# Patient Record
Sex: FEMALE | Race: BLACK OR AFRICAN AMERICAN | Employment: OTHER | URBAN - METROPOLITAN AREA
[De-identification: names, ages, dates, MRNs, and addresses within clinical notes are randomized per-mention and may not be internally consistent; named-entity substitution may affect disease eponyms.]

---

## 2017-01-05 ENCOUNTER — OFFICE VISIT (OUTPATIENT)
Dept: CARDIOLOGY CLINIC | Age: 77
End: 2017-01-05

## 2017-01-05 ENCOUNTER — DOCUMENTATION ONLY (OUTPATIENT)
Dept: CARDIOLOGY CLINIC | Age: 77
End: 2017-01-05

## 2017-01-05 VITALS
HEIGHT: 62 IN | RESPIRATION RATE: 20 BRPM | WEIGHT: 203 LBS | DIASTOLIC BLOOD PRESSURE: 72 MMHG | OXYGEN SATURATION: 97 % | HEART RATE: 101 BPM | SYSTOLIC BLOOD PRESSURE: 132 MMHG | BODY MASS INDEX: 37.36 KG/M2

## 2017-01-05 DIAGNOSIS — E78.5 DYSLIPIDEMIA: ICD-10-CM

## 2017-01-05 DIAGNOSIS — R06.09 DYSPNEA ON EXERTION: Primary | ICD-10-CM

## 2017-01-05 DIAGNOSIS — I20.8 ANGINAL EQUIVALENT (HCC): ICD-10-CM

## 2017-01-05 DIAGNOSIS — I20.9 ISCHEMIC CHEST PAIN (HCC): Primary | ICD-10-CM

## 2017-01-05 DIAGNOSIS — E11.9 TYPE 2 DIABETES MELLITUS WITHOUT COMPLICATION, UNSPECIFIED LONG TERM INSULIN USE STATUS: ICD-10-CM

## 2017-01-05 RX ORDER — SODIUM CHLORIDE 0.9 % (FLUSH) 0.9 %
5-10 SYRINGE (ML) INJECTION EVERY 8 HOURS
Status: CANCELLED | OUTPATIENT
Start: 2017-01-06

## 2017-01-05 RX ORDER — SODIUM CHLORIDE 0.9 % (FLUSH) 0.9 %
5-10 SYRINGE (ML) INJECTION AS NEEDED
Status: CANCELLED | OUTPATIENT
Start: 2017-01-06

## 2017-01-05 NOTE — PROGRESS NOTES
Right/Left heart cath with  tomorrrow 1-6 at 3:00 pm.Arrival time of 1:00pm.Npo after midnight,labs current. Hold Lasix in am prior to procedure. Pt advised of instructions

## 2017-01-05 NOTE — MR AVS SNAPSHOT
Visit Information Date & Time Provider Department Dept. Phone Encounter #  
 1/5/2017  3:00 PM Lizzy Agee MD [facility] [phone] [id number] Upcoming Health Maintenance Date Due  
 EYE EXAM RETINAL OR DILATED Q1 1/3/1950 GLAUCOMA SCREENING Q2Y 1/3/2005 INFLUENZA AGE 9 TO ADULT 8/1/2016 HEMOGLOBIN A1C Q6M 1/12/2017 MICROALBUMIN Q1 7/12/2017 LIPID PANEL Q1 7/12/2017 FOOT EXAM Q1 7/21/2017 MEDICARE YEARLY EXAM 7/22/2017 Pneumococcal 65+ Low/Medium Risk (2 of 2 - PPSV23) 11/15/2017 DTaP/Tdap/Td series (2 - Td) 7/21/2026 Allergies as of 1/5/2017  Review Complete On: 1/5/2017 By: Rebecca Merino Severity Noted Reaction Type Reactions Sulfa (Sulfonamide Antibiotics) High 03/11/2010    Hives, Itching Current Immunizations  Reviewed on 11/15/2016 No immunizations on file. Not reviewed this visit You Were Diagnosed With   
  
 Codes Comments Dyspnea on exertion    -  Primary ICD-10-CM: R06.09 
ICD-9-CM: 786.09 Dyslipidemia     ICD-10-CM: E78.5 ICD-9-CM: 272.4 Type 2 diabetes mellitus without complication, unspecified long term insulin use status (HCC)     ICD-10-CM: E11.9 ICD-9-CM: 250.00 Vitals BP Pulse Resp Height(growth percentile) Weight(growth percentile) SpO2  
 132/72 (BP 1 Location: Right arm) (!) 101 20 5' 2\" (1.575 m) 203 lb (92.1 kg) 97% BMI OB Status Smoking Status 37.13 kg/m2 Hysterectomy Former Smoker Vitals History BMI and BSA Data Body Mass Index Body Surface Area  
 37.13 kg/m 2 2.01 m 2 Preferred Pharmacy Pharmacy Name Phone [facility] [address] [phone] Your Updated Medication List  
  
   
This list is accurate as of: 1/5/17  4:18 PM.  Always use your most recent med list.  
  
  
  
  
 acetaminophen 650 mg CR tablet Commonly known as:  TYLENOL  
 Take 650 mg by mouth every six (6) hours as needed for Pain. alcohol swabs Padm Commonly known as:  BD Single Use Swabs Regular  
100 Each by Apply Externally route daily. alendronate 70 mg tablet Commonly known as:  FOSAMAX TAKE 1 TABLET EVERY 7 DAYS  
  
 atorvastatin 40 mg tablet Commonly known as:  LIPITOR Take 40 mg by mouth once over twenty-four (24) hours. Blood-Glucose Meter monitoring kit  
accu-check alexei plus meter Test blood sugar once daily E11.9 Calcium Carbonate-Vit D3-Min 600 mg calcium- 400 unit Tab Take  by mouth two (2) times a day. furosemide 20 mg tablet Commonly known as:  LASIX Take  by mouth every Monday, Wednesday, Friday. gabapentin 600 mg tablet Commonly known as:  NEURONTIN Take  by mouth three (3) times daily. glipiZIDE 5 mg tablet Commonly known as:  Ivon Waylon Take 5 mg by mouth once over twenty-four (24) hours. glucose blood VI test strips strip Commonly known as:  blood glucose test  
Accu-chek alexei plus test strips Test blood sugar once daily E11.9 GLUCOSE CONTROL Soln Generic drug:  Blood Glucose Control, Normal  
1 Bottle by Does Not Apply route daily. Lancets Misc  
accu-chek softclix lancets Check blood sugar daily E11.9  
  
 latanoprost 0.005 % ophthalmic solution Commonly known as:  Farhad Morales Administer 1 Drop to both eyes nightly. nystatin powder Commonly known as:  NYSTOP Apply  to affected area four (4) times daily for 90 days. oxybutynin 5 mg tablet Commonly known as:  OKVJWZUZ Take 1 Tab by mouth three (3) times daily for 270 days. raNITIdine 150 mg tablet Commonly known as:  ZANTAC Take 150 mg by mouth once over twenty-four (24) hours. We Performed the Following AMB POC EKG ROUTINE W/ 12 LEADS, INTER & REP [12675 CPT(R)] To-Do List   
 01/06/2017 1:45 PM  
  Appointment with CATH LAB [facility] VIRTUAL; CATH LAB [facility] at [address] [place] [address] CATH LAB ([phone]) NPO AFTER MIDNIGHT! ROUTINE CASES:  Please arrive 2 hours prior to your scheduled appointment time. If your scheduled appointment is for 7:30am, 8:00am or 8:15am, please arrive by 6:45am.  NON ROUTINE CASES:  1. If you require labs, x-ray, EKG or meds-please arrive 3 hours prior to your appointment time. 2.  If you require hydration prior to your procedure-please arrive 4 hours prior to your appointment time. ** It is the [facility] responsibility to notify the Cath Lab  of any prep required outside of the normal routine case**   Patient needs to arrive 2 hours before their appointment time, unless otherwise instructed. Introducing [facility] SERVICES! Dear Alok Mccall: Thank you for requesting a [employer]â„¢ account. Our records indicate that you already have an active [employer]â„¢ account. You can access your account anytime at [email or web address] Did you know that you can access your hospital and ER discharge instructions at any time in [employer]â„¢? You can also review all of your test results from your hospital stay or ER visit. Additional Information If you have questions, please visit the Frequently Asked Questions section of the [employer]â„¢ website at [email or web address] Remember, [employer]â„¢ is NOT to be used for urgent needs. For medical emergencies, dial 911. Now available from your iPhone and Android! Please provide this summary of care documentation to your next provider. Your primary care clinician is listed as [address]. If you have any questions after today's visit, please call [phone].

## 2017-01-05 NOTE — PROGRESS NOTES
4:36 PM Pt's. Chart reviewed possibly including viewing of labs, test results, imaging results and history and physical for possible cath/angio/EP procedure.

## 2017-01-05 NOTE — PROGRESS NOTES
Micheal Louis MD    [address] [facility], [address] [facility]    Office [phone],U [phone]  Pager [phone]    Jamal Cline is a 68 y.o. female is here for f/u visit . Primary care physician:  Mary French MD    Patient Active Problem List   Diagnosis Code    GERD (gastroesophageal reflux disease) K21.9    Arthritis of left knee M19.90    Pure hypercholesterolemia E78.00    Primary open angle glaucoma H40.1190    Type 2 diabetes mellitus without complication ([facility] [address] N10.7    Osteoporosis M81.0       Chief complaint:  Chief Complaint   Patient presents with    Irregular Heart Beat     afib    Shortness of Breath       Assessment:  Dyspnea -continues to have progressive dyspnea with exertion which has worsened. HLD  DM  Former smoker. However has been exposed to significant secondhand smoking        Plan:   Stress nuc study - Nml/ECHO- EF 68%/CTA-no acute pulmonary process/PFTs done by pulmonologist-patient states that she has been told that her lung function tests were okay. Jillian Hayes - nml  Continue aspirin 81 mg daily/Statin  Since the patient's symptoms of dyspnea are progressively worsening , I am not sure whether this is anginal equivalent in spite of having a normal stress test.  We will proceed with RHC/LHC. ( possible radial/brachial approach)  Cont meds    LHC +/- PCI/RHC consent    The risks (including but not limited to death, myocardial infarction, cerebrovascular accident, dysrhythmia, renal failure +/-dialysis, vascular complication, allergy, and/or need for emergency surgery), indications, benefits, and alternatives of cardiac catheterization +/- PCI have been explained in detail to the patient and family. Patient and family informed that if patient needs surgery  - it will be at [address] as [facility] does not have onsite surgery. All questions answered to patient's satisfaction.  Patient agrees to proceed with the procedure and  informed consent was obtained. Patient evaluated and can be MALDONADO candidate. Galen's R - normal    ASA 3    AW 3    Follow-up in 2-3 weeks postprocedure or earlier prn          Lab Results   Component Value Date/Time    Cholesterol, total 146 07/12/2016 09:37 AM    HDL Cholesterol 56 07/12/2016 09:37 AM    LDL, calculated 69 07/12/2016 09:37 AM    VLDL, calculated 21 07/12/2016 09:37 AM    Triglyceride 106 07/12/2016 09:37 AM    CHOL/HDL Ratio 4.4 09/02/2010 03:17 PM         Patient understands the plan. All questions were answered to the patient's satisfaction. Medication Side Effects and Warnings were discussed with patient: yes  Patient Labs were reviewed and or requested:  yes  Patient Past Records were reviewed and or requested: yes    I appreciate the opportunity to be involved in Ms. Pichardo. See note below for details. Please do not hesitate to contact us with questions or concerns. Manohar Monsalve MD    Cardiac Testing/ Procedures: A. Cardiac Cath/PCI:    B.ECHO/CHIP: 11/30/16 Left ventricle: Systolic function was normal. Ejection fraction was  estimated to be 68 % by Lira's biplane technique. There were no  regional wall motion abnormalities. Doppler parameters were consistent  with abnormal left ventricular relaxation (grade 1 diastolic dysfunction). Mitral valve: There was mild regurgitation. Tricuspid valve: There was near moderate regurgitation. Pulmonary artery  systolic pressure: 33 mmHg. C.StressNuclear/Stress ECHO/Stress test: 11/2016 - Nml Elba nuc stress test    D. Vascular: 12/6/16 - Nml resting NICKOLAS    E. EP:    F. Miscellaneous:    Subjective:  Nehemiah Hernandez is a 68 y.o. female who returns for follow up visit. Patient continues to have dyspnea with exertion. She is accompanied by her daughter. Both the patient and the daughter indicate that the symptoms are getting worse. She can hardly walk a few steps without starting to get short winded. No chest pain.   History of chronic mild swelling lower extremities. Patient was seen by pulmonary and has had LFTs done. She also has had a CTA chest done on 12/16/16-no acute cardiopulmonary process. CBC on 12/15/16-WBC 6, hemoglobin 12.8, platelets 072. Serum creatinine 0.82. K-3.8. Both the patient and the daughter is very worried about the patient's symptoms since it is becoming difficult to move around. ROS:  (bold if positive, if negative)             Medications before admission:    Current Outpatient Prescriptions   Medication Sig Dispense    nystatin (NYSTOP) powder Apply  to affected area four (4) times daily for 90 days. 180 g    glipiZIDE (GLUCOTROL) 5 mg tablet Take 5 mg by mouth once over twenty-four (24) hours.  atorvastatin (LIPITOR) 40 mg tablet Take 40 mg by mouth once over twenty-four (24) hours.  raNITIdine (ZANTAC) 150 mg tablet Take 150 mg by mouth once over twenty-four (24) hours.  alendronate (FOSAMAX) 70 mg tablet TAKE 1 TABLET EVERY 7 DAYS 12 Tab    Blood-Glucose Meter monitoring kit accu-check alexei plus meter Test blood sugar once daily E11.9 1 Kit    glucose blood VI test strips (BLOOD GLUCOSE TEST) strip Accu-chek alexei plus test strips Test blood sugar once daily E11.9 100 Strip    Lancets misc accu-chek softclix lancets Check blood sugar daily E11.9 100 Each    alcohol swabs (BD SINGLE USE SWABS REGULAR) padm 100 Each by Apply Externally route daily. 100 Pad    GLUCOSE CONTROL soln 1 Bottle by Does Not Apply route daily. 1 Bottle    oxybutynin (DITROPAN) 5 mg tablet Take 1 Tab by mouth three (3) times daily for 270 days. 270 Tab    acetaminophen (TYLENOL) 650 mg CR tablet Take 650 mg by mouth every six (6) hours as needed for Pain.  gabapentin (NEURONTIN) 600 mg tablet Take  by mouth three (3) times daily.  latanoprost (XALATAN) 0.005 % ophthalmic solution Administer 1 Drop to both eyes nightly.      Calcium Carbonate-Vit D3-Min 600 mg calcium- 400 unit tab Take  by mouth two (2) times a day.  furosemide (LASIX) 20 mg tablet Take  by mouth every Monday, Wednesday, Friday. No current facility-administered medications for this visit. Physical Exam:  Visit Vitals    /72 (BP 1 Location: Right arm)    Pulse (!) 101    Resp 20    Ht 5' 2\" (1.575 m)    Wt 203 lb (92.1 kg)    SpO2 97%    BMI 37.13 kg/m2          Gen: Well-developed, well-nourished, in no acute distress  Neck: Supple,No JVD, No Carotid Bruit,   Resp: No accessory muscle use, Clear breath sounds, No rales or rhonchi  Card: Regular Rate,Rythm,Normal S1, S2, No murmurs, rubs or gallop. No thrills.    Abd:  Soft, non-tender, non-distended,BS+,   MSK: No cyanosis  Skin: No rashes    Neuro: moving all four extremities , follows commands appropriately  Psych:  Good insight, oriented to person, place , alert, Nml Affect  LE: No edema    EKG:      LABS:        Lab Results   Component Value Date/Time    WBC 8.2 07/12/2016 09:37 AM    HGB 13.0 07/12/2016 09:37 AM    HCT 39.5 07/12/2016 09:37 AM    PLATELET 736 17/08/8584 09:37 AM    MCV 86 07/12/2016 09:37 AM     Lab Results   Component Value Date/Time    Sodium 142 07/12/2016 09:37 AM    Potassium 4.6 07/12/2016 09:37 AM    Chloride 100 07/12/2016 09:37 AM    CO2 25 07/12/2016 09:37 AM    Anion gap 8 07/15/2014 03:34 AM    Glucose 90 07/12/2016 09:37 AM    BUN 11 07/12/2016 09:37 AM    Creatinine 1.06 07/12/2016 09:37 AM    BUN/Creatinine ratio 10 07/12/2016 09:37 AM    GFR est AA 59 07/12/2016 09:37 AM    GFR est non-AA 51 07/12/2016 09:37 AM    Calcium 9.5 07/12/2016 09:37 AM       No results found for: APTT  Lab Results   Component Value Date/Time    INR 1.1 06/16/2014 02:25 PM    Prothrombin time 11.2 06/16/2014 02:25 PM     No components found for: Mitchell Hilliard, MD

## 2017-01-06 ENCOUNTER — HOSPITAL ENCOUNTER (OUTPATIENT)
Dept: CARDIAC CATH/INVASIVE PROCEDURES | Age: 77
Discharge: HOME | End: 2017-01-06
Attending: INTERNAL MEDICINE | Admitting: INTERNAL MEDICINE
Payer: MEDICARE

## 2017-01-06 VITALS
RESPIRATION RATE: 20 BRPM | WEIGHT: 203 LBS | DIASTOLIC BLOOD PRESSURE: 79 MMHG | SYSTOLIC BLOOD PRESSURE: 112 MMHG | HEART RATE: 62 BPM | OXYGEN SATURATION: 95 % | BODY MASS INDEX: 37.36 KG/M2 | HEIGHT: 62 IN | TEMPERATURE: 98.7 F

## 2017-01-06 DIAGNOSIS — I20.9 ISCHEMIC CHEST PAIN (HCC): ICD-10-CM

## 2017-01-06 LAB
ANION GAP BLD CALC-SCNC: 10 MMOL/L (ref 5–15)
BASOPHILS # BLD AUTO: 0 K/UL (ref 0–0.1)
BASOPHILS # BLD: 0 % (ref 0–1)
BUN SERPL-MCNC: 14 MG/DL (ref 6–20)
BUN/CREAT SERPL: 14 (ref 12–20)
CALCIUM SERPL-MCNC: 9.1 MG/DL (ref 8.5–10.1)
CHLORIDE SERPL-SCNC: 104 MMOL/L (ref 97–108)
CO2 SERPL-SCNC: 26 MMOL/L (ref 21–32)
COHGB MFR BLD: 0.6 % (ref 1–2)
COHGB MFR BLD: 0.7 % (ref 1–2)
CREAT SERPL-MCNC: 0.98 MG/DL (ref 0.55–1.02)
EOSINOPHIL # BLD: 0.1 K/UL (ref 0–0.4)
EOSINOPHIL NFR BLD: 1 % (ref 0–7)
ERYTHROCYTE [DISTWIDTH] IN BLOOD BY AUTOMATED COUNT: 14.4 % (ref 11.5–14.5)
GLUCOSE SERPL-MCNC: 77 MG/DL (ref 65–100)
HCT VFR BLD AUTO: 39.1 % (ref 35–47)
HGB BLD OXIMETRY-MCNC: 11.1 G/DL (ref 14–17)
HGB BLD OXIMETRY-MCNC: 12.7 G/DL (ref 14–17)
HGB BLD-MCNC: 12.2 G/DL (ref 11.5–16)
INR PPP: 1.1 (ref 0.9–1.1)
LYMPHOCYTES # BLD AUTO: 23 % (ref 12–49)
LYMPHOCYTES # BLD: 1.6 K/UL (ref 0.8–3.5)
MCH RBC QN AUTO: 27.9 PG (ref 26–34)
MCHC RBC AUTO-ENTMCNC: 31.2 G/DL (ref 30–36.5)
MCV RBC AUTO: 89.3 FL (ref 80–99)
METHGB MFR BLD: 0.2 % (ref 0–1.4)
METHGB MFR BLD: 0.5 % (ref 0–1.4)
MONOCYTES # BLD: 0.4 K/UL (ref 0–1)
MONOCYTES NFR BLD AUTO: 6 % (ref 5–13)
NEUTS SEG # BLD: 4.8 K/UL (ref 1.8–8)
NEUTS SEG NFR BLD AUTO: 70 % (ref 32–75)
OXYHGB MFR BLD: 76.7 % (ref 94–97)
OXYHGB MFR BLD: 96 % (ref 94–97)
PLATELET # BLD AUTO: 247 K/UL (ref 150–400)
POTASSIUM SERPL-SCNC: 3.8 MMOL/L (ref 3.5–5.1)
PROTHROMBIN TIME: 10.7 SEC (ref 9–11.1)
RBC # BLD AUTO: 4.38 M/UL (ref 3.8–5.2)
SAO2 % BLD: 78 % (ref 95–99)
SAO2 % BLD: 97 % (ref 95–99)
SODIUM SERPL-SCNC: 140 MMOL/L (ref 136–145)
WBC # BLD AUTO: 6.9 K/UL (ref 3.6–11)

## 2017-01-06 PROCEDURE — C1751 CATH, INF, PER/CENT/MIDLINE: HCPCS

## 2017-01-06 PROCEDURE — 74011000250 HC RX REV CODE- 250: Performed by: INTERNAL MEDICINE

## 2017-01-06 PROCEDURE — 77030029065 HC DRSG HEMO QCLOT ZMED -B

## 2017-01-06 PROCEDURE — 85610 PROTHROMBIN TIME: CPT | Performed by: INTERNAL MEDICINE

## 2017-01-06 PROCEDURE — 77030004532 HC CATH ANGI DX IMP BSC -A

## 2017-01-06 PROCEDURE — 74011250636 HC RX REV CODE- 250/636: Performed by: INTERNAL MEDICINE

## 2017-01-06 PROCEDURE — 93460 R&L HRT ART/VENTRICLE ANGIO: CPT

## 2017-01-06 PROCEDURE — 77030019569 HC BND COMPR RAD TERU -B

## 2017-01-06 PROCEDURE — 85025 COMPLETE CBC W/AUTO DIFF WBC: CPT | Performed by: INTERNAL MEDICINE

## 2017-01-06 PROCEDURE — 77030028837 HC SYR ANGI PWR INJ COEU -A

## 2017-01-06 PROCEDURE — C1894 INTRO/SHEATH, NON-LASER: HCPCS

## 2017-01-06 PROCEDURE — 36415 COLL VENOUS BLD VENIPUNCTURE: CPT | Performed by: INTERNAL MEDICINE

## 2017-01-06 PROCEDURE — C1769 GUIDE WIRE: HCPCS

## 2017-01-06 PROCEDURE — 82375 ASSAY CARBOXYHB QUANT: CPT | Performed by: INTERNAL MEDICINE

## 2017-01-06 PROCEDURE — 74011636320 HC RX REV CODE- 636/320: Performed by: INTERNAL MEDICINE

## 2017-01-06 PROCEDURE — 80048 BASIC METABOLIC PNL TOTAL CA: CPT | Performed by: INTERNAL MEDICINE

## 2017-01-06 RX ORDER — HEPARIN SODIUM 200 [USP'U]/100ML
500 INJECTION, SOLUTION INTRAVENOUS
Status: DISCONTINUED | OUTPATIENT
Start: 2017-01-06 | End: 2017-01-06 | Stop reason: HOSPADM

## 2017-01-06 RX ORDER — MIDAZOLAM HYDROCHLORIDE 1 MG/ML
.5-1 INJECTION, SOLUTION INTRAMUSCULAR; INTRAVENOUS
Status: DISCONTINUED | OUTPATIENT
Start: 2017-01-06 | End: 2017-01-06 | Stop reason: HOSPADM

## 2017-01-06 RX ORDER — DIPHENHYDRAMINE HYDROCHLORIDE 50 MG/ML
25 INJECTION, SOLUTION INTRAMUSCULAR; INTRAVENOUS
Status: DISCONTINUED | OUTPATIENT
Start: 2017-01-06 | End: 2017-01-06 | Stop reason: HOSPADM

## 2017-01-06 RX ORDER — SODIUM CHLORIDE 9 MG/ML
75 INJECTION, SOLUTION INTRAVENOUS CONTINUOUS
Status: DISCONTINUED | OUTPATIENT
Start: 2017-01-06 | End: 2017-01-06 | Stop reason: HOSPADM

## 2017-01-06 RX ORDER — ACETAMINOPHEN 325 MG/1
650 TABLET ORAL
Status: DISCONTINUED | OUTPATIENT
Start: 2017-01-06 | End: 2017-01-06 | Stop reason: HOSPADM

## 2017-01-06 RX ORDER — HYDROCORTISONE SODIUM SUCCINATE 100 MG/2ML
100 INJECTION, POWDER, FOR SOLUTION INTRAMUSCULAR; INTRAVENOUS
Status: DISCONTINUED | OUTPATIENT
Start: 2017-01-06 | End: 2017-01-06 | Stop reason: HOSPADM

## 2017-01-06 RX ORDER — VERAPAMIL HYDROCHLORIDE 2.5 MG/ML
2.5-5 INJECTION, SOLUTION INTRAVENOUS
Status: DISCONTINUED | OUTPATIENT
Start: 2017-01-06 | End: 2017-01-06 | Stop reason: HOSPADM

## 2017-01-06 RX ORDER — HEPARIN SODIUM 1000 [USP'U]/ML
1000-5000 INJECTION, SOLUTION INTRAVENOUS; SUBCUTANEOUS
Status: DISCONTINUED | OUTPATIENT
Start: 2017-01-06 | End: 2017-01-06 | Stop reason: HOSPADM

## 2017-01-06 RX ORDER — SODIUM CHLORIDE 0.9 % (FLUSH) 0.9 %
5-10 SYRINGE (ML) INJECTION EVERY 8 HOURS
Status: DISCONTINUED | OUTPATIENT
Start: 2017-01-06 | End: 2017-01-06 | Stop reason: HOSPADM

## 2017-01-06 RX ORDER — SODIUM CHLORIDE 9 MG/ML
100 INJECTION, SOLUTION INTRAVENOUS CONTINUOUS
Status: DISCONTINUED | OUTPATIENT
Start: 2017-01-06 | End: 2017-01-06 | Stop reason: HOSPADM

## 2017-01-06 RX ORDER — LIDOCAINE HYDROCHLORIDE 10 MG/ML
1-20 INJECTION INFILTRATION; PERINEURAL
Status: DISCONTINUED | OUTPATIENT
Start: 2017-01-06 | End: 2017-01-06 | Stop reason: HOSPADM

## 2017-01-06 RX ORDER — SODIUM CHLORIDE 0.9 % (FLUSH) 0.9 %
5-10 SYRINGE (ML) INJECTION AS NEEDED
Status: DISCONTINUED | OUTPATIENT
Start: 2017-01-06 | End: 2017-01-06 | Stop reason: HOSPADM

## 2017-01-06 RX ORDER — FENTANYL CITRATE 50 UG/ML
25-200 INJECTION, SOLUTION INTRAMUSCULAR; INTRAVENOUS
Status: DISCONTINUED | OUTPATIENT
Start: 2017-01-06 | End: 2017-01-06 | Stop reason: HOSPADM

## 2017-01-06 RX ADMIN — VERAPAMIL HYDROCHLORIDE 2.5 MG: 2.5 INJECTION, SOLUTION INTRAVENOUS at 14:40

## 2017-01-06 RX ADMIN — MIDAZOLAM HYDROCHLORIDE 1 MG: 1 INJECTION, SOLUTION INTRAMUSCULAR; INTRAVENOUS at 13:48

## 2017-01-06 RX ADMIN — HEPARIN SODIUM 1000 UNITS: 200 INJECTION, SOLUTION INTRAVENOUS at 13:40

## 2017-01-06 RX ADMIN — LIDOCAINE HYDROCHLORIDE 15 ML: 10 INJECTION, SOLUTION INFILTRATION; PERINEURAL at 13:50

## 2017-01-06 RX ADMIN — FENTANYL CITRATE 50 MCG: 50 INJECTION, SOLUTION INTRAMUSCULAR; INTRAVENOUS at 13:40

## 2017-01-06 RX ADMIN — MIDAZOLAM HYDROCHLORIDE 1 MG: 1 INJECTION, SOLUTION INTRAMUSCULAR; INTRAVENOUS at 14:19

## 2017-01-06 RX ADMIN — MIDAZOLAM HYDROCHLORIDE 1 MG: 1 INJECTION, SOLUTION INTRAMUSCULAR; INTRAVENOUS at 14:03

## 2017-01-06 RX ADMIN — IOPAMIDOL 80 ML: 755 INJECTION, SOLUTION INTRAVENOUS at 14:43

## 2017-01-06 RX ADMIN — MIDAZOLAM HYDROCHLORIDE 2 MG: 1 INJECTION, SOLUTION INTRAMUSCULAR; INTRAVENOUS at 14:32

## 2017-01-06 RX ADMIN — MIDAZOLAM HYDROCHLORIDE 1 MG: 1 INJECTION, SOLUTION INTRAMUSCULAR; INTRAVENOUS at 14:29

## 2017-01-06 RX ADMIN — SODIUM CHLORIDE 100 ML/HR: 900 INJECTION, SOLUTION INTRAVENOUS at 15:46

## 2017-01-06 RX ADMIN — SODIUM CHLORIDE 75 ML/HR: 900 INJECTION, SOLUTION INTRAVENOUS at 12:33

## 2017-01-06 RX ADMIN — FENTANYL CITRATE 25 MCG: 50 INJECTION, SOLUTION INTRAMUSCULAR; INTRAVENOUS at 13:51

## 2017-01-06 RX ADMIN — NITROGLYCERIN 100 MCG: 5 INJECTION, SOLUTION INTRAVENOUS at 14:22

## 2017-01-06 RX ADMIN — FENTANYL CITRATE 25 MCG: 50 INJECTION, SOLUTION INTRAMUSCULAR; INTRAVENOUS at 14:20

## 2017-01-06 RX ADMIN — NITROGLYCERIN 100 MCG: 5 INJECTION, SOLUTION INTRAVENOUS at 14:30

## 2017-01-06 RX ADMIN — VERAPAMIL HYDROCHLORIDE 2.5 MG: 2.5 INJECTION, SOLUTION INTRAVENOUS at 13:48

## 2017-01-06 RX ADMIN — LIDOCAINE HYDROCHLORIDE 7 ML: 10 INJECTION, SOLUTION INFILTRATION; PERINEURAL at 13:44

## 2017-01-06 RX ADMIN — NITROGLYCERIN 100 MCG: 5 INJECTION, SOLUTION INTRAVENOUS at 14:40

## 2017-01-06 RX ADMIN — MIDAZOLAM HYDROCHLORIDE 1 MG: 1 INJECTION, SOLUTION INTRAMUSCULAR; INTRAVENOUS at 14:08

## 2017-01-06 RX ADMIN — HEPARIN SODIUM 3000 UNITS: 1000 INJECTION, SOLUTION INTRAVENOUS; SUBCUTANEOUS at 14:29

## 2017-01-06 RX ADMIN — MIDAZOLAM HYDROCHLORIDE 3 MG: 1 INJECTION, SOLUTION INTRAMUSCULAR; INTRAVENOUS at 13:40

## 2017-01-06 RX ADMIN — NITROGLYCERIN 100 MCG: 5 INJECTION, SOLUTION INTRAVENOUS at 13:47

## 2017-01-06 NOTE — PROCEDURES
BRIEF PROCEDURE NOTE    Date of Procedure: 1/6/2017   Preoperative Diagnosis: Dyspnea - ? Anginal equivalent - CCS 3  Postoperative Diagnosis: No sig CAD  Procedure: Left heart cath, LV angiography, coronary angiography  Interventional Cardiologist: Mai Kingsley MD  Anesthesia: local + IV sedation  Estimated Blood Loss: Minimal  Findings:     R radial approach  Attempted R brachial vein - unable to get access; Switched to R Femoral vein    R SCV angiogram  R Brachial vein venogram    L Main: Nml    LAD: Mid 30%; Med; D1 - MLI    LCflex: Med; MLI; OM1 - small to med; MLI    RCA:  Med to large; Dominant; Nml      LVEF: 60%    No significant gradient across aortic valve. PCI: none        RHC findings:    RAPm8=       mmHg  RVSP= 42    mmHg  PAPm=  16      mmHg  PCWPm= 9   mmHg    Specimens Removed : None    Closure Device: TR band R  Radial                              Manual - R Femoral vein        See full cath note.     Complications: none    Mai Kingsley MD

## 2017-01-06 NOTE — DISCHARGE INSTRUCTIONS
Kori Gastelum MD    [address] [facility], [address] [facility]    Office [phone],ABD [phone]    Patient ID:  Court Sims  [id number]  86 y.o.  1940    Admit Date: 1/6/2017    Discharge Date: 1/6/2017     Admitting Physician: Kori Gastelum MD     Discharge Physician: Kori Gastelum MD    Admission Diagnoses:   [facility]    Discharge Diagnoses:   No sig CAD    Discharge Condition: Good    Cardiology Procedures this Admission:  Diagnostic left heart catheterization    Disposition: home    Patient Instructions:         Reference discharge instructions provided by nursing for diet and activity. Follow-up with Dr Alexa Shepard as scheduled    Signed:  Kori Gastelum MD  1/6/2017  3:14 PM      Radial Cardiac Catheterization/Angiography Discharge Instructions    It is normal to feel tired the first couple days. Take it easy and follow the physicians instructions. CHECK THE CATHETER INSERTION SITE DAILY:    Remove the wrist dressing 24 hours after the procedure. You may shower 24 hours after the procedure. Wash with soap and water and pat dry. Gentle cleaning of the site with soap and water is sufficient, cover with a dry clean dressing or bandage. Do not apply creams or powders to the area. No soaking the wrist for 3 days. Leave the puncture site open to air after 24 hours post-procedure. CALL THE PHYSICIANS:     If the site becomes red, swollen or feels warm to the touch  If there is bleeding or drainage or if there is unusual pain at the radial site. If there is any minor oozing, you may apply a band-aid and remove after 12 hours. If the bleeding continues, hold pressure with the middle finger against the puncture site and the thumb against the back of the wrist,call 911 to be transported to the hospital.  DO NOT DRIVE YOURSELF, [address]     ACTIVITY:   For the first 24 hours do not manipulate the wrist.  No lifting, pushing or pulling over 3-5 pounds with the affected wrist for 7 daysand no straining the insertion site. Do not life grocery bags or the garbage can, do not run the vacuum  or  for 7 days. Start with short walks as in the hospital and gradually increase as tolerated each day. It is recommended to walk 30 minutes 5-7 days per week. Follow your physicians instructions on activity. Avoid walking outside in extremes of heat or cold. Walk inside when it is cold and windy or hot and humid. Things to keep in mind:  No driving for at least 24 hours, or as designated by your physician. Limit the number of times you go up and down the stairs  Take rests and pace yourself with activity. Be careful and do not strain with bowel movements. MEDICATIONS:    Take all medications as prescribed  Call your physician if you have any questions  Keep an updated list of your medications with you at all times and give a list to your physician and pharmacist    SIGNS AND SYMPTOMS:   Be cautious of symptoms of angina or recurrent symptoms such as chest discomfort, unusual shortness of breath or fatigue. These could be symptoms of restenosis, a new blockage or a heart attack. If your symptoms are relieved with rest it is still recommended that you notify your physician of recurrent chest pain or discomfort. For CHEST PAIN or symptoms of angina not relieved with rest:  If the discomfort is not relieved with rest, and you have been prescribed Nitroglycerin, take as directed (taken under the tongue, one at a time 5 minutes apart for a total of 3 doses). If the discomfort is not relieved after the 3rd nitroglycerin, call 911. If you have not been prescribed Nitroglycerin  and your chest discomfort is not relieved with rest, call 911. AFTER CARE:   Follow up with your physician as instructed.   Follow a heart healthy diet with proper portion control, daily stress management, daily exercise, blood pressure and cholesterol control , and smoking cessation.

## 2017-01-06 NOTE — IP AVS SNAPSHOT
[address] 
[phone] Patient: Rigoberto Cruz MRN: [id number] EHU:[id number]/[id number] You are allergic to the following Allergen Reactions Sulfa (Sulfonamide Antibiotics) Hives Itching Recent Documentation Height Weight BMI OB Status Smoking Status 1.575 m 92.1 kg 37.13 kg/m2 Hysterectomy Former Smoker Emergency Contacts Name Discharge Info Relation Home Work Mobile Nafisa Ferreira CAREGIVER [3] Daughter [21] [phone] Siomara Borrego  Child [2] [phone] About your hospitalization You were admitted on:  January 6, 2017 You last received care in the:  [facility] PACU You were discharged on:  January 6, 2017 Unit phone number:  [phone] Why you were hospitalized Your primary diagnosis was:  Not on File Providers Seen During Your Hospitalizations Provider Role Specialty Primary office phone Julius Bright MD Attending Provider Cardiology [phone] Your Primary Care Physician (PCP) Primary Care Physician Office Phone Office Fax George Trivedi [phone] Follow-up Information Follow up With Details Comments Contact Info MD Marilynn Haro [address] 
[phone] Your Appointments Tuesday January 10, 2017 10:00 AM EST  
ROUTINE CARE with Umesh Leggett MD  
[address] [place] [address] [facility] [address]ilynn [address]  
[phone] Friday January 27, 2017 11:20 AM EST  
ESTABLISHED PATIENT with Julius Bright MD  
[facility] ([facility]) [address] [place] [address] [phone] [facility]  
[phone] Current Discharge Medication List  
  
 CONTINUE these medications which have NOT CHANGED Dose & Instructions Dispensing Information Comments Morning Noon Evening Bedtime  
 acetaminophen 650 mg CR tablet Commonly known as:  TYLENOL Your next dose is: Today, Tomorrow Other:  _________ Dose:  650 mg Take 650 mg by mouth every six (6) hours as needed for Pain. Refills:  0  
     
   
   
   
  
 alcohol swabs Padm Commonly known as:  BD Single Use Swabs Regular Your next dose is: Today, Tomorrow Other:  _________ Dose:  100 Each  
100 Each by Apply Externally route daily. Quantity:  100 Pad Refills:  1  
     
   
   
   
  
 alendronate 70 mg tablet Commonly known as:  FOSAMAX Your next dose is: Today, Tomorrow Other:  _________ TAKE 1 TABLET EVERY 7 DAYS Quantity:  12 Tab Refills:  0  
     
   
   
   
  
 atorvastatin 40 mg tablet Commonly known as:  LIPITOR Your next dose is: Today, Tomorrow Other:  _________ Dose:  40 mg Take 40 mg by mouth once over twenty-four (24) hours. Refills:  0 Blood-Glucose Meter monitoring kit Your next dose is: Today, Tomorrow Other:  _________  
   
   
 accu-check alexei plus meter Test blood sugar once daily E11.9 Quantity:  1 Kit Refills:  0 Calcium Carbonate-Vit D3-Min 600 mg calcium- 400 unit Tab Your next dose is: Today, Tomorrow Other:  _________ Take  by mouth two (2) times a day. Refills:  0  
     
   
   
   
  
 furosemide 20 mg tablet Commonly known as:  LASIX Your next dose is: Today, Tomorrow Other:  _________ Take  by mouth every Monday, Wednesday, Friday. Refills:  0  
     
   
   
   
  
 gabapentin 600 mg tablet Commonly known as:  NEURONTIN Your next dose is: Today, Tomorrow Other:  _________ Take  by mouth three (3) times daily. Refills:  0  
     
   
   
   
  
 glipiZIDE 5 mg tablet Commonly known as:  Noam Cavazos Your next dose is: Today, Tomorrow Other:  _________ Dose:  5 mg Take 5 mg by mouth once over twenty-four (24) hours. Refills:  0  
     
   
   
   
  
 glucose blood VI test strips strip Commonly known as:  blood glucose test  
   
Your next dose is: Today, Tomorrow Other:  _________ Accu-chek alexei plus test strips Test blood sugar once daily E11.9 Quantity:  100 Strip Refills:  1 GLUCOSE CONTROL Soln Generic drug:  Blood Glucose Control, Normal  
   
Your next dose is: Today, Tomorrow Other:  _________ Dose:  1 Bottle 1 Bottle by Does Not Apply route daily. Quantity:  1 Bottle Refills:  1 Lancets Misc Your next dose is: Today, Tomorrow Other:  _________  
   
   
 accu-chek softclix lancets Check blood sugar daily E11.9 Quantity:  100 Each Refills:  1  
     
   
   
   
  
 latanoprost 0.005 % ophthalmic solution Commonly known as:  Ede Sarah Your next dose is: Today, Tomorrow Other:  _________ Dose:  1 Drop Administer 1 Drop to both eyes nightly. Refills:  0  
     
   
   
   
  
 nystatin powder Commonly known as:  NYSTOP Your next dose is: Today, Tomorrow Other:  _________ Apply  to affected area four (4) times daily for 90 days. Quantity:  180 g Refills:  3  
     
   
   
   
  
 oxybutynin 5 mg tablet Commonly known as:  BOUUHKVN Your next dose is: Today, Tomorrow Other:  _________ Dose:  5 mg Take 1 Tab by mouth three (3) times daily for 270 days. Quantity:  270 Tab Refills:  1  
     
   
   
   
  
 raNITIdine 150 mg tablet Commonly known as:  ZANTAC Your next dose is: Today, Tomorrow Other:  _________ Dose:  150 mg Take 150 mg by mouth once over twenty-four (24) hours. Refills:  0 Discharge Instructions Brooks Holt MD 
 
[address] [facility] [address] [facility] Office [phone] Patient ID: 
Srikanth Pope [id number] 
39 y.o. 
1940 Admit Date: 1/6/2017 Discharge Date: 1/6/2017 Admitting Physician: Brooks Holt MD  
 
Discharge Physician: Brooks Holt MD 
 
Admission Diagnoses:  
R&LHC Discharge Diagnoses: No sig CAD Discharge Condition: Good Cardiology Procedures this Admission:  Diagnostic left heart catheterization Disposition: home Patient Instructions:  
 
 
 
Reference discharge instructions provided by nursing for diet and activity. Follow-up with Dr Bhupendra Resendiz as scheduled Signed: 
Brooks Holt MD 
1/6/2017 
3:14 PM 
 
 
Radial Cardiac Catheterization/Angiography Discharge Instructions It is normal to feel tired the first couple days. Take it easy and follow the physicians instructions. CHECK THE CATHETER INSERTION SITE DAILY: 
 
Remove the wrist dressing 24 hours after the procedure. You may shower 24 hours after the procedure. Wash with soap and water and pat dry. Gentle cleaning of the site with soap and water is sufficient, cover with a dry clean dressing or bandage. Do not apply creams or powders to the area. No soaking the wrist for 3 days. Leave the puncture site open to air after 24 hours post-procedure. CALL THE PHYSICIANS:  
 
If the site becomes red, swollen or feels warm to the touch If there is bleeding or drainage or if there is unusual pain at the radial site. If there is any minor oozing, you may apply a band-aid and remove after 12 hours.   
If the bleeding continues, hold pressure with the middle finger against the puncture site and the thumb against the back of the wrist,call 911 to be transported to the hospital. 
DO NOT DRIVE YOURSELF, OR HAVE ANYONE ELSE DRIVE YOU  CALL [phone]. ACTIVITY:  
For the first 24 hours do not manipulate the wrist. 
No lifting, pushing or pulling over 3-5 pounds with the affected wrist for 7 daysand no straining the insertion site. Do not life grocery bags or the garbage can, do not run the vacuum  or  for 7 days. Start with short walks as in the hospital and gradually increase as tolerated each day. It is recommended to walk 30 minutes 5-7 days per week. Follow your physicians instructions on activity. Avoid walking outside in extremes of heat or cold. Walk inside when it is cold and windy or hot and humid. Things to keep in mind: 
No driving for at least 24 hours, or as designated by your physician. Limit the number of times you go up and down the stairs Take rests and pace yourself with activity. Be careful and do not strain with bowel movements. MEDICATIONS: 
 
Take all medications as prescribed Call your physician if you have any questions Keep an updated list of your medications with you at all times and give a list to your physician and pharmacist 
 
SIGNS AND SYMPTOMS:  
Be cautious of symptoms of angina or recurrent symptoms such as chest discomfort, unusual shortness of breath or fatigue. These could be symptoms of restenosis, a new blockage or a heart attack. If your symptoms are relieved with rest it is still recommended that you notify your physician of recurrent chest pain or discomfort. For CHEST PAIN or symptoms of angina not relieved with rest:  If the discomfort is not relieved with rest, and you have been prescribed Nitroglycerin, take as directed (taken under the tongue, one at a time 5 minutes apart for a total of 3 doses). If the discomfort is not relieved after the 3rd nitroglycerin, call 911. If you have not been prescribed Nitroglycerin  and your chest discomfort is not relieved with rest, call 911. AFTER CARE:  
Follow up with your physician as instructed. Follow a heart healthy diet with proper portion control, daily stress management, daily exercise, blood pressure and cholesterol control , and smoking cessation. Discharge Orders None [facility]! Dear Anel Conde: Thank you for requesting a [employer] account. Our records indicate that you already have an active [employer] account. You can access your account anytime at [email or web address] Did you know that you can access your hospital and ER discharge instructions at any time in [employer]? You can also review all of your test results from your hospital stay or ER visit. Additional Information If you have questions, please visit the Frequently Asked Questions section of the [employer] website at [email or web address] Remember, [employer] is NOT to be used for urgent needs. For medical emergencies, dial 911. Now available from your iPhone and Android! General Information Please provide this summary of care documentation to your next provider. Patient Signature:  ____________________________________________________________ Date:  ____________________________________________________________  
  
Earnstine Rosales Provider Signature:  ____________________________________________________________ Date:  ____________________________________________________________

## 2017-01-06 NOTE — PROGRESS NOTES
Chart reviewed for Pre-Procedure evaluation and documentation. -- Areas reviewed, BUT NOT LIMITED TO, are Patient's current and past H&P, Labs, all Notes, all Media records, radiology records, and medications.

## 2017-01-06 NOTE — PROGRESS NOTES
11:53 AM Received patient from waiting area. Armband and allergies verbally confirmed with patient. Procedure explained and consents signed. 1130 labs sent right radial pleth good for radial and ulnar  1025 TRANSFER - OUT REPORT:    Verbal report given to Michelle rn(name) on Lazaro Rangel  being transferred to cath lab(unit) for routine progression of care       Report consisted of patients Situation, Background, Assessment and   Recommendations(SBAR). Information from the following report(s) Procedure Summary was reviewed with the receiving nurse. Lines:   Peripheral IV 01/06/17 Right Antecubital (Active)   Site Assessment Clean, dry, & intact 1/6/2017 12:24 PM       Peripheral IV 01/06/17 Left Antecubital (Active)   Site Assessment Clean, dry, & intact 1/6/2017 12:24 PM        Opportunity for questions and clarification was provided. Patient transported with:   Registered Nurse        2:49 PM TRANSFER - IN REPORT:    Verbal report received from Michelle mera(name) on Lazaro Rangel  being received from cath lab(unit) for routine progression of care      Report consisted of patients Situation, Background, Assessment and   Recommendations(SBAR). Information from the following report(s) Procedure Summary was reviewed with the receiving nurse. Opportunity for questions and clarification was provided. Assessment completed upon patients arrival to unit and care assumed. 1530 update given to daughter, pt able to void on bedpan    3:49 PM 3 cc removed from tr band  355 3 cc removed from tr band site soft d ry in tact  405 4 cc removed from tr band site soft dry in tact, tr band removed and quick clot dressing applied, arm board reapplied sitting pt up 30 degrees  4:10 taking po without problem, daughter brought back to review discharge summary   5 pm Copy of printed discharge instructions given to patient and   daughter            . daughter Discharge instructions given to patient and daughter. All questions answered. Patient and daughter verbalized understanding. Patient escorted via wheelchair to entrance. daughter driving. Patient discharged into care of daughter.

## 2017-01-06 NOTE — IP AVS SNAPSHOT
Current Discharge Medication List  
  
Take these medications at their scheduled times Dose & Instructions Dispensing Information Comments Morning Noon Evening Bedtime  
 alcohol swabs Padm Commonly known as:  BD Single Use Swabs Regular Your next dose is: Today, Tomorrow Other:  ____________ Dose:  100 Each  
100 Each by Apply Externally route daily. Quantity:  100 Pad Refills:  1 Calcium Carbonate-Vit D3-Min 600 mg calcium- 400 unit Tab Your next dose is: Today, Tomorrow Other:  ____________ Take  by mouth two (2) times a day. Refills:  0  
     
   
   
   
  
 furosemide 20 mg tablet Commonly known as:  LASIX Your next dose is: Today, Tomorrow Other:  ____________ Take  by mouth every Monday, Wednesday, Friday. Refills:  0  
     
   
   
   
  
 gabapentin 600 mg tablet Commonly known as:  NEURONTIN Your next dose is: Today, Tomorrow Other:  ____________ Take  by mouth three (3) times daily. Refills:  0 GLUCOSE CONTROL Soln Generic drug:  Blood Glucose Control, Normal  
   
Your next dose is: Today, Tomorrow Other:  ____________ Dose:  1 Bottle 1 Bottle by Does Not Apply route daily. Quantity:  1 Bottle Refills:  1  
     
   
   
   
  
 latanoprost 0.005 % ophthalmic solution Commonly known as:  Gina Furbish Your next dose is: Today, Tomorrow Other:  ____________ Dose:  1 Drop Administer 1 Drop to both eyes nightly. Refills:  0  
     
   
   
   
  
 nystatin powder Commonly known as:  NYSTOP Your next dose is: Today, Tomorrow Other:  ____________ Apply  to affected area four (4) times daily for 90 days. Quantity:  180 g Refills:  3  
     
   
   
   
  
 oxybutynin 5 mg tablet Commonly known as:  CDKPANXZ  
   
 Your next dose is: Today, Tomorrow Other:  ____________ Dose:  5 mg Take 1 Tab by mouth three (3) times daily for 270 days. Quantity:  270 Tab Refills:  1 Take these medications as needed Dose & Instructions Dispensing Information Comments Morning Noon Evening Bedtime  
 acetaminophen 650 mg CR tablet Commonly known as:  TYLENOL Your next dose is: Today, Tomorrow Other:  ____________ Dose:  650 mg Take 650 mg by mouth every six (6) hours as needed for Pain. Refills:  0 Take these medications as directed Dose & Instructions Dispensing Information Comments Morning Noon Evening Bedtime  
 alendronate 70 mg tablet Commonly known as:  FOSAMAX Your next dose is: Today, Tomorrow Other:  ____________ TAKE 1 TABLET EVERY 7 DAYS Quantity:  12 Tab Refills:  0  
     
   
   
   
  
 atorvastatin 40 mg tablet Commonly known as:  LIPITOR Your next dose is: Today, Tomorrow Other:  ____________ Dose:  40 mg Take 40 mg by mouth once over twenty-four (24) hours. Refills:  0 Blood-Glucose Meter monitoring kit Your next dose is: Today, Tomorrow Other:  ____________  
   
   
 accu-check alexei plus meter Test blood sugar once daily E11.9 Quantity:  1 Kit Refills:  0  
     
   
   
   
  
 glipiZIDE 5 mg tablet Commonly known as:  Rashida Ryan Your next dose is: Today, Tomorrow Other:  ____________ Dose:  5 mg Take 5 mg by mouth once over twenty-four (24) hours. Refills:  0  
     
   
   
   
  
 glucose blood VI test strips strip Commonly known as:  blood glucose test  
   
Your next dose is: Today, Tomorrow Other:  ____________ Accu-chek alexei plus test strips Test blood sugar once daily E11.9 Quantity:  100 Strip Refills:  1 Lancets Misc Your next dose is: Today, Tomorrow Other:  ____________  
   
   
 accu-chek softclix lancets Check blood sugar daily E11.9 Quantity:  100 Each Refills:  1  
     
   
   
   
  
 raNITIdine 150 mg tablet Commonly known as:  ZANTAC Your next dose is: Today, Tomorrow Other:  ____________ Dose:  150 mg Take 150 mg by mouth once over twenty-four (24) hours. Refills:  0

## 2017-01-06 NOTE — PROGRESS NOTES
Chart reviewed for pre-procedure, evaluation and documentation. Areas reviewed, but not limited to, are patients current and past H&P, labs, all notes, all media records, radiology records and medications.

## 2017-01-06 NOTE — H&P (VIEW-ONLY)
Brooks Holt MD    [address] [facility] [place], [address] [facility]    Office [phone],LJJ [phone]  Pager [phone]    Srikanth Pope is a 68 y.o. female is here for f/u visit . Primary care physician:  Dasia Quintero MD    Patient Active Problem List   Diagnosis Code    GERD (gastroesophageal reflux disease) K21.9    Arthritis of left knee M19.90    Pure hypercholesterolemia E78.00    Primary open angle glaucoma H40.1190    Type 2 diabetes mellitus without complication ([facility] [address] Y77.4    Osteoporosis M81.0       Chief complaint:  Chief Complaint   Patient presents with    Irregular Heart Beat     afib    Shortness of Breath       Assessment:  Dyspnea -continues to have progressive dyspnea with exertion which has worsened. HLD  DM  Former smoker. However has been exposed to significant secondhand smoking        Plan:   Stress nuc study - Nml/ECHO- EF 68%/CTA-no acute pulmonary process/PFTs done by pulmonologist-patient states that she has been told that her lung function tests were okay. Mayra Gonzalez - nml  Continue aspirin 81 mg daily/Statin  Since the patient's symptoms of dyspnea are progressively worsening , I am not sure whether this is anginal equivalent in spite of having a normal stress test.  We will proceed with RHC/LHC. ( possible radial/brachial approach)  Cont meds    LHC +/- PCI/RHC consent    The risks (including but not limited to death, myocardial infarction, cerebrovascular accident, dysrhythmia, renal failure +/-dialysis, vascular complication, allergy, and/or need for emergency surgery), indications, benefits, and alternatives of cardiac catheterization +/- PCI have been explained in detail to the patient and family. Patient and family informed that if patient needs surgery  - it will be at [facility] as [facility] does not have onsite surgery. All questions answered to patient's satisfaction.  Patient agrees to proceed with the procedure and  informed consent was obtained. Patient evaluated and can be MALDONADO candidate. Galen's R - normal    ASA 3    AW 3    Follow-up in 2-3 weeks postprocedure or earlier prn          Lab Results   Component Value Date/Time    Cholesterol, total 146 07/12/2016 09:37 AM    HDL Cholesterol 56 07/12/2016 09:37 AM    LDL, calculated 69 07/12/2016 09:37 AM    VLDL, calculated 21 07/12/2016 09:37 AM    Triglyceride 106 07/12/2016 09:37 AM    CHOL/HDL Ratio 4.4 09/02/2010 03:17 PM         Patient understands the plan. All questions were answered to the patient's satisfaction. Medication Side Effects and Warnings were discussed with patient: yes  Patient Labs were reviewed and or requested:  yes  Patient Past Records were reviewed and or requested: yes    I appreciate the opportunity to be involved in Ms. Pichardo. See note below for details. Please do not hesitate to contact us with questions or concerns. Matthew Pearson MD    Cardiac Testing/ Procedures: A. Cardiac Cath/PCI:    B.ECHO/CHIP: 11/30/16 Left ventricle: Systolic function was normal. Ejection fraction was  estimated to be 68 % by Lira's biplane technique. There were no  regional wall motion abnormalities. Doppler parameters were consistent  with abnormal left ventricular relaxation (grade 1 diastolic dysfunction). Mitral valve: There was mild regurgitation. Tricuspid valve: There was near moderate regurgitation. Pulmonary artery  systolic pressure: 33 mmHg. C.StressNuclear/Stress ECHO/Stress test: 11/2016 - Nml Elba nuc stress test    D. Vascular: 12/6/16 - Nml resting NICKOLAS    E. EP:    F. Miscellaneous:    Subjective:  Melva Shah is a 68 y.o. female who returns for follow up visit. Patient continues to have dyspnea with exertion. She is accompanied by her daughter. Both the patient and the daughter indicate that the symptoms are getting worse. She can hardly walk a few steps without starting to get short winded. No chest pain.   History of chronic mild swelling lower extremities. Patient was seen by pulmonary and has had LFTs done. She also has had a CTA chest done on 12/16/16-no acute cardiopulmonary process. CBC on 12/15/16-WBC 6, hemoglobin 12.8, platelets 315. Serum creatinine 0.82. K-3.8. Both the patient and the daughter is very worried about the patient's symptoms since it is becoming difficult to move around. ROS:  (bold if positive, if negative)             Medications before admission:    Current Outpatient Prescriptions   Medication Sig Dispense    nystatin (NYSTOP) powder Apply  to affected area four (4) times daily for 90 days. 180 g    glipiZIDE (GLUCOTROL) 5 mg tablet Take 5 mg by mouth once over twenty-four (24) hours.  atorvastatin (LIPITOR) 40 mg tablet Take 40 mg by mouth once over twenty-four (24) hours.  raNITIdine (ZANTAC) 150 mg tablet Take 150 mg by mouth once over twenty-four (24) hours.  alendronate (FOSAMAX) 70 mg tablet TAKE 1 TABLET EVERY 7 DAYS 12 Tab    Blood-Glucose Meter monitoring kit accu-check alexei plus meter Test blood sugar once daily E11.9 1 Kit    glucose blood VI test strips (BLOOD GLUCOSE TEST) strip Accu-chek alexei plus test strips Test blood sugar once daily E11.9 100 Strip    Lancets misc accu-chek softclix lancets Check blood sugar daily E11.9 100 Each    alcohol swabs (BD SINGLE USE SWABS REGULAR) padm 100 Each by Apply Externally route daily. 100 Pad    GLUCOSE CONTROL soln 1 Bottle by Does Not Apply route daily. 1 Bottle    oxybutynin (DITROPAN) 5 mg tablet Take 1 Tab by mouth three (3) times daily for 270 days. 270 Tab    acetaminophen (TYLENOL) 650 mg CR tablet Take 650 mg by mouth every six (6) hours as needed for Pain.  gabapentin (NEURONTIN) 600 mg tablet Take  by mouth three (3) times daily.  latanoprost (XALATAN) 0.005 % ophthalmic solution Administer 1 Drop to both eyes nightly.      Calcium Carbonate-Vit D3-Min 600 mg calcium- 400 unit tab Take  by mouth two (2) times a day.  furosemide (LASIX) 20 mg tablet Take  by mouth every Monday, Wednesday, Friday. No current facility-administered medications for this visit. Physical Exam:  Visit Vitals    /72 (BP 1 Location: Right arm)    Pulse (!) 101    Resp 20    Ht 5' 2\" (1.575 m)    Wt 203 lb (92.1 kg)    SpO2 97%    BMI 37.13 kg/m2          Gen: Well-developed, well-nourished, in no acute distress  Neck: Supple,No JVD, No Carotid Bruit,   Resp: No accessory muscle use, Clear breath sounds, No rales or rhonchi  Card: Regular Rate,Rythm,Normal S1, S2, No murmurs, rubs or gallop. No thrills.    Abd:  Soft, non-tender, non-distended,BS+,   MSK: No cyanosis  Skin: No rashes    Neuro: moving all four extremities , follows commands appropriately  Psych:  Good insight, oriented to person, place , alert, Nml Affect  LE: No edema    EKG:      LABS:        Lab Results   Component Value Date/Time    WBC 8.2 07/12/2016 09:37 AM    HGB 13.0 07/12/2016 09:37 AM    HCT 39.5 07/12/2016 09:37 AM    PLATELET 282 67/63/2543 09:37 AM    MCV 86 07/12/2016 09:37 AM     Lab Results   Component Value Date/Time    Sodium 142 07/12/2016 09:37 AM    Potassium 4.6 07/12/2016 09:37 AM    Chloride 100 07/12/2016 09:37 AM    CO2 25 07/12/2016 09:37 AM    Anion gap 8 07/15/2014 03:34 AM    Glucose 90 07/12/2016 09:37 AM    BUN 11 07/12/2016 09:37 AM    Creatinine 1.06 07/12/2016 09:37 AM    BUN/Creatinine ratio 10 07/12/2016 09:37 AM    GFR est AA 59 07/12/2016 09:37 AM    GFR est non-AA 51 07/12/2016 09:37 AM    Calcium 9.5 07/12/2016 09:37 AM       No results found for: APTT  Lab Results   Component Value Date/Time    INR 1.1 06/16/2014 02:25 PM    Prothrombin time 11.2 06/16/2014 02:25 PM     No components found for: Kaya Fitting, MD

## 2017-01-06 NOTE — INTERVAL H&P NOTE
H&P Update:  Denton Benton was seen and examined. History and physical has been reviewed. The patient has been examined.  There have been no significant clinical changes since the completion of the originally dated History and Physical.    Signed By: Karla Lynch MD     January 6, 2017 12:58 PM

## 2017-01-13 ENCOUNTER — OFFICE VISIT (OUTPATIENT)
Dept: FAMILY MEDICINE CLINIC | Age: 77
End: 2017-01-13

## 2017-01-13 VITALS
BODY MASS INDEX: 37.17 KG/M2 | WEIGHT: 202 LBS | HEIGHT: 62 IN | RESPIRATION RATE: 16 BRPM | TEMPERATURE: 97.7 F | DIASTOLIC BLOOD PRESSURE: 60 MMHG | HEART RATE: 70 BPM | SYSTOLIC BLOOD PRESSURE: 106 MMHG | OXYGEN SATURATION: 96 %

## 2017-01-13 DIAGNOSIS — Z77.22 SECOND HAND SMOKE EXPOSURE: ICD-10-CM

## 2017-01-13 DIAGNOSIS — R06.02 SOB (SHORTNESS OF BREATH): Primary | ICD-10-CM

## 2017-01-13 RX ORDER — BUDESONIDE AND FORMOTEROL FUMARATE DIHYDRATE 160; 4.5 UG/1; UG/1
2 AEROSOL RESPIRATORY (INHALATION) 2 TIMES DAILY
Qty: 1 INHALER | Refills: 3 | Status: SHIPPED | OUTPATIENT
Start: 2017-01-13 | End: 2017-02-07

## 2017-01-13 NOTE — MR AVS SNAPSHOT
Visit Information Date & Time Provider Department Dept. Phone Encounter #  
 1/13/2017 11:15 AM Hermes Haro [id number] Follow-up Instructions Return if symptoms worsen or fail to improve. Your Appointments 1/27/2017 11:20 AM  
ESTABLISHED PATIENT with Julius Bright MD  
[facility] ([facility]) Appt Note: 3 week fup  
 [address] [facility] [address] [facility]  
[address] [facility] [address] [address] Upcoming Health Maintenance Date Due  
 EYE EXAM RETINAL OR DILATED Q1 1/3/1950 GLAUCOMA SCREENING Q2Y 1/3/2005 INFLUENZA AGE 9 TO ADULT 8/1/2016 HEMOGLOBIN A1C Q6M 1/12/2017 MICROALBUMIN Q1 7/12/2017 LIPID PANEL Q1 7/12/2017 FOOT EXAM Q1 7/21/2017 MEDICARE YEARLY EXAM 7/22/2017 Pneumococcal 65+ Low/Medium Risk (2 of 2 - PPSV23) 11/15/2017 DTaP/Tdap/Td series (2 - Td) 7/21/2026 Allergies as of 1/13/2017  Review Complete On: 1/13/2017 By: Pepe Orozco LPN Severity Noted Reaction Type Reactions Sulfa (Sulfonamide Antibiotics) High 03/11/2010    Hives, Itching Current Immunizations  Reviewed on 11/15/2016 No immunizations on file. Not reviewed this visit You Were Diagnosed With   
  
 Codes Comments SOB (shortness of breath)    -  Primary ICD-10-CM: R06.02 
ICD-9-CM: 786.05 Second hand smoke exposure     ICD-10-CM: Z77.22 
ICD-9-CM: V15.89 Vitals BP Pulse Temp Resp Height(growth percentile) Weight(growth percentile) 106/60 (BP 1 Location: Right arm, BP Patient Position: Sitting) 70 97.7 °F (36.5 °C) (Oral) 16 5' 2\" (1.575 m) 202 lb (91.6 kg) SpO2 BMI OB Status Smoking Status 96% 36.95 kg/m2 Hysterectomy Former Smoker Vitals History BMI and BSA Data  Body Mass Index Body Surface Area  
 36.95 kg/m 2 2 m 2  
  
  
 Preferred Pharmacy Pharmacy Name Phone [facility] [address] [place] [address] [address] [phone] Your Updated Medication List  
  
   
This list is accurate as of: 1/13/17 11:49 AM.  Always use your most recent med list.  
  
  
  
  
 acetaminophen 650 mg CR tablet Commonly known as:  TYLENOL Take 650 mg by mouth every six (6) hours as needed for Pain. alcohol swabs Padm Commonly known as:  BD Single Use Swabs Regular  
100 Each by Apply Externally route daily. alendronate 70 mg tablet Commonly known as:  FOSAMAX TAKE 1 TABLET EVERY 7 DAYS  
  
 ASPIRIN CHILDRENS PO Take  by mouth. atorvastatin 40 mg tablet Commonly known as:  LIPITOR Take 40 mg by mouth once over twenty-four (24) hours. Blood-Glucose Meter monitoring kit  
accu-check alexei plus meter Test blood sugar once daily E11.9  
  
 budesonide-formoterol 160-4.5 mcg/actuation HFA inhaler Commonly known as:  SYMBICORT Take 2 Puffs by inhalation two (2) times a day for 30 days. Calcium Carbonate-Vit D3-Min 600 mg calcium- 400 unit Tab Take  by mouth two (2) times a day. furosemide 20 mg tablet Commonly known as:  LASIX Take  by mouth every Monday, Wednesday, Friday. gabapentin 600 mg tablet Commonly known as:  NEURONTIN Take  by mouth three (3) times daily. glipiZIDE 5 mg tablet Commonly known as:  Janith Ket Take 5 mg by mouth once over twenty-four (24) hours. glucose blood VI test strips strip Commonly known as:  blood glucose test  
Accu-chek alexei plus test strips Test blood sugar once daily E11.9 GLUCOSE CONTROL Soln Generic drug:  Blood Glucose Control, Normal  
1 Bottle by Does Not Apply route daily. Lancets Misc  
accu-chek softclix lancets Check blood sugar daily E11.9  
  
 latanoprost 0.005 % ophthalmic solution Commonly known as:  Ami Curls Administer 1 Drop to both eyes nightly. nystatin powder Commonly known as:  NYSTOP Apply  to affected area four (4) times daily for 90 days. oxybutynin 5 mg tablet Commonly known as:  MDXNBASS Take 1 Tab by mouth three (3) times daily for 270 days. raNITIdine 150 mg tablet Commonly known as:  ZANTAC Take 150 mg by mouth once over twenty-four (24) hours. Prescriptions Printed Refills  
 budesonide-formoterol (SYMBICORT) 160-4.5 mcg/actuation HFA inhaler 3 Sig: Take 2 Puffs by inhalation two (2) times a day for 30 days. Class: Print Route: Inhalation We Performed the Following REFERRAL TO SLEEP STUDIES [REF99 Custom] Comments:  
 Suspicious for DANIELA. Day time somnolence. Has SOB which has been thoroughly investigated with Pulmonary and Cardiology. Follow-up Instructions Return if symptoms worsen or fail to improve. Referral Information Referral ID Referred By Referred To  
  
 [id number] Lexi Choudhury Phone: [phone] Visits Status Start Date End Date 1 New Request 1/13/17 1/13/18 If your referral has a status of pending review or denied, additional information will be sent to support the outcome of this decision. Patient Instructions Sleep Apnea: Care Instructions Your Care Instructions Sleep apnea means that you frequently stop breathing for 10 seconds or longer during sleep. It can be mild to severe, based on the number of times an hour that you stop breathing or have slowed breathing. Blocked or narrowed airways in your nose, mouth, or throat can cause sleep apnea. Your airway can become blocked when your throat muscles and tongue relax during sleep. You can treat sleep apnea at home by making lifestyle changes. You also can use a CPAP breathing machine that keeps tissues in the throat from blocking your airway.  Or your doctor may suggest that you use a breathing device while you sleep. It helps keep your airway open. This could be a device that you put in your mouth. Other examples include strips or disks that you use on your nose. In some cases, surgery may be needed to remove enlarged tissues in the throat. Follow-up care is a key part of your treatment and safety. Be sure to make and go to all appointments, and call your doctor if you are having problems. It's also a good idea to know your test results and keep a list of the medicines you take. How can you care for yourself at home? · Lose weight, if needed. It may reduce the number of times you stop breathing or have slowed breathing. · Sleep on your side. It may stop mild apnea. If you tend to roll onto your back, sew a pocket in the back of your pajama top. Put a tennis ball into the pocket, and stitch the pocket shut. This will help keep you from sleeping on your back. · Avoid alcohol and medicines such as sleeping pills and sedatives before bed. · Do not smoke. Smoking can make sleep apnea worse. If you need help quitting, talk to your doctor about stop-smoking programs and medicines. These can increase your chances of quitting for good. · Prop up the head of your bed 4 to 6 inches by putting bricks under the legs of the bed. · Treat breathing problems, such as a stuffy nose, caused by a cold or allergies. · Try a continuous positive airway pressure (CPAP) breathing machine if your doctor recommends it. The machine keeps your airway open when you sleep. · If CPAP does not work for you, ask your doctor if you can try other breathing machines. A bilevel positive airway pressure machine uses one type of air pressure for breathing in and another type for breathing out. Another device raises or lowers air pressure as needed while you breathe. · Talk to your doctor if: 
¨ Your nose feels dry or bleeds when you use one of these machines. You may need to increase moisture in the air. A humidifier may help. ¨ Your nose is runny or stuffy from using a breathing machine. Decongestants or a corticosteroid nasal spray may help. ¨ You are sleepy during the day and it gets in the way of the normal things you do. Do not drive when you are drowsy. When should you call for help? Watch closely for changes in your health, and be sure to contact your doctor if: 
· You still have sleep apnea even though you have made lifestyle changes. · You are thinking of trying a device such as CPAP. · You are having problems using a CPAP or similar machine. Where can you learn more? Go to http://nikki-alicia.info/. Enter E300 in the search box to learn more about \"Sleep Apnea: Care Instructions. \" Current as of: May 23, 2016 Content Version: 11.1 © 8106-6746 [employer]. Care instructions adapted under license by [employer] (which disclaims liability or warranty for this information). If you have questions about a medical condition or this instruction, always ask your healthcare professional. [place] [address] any warranty or liability for your use of this information. Introducing \[facility] [place] [facility]\"" & HEALTH SERVICES! Dear Duke La: Thank you for requesting a [employer] account. Our records indicate that you already have an active [employer] account. You can access your account anytime at [email or web address] Did you know that you can access your hospital and ER discharge instructions at any time in [employer]? You can also review all of your test results from your hospital stay or ER visit. Additional Information If you have questions, please visit the Frequently Asked Questions section of the [employer] website at [email or web address] Remember, [employer] is NOT to be used for urgent needs. For medical emergencies, dial 911. Now available from your iPhone and Android! Please provide this summary of care documentation to your next provider. Your primary care clinician is listed as [address]. If you have any questions after today's visit, please call [phone].

## 2017-01-13 NOTE — PROGRESS NOTES
Identified pt with two pt identifiers(name and ). Chief Complaint   Patient presents with    Follow-up     after having heart cath - reports,  \"The heart and lung doctor couln't find any reason for my shortness of breath\"        Health Maintenance Due   Topic    EYE EXAM RETINAL OR DILATED Q1     GLAUCOMA SCREENING Q2Y     INFLUENZA AGE 9 TO ADULT     HEMOGLOBIN A1C Q6M        Wt Readings from Last 3 Encounters:   17 202 lb (91.6 kg)   17 203 lb (92.1 kg)   17 203 lb (92.1 kg)     Temp Readings from Last 3 Encounters:   17 98.7 °F (37.1 °C)   11/15/16 98.2 °F (36.8 °C) (Oral)   16 98.6 °F (37 °C) (Oral)     BP Readings from Last 3 Encounters:   17 112/79   17 132/72   16 120/78     Pulse Readings from Last 3 Encounters:   17 62   17 (!) 101   16 (!) 56         Learning Assessment:  :     Learning Assessment 2016   PRIMARY LEARNER Patient   PRIMARY LANGUAGE ENGLISH   LEARNER PREFERENCE PRIMARY LISTENING   ANSWERED BY patient   RELATIONSHIP SELF       Depression Screening:  :     PHQ 2 / 9, over the last two weeks 2016   Little interest or pleasure in doing things Not at all   Feeling down, depressed or hopeless Not at all   Total Score PHQ 2 0       Fall Risk Assessment:  :     Fall Risk Assessment, last 12 mths 2016   Able to walk? Yes   Fall in past 12 months? No       Abuse Screening:  :     Abuse Screening Questionnaire 11/15/2016   Do you ever feel afraid of your partner? N   Are you in a relationship with someone who physically or mentally threatens you? N   Is it safe for you to go home? Y       Coordination of Care Questionnaire:  :     1) Have you been to an emergency room, urgent care clinic since your last visit? no   Hospitalized since your last visit?  Yes, 2017 for heart catheretization             2) Have you seen or consulted any other health care providers outside of [facility] since your last visit? Yes, Heart and lung doctor  3) Do you have an Advance Directive on file? no  Are you interested in receiving information about Advance Directives? no    Patient is accompanied by self. I have received verbal consent from Nehemiah Hernandez to discuss any/all medical information while they are present in the room. Reviewed record in preparation for visit and have obtained necessary documentation. Medication reconciliation up to date and corrected with patient at this time.

## 2017-01-13 NOTE — PROGRESS NOTES
HISTORY OF PRESENT ILLNESS  Vincenzo Sheppard is a 68 y.o. female. HPI Comments: Who presents for follow up of her SOB and malaise. She described SOB as worsening over the past 6-9 months and has a difficult time walking more than 50 feet or less before she becomes winded. She had a full cardiac work up that even included a catheterization (1/7/2017)  and fortunately, all results returned normal. She is followed by Dr. Bita Wang. Unfortunately, she still has had problems with SOB and fatigue/malaise. Sleeps poorly at night. She has symptoms concerning for possible DANIELA. Please see below: How do you know you have sleep apnea? You probably have sleep apnea if you answer 'yes' to 3 or more of the following questions:  S - Have you been told that you Snore? Y  T - Are you often Tired during the day? Y  O - Has anyone Observed you stop breathing while sleeping? N  P- Do you have (or are being treated for) high blood Pressure? Y    B - Are you obese (Body Mass Index > 35)? Y  A - Is your Age 48years old or older? Y  N - Is your Neck size greater than 16 inches? N  G - Are you male Gender? N      ROS:  Review of Systems   All other systems reviewed and are negative. OBJECTIVE:  Visit Vitals    /60 (BP 1 Location: Right arm, BP Patient Position: Sitting)    Pulse 70    Temp 97.7 °F (36.5 °C) (Oral)    Resp 16    Ht 5' 2\" (1.575 m)    Wt 202 lb (91.6 kg)    SpO2 96%    BMI 36.95 kg/m2   Physical Exam   Cardiovascular: Normal rate and regular rhythm. Pulmonary/Chest: Effort normal and breath sounds normal.   Musculoskeletal: Normal range of motion. Neurological: She is alert. Nursing note and vitals reviewed. ASSESSMENT and PLAN    ICD-10-CM ICD-9-CM    1. SOB (shortness of breath) R06.02 786.05 REFERRAL TO SLEEP STUDIES      budesonide-formoterol (SYMBICORT) 160-4.5 mcg/actuation HFA inhaler   2.  Second hand smoke exposure Z77.22 V15.89 budesonide-formoterol (SYMBICORT) 160-4.5 mcg/actuation HFA inhaler     Unclear as to the etiology of her SOB and malaise. She is deconditioned from obesity and possible sleep disorder. She has a h/o second hand smoking and will add Symbicort to her regimen. Of note, she is in the process of being worked up by pulmonary. She is to return in 6-weeks. Nothing specific was found on routine spirometry just \"mild restriction\". I have given her a referral to sleep study and will advise her specialist when the results return. Worrisome symptoms discussed in detail and when to pursue emergent care. Pt verbalizes understanding of plan of care and denies further questions or concerns at this time. Follow-up Disposition:  Return if symptoms worsen or fail to improve.

## 2017-01-13 NOTE — PATIENT INSTRUCTIONS
Sleep Apnea: Care Instructions  Your Care Instructions  Sleep apnea means that you frequently stop breathing for 10 seconds or longer during sleep. It can be mild to severe, based on the number of times an hour that you stop breathing or have slowed breathing. Blocked or narrowed airways in your nose, mouth, or throat can cause sleep apnea. Your airway can become blocked when your throat muscles and tongue relax during sleep. You can treat sleep apnea at home by making lifestyle changes. You also can use a CPAP breathing machine that keeps tissues in the throat from blocking your airway. Or your doctor may suggest that you use a breathing device while you sleep. It helps keep your airway open. This could be a device that you put in your mouth. Other examples include strips or disks that you use on your nose. In some cases, surgery may be needed to remove enlarged tissues in the throat. Follow-up care is a key part of your treatment and safety. Be sure to make and go to all appointments, and call your doctor if you are having problems. It's also a good idea to know your test results and keep a list of the medicines you take. How can you care for yourself at home? · Lose weight, if needed. It may reduce the number of times you stop breathing or have slowed breathing. · Sleep on your side. It may stop mild apnea. If you tend to roll onto your back, sew a pocket in the back of your pajama top. Put a tennis ball into the pocket, and stitch the pocket shut. This will help keep you from sleeping on your back. · Avoid alcohol and medicines such as sleeping pills and sedatives before bed. · Do not smoke. Smoking can make sleep apnea worse. If you need help quitting, talk to your doctor about stop-smoking programs and medicines. These can increase your chances of quitting for good. · Prop up the head of your bed 4 to 6 inches by putting bricks under the legs of the bed.   · Treat breathing problems, such as a stuffy nose, caused by a cold or allergies. · Try a continuous positive airway pressure (CPAP) breathing machine if your doctor recommends it. The machine keeps your airway open when you sleep. · If CPAP does not work for you, ask your doctor if you can try other breathing machines. A bilevel positive airway pressure machine uses one type of air pressure for breathing in and another type for breathing out. Another device raises or lowers air pressure as needed while you breathe. · Talk to your doctor if:  ¨ Your nose feels dry or bleeds when you use one of these machines. You may need to increase moisture in the air. A humidifier may help. ¨ Your nose is runny or stuffy from using a breathing machine. Decongestants or a corticosteroid nasal spray may help. ¨ You are sleepy during the day and it gets in the way of the normal things you do. Do not drive when you are drowsy. When should you call for help? Watch closely for changes in your health, and be sure to contact your doctor if:  · You still have sleep apnea even though you have made lifestyle changes. · You are thinking of trying a device such as CPAP. · You are having problems using a CPAP or similar machine. Where can you learn more? Go to http://nikki-alicia.info/. Enter B701 in the search box to learn more about \"Sleep Apnea: Care Instructions. \"  Current as of: May 23, 2016  Content Version: 11.1  © 3539-7682 [employer]. Care instructions adapted under license by [employer] (which disclaims liability or warranty for this information). If you have questions about a medical condition or this instruction, always ask your healthcare professional. [address] any warranty or liability for your use of this information.

## 2017-02-07 ENCOUNTER — OFFICE VISIT (OUTPATIENT)
Dept: CARDIOLOGY CLINIC | Age: 77
End: 2017-02-07

## 2017-02-07 VITALS
RESPIRATION RATE: 18 BRPM | OXYGEN SATURATION: 93 % | HEIGHT: 62 IN | DIASTOLIC BLOOD PRESSURE: 68 MMHG | WEIGHT: 204.2 LBS | BODY MASS INDEX: 37.58 KG/M2 | HEART RATE: 87 BPM | SYSTOLIC BLOOD PRESSURE: 106 MMHG

## 2017-02-07 DIAGNOSIS — R06.00 DYSPNEA, UNSPECIFIED TYPE: Primary | ICD-10-CM

## 2017-02-07 RX ORDER — FLUTICASONE FUROATE AND VILANTEROL 100; 25 UG/1; UG/1
1 POWDER RESPIRATORY (INHALATION) DAILY
COMMUNITY
End: 2018-01-09

## 2017-02-07 RX ORDER — ASPIRIN 81 MG/1
TABLET ORAL DAILY
COMMUNITY
End: 2020-08-05

## 2017-02-07 NOTE — PROGRESS NOTES
Melecio Rooney MD    [address] [place], [address] [facility]    Office [phone],YU [phone]  Pager [phone]    Helena Monaco is a 68 y.o. female is here for f/u visit . Primary care physician:  Yue Blake MD    Patient Active Problem List   Diagnosis Code    GERD (gastroesophageal reflux disease) K21.9    Arthritis of left knee M19.90    Pure hypercholesterolemia E78.00    Primary open angle glaucoma H40.1190    Type 2 diabetes mellitus without complication ([facility] [address] L65.9    Osteoporosis M81.0       Chief complaint:  Chief Complaint   Patient presents with    Follow-up     S/P cath       Assessment:  Dyspnea -cardiac cath-no significant CAD/normal PA pressures on right heart cath. HLD  DM  Former smoker. However has been exposed to significant secondhand smoking        Plan:     Cont meds. Patient states that her symptoms have improved after using inhaler. Scheduled to get a sleep study  Lipids per PCP  Aggressive cardiovascular risk factor modification. Follow-up in 1 year or earlier prn            Lab Results   Component Value Date/Time    Cholesterol, total 146 07/12/2016 09:37 AM    HDL Cholesterol 56 07/12/2016 09:37 AM    LDL, calculated 69 07/12/2016 09:37 AM    VLDL, calculated 21 07/12/2016 09:37 AM    Triglyceride 106 07/12/2016 09:37 AM    CHOL/HDL Ratio 4.4 09/02/2010 03:17 PM         Patient understands the plan. All questions were answered to the patient's satisfaction. Medication Side Effects and Warnings were discussed with patient: yes  Patient Labs were reviewed and or requested:  yes  Patient Past Records were reviewed and or requested: yes    I appreciate the opportunity to be involved in Ms. Pichardo. See note below for details. Please do not hesitate to contact us with questions or concerns. Melecio Rooney MD    Cardiac Testing/ Procedures: A. Cardiac Cath/PCI: 2/7/17 R radial approach  Attempted R brachial vein - unable to get access; Switched to R Femoral  vein    R SCV angiogram  R Brachial vein venogram    L Main: Nml    LAD: Mid 30%; Med; D1 - MLI    LCflex: Med; MLI; OM1 - small to med; MLI    RCA: Med to large; Dominant; Nml    LVEF: 60%    No significant gradient across aortic valve. PCI: none    RHC findings:    RAPm8= mmHg  RVSP= 42 mmHg  PAPm= 16 mmHg  PCWPm= 9 mmHg    Specimens Removed : None    B.ECHO/CHIP: 11/30/16 Left ventricle: Systolic function was normal. Ejection fraction was  estimated to be 68 % by Lira's biplane technique. There were no  regional wall motion abnormalities. Doppler parameters were consistent  with abnormal left ventricular relaxation (grade 1 diastolic dysfunction). Mitral valve: There was mild regurgitation. Tricuspid valve: There was near moderate regurgitation. Pulmonary artery  systolic pressure: 33 mmHg. C.StressNuclear/Stress ECHO/Stress test: 11/2016 - Nml Elba nuc stress test    D. Vascular: 12/6/16 - Nml resting NICKOLAS    E. EP:    F. Miscellaneous:    Subjective:  Nehemiah Hernandez is a 68 y.o. female who returns for follow up visit. Patient states that her symptoms have improved after using inhaler. Scheduled to get a sleep study. History of chronic mild swelling lower extremities. ROS:  (bold if positive, if negative)             Medications before admission:    Current Outpatient Prescriptions   Medication Sig Dispense    fluticasone-vilanterol (BREO ELLIPTA) 100-25 mcg/dose inhaler Take 1 Puff by inhalation daily.  aspirin delayed-release 81 mg tablet Take  by mouth daily.  nystatin (NYSTOP) powder Apply  to affected area four (4) times daily for 90 days. 180 g    glipiZIDE (GLUCOTROL) 5 mg tablet Take 5 mg by mouth once over twenty-four (24) hours.  atorvastatin (LIPITOR) 40 mg tablet Take 40 mg by mouth once over twenty-four (24) hours.  raNITIdine (ZANTAC) 150 mg tablet Take 150 mg by mouth once over twenty-four (24) hours.      alendronate (FOSAMAX) 70 mg tablet TAKE 1 TABLET EVERY 7 DAYS 12 Tab    Blood-Glucose Meter monitoring kit accu-check alexei plus meter Test blood sugar once daily E11.9 1 Kit    glucose blood VI test strips (BLOOD GLUCOSE TEST) strip Accu-chek alexei plus test strips Test blood sugar once daily E11.9 100 Strip    Lancets misc accu-chek softclix lancets Check blood sugar daily E11.9 100 Each    alcohol swabs (BD SINGLE USE SWABS REGULAR) padm 100 Each by Apply Externally route daily. 100 Pad    GLUCOSE CONTROL soln 1 Bottle by Does Not Apply route daily. 1 Bottle    oxybutynin (DITROPAN) 5 mg tablet Take 1 Tab by mouth three (3) times daily for 270 days. 270 Tab    acetaminophen (TYLENOL) 650 mg CR tablet Take 650 mg by mouth every six (6) hours as needed for Pain.  gabapentin (NEURONTIN) 600 mg tablet Take  by mouth three (3) times daily.  latanoprost (XALATAN) 0.005 % ophthalmic solution Administer 1 Drop to both eyes nightly.  Calcium Carbonate-Vit D3-Min 600 mg calcium- 400 unit tab Take  by mouth two (2) times a day.  furosemide (LASIX) 20 mg tablet Take  by mouth every Monday, Wednesday, Friday. No current facility-administered medications for this visit. Physical Exam:  Visit Vitals    /68 (BP 1 Location: Left arm)    Pulse 87    Resp 18    Ht 5' 2\" (1.575 m)    Wt 204 lb 3.2 oz (92.6 kg)    SpO2 93%    BMI 37.35 kg/m2          Gen: Well-developed, well-nourished, in no acute distress  Neck: Supple,No JVD, No Carotid Bruit,   Resp: No accessory muscle use, Clear breath sounds, No rales or rhonchi  Card: Regular Rate,Rythm,Normal S1, S2, No murmurs, rubs or gallop. No thrills.    Abd:  Soft, non-tender, non-distended,BS+,   MSK: No cyanosis  Skin: No rashes    Neuro: moving all four extremities , follows commands appropriately  Psych:  Good insight, oriented to person, place , alert, Nml Affect  LE: No edema    EKG: Sinus rhythm, PVC, leftward axis, nonspecific STT changes      LABS:        Lab Results   Component Value Date/Time    WBC 6.9 01/06/2017 12:09 PM    HGB 12.2 01/06/2017 12:09 PM    HCT 39.1 01/06/2017 12:09 PM    PLATELET 914 14/57/3246 12:09 PM    MCV 89.3 01/06/2017 12:09 PM     Lab Results   Component Value Date/Time    Sodium 140 01/06/2017 12:09 PM    Potassium 3.8 01/06/2017 12:09 PM    Chloride 104 01/06/2017 12:09 PM    CO2 26 01/06/2017 12:09 PM    Anion gap 10 01/06/2017 12:09 PM    Glucose 77 01/06/2017 12:09 PM    BUN 14 01/06/2017 12:09 PM    Creatinine 0.98 01/06/2017 12:09 PM    BUN/Creatinine ratio 14 01/06/2017 12:09 PM    GFR est AA >60 01/06/2017 12:09 PM    GFR est non-AA 55 01/06/2017 12:09 PM    Calcium 9.1 01/06/2017 12:09 PM       No results found for: APTT  Lab Results   Component Value Date/Time    INR 1.1 01/06/2017 12:14 PM    INR 1.1 06/16/2014 02:25 PM    Prothrombin time 10.7 01/06/2017 12:14 PM    Prothrombin time 11.2 06/16/2014 02:25 PM     No components found for: Anisha Funes MD

## 2017-02-07 NOTE — MR AVS SNAPSHOT
Visit Information Date & Time Provider Department Dept. Phone Encounter #  
 2/7/2017 10:20 AM Del Whitmore MD [facility] [phone] [id number] Your Appointments 2/9/2018 10:40 AM  
ESTABLISHED PATIENT with Del Whitmore MD  
[facility] ([address]) Appt Note: ANNUAL  
 [address] [facility] [place] [address] [facility]  
[address] [facility] [place] [phone] [address] Upcoming Health Maintenance Date Due  
 EYE EXAM RETINAL OR DILATED Q1 1/3/1950 GLAUCOMA SCREENING Q2Y 1/3/2005 INFLUENZA AGE 9 TO ADULT 8/1/2016 HEMOGLOBIN A1C Q6M 1/12/2017 MICROALBUMIN Q1 7/12/2017 LIPID PANEL Q1 7/12/2017 FOOT EXAM Q1 7/21/2017 MEDICARE YEARLY EXAM 7/22/2017 Pneumococcal 65+ Low/Medium Risk (2 of 2 - PPSV23) 11/15/2017 DTaP/Tdap/Td series (2 - Td) 7/21/2026 Allergies as of 2/7/2017  Review Complete On: 2/7/2017 By: Natalee Elizabeth Severity Noted Reaction Type Reactions Sulfa (Sulfonamide Antibiotics) High 03/11/2010    Hives, Itching Current Immunizations  Reviewed on 11/15/2016 No immunizations on file. Not reviewed this visit You Were Diagnosed With   
  
 Codes Comments Dyspnea, unspecified type    -  Primary ICD-10-CM: R06.00 
ICD-9-CM: 786.09 Vitals BP Pulse Resp Height(growth percentile) Weight(growth percentile) SpO2  
 106/68 (BP 1 Location: Left arm) 87 18 5' 2\" (1.575 m) 204 lb 3.2 oz (92.6 kg) 93% BMI OB Status Smoking Status 37.35 kg/m2 Hysterectomy Former Smoker Vitals History BMI and BSA Data Body Mass Index Body Surface Area  
 37.35 kg/m 2 2.01 m 2 Preferred Pharmacy Pharmacy Name Phone [address] [facility] [address] [facility] [address] [phone] Your Updated Medication List  
  
   
 This list is accurate as of: 2/7/17  1:13 PM.  Always use your most recent med list.  
  
  
  
  
 acetaminophen 650 mg CR tablet Commonly known as:  TYLENOL Take 650 mg by mouth every six (6) hours as needed for Pain. alcohol swabs Padm Commonly known as:  BD Single Use Swabs Regular  
100 Each by Apply Externally route daily. alendronate 70 mg tablet Commonly known as:  FOSAMAX TAKE 1 TABLET EVERY 7 DAYS  
  
 aspirin delayed-release 81 mg tablet Take  by mouth daily. atorvastatin 40 mg tablet Commonly known as:  LIPITOR Take 40 mg by mouth once over twenty-four (24) hours. Blood-Glucose Meter monitoring kit  
accu-check alexei plus meter Test blood sugar once daily E11.9 BREO ELLIPTA 100-25 mcg/dose inhaler Generic drug:  fluticasone-vilanterol Take 1 Puff by inhalation daily. Calcium Carbonate-Vit D3-Min 600 mg calcium- 400 unit Tab Take  by mouth two (2) times a day. furosemide 20 mg tablet Commonly known as:  LASIX Take  by mouth every Monday, Wednesday, Friday. gabapentin 600 mg tablet Commonly known as:  NEURONTIN Take  by mouth three (3) times daily. glipiZIDE 5 mg tablet Commonly known as:  Loura Valentina Take 5 mg by mouth once over twenty-four (24) hours. glucose blood VI test strips strip Commonly known as:  blood glucose test  
Accu-chek alexei plus test strips Test blood sugar once daily E11.9 GLUCOSE CONTROL Soln Generic drug:  Blood Glucose Control, Normal  
1 Bottle by Does Not Apply route daily. Lancets Misc  
accu-chek softclix lancets Check blood sugar daily E11.9  
  
 latanoprost 0.005 % ophthalmic solution Commonly known as:  Verta Piety Administer 1 Drop to both eyes nightly. nystatin powder Commonly known as:  NYSTOP Apply  to affected area four (4) times daily for 90 days. oxybutynin 5 mg tablet Commonly known as:  PKEDWERC  
 Take 1 Tab by mouth three (3) times daily for 270 days. raNITIdine 150 mg tablet Commonly known as:  ZANTAC Take 150 mg by mouth once over twenty-four (24) hours. We Performed the Following AMB POC EKG ROUTINE W/ 12 LEADS, INTER & REP [79428 CPT(R)] Introducing [facility]! Dear Luis Romero: Thank you for requesting a DIY account. Our records indicate that you already have an active DIY account. You can access your account anytime at [email or web address] Did you know that you can access your hospital and ER discharge instructions at any time in DIY? You can also review all of your test results from your hospital stay or ER visit. Additional Information If you have questions, please visit the Frequently Asked Questions section of the DIY website at [email or web address] Remember, DIY is NOT to be used for urgent needs. For medical emergencies, dial 911. Now available from your iPhone and Android! Please provide this summary of care documentation to your next provider. Your primary care clinician is listed as [address]. If you have any questions after today's visit, please call [phone].

## 2017-04-05 RX ORDER — GLIPIZIDE 5 MG/1
5 TABLET ORAL
Qty: 90 TAB | Refills: 0 | Status: SHIPPED | OUTPATIENT
Start: 2017-04-05 | End: 2017-11-10 | Stop reason: SDUPTHER

## 2017-04-05 NOTE — TELEPHONE ENCOUNTER
Royal Hussein called to request medication for patient. Patient has one pill left. Patient uses [address] [place] [address] on [address].

## 2017-07-31 ENCOUNTER — OFFICE VISIT (OUTPATIENT)
Dept: FAMILY MEDICINE CLINIC | Age: 77
End: 2017-07-31

## 2017-07-31 VITALS
OXYGEN SATURATION: 95 % | DIASTOLIC BLOOD PRESSURE: 71 MMHG | HEART RATE: 94 BPM | RESPIRATION RATE: 18 BRPM | HEIGHT: 62 IN | TEMPERATURE: 98.4 F | BODY MASS INDEX: 39.01 KG/M2 | WEIGHT: 212 LBS | SYSTOLIC BLOOD PRESSURE: 127 MMHG

## 2017-07-31 DIAGNOSIS — K21.9 GASTROESOPHAGEAL REFLUX DISEASE WITHOUT ESOPHAGITIS: ICD-10-CM

## 2017-07-31 DIAGNOSIS — E55.9 VITAMIN D DEFICIENCY: ICD-10-CM

## 2017-07-31 DIAGNOSIS — E78.00 PURE HYPERCHOLESTEROLEMIA: ICD-10-CM

## 2017-07-31 DIAGNOSIS — E11.9 TYPE 2 DIABETES MELLITUS WITHOUT COMPLICATION, UNSPECIFIED LONG TERM INSULIN USE STATUS: ICD-10-CM

## 2017-07-31 DIAGNOSIS — G47.33 OSA ON CPAP: ICD-10-CM

## 2017-07-31 DIAGNOSIS — Z99.89 OSA ON CPAP: ICD-10-CM

## 2017-07-31 DIAGNOSIS — Z00.00 MEDICARE ANNUAL WELLNESS VISIT, SUBSEQUENT: Primary | ICD-10-CM

## 2017-07-31 RX ORDER — RANITIDINE 150 MG/1
150 TABLET, FILM COATED ORAL 2 TIMES DAILY
Qty: 60 TAB | Refills: 5 | Status: SHIPPED | OUTPATIENT
Start: 2017-07-31 | End: 2018-01-08

## 2017-07-31 NOTE — PROGRESS NOTES
Nel Eaton is a 68 y.o. female and presents for annual Medicare Wellness Visit. She has a h/o GERD, HTN, HLD, Type II DM and also DANIELA on CPAP. She has been doing well recently. Reports near normal blood sugars. Needs to return for fasting labs as she is not fasting today. In addition, she reports that her GERD medication (Zantac) had not been working as well recently. I note that she is taking it only 1 x per day. Problem List: Reviewed with patient and discussed risk factors. Patient Active Problem List   Diagnosis Code    GERD (gastroesophageal reflux disease) K21.9    Arthritis of left knee M17.12    Pure hypercholesterolemia E78.00    Primary open angle glaucoma H40.1190    Type 2 diabetes mellitus without complication ([facility] [address] C81.3    Osteoporosis M81.0    DANIELA on CPAP G47.33, Z99.89       Current medical providers:  Patient Care Team:  Anh Cui MD as PCP - General (Pediatrics)  Elpidio Puga MD as Physician (Cardiology)    PSH: Reviewed with patient  Past Surgical History:   Procedure Laterality Date    BREAST SURGERY PROCEDURE UNLISTED      LT BREAST CYST BENIGN    DRAINAGE OF DEEP RECTAL ABSCESS      ENDOSCOPY, COLON, DIAGNOSTIC      2008    HX GI      RECTAL ABSCESS    HX HEART CATHETERIZATION  01/06/2017    HX HEENT      NATHAN CATARACTS    HX HYSTERECTOMY          SH: Reviewed with patient  Social History   Substance Use Topics    Smoking status: Former Smoker     Quit date: 3/11/1990    Smokeless tobacco: Not on file    Alcohol use 0.0 oz/week     0 Standard drinks or equivalent per week      Comment: occasionally       FH: Reviewed with patient  Family History   Problem Relation Age of Onset    Heart Disease Mother        Medications/Allergies: Reviewed with patient  Current Outpatient Prescriptions on File Prior to Visit   Medication Sig Dispense Refill    glipiZIDE (GLUCOTROL) 5 mg tablet Take 1 Tab by mouth once over twenty-four (24) hours.  90 Tab 0    fluticasone-vilanterol (BREO ELLIPTA) 100-25 mcg/dose inhaler Take 1 Puff by inhalation daily.  aspirin delayed-release 81 mg tablet Take  by mouth daily.  atorvastatin (LIPITOR) 40 mg tablet Take 40 mg by mouth once over twenty-four (24) hours.  alendronate (FOSAMAX) 70 mg tablet TAKE 1 TABLET EVERY 7 DAYS 12 Tab 0    oxybutynin (DITROPAN) 5 mg tablet Take 1 Tab by mouth three (3) times daily for 270 days. 270 Tab 1    acetaminophen (TYLENOL) 650 mg CR tablet Take 650 mg by mouth every six (6) hours as needed for Pain.  gabapentin (NEURONTIN) 600 mg tablet Take  by mouth three (3) times daily.  latanoprost (XALATAN) 0.005 % ophthalmic solution Administer 1 Drop to both eyes nightly.  Calcium Carbonate-Vit D3-Min 600 mg calcium- 400 unit tab Take  by mouth two (2) times a day.  furosemide (LASIX) 20 mg tablet Take  by mouth every Monday, Wednesday, Friday.  Blood-Glucose Meter monitoring kit accu-check alexei plus meter Test blood sugar once daily E11.9 1 Kit 0    glucose blood VI test strips (BLOOD GLUCOSE TEST) strip Accu-chek alexei plus test strips Test blood sugar once daily E11.9 100 Strip 1    Lancets misc accu-chek softclix lancets Check blood sugar daily E11.9 100 Each 1    alcohol swabs (BD SINGLE USE SWABS REGULAR) padm 100 Each by Apply Externally route daily. 100 Pad 1    GLUCOSE CONTROL soln 1 Bottle by Does Not Apply route daily. 1 Bottle 1     No current facility-administered medications on file prior to visit. Allergies   Allergen Reactions    Sulfa (Sulfonamide Antibiotics) Hives and Itching       Objective:  Visit Vitals    /71    Pulse 94    Temp 98.4 °F (36.9 °C) (Oral)    Resp 18    Ht 5' 2\" (1.575 m)    Wt 212 lb (96.2 kg)    SpO2 95%    BMI 38.78 kg/m2    Body mass index is 38.78 kg/(m^2).     Assessment of cognitive impairment: Alert and oriented x 3    General Appearance:  Well developed, well nourished,alert and oriented x 3, and individual in no acute distress. Ears/Nose/Mouth/Throat:   Hearing grossly normal.         Neck: Supple. Chest:   Lungs clear to auscultation bilaterally. Cardiovascular:  Regular rate and rhythm, S1, S2 normal, no murmur. Abdomen:   Soft, non-tender, bowel sounds are active. Extremities: No edema bilaterally. Skin: Warm and dry. Depression Screen:   PHQ over the last two weeks 7/31/2017   Little interest or pleasure in doing things Not at all   Feeling down, depressed or hopeless Nearly every day   Total Score PHQ 2 3       Fall Risk Assessment:    Fall Risk Assessment, last 12 mths 7/31/2017   Able to walk? Yes   Fall in past 12 months? No       Functional Ability:   Does the patient exhibit a steady gait? yes   How long did it take the patient to get up and walk from a sitting position? Few seconds   Is the patient self reliant?  (ie can do own laundry, meals, household chores)  yes     Does the patient handle his/her own medications? yes     Does the patient handle his/her own money? yes     Is the patients home safe (ie good lighting, handrails on stairs and bath, etc.)? yes     Did you notice or did patient express any hearing difficulties? no     Did you notice or did patient express any vision difficulties? Mild difficulties. Received new glasses, but they don't seem to be working for her. Were distance and reading eye charts used? no       Advance Care Planning:   Patient was offered the opportunity to discuss advance care planning:  yes     Does patient have an Advance Directive:  no   If no, did you provide information on Caring Connections?   yes       Plan:      Orders Placed This Encounter    LIPID PANEL    CRP, HIGH SENSITIVITY    VITAMIN D, 25 HYDROXY    HEMOGLOBIN A1C WITH EAG    METABOLIC PANEL, COMPREHENSIVE    CBC WITH AUTOMATED DIFF    raNITIdine (ZANTAC) 150 mg tablet       Health Maintenance   Topic Date Due    EYE EXAM RETINAL OR DILATED Q1 Addressed    HEMOGLOBIN A1C Q6M  Addressed    LIPID PANEL Q1  Addressed    INFLUENZA AGE 9 TO ADULT  08/01/2017    Pneumococcal 65+ Low/Medium Risk (2 of 2 - PPSV23) 11/15/2017    GLAUCOMA SCREENING Q2Y  01/15/2018    FOOT EXAM Q1  07/31/2018    MICROALBUMIN Q1  07/31/2018    MEDICARE YEARLY EXAM  08/01/2018    DTaP/Tdap/Td series (2 - Td) 07/21/2026    OSTEOPOROSIS SCREENING (DEXA)  Completed    ZOSTER VACCINE AGE 60>  Addressed       Assessment and Plan:    ICD-10-CM ICD-9-CM    1. Medicare annual wellness visit, subsequent Z00.00 V70.0    2. Pure hypercholesterolemia E78.00 272.0 LIPID PANEL      CRP, HIGH SENSITIVITY      CBC WITH AUTOMATED DIFF   3. Type 2 diabetes mellitus without complication, unspecified long term insulin use status (HCC) E11.9 250.00 HEMOGLOBIN A1C WITH EAG      METABOLIC PANEL, COMPREHENSIVE       DIABETES FOOT EXAM      MICROALBUMIN, UR, RAND W/ MICROALBUMIN/CREA RATIO   4. Gastroesophageal reflux disease without esophagitis K21.9 530.81 raNITIdine (ZANTAC) 150 mg tablet   5. Vitamin D deficiency E55.9 268.9 VITAMIN D, 25 HYDROXY   6. DANIELA on CPAP G47.33 327.23     Z99.89 V46.8    . Penelope Everett 77F with h/o Type II DM, HTN, GERD and DANIELA. She has been doing well. Here for annual medicare wellness visit. I have reviewed her  and not that her immunizations are UTD. She does need to schedule for a mammogram at this time and needs to return for fasting labs since she is not fasting at this time. To help more effectively manage her GERD symptoms. We increased Zantac to 150 mgs 2 x daily. If this does not help, then will move to a PPI. Would prefer to avoid PPI due to her h/o osteopenia seen on bone density next year. Repeat bone density is in 2018. I have discussed the diagnosis with the patient and the intended treatment plan as seen in the above orders. The patient has received an after-visit summary and questions were answered concerning future plans.  Asked to return should symptoms worsen or not improve with treatment. Any pending labs and studies will be relayed to patient when they become available. Pt verbalizes understanding of plan of care and denies further questions or concerns at this time. Follow-up Disposition:  Return in about 1 year (around 7/31/2018), or if symptoms worsen or fail to improve, for every 4-6 months. Pavithra Pierce

## 2017-07-31 NOTE — PATIENT INSTRUCTIONS
Osteopenia:   Recommend vitamin D 8090-4409 international units. Calcium 800 mgs daily and 2-servings of calcium products per day in the diet. (Chewing 1-1/2 tums may suffice). Well Visit, Over 72: Care Instructions  Your Care Instructions  Physical exams can help you stay healthy. Your doctor has checked your overall health and may have suggested ways to take good care of yourself. He or she also may have recommended tests. At home, you can help prevent illness with healthy eating, regular exercise, and other steps. Follow-up care is a key part of your treatment and safety. Be sure to make and go to all appointments, and call your doctor if you are having problems. It's also a good idea to know your test results and keep a list of the medicines you take. How can you care for yourself at home? · Reach and stay at a healthy weight. This will lower your risk for many problems, such as obesity, diabetes, heart disease, and high blood pressure. · Get at least 30 minutes of exercise on most days of the week. Walking is a good choice. You also may want to do other activities, such as running, swimming, cycling, or playing tennis or team sports. · Do not smoke. Smoking can make health problems worse. If you need help quitting, talk to your doctor about stop-smoking programs and medicines. These can increase your chances of quitting for good. · Protect your skin from too much sun. When you're outdoors from 10 a.m. to 4 p.m., stay in the shade or cover up with clothing and a hat with a wide brim. Wear sunglasses that block UV rays. Even when it's cloudy, put broad-spectrum sunscreen (SPF 30 or higher) on any exposed skin. · See a dentist one or two times a year for checkups and to have your teeth cleaned. · Wear a seat belt in the car. · Limit alcohol to 2 drinks a day for men and 1 drink a day for women. Too much alcohol can cause health problems.   Follow your doctor's advice about when to have certain tests. These tests can spot problems early. For men and women  · Cholesterol. Your doctor will tell you how often to have this done based on your overall health and other things that can increase your risk for heart attack and stroke. · Blood pressure. Have your blood pressure checked during a routine doctor visit. Your doctor will tell you how often to check your blood pressure based on your age, your blood pressure results, and other factors. · Diabetes. Ask your doctor whether you should have tests for diabetes. · Vision. Experts recommend that you have yearly exams for glaucoma and other age-related eye problems. · Hearing. Tell your doctor if you notice any change in your hearing. You can have tests to find out how well you hear. · Colon cancer tests. Keep having colon cancer tests as your doctor recommends. You can have one of several types of tests. · Heart attack and stroke risk. At least every 4 to 6 years, you should have your risk for heart attack and stroke assessed. Your doctor uses factors such as your age, blood pressure, cholesterol, and whether you smoke or have diabetes to show what your risk for a heart attack or stroke is over the next 10 years. · Osteoporosis. Talk to your doctor about whether you should have a bone density test to find out whether you have thinning bones. Also ask your doctor about whether you should take calcium and vitamin D supplements. For women  · Pap test and pelvic exam. You may no longer need a Pap test. Talk with your doctor about whether to stop or continue to have Pap tests. · Breast exam and mammogram. Ask how often you should have a mammogram, which is an X-ray of your breasts. A mammogram can spot breast cancer before it can be felt and when it is easiest to treat. · Thyroid disease. Talk to your doctor about whether to have your thyroid checked as part of a regular physical exam. Women have an increased chance of a thyroid problem.   For men  · Prostate exam. Talk to your doctor about whether you should have a blood test (called a PSA test) for prostate cancer. Experts disagree on whether men should have this test. Some experts recommend that you discuss the benefits and risks of the test with your doctor. · Abdominal aortic aneurysm. Ask your doctor whether you should have a test to check for an aneurysm. You may need a test if you ever smoked or if your parent, brother, sister, or child has had an aneurysm. When should you call for help? Watch closely for changes in your health, and be sure to contact your doctor if you have any problems or symptoms that concern you. Where can you learn more? Go to http://nikki-alicia.info/. Enter T897 in the search box to learn more about \"Well Visit, Over 65: Care Instructions. \"  Current as of: July 19, 2016  Content Version: 11.3  © 3830-2354 [employer]. Care instructions adapted under license by [employer] (which disclaims liability or warranty for this information). If you have questions about a medical condition or this instruction, always ask your healthcare professional. [address] any warranty or liability for your use of this information.

## 2017-07-31 NOTE — MR AVS SNAPSHOT
Visit Information Date & Time Provider Department Dept. Phone Encounter #  
 7/31/2017  9:00 AM Victor Manuel Hull [facility] [phone] Follow-up Instructions Return in about 1 year (around 7/31/2018), or if symptoms worsen or fail to improve, for every 4-6 months. Your Appointments 2/9/2018 10:40 AM  
ESTABLISHED PATIENT with Tonia Howard MD  
[facility] ([address]) Appt Note: ANNUAL  
 [address] [facility] [place] [phone] [facility]  
[address] [facility] [place] [phone] [address] Upcoming Health Maintenance Date Due  
 EYE EXAM RETINAL OR DILATED Q1 1/3/1950 HEMOGLOBIN A1C Q6M 1/12/2017 LIPID PANEL Q1 7/12/2017 INFLUENZA AGE 9 TO ADULT 8/1/2017 Pneumococcal 65+ Low/Medium Risk (2 of 2 - PPSV23) 11/15/2017 GLAUCOMA SCREENING Q2Y 1/15/2018 FOOT EXAM Q1 7/31/2018 MICROALBUMIN Q1 7/31/2018 MEDICARE YEARLY EXAM 8/1/2018 DTaP/Tdap/Td series (2 - Td) 7/21/2026 Allergies as of 7/31/2017  Review Complete On: 7/31/2017 By: Ang Price LPN Severity Noted Reaction Type Reactions Sulfa (Sulfonamide Antibiotics) High 03/11/2010    Hives, Itching Current Immunizations  Reviewed on 7/31/2017 No immunizations on file. Reviewed by Victor Manuel Hull MD on 7/31/2017 at  9:38 AM  
You Were Diagnosed With   
  
 Codes Comments Medicare annual wellness visit, subsequent    -  Primary ICD-10-CM: Z00.00 ICD-9-CM: V70.0 Pure hypercholesterolemia     ICD-10-CM: E78.00 ICD-9-CM: 272.0 Type 2 diabetes mellitus without complication, unspecified long term insulin use status (HCC)     ICD-10-CM: E11.9 ICD-9-CM: 250.00 Gastroesophageal reflux disease without esophagitis     ICD-10-CM: K21.9 ICD-9-CM: 530.81 Vitamin D deficiency     ICD-10-CM: E55.9 ICD-9-CM: 268.9 DANIELA on CPAP     ICD-10-CM: G47.33, Z99.89 ICD-9-CM: 327.23, V46.8 Vitals BP Pulse Temp Resp Height(growth percentile) Weight(growth percentile) 127/71 94 98.4 °F (36.9 °C) (Oral) 18 5' 2\" (1.575 m) 212 lb (96.2 kg) SpO2 BMI OB Status Smoking Status 95% 38.78 kg/m2 Hysterectomy Former Smoker Vitals History BMI and BSA Data Body Mass Index Body Surface Area 38.78 kg/m 2 2.05 m 2 Preferred Pharmacy Pharmacy Name Phone [facility], [address] [place] [address] Your Updated Medication List  
  
   
This list is accurate as of: 7/31/17  9:53 AM.  Always use your most recent med list.  
  
  
  
  
 acetaminophen 650 mg CR tablet Commonly known as:  TYLENOL Take 650 mg by mouth every six (6) hours as needed for Pain. alcohol swabs Padm Commonly known as:  BD Single Use Swabs Regular  
100 Each by Apply Externally route daily. alendronate 70 mg tablet Commonly known as:  FOSAMAX TAKE 1 TABLET EVERY 7 DAYS  
  
 aspirin delayed-release 81 mg tablet Take  by mouth daily. atorvastatin 40 mg tablet Commonly known as:  LIPITOR Take 40 mg by mouth once over twenty-four (24) hours. Blood-Glucose Meter monitoring kit  
accu-check alexei plus meter Test blood sugar once daily E11.9 BREO ELLIPTA 100-25 mcg/dose inhaler Generic drug:  fluticasone-vilanterol Take 1 Puff by inhalation daily. Calcium Carbonate-Vit D3-Min 600 mg calcium- 400 unit Tab Take  by mouth two (2) times a day. furosemide 20 mg tablet Commonly known as:  LASIX Take  by mouth every Monday, Wednesday, Friday. gabapentin 600 mg tablet Commonly known as:  NEURONTIN Take  by mouth three (3) times daily. glipiZIDE 5 mg tablet Commonly known as:  Salem Campi Take 1 Tab by mouth once over twenty-four (24) hours. glucose blood VI test strips strip Commonly known as:  blood glucose test  
Accu-chek alexei plus test strips Test blood sugar once daily E11.9 GLUCOSE CONTROL Soln Generic drug:  Blood Glucose Control, Normal  
1 Bottle by Does Not Apply route daily. Lancets Misc  
accu-chek softclix lancets Check blood sugar daily E11.9  
  
 latanoprost 0.005 % ophthalmic solution Commonly known as:  Garnell Anis Administer 1 Drop to both eyes nightly. oxybutynin 5 mg tablet Commonly known as:  XQWONXSF Take 1 Tab by mouth three (3) times daily for 270 days. raNITIdine 150 mg tablet Commonly known as:  ZANTAC Take 1 Tab by mouth two (2) times a day for 30 days. Prescriptions Sent to Pharmacy Refills  
 raNITIdine (ZANTAC) 150 mg tablet 5 Sig: Take 1 Tab by mouth two (2) times a day for 30 days. Class: Normal  
 Pharmacy: [facility] [id number] [place] [address], [address] [place] [address] #: [phone] Route: Oral  
  
We Performed the Following CBC WITH AUTOMATED DIFF [63729 CPT(R)] CRP, HIGH SENSITIVITY [75066 CPT(R)] HEMOGLOBIN A1C WITH EAG [30520 CPT(R)]  DIABETES FOOT EXAM [7 Custom] LIPID PANEL [34737 CPT(R)] METABOLIC PANEL, COMPREHENSIVE [45761 CPT(R)] VITAMIN D, 25 HYDROXY [id number] CPT(R)] Follow-up Instructions Return in about 1 year (around 7/31/2018), or if symptoms worsen or fail to improve, for every 4-6 months. Patient Instructions Osteopenia:  
Recommend vitamin D 4190-4532 international units. Calcium 800 mgs daily and 2-servings of calcium products per day in the diet. (Chewing 1-1/2 tums may suffice). Well Visit, Over 72: Care Instructions Your Care Instructions Physical exams can help you stay healthy. Your doctor has checked your overall health and may have suggested ways to take good care of yourself. He or she also may have recommended tests.  At home, you can help prevent illness with healthy eating, regular exercise, and other steps. Follow-up care is a key part of your treatment and safety. Be sure to make and go to all appointments, and call your doctor if you are having problems. It's also a good idea to know your test results and keep a list of the medicines you take. How can you care for yourself at home? · Reach and stay at a healthy weight. This will lower your risk for many problems, such as obesity, diabetes, heart disease, and high blood pressure. · Get at least 30 minutes of exercise on most days of the week. Walking is a good choice. You also may want to do other activities, such as running, swimming, cycling, or playing tennis or team sports. · Do not smoke. Smoking can make health problems worse. If you need help quitting, talk to your doctor about stop-smoking programs and medicines. These can increase your chances of quitting for good. · Protect your skin from too much sun. When you're outdoors from 10 a.m. to 4 p.m., stay in the shade or cover up with clothing and a hat with a wide brim. Wear sunglasses that block UV rays. Even when it's cloudy, put broad-spectrum sunscreen (SPF 30 or higher) on any exposed skin. · See a dentist one or two times a year for checkups and to have your teeth cleaned. · Wear a seat belt in the car. · Limit alcohol to 2 drinks a day for men and 1 drink a day for women. Too much alcohol can cause health problems. Follow your doctor's advice about when to have certain tests. These tests can spot problems early. For men and women · Cholesterol. Your doctor will tell you how often to have this done based on your overall health and other things that can increase your risk for heart attack and stroke. · Blood pressure. Have your blood pressure checked during a routine doctor visit. Your doctor will tell you how often to check your blood pressure based on your age, your blood pressure results, and other factors. · Diabetes. Ask your doctor whether you should have tests for diabetes. · Vision. Experts recommend that you have yearly exams for glaucoma and other age-related eye problems. · Hearing. Tell your doctor if you notice any change in your hearing. You can have tests to find out how well you hear. · Colon cancer tests. Keep having colon cancer tests as your doctor recommends. You can have one of several types of tests. · Heart attack and stroke risk. At least every 4 to 6 years, you should have your risk for heart attack and stroke assessed. Your doctor uses factors such as your age, blood pressure, cholesterol, and whether you smoke or have diabetes to show what your risk for a heart attack or stroke is over the next 10 years. · Osteoporosis. Talk to your doctor about whether you should have a bone density test to find out whether you have thinning bones. Also ask your doctor about whether you should take calcium and vitamin D supplements. For women · Pap test and pelvic exam. You may no longer need a Pap test. Talk with your doctor about whether to stop or continue to have Pap tests. · Breast exam and mammogram. Ask how often you should have a mammogram, which is an X-ray of your breasts. A mammogram can spot breast cancer before it can be felt and when it is easiest to treat. · Thyroid disease. Talk to your doctor about whether to have your thyroid checked as part of a regular physical exam. Women have an increased chance of a thyroid problem. For men · Prostate exam. Talk to your doctor about whether you should have a blood test (called a PSA test) for prostate cancer. Experts disagree on whether men should have this test. Some experts recommend that you discuss the benefits and risks of the test with your doctor. · Abdominal aortic aneurysm. Ask your doctor whether you should have a test to check for an aneurysm.  You may need a test if you ever smoked or if your parent, brother, sister, or child has had an aneurysm. When should you call for help? Watch closely for changes in your health, and be sure to contact your doctor if you have any problems or symptoms that concern you. Where can you learn more? Go to http://nikki-alicia.info/. Enter Z624 in the search box to learn more about \"Well Visit, Over 65: Care Instructions. \" Current as of: July 19, 2016 Content Version: 11.3 © 7474-3776 [employer] Care instructions adapted under license by [employer] (which disclaims liability or warranty for this information). If you have questions about a medical condition or this instruction, always ask your healthcare professional. [address] any warranty or liability for your use of this information. Introducing [facility]! Dear Trevor Contreras: Thank you for requesting a Logistic[employer] account. Our records indicate that you already have an active Logisticare account. You can access your account anytime at [email or web address] Did you know that you can access your hospital and ER discharge instructions at any time in Logisticare? You can also review all of your test results from your hospital stay or ER visit. Additional Information If you have questions, please visit the Frequently Asked Questions section of the [employer] website at [email or web address] Remember, Logisticare is NOT to be used for urgent needs. For medical emergencies, dial 911. Now available from your iPhone and Android! Please provide this summary of care documentation to your next provider. Your primary care clinician is listed as [address]. If you have any questions after today's visit, please call [phone].

## 2017-08-04 ENCOUNTER — LAB ONLY (OUTPATIENT)
Dept: FAMILY MEDICINE CLINIC | Age: 77
End: 2017-08-04

## 2017-08-04 ENCOUNTER — HOSPITAL ENCOUNTER (OUTPATIENT)
Dept: MAMMOGRAPHY | Age: 77
Discharge: HOME | End: 2017-08-04
Attending: INTERNAL MEDICINE
Payer: MEDICARE

## 2017-08-04 DIAGNOSIS — Z12.31 VISIT FOR SCREENING MAMMOGRAM: ICD-10-CM

## 2017-08-04 PROCEDURE — 77067 SCR MAMMO BI INCL CAD: CPT

## 2017-08-05 LAB
25(OH)D3+25(OH)D2 SERPL-MCNC: 32.3 NG/ML (ref 30–100)
ALBUMIN SERPL-MCNC: 3.7 G/DL (ref 3.5–4.8)
ALBUMIN/CREAT UR: <9.8 MG/G CREAT (ref 0–30)
ALBUMIN/GLOB SERPL: 1 {RATIO} (ref 1.2–2.2)
ALP SERPL-CCNC: 91 IU/L (ref 39–117)
ALT SERPL-CCNC: 14 IU/L (ref 0–32)
AST SERPL-CCNC: 14 IU/L (ref 0–40)
BASOPHILS # BLD AUTO: 0 X10E3/UL (ref 0–0.2)
BASOPHILS NFR BLD AUTO: 1 %
BILIRUB SERPL-MCNC: 0.3 MG/DL (ref 0–1.2)
BUN SERPL-MCNC: 15 MG/DL (ref 8–27)
BUN/CREAT SERPL: 16 (ref 12–28)
CALCIUM SERPL-MCNC: 9.3 MG/DL (ref 8.7–10.3)
CHLORIDE SERPL-SCNC: 102 MMOL/L (ref 96–106)
CHOLEST SERPL-MCNC: 143 MG/DL (ref 100–199)
CO2 SERPL-SCNC: 25 MMOL/L (ref 18–29)
CREAT SERPL-MCNC: 0.94 MG/DL (ref 0.57–1)
CREAT UR-MCNC: 30.5 MG/DL
CRP SERPL HS-MCNC: 6.59 MG/L (ref 0–3)
EOSINOPHIL # BLD AUTO: 0.1 X10E3/UL (ref 0–0.4)
EOSINOPHIL NFR BLD AUTO: 3 %
ERYTHROCYTE [DISTWIDTH] IN BLOOD BY AUTOMATED COUNT: 14.8 % (ref 12.3–15.4)
EST. AVERAGE GLUCOSE BLD GHB EST-MCNC: 143 MG/DL
GLOBULIN SER CALC-MCNC: 3.7 G/DL (ref 1.5–4.5)
GLUCOSE SERPL-MCNC: 70 MG/DL (ref 65–99)
HBA1C MFR BLD: 6.6 % (ref 4.8–5.6)
HCT VFR BLD AUTO: 36.1 % (ref 34–46.6)
HDLC SERPL-MCNC: 45 MG/DL
HGB BLD-MCNC: 11.9 G/DL (ref 11.1–15.9)
IMM GRANULOCYTES # BLD: 0 X10E3/UL (ref 0–0.1)
IMM GRANULOCYTES NFR BLD: 0 %
INTERPRETATION, 910389: NORMAL
INTERPRETATION: NORMAL
LDLC SERPL CALC-MCNC: 82 MG/DL (ref 0–99)
LYMPHOCYTES # BLD AUTO: 1.5 X10E3/UL (ref 0.7–3.1)
LYMPHOCYTES NFR BLD AUTO: 28 %
Lab: NORMAL
Lab: NORMAL
MCH RBC QN AUTO: 28.3 PG (ref 26.6–33)
MCHC RBC AUTO-ENTMCNC: 33 G/DL (ref 31.5–35.7)
MCV RBC AUTO: 86 FL (ref 79–97)
MICROALBUMIN UR-MCNC: <3 UG/ML
MONOCYTES # BLD AUTO: 0.4 X10E3/UL (ref 0.1–0.9)
MONOCYTES NFR BLD AUTO: 7 %
NEUTROPHILS # BLD AUTO: 3.4 X10E3/UL (ref 1.4–7)
NEUTROPHILS NFR BLD AUTO: 61 %
PDF IMAGE, 910387: NORMAL
PLATELET # BLD AUTO: 246 X10E3/UL (ref 150–379)
POTASSIUM SERPL-SCNC: 4.4 MMOL/L (ref 3.5–5.2)
PROT SERPL-MCNC: 7.4 G/DL (ref 6–8.5)
RBC # BLD AUTO: 4.2 X10E6/UL (ref 3.77–5.28)
SODIUM SERPL-SCNC: 140 MMOL/L (ref 134–144)
TRIGL SERPL-MCNC: 78 MG/DL (ref 0–149)
VLDLC SERPL CALC-MCNC: 16 MG/DL (ref 5–40)
WBC # BLD AUTO: 5.4 X10E3/UL (ref 3.4–10.8)

## 2017-10-31 ENCOUNTER — OFFICE VISIT (OUTPATIENT)
Dept: FAMILY MEDICINE CLINIC | Age: 77
End: 2017-10-31

## 2017-10-31 VITALS
HEIGHT: 62 IN | DIASTOLIC BLOOD PRESSURE: 64 MMHG | BODY MASS INDEX: 36.25 KG/M2 | HEART RATE: 94 BPM | TEMPERATURE: 99 F | SYSTOLIC BLOOD PRESSURE: 131 MMHG | OXYGEN SATURATION: 98 % | WEIGHT: 197 LBS | RESPIRATION RATE: 18 BRPM

## 2017-10-31 DIAGNOSIS — L30.4 PRURITIC INTERTRIGO: ICD-10-CM

## 2017-10-31 DIAGNOSIS — N76.0 ACUTE VAGINITIS: Primary | ICD-10-CM

## 2017-10-31 RX ORDER — ATORVASTATIN CALCIUM 40 MG/1
40 TABLET, FILM COATED ORAL
Qty: 30 TAB | Refills: 5 | Status: SHIPPED | OUTPATIENT
Start: 2017-10-31 | End: 2018-06-14 | Stop reason: SDUPTHER

## 2017-10-31 RX ORDER — GABAPENTIN 600 MG/1
TABLET ORAL 3 TIMES DAILY
Status: CANCELLED | OUTPATIENT
Start: 2017-10-31

## 2017-10-31 RX ORDER — LYSINE HCL 500 MG
TABLET ORAL
Qty: 60 TAB | Refills: 5 | Status: SHIPPED | OUTPATIENT
Start: 2017-10-31

## 2017-10-31 RX ORDER — FLUCONAZOLE 150 MG/1
150 TABLET ORAL DAILY
Qty: 2 TAB | Refills: 0 | Status: SHIPPED | OUTPATIENT
Start: 2017-10-31 | End: 2017-11-01

## 2017-10-31 RX ORDER — KETOCONAZOLE 20 MG/G
CREAM TOPICAL DAILY
Qty: 15 G | Refills: 0 | Status: SHIPPED | OUTPATIENT
Start: 2017-10-31 | End: 2017-11-16 | Stop reason: SDUPTHER

## 2017-10-31 RX ORDER — NYSTATIN 100000 [USP'U]/G
POWDER TOPICAL 4 TIMES DAILY
Qty: 60 G | Refills: 3 | Status: SHIPPED | OUTPATIENT
Start: 2017-10-31 | End: 2018-01-09 | Stop reason: ALTCHOICE

## 2017-10-31 NOTE — MR AVS SNAPSHOT
Visit Information Date & Time Provider Department Dept. Phone Encounter #  
 10/31/2017  2:15 PM Hermes Puckett [place] [phone] Your Appointments 2/9/2018 10:40 AM  
ESTABLISHED PATIENT with Dar Stearns MD  
[facility] ([address]) Appt Note: ANNUAL  
 [address] [facility] [place] [phone] [facility]  
[address] [facility] [place] [phone] [address] Upcoming Health Maintenance Date Due  
 EYE EXAM RETINAL OR DILATED Q1 1/3/1950 INFLUENZA AGE 9 TO ADULT 8/1/2017 Pneumococcal 65+ Low/Medium Risk (2 of 2 - PPSV23) 11/15/2017 GLAUCOMA SCREENING Q2Y 1/15/2018 HEMOGLOBIN A1C Q6M 2/4/2018 FOOT EXAM Q1 7/31/2018 MICROALBUMIN Q1 7/31/2018 MEDICARE YEARLY EXAM 8/1/2018 LIPID PANEL Q1 8/4/2018 DTaP/Tdap/Td series (2 - Td) 7/21/2026 Allergies as of 10/31/2017  Review Complete On: 10/31/2017 By: Fuentes Becker MD  
  
 Severity Noted Reaction Type Reactions Sulfa (Sulfonamide Antibiotics) High 03/11/2010    Hives, Itching Current Immunizations  Reviewed on 7/31/2017 No immunizations on file. Not reviewed this visit You Were Diagnosed With   
  
 Codes Comments Acute vaginitis    -  Primary ICD-10-CM: N76.0 ICD-9-CM: 616.10 Vitals BP Pulse Temp Resp Height(growth percentile) Weight(growth percentile) 131/64 94 99 °F (37.2 °C) (Oral) 18 5' 2\" (1.575 m) 197 lb (89.4 kg) SpO2 BMI OB Status Smoking Status 98% 36.03 kg/m2 Hysterectomy Former Smoker Vitals History BMI and BSA Data Body Mass Index Body Surface Area 36.03 kg/m 2 1.98 m 2 Preferred Pharmacy Pharmacy Name Phone [address] Your Updated Medication List  
  
   
 This list is accurate as of: 10/31/17  3:16 PM.  Always use your most recent med list.  
  
  
  
  
 acetaminophen 650 mg CR tablet Commonly known as:  TYLENOL Take 650 mg by mouth every six (6) hours as needed for Pain. alcohol swabs Padm Commonly known as:  BD Single Use Swabs Regular  
100 Each by Apply Externally route daily. alendronate 70 mg tablet Commonly known as:  FOSAMAX TAKE 1 TABLET EVERY 7 DAYS  
  
 aspirin delayed-release 81 mg tablet Take  by mouth daily. atorvastatin 40 mg tablet Commonly known as:  LIPITOR Take 1 Tab by mouth once over twenty-four (24) hours for 30 days. Blood-Glucose Meter monitoring kit  
accu-check alexei plus meter Test blood sugar once daily E11.9 BREO ELLIPTA 100-25 mcg/dose inhaler Generic drug:  fluticasone-vilanterol Take 1 Puff by inhalation daily. Calcium Carbonate-Vit D3-Min 600 mg calcium- 400 unit Tab Take 1 pill 2 x daily  
  
 fluconazole 150 mg tablet Commonly known as:  DIFLUCAN Take 1 Tab by mouth daily for 1 day. Repeat in 3-days if still having symptoms. furosemide 20 mg tablet Commonly known as:  LASIX Take  by mouth every Monday, Wednesday, Friday. gabapentin 600 mg tablet Commonly known as:  NEURONTIN Take  by mouth three (3) times daily. glipiZIDE 5 mg tablet Commonly known as:  Dav [place] Take 1 Tab by mouth once over twenty-four (24) hours. glucose blood VI test strips strip Commonly known as:  blood glucose test  
Accu-chek alexei plus test strips Test blood sugar once daily E11.9 GLUCOSE CONTROL Soln Generic drug:  Blood Glucose Control, Normal  
1 Bottle by Does Not Apply route daily. ketoconazole 2 % topical cream  
Commonly known as:  NIZORAL Apply  to affected area daily. Lancets Misc  
accu-chek softclix lancets Check blood sugar daily E11.9  
  
 latanoprost 0.005 % ophthalmic solution Commonly known as:  Martin Shoemaker  
 Administer 1 Drop to both eyes nightly. oxybutynin 5 mg tablet Commonly known as:  NZGSELHF Take 1 Tab by mouth three (3) times daily for 270 days. raNITIdine 150 mg tablet Commonly known as:  ZANTAC Take 1 Tab by mouth two (2) times a day for 30 days. Prescriptions Sent to Pharmacy Refills  
 atorvastatin (LIPITOR) 40 mg tablet 5 Sig: Take 1 Tab by mouth once over twenty-four (24) hours for 30 days. Class: Normal  
 Pharmacy: [address] Ph #: [phone] Route: Oral  
 Calcium Carbonate-Vit D3-Min 600 mg calcium- 400 unit tab 5 Sig: Take 1 pill 2 x daily Class: Normal  
 Pharmacy: [address] Ph #: [phone]  
 fluconazole (DIFLUCAN) 150 mg tablet 0 Sig: Take 1 Tab by mouth daily for 1 day. Repeat in 3-days if still having symptoms. Class: Normal  
 Pharmacy: [address] Ph #: [phone] Route: Oral  
 ketoconazole (NIZORAL) 2 % topical cream 0 Sig: Apply  to affected area daily. Class: Normal  
 Pharmacy: [address] Ph #: [phone] Route: Topical  
  
We Performed the Following [address] [id number] Custom] Patient Instructions Vaginitis: Care Instructions Your Care Instructions Vaginitis is soreness or infection of the vagina. This common problem can cause itching and burning. And it can cause a change in vaginal discharge. Sometimes it can cause pain during sex. Vaginitis may be caused by bacteria, yeast, or other germs. Some infections that cause it are caught from a sexual partner. Bath products, spermicides, and douches can irritate the vagina too. Some women have this problem during and after menopause.  A drop in estrogen levels during this time can cause dryness, soreness, and pain during sex. Your doctor can give you medicine to treat an infection. And home care may help you feel better. For certain types of infections, your sex partner must be treated too. Follow-up care is a key part of your treatment and safety. Be sure to make and go to all appointments, and call your doctor if you are having problems. It's also a good idea to know your test results and keep a list of the medicines you take. How can you care for yourself at home? · If your doctor prescribed antibiotics, take them as directed. Do not stop taking them just because you feel better. You need to take the full course of antibiotics. · Take your medicines exactly as prescribed. Call your doctor if you think you are having a problem with your medicine. · Do not eat or drink anything that has alcohol if you are taking metronidazole (Flagyl). · If you have a yeast infection, use over-the-counter products as your doctor tells you to. Or take medicine your doctor prescribes exactly as directed. · Wash your vaginal area daily with water. You also can use a mild, unscented soap if you want. · Do not use scented bath products. And do not use vaginal sprays or douches. · Put a washcloth soaked in cool water on the area to relieve itching. Or you can take cool baths. · If you have dryness because of menopause, use estrogen cream or pills that your doctor prescribes. · Ask your doctor about when it is okay to have sex. · Use a personal lubricant before sex if you have dryness. Examples are Astroglide, K-Y Jelly, and Wet Lubricant Gel. · Ask your doctor if your sex partner also needs treatment. When should you call for help? Call your doctor now or seek immediate medical care if: 
? · You have a fever and pelvic pain. ? Watch closely for changes in your health, and be sure to contact your doctor if: 
? · You have bleeding other than your period. ? · You do not get better as expected. Where can you learn more? Go to http://nikki-alicia.info/. Enter I071 in the search box to learn more about \"Vaginitis: Care Instructions. \" Current as of: October 13, 2016 Content Version: 11.4 © 0056-8010 [employer]. Care instructions adapted under license by [employer] (which disclaims liability or warranty for this information). If you have questions about a medical condition or this instruction, always ask your healthcare professional. [address] any warranty or liability for your use of this information. Introducing [facility]! Dear Sara Whittaker: Thank you for requesting a ideaForge account. Our records indicate that you already have an active ideaForge account. You can access your account anytime at [email or web address] Did you know that you can access your hospital and ER discharge instructions at any time in ideaForge? You can also review all of your test results from your hospital stay or ER visit. Additional Information If you have questions, please visit the Frequently Asked Questions section of the ideaForge website at [email or web address] Remember, ideaForge is NOT to be used for urgent needs. For medical emergencies, dial 911. Now available from your iPhone and Android! Please provide this summary of care documentation to your next provider. Your primary care clinician is listed as [address]. If you have any questions after today's visit, please call [phone].

## 2017-10-31 NOTE — PATIENT INSTRUCTIONS
Vaginitis: Care Instructions  Your Care Instructions    Vaginitis is soreness or infection of the vagina. This common problem can cause itching and burning. And it can cause a change in vaginal discharge. Sometimes it can cause pain during sex. Vaginitis may be caused by bacteria, yeast, or other germs. Some infections that cause it are caught from a sexual partner. Bath products, spermicides, and douches can irritate the vagina too. Some women have this problem during and after menopause. A drop in estrogen levels during this time can cause dryness, soreness, and pain during sex. Your doctor can give you medicine to treat an infection. And home care may help you feel better. For certain types of infections, your sex partner must be treated too. Follow-up care is a key part of your treatment and safety. Be sure to make and go to all appointments, and call your doctor if you are having problems. It's also a good idea to know your test results and keep a list of the medicines you take. How can you care for yourself at home? · If your doctor prescribed antibiotics, take them as directed. Do not stop taking them just because you feel better. You need to take the full course of antibiotics. · Take your medicines exactly as prescribed. Call your doctor if you think you are having a problem with your medicine. · Do not eat or drink anything that has alcohol if you are taking metronidazole (Flagyl). · If you have a yeast infection, use over-the-counter products as your doctor tells you to. Or take medicine your doctor prescribes exactly as directed. · Wash your vaginal area daily with water. You also can use a mild, unscented soap if you want. · Do not use scented bath products. And do not use vaginal sprays or douches. · Put a washcloth soaked in cool water on the area to relieve itching. Or you can take cool baths.   · If you have dryness because of menopause, use estrogen cream or pills that your doctor prescribes. · Ask your doctor about when it is okay to have sex. · Use a personal lubricant before sex if you have dryness. Examples are Astroglide, K-Y Jelly, and Wet Lubricant Gel. · Ask your doctor if your sex partner also needs treatment. When should you call for help? Call your doctor now or seek immediate medical care if:  ? · You have a fever and pelvic pain. ? Watch closely for changes in your health, and be sure to contact your doctor if:  ? · You have bleeding other than your period. ? · You do not get better as expected. Where can you learn more? Go to http://nikki-alicia.info/. Enter W680 in the search box to learn more about \"Vaginitis: Care Instructions. \"  Current as of: October 13, 2016  Content Version: 11.4  © 3499-8220 Healthwise, Incorporated. Care instructions adapted under license by [employer] (which disclaims liability or warranty for this information). If you have questions about a medical condition or this instruction, always ask your healthcare professional. [address] any warranty or liability for your use of this information.

## 2017-10-31 NOTE — PROGRESS NOTES
CC:  Chief Complaint   Patient presents with    Vaginal Itching     HISTORY OF PRESENT ILLNESS  Hakeem Stallworth is a 68 y.o. female. HPI Comments: Who presents with a new c/o vaginal itching that started about 1-month ago. No discharge. No burning. It is mostly on the vaginal walls and along the inner surface of her groin. She has also noticed that she is missing hair in the area. Vaginal Itching          ROS:  Review of Systems   All other systems reviewed and are negative. OBJECTIVE:  /64  Pulse 94  Temp 99 °F (37.2 °C) (Oral)   Resp 18  Ht 5' 2\" (1.575 m)  Wt 197 lb (89.4 kg)  SpO2 98%  BMI 36.03 kg/m2  Physical Exam   Cardiovascular: Normal rate and regular rhythm. Pulmonary/Chest: Effort normal and breath sounds normal.   Genitourinary:   Genitourinary Comments: Excoriation of vagina, erythematous and whitish discharge is noted adherent to the skin. Nursing note and vitals reviewed. ASSESSMENT and PLAN    ICD-10-CM ICD-9-CM    1. Acute vaginitis N76.0 616.10 NUSWAB VAGINITIS PLUS   2. Pruritic intertrigo L30.4 695.89 nystatin (MYCOSTATIN) powder     I have discussed the diagnosis with the patient and the intended treatment plan as seen in the above orders. The patient has received an after-visit summary and questions were answered concerning future plans. Asked to return should symptoms worsen or not improve with treatment. Any pending labs and studies will be relayed to patient when they become available. Pt verbalizes understanding of plan of care and denies further questions or concerns at this time. Follow-up Disposition:  Return if symptoms worsen or fail to improve.

## 2017-10-31 NOTE — PROGRESS NOTES
Chief Complaint   Patient presents with    Vaginal Itching     Pt states she has had vaginal itching x1.5 months. Denies discharge and dysuria, but feels that it is very different than a yeast infection. PHQ over the last two weeks 7/31/2017   Little interest or pleasure in doing things Not at all   Feeling down, depressed or hopeless Nearly every day   Total Score PHQ 2 3     1. Have you been to the ER, urgent care clinic since your last visit? Hospitalized since your last visit? No    2. Have you seen or consulted any other health care providers outside of the [address] [place] [address] since your last visit? Include any pap smears or colon screening. No       PHQ over the last two weeks 10/31/2017   Little interest or pleasure in doing things Several days   Feeling down, depressed or hopeless Several days   Total Score PHQ 2 2     Pt reports \"a lot going on in my house\" -r/t moods.

## 2017-11-02 NOTE — TELEPHONE ENCOUNTER
Marguerite Kerr with [facility] is calling to see if  is still taking the glipizide 5 mg or if this has been discontinued.  The call back number is [phone]

## 2017-11-02 NOTE — TELEPHONE ENCOUNTER
Phone number goes directly to busy signal. Pt was advised I am unable to reach the [facility] Representative to advise glipizide is a current medication for her. Please advise representative if they should callback. Thanks.

## 2017-11-03 LAB
A VAGINAE DNA VAG QL NAA+PROBE: ABNORMAL SCORE
BVAB2 DNA VAG QL NAA+PROBE: ABNORMAL SCORE
C ALBICANS DNA VAG QL NAA+PROBE: POSITIVE
C GLABRATA DNA VAG QL NAA+PROBE: POSITIVE
C TRACH RRNA SPEC QL NAA+PROBE: NEGATIVE
MEGA1 DNA VAG QL NAA+PROBE: ABNORMAL SCORE
N GONORRHOEA RRNA SPEC QL NAA+PROBE: NEGATIVE
T VAGINALIS RRNA SPEC QL NAA+PROBE: NEGATIVE

## 2017-11-07 NOTE — PROGRESS NOTES
Pt was advised via TM on cell phone, \"Recent culture done in the office only showed yeast and medication she was prescribed when she was seen should have resolved her sx. Please give us a call at the office if sx have not resolved to discuss further\".

## 2017-11-10 NOTE — TELEPHONE ENCOUNTER
Thomas called regarding previous message. I advised Guy Smith that patient is still taking glipizide. Guy Smith requested a new order because she stated patient hasn't received a refill since May 2017.

## 2017-11-14 RX ORDER — GLIPIZIDE 5 MG/1
5 TABLET ORAL
Qty: 90 TAB | Refills: 0 | Status: SHIPPED | OUTPATIENT
Start: 2017-11-14 | End: 2018-03-31 | Stop reason: SDUPTHER

## 2017-11-16 ENCOUNTER — OFFICE VISIT (OUTPATIENT)
Dept: FAMILY MEDICINE CLINIC | Age: 77
End: 2017-11-16

## 2017-11-16 VITALS
SYSTOLIC BLOOD PRESSURE: 116 MMHG | HEART RATE: 47 BPM | TEMPERATURE: 96.7 F | OXYGEN SATURATION: 96 % | WEIGHT: 200 LBS | DIASTOLIC BLOOD PRESSURE: 51 MMHG | RESPIRATION RATE: 18 BRPM | HEIGHT: 62 IN | BODY MASS INDEX: 36.8 KG/M2

## 2017-11-16 DIAGNOSIS — J02.9 SORE THROAT: Primary | ICD-10-CM

## 2017-11-16 DIAGNOSIS — R52 BODY ACHES: ICD-10-CM

## 2017-11-16 DIAGNOSIS — R05.9 COUGH: ICD-10-CM

## 2017-11-16 DIAGNOSIS — R68.83 CHILLS: ICD-10-CM

## 2017-11-16 DIAGNOSIS — J06.9 URI WITH COUGH AND CONGESTION: ICD-10-CM

## 2017-11-16 DIAGNOSIS — J34.89 NASAL DRAINAGE: ICD-10-CM

## 2017-11-16 DIAGNOSIS — R23.8 SKIN IRRITATION: ICD-10-CM

## 2017-11-16 DIAGNOSIS — J11.1 INFLUENZA: ICD-10-CM

## 2017-11-16 LAB
QUICKVUE INFLUENZA TEST: NEGATIVE
S PYO AG THROAT QL: NEGATIVE
VALID INTERNAL CONTROL?: YES
VALID INTERNAL CONTROL?: YES

## 2017-11-16 RX ORDER — AZITHROMYCIN 250 MG/1
TABLET, FILM COATED ORAL
Qty: 6 TAB | Refills: 0 | Status: SHIPPED | OUTPATIENT
Start: 2017-11-16 | End: 2017-11-21

## 2017-11-16 RX ORDER — KETOCONAZOLE 20 MG/G
CREAM TOPICAL DAILY
Qty: 30 G | Refills: 3 | Status: SHIPPED | OUTPATIENT
Start: 2017-11-16 | End: 2020-09-01 | Stop reason: SDUPTHER

## 2017-11-16 RX ORDER — BENZONATATE 100 MG/1
100 CAPSULE ORAL
Qty: 21 CAP | Refills: 0 | Status: SHIPPED | OUTPATIENT
Start: 2017-11-16 | End: 2017-11-27 | Stop reason: SDUPTHER

## 2017-11-16 RX ORDER — KETOCONAZOLE 20 MG/G
CREAM TOPICAL DAILY
Qty: 15 G | Refills: 0 | Status: CANCELLED | OUTPATIENT
Start: 2017-11-16

## 2017-11-16 NOTE — PROGRESS NOTES
Identified pt with two pt identifiers(name and ). Chief Complaint   Patient presents with    Sore Throat     sx have been occurring over the last 2 wks    Fever    Chills    Cough    Generalized Body Aches    Medication Refill     of cream for irritation under breasts that is almost cleared        Health Maintenance Due   Topic    EYE EXAM RETINAL OR DILATED Q1     Influenza Age 5 to Adult     Pneumococcal 65+ Low/Medium Risk (2 of 2 - PPSV23)    GLAUCOMA SCREENING Q2Y        Wt Readings from Last 3 Encounters:   17 200 lb (90.7 kg)   10/31/17 197 lb (89.4 kg)   17 212 lb (96.2 kg)     Temp Readings from Last 3 Encounters:   17 96.7 °F (35.9 °C) (Oral)   10/31/17 99 °F (37.2 °C) (Oral)   17 98.4 °F (36.9 °C) (Oral)     BP Readings from Last 3 Encounters:   17 116/51   10/31/17 131/64   17 127/71     Pulse Readings from Last 3 Encounters:   17 (!) 47   10/31/17 94   17 94         Learning Assessment:  :     Learning Assessment 2017   PRIMARY LEARNER Patient Patient   HIGHEST LEVEL OF EDUCATION - PRIMARY LEARNER  SOME COLLEGE -   BARRIERS PRIMARY LEARNER NONE -   PRIMARY LANGUAGE ENGLISH ENGLISH   LEARNER PREFERENCE PRIMARY READING LISTENING   ANSWERED BY patient patient   RELATIONSHIP SELF SELF       Depression Screening:  :     PHQ over the last two weeks 10/31/2017   Little interest or pleasure in doing things Several days   Feeling down, depressed or hopeless Several days   Total Score PHQ 2 2       Fall Risk Assessment:  :     Fall Risk Assessment, last 12 mths 10/31/2017   Able to walk? Yes   Fall in past 12 months? No       Abuse Screening:  :     Abuse Screening Questionnaire 2017 11/15/2016   Do you ever feel afraid of your partner? N N   Are you in a relationship with someone who physically or mentally threatens you? N N   Is it safe for you to go home?  Y Y       Coordination of Care Questionnaire:  :     1) Have you been to an emergency room, urgent care clinic since your last visit? no   Hospitalized since your last visit? no             2) Have you seen or consulted any other health care providers outside of [address] [place] [address] since your last visit? no  (Include any pap smears or colon screenings in this section.)    3) Do you have an Advance Directive on file? no  Are you interested in receiving information about Advance Directives? no    Patient is accompanied by daughter. I have received verbal consent from Court Sims to discuss any/all medical information while they are present in the room. Reviewed record in preparation for visit and have obtained necessary documentation. Medication reconciliation up to date and corrected with patient at this time.      Orders for POC influenza & Rapid Strep testing placed per Dr. Gus Gilbert verbal order

## 2017-11-16 NOTE — PATIENT INSTRUCTIONS
Upper Respiratory Infection (Cold): Care Instructions  Your Care Instructions    An upper respiratory infection, or URI, is an infection of the nose, sinuses, or throat. URIs are spread by coughs, sneezes, and direct contact. The common cold is the most frequent kind of URI. The flu and sinus infections are other kinds of URIs. Almost all URIs are caused by viruses. Antibiotics won't cure them. But you can treat most infections with home care. This may include drinking lots of fluids and taking over-the-counter pain medicine. You will probably feel better in 4 to 10 days. The doctor has checked you carefully, but problems can develop later. If you notice any problems or new symptoms, get medical treatment right away. Follow-up care is a key part of your treatment and safety. Be sure to make and go to all appointments, and call your doctor if you are having problems. It's also a good idea to know your test results and keep a list of the medicines you take. How can you care for yourself at home? · To prevent dehydration, drink plenty of fluids, enough so that your urine is light yellow or clear like water. Choose water and other caffeine-free clear liquids until you feel better. If you have kidney, heart, or liver disease and have to limit fluids, talk with your doctor before you increase the amount of fluids you drink. · Take an over-the-counter pain medicine, such as acetaminophen (Tylenol), ibuprofen (Advil, Motrin), or naproxen (Aleve). Read and follow all instructions on the label. · Before you use cough and cold medicines, check the label. These medicines may not be safe for young children or for people with certain health problems. · Be careful when taking over-the-counter cold or flu medicines and Tylenol at the same time. Many of these medicines have acetaminophen, which is Tylenol. Read the labels to make sure that you are not taking more than the recommended dose.  Too much acetaminophen (Tylenol) can be harmful. · Get plenty of rest.  · Do not smoke or allow others to smoke around you. If you need help quitting, talk to your doctor about stop-smoking programs and medicines. These can increase your chances of quitting for good. When should you call for help? Call 911 anytime you think you may need emergency care. For example, call if:  ? · You have severe trouble breathing. ?Call your doctor now or seek immediate medical care if:  ? · You seem to be getting much sicker. ? · You have new or worse trouble breathing. ? · You have a new or higher fever. ? · You have a new rash. ? Watch closely for changes in your health, and be sure to contact your doctor if:  ? · You have a new symptom, such as a sore throat, an earache, or sinus pain. ? · You cough more deeply or more often, especially if you notice more mucus or a change in the color of your mucus. ? · You do not get better as expected. Where can you learn more? Go to http://nikki-alicia.info/. Enter L261 in the search box to learn more about \"Upper Respiratory Infection (Cold): Care Instructions. \"  Current as of: May 12, 2017  Content Version: 11.4  © 0815-4885 Healthwise, Incorporated. Care instructions adapted under license by [employer] (which disclaims liability or warranty for this information). If you have questions about a medical condition or this instruction, always ask your healthcare professional. [place] [address] any warranty or liability for your use of this information.

## 2017-11-16 NOTE — MR AVS SNAPSHOT
Visit Information Date & Time Provider Department Dept. Phone Encounter #  
 11/16/2017  3:30 PM Hermes Colon  Your Appointments 2/9/2018 10:40 AM  
ESTABLISHED PATIENT with Tony Tanner MD  
[facility] ([facility]) Appt Note: ANNUAL  
 [address] [facility] [place] [address] [address]7 [facility]  
[address] [facility] [place] [address] [facility] [address] Upcoming Health Maintenance Date Due  
 EYE EXAM RETINAL OR DILATED Q1 1/3/1950 Influenza Age 5 to Adult 8/1/2017 Pneumococcal 65+ Low/Medium Risk (2 of 2 - PPSV23) 11/15/2017 GLAUCOMA SCREENING Q2Y 1/15/2018 HEMOGLOBIN A1C Q6M 2/4/2018 FOOT EXAM Q1 7/31/2018 MICROALBUMIN Q1 7/31/2018 MEDICARE YEARLY EXAM 8/1/2018 LIPID PANEL Q1 8/4/2018 DTaP/Tdap/Td series (2 - Td) 7/21/2026 Allergies as of 11/16/2017  Review Complete On: 11/16/2017 By: Ledora Curling, LPN Severity Noted Reaction Type Reactions Sulfa (Sulfonamide Antibiotics) High 03/11/2010    Hives, Itching Current Immunizations  Reviewed on 7/31/2017 No immunizations on file. Not reviewed this visit You Were Diagnosed With   
  
 Codes Comments Sore throat    -  Primary ICD-10-CM: J02.9 ICD-9-CM: 704 Chills     ICD-10-CM: R68.83 ICD-9-CM: 780.64 Cough     ICD-10-CM: R05 ICD-9-CM: 786.2 Nasal drainage     ICD-10-CM: J34.89 ICD-9-CM: 478.19 Body aches     ICD-10-CM: R52 ICD-9-CM: 780.96   
 URI with cough and congestion     ICD-10-CM: J06.9 ICD-9-CM: 465.9 Skin irritation     ICD-10-CM: R23.8 ICD-9-CM: 709.9 Vitals BP Pulse Temp Resp Height(growth percentile) Weight(growth percentile) 116/51 (BP 1 Location: Right arm, BP Patient Position: Sitting) (!) 47 96.7 °F (35.9 °C) (Oral) 18 5' 2\" (1.575 m) 200 lb (90.7 kg) SpO2 BMI OB Status Smoking Status 96% 36.58 kg/m2 Hysterectomy Former Smoker BMI and BSA Data Body Mass Index Body Surface Area  
 36.58 kg/m 2 1.99 m 2 Preferred Pharmacy Pharmacy Name Phone [facility], [address] [place] [address] Your Updated Medication List  
  
   
This list is accurate as of: 11/16/17  4:29 PM.  Always use your most recent med list.  
  
  
  
  
 acetaminophen 650 mg Tber Commonly known as:  TYLENOL Take 650 mg by mouth every six (6) hours as needed for Pain. alcohol swabs Padm Commonly known as:  BD Single Use Swabs Regular  
100 Each by Apply Externally route daily. alendronate 70 mg tablet Commonly known as:  FOSAMAX TAKE 1 TABLET EVERY 7 DAYS  
  
 aspirin delayed-release 81 mg tablet Take  by mouth daily. atorvastatin 40 mg tablet Commonly known as:  LIPITOR Take 1 Tab by mouth once over twenty-four (24) hours for 30 days. azithromycin 250 mg tablet Commonly known as:  Alysona Lever Take 2 tablets today, then take 1 tablet daily  
  
 benzonatate 100 mg capsule Commonly known as:  TESSALON PERLES Take 1 Cap by mouth three (3) times daily as needed for Cough for up to 7 days. BREO ELLIPTA 100-25 mcg/dose inhaler Generic drug:  fluticasone-vilanterol Take 1 Puff by inhalation daily. Calcium Carbonate-Vit D3-Min 600 mg calcium- 400 unit Tab Take 1 pill 2 x daily  
  
 furosemide 20 mg tablet Commonly known as:  LASIX Take  by mouth every Monday, Wednesday, Friday. gabapentin 600 mg tablet Commonly known as:  NEURONTIN Take  by mouth three (3) times daily. glipiZIDE 5 mg tablet Commonly known as:  Myrla Nordmann Take 1 Tab by mouth once over twenty-four (24) hours. glucose blood VI test strips strip Commonly known as:  blood glucose test  
Accu-chek alexei plus test strips Test blood sugar once daily E11.9 GLUCOSE CONTROL Soln Generic drug:  Blood Glucose Control, Normal  
1 Bottle by Does Not Apply route daily. ketoconazole 2 % topical cream  
Commonly known as:  NIZORAL Apply  to affected area daily. Lancets Misc  
accu-chek softclix lancets Check blood sugar daily E11.9  
  
 latanoprost 0.005 % ophthalmic solution Commonly known as:  Macie Sin Administer 1 Drop to both eyes nightly. nystatin powder Commonly known as:  MYCOSTATIN Apply  to affected area four (4) times daily. Until rash resolves. oxybutynin 5 mg tablet Commonly known as:  HLWGBTKY Take 1 Tab by mouth three (3) times daily for 270 days. raNITIdine 150 mg tablet Commonly known as:  ZANTAC Take 1 Tab by mouth two (2) times a day for 30 days. Prescriptions Sent to Pharmacy Refills  
 ketoconazole (NIZORAL) 2 % topical cream 3 Sig: Apply  to affected area daily. Class: Normal  
 Pharmacy: [address] Ph #: [phone] Route: Topical  
 azithromycin (ZITHROMAX) 250 mg tablet 0 Sig: Take 2 tablets today, then take 1 tablet daily Class: Normal  
 Pharmacy: [address] Ph #: [phone]  
 benzonatate (TESSALON PERLES) 100 mg capsule 0 Sig: Take 1 Cap by mouth three (3) times daily as needed for Cough for up to 7 days. Class: Normal  
 Pharmacy: [address] [place] [address] Ph #: [phone] Route: Oral  
  
We Performed the Following AMB POC RAPID INFLUENZA TEST [73483 CPT(R)] AMB POC RAPID STREP A [15593 CPT(R)] Patient Instructions Upper Respiratory Infection (Cold): Care Instructions Your Care Instructions An upper respiratory infection, or URI, is an infection of the nose, sinuses, or throat.  URIs are spread by coughs, sneezes, and direct contact. The common cold is the most frequent kind of URI. The flu and sinus infections are other kinds of URIs. Almost all URIs are caused by viruses. Antibiotics won't cure them. But you can treat most infections with home care. This may include drinking lots of fluids and taking over-the-counter pain medicine. You will probably feel better in 4 to 10 days. The doctor has checked you carefully, but problems can develop later. If you notice any problems or new symptoms, get medical treatment right away. Follow-up care is a key part of your treatment and safety. Be sure to make and go to all appointments, and call your doctor if you are having problems. It's also a good idea to know your test results and keep a list of the medicines you take. How can you care for yourself at home? · To prevent dehydration, drink plenty of fluids, enough so that your urine is light yellow or clear like water. Choose water and other caffeine-free clear liquids until you feel better. If you have kidney, heart, or liver disease and have to limit fluids, talk with your doctor before you increase the amount of fluids you drink. · Take an over-the-counter pain medicine, such as acetaminophen (Tylenol), ibuprofen (Advil, Motrin), or naproxen (Aleve). Read and follow all instructions on the label. · Before you use cough and cold medicines, check the label. These medicines may not be safe for young children or for people with certain health problems. · Be careful when taking over-the-counter cold or flu medicines and Tylenol at the same time. Many of these medicines have acetaminophen, which is Tylenol. Read the labels to make sure that you are not taking more than the recommended dose. Too much acetaminophen (Tylenol) can be harmful. · Get plenty of rest. 
· Do not smoke or allow others to smoke around you. If you need help quitting, talk to your doctor about stop-smoking programs and medicines. These can increase your chances of quitting for good. When should you call for help? Call 911 anytime you think you may need emergency care. For example, call if: 
? · You have severe trouble breathing. ?Call your doctor now or seek immediate medical care if: 
? · You seem to be getting much sicker. ? · You have new or worse trouble breathing. ? · You have a new or higher fever. ? · You have a new rash. ? Watch closely for changes in your health, and be sure to contact your doctor if: 
? · You have a new symptom, such as a sore throat, an earache, or sinus pain. ? · You cough more deeply or more often, especially if you notice more mucus or a change in the color of your mucus. ? · You do not get better as expected. Where can you learn more? Go to http://nikki-alicia.info/. Enter F348 in the search box to learn more about \"Upper Respiratory Infection (Cold): Care Instructions. \" Current as of: May 12, 2017 Content Version: 11.4 © 5902-3470 [employer]. Care instructions adapted under license by [employer] (which disclaims liability or warranty for this information). If you have questions about a medical condition or this instruction, always ask your healthcare professional. [address] any warranty or liability for your use of this information. Introducing \[facility] [place] [facility]\"" & HEALTH SERVICES! Dear Wil Gailndo: Thank you for requesting a [employer] account. Our records indicate that you already have an active [employer] account. You can access your account anytime at [email or web address] Did you know that you can access your hospital and ER discharge instructions at any time in [employer]? You can also review all of your test results from your hospital stay or ER visit. Additional Information If you have questions, please visit the Frequently Asked Questions section of the [employer] website at [email or web address] Remember, [employer] is NOT to be used for urgent needs. For medical emergencies, dial 911. Now available from your iPhone and Android! Please provide this summary of care documentation to your next provider. Your primary care clinician is listed as [address]. If you have any questions after today's visit, please call [phone].

## 2017-11-27 DIAGNOSIS — J06.9 URI WITH COUGH AND CONGESTION: ICD-10-CM

## 2017-11-27 RX ORDER — BENZONATATE 100 MG/1
CAPSULE ORAL
Qty: 21 CAP | Refills: 0 | Status: SHIPPED | OUTPATIENT
Start: 2017-11-27 | End: 2018-01-05

## 2017-12-01 NOTE — PROGRESS NOTES
Subjective:   Vane Madrigal is a 68 y.o. female who present complaining of flu-like symptoms: fevers, chills, myalgias, congestion, sore throat and cough for 7-8 days. She denies dyspnea or wheezing. Smoking status: non-smoker. Flu vaccine status: not vaccinated this season. Relevant PMH: No pertinent additional PMH. Review of Systems  Pertinent items are noted in HPI. Patient Active Problem List    Diagnosis Date Noted    DANIELA on CPAP 07/31/2017    Pure hypercholesterolemia 06/22/2016    Primary open angle glaucoma 06/22/2016    Type 2 [facility] mellitus without complication [facility] 07/49/1363    Osteoporosis 06/22/2016    Arthritis of left knee 07/14/2014    GERD (gastroesophageal reflux disease) 03/11/2010     Current Outpatient Prescriptions   Medication Sig Dispense Refill    ketoconazole (NIZORAL) 2 % topical cream Apply  to affected area daily. 30 g 3    glipiZIDE (GLUCOTROL) 5 mg tablet Take 1 Tab by mouth once over twenty-four (24) hours. 90 Tab 0    atorvastatin (LIPITOR) 40 mg tablet Take 1 Tab by mouth once over twenty-four (24) hours for 30 days. 30 Tab 5    Calcium Carbonate-Vit D3-Min 600 mg calcium- 400 unit tab Take 1 pill 2 x daily 60 Tab 5    nystatin (MYCOSTATIN) powder Apply  to affected area four (4) times daily. Until rash resolves. 60 g 3    fluticasone-vilanterol (BREO ELLIPTA) 100-25 mcg/dose inhaler Take 1 Puff by inhalation daily.  aspirin delayed-release 81 mg tablet Take  by mouth daily.  alendronate (FOSAMAX) 70 mg tablet TAKE 1 TABLET EVERY 7 DAYS 12 Tab 0    glucose blood VI test strips (BLOOD GLUCOSE TEST) strip Accu-chek alexei plus test strips Test blood sugar once daily E11.9 100 Strip 1    Lancets misc accu-chek softclix lancets Check blood sugar daily E11.9 100 Each 1    alcohol swabs (BD SINGLE USE SWABS REGULAR) padm 100 Each by Apply Externally route daily. 100 Pad 1    GLUCOSE CONTROL soln 1 Bottle by Does Not Apply route daily.  1 Bottle 1    acetaminophen (TYLENOL) 650 mg CR tablet Take 650 mg by mouth every six (6) hours as needed for Pain.  gabapentin (NEURONTIN) 600 mg tablet Take  by mouth three (3) times daily.  latanoprost (XALATAN) 0.005 % ophthalmic solution Administer 1 Drop to both eyes nightly.  furosemide (LASIX) 20 mg tablet Take  by mouth every Monday, Wednesday, Friday.  benzonatate (TESSALON) 100 mg capsule TAKE ONE CAPSULE BY MOUTH THREE TIMES DAILY AS NEEDED FOR COUGH FOR UP TO 7 DAYS 21 Cap 0    raNITIdine (ZANTAC) 150 mg tablet Take 1 Tab by mouth two (2) times a day for 30 days. 60 Tab 5    oxybutynin (DITROPAN) 5 mg tablet Take 1 Tab by mouth three (3) times daily for 270 days.  270 Tab 1     Allergies   Allergen Reactions    Sulfa (Sulfonamide Antibiotics) Hives and Itching     Past Medical History:   Diagnosis Date    Arthritis     Diabetes ([address]     GERD (gastroesophageal reflux disease)     Ill-defined condition     EDEMA, LOWER LIMS, URINARY INCONTINENCE    Urge incontinence      Past Surgical History:   Procedure Laterality Date    BREAST SURGERY PROCEDURE UNLISTED      LT BREAST CYST BENIGN    DRAINAGE OF DEEP RECTAL ABSCESS      ENDOSCOPY, COLON, DIAGNOSTIC      2008    HX BREAST BIOPSY Left 1970s    Surgical    HX GI      RECTAL ABSCESS    HX HEART CATHETERIZATION  01/06/2017    HX HEENT      NATHAN CATARACTS    HX HYSTERECTOMY       Family History   Problem Relation Age of Onset    Heart Disease Mother     No Known Problems Father      Social History   Substance Use Topics    Smoking status: Former Smoker     Quit date: 3/11/1990    Smokeless tobacco: Never Used    Alcohol use 0.0 oz/week     0 Standard drinks or equivalent per week      Comment: occasionally       Objective:     Visit Vitals    /51 (BP 1 Location: Right arm, BP Patient Position: Sitting)    Pulse (!) 47    Temp 96.7 °F (35.9 °C) (Oral)    Resp 18    Ht 5' 2\" (1.575 m)    Wt 200 lb (90.7 kg)  SpO2 96%    BMI 36.58 kg/m2       Appears moderately ill but not toxic; temperature as noted in vitals. Ears normal.   Throat and pharynx normal.    Neck supple. No adenopathy in the neck. Sinuses non tender. The chest is clear. Labs:  Results for orders placed or performed in visit on 11/16/17   AMB POC RAPID STREP A   Result Value Ref Range    VALID INTERNAL CONTROL POC Yes     Group A Strep Ag Negative Negative   AMB POC RAPID INFLUENZA TEST   Result Value Ref Range    VALID INTERNAL CONTROL POC Yes     QuickVue Influenza test Negative Negative       Assessment/Plan: I    ICD-10-CM ICD-9-CM    1. Sore throat J02.9 462 AMB POC RAPID STREP A      AMB POC RAPID INFLUENZA TEST   2. Chills R68.83 780.64 AMB POC RAPID STREP A      AMB POC RAPID INFLUENZA TEST   3. Cough R05 786.2 AMB POC RAPID STREP A      AMB POC RAPID INFLUENZA TEST   4. Nasal drainage J34.89 478.19 AMB POC RAPID STREP A      AMB POC RAPID INFLUENZA TEST   5. Body aches R52 780.96 AMB POC RAPID STREP A      AMB POC RAPID INFLUENZA TEST   6. URI with cough and congestion J06.9 465.9 azithromycin (ZITHROMAX) 250 mg tablet      DISCONTINUED: benzonatate (TESSALON PERLES) 100 mg capsule   7. Skin irritation R23.8 709.9 ketoconazole (NIZORAL) 2 % topical cream     Influenza and strep are negative at this time. Considerations for specific influenza anti-viral therapy: symptoms present > 48 hours, antiviral therapy unlikely to be effective  Symptomatic therapy suggested: rest, increase fluids, gargle prn for sore throat, apply heat to sinuses prn and call prn if symptoms persist or worsen. Call or return to clinic prn if these symptoms worsen or fail to improve as anticipated. I have discussed the diagnosis with the patient and the intended treatment plan as seen in the above orders. The patient has received an after-visit summary and questions were answered concerning future plans.  Asked to return should symptoms worsen or not improve with treatment. Any pending labs and studies will be relayed to patient when they become available. Pt verbalizes understanding of plan of care and denies further questions or concerns at this time. Follow-up Disposition:  Return if symptoms worsen or fail to improve.

## 2017-12-13 ENCOUNTER — OFFICE VISIT (OUTPATIENT)
Dept: FAMILY MEDICINE CLINIC | Age: 77
End: 2017-12-13

## 2017-12-13 VITALS
OXYGEN SATURATION: 94 % | TEMPERATURE: 96.6 F | SYSTOLIC BLOOD PRESSURE: 135 MMHG | DIASTOLIC BLOOD PRESSURE: 53 MMHG | WEIGHT: 196.8 LBS | HEART RATE: 45 BPM | BODY MASS INDEX: 36.22 KG/M2 | HEIGHT: 62 IN | RESPIRATION RATE: 18 BRPM

## 2017-12-13 DIAGNOSIS — G89.29 CHRONIC LEFT SHOULDER PAIN: ICD-10-CM

## 2017-12-13 DIAGNOSIS — M25.512 CHRONIC LEFT SHOULDER PAIN: ICD-10-CM

## 2017-12-13 DIAGNOSIS — M62.838 MUSCLE SPASM: Primary | ICD-10-CM

## 2017-12-13 RX ORDER — ALENDRONATE SODIUM 70 MG/1
70 TABLET ORAL
Qty: 12 TAB | Refills: 1 | Status: SHIPPED | OUTPATIENT
Start: 2017-12-13 | End: 2019-01-10

## 2017-12-13 RX ORDER — METHYLPREDNISOLONE 4 MG/1
TABLET ORAL
Qty: 1 DOSE PACK | Refills: 0 | Status: SHIPPED | OUTPATIENT
Start: 2017-12-13 | End: 2018-01-05

## 2017-12-13 RX ORDER — TIZANIDINE 2 MG/1
TABLET ORAL
Qty: 15 TAB | Refills: 0 | Status: SHIPPED | OUTPATIENT
Start: 2017-12-13 | End: 2018-01-09 | Stop reason: ALTCHOICE

## 2017-12-13 NOTE — MR AVS SNAPSHOT
Visit Information Date & Time Provider Department Dept. Phone Encounter #  
 12/13/2017  1:00 PM Hermes Heredia [phone] [id number] Your Appointments 2/9/2018 10:40 AM  
ESTABLISHED PATIENT with Keyon Mondragon MD  
[facility] ([address]) Appt Note: ANNUAL  
 [address] [facility] [place] [address] [address] [address] [id number] [address] Upcoming Health Maintenance Date Due  
 EYE EXAM RETINAL OR DILATED Q1 1/3/1950 Influenza Age 5 to Adult 8/1/2017 Pneumococcal 65+ Low/Medium Risk (2 of 2 - PPSV23) 11/15/2017 GLAUCOMA SCREENING Q2Y 1/15/2018 HEMOGLOBIN A1C Q6M 2/4/2018 FOOT EXAM Q1 7/31/2018 MICROALBUMIN Q1 7/31/2018 MEDICARE YEARLY EXAM 8/1/2018 LIPID PANEL Q1 8/4/2018 DTaP/Tdap/Td series (2 - Td) 7/21/2026 Allergies as of 12/13/2017  Review Complete On: 12/13/2017 By: Casper Canavan, LPN Severity Noted Reaction Type Reactions Sulfa (Sulfonamide Antibiotics) High 03/11/2010    Hives, Itching Current Immunizations  Reviewed on 7/31/2017 No immunizations on file. Not reviewed this visit You Were Diagnosed With   
  
 Codes Comments Muscle spasm    -  Primary ICD-10-CM: M80.830 ICD-9-CM: 728.85 Chronic left shoulder pain     ICD-10-CM: M25.512, G89.29 ICD-9-CM: 719.41, 338.29 Vitals BP Pulse Temp Resp Height(growth percentile) Weight(growth percentile) 135/53 (!) 45 96.6 °F (35.9 °C) (Temporal) 18 5' 2\" (1.575 m) 196 lb 12.8 oz (89.3 kg) SpO2 BMI OB Status Smoking Status 94% 36 kg/m2 Hysterectomy Former Smoker Vitals History BMI and BSA Data Body Mass Index Body Surface Area  
 36 kg/m 2 1.98 m 2 Preferred Pharmacy Pharmacy Name Phone [address] Your Updated Medication List  
  
   
This list is accurate as of: 12/13/17  2:45 PM.  Always use your most recent med list.  
  
  
  
  
 acetaminophen 650 mg Tber Commonly known as:  TYLENOL Take 650 mg by mouth every six (6) hours as needed for Pain. alcohol swabs Padm Commonly known as:  BD Single Use Swabs Regular  
100 Each by Apply Externally route daily. alendronate 70 mg tablet Commonly known as:  FOSAMAX Take 1 Tab by mouth every seven (7) days for 90 days. aspirin delayed-release 81 mg tablet Take  by mouth daily. atorvastatin 40 mg tablet Commonly known as:  LIPITOR Take 1 Tab by mouth once over twenty-four (24) hours for 30 days. benzonatate 100 mg capsule Commonly known as:  TESSALON  
TAKE ONE CAPSULE BY MOUTH THREE TIMES DAILY AS NEEDED FOR COUGH FOR UP TO 7 DAYS  
  
 BREO ELLIPTA 100-25 mcg/dose inhaler Generic drug:  fluticasone-vilanterol Take 1 Puff by inhalation daily. Calcium Carbonate-Vit D3-Min 600 mg calcium- 400 unit Tab Take 1 pill 2 x daily  
  
 furosemide 20 mg tablet Commonly known as:  LASIX Take  by mouth every Monday, Wednesday, Friday. gabapentin 600 mg tablet Commonly known as:  NEURONTIN Take  by mouth three (3) times daily. glipiZIDE 5 mg tablet Commonly known as:  Slime Dayhoff Take 1 Tab by mouth once over twenty-four (24) hours. glucose blood VI test strips strip Commonly known as:  blood glucose test  
Accu-chek alexei plus test strips Test blood sugar once daily E11.9 GLUCOSE CONTROL Soln Generic drug:  Blood Glucose Control, Normal  
1 Bottle by Does Not Apply route daily. ketoconazole 2 % topical cream  
Commonly known as:  NIZORAL Apply  to affected area daily. Lancets Misc  
accu-chek softclix lancets Check blood sugar daily E11.9  
  
 latanoprost 0.005 % ophthalmic solution Commonly known as:  Zayra Arora Administer 1 Drop to both eyes nightly. methylPREDNISolone 4 mg tablet Commonly known as:  Mayra Bon Take as directed. nystatin powder Commonly known as:  MYCOSTATIN Apply  to affected area four (4) times daily. Until rash resolves. oxybutynin 5 mg tablet Commonly known as:  TFYEFJLW Take 1 Tab by mouth three (3) times daily for 270 days. raNITIdine 150 mg tablet Commonly known as:  ZANTAC Take 1 Tab by mouth two (2) times a day for 30 days. tiZANidine 2 mg tablet Commonly known as:  ZANAFLEX  
1-2 tablets 1-2 hours prior to bedtime. Prescriptions Sent to Pharmacy Refills  
 alendronate (FOSAMAX) 70 mg tablet 1 Sig: Take 1 Tab by mouth every seven (7) days for 90 days. Class: Normal  
 Pharmacy: [address] Ph #: [phone] Route: Oral  
 tiZANidine (ZANAFLEX) 2 mg tablet 0 Si-2 tablets 1-2 hours prior to bedtime. Class: Normal  
 Pharmacy: [address] Ph #: [phone]  
 methylPREDNISolone (MEDROL DOSEPACK) 4 mg tablet 0 Sig: Take as directed. Class: Normal  
 Pharmacy: [address] Ph #: [phone] [facility] SERVICES! Dear Laurel Barcenas: Thank you for requesting a [employer] account. Our records indicate that you already have an active [employer] account. You can access your account anytime at [email or web address] Did you know that you can access your hospital and ER discharge instructions at any time in [employer]? You can also review all of your test results from your hospital stay or ER visit. Additional Information If you have questions, please visit the Frequently Asked Questions section of the [employer] website at [email or web address] Remember, [employer] is NOT to be used for urgent needs.  For medical emergencies, dial 911. Now available from your iPhone and Android! Please provide this summary of care documentation to your next provider. Your primary care clinician is listed as [address]. If you have any questions after today's visit, please call [phone].

## 2017-12-13 NOTE — PROGRESS NOTES
Chief Complaint   Patient presents with    Arm Pain     Pt c/o extreme pain to the left side of her body from the shoulder that radiates to the left arm and neck area. Pt states the pain alternated between and strong stabbing pain to a throbbing pain. Pt has tried rubbing alcohol& Bengay with no relief. Pt states she noticed the pain start about 2 1/2 weeks ago, but recently getting worse, pt reports having to sleep in recliner due to pain worsening when she lays down in bed.

## 2017-12-13 NOTE — PATIENT INSTRUCTIONS
Shoulder Bursitis: Exercises  Your Care Instructions  Here are some examples of typical rehabilitation exercises for your condition. Start each exercise slowly. Ease off the exercise if you start to have pain. Your doctor or physical therapist will tell you when you can start these exercises and which ones will work best for you. How to do the exercises  Posterior stretching exercise    1. Hold the elbow of your injured arm with your other hand. 2. Use your hand to pull your injured arm gently up and across your body. You will feel a gentle stretch across the back of your injured shoulder. 3. Hold for at least 15 to 30 seconds. Then slowly lower your arm. 4. Repeat 2 to 4 times. Up-the-back stretch    Your doctor or physical therapist may want you to wait to do this stretch until you have regained most of your range of motion and strength. You can do this stretch in different ways. Hold any of these stretches for at least 15 to 30 seconds. Repeat them 2 to 4 times. 1. Put your hand in your back pocket. Let it rest there to stretch your shoulder. 2. With your other hand, hold your injured arm (palm outward) behind your back by the wrist. Pull your arm up gently to stretch your shoulder. 3. Next, put a towel over your other shoulder. Put the hand of your injured arm behind your back. Now hold the back end of the towel. With the other hand, hold the front end of the towel in front of your body. Pull gently on the front end of the towel. This will bring your hand farther up your back to stretch your shoulder. Overhead stretch    1. Standing about an arm's length away, grasp onto a solid surface. You could use a countertop, a doorknob, or the back of a sturdy chair. 2. With your knees slightly bent, bend forward with your arms straight. Lower your upper body, and let your shoulders stretch. 3. As your shoulders are able to stretch farther, you may need to take a step or two backward.   4. Hold for at least 15 to 30 seconds. Then stand up and relax. If you had stepped back during your stretch, step forward so you can keep your hands on the solid surface. 5. Repeat 2 to 4 times. Shoulder flexion (lying down)    To make a wand for this exercise, use a piece of PVC pipe or a broom handle with the broom removed. Make the wand about a foot wider than your shoulders. 1. Lie on your back, holding a wand with both hands. Your palms should face down as you hold the wand. 2. Keeping your elbows straight, slowly raise your arms over your head. Raise them until you feel a stretch in your shoulders, upper back, and chest.  3. Hold for 15 to 30 seconds. 4. Repeat 2 to 4 times. Shoulder rotation (lying down)    To make a wand for this exercise, use a piece of PVC pipe or a broom handle with the broom removed. Make the wand about a foot wider than your shoulders. 1. Lie on your back. Hold a wand with both hands with your elbows bent and palms up. 2. Keep your elbows close to your body, and move the wand across your body toward the sore arm. 3. Hold for 8 to 12 seconds. 4. Repeat 2 to 4 times. Shoulder blade squeeze    1. Stand with your arms at your sides, and squeeze your shoulder blades together. Do not raise your shoulders up as you squeeze. 2. Hold 6 seconds. 3. Repeat 8 to 12 times. Shoulder flexor and extensor exercise    These are isometric exercises. That means you contract your muscles without actually moving. 1. Push forward (flex): Stand facing a wall or doorjamb, about 6 inches or less back. Hold your injured arm against your body. Make a closed fist with your thumb on top. Then gently push your hand forward into the wall with about 25% to 50% of your strength. Don't let your body move backward as you push. Hold for about 6 seconds. Relax for a few seconds. Repeat 8 to 12 times. 2. Push backward (extend): Stand with your back flat against a wall.  Your upper arm should be against the wall, with your elbow bent 90 degrees (your hand straight ahead). Push your elbow gently back against the wall with about 25% to 50% of your strength. Don't let your body move forward as you push. Hold for about 6 seconds. Relax for a few seconds. Repeat 8 to 12 times. Scapular exercise: Wall push-ups    This exercise is best done with your fingers somewhat turned out, rather than straight up and down. 1. Stand facing a wall, about 12 inches to 18 inches away. 2. Place your hands on the wall at shoulder height. 3. Slowly bend your elbows and bring your face to the wall. Keep your back and hips straight. 4. Push back to where you started. 5. Repeat 8 to 12 times. 6. When you can do this exercise against a wall comfortably, you can try it against a counter. You can then slowly progress to the end of a couch, then to a sturdy chair, and finally to the floor. Scapular exercise: Retraction    For this exercise, you will need elastic exercise material, such as surgical tubing or Thera-Band. 1. Put the band around a solid object at about waist level. (A bedpost will work well.) Each hand should hold an end of the band. 2. With your elbows at your sides and bent to 90 degrees, pull the band back. Your shoulder blades should move toward each other. Then move your arms back where you started. 3. Repeat 8 to 12 times. 4. If you have good range of motion in your shoulders, try this exercise with your arms lifted out to the sides. Keep your elbows at a 90-degree angle. Raise the elastic band up to about shoulder level. Pull the band back to move your shoulder blades toward each other. Then move your arms back where you started. Internal rotator strengthening exercise    1. Start by tying a piece of elastic exercise material to a doorknob. You can use surgical tubing or Thera-Band. 2. Stand or sit with your shoulder relaxed and your elbow bent 90 degrees. Your upper arm should rest comfortably against your side.  Squeeze a rolled towel between your elbow and your body for comfort. This will help keep your arm at your side. 3. Hold one end of the elastic band in the hand of the painful arm. 4. Slowly rotate your forearm toward your body until it touches your belly. Slowly move it back to where you started. 5. Keep your elbow and upper arm firmly tucked against the towel roll or at your side. 6. Repeat 8 to 12 times. External rotator strengthening exercise    1. Start by tying a piece of elastic exercise material to a doorknob. You can use surgical tubing or Thera-Band. (You may also hold one end of the band in each hand.)  2. Stand or sit with your shoulder relaxed and your elbow bent 90 degrees. Your upper arm should rest comfortably against your side. Squeeze a rolled towel between your elbow and your body for comfort. This will help keep your arm at your side. 3. Hold one end of the elastic band with the hand of the painful arm. 4. Start with your forearm across your belly. Slowly rotate the forearm out away from your body. Keep your elbow and upper arm tucked against the towel roll or the side of your body until you begin to feel tightness in your shoulder. Slowly move your arm back to where you started. 5. Repeat 8 to 12 times. Follow-up care is a key part of your treatment and safety. Be sure to make and go to all appointments, and call your doctor if you are having problems. It's also a good idea to know your test results and keep a list of the medicines you take. Where can you learn more? Go to http://nikki-alicia.info/. Enter S345 in the search box to learn more about \"Shoulder Bursitis: Exercises. \"  Current as of: March 21, 2017  Content Version: 11.4  © 9295-9482 [employer]. Care instructions adapted under license by [employer] (which disclaims liability or warranty for this information).  If you have questions about a medical condition or this instruction, always ask your healthcare professional. [address] any warranty or liability for your use of this information.

## 2017-12-13 NOTE — PROGRESS NOTES
Subjective:     Vikki Zuñiga is a 68 y.o. female who complains of pain that starts in the upper L-shoulder and moves all the way down to her lower back and she is also experiencing tingling in her L-arm. She first noticed the discomfort about 2-weeks ago and it became more intense after a recent URI. She was started on Amoxicillin. She completed the dose. She reports that she has not had any recent trauma. She reports that the pain is about an 8/10 and is preventing her from sleeping well at night. She has been taking extra-strength Tylenol, but it really has not helped her symptoms. Symptoms are worst: nighttime. Alleviating factors identifiable by patient are none. Exacerbating factors identifiable by patient are recumbency. Sometimes, heat along the shoulder can make it feel a little better. Patient Active Problem List    Diagnosis Date Noted    DANIELA on CPAP 07/31/2017    Pure hypercholesterolemia 06/22/2016    Primary open angle glaucoma 06/22/2016    Type 2 [facility] mellitus without complication [facility] 97/04/4449    Osteoporosis 06/22/2016    Arthritis of left knee 07/14/2014    GERD (gastroesophageal reflux disease) 03/11/2010     Current Outpatient Prescriptions   Medication Sig Dispense Refill    benzonatate (TESSALON) 100 mg capsule TAKE ONE CAPSULE BY MOUTH THREE TIMES DAILY AS NEEDED FOR COUGH FOR UP TO 7 DAYS 21 Cap 0    ketoconazole (NIZORAL) 2 % topical cream Apply  to affected area daily. 30 g 3    glipiZIDE (GLUCOTROL) 5 mg tablet Take 1 Tab by mouth once over twenty-four (24) hours. 90 Tab 0    Calcium Carbonate-Vit D3-Min 600 mg calcium- 400 unit tab Take 1 pill 2 x daily 60 Tab 5    nystatin (MYCOSTATIN) powder Apply  to affected area four (4) times daily. Until rash resolves. 60 g 3    fluticasone-vilanterol (BREO ELLIPTA) 100-25 mcg/dose inhaler Take 1 Puff by inhalation daily.  aspirin delayed-release 81 mg tablet Take  by mouth daily.       alendronate (FOSAMAX) 70 mg tablet TAKE 1 TABLET EVERY 7 DAYS 12 Tab 0    glucose blood VI test strips (BLOOD GLUCOSE TEST) strip Accu-chek alexei plus test strips Test blood sugar once daily E11.9 100 Strip 1    Lancets misc accu-chek softclix lancets Check blood sugar daily E11.9 100 Each 1    alcohol swabs (BD SINGLE USE SWABS REGULAR) padm 100 Each by Apply Externally route daily. 100 Pad 1    GLUCOSE CONTROL soln 1 Bottle by Does Not Apply route daily. 1 Bottle 1    acetaminophen (TYLENOL) 650 mg CR tablet Take 650 mg by mouth every six (6) hours as needed for Pain.  gabapentin (NEURONTIN) 600 mg tablet Take  by mouth three (3) times daily.  latanoprost (XALATAN) 0.005 % ophthalmic solution Administer 1 Drop to both eyes nightly.  furosemide (LASIX) 20 mg tablet Take  by mouth every Monday, Wednesday, Friday.  atorvastatin (LIPITOR) 40 mg tablet Take 1 Tab by mouth once over twenty-four (24) hours for 30 days. 30 Tab 5    raNITIdine (ZANTAC) 150 mg tablet Take 1 Tab by mouth two (2) times a day for 30 days. 60 Tab 5    oxybutynin (DITROPAN) 5 mg tablet Take 1 Tab by mouth three (3) times daily for 270 days.  270 Tab 1     Allergies   Allergen Reactions    Sulfa (Sulfonamide Antibiotics) Hives and Itching     Past Medical History:   Diagnosis Date    Arthritis     Diabetes ([address]     GERD (gastroesophageal reflux disease)     Ill-defined condition     EDEMA, LOWER LIMS, URINARY INCONTINENCE    Urge incontinence      Past Surgical History:   Procedure Laterality Date    BREAST SURGERY PROCEDURE UNLISTED      LT BREAST CYST BENIGN    DRAINAGE OF DEEP RECTAL ABSCESS      ENDOSCOPY, COLON, DIAGNOSTIC      2008    HX BREAST BIOPSY Left 1970s    Surgical    HX GI      RECTAL ABSCESS    HX HEART CATHETERIZATION  01/06/2017    HX HEENT      NATHAN CATARACTS    HX HYSTERECTOMY       Family History   Problem Relation Age of Onset    Heart Disease Mother     No Known Problems Father      Social History Substance Use Topics    Smoking status: Former Smoker     Quit date: 3/11/1990    Smokeless tobacco: Never Used    Alcohol use 0.0 oz/week     0 Standard drinks or equivalent per week      Comment: occasionally        Review of Systems  Pertinent items are noted in HPI. Objective:     Visit Vitals    /53    Pulse (!) 45    Temp 96.6 °F (35.9 °C) (Temporal)    Resp 18    Ht 5' 2\" (1.575 m)    Wt 196 lb 12.8 oz (89.3 kg)    SpO2 94%    BMI 36 kg/m2      Patient appears to be in mild to moderate pain, antalgic gait noted. Lumbosacral spine area reveals no local tenderness or mass. Painful and reduced LS ROM noted. Straight leg raise is positive at 45 degrees on both. DTR's, motor strength and sensation normal, including heel and toe gait. Peripheral pulses are palpable. X-Ray: not indicated. Assessment/Plan:       ICD-10-CM ICD-9-CM    1. Muscle spasm M62.838 728.85 tiZANidine (ZANAFLEX) 2 mg tablet      methylPREDNISolone (MEDROL DOSEPACK) 4 mg tablet   2. Chronic left shoulder pain M25.512 719.41 methylPREDNISolone (MEDROL DOSEPACK) 4 mg tablet    G89.29 338.29      For acute pain, rest, intermittent application of heat (do not sleep on heating pad), analgesics and muscle relaxants are recommended. Discussed longer term treatment plan of prn NSAID's and discussed a home back care exercise program with flexion exercise routine. Proper lifting with avoidance of heavy lifting discussed. Consider Physical Therapy and XRay studies if not improving. Call or return to clinic prn if these symptoms worsen or fail to improve as anticipated. I have discussed the diagnosis with the patient and the intended treatment plan as seen in the above orders. The patient has received an after-visit summary and questions were answered concerning future plans. Asked to return should symptoms worsen or not improve with treatment.  Any pending labs and studies will be relayed to patient when they become available. Pt verbalizes understanding of plan of care and denies further questions or concerns at this time. Follow-up Disposition:  Return if symptoms worsen or fail to improve.

## 2017-12-29 PROBLEM — E11.21 TYPE 2 DIABETES MELLITUS WITH NEPHROPATHY (HCC): Status: ACTIVE | Noted: 2017-12-29

## 2018-01-05 ENCOUNTER — APPOINTMENT (OUTPATIENT)
Dept: GENERAL RADIOLOGY | Age: 78
End: 2018-01-05
Attending: NURSE PRACTITIONER
Payer: MEDICARE

## 2018-01-05 ENCOUNTER — HOSPITAL ENCOUNTER (OUTPATIENT)
Age: 78
Setting detail: OBSERVATION
Discharge: HOME | End: 2018-01-08
Attending: EMERGENCY MEDICINE | Admitting: FAMILY MEDICINE
Payer: MEDICARE

## 2018-01-05 ENCOUNTER — APPOINTMENT (OUTPATIENT)
Dept: CT IMAGING | Age: 78
End: 2018-01-05
Attending: NURSE PRACTITIONER
Payer: MEDICARE

## 2018-01-05 DIAGNOSIS — R00.1 SYMPTOMATIC BRADYCARDIA: Primary | ICD-10-CM

## 2018-01-05 LAB
ALBUMIN SERPL-MCNC: 3.8 G/DL (ref 3.5–5)
ALBUMIN/GLOB SERPL: 0.9 {RATIO} (ref 1.1–2.2)
ALP SERPL-CCNC: 87 U/L (ref 45–117)
ALT SERPL-CCNC: 22 U/L (ref 12–78)
ANION GAP SERPL CALC-SCNC: 9 MMOL/L (ref 5–15)
APPEARANCE UR: CLEAR
AST SERPL-CCNC: 18 U/L (ref 15–37)
ATRIAL RATE: 51 BPM
BACTERIA URNS QL MICRO: NEGATIVE /HPF
BASOPHILS # BLD: 0 K/UL (ref 0–0.1)
BASOPHILS NFR BLD: 0 % (ref 0–1)
BILIRUB SERPL-MCNC: 0.4 MG/DL (ref 0.2–1)
BILIRUB UR QL: NEGATIVE
BUN SERPL-MCNC: 10 MG/DL (ref 6–20)
BUN/CREAT SERPL: 12 (ref 12–20)
CALCIUM SERPL-MCNC: 9.8 MG/DL (ref 8.5–10.1)
CALCULATED P AXIS, ECG09: 36 DEGREES
CALCULATED R AXIS, ECG10: -30 DEGREES
CALCULATED T AXIS, ECG11: 20 DEGREES
CHLORIDE SERPL-SCNC: 106 MMOL/L (ref 97–108)
CO2 SERPL-SCNC: 27 MMOL/L (ref 21–32)
COLOR UR: NORMAL
CREAT SERPL-MCNC: 0.83 MG/DL (ref 0.55–1.02)
DIAGNOSIS, 93000: NORMAL
DIFFERENTIAL METHOD BLD: NORMAL
EOSINOPHIL # BLD: 0.1 K/UL (ref 0–0.4)
EOSINOPHIL NFR BLD: 1 % (ref 0–7)
EPITH CASTS URNS QL MICRO: NORMAL /LPF
ERYTHROCYTE [DISTWIDTH] IN BLOOD BY AUTOMATED COUNT: 14.4 % (ref 11.5–14.5)
GLOBULIN SER CALC-MCNC: 4.4 G/DL (ref 2–4)
GLUCOSE BLD STRIP.AUTO-MCNC: 110 MG/DL (ref 65–100)
GLUCOSE SERPL-MCNC: 118 MG/DL (ref 65–100)
GLUCOSE UR STRIP.AUTO-MCNC: NEGATIVE MG/DL
HCT VFR BLD AUTO: 40.5 % (ref 35–47)
HGB BLD-MCNC: 12.9 G/DL (ref 11.5–16)
HGB UR QL STRIP: NEGATIVE
KETONES UR QL STRIP.AUTO: NEGATIVE MG/DL
LEUKOCYTE ESTERASE UR QL STRIP.AUTO: NEGATIVE
LYMPHOCYTES # BLD: 1.6 K/UL (ref 0.8–3.5)
LYMPHOCYTES NFR BLD: 27 % (ref 12–49)
MCH RBC QN AUTO: 28.4 PG (ref 26–34)
MCHC RBC AUTO-ENTMCNC: 31.9 G/DL (ref 30–36.5)
MCV RBC AUTO: 89 FL (ref 80–99)
MONOCYTES # BLD: 0.3 K/UL (ref 0–1)
MONOCYTES NFR BLD: 6 % (ref 5–13)
NEUTS SEG # BLD: 3.9 K/UL (ref 1.8–8)
NEUTS SEG NFR BLD: 66 % (ref 32–75)
NITRITE UR QL STRIP.AUTO: NEGATIVE
P-R INTERVAL, ECG05: 158 MS
PH UR STRIP: 6 [PH] (ref 5–8)
PLATELET # BLD AUTO: 242 K/UL (ref 150–400)
POTASSIUM SERPL-SCNC: 3.6 MMOL/L (ref 3.5–5.1)
PROT SERPL-MCNC: 8.2 G/DL (ref 6.4–8.2)
PROT UR STRIP-MCNC: NEGATIVE MG/DL
Q-T INTERVAL, ECG07: 422 MS
QRS DURATION, ECG06: 78 MS
QTC CALCULATION (BEZET), ECG08: 388 MS
RBC # BLD AUTO: 4.55 M/UL (ref 3.8–5.2)
RBC #/AREA URNS HPF: NORMAL /HPF (ref 0–5)
SERVICE CMNT-IMP: ABNORMAL
SODIUM SERPL-SCNC: 142 MMOL/L (ref 136–145)
SP GR UR REFRACTOMETRY: 1.01 (ref 1–1.03)
TROPONIN I SERPL-MCNC: <0.04 NG/ML
UA: UC IF INDICATED,UAUC: NORMAL
UR CULT HOLD, URHOLD: NORMAL
UROBILINOGEN UR QL STRIP.AUTO: 0.2 EU/DL (ref 0.2–1)
VENTRICULAR RATE, ECG03: 51 BPM
WBC # BLD AUTO: 5.8 K/UL (ref 3.6–11)
WBC URNS QL MICRO: NORMAL /HPF (ref 0–4)

## 2018-01-05 PROCEDURE — 74011250636 HC RX REV CODE- 250/636: Performed by: STUDENT IN AN ORGANIZED HEALTH CARE EDUCATION/TRAINING PROGRAM

## 2018-01-05 PROCEDURE — 82962 GLUCOSE BLOOD TEST: CPT

## 2018-01-05 PROCEDURE — 74011250637 HC RX REV CODE- 250/637: Performed by: FAMILY MEDICINE

## 2018-01-05 PROCEDURE — 36415 COLL VENOUS BLD VENIPUNCTURE: CPT | Performed by: NURSE PRACTITIONER

## 2018-01-05 PROCEDURE — 96372 THER/PROPH/DIAG INJ SC/IM: CPT

## 2018-01-05 PROCEDURE — 96360 HYDRATION IV INFUSION INIT: CPT

## 2018-01-05 PROCEDURE — 71046 X-RAY EXAM CHEST 2 VIEWS: CPT

## 2018-01-05 PROCEDURE — 81001 URINALYSIS AUTO W/SCOPE: CPT | Performed by: NURSE PRACTITIONER

## 2018-01-05 PROCEDURE — 74011250636 HC RX REV CODE- 250/636: Performed by: FAMILY MEDICINE

## 2018-01-05 PROCEDURE — 80053 COMPREHEN METABOLIC PANEL: CPT | Performed by: NURSE PRACTITIONER

## 2018-01-05 PROCEDURE — 93005 ELECTROCARDIOGRAM TRACING: CPT

## 2018-01-05 PROCEDURE — 99218 HC RM OBSERVATION: CPT

## 2018-01-05 PROCEDURE — 85025 COMPLETE CBC W/AUTO DIFF WBC: CPT | Performed by: NURSE PRACTITIONER

## 2018-01-05 PROCEDURE — 96361 HYDRATE IV INFUSION ADD-ON: CPT

## 2018-01-05 PROCEDURE — 94762 N-INVAS EAR/PLS OXIMTRY CONT: CPT

## 2018-01-05 PROCEDURE — 99285 EMERGENCY DEPT VISIT HI MDM: CPT

## 2018-01-05 PROCEDURE — 84484 ASSAY OF TROPONIN QUANT: CPT | Performed by: NURSE PRACTITIONER

## 2018-01-05 PROCEDURE — 70450 CT HEAD/BRAIN W/O DYE: CPT

## 2018-01-05 RX ORDER — ASPIRIN 81 MG/1
81 TABLET ORAL DAILY
Status: DISCONTINUED | OUTPATIENT
Start: 2018-01-06 | End: 2018-01-08 | Stop reason: HOSPADM

## 2018-01-05 RX ORDER — ATORVASTATIN CALCIUM 20 MG/1
40 TABLET, FILM COATED ORAL
Status: DISCONTINUED | OUTPATIENT
Start: 2018-01-05 | End: 2018-01-08 | Stop reason: HOSPADM

## 2018-01-05 RX ORDER — KETOCONAZOLE 20 MG/G
CREAM TOPICAL DAILY
Status: DISCONTINUED | OUTPATIENT
Start: 2018-01-06 | End: 2018-01-08 | Stop reason: HOSPADM

## 2018-01-05 RX ORDER — LATANOPROST 50 UG/ML
1 SOLUTION/ DROPS OPHTHALMIC
Status: DISCONTINUED | OUTPATIENT
Start: 2018-01-05 | End: 2018-01-08 | Stop reason: HOSPADM

## 2018-01-05 RX ORDER — ENOXAPARIN SODIUM 100 MG/ML
40 INJECTION SUBCUTANEOUS EVERY 24 HOURS
Status: DISCONTINUED | OUTPATIENT
Start: 2018-01-05 | End: 2018-01-08 | Stop reason: HOSPADM

## 2018-01-05 RX ORDER — SODIUM CHLORIDE 9 MG/ML
130 INJECTION, SOLUTION INTRAVENOUS CONTINUOUS
Status: DISCONTINUED | OUTPATIENT
Start: 2018-01-05 | End: 2018-01-08 | Stop reason: HOSPADM

## 2018-01-05 RX ORDER — INSULIN LISPRO 100 [IU]/ML
INJECTION, SOLUTION INTRAVENOUS; SUBCUTANEOUS
Status: DISCONTINUED | OUTPATIENT
Start: 2018-01-05 | End: 2018-01-08 | Stop reason: HOSPADM

## 2018-01-05 RX ORDER — ACETAMINOPHEN 325 MG/1
650 TABLET ORAL
Status: DISCONTINUED | OUTPATIENT
Start: 2018-01-05 | End: 2018-01-08 | Stop reason: HOSPADM

## 2018-01-05 RX ORDER — LANOLIN ALCOHOL/MO/W.PET/CERES
3 CREAM (GRAM) TOPICAL
Status: DISCONTINUED | OUTPATIENT
Start: 2018-01-05 | End: 2018-01-08 | Stop reason: HOSPADM

## 2018-01-05 RX ORDER — ALENDRONATE SODIUM 70 MG/1
70 TABLET ORAL
Status: DISCONTINUED | OUTPATIENT
Start: 2018-01-08 | End: 2018-01-08 | Stop reason: HOSPADM

## 2018-01-05 RX ORDER — MAGNESIUM SULFATE 100 %
4 CRYSTALS MISCELLANEOUS AS NEEDED
Status: DISCONTINUED | OUTPATIENT
Start: 2018-01-05 | End: 2018-01-08 | Stop reason: HOSPADM

## 2018-01-05 RX ORDER — TIZANIDINE 2 MG/1
2 TABLET ORAL
Status: DISCONTINUED | OUTPATIENT
Start: 2018-01-05 | End: 2018-01-05

## 2018-01-05 RX ORDER — DEXTROSE 50 % IN WATER (D50W) INTRAVENOUS SYRINGE
12.5-25 AS NEEDED
Status: DISCONTINUED | OUTPATIENT
Start: 2018-01-05 | End: 2018-01-08 | Stop reason: HOSPADM

## 2018-01-05 RX ORDER — DICLOFENAC SODIUM 10 MG/G
2 GEL TOPICAL
Status: DISCONTINUED | OUTPATIENT
Start: 2018-01-05 | End: 2018-01-08 | Stop reason: HOSPADM

## 2018-01-05 RX ORDER — SODIUM CHLORIDE 0.9 % (FLUSH) 0.9 %
5-10 SYRINGE (ML) INJECTION EVERY 8 HOURS
Status: DISCONTINUED | OUTPATIENT
Start: 2018-01-05 | End: 2018-01-08 | Stop reason: HOSPADM

## 2018-01-05 RX ORDER — SODIUM CHLORIDE 0.9 % (FLUSH) 0.9 %
5-10 SYRINGE (ML) INJECTION AS NEEDED
Status: DISCONTINUED | OUTPATIENT
Start: 2018-01-05 | End: 2018-01-08 | Stop reason: HOSPADM

## 2018-01-05 RX ADMIN — ENOXAPARIN SODIUM 40 MG: 40 INJECTION SUBCUTANEOUS at 22:29

## 2018-01-05 RX ADMIN — SODIUM CHLORIDE 130 ML/HR: 9 INJECTION, SOLUTION INTRAVENOUS at 20:45

## 2018-01-05 RX ADMIN — ATORVASTATIN CALCIUM 40 MG: 20 TABLET, FILM COATED ORAL at 22:29

## 2018-01-05 NOTE — PROGRESS NOTES
SHIFT CHANGE:  5:59 PM TRANSFER - IN REPORT:    Verbal report received from Darshana Moser RN(name) on Chester Glasgow  being received from [facility] ED(unit) for routine progression of care      Report consisted of patients Situation, Background, Assessment and   Recommendations(SBAR). Information from the following report(s) SBAR, Kardex, Procedure Summary, Intake/Output, MAR, Recent Results, Med Rec Status and Cardiac Rhythm SB was reviewed with the receiving nurse. Opportunity for questions and clarification was provided. Assessment completed upon patients arrival to unit and care assumed. SHIFT SUMMARY:    END OF SHIFT REPORT:  Bedside and Verbal shift change report given to   (oncoming nurse) by Festus Esteban RN (offgoing nurse).  Report included the following information SBAR, Kardex, Procedure Summary, Intake/Output, MAR, Recent Results, Med Rec Status and Cardiac Rhythm SB.

## 2018-01-05 NOTE — ED PROVIDER NOTES
HPI Comments: 66 y.o. female with past medical history significant for GERD, diabetes, high cholesterol, glaucoma, cardiac cath who presents accompanied by daughter with chief complaint of dizziness. Patient states that she has had new onset of dizziness over the past 10 days. States that it is present at all times, but worse with standing. States that upon standing she has felt like she may pass out, but denies any recent syncope. Her daughter is concerned because patient is not acting like herself, has been more tired and laying around lately. States that all she does is sleep. Associated symptoms include generalized weakness and blurred vision upon standing. Most recent illness was 17, she took azithromycin for a URI which she reports resolved. There are no other acute medical concerns at this time. Denies fever/chills, weight loss, ear pain, sore throat, dysphagia, chest pain, shortness of breath, palpitations, vomiting/diarrhea/constipation,  symptoms. Reports leg swelling at baseline which is no worse than usual   Social hx: Denies smoking, reports occasional ETOH use  Significant FMHx: Mother  of CHF  PCP: Lee Monroe MD  Cardiologist: Trip Damico        Patient is a 66 y.o. female presenting with dizziness. The history is provided by the patient and a relative. Dizziness   Pertinent negatives include no speech difficulty. Associated symptoms include nausea (when she gets dizzy). Pertinent negatives include no shortness of breath, no chest pain, no vomiting, no confusion and no headaches.         Past Medical History:   Diagnosis Date    Arthritis     Diabetes ([address]     GERD (gastroesophageal reflux disease)     Glaucoma     High cholesterol     Ill-defined condition     EDEMA, LOWER LIMS, URINARY INCONTINENCE    Urge incontinence        Past Surgical History:   Procedure Laterality Date    BREAST SURGERY PROCEDURE UNLISTED      LT BREAST CYST BENIGN    DRAINAGE OF DEEP RECTAL ABSCESS      ENDOSCOPY, COLON, DIAGNOSTIC      2008    HX BREAST BIOPSY Left 1970s    Surgical    HX GI      RECTAL ABSCESS    HX HEART CATHETERIZATION  01/06/2017    HX HEENT      NATHAN CATARACTS    HX HYSTERECTOMY      HX KNEE REPLACEMENT      left         Family History:   Problem Relation Age of Onset    Heart Disease Mother     No Known Problems Father        Social History     Social History    Marital status:      Spouse name: N/A    Number of children: N/A    Years of education: N/A     Occupational History    Not on file. Social History Main Topics    Smoking status: Former Smoker     Quit date: 3/11/1990    Smokeless tobacco: Never Used    Alcohol use 0.0 oz/week     0 Standard drinks or equivalent per week      Comment: occasionally    Drug use: No    Sexual activity: Not on file     Other Topics Concern     Service No    Blood Transfusions No    Caffeine Concern No    Occupational Exposure No    Hobby Hazards No    Sleep Concern Yes    Stress Concern Yes    Weight Concern Yes    Special Diet No    Back Care No    Exercise Yes    Bike Helmet Yes    Seat Belt Yes    Self-Exams No     Social History Narrative         ALLERGIES: Sulfa (sulfonamide antibiotics)    Review of Systems   Constitutional: Positive for activity change (decreased per daughter) and fatigue. Negative for chills, fever and unexpected weight change. HENT: Negative for congestion, ear pain, sore throat and trouble swallowing. Eyes: Negative for photophobia and visual disturbance. Respiratory: Negative for cough and shortness of breath. Cardiovascular: Negative for chest pain and palpitations. Gastrointestinal: Positive for nausea (when she gets dizzy). Negative for abdominal pain, constipation, diarrhea and vomiting. Genitourinary: Negative for decreased urine volume, difficulty urinating and dysuria. Musculoskeletal: Negative for back pain, neck pain and neck stiffness. Neurological: Positive for dizziness and weakness. Negative for syncope, speech difficulty, numbness and headaches. Psychiatric/Behavioral: Negative for confusion and decreased concentration. All other systems reviewed and are negative. Vitals:    01/05/18 1253   BP: 156/76   Pulse: 60   Resp: 13   Temp: 98.7 °F (37.1 °C)   SpO2: 96%            Physical Exam   Constitutional: She is oriented to person, place, and time. She appears well-developed and well-nourished. No distress. HENT:   Head: Normocephalic and atraumatic. Right Ear: External ear normal.   Left Ear: External ear normal.   Nose: Nose normal.   Mouth/Throat: Oropharynx is clear and moist. No oropharyngeal exudate. Eyes: Pupils are equal, round, and reactive to light. Right eye exhibits no discharge. Left eye exhibits no discharge. No scleral icterus. Right eye exhibits normal extraocular motion and no nystagmus. Left eye exhibits normal extraocular motion and no nystagmus. Neck: Normal range of motion. Neck supple. No tracheal deviation present. No thyromegaly present. Cardiovascular: Regular rhythm, normal heart sounds and intact distal pulses. Bradycardia present. No murmur heard. Pulses:       Radial pulses are 2+ on the right side, and 2+ on the left side. Dorsalis pedis pulses are 2+ on the right side, and 2+ on the left side. Pulmonary/Chest: Effort normal and breath sounds normal. No respiratory distress. She has no wheezes. She has no rales. Abdominal: Soft. Bowel sounds are normal. She exhibits no distension and no mass. There is no tenderness. Musculoskeletal: Normal range of motion. Lymphadenopathy:     She has no cervical adenopathy. Neurological: She is alert and oriented to person, place, and time. She is not disoriented. She displays no atrophy and no tremor. No cranial nerve deficit or sensory deficit. She exhibits normal muscle tone. Coordination normal. GCS eye subscore is 4.  GCS verbal subscore is 5. GCS motor subscore is 6. Patient had some mild weakness of both legs when holding them up in the air   Skin: Skin is warm and dry. She is not diaphoretic. Psychiatric: She has a normal mood and affect. Her behavior is normal. Judgment and thought content normal.   Nursing note and vitals reviewed. MDM  Number of Diagnoses or Management Options  Symptomatic bradycardia:   Diagnosis management comments: New onset dizziness x 10 days. Patient denies ever having felt like this before. She is also noted to be bradycardic here as low as 46 bpm. Lowest HR found in chart review was 62 bpm.   Exam also revealed some mild weakness in bilateral legs. Patient actively dizzy when she tries to stand  The patient presents with dizziness with a differential diagnosis of  cardiac dysrhythm, dizziness/vertigo, generalized weakness, hypertension, TIA, symptomatic bradycardia, UTI    EKG, head CT, CXR, CBC, CMP, troponin, UA    ED Course   ED EKG interpretation:  Rhythm: sinus bradycardia; and regular . Rate (approx.): 51; Axis: left axis deviation; ST/T wave: normal; EKG visualized and signed by ROSALVA Jordan NP    Attending ROSALVA Orozco MD in agreement with the plan of care  Augustine Jordan NP    2:15 PM  Labs and x-ray unremarkable. Still awaiting results of head CT. Will consult cardiology for symptomatic bradycardia once all diagnostics result  Augustine Jordan NP    2:46 PM  Put in a consult for cardiology as well as the hospitalist to admit patient for symptomatic bradycardia. She has been unable to tolerate standing while in the ED due to dizziness  Augustine Jordan NP    2:55 PM  Patient and daughter updated on diagnostic results. She verbalizes understanding and is in agreement to be admitted to [place] [id number] for further care  Augustine Jordan NP    3:31 PM  Consult: spoke with cardiology and will admit to hospitalist service.  Will be evaluated by cardiology in the [facility]  Leo Mcburney, NP     3:43 PM  Consult: Spoke with [address]  Medicine resident who will place admission order for the patient  Leo Mcburney, NP      Procedures

## 2018-01-05 NOTE — ED NOTES
Pt report given to [facility] paramedic pt remains stable at time of transport, pain free Sinus Melvinia Mitten on the monitor. Belongings with daughter.

## 2018-01-05 NOTE — IP AVS SNAPSHOT
Chani Young 
 
 
 [address] 
[phone] Patient: Oj Serna MRN: [id number] ZCS:[id number]/6/1894 A check chiki indicates which time of day the medication should be taken. My Medications START taking these medications Instructions Each Dose to Equal  
 Morning Noon Evening Bedtime  
 lisinopril 10 mg tablet Commonly known as:  Enrigue Sails Your last dose was:  8 am today Take 1 Tab by mouth daily. 10 mg  
    
  
   
   
   
  
 meclizine 12.5 mg tablet Commonly known as:  ANTIVERT Your last dose was:  8 am today Take 1 Tab by mouth three (3) times daily as needed for up to 10 days. 12.5 mg  
    
  
   
  
   
  
   
  
  
CONTINUE taking these medications Instructions Each Dose to Equal  
 Morning Noon Evening Bedtime  
 acetaminophen 650 mg Tber Commonly known as:  TYLENOL Notes to Patient:  Resume at discharge Take 650 mg by mouth every six (6) hours as needed for Pain. 650 mg  
    
   
   
   
  
 alcohol swabs Padm Commonly known as:  BD Single Use Swabs Regular Notes to Patient:  Not given while in hospital but resume at discharge 100 Each by Apply Externally route daily. 100 Each  
    
   
   
   
  
 alendronate 70 mg tablet Commonly known as:  FOSAMAX Your last dose was:  8 am today Take 1 Tab by mouth every seven (7) days for 90 days. 70 mg  
    
  
   
   
   
  
 aspirin delayed-release 81 mg tablet Your last dose was:  8 am today Take  by mouth daily. atorvastatin 40 mg tablet Commonly known as:  LIPITOR Your last dose was:  1/7 at 9 pm  
   
 Take 1 Tab by mouth once over twenty-four (24) hours for 30 days. 40 mg  
    
   
   
   
  
  
 BREO ELLIPTA 100-25 mcg/dose inhaler Generic drug:  fluticasone-vilanterol Notes to Patient:  Substituted with pulmicort while in hospital. Resume Breo at discharge Take 1 Puff by inhalation daily. 1 Puff Calcium Carbonate-Vit D3-Min 600 mg calcium- 400 unit Tab Notes to Patient:  Not given while in hospital but resume at discharge Take 1 pill 2 x daily  
     
  
   
   
  
   
  
 furosemide 20 mg tablet Commonly known as:  LASIX Notes to Patient:  Not given while in hospital but resume at discharge Take  by mouth every Monday, Wednesday, Friday. gabapentin 600 mg tablet Commonly known as:  NEURONTIN Notes to Patient:  Not given while in hospital but resume at discharge Take  by mouth three (3) times daily. glipiZIDE 5 mg tablet Commonly known as:  Mariela Pickard Notes to Patient:  Not given while in hospital but resume at discharge Take 1 Tab by mouth once over twenty-four (24) hours. 5 mg  
    
  
   
   
   
  
 glucose blood VI test strips strip Commonly known as:  blood glucose test  
Notes to Patient:  Resume at discharge Accu-chek alexei plus test strips Test blood sugar once daily E11.9  
     
  
   
   
   
  
 ketoconazole 2 % topical cream  
Commonly known as:  NIZORAL Your last dose was:  8 am today Apply  to affected area daily. Lancets Misc Notes to Patient:  Resume at discharge  
   
 accu-chek softclix lancets Check blood sugar daily E11.9  
     
   
   
   
  
 latanoprost 0.005 % ophthalmic solution Commonly known as:  Russell Maya Your last dose was:  1/7 at bedtime Administer 1 Drop to both eyes nightly. 1 Drop  
    
   
   
   
  
  
 nystatin powder Commonly known as:  MYCOSTATIN Notes to Patient:  Not given while in hospital but can resume at discharge Apply  to affected area four (4) times daily. Until rash resolves. oxybutynin 5 mg tablet Commonly known as:  [id number]  
 Notes to Patient:  Not given in hospital but can resume at time of discharge Take 1 Tab by mouth three (3) times daily for 270 days. 5 mg  
    
  
   
  
   
  
   
  
 tiZANidine 2 mg tablet Commonly known as:  Augustin Luna Notes to Patient:  Resume at discharge 1-2 tablets 1-2 hours prior to bedtime. STOP taking these medications GLUCOSE CONTROL Soln Generic drug:  Blood Glucose Control, Normal  
   
  
 raNITIdine 150 mg tablet Commonly known as:  ZANTAC Where to Get Your Medications Information on where to get these meds will be given to you by the nurse or doctor. ! Ask your nurse or doctor about these medications  
  lisinopril 10 mg tablet  
 meclizine 12.5 mg tablet

## 2018-01-05 NOTE — IP AVS SNAPSHOT
[facility] 
 
 
 [address] [facility] [address] [place] [address] 
[phone] Patient: Ana Charles MRN: [id number] WFD:[birth year] About your hospitalization You were admitted on:  January 5, 2018 You last received care in the:  [facility] [address] You were discharged on:  January 8, 2018 Why you were hospitalized Your primary diagnosis was:  Symptomatic Bradycardia Your diagnoses also included:  Gerd (Gastroesophageal Reflux Disease), Arthritis Of Left Knee, Pure Hypercholesterolemia, Primary Open Angle Glaucoma, Type 2 Diabetes Mellitus Without Complication (Hcc), Osteoporosis Follow-up Information Follow up With Details Comments Contact Info Whitley Pickard MD Go on 1/9/2018 Hospital follow-up appointment at 2:15PM [place] [address] [address] [place] [address] 
[phone] Kiki Jacobson MD On 2/9/2018 10:40 am  [address] [place] [address] [address] [place] [address] 
[phone] Your Scheduled Appointments Friday February 09, 2018 10:40 AM EST  
ESTABLISHED PATIENT with Kiki Jacobson MD  
[facility] ([address]) [address] [place] [address]  
[phone] Discharge Orders None A check chiki indicates which time of day the medication should be taken. My Medications START taking these medications Instructions Each Dose to Equal  
 Morning Noon Evening Bedtime  
 lisinopril 10 mg tablet Commonly known as:  Oscar Nutley Your last dose was:  8 am today Take 1 Tab by mouth daily. 10 mg  
    
  
   
   
   
  
 meclizine 12.5 mg tablet Commonly known as:  ANTIVERT Your last dose was:  8 am today Take 1 Tab by mouth three (3) times daily as needed for up to 10 days. 12.5 mg  
    
  
   
  
   
  
   
  
  
CONTINUE taking these medications Instructions Each Dose to Equal  
 Morning Noon Evening Bedtime  
 acetaminophen 650 mg Tber Commonly known as:  TYLENOL Notes to Patient:  Resume at discharge Take 650 mg by mouth every six (6) hours as needed for Pain. 650 mg  
    
   
   
   
  
 alcohol swabs Padm Commonly known as:  BD Single Use Swabs Regular Notes to Patient:  Not given while in hospital but resume at discharge 100 Each by Apply Externally route daily. 100 Each  
    
   
   
   
  
 alendronate 70 mg tablet Commonly known as:  FOSAMAX Your last dose was:  8 am today Take 1 Tab by mouth every seven (7) days for 90 days. 70 mg  
    
  
   
   
   
  
 aspirin delayed-release 81 mg tablet Your last dose was:  8 am today Take  by mouth daily. atorvastatin 40 mg tablet Commonly known as:  LIPITOR Your last dose was:  1/7 at 9 pm  
   
 Take 1 Tab by mouth once over twenty-four (24) hours for 30 days. 40 mg  
    
   
   
   
  
  
 BREO ELLIPTA 100-25 mcg/dose inhaler Generic drug:  fluticasone-vilanterol Notes to Patient:  Substituted with pulmicort while in hospital. Resume Breo at discharge Take 1 Puff by inhalation daily. 1 Puff Calcium Carbonate-Vit D3-Min 600 mg calcium- 400 unit Tab Notes to Patient:  Not given while in hospital but resume at discharge Take 1 pill 2 x daily  
     
  
   
   
  
   
  
 furosemide 20 mg tablet Commonly known as:  LASIX Notes to Patient:  Not given while in hospital but resume at discharge Take  by mouth every Monday, Wednesday, Friday. gabapentin 600 mg tablet Commonly known as:  NEURONTIN Notes to Patient:  Not given while in hospital but resume at discharge Take  by mouth three (3) times daily. glipiZIDE 5 mg tablet Commonly known as:  Linda Mitchell Notes to Patient:  Not given while in hospital but resume at discharge Take 1 Tab by mouth once over twenty-four (24) hours. 5 mg  
    
  
   
   
   
  
 glucose blood VI test strips strip Commonly known as:  blood glucose test  
Notes to Patient:  Resume at discharge Accu-chek alexei plus test strips Test blood sugar once daily E11.9  
     
  
   
   
   
  
 ketoconazole 2 % topical cream  
Commonly known as:  NIZORAL Your last dose was:  8 am today Apply  to affected area daily. Lancets Misc Notes to Patient:  Resume at discharge  
   
 accu-chek softclix lancets Check blood sugar daily E11.9  
     
   
   
   
  
 latanoprost 0.005 % ophthalmic solution Commonly known as:  Cheryl Sprawls Your last dose was:  1/7 at bedtime Administer 1 Drop to both eyes nightly. 1 Drop  
    
   
   
   
  
  
 nystatin powder Commonly known as:  MYCOSTATIN Notes to Patient:  Not given while in hospital but can resume at discharge Apply  to affected area four (4) times daily. Until rash resolves. oxybutynin 5 mg tablet Commonly known as:  QDMWNBCX Notes to Patient:  Not given in hospital but can resume at time of discharge Take 1 Tab by mouth three (3) times daily for 270 days. 5 mg  
    
  
   
  
   
  
   
  
 tiZANidine 2 mg tablet Commonly known as:  Thurl Borne Notes to Patient:  Resume at discharge 1-2 tablets 1-2 hours prior to bedtime. STOP taking these medications GLUCOSE CONTROL Soln Generic drug:  Blood Glucose Control, Normal  
   
  
 raNITIdine 150 mg tablet Commonly known as:  ZANTAC Where to Get Your Medications Information on where to get these meds will be given to you by the nurse or doctor. ! Ask your nurse or doctor about these medications  
  lisinopril 10 mg tablet  
 meclizine 12.5 mg tablet Discharge Instructions HOME DISCHARGE INSTRUCTIONS Kiki Amezquita / [id number] : 1940 Admission date: 2018 Discharge date: 2018 Please bring this form with you to show your care provider at your follow-up appointment. Primary care provider:  Emily Arellano MD 
 
Discharging provider:  Glenn Freitas MD  - Family Medicine Resident Steve Blake MD  - Attending, Family Medicine You have been admitted to the hospital with the following diagnoses: 
 
ACUTE DIAGNOSES: 
Symptomatic bradycardia Yamini Mckeon . . . . . . . . . . . . . . . . . . . . . . . . . . . . . . . . . . . . . . . . . . . . . . . . . . . . . . . . . . . . . . . . . . . . . . Yamini Mckeon FOLLOW-UP CARE RECOMMENDATIONS: 
 
Appointments Follow-up Information Follow up With Details Comments Contact Info Emily Arellano MD Schedule an appointment as soon as possible for a visit in 3 days Hospital Stay and [address] [place] [address] 
[phone] Follow-up tests needed: Event Recorder and possible ECHO by Cardiology (Dr. James De La Cruz) Pending test results: At the time of your discharge the following test results are still pending: None. Please make sure you review these results with your outpatient follow-up provider(s). Specific symptoms to watch for: chest pain, shortness of breath, fever, chills, nausea, vomiting, diarrhea, change in mentation, falling, weakness, bleeding. DIET/what to eat:  Cardiac Diet ACTIVITY:  Activity as tolerated Wound care: None Equipment needed:  [employer] will mail you a heart monitor after you are discharged. Instructions will be included. What to do if new or unexpected symptoms occur? If you experience any of the above symptoms (or should other concerns or questions arise after discharge) please call your primary care physician. Return to the emergency room if you cannot get hold of your doctor. · It is very important that you keep your follow-up appointment(s). · Please bring discharge papers, medication list (and/or medication bottles) to your follow-up appointments for review by your outpatient provider(s). · Please check the list of medications and be sure it includes every medication (even non-prescription medications) that your provider wants you to take. · It is important that you take the medication exactly as they are prescribed. · Keep your medication in the bottles provided by the pharmacist and keep a list of the medication names, dosages, and times to be taken in your wallet. · Do not take other medications without consulting your doctor. · If you have any questions about your medications or other instructions, please talk to your nurse or care provider before you leave the hospital.  
 
Information obtained by:  
 
I understand that if any problems occur once I am at home I am to contact my physician. These instructions were explained to me and I had the opportunity to ask questions. I understand and acknowledge receipt of the instructions indicated above. Physician's or R.N.'s Signature                                                                  Date/Time Patient or Representative Signature                                                          Date/Time Bradycardia: Care Instructions Your Care Instructions Bradycardia is a slow heart rate. If your heart beats too slowly, it can't supply your body with enough blood. This can make you weak or dizzy. Or it may make you pass out. Sometimes medicine can cause this problem.  If this happens, your doctor may have you adjust one of your medicines. If a medicine is not the problem, your doctor may recommend a pacemaker. It is important to treat bradycardia so that you don't get more serious health problems. Your doctor will want to see you on a routine schedule to make sure that your heartbeat is normal. 
Follow-up care is a key part of your treatment and safety. Be sure to make and go to all appointments, and call your doctor if you are having problems. It's also a good idea to know your test results and keep a list of the medicines you take. How can you care for yourself at home? · Take your medicines exactly as prescribed. Call your doctor if you think you are having a problem with your medicine. If your bradycardia is caused by another disease, your doctor will try to treat the disease. If it is caused by heart medicines, he or she will adjust your medicines. · Make lifestyle changes to improve your heart health. ¨ Get regular exercise. Try for 30 minutes on most days of the week. If you do not have other heart problems, you likely do not have limits on the type or level of activity that you can do. You may want to walk, swim, bike, or do other activities. Ask your doctor what level of exercise is safe for you. ¨ To control your cholesterol, avoid foods with a lot of fat, saturated fat, or sodium. Try to eat more fiber. And if your doctor says it's okay, get some exercise on most days. ¨ Do not smoke. Smoking can make your heart condition worse. If you need help quitting, talk to your doctor about stop-smoking programs and medicines. These can increase your chances of quitting for good. ¨ Limit alcohol to 2 drinks a day for men and 1 drink a day for women. Too much alcohol can cause health problems. Pacemaker If you have a pacemaker, you will get more specific information about it. Be sure to: · Check your pulse as your doctor tells you. · Have your pacemaker checked as often as your doctor recommends. You may be able to do this over the phone or computer. · Avoid strong magnetic or electrical fields. These include wand metal detectors used in airports, MRIs, welding equipment, and generators. · You will be checked several times right after you get your pacemaker and when it is time to have the battery changed. Batteries last for 5 to 15 years. · You can talk on a cell phone. But keep it 6 inches away from your pacemaker. · Microwaves, TVs, radios, and kitchen and bathroom appliances won't harm you. When should you call for help? Call 911 anytime you think you may need emergency care. For example, call if: 
? · You have symptoms of sudden heart failure. These may include: ¨ Severe trouble breathing. ¨ A fast or irregular heartbeat. ¨ Coughing up pink, foamy mucus. ¨ You passed out. ? · You have symptoms of a stroke. These may include: 
¨ Sudden numbness, tingling, weakness, or loss of movement in your face, arm, or leg, especially on only one side of your body. ¨ Sudden vision changes. ¨ Sudden trouble speaking. ¨ Sudden confusion or trouble understanding simple statements. ¨ Sudden problems with walking or balance. ¨ A sudden, severe headache that is different from past headaches. ?Call your doctor now or seek immediate medical care if: 
? · You have new or changed symptoms of heart failure, such as: ¨ New or increased shortness of breath. ¨ New or worse swelling in your legs, ankles, or feet. ¨ Sudden weight gain, such as more than 2 to 3 pounds in a day or 5 pounds in a week. (Your doctor may suggest a different range of weight gain.) ¨ Feeling dizzy or lightheaded or like you may faint. ¨ Feeling so tired or weak that you cannot do your usual activities. ¨ Not sleeping well. Shortness of breath wakes you at night. You need extra pillows to prop yourself up to breathe easier. ?Watch closely for changes in your health, and be sure to contact your doctor if: 
? · You do not get better as expected. Where can you learn more? Go to http://nikki-alicia.info/. Enter M213 in the search box to learn more about \"Bradycardia: Care Instructions. \" Current as of: September 21, 2016 Content Version: 11.4 © 2172-4917 [employer]. Care instructions adapted under license by [employer] (which disclaims liability or warranty for this information). If you have questions about a medical condition or this instruction, always ask your healthcare professional. [place] [address] any warranty or liability for your use of this information. Dizziness: Care Instructions Your Care Instructions Dizziness is the feeling of unsteadiness or fuzziness in your head. It is different than having vertigo, which is a feeling that the room is spinning or that you are moving or falling. It is also different from lightheadedness, which is the feeling that you are about to faint. It can be hard to know what causes dizziness. Some people feel dizzy when they have migraine headaches. Sometimes bouts of flu can make you feel dizzy. Some medical conditions, such as heart problems or high blood pressure, can make you feel dizzy. Many medicines can cause dizziness, including medicines for high blood pressure, pain, or anxiety. If a medicine causes your symptoms, your doctor may recommend that you stop or change the medicine. If it is a problem with your heart, you may need medicine to help your heart work better. If there is no clear reason for your symptoms, your doctor may suggest watching and waiting for a while to see if the dizziness goes away on its own. Follow-up care is a key part of your treatment and safety.  Be sure to make and go to all appointments, and call your doctor if you are having problems. It's also a good idea to know your test results and keep a list of the medicines you take. How can you care for yourself at home? · If your doctor recommends or prescribes medicine, take it exactly as directed. Call your doctor if you think you are having a problem with your medicine. · Do not drive while you feel dizzy. · Try to prevent falls. Steps you can take include: ¨ Using nonskid mats, adding grab bars near the tub, and using night-lights. ¨ Clearing your home so that walkways are free of anything you might trip on. ¨ Letting family and friends know that you have been feeling dizzy. This will help them know how to help you. When should you call for help? Call 911 anytime you think you may need emergency care. For example, call if: 
? · You passed out (lost consciousness). ? · You have dizziness along with symptoms of a heart attack. These may include: ¨ Chest pain or pressure, or a strange feeling in the chest. 
¨ Sweating. ¨ Shortness of breath. ¨ Nausea or vomiting. ¨ Pain, pressure, or a strange feeling in the back, neck, jaw, or upper belly or in one or both shoulders or arms. ¨ Lightheadedness or sudden weakness. ¨ A fast or irregular heartbeat. ? · You have symptoms of a stroke. These may include: 
¨ Sudden numbness, tingling, weakness, or loss of movement in your face, arm, or leg, especially on only one side of your body. ¨ Sudden vision changes. ¨ Sudden trouble speaking. ¨ Sudden confusion or trouble understanding simple statements. ¨ Sudden problems with walking or balance. ¨ A sudden, severe headache that is different from past headaches. ?Call your doctor now or seek immediate medical care if: 
? · You feel dizzy and have a fever, headache, or ringing in your ears. ? · You have new or increased nausea and vomiting. ? · Your dizziness does not go away or comes back. ? Watch closely for changes in your health, and be sure to contact your doctor if: ? · You do not get better as expected. Where can you learn more? Go to http://nikki-alicia.info/. Enter E588 in the search box to learn more about \"Dizziness: Care Instructions. \" Current as of: March 20, 2017 Content Version: 11.4 © 3390-3678 [employer]. Care instructions adapted under license by [employer] (which disclaims liability or warranty for this information). If you have questions about a medical condition or this instruction, always ask your healthcare professional. [address] any warranty or liability for your use of this information. [employer] Announcement We are excited to announce that we are making your provider's discharge notes available to you in [employer]. You will see these notes when they are completed and signed by the physician that discharged you from your recent hospital stay. If you have any questions or concerns about any information you see in [employer], please call the Health Information Department where you were seen or reach out to your Primary Care Provider for more information about your plan of care. Introducing [facility]! Dear Claudeen Boos: Thank you for requesting a [employer] account. Our records indicate that you already have an active [employer] account. You can access your account anytime at [email or web address] Did you know that you can access your hospital and ER discharge instructions at any time in [employer]? You can also review all of your test results from your hospital stay or ER visit. Additional Information If you have questions, please visit the Frequently Asked Questions section of the [employer] website at [email or web address] Remember, [employer] is NOT to be used for urgent needs. For medical emergencies, dial 911. Now available from your iPhone and Android! Providers Seen During Your Hospitalization Provider Specialty Primary office phone José Miguel Hall MD Emergency Medicine [phone] Denise Avendano MD Family Practice [phone] Your Primary Care Physician (PCP) Primary Care Physician Office Phone Office Fax Charisse Dick [phone] You are allergic to the following Allergen Reactions Sulfa (Sulfonamide Antibiotics) Hives Itching Recent Documentation Weight BMI OB Status Smoking Status 88.9 kg 35.85 kg/m2 Hysterectomy Former Smoker Emergency Contacts Name Discharge Info Relation Home Work Mobile Nafisa Ferreira CAREGIVER [3] Daughter [21] [phone] Liss Bars  Child [2] [phone] Patient Belongings The following personal items are in your possession at time of discharge: 
  Dental Appliances: None  Visual Aid: Glasses, At bedside      Home Medications: None      Clothing: Undergarments, Pants, Shirt, Footwear    Other Valuables: Cell Phone (CPAP) Discharge Instructions Attachments/References MEFS - LISINOPRIL (PRINIVIL, ZESTRIL) - (BY MOUTH) (ENGLISH) MEFS - MECLIZINE (ANTIVERT, ANTIVERT/25, ANTIVERT/50, MOTION SICKNESS RELIEF) - (BY MOUTH) (ENGLISH) Patient Handouts Lisinopril (Prinivil, Zestril) - (By mouth) Why this medicine is used:  
Treats high blood pressure and heart failure. Contact a nurse or doctor right away if you have: · Blistering, peeling, red skin rash · Change in how much or how often you urinate · Confusion, weakness, uneven heartbeat, trouble breathing · Lightheadedness, dizziness, fainting · Numbness or tingling in your hands, feet, or lips Common side effects: · Dry cough © 2017 [facility] [employer] Information is for End User's use only and may not be sold, redistributed or otherwise used for commercial purposes. Meclizine (Antivert, Antivert/25, Antivert/50, Motion Sickness Relief) - (By mouth) Why this medicine is used:  
Treats symptoms of motion sickness. Also treats vertigo. Contact a nurse or doctor right away if you have: · Blurred vision Common side effects: 
· Drowsiness · Dry mouth 
· Headache © 2017 [address] Information is for End User's use only and may not be sold, redistributed or otherwise used for commercial purposes. Please provide this summary of care documentation to your next provider. Signatures-by signing, you are acknowledging that this After Visit Summary has been reviewed with you and you have received a copy. Patient Signature:  ____________________________________________________________ Date:  ____________________________________________________________  
  
[facility] Provider Signature:  ____________________________________________________________ Date:  ____________________________________________________________

## 2018-01-05 NOTE — ED NOTES
TRANSFER - OUT REPORT:    Verbal report given to Malena Hussein RN(name) on Ysidro Mcburney  being transferred to Telemetry(unit) for routine progression of care       Report consisted of patients Situation, Background, Assessment and   Recommendations(SBAR). Information from the following report(s) ED Summary, MAR, Recent Results and Cardiac Rhythm sinus carlos was reviewed with the receiving nurse. Lines:   Peripheral IV 01/05/18 Right Antecubital (Active)   Site Assessment Clean, dry, & intact 1/5/2018  2:49 PM   Phlebitis Assessment 0 1/5/2018  2:49 PM   Infiltration Assessment 0 1/5/2018  2:49 PM   Dressing Status Clean, dry, & intact 1/5/2018  2:49 PM   Dressing Type Transparent 1/5/2018  2:49 PM   Hub Color/Line Status Flushed 1/5/2018  2:49 PM        Opportunity for questions and clarification was provided.       Patient transported with:   Monitor, IV saline lock

## 2018-01-05 NOTE — ED TRIAGE NOTES
Pt assisted to treatment area she states that for the past 1 1/2 weeks she has been dizzy from the time she wakes up to the time she goes to bed. Nothing seems to change the dizziness it is always there. She notes that she has some sinus congestion along with some nausea. Pt denies any headache with the dizziness. Pt was at her PCP's office and could not be seen for another hour so her daughter brought her here to be evaluated.

## 2018-01-06 ENCOUNTER — APPOINTMENT (OUTPATIENT)
Dept: MRI IMAGING | Age: 78
End: 2018-01-06
Attending: HOSPITALIST
Payer: MEDICARE

## 2018-01-06 LAB
ANION GAP SERPL CALC-SCNC: 9 MMOL/L (ref 5–15)
BASOPHILS # BLD: 0 K/UL (ref 0–0.1)
BASOPHILS NFR BLD: 0 % (ref 0–1)
BUN SERPL-MCNC: 9 MG/DL (ref 6–20)
BUN/CREAT SERPL: 10 (ref 12–20)
CALCIUM SERPL-MCNC: 8.9 MG/DL (ref 8.5–10.1)
CHLORIDE SERPL-SCNC: 108 MMOL/L (ref 97–108)
CO2 SERPL-SCNC: 26 MMOL/L (ref 21–32)
CREAT SERPL-MCNC: 0.86 MG/DL (ref 0.55–1.02)
EOSINOPHIL # BLD: 0.1 K/UL (ref 0–0.4)
EOSINOPHIL NFR BLD: 1 % (ref 0–7)
ERYTHROCYTE [DISTWIDTH] IN BLOOD BY AUTOMATED COUNT: 14.4 % (ref 11.5–14.5)
GLUCOSE BLD STRIP.AUTO-MCNC: 118 MG/DL (ref 65–100)
GLUCOSE BLD STRIP.AUTO-MCNC: 119 MG/DL (ref 65–100)
GLUCOSE BLD STRIP.AUTO-MCNC: 119 MG/DL (ref 65–100)
GLUCOSE BLD STRIP.AUTO-MCNC: 131 MG/DL (ref 65–100)
GLUCOSE SERPL-MCNC: 155 MG/DL (ref 65–100)
HCT VFR BLD AUTO: 37.9 % (ref 35–47)
HGB BLD-MCNC: 12.1 G/DL (ref 11.5–16)
LYMPHOCYTES # BLD: 2.1 K/UL (ref 0.8–3.5)
LYMPHOCYTES NFR BLD: 29 % (ref 12–49)
MAGNESIUM SERPL-MCNC: 2.1 MG/DL (ref 1.6–2.4)
MCH RBC QN AUTO: 28 PG (ref 26–34)
MCHC RBC AUTO-ENTMCNC: 31.9 G/DL (ref 30–36.5)
MCV RBC AUTO: 87.7 FL (ref 80–99)
MONOCYTES # BLD: 0.4 K/UL (ref 0–1)
MONOCYTES NFR BLD: 5 % (ref 5–13)
NEUTS SEG # BLD: 4.7 K/UL (ref 1.8–8)
NEUTS SEG NFR BLD: 65 % (ref 32–75)
PHOSPHATE SERPL-MCNC: 3.1 MG/DL (ref 2.6–4.7)
PLATELET # BLD AUTO: 244 K/UL (ref 150–400)
POTASSIUM SERPL-SCNC: 3.3 MMOL/L (ref 3.5–5.1)
RBC # BLD AUTO: 4.32 M/UL (ref 3.8–5.2)
SERVICE CMNT-IMP: ABNORMAL
SODIUM SERPL-SCNC: 143 MMOL/L (ref 136–145)
TROPONIN I SERPL-MCNC: <0.04 NG/ML
TSH SERPL DL<=0.05 MIU/L-ACNC: 1.66 UIU/ML (ref 0.36–3.74)
WBC # BLD AUTO: 7.3 K/UL (ref 3.6–11)

## 2018-01-06 PROCEDURE — 74011250637 HC RX REV CODE- 250/637: Performed by: FAMILY MEDICINE

## 2018-01-06 PROCEDURE — 74011250636 HC RX REV CODE- 250/636: Performed by: FAMILY MEDICINE

## 2018-01-06 PROCEDURE — 74011250637 HC RX REV CODE- 250/637: Performed by: STUDENT IN AN ORGANIZED HEALTH CARE EDUCATION/TRAINING PROGRAM

## 2018-01-06 PROCEDURE — G8978 MOBILITY CURRENT STATUS: HCPCS

## 2018-01-06 PROCEDURE — 99218 HC RM OBSERVATION: CPT

## 2018-01-06 PROCEDURE — 84443 ASSAY THYROID STIM HORMONE: CPT | Performed by: STUDENT IN AN ORGANIZED HEALTH CARE EDUCATION/TRAINING PROGRAM

## 2018-01-06 PROCEDURE — 74011000250 HC RX REV CODE- 250: Performed by: FAMILY MEDICINE

## 2018-01-06 PROCEDURE — 96372 THER/PROPH/DIAG INJ SC/IM: CPT

## 2018-01-06 PROCEDURE — 94664 DEMO&/EVAL PT USE INHALER: CPT

## 2018-01-06 PROCEDURE — 84484 ASSAY OF TROPONIN QUANT: CPT | Performed by: STUDENT IN AN ORGANIZED HEALTH CARE EDUCATION/TRAINING PROGRAM

## 2018-01-06 PROCEDURE — 74011000250 HC RX REV CODE- 250: Performed by: STUDENT IN AN ORGANIZED HEALTH CARE EDUCATION/TRAINING PROGRAM

## 2018-01-06 PROCEDURE — A9576 INJ PROHANCE MULTIPACK: HCPCS | Performed by: FAMILY MEDICINE

## 2018-01-06 PROCEDURE — 93880 EXTRACRANIAL BILAT STUDY: CPT

## 2018-01-06 PROCEDURE — 70544 MR ANGIOGRAPHY HEAD W/O DYE: CPT

## 2018-01-06 PROCEDURE — 82962 GLUCOSE BLOOD TEST: CPT

## 2018-01-06 PROCEDURE — 74011250637 HC RX REV CODE- 250/637: Performed by: HOSPITALIST

## 2018-01-06 PROCEDURE — G8979 MOBILITY GOAL STATUS: HCPCS

## 2018-01-06 PROCEDURE — 85025 COMPLETE CBC W/AUTO DIFF WBC: CPT | Performed by: STUDENT IN AN ORGANIZED HEALTH CARE EDUCATION/TRAINING PROGRAM

## 2018-01-06 PROCEDURE — 36415 COLL VENOUS BLD VENIPUNCTURE: CPT | Performed by: STUDENT IN AN ORGANIZED HEALTH CARE EDUCATION/TRAINING PROGRAM

## 2018-01-06 PROCEDURE — 97161 PT EVAL LOW COMPLEX 20 MIN: CPT

## 2018-01-06 PROCEDURE — 94640 AIRWAY INHALATION TREATMENT: CPT

## 2018-01-06 PROCEDURE — 84100 ASSAY OF PHOSPHORUS: CPT | Performed by: STUDENT IN AN ORGANIZED HEALTH CARE EDUCATION/TRAINING PROGRAM

## 2018-01-06 PROCEDURE — 74011250636 HC RX REV CODE- 250/636: Performed by: STUDENT IN AN ORGANIZED HEALTH CARE EDUCATION/TRAINING PROGRAM

## 2018-01-06 PROCEDURE — 80048 BASIC METABOLIC PNL TOTAL CA: CPT | Performed by: STUDENT IN AN ORGANIZED HEALTH CARE EDUCATION/TRAINING PROGRAM

## 2018-01-06 PROCEDURE — 97116 GAIT TRAINING THERAPY: CPT

## 2018-01-06 PROCEDURE — 83735 ASSAY OF MAGNESIUM: CPT | Performed by: STUDENT IN AN ORGANIZED HEALTH CARE EDUCATION/TRAINING PROGRAM

## 2018-01-06 PROCEDURE — 70553 MRI BRAIN STEM W/O & W/DYE: CPT

## 2018-01-06 PROCEDURE — 96361 HYDRATE IV INFUSION ADD-ON: CPT

## 2018-01-06 RX ORDER — LIDOCAINE 50 MG/G
1 PATCH TOPICAL EVERY 24 HOURS
Status: DISCONTINUED | OUTPATIENT
Start: 2018-01-06 | End: 2018-01-08 | Stop reason: HOSPADM

## 2018-01-06 RX ORDER — ARFORMOTEROL TARTRATE 15 UG/2ML
15 SOLUTION RESPIRATORY (INHALATION) DAILY
Status: DISCONTINUED | OUTPATIENT
Start: 2018-01-06 | End: 2018-01-08 | Stop reason: HOSPADM

## 2018-01-06 RX ORDER — POTASSIUM CHLORIDE 1.5 G/1.77G
40 POWDER, FOR SOLUTION ORAL 2 TIMES DAILY WITH MEALS
Status: COMPLETED | OUTPATIENT
Start: 2018-01-06 | End: 2018-01-06

## 2018-01-06 RX ORDER — BUDESONIDE 0.5 MG/2ML
500 INHALANT ORAL DAILY
Status: DISCONTINUED | OUTPATIENT
Start: 2018-01-06 | End: 2018-01-08 | Stop reason: HOSPADM

## 2018-01-06 RX ADMIN — ATORVASTATIN CALCIUM 40 MG: 20 TABLET, FILM COATED ORAL at 22:46

## 2018-01-06 RX ADMIN — ARFORMOTEROL TARTRATE 15 MCG: 15 SOLUTION RESPIRATORY (INHALATION) at 09:10

## 2018-01-06 RX ADMIN — MELATONIN TAB 3 MG 3 MG: 3 TAB at 01:00

## 2018-01-06 RX ADMIN — Medication 5 ML: at 14:33

## 2018-01-06 RX ADMIN — ASPIRIN 81 MG: 81 TABLET, COATED ORAL at 08:32

## 2018-01-06 RX ADMIN — ENOXAPARIN SODIUM 40 MG: 40 INJECTION SUBCUTANEOUS at 22:46

## 2018-01-06 RX ADMIN — POTASSIUM CHLORIDE 40 MEQ: 1.5 POWDER, FOR SOLUTION ORAL at 08:32

## 2018-01-06 RX ADMIN — Medication 10 ML: at 01:22

## 2018-01-06 RX ADMIN — SODIUM CHLORIDE 130 ML/HR: 9 INJECTION, SOLUTION INTRAVENOUS at 15:22

## 2018-01-06 RX ADMIN — BUDESONIDE 500 MCG: 0.5 INHALANT RESPIRATORY (INHALATION) at 09:10

## 2018-01-06 RX ADMIN — GADOTERIDOL 18 ML: 279.3 INJECTION, SOLUTION INTRAVENOUS at 11:27

## 2018-01-06 RX ADMIN — POTASSIUM CHLORIDE 40 MEQ: 1.5 POWDER, FOR SOLUTION ORAL at 18:05

## 2018-01-06 RX ADMIN — ACETAMINOPHEN 650 MG: 325 TABLET ORAL at 08:32

## 2018-01-06 RX ADMIN — Medication 10 ML: at 22:48

## 2018-01-06 RX ADMIN — LATANOPROST 1 DROP: 50 SOLUTION OPHTHALMIC at 01:02

## 2018-01-06 RX ADMIN — Medication 5 ML: at 06:00

## 2018-01-06 RX ADMIN — KETOCONAZOLE: 20 CREAM TOPICAL at 08:35

## 2018-01-06 RX ADMIN — SODIUM CHLORIDE 130 ML/HR: 9 INJECTION, SOLUTION INTRAVENOUS at 22:36

## 2018-01-06 RX ADMIN — Medication 10 ML: at 18:06

## 2018-01-06 NOTE — PROGRESS NOTES
1/6/2018  12:20 PM  CM met with pt for assessment. Demographics and PCP were confirmed. Pt is a 66year old,  female who lives in a private residnece with her daughter and sister (0 exterior, 0 interior steps). PTA, pt was able to complete ADLs without the use of DME; however, pt does have a CPAP. Pt has prescription drug coverage, and prefers [facility]. OBS letter provided and signed. Pt notified of $45.00 copay for Breagreement24 avtal24 through [place]. Pt related that this was not affordable for her. CM informed attending physician of this. No additional discharge service needs indicated. CM will continue to monitor for finalized discharge recommendations. Linda Dyson MA    Care Management Interventions  PCP Verified by CM: Yes Fabian Castillo)  Palliative Care Criteria Met (RRAT>21 & CHF Dx)?: No  Mode of Transport at Discharge:  Other (see comment) (Daughter)  MyChart Signup: No  Discharge Durable Medical Equipment: No  Physical Therapy Consult: No  Occupational Therapy Consult: No  Speech Therapy Consult: No  Current Support Network: Relative's Home  Confirm Follow Up Transport: Family  Plan discussed with Pt/Family/Caregiver: Yes  Discharge Location  Discharge Placement: Home with family assistance

## 2018-01-06 NOTE — PROGRESS NOTES
Problem: Mobility Impaired (Adult and Pediatric)  Goal: *Acute Goals and Plan of Care (Insert Text)  Physical Therapy Goals  Initiated 1/6/2018  1. Patient will move from supine to sit and sit to supine  in bed with independence within 7 day(s). 2.  Patient will transfer from bed to chair and chair to bed with independence using the least restrictive device within 7 day(s). 3.  Patient will perform sit to stand with independence within 7 day(s). 4.  Patient will ambulate with modified independence for 200 feet with the least restrictive device within 7 day(s). physical Therapy EVALUATION  Patient: Arden Thornton (89 y.o. female)  Date: 1/6/2018  Primary Diagnosis: Symptomatic bradycardia        Precautions:   Fall    ASSESSMENT :  Based on the objective data described below, the patient presents with decreased functional ambulation, dizziness when up and bradycardia following admission for symptomatic bradycardia and 10 history of dizziness. Patient was received in bed alert and willing to participate with PT. PT monitored patient for orthostatic blood pressures which were stable (see doc flow sheets for values), but HR ranged from 52-63. Patient stood and ambulated 40 feet with rolling walker +1 CGA, then  requested to return to bed due to increased dizziness when up. Patient still being worked up for cause of bradycardia and dizziness but so far MRA , MRI, and CT of head all normal. Patient lives with her son and daughter in a one story home. She was independent prior to this admission but admits to using a cane now and then if her arthritis \"acts up. \"    Will see how she progresses but hopefully can return home without services, or OP PT if dizziness persists. Patient will benefit from skilled intervention to address the above impairments.   Patients rehabilitation potential is considered to be Good  Factors which may influence rehabilitation potential include:   []         None noted  [] Mental ability/status  [x]         Medical condition  []         Home/family situation and support systems  []         Safety awareness  []         Pain tolerance/management  []         Other:      PLAN :  Recommendations and Planned Interventions:  []           Bed Mobility Training             []    Neuromuscular Re-Education  [x]           Transfer Training                   []    Orthotic/Prosthetic Training  [x]           Gait Training                         []    Modalities  []           Therapeutic Exercises           []    Edema Management/Control  []           Therapeutic Activities            []    Patient and Family Training/Education  []           Other (comment):    Frequency/Duration: Patient will be followed by physical therapy  5 times a week to address goals. Discharge Recommendations: Outpatient versus None  Further Equipment Recommendations for Discharge: none     SUBJECTIVE:   Patient stated I get dizzy when I get up; I don't know why.     OBJECTIVE DATA SUMMARY:   HISTORY:    Past Medical History:   Diagnosis Date    Arthritis     Diabetes ([facility] [address]     GERD (gastroesophageal reflux disease)     Glaucoma     High cholesterol     Ill-defined condition     EDEMA, LOWER LIMS, URINARY INCONTINENCE    Urge incontinence      Past Surgical History:   Procedure Laterality Date    BREAST SURGERY PROCEDURE UNLISTED      LT BREAST CYST BENIGN    DRAINAGE OF DEEP RECTAL ABSCESS      ENDOSCOPY, COLON, DIAGNOSTIC      2008    HX BREAST BIOPSY Left 1970s    Surgical    HX GI      RECTAL ABSCESS    HX HEART CATHETERIZATION  01/06/2017    HX HEENT      NATHAN CATARACTS    HX HYSTERECTOMY      HX KNEE REPLACEMENT      left     Prior Level of Function/Home Situation: independent; cane as needed  Personal factors and/or comorbidities impacting plan of care: dizzy when up increases fall risk    Home Situation  Home Environment: Private residence  # Steps to Enter: 0  Wheelchair Ramp: Yes  One/Two Story Residence: One story  Living Alone: No  Support Systems: Family member(s)  Patient Expects to be Discharged to[de-identified] Private residence  Current DME Used/Available at Home: Monia Lundborg, straight, Walker, rolling, Shower chair    EXAMINATION/PRESENTATION/DECISION MAKING:   Critical Behavior:  Neurologic State: Alert  Orientation Level: Appropriate for age, Oriented X4  Cognition: Appropriate decision making, Appropriate for age attention/concentration, Appropriate safety awareness, Follows commands  Safety/Judgement: Insight into deficits       Skin:  Not fully observed    Range Of Motion:  AROM: Within functional limits                       Strength:    Strength: Within functional limits                    Tone & Sensation:   Tone: Normal              Sensation: Intact                         Functional Mobility:  Bed Mobility:  Rolling: Modified independent  Supine to Sit: Modified independent  Sit to Supine: Modified independent     Transfers:  Sit to Stand: Contact guard assistance  Stand to Sit: Contact guard assistance                       Balance:   Sitting: Intact  Standing: Intact; With support  Ambulation/Gait Training:  Distance (ft): 40 Feet (ft)  Assistive Device: Gait belt;Walker, rolling  Ambulation - Level of Assistance: Contact guard assistance                                                               Functional Measure:  Barthel Index:    Bathin  Bladder: 10  Bowels: 10  Groomin  Dressing: 10  Feeding: 10  Mobility: 10  Stairs: 0  Toilet Use: 5  Transfer (Bed to Chair and Back): 5  Total: 60       Barthel and G-code impairment scale:  Percentage of impairment CH  0% CI  1-19% CJ  20-39% CK  40-59% CL  60-79% CM  80-99% CN  100%   Barthel Score 0-100 100 99-80 79-60 59-40 20-39 1-19   0   Barthel Score 0-20 20 17-19 13-16 9-12 5-8 1-4 0      The Barthel ADL Index: Guidelines  1. The index should be used as a record of what a patient does, not as a record of what a patient could do.   2. The main aim is to establish degree of independence from any help, physical or verbal, however minor and for whatever reason. 3. The need for supervision renders the patient not independent. 4. A patient's performance should be established using the best available evidence. Asking the patient, friends/relatives and nurses are the usual sources, but direct observation and common sense are also important. However direct testing is not needed. 5. Usually the patient's performance over the preceding 24-48 hours is important, but occasionally longer periods will be relevant. 6. Middle categories imply that the patient supplies over 50 per cent of the effort. 7. Use of aids to be independent is allowed. Ira Mcguire., Barthel, D.W. (0682). Functional evaluation: the Barthel Index. [facility] (14)2. JESSICA Mendez, Keith Rolon.Itz, [place], [address] (1999). Measuring the change indisability after inpatient rehabilitation; comparison of the responsiveness of the Barthel Index and Functional [place] Measure. Journal of Neurology, Neurosurgery, and Psychiatry, 66(4), 960-750. Cassia Haines, N.J.A, TERESITA Gallegos, & Blaise Schirmer, MJonathanA. (2004.) Assessment of post-stroke quality of life in cost-effectiveness studies: The usefulness of the Barthel Index and the EuroQoL-5D. Quality of Life Research, 13, 204-23       G codes: In compliance with CMSs Claims Based Outcome Reporting, the following G-code set was chosen for this patient based on their primary functional limitation being treated: The outcome measure chosen to determine the severity of the functional limitation was the Barthel with a score of 60/100 which was correlated with the impairment scale.     ? Mobility - Walking and Moving Around:     - CURRENT STATUS: CJ - 20%-39% impaired, limited or restricted    - GOAL STATUS: CI - 1%-19% impaired, limited or restricted    - D/C STATUS:  ---------------To be determined---------------      Physical Therapy Evaluation Charge Determination   History Examination Presentation Decision-Making   MEDIUM  Complexity : 1-2 comorbidities / personal factors will impact the outcome/ POC  LOW Complexity : 1-2 Standardized tests and measures addressing body structure, function, activity limitation and / or participation in recreation  MEDIUM Complexity : Evolving with changing characteristics  LOW Complexity : FOTO score of       Based on the above components, the patient evaluation is determined to be of the following complexity level: LOW     Pain:  Pain Scale 1: Numeric (0 - 10)  Pain Intensity 1: 0  Pain Location 1: Head  Pain Orientation 1: Anterior  Pain Description 1: Pressure  Pain Intervention(s) 1: Medication (see MAR)  Activity Tolerance:   Dizzy when up and bradycardia  Please refer to the flowsheet for vital signs taken during this treatment. After treatment:   []         Patient left in no apparent distress sitting up in chair  [x]         Patient left in no apparent distress in bed  [x]         Call bell left within reach  [x]         Nursing notified  []         Caregiver present  []         Bed alarm activated    COMMUNICATION/EDUCATION:   The patients plan of care was discussed with: Registered Nurse. [x]         Fall prevention education was provided and the patient/caregiver indicated understanding. []         Patient/family have participated as able in goal setting and plan of care. [x]         Patient/family agree to work toward stated goals and plan of care. []         Patient understands intent and goals of therapy, but is neutral about his/her participation. []         Patient is unable to participate in goal setting and plan of care.     Thank you for this referral.  Man Valles, PT   Time Calculation: 23 mins

## 2018-01-06 NOTE — CONSULTS
Yadira Miranda MD    [address] [place], [address] [facility]    Office [phone],[facility] [phone]  Pager [phone]    Date of  Admission: 1/5/2018 12:38 PM  PCP- Laya Huerta MD    Mai Proctor is a 66 y.o. female admitted for Symptomatic bradycardia. Consult requested by Britni Marques MD    Assessment/Plan    Dizziness  Sinus bradycardia  Diabetes  Dyslipidemia     Plan:  Check  for orthostasis. Echocardiogram.  Patient has had a cardiac catheterization February 2017-nonobstructive CAD. I am not sure if her  symptoms are secondary to her sinus bradycardia. Monitor on telemetry. Discussed with patient that if she has significant bradycardia,  she probably will need a pacemaker. ? Vertigo-differential diagnosis                   I appreciate the opportunity to be involved in Ms. Pichardo. See below note for details. Please do not hesitate to contact us with questions or concerns. Yadira Miranda MD    Cardiac Testing/ Procedures: A. Cardiac Cath/PCI: 2/7/17 R radial approach  Attempted R brachial vein - unable to get access; Switched to R Femoral  vein    R SCV angiogram  R Brachial vein venogram    L Main: Nml    LAD: Mid 30%; Med; D1 - MLI    LCflex: Med; MLI; OM1 - small to med; MLI    RCA: Med to large; Dominant; Nml    LVEF: 60%    No significant gradient across aortic valve. PCI: none    RHC findings:    RAPm8= mmHg  RVSP= 42 mmHg  PAPm= 16 mmHg  PCWPm= 9 mmHg    Specimens Removed : None     B. ECHO/CHIP: 11/30/16 Left ventricle: Systolic function was normal. Ejection fraction was  estimated to be 68 % by Lira's biplane technique. There were no  regional wall motion abnormalities. Doppler parameters were consistent  with abnormal left ventricular relaxation (grade 1 diastolic dysfunction). Mitral valve: There was mild regurgitation. Tricuspid valve: There was near moderate regurgitation.  Pulmonary artery  systolic pressure: 33 mmHg.     C. StressNuclear/Stress ECHO/Stress test: 11/2016 - Nml [place]ba nuc stress test     D. Vascular: 12/6/16 - Nml resting NICKOLAS     E. EP:     F. Miscellaneous:    F. Miscellaneous:    Care Plan discussed with: Patient and Nursing Staff    Subjective:    Patient is a 70-year-old  -American female who has been having symptoms of dizziness for the past few days. No history of syncope. Usually the dizziness occurs when she is standing. She also states that she feels the room spinning around her. However, she also states that occasionally she has dizziness lying down. No chest pain, dyspnea, palpitations, swelling lower extremities. Patient is not taking any AV tejinder blocking agents. In the ED, her heart rate has been noted to be as low as 46. Lowest documented heart rate is 48. However, at the time of the exam her heart rate is in the 50s.   No URI symptoms, history of vertigo      Past Medical History:   Diagnosis Date    Arthritis     Diabetes ([address]     GERD (gastroesophageal reflux disease)     Glaucoma     High cholesterol     Ill-defined condition     EDEMA, LOWER LIMS, URINARY INCONTINENCE    Urge incontinence       Past Surgical History:   Procedure Laterality Date    BREAST SURGERY PROCEDURE UNLISTED      LT BREAST CYST BENIGN    DRAINAGE OF DEEP RECTAL ABSCESS      ENDOSCOPY, COLON, DIAGNOSTIC      2008    HX BREAST BIOPSY Left 1970s    Surgical    HX GI      RECTAL ABSCESS    HX HEART CATHETERIZATION  01/06/2017    HX HEENT      NATHAN CATARACTS    HX HYSTERECTOMY      HX KNEE REPLACEMENT      left     Allergies   Allergen Reactions    Sulfa (Sulfonamide Antibiotics) Hives and Itching     Family History   Problem Relation Age of Onset    Heart Disease Mother     No Known Problems Father       Social History   Substance Use Topics    Smoking status: Former Smoker     Quit date: 3/11/1990    Smokeless tobacco: Never Used    Alcohol use 0.0 oz/week     0 Standard drinks or equivalent per week      Comment: occasionally          Medications:  Prescriptions Prior to Admission   Medication Sig    alendronate (FOSAMAX) 70 mg tablet Take 1 Tab by mouth every seven (7) days for 90 days.  tiZANidine (ZANAFLEX) 2 mg tablet 1-2 tablets 1-2 hours prior to bedtime.  ketoconazole (NIZORAL) 2 % topical cream Apply  to affected area daily.  glipiZIDE (GLUCOTROL) 5 mg tablet Take 1 Tab by mouth once over twenty-four (24) hours.  atorvastatin (LIPITOR) 40 mg tablet Take 1 Tab by mouth once over twenty-four (24) hours for 30 days.  Calcium Carbonate-Vit D3-Min 600 mg calcium- 400 unit tab Take 1 pill 2 x daily    nystatin (MYCOSTATIN) powder Apply  to affected area four (4) times daily. Until rash resolves.  fluticasone-vilanterol (BREO ELLIPTA) 100-25 mcg/dose inhaler Take 1 Puff by inhalation daily.  aspirin delayed-release 81 mg tablet Take  by mouth daily.  glucose blood VI test strips (BLOOD GLUCOSE TEST) strip Accu-chek alexei plus test strips Test blood sugar once daily E11.9    Lancets misc accu-chek softclix lancets Check blood sugar daily E11.9    oxybutynin (DITROPAN) 5 mg tablet Take 1 Tab by mouth three (3) times daily for 270 days.  acetaminophen (TYLENOL) 650 mg CR tablet Take 650 mg by mouth every six (6) hours as needed for Pain.  gabapentin (NEURONTIN) 600 mg tablet Take  by mouth three (3) times daily.  latanoprost (XALATAN) 0.005 % ophthalmic solution Administer 1 Drop to both eyes nightly.  furosemide (LASIX) 20 mg tablet Take  by mouth every Monday, Wednesday, Friday.  raNITIdine (ZANTAC) 150 mg tablet Take 1 Tab by mouth two (2) times a day for 30 days.  alcohol swabs (BD SINGLE USE SWABS REGULAR) padm 100 Each by Apply Externally route daily.  GLUCOSE CONTROL soln 1 Bottle by Does Not Apply route daily.      Current Facility-Administered Medications   Medication Dose Route Frequency    sodium chloride (NS) flush 5-10 mL  5-10 mL IntraVENous Q8H    sodium chloride (NS) flush 5-10 mL  5-10 mL IntraVENous PRN    enoxaparin (LOVENOX) injection 40 mg  40 mg SubCUTAneous Q24H    latanoprost (XALATAN) 0.005 % ophthalmic solution 1 Drop  1 Drop Both Eyes QHS    atorvastatin (LIPITOR) tablet 40 mg  40 mg Oral QHS    0.9% sodium chloride infusion  130 mL/hr IntraVENous CONTINUOUS    acetaminophen (TYLENOL) tablet 650 mg  650 mg Oral Q6H PRN    [START ON 1/8/2018] alendronate (FOSAMAX) tablet 70 mg  70 mg Oral every Monday    [START ON 1/6/2018] ketoconazole (NIZORAL) 2 % cream   Topical DAILY    [START ON 1/6/2018] aspirin delayed-release tablet 81 mg  81 mg Oral DAILY    glucose chewable tablet 16 g  4 Tab Oral PRN    dextrose (D50W) injection syrg 12.5-25 g  12.5-25 g IntraVENous PRN    glucagon (GLUCAGEN) injection 1 mg  1 mg IntraMUSCular PRN    insulin lispro (HUMALOG) injection   SubCUTAneous QID WITH MEALS    diclofenac (VOLTAREN) 1 % topical gel 2 g  2 g Topical TID PRN    melatonin tablet 3 mg  3 mg Oral QHS PRN         Review of Systems:  (bold if positive, if negative)    As in HPI - rest of ROS noncontributory        Physical Exam:  Visit Vitals    /65    Pulse (!) 54    Temp 98.1 °F (36.7 °C)    Resp 18    Wt 196 lb 13.9 oz (89.3 kg)    SpO2 97%    BMI 36.01 kg/m2         Telemetry:     Gen: Well-developed, well-nourished, in no acute distress  HEENT:  Pink conjunctivae, hearing intact to voice, moist mucous membranes  Neck: Supple,No JVD, No Carotid Bruit, Thyroid- non tender  Resp: No accessory muscle use, Clear breath sounds, No rales or rhonchi  Card: Regular Rate,Rythm,Normal S1, S2, 2/6 sys murmur rubs or gallop. No thrills.    Abd:  Soft, non-tender, non-distended, normoactive bowel sounds are present,   MSK: No cyanosis or clubbing  Skin: No rashes or ulcers  Neuro:   moving all four extremities, no focal deficit, follows commands appropriately  Psych:  Good insight, oriented to person, place and time, alert, Nml Affect  LE: No edema  Vascular:Radial Pulses 2+ and symmetric        EKG: SR/Leftward axis/Inf Q waves/      Cxray:    LABS:        Lab Results   Component Value Date/Time    WBC 5.8 01/05/2018 01:26 PM    HGB 12.9 01/05/2018 01:26 PM    HCT 40.5 01/05/2018 01:26 PM    PLATELET 117 86/54/1981 01:26 PM    MCV 89.0 01/05/2018 01:26 PM     Lab Results   Component Value Date/Time    Sodium 142 01/05/2018 01:26 PM    Potassium 3.6 01/05/2018 01:26 PM    Chloride 106 01/05/2018 01:26 PM    CO2 27 01/05/2018 01:26 PM    Anion gap 9 01/05/2018 01:26 PM    Glucose 118 01/05/2018 01:26 PM    BUN 10 01/05/2018 01:26 PM    Creatinine 0.83 01/05/2018 01:26 PM    BUN/Creatinine ratio 12 01/05/2018 01:26 PM    GFR est AA >60 01/05/2018 01:26 PM    GFR est non-AA >60 01/05/2018 01:26 PM    Calcium 9.8 01/05/2018 01:26 PM     Lab Results   Component Value Date/Time    Troponin-I, Qt. <0.04 01/05/2018 01:26 PM     No results found for: APTT  Lab Results   Component Value Date/Time    INR 1.1 01/06/2017 12:14 PM    INR 1.1 06/16/2014 02:25 PM    Prothrombin time 10.7 01/06/2017 12:14 PM    Prothrombin time 11.2 06/16/2014 02:25 PM     No results found for: BNP, BNPP, Ceasar Rowan MD

## 2018-01-06 NOTE — PROGRESS NOTES
Bedside shift change report given to ELIDA Mary (oncoming nurse) by Zachariah Ruth (offgoing nurse).  Report included the following information SBAR, Kardex, MAR, Accordion and Recent Results

## 2018-01-06 NOTE — PROGRESS NOTES
SHIFT CHANGE:  7:30 AM  Report received from Dawna Freitas RN. SBAR, Kardex, Procedure Summary, Intake/Output, MAR, Recent Results, Med Rec Status and Cardiac Rhythm SB were discussed. SHIFT SUMMARY:  8:36 AM  Patient informed RN that she needs to have dental work done. She has been having pain in her teeth and gums. She wonders if this could be contributing to her dizziness? Will inform family practice when rounding. 1:30 PM  Patient complaining of IV placement and stating it is hurting her in the bend of her arm. IV removed and new PIV placed. END OF SHIFT REPORT:  5:45 PM  Bedside and Verbal shift change report given to Roman Payne RN (oncoming nurse) by Samm Wren RN (offgoing nurse).  Report included the following information SBAR, Kardex, Procedure Summary, Intake/Output, MAR, Recent Results, Med Rec Status and Cardiac Rhythm SB.

## 2018-01-06 NOTE — H&P
[address] [facility]   Admission H&P    Date of admission: 1/5/2018    Patient name: Zoey Ray  MRN: [id number]  YOB: 1940  Age: 66 y.o. Primary care provider:  Griselda Douglas MD     Source of Information: patient, medical records    Chief complaint:  dizziness    History of Present Illness  Zoey Ray is a 66 y.o. female with a h/o HLD, GERD, DM with nephropathy, OA, and DANIELA who presents to the ER complaining of dizziness. She reports she has been feeling dizzy for the last 10 days. It is worsened with positional changes. She reports that is has increasingly worsened--she is having them more frequently. She denies changes in oral intake, HA, CP, SOB, increased urinary or bowel frequency, vision changes, or recent falls. She is followed by Dr. Edvin Bowman as an outpatient. She had a cardiac workup in 2/2017 for SOB. Cardiac cath showed EF of 60% with 30% stenosis of LAD. She was placed on Breo which helped SOB. She was recently started on Tizanidine by her PCP for a muscle spasm in her left back. She has been taking it nightly since mid December. In the ER, vital signs were remarkable for HR 71, /88. Labs were unremarkable, trop neg X1. UA was unremarkable. CXR showed no acute process and CT of head showed no acute intracranial process. ECG has HR of 51 with LAD and poor R-wave progression. Cardiology was consulted. Home Medications   Prior to Admission medications    Medication Sig Start Date End Date Taking? Authorizing Provider   alendronate (FOSAMAX) 70 mg tablet Take 1 Tab by mouth every seven (7) days for 90 days. 12/13/17 3/13/18 Yes Griselda Douglas MD   tiZANidine (ZANAFLEX) 2 mg tablet 1-2 tablets 1-2 hours prior to bedtime. 12/13/17  Yes Griselda Douglas MD   ketoconazole (NIZORAL) 2 % topical cream Apply  to affected area daily.  11/16/17  Yes Griselda Douglas MD   glipiZIDE (GLUCOTROL) 5 mg tablet Take 1 Tab by mouth once over twenty-four (24) hours. 11/14/17  Yes Budd Spatz, MD   atorvastatin (LIPITOR) 40 mg tablet Take 1 Tab by mouth once over twenty-four (24) hours for 30 days. 10/31/17 1/5/18 Yes Budd Spatz, MD   Calcium Carbonate-Vit D3-Min 600 mg calcium- 400 unit tab Take 1 pill 2 x daily 10/31/17  Yes Budd Spatz, MD   nystatin (MYCOSTATIN) powder Apply  to affected area four (4) times daily. Until rash resolves. 10/31/17  Yes Budd Spatz, MD   fluticasone-vilanterol (BREO ELLIPTA) 100-25 mcg/dose inhaler Take 1 Puff by inhalation daily. Yes Historical Provider   aspirin delayed-release 81 mg tablet Take  by mouth daily. Yes Historical Provider   glucose blood VI test strips (BLOOD GLUCOSE TEST) strip Accu-chek alexei plus test strips Test blood sugar once daily E11.9 7/19/16  Yes Budd Spatz, MD   Lancets misc accu-chek softclix lancets Check blood sugar daily E11.9 7/19/16  Yes Budd Spatz, MD   oxybutynin (DITROPAN) 5 mg tablet Take 1 Tab by mouth three (3) times daily for 270 days. 7/19/16 1/5/18 Yes Budd Spatz, MD   acetaminophen (TYLENOL) 650 mg CR tablet Take 650 mg by mouth every six (6) hours as needed for Pain. Yes Historical Provider   gabapentin (NEURONTIN) 600 mg tablet Take  by mouth three (3) times daily. Yes Historical Provider   latanoprost (XALATAN) 0.005 % ophthalmic solution Administer 1 Drop to both eyes nightly. Yes Historical Provider   furosemide (LASIX) 20 mg tablet Take  by mouth every Monday, Wednesday, Friday. Yes Historical Provider   raNITIdine (ZANTAC) 150 mg tablet Take 1 Tab by mouth two (2) times a day for 30 days. 7/31/17 8/30/17  Budd Spatz, MD   alcohol swabs (BD SINGLE USE SWABS REGULAR) padm 100 Each by Apply Externally route daily. 7/19/16   Budd Spatz, MD   GLUCOSE CONTROL soln 1 Bottle by Does Not Apply route daily.  7/19/16   Budd Spatz, MD       Allergies   Allergies   Allergen Reactions    Sulfa (Sulfonamide Antibiotics) Hives and Itching       Past Medical History:   Diagnosis Date    Arthritis     Diabetes ([address]     GERD (gastroesophageal reflux disease)     Glaucoma     High cholesterol     Ill-defined condition     EDEMA, LOWER LIMS, URINARY INCONTINENCE    Urge incontinence          Past Surgical History:   Procedure Laterality Date    BREAST SURGERY PROCEDURE UNLISTED      LT BREAST CYST BENIGN    DRAINAGE OF DEEP RECTAL ABSCESS      ENDOSCOPY, COLON, DIAGNOSTIC      2008    HX BREAST BIOPSY Left 1970s    Surgical    HX GI      RECTAL ABSCESS    HX HEART CATHETERIZATION  01/06/2017    HX HEENT      NATHAN CATARACTS    HX HYSTERECTOMY      HX KNEE REPLACEMENT      left       Family History   Problem Relation Age of Onset    Heart Disease Mother     No Known Problems Father        Social History   Patient resides    Independently    X  With family care      Assisted living      SNF      Ambulates  X  Independently      With cane       Assisted walker           Alcohol history     None   X  Social (Infrequent, 1 beer on Sat)     Chronic     Smoking history    None   X  Former smoker (quit in [address] [facility] but exposed to second hand smoke)     Current smoker     History   Smoking Status    Former Smoker    Quit date: 3/11/1990   Smokeless Tobacco    Never Used           Drug history  X  None     Former drug user     Current drug user     Code status  X  Full code     DNR/DNI     Partial    Code status discussed with the patient/caregivers. Review of Systems  See HPI    Physical Exam  Visit Vitals    /65    Pulse (!) 54    Temp 98.1 °F (36.7 °C)    Resp 18    Wt 196 lb 13.9 oz (89.3 kg)    SpO2 97%    BMI 36.01 kg/m2        General: No acute distress. Alert. Cooperative. Head: Normocephalic. Atraumatic. Eyes:  Conjunctiva pink. Sclera white. PERRL. EOMI. Throat: Mucosa pink. Moist mucous membranes. No tonsillar exudates or erythema. Palate movement equal bilaterally. Neck: Supple. Normal ROM.  No stiffness. No bruits present in carotids. Respiratory: CTAB. No w/r/r/c.   Cardiovascular: Slow, RR. Normal S1,S2. No m/r/g. Pulses 2+ in DPs bilaterally. GI: + bowel sounds. Nontender. No rebound tenderness or guarding. Nondistended. Extremities: No edema. No tenderness. Musculoskeletal: Full ROM in all extremities. Neuro: CN II-XII grossly intact. Strength 5/5 in all extremities. Sensation intact in all extremities. Sensation intact in all extremities. Dizziness upon sitting up. Keke-Hallpike negative. Skin: Skin irritation in gluteal fold. Nystatin powder under breasts and pannus. No ulcers.         Laboratory Data  Recent Results (from the past 24 hour(s))   EKG, 12 LEAD, INITIAL    Collection Time: 01/05/18 12:45 PM   Result Value Ref Range    Ventricular Rate 51 BPM    Atrial Rate 51 BPM    P-R Interval 158 ms    QRS Duration 78 ms    Q-T Interval 422 ms    QTC Calculation (Bezet) 388 ms    Calculated P Axis 36 degrees    Calculated R Axis -30 degrees    Calculated T Axis 20 degrees    Diagnosis       Sinus bradycardia  Left axis deviation  Poor R-wave Progression  Abnormal ECG  When compared with ECG of 16-JUN-2014 14:44,  No significant change was found  Confirmed by Sami Morgan MD ([id number]) on 1/5/2018 5:29:21 PM     URINALYSIS W/MICROSCOPIC    Collection Time: 01/05/18  1:15 PM   Result Value Ref Range    Color STRAW      Appearance CLEAR CLEAR      Specific gravity 1.007 1.003 - 1.030      pH (UA) 6.0 5.0 - 8.0      Protein NEGATIVE  NEG mg/dL    Glucose NEGATIVE  NEG mg/dL    Ketone NEGATIVE  NEG mg/dL    Bilirubin NEGATIVE  NEG      Blood NEGATIVE  NEG      Urobilinogen 0.2 0.2 - 1.0 EU/dL    Nitrites NEGATIVE  NEG      Leukocyte Esterase NEGATIVE  NEG      WBC 0-4 0 - 4 /hpf    RBC 0-5 0 - 5 /hpf    Epithelial cells FEW FEW /lpf    Bacteria NEGATIVE  NEG /hpf    UA:UC IF INDICATED CULTURE NOT INDICATED BY UA RESULT CNI     URINE CULTURE HOLD SAMPLE    Collection Time: 01/05/18  1:15 PM Result Value Ref Range    Urine culture hold URINE ON HOLD IN MICROBIOLOGY DEPT FOR 3 DAYS     CBC WITH AUTOMATED DIFF    Collection Time: 01/05/18  1:26 PM   Result Value Ref Range    WBC 5.8 3.6 - 11.0 K/uL    RBC 4.55 3.80 - 5.20 M/uL    HGB 12.9 11.5 - 16.0 g/dL    HCT 40.5 35.0 - 47.0 %    MCV 89.0 80.0 - 99.0 FL    MCH 28.4 26.0 - 34.0 PG    MCHC 31.9 30.0 - 36.5 g/dL    RDW 14.4 11.5 - 14.5 %    PLATELET 184 953 - 104 K/uL    NEUTROPHILS 66 32 - 75 %    LYMPHOCYTES 27 12 - 49 %    MONOCYTES 6 5 - 13 %    EOSINOPHILS 1 0 - 7 %    BASOPHILS 0 0 - 1 %    ABS. NEUTROPHILS 3.9 1.8 - 8.0 K/UL    ABS. LYMPHOCYTES 1.6 0.8 - 3.5 K/UL    ABS. MONOCYTES 0.3 0.0 - 1.0 K/UL    ABS. EOSINOPHILS 0.1 0.0 - 0.4 K/UL    ABS. BASOPHILS 0.0 0.0 - 0.1 K/UL    DF AUTOMATED     METABOLIC PANEL, COMPREHENSIVE    Collection Time: 01/05/18  1:26 PM   Result Value Ref Range    Sodium 142 136 - 145 mmol/L    Potassium 3.6 3.5 - 5.1 mmol/L    Chloride 106 97 - 108 mmol/L    CO2 27 21 - 32 mmol/L    Anion gap 9 5 - 15 mmol/L    Glucose 118 (H) 65 - 100 mg/dL    BUN 10 6 - 20 MG/DL    Creatinine 0.83 0.55 - 1.02 MG/DL    BUN/Creatinine ratio 12 12 - 20      GFR est AA >60 >60 ml/min/1.73m2    GFR est non-AA >60 >60 ml/min/1.73m2    Calcium 9.8 8.5 - 10.1 MG/DL    Bilirubin, total 0.4 0.2 - 1.0 MG/DL    ALT (SGPT) 22 12 - 78 U/L    AST (SGOT) 18 15 - 37 U/L    Alk. phosphatase 87 45 - 117 U/L    Protein, total 8.2 6.4 - 8.2 g/dL    Albumin 3.8 3.5 - 5.0 g/dL    Globulin 4.4 (H) 2.0 - 4.0 g/dL    A-G Ratio 0.9 (L) 1.1 - 2.2     TROPONIN I    Collection Time: 01/05/18  1:26 PM   Result Value Ref Range    Troponin-I, Qt. <0.04 <0.08 ng/mL   GLUCOSE, POC    Collection Time: 01/05/18  9:02 PM   Result Value Ref Range    Glucose (POC) 110 (H) 65 - 100 mg/dL    Performed by Erlinda Mejia (PCT)        Imaging  CXR  IMPRESSION:  1. No acute process    CT head w/o contrast  IMPRESSION:   1.  No evidence of acute intracranial abnormality by this modality. EKG:  Bradycardia to 51bpm, normal MS interval, normal QTc, poor R-wave progression, and t wave inversion in AVR. Unchanged when compared to ECG on 2/17 except new bradycardia. Assessment and Plan   Arden Thornton is a 66 y.o. female who is admitted for symptomatic bradycardia. Dizziness - DDx includes vertigo vs drug side effect vs cardiac etiology vs orthostasis. Pt had prior cardiac workup that was negative for cardiac dysfunction. Echo in 2016 showed no regional wall abnormalities with normal thickness and Lexiscan showed no inducible ischemia. Cath in 2/17 shoed EF of 60% with 30% stenosis of LAD. Pt has been taking tizanidine nightly for the past 3 weeks, could possibly 2/2 drug reaction as muscle relaxations have been known to cause dizziness and bradycardia. - Admit to remote tele  - Cardiology consult  - TSH  - Carotid dopplers  - F/u trop, neg X1  - Hold home tizanidine   - Orthostatic vitals  - NPO after midnight    Glaucoma - stable  - Latanoprost nightly     Candidal Intertrigo - POA, between gluteal folds.  - Ketoconazole cream    Dyspnea - Stable on Breo. Extensive workup was negative for cardiac etiology. - Substitute home Breo for Brovana/Plumicort  - Consult to Case management for assistance for paying for medication    OA - Stable. - Tylenol PRN  - Voltaran gel for hands     Diabetes Mellitus T2:  - Last HgA1c on 8/17 was 6.6.   - Holding home glipizide. - Insulin Sliding Scale normal sensitivity with AC&HS glucose checks. - Hypoglycemia protocols ordered. Hyperlipidemia: Last lipid panel on 8/17 and values were , TG 78, HDL 45, LDL 82   -Continue home atorvasatin    DANIELA - CPAP     Obesity  - PT with Body mass index is 36.01 kg/(m^2). .  - Encouraging lifestyle modifications and further follow up outpatient. FEN/GI - Diabetic diet. NS at 130 mL/hr.   Activity - Up withy assistance  DVT prophylaxis - Lovenox  GI prophylaxis -  Not indicated  Disposition - TBD.    CODE STATUS:  FULL     Patient discussed with Dr. Benjamin De La Torre (PGY-2) and Dr. Robby Grossman (attending).        Ilana Hilliard MD  [facility]  Date Reviewed: 12/29/2017          Codes Class Noted POA    Symptomatic bradycardia ICD-10-CM: R00.1  ICD-9-CM: 427.89  1/5/2018 Unknown        Pure hypercholesterolemia ICD-10-CM: E78.00  ICD-9-CM: 272.0  6/22/2016 Yes        Primary open angle glaucoma ICD-10-CM: H40.1190  ICD-9-CM: 365.11, 365.70  6/22/2016 Yes        Type 2 diabetes mellitus without complication ([facility] IFC-24-UL: E11.9  ICD-9-CM: 250.00  6/22/2016 Yes        Osteoporosis ICD-10-CM: M81.0  ICD-9-CM: 733.00  6/22/2016 Yes        Arthritis of left knee ICD-10-CM: M17.12  ICD-9-CM: 716.96  7/14/2014 Yes        GERD (gastroesophageal reflux disease) ICD-10-CM: K21.9  ICD-9-CM: 530.81  3/11/2010 Yes

## 2018-01-06 NOTE — PROGRESS NOTES
Bedside and Verbal shift change report given to Emil Ames (oncoming nurse) by Kanika Miller (offgoing nurse). Report included the following information SBAR, Kardex, ED Summary, Procedure Summary, Intake/Output, MAR, Accordion, Recent Results, Med Rec Status and Cardiac Rhythm NSR.     1740: P/c to FP, Spoke with ..., informed her of patient's dental hx and pain (she has to have all lower teeth pulled and has had a delay in car) and my concern for possible infection. Per report from 7-4 nurse, patient has been afebril and WBC is 7.3. No new orders. Will continue to monitor. 1745: FP called and inquired about echo. Informed them that it has not been completed yet. I placed call to Echo, left vmsg in ref to patient. 1810: Tech in patient's room to complete doppler. 1950: Bedside and Verbal shift change report given to Janeen Wells (oncoming nurse) by Emil Ames (offgoing nurse).  Report included the following information SBAR, Kardex, ED Summary, Procedure Summary, Intake/Output, MAR, Accordion, Recent Results, Med Rec Status and Cardiac Rhythm SB.

## 2018-01-06 NOTE — PROGRESS NOTES
[facility]   Daily Progress Note    Date: 1/6/2018    Assessment/Plan:   Jennifer Lacey is a 66 y.o. female who is hospitalized for symptomatic bradycardia. Events since admission: None    Dizziness - DDx includes vertigo vs drug side effect vs cardiac etiology vs orthostasis. Pt had prior cardiac workup that was negative for cardiac dysfunction. Echo in 2016 showed no regional wall abnormalities with normal thickness and Lexiscan showed no inducible ischemia. Cath in 2/17 shoed EF of 60% with 30% stenosis of LAD. Pt has been taking tizanidine nightly for the past 3 weeks, could possibly 2/2 drug reaction as muscle relaxations have been known to cause dizziness and bradycardia. Trop neg X2 and TSH normal. Orthostatics neg, appropriate BP response to position change. - Remote tele  - Cardiology follow, rec echo and possible pacemaker placement  - f/u Carotid dopplers  - Hold home tizanidine   - F/u echo     Glaucoma - stable  - Latanoprost nightly      Candidal Intertrigo - POA, between gluteal folds.  - Ketoconazole cream     Dyspnea - Stable on Breo. Extensive workup was negative for cardiac etiology. - Substitute home Breo for Brovana/Plumicort  - Consult to Case management for assistance for paying for medication     OA - Stable. - Tylenol PRN  - Voltaran gel for hands      Diabetes Mellitus T2:  - Last HgA1c on 8/17 was 6.6.   - Holding home glipizide. - Insulin Sliding Scale normal sensitivity with AC&HS glucose checks. - Hypoglycemia protocols ordered.     Hyperlipidemia: Last lipid panel on 8/17 and values were , TG 78, HDL 45, LDL 82   -Continue home atorvasatin     DANIELA - CPAP      Obesity  - PT with Body mass index is 36.01 kg/(m^2). .  - Encouraging lifestyle modifications and further follow up outpatient.         FEN/GI - Diabetic diet. NS at 130 mL/hr.   Activity - Up withy assistance  DVT prophylaxis - Lovenox  GI prophylaxis -  Not indicated  Disposition - TBD.     CODE STATUS:  FULL  Patient will be discussed with Dr. Renee Wharton (attending). Kaela Daley MD  Family Medicine Resident  1/6/2018 5:17 AM         CC: \"My nose is congested\"    Subjective  No acute events overnight. Pt reports she did not get her CPAP and was unable to sleep even with taking the melatonin.       Inpatient Medications  Current Facility-Administered Medications   Medication Dose Route Frequency    arformoterol (BROVANA) neb solution 15 mcg  15 mcg Nebulization DAILY    budesonide (PULMICORT) 500 mcg/2 ml nebulizer suspension  500 mcg Nebulization DAILY    sodium chloride (NS) flush 5-10 mL  5-10 mL IntraVENous Q8H    sodium chloride (NS) flush 5-10 mL  5-10 mL IntraVENous PRN    enoxaparin (LOVENOX) injection 40 mg  40 mg SubCUTAneous Q24H    latanoprost (XALATAN) 0.005 % ophthalmic solution 1 Drop  1 Drop Both Eyes QHS    atorvastatin (LIPITOR) tablet 40 mg  40 mg Oral QHS    0.9% sodium chloride infusion  130 mL/hr IntraVENous CONTINUOUS    acetaminophen (TYLENOL) tablet 650 mg  650 mg Oral Q6H PRN    [START ON 1/8/2018] alendronate (FOSAMAX) tablet 70 mg  70 mg Oral every Monday    ketoconazole (NIZORAL) 2 % cream   Topical DAILY    aspirin delayed-release tablet 81 mg  81 mg Oral DAILY    glucose chewable tablet 16 g  4 Tab Oral PRN    dextrose (D50W) injection syrg 12.5-25 g  12.5-25 g IntraVENous PRN    glucagon (GLUCAGEN) injection 1 mg  1 mg IntraMUSCular PRN    insulin lispro (HUMALOG) injection   SubCUTAneous QID WITH MEALS    diclofenac (VOLTAREN) 1 % topical gel 2 g  2 g Topical TID PRN    melatonin tablet 3 mg  3 mg Oral QHS PRN         Allergies  Allergies   Allergen Reactions    Sulfa (Sulfonamide Antibiotics) Hives and Itching         Objective  Vitals:  Patient Vitals for the past 8 hrs:   Pulse   01/06/18 0048 80         I/O:    Intake/Output Summary (Last 24 hours) at 01/06/18 0517  Last data filed at 01/05/18 1315   Gross per 24 hour   Intake                0 ml   Output              400 ml   Net             -400 ml     Last shift:       Last 3 shifts:    01/04 0701 - 01/05 1900  In: -   Out: 400 [Urine:400]    Physical Exam:  General: No acute distress. Alert. Cooperative. HEENT: Normocephalic. Atraumatic. Conjunctiva pink. Sclera white. PERRL. MMM   Respiratory: CTAB. No w/r/r/c.   Cardiovascular: HR 61bpm. Normal S1,S2. No m/r/g. 2+ pulses in DP bilaterally   GI: + bowel sounds. Nontender. No rebound tenderness or guarding. Nondistended   Extremities: No edema. No tenderness.      Laboratory Data  Recent Results (from the past 12 hour(s))   GLUCOSE, POC    Collection Time: 01/05/18  9:02 PM   Result Value Ref Range    Glucose (POC) 110 (H) 65 - 100 mg/dL    Performed by Stanley Campuzano (PCT)    TSH 3RD GENERATION    Collection Time: 01/06/18  1:58 AM   Result Value Ref Range    TSH 1.66 0.36 - 3.74 uIU/mL   TROPONIN I    Collection Time: 01/06/18  1:58 AM   Result Value Ref Range    Troponin-I, Qt. <0.04 <0.37 ng/mL   METABOLIC PANEL, BASIC    Collection Time: 01/06/18  1:58 AM   Result Value Ref Range    Sodium 143 136 - 145 mmol/L    Potassium 3.3 (L) 3.5 - 5.1 mmol/L    Chloride 108 97 - 108 mmol/L    CO2 26 21 - 32 mmol/L    Anion gap 9 5 - 15 mmol/L    Glucose 155 (H) 65 - 100 mg/dL    BUN 9 6 - 20 MG/DL    Creatinine 0.86 0.55 - 1.02 MG/DL    BUN/Creatinine ratio 10 (L) 12 - 20      GFR est AA >60 >60 ml/min/1.73m2    GFR est non-AA >60 >60 ml/min/1.73m2    Calcium 8.9 8.5 - 10.1 MG/DL   CBC WITH AUTOMATED DIFF    Collection Time: 01/06/18  1:58 AM   Result Value Ref Range    WBC 7.3 3.6 - 11.0 K/uL    RBC 4.32 3.80 - 5.20 M/uL    HGB 12.1 11.5 - 16.0 g/dL    HCT 37.9 35.0 - 47.0 %    MCV 87.7 80.0 - 99.0 FL    MCH 28.0 26.0 - 34.0 PG    MCHC 31.9 30.0 - 36.5 g/dL    RDW 14.4 11.5 - 14.5 %    PLATELET 614 613 - 665 K/uL    NEUTROPHILS 65 32 - 75 %    LYMPHOCYTES 29 12 - 49 %    MONOCYTES 5 5 - 13 %    EOSINOPHILS 1 0 - 7 %    BASOPHILS 0 0 - 1 %    ABS. NEUTROPHILS 4.7 1.8 - 8.0 K/UL    ABS. LYMPHOCYTES 2.1 0.8 - 3.5 K/UL    ABS. MONOCYTES 0.4 0.0 - 1.0 K/UL    ABS. EOSINOPHILS 0.1 0.0 - 0.4 K/UL    ABS.  BASOPHILS 0.0 0.0 - 0.1 K/UL     Hospital Problems:  Hospital Problems  Date Reviewed: 12/29/2017          Codes Class Noted POA    Symptomatic bradycardia ICD-10-CM: R00.1  ICD-9-CM: 427.89  1/5/2018 Unknown        Pure hypercholesterolemia ICD-10-CM: E78.00  ICD-9-CM: 272.0  6/22/2016 Yes        Primary open angle glaucoma ICD-10-CM: H40.1190  ICD-9-CM: 365.11, 365.70  6/22/2016 Yes        Type 2 diabetes mellitus without complication ([facility] URF-59-HD: E11.9  ICD-9-CM: 250.00  6/22/2016 Yes        Osteoporosis ICD-10-CM: M81.0  ICD-9-CM: 733.00  6/22/2016 Yes        Arthritis of left knee ICD-10-CM: M17.12  ICD-9-CM: 716.96  7/14/2014 Yes        GERD (gastroesophageal reflux disease) ICD-10-CM: K21.9  ICD-9-CM: 530.81  3/11/2010 Yes

## 2018-01-06 NOTE — PROGRESS NOTES
Geronimo Donaldson MD    [id number] [place], [address] [facility]    Office [phone],ATQ [phone]  Pager [phone]    2018     Admit Date: 2018    Admit Diagnosis: Symptomatic bradycardia    NAME: Chandrika Valle   :  1940     MRN: [id number]     Assessment/Plan:    Dizziness  Sinus bradycardia  Diabetes  Dyslipidemia     Plan:  Patient has had cardiac 2017-nonobstructive CAD. Echo pending  Daily check for orthostasis/document  TSH - WNL  Pt not on AV tejinder blocking agents. Avoid AV tejinder blocking agents  Patient's heart rate is 50's-sinus bradycardia on telemetry. Continue to observe on telemetry. for another 24 hours. Ambulate. If heart rate continues to be in the 50s and patient is still dizzy-probably unrelated to sinus bradycardia. Would explore other causes for dizziness. Will consider E cardio event monitor. Please do not hesitate to contact us with questions or concerns. See note below for details. Geronimo Donaldson MD      Subjective:  Continues to have intermittent dizziness.   Getting MRI brain today    ROS:  (bold if positive, if negative)          Medications before admission:     Current Facility-Administered Medications   Medication Dose Route Frequency    arformoterol (BROVANA) neb solution 15 mcg  15 mcg Nebulization DAILY    budesonide (PULMICORT) 500 mcg/2 ml nebulizer suspension  500 mcg Nebulization DAILY    potassium chloride (KLOR-CON) packet 40 mEq  40 mEq Oral BID WITH MEALS    sodium chloride (OCEAN) 0.65 % nasal spray 2 Spray  2 Spray Both Nostrils Q2H PRN    sodium chloride (NS) flush 5-10 mL  5-10 mL IntraVENous Q8H    sodium chloride (NS) flush 5-10 mL  5-10 mL IntraVENous PRN    enoxaparin (LOVENOX) injection 40 mg  40 mg SubCUTAneous Q24H    latanoprost (XALATAN) 0.005 % ophthalmic solution 1 Drop  1 Drop Both Eyes QHS    atorvastatin (LIPITOR) tablet 40 mg  40 mg Oral QHS    0.9% sodium chloride infusion  130 mL/hr IntraVENous CONTINUOUS    acetaminophen (TYLENOL) tablet 650 mg  650 mg Oral Q6H PRN    [START ON 1/8/2018] alendronate (FOSAMAX) tablet 70 mg  70 mg Oral every Monday    ketoconazole (NIZORAL) 2 % cream   Topical DAILY    aspirin delayed-release tablet 81 mg  81 mg Oral DAILY    glucose chewable tablet 16 g  4 Tab Oral PRN    dextrose (D50W) injection syrg 12.5-25 g  12.5-25 g IntraVENous PRN    glucagon (GLUCAGEN) injection 1 mg  1 mg IntraMUSCular PRN    insulin lispro (HUMALOG) injection   SubCUTAneous QID WITH MEALS    diclofenac (VOLTAREN) 1 % topical gel 2 g  2 g Topical TID PRN    melatonin tablet 3 mg  3 mg Oral QHS PRN       Physical Exam:  Visit Vitals    /65 (BP 1 Location: Left arm, BP Patient Position: At rest)    Pulse (!) 57    Temp 99 °F (37.2 °C)    Resp 16    Wt 196 lb 13.9 oz (89.3 kg)    SpO2 96%    BMI 36.01 kg/m2      O2 Device: Room air      Telemetry:     Gen: Well-developed, well-nourished, in no acute distress  Neck: Supple,No JVD, No Carotid Bruit,   Resp: No accessory muscle use, Clear breath sounds, No rales or rhonchi  Card: Regular Rate,Rythm,Normal S1, S2, 2/6 sys murmur, No rubs or gallop. No thrills.    Abd:  Soft, non-tender, non-distended,BS+,   LE: No edema    EKG:        Cxray:    LABS:        Lab Results   Component Value Date/Time    WBC 7.3 01/06/2018 01:58 AM    HGB 12.1 01/06/2018 01:58 AM    HCT 37.9 01/06/2018 01:58 AM    PLATELET 596 10/09/4471 01:58 AM    MCV 87.7 01/06/2018 01:58 AM     Lab Results   Component Value Date/Time    Sodium 143 01/06/2018 01:58 AM    Potassium 3.3 01/06/2018 01:58 AM    Chloride 108 01/06/2018 01:58 AM    CO2 26 01/06/2018 01:58 AM    Anion gap 9 01/06/2018 01:58 AM    Glucose 155 01/06/2018 01:58 AM    BUN 9 01/06/2018 01:58 AM    Creatinine 0.86 01/06/2018 01:58 AM    BUN/Creatinine ratio 10 01/06/2018 01:58 AM    GFR est AA >60 01/06/2018 01:58 AM    GFR est non-AA >60 01/06/2018 01:58 AM    Calcium 8.9 01/06/2018 01:58 AM     Lab Results   Component Value Date/Time    Troponin-I, Qt. <0.04 01/06/2018 01:58 AM     No results found for: APTT  Lab Results   Component Value Date/Time    INR 1.1 01/06/2017 12:14 PM    INR 1.1 06/16/2014 02:25 PM    Prothrombin time 10.7 01/06/2017 12:14 PM    Prothrombin time 11.2 06/16/2014 02:25 PM     No results found for: BNP, BNPP, XBNPT    Care Plan discussed with:   Jacinto Sacks, MD

## 2018-01-06 NOTE — PROGRESS NOTES
[facility]   Senior Resident Admission Note    CC: Sushma     HPI:  Helen Og is a 66 y.o. female with history notable for GERD, diabetes, hyperlipidemia, glaucoma who presents to the ER complaining of dizziness. Per daughter symptoms have been going on for the past ten days, denies any recent syncopal episodes    In the ED vitals notable for HR in the 40's and low 50's. Labs notable for glucose of 118. Chart reviewed. Patient seen, examined, and discussed with Dr. Ceasar Mcarthur (PGY-1). See admission note for more details. Physical Exam   Constitutional: She is oriented to person, place, and time. She appears well-developed and well-nourished. Cardiovascular: Regular rhythm and normal heart sounds. Exam reveals no friction rub. No murmur heard. Pulmonary/Chest: Effort normal and breath sounds normal. No respiratory distress. She has no wheezes. Abdominal: Soft. Bowel sounds are normal. She exhibits no distension. There is no tenderness. Neurological: She is alert and oriented to person, place, and time. CT head - No evidence of acute abnormality   XR chest - No acute process   EKG - Sinus bradycardia, Left axis devation. No changed noted from prior EKG            A/P:    67 yo female with history of GERD, DM, hyperlipidemia admitted for dizziness likely secondary to bradycardia      1. Dizziness       CT head showed no acute findings. Follow up carotid dopplers, Echo and orthostatic vitals       Hold Tizanidine due to association with dizziness, hypotension and bradycardia. Trend troponin's (negative x1)        Cardiology consult, potential stress test tomorrow        NPO at midnight.          Pt discussed with Dr Robby Grossman (on-call attending physician)    Patrick Alejandra MD  Family Medicine Resident

## 2018-01-06 NOTE — PROGRESS NOTES
Primary Nurse Gayathri Sepulveda RN and Milagors Jennings RN performed a dual skin assessment on this patient Impairment noted- see wound doc flow sheet  Elfego score is {ELFEGO SCALE:[id number]}

## 2018-01-06 NOTE — PROCEDURES
[facility]  *** FINAL REPORT ***    Name: Quintin Craft  MRN: [id number]    Outpatient  : 1940  HIS Order #: [id number]  [id number] [facility] Visit #: [id number]  Date: 2018    TYPE OF TEST: Cerebrovascular Duplex    REASON FOR TEST  Dizziness/vertigo    Right Carotid:-             Proximal               Mid                 Distal  cm/s  Systolic  Diastolic  Systolic  Diastolic  Systolic  Diastolic  CCA:    508.2      18.7                            65.5      13.8  Bulb:  ECA:    138.3      10.7  ICA:     81.7       9.0       38.0      11.7       65.5      21.5  ICA/CCA:  1.2       0.7    ICA Stenosis: Normal    Right Vertebral:-  Finding: Antegrade  Sys:       40.3  Sherri:       11.8    Right Subclavian:    Left Carotid:-            Proximal                Mid                 Distal  cm/s  Systolic  Diastolic  Systolic  Diastolic  Systolic  Diastolic  CCA:     21.6      22.0                            97.7      13.7  Bulb:  ECA:    162.4      13.7  ICA:     54.8       7.6       54.8      15.3       81.2      24.1  ICA/CCA:  0.6       0.6    ICA Stenosis: Normal    Left Vertebral:-  Finding: Antegrade  Sys:       50.5  Sherri:       14.2    Left Subclavian:    INTERPRETATION/FINDINGS  PROCEDURE:  Evaluation of the extracranial cerebrovascular arteries  with ultrasound (B-mode imaging, pulsed Doppler, color Doppler). Includes the common carotid, internal carotid, external carotid, and  vertebral arteries. CONCLUSION:  1. No evidence of significant arterial occlusive disease in the  bilateral internal carotid arteries. 2. No significant stenosis in the external carotid arteries  bilaterally. 3. Antegrade flow in both vertebral arteries. ADDITIONAL COMMENTS    I have personally reviewed the data relevant to the interpretation of  this  study. TECHNOLOGIST: ROGELIO Zhu  Signed: 2018 6:35:59 PM    PHYSICIAN: Marisela Mcgill.  Eneida Dc MD  Signed: 2018 7:33:22 AM

## 2018-01-07 LAB
AMPHET UR QL SCN: NEGATIVE
ANION GAP SERPL CALC-SCNC: 9 MMOL/L (ref 5–15)
BARBITURATES UR QL SCN: NEGATIVE
BASOPHILS # BLD: 0 K/UL (ref 0–0.1)
BASOPHILS NFR BLD: 0 % (ref 0–1)
BENZODIAZ UR QL: NEGATIVE
BUN SERPL-MCNC: 8 MG/DL (ref 6–20)
BUN/CREAT SERPL: 11 (ref 12–20)
CALCIUM SERPL-MCNC: 8.4 MG/DL (ref 8.5–10.1)
CANNABINOIDS UR QL SCN: NEGATIVE
CHLORIDE SERPL-SCNC: 111 MMOL/L (ref 97–108)
CO2 SERPL-SCNC: 23 MMOL/L (ref 21–32)
COCAINE UR QL SCN: NEGATIVE
CREAT SERPL-MCNC: 0.72 MG/DL (ref 0.55–1.02)
DRUG SCRN COMMENT,DRGCM: NORMAL
EOSINOPHIL # BLD: 0.1 K/UL (ref 0–0.4)
EOSINOPHIL NFR BLD: 2 % (ref 0–7)
ERYTHROCYTE [DISTWIDTH] IN BLOOD BY AUTOMATED COUNT: 14.5 % (ref 11.5–14.5)
GLUCOSE BLD STRIP.AUTO-MCNC: 102 MG/DL (ref 65–100)
GLUCOSE BLD STRIP.AUTO-MCNC: 125 MG/DL (ref 65–100)
GLUCOSE BLD STRIP.AUTO-MCNC: 130 MG/DL (ref 65–100)
GLUCOSE BLD STRIP.AUTO-MCNC: 149 MG/DL (ref 65–100)
GLUCOSE BLD STRIP.AUTO-MCNC: 94 MG/DL (ref 65–100)
GLUCOSE SERPL-MCNC: 114 MG/DL (ref 65–100)
HCT VFR BLD AUTO: 36.7 % (ref 35–47)
HGB BLD-MCNC: 11.8 G/DL (ref 11.5–16)
LYMPHOCYTES # BLD: 2.2 K/UL (ref 0.8–3.5)
LYMPHOCYTES NFR BLD: 39 % (ref 12–49)
MCH RBC QN AUTO: 27.9 PG (ref 26–34)
MCHC RBC AUTO-ENTMCNC: 32.2 G/DL (ref 30–36.5)
MCV RBC AUTO: 86.8 FL (ref 80–99)
METHADONE UR QL: NEGATIVE
MONOCYTES # BLD: 0.4 K/UL (ref 0–1)
MONOCYTES NFR BLD: 6 % (ref 5–13)
NEUTS SEG # BLD: 3.1 K/UL (ref 1.8–8)
NEUTS SEG NFR BLD: 53 % (ref 32–75)
OPIATES UR QL: NEGATIVE
PCP UR QL: NEGATIVE
PLATELET # BLD AUTO: 241 K/UL (ref 150–400)
POTASSIUM SERPL-SCNC: 4.2 MMOL/L (ref 3.5–5.1)
RBC # BLD AUTO: 4.23 M/UL (ref 3.8–5.2)
SERVICE CMNT-IMP: ABNORMAL
SERVICE CMNT-IMP: NORMAL
SODIUM SERPL-SCNC: 143 MMOL/L (ref 136–145)
WBC # BLD AUTO: 5.8 K/UL (ref 3.6–11)

## 2018-01-07 PROCEDURE — 96372 THER/PROPH/DIAG INJ SC/IM: CPT

## 2018-01-07 PROCEDURE — 82962 GLUCOSE BLOOD TEST: CPT

## 2018-01-07 PROCEDURE — 99218 HC RM OBSERVATION: CPT

## 2018-01-07 PROCEDURE — 74011250636 HC RX REV CODE- 250/636: Performed by: FAMILY MEDICINE

## 2018-01-07 PROCEDURE — 94640 AIRWAY INHALATION TREATMENT: CPT

## 2018-01-07 PROCEDURE — 94761 N-INVAS EAR/PLS OXIMETRY MLT: CPT

## 2018-01-07 PROCEDURE — 74011250637 HC RX REV CODE- 250/637: Performed by: STUDENT IN AN ORGANIZED HEALTH CARE EDUCATION/TRAINING PROGRAM

## 2018-01-07 PROCEDURE — 74011000250 HC RX REV CODE- 250: Performed by: STUDENT IN AN ORGANIZED HEALTH CARE EDUCATION/TRAINING PROGRAM

## 2018-01-07 PROCEDURE — 74011250637 HC RX REV CODE- 250/637: Performed by: SPECIALIST

## 2018-01-07 PROCEDURE — 74011250637 HC RX REV CODE- 250/637: Performed by: FAMILY MEDICINE

## 2018-01-07 PROCEDURE — 96361 HYDRATE IV INFUSION ADD-ON: CPT

## 2018-01-07 PROCEDURE — 36415 COLL VENOUS BLD VENIPUNCTURE: CPT | Performed by: STUDENT IN AN ORGANIZED HEALTH CARE EDUCATION/TRAINING PROGRAM

## 2018-01-07 PROCEDURE — 80048 BASIC METABOLIC PNL TOTAL CA: CPT | Performed by: STUDENT IN AN ORGANIZED HEALTH CARE EDUCATION/TRAINING PROGRAM

## 2018-01-07 PROCEDURE — 80307 DRUG TEST PRSMV CHEM ANLYZR: CPT | Performed by: STUDENT IN AN ORGANIZED HEALTH CARE EDUCATION/TRAINING PROGRAM

## 2018-01-07 PROCEDURE — 85025 COMPLETE CBC W/AUTO DIFF WBC: CPT | Performed by: STUDENT IN AN ORGANIZED HEALTH CARE EDUCATION/TRAINING PROGRAM

## 2018-01-07 PROCEDURE — 74011250636 HC RX REV CODE- 250/636: Performed by: STUDENT IN AN ORGANIZED HEALTH CARE EDUCATION/TRAINING PROGRAM

## 2018-01-07 PROCEDURE — 96374 THER/PROPH/DIAG INJ IV PUSH: CPT

## 2018-01-07 RX ORDER — MECLIZINE HCL 12.5 MG 12.5 MG/1
12.5 TABLET ORAL
Qty: 30 TAB | Refills: 0 | Status: SHIPPED | OUTPATIENT
Start: 2018-01-07 | End: 2018-01-17

## 2018-01-07 RX ORDER — LISINOPRIL 5 MG/1
10 TABLET ORAL DAILY
Status: DISCONTINUED | OUTPATIENT
Start: 2018-01-07 | End: 2018-01-08 | Stop reason: HOSPADM

## 2018-01-07 RX ORDER — LISINOPRIL 10 MG/1
10 TABLET ORAL DAILY
Qty: 30 TAB | Refills: 0 | Status: SHIPPED | OUTPATIENT
Start: 2018-01-08 | End: 2018-01-09 | Stop reason: SDUPTHER

## 2018-01-07 RX ADMIN — LATANOPROST 1 DROP: 50 SOLUTION OPHTHALMIC at 22:57

## 2018-01-07 RX ADMIN — Medication 10 ML: at 14:00

## 2018-01-07 RX ADMIN — LISINOPRIL 10 MG: 5 TABLET ORAL at 12:59

## 2018-01-07 RX ADMIN — BUDESONIDE 500 MCG: 0.5 INHALANT RESPIRATORY (INHALATION) at 07:38

## 2018-01-07 RX ADMIN — Medication 10 ML: at 22:58

## 2018-01-07 RX ADMIN — SODIUM CHLORIDE 130 ML/HR: 9 INJECTION, SOLUTION INTRAVENOUS at 07:20

## 2018-01-07 RX ADMIN — ASPIRIN 81 MG: 81 TABLET, COATED ORAL at 09:49

## 2018-01-07 RX ADMIN — ENOXAPARIN SODIUM 40 MG: 40 INJECTION SUBCUTANEOUS at 20:34

## 2018-01-07 RX ADMIN — ARFORMOTEROL TARTRATE 15 MCG: 15 SOLUTION RESPIRATORY (INHALATION) at 07:38

## 2018-01-07 RX ADMIN — ATORVASTATIN CALCIUM 40 MG: 20 TABLET, FILM COATED ORAL at 20:33

## 2018-01-07 NOTE — PROGRESS NOTES
[facility] FAMILY MEDICINE RESIDENCY PROGRAM   Daily Progress Note    Date: 1/7/2018    Assessment/Plan:   Mai Proctor is a 66 y.o. female who is hospitalized for symptomatic bradycardia. Events since admission: None    Dizziness - DDx includes vertigo vs drug side effect vs cardiac etiology vs orthostasis. Pt had prior cardiac workup that was negative for cardiac dysfunction. Echo in 2016 showed no regional wall abnormalities with normal thickness and Lexiscan showed no inducible ischemia. Cath in 2/17 shoed EF of 60% with 30% stenosis of LAD. Pt has been taking tizanidine nightly for the past 3 weeks, could possibly 2/2 drug reaction as muscle relaxations have been known to cause dizziness and bradycardia. Trop neg X2 and TSH normal. Orthostatics neg, appropriate BP response to position change. - Remote tele  - Repeat orthostatics  - Cardiology follow, rec echo. Appreciate their recs. - Carotid dopplers- No occlusive disease, stenosis  - MRA Brain- Normal, MRI Brain- no acute abnoralities  - ECHO pending  - Hold home tizanidine        Glaucoma - stable  - Latanoprost nightly      Candidal Intertrigo - POA, between gluteal folds.  - Ketoconazole cream     Dyspnea - Stable on Breo. Extensive workup was negative for cardiac etiology. - Substitute home Breo for Brovana/Plumicort  - Consult to Case management for assistance for paying for medication     OA - Stable. - Tylenol PRN  - Voltaran gel for hands      Diabetes Mellitus T2:  - Last HgA1c on 8/17 was 6.6.   - Holding home glipizide. - Insulin Sliding Scale normal sensitivity with AC&HS glucose checks. - Hypoglycemia protocols ordered.     Hyperlipidemia: Last lipid panel on 8/17 and values were , TG 78, HDL 45, LDL 82   -Continue home atorvasatin     DANIELA - CPAP      Obesity  - PT with Body mass index is 36.01 kg/(m^2). .  - Encouraging lifestyle modifications and further follow up outpatient.         FEN/GI - Diabetic diet.  NS at 130 mL/hr.  Activity - Up withy assistance  DVT prophylaxis - Lovenox  GI prophylaxis -  Not indicated  Disposition - TBD.     CODE STATUS:  FULL  Patient will be discussed with Dr. Tushar Santizo (attending). Sebastián Garcia DO  Family Medicine Resident  1/7/2018 5:17 AM           Subjective  NAEO. Resting comfortably in bed. States that she tolerated CPAP last night. States that her dizziness is much better this morning and she was able to ambulate with minimal dizziness. Denies CP, SOB, n/v, f/c, abd pain, calf tenderness.        Inpatient Medications  Current Facility-Administered Medications   Medication Dose Route Frequency    arformoterol (BROVANA) neb solution 15 mcg  15 mcg Nebulization DAILY    budesonide (PULMICORT) 500 mcg/2 ml nebulizer suspension  500 mcg Nebulization DAILY    sodium chloride (OCEAN) 0.65 % nasal spray 2 Spray  2 Spray Both Nostrils Q2H PRN    lidocaine (LIDODERM) 5 % patch 1 Patch  1 Patch TransDERmal Q24H    sodium chloride (NS) flush 5-10 mL  5-10 mL IntraVENous Q8H    sodium chloride (NS) flush 5-10 mL  5-10 mL IntraVENous PRN    enoxaparin (LOVENOX) injection 40 mg  40 mg SubCUTAneous Q24H    latanoprost (XALATAN) 0.005 % ophthalmic solution 1 Drop  1 Drop Both Eyes QHS    atorvastatin (LIPITOR) tablet 40 mg  40 mg Oral QHS    0.9% sodium chloride infusion  130 mL/hr IntraVENous CONTINUOUS    acetaminophen (TYLENOL) tablet 650 mg  650 mg Oral Q6H PRN    [START ON 1/8/2018] alendronate (FOSAMAX) tablet 70 mg  70 mg Oral every Monday    ketoconazole (NIZORAL) 2 % cream   Topical DAILY    aspirin delayed-release tablet 81 mg  81 mg Oral DAILY    glucose chewable tablet 16 g  4 Tab Oral PRN    dextrose (D50W) injection syrg 12.5-25 g  12.5-25 g IntraVENous PRN    glucagon (GLUCAGEN) injection 1 mg  1 mg IntraMUSCular PRN    insulin lispro (HUMALOG) injection   SubCUTAneous QID WITH MEALS    diclofenac (VOLTAREN) 1 % topical gel 2 g  2 g Topical TID PRN    melatonin tablet 3 mg  3 mg Oral QHS PRN         Allergies  Allergies   Allergen Reactions    Sulfa (Sulfonamide Antibiotics) Hives and Itching         Objective  Vitals:  Patient Vitals for the past 8 hrs:   Temp Pulse Resp BP SpO2   01/07/18 0518 - (!) 57 - 154/80 -   01/07/18 0516 - (!) 56 - 148/80 -   01/07/18 0512 98.4 °F (36.9 °C) 66 16 150/74 98 %   01/07/18 0025 97.7 °F (36.5 °C) (!) 46 16 137/69 94 %   01/06/18 2309 - (!) 51 - - -         I/O:    Intake/Output Summary (Last 24 hours) at 01/07/18 0521  Last data filed at 01/07/18 0102   Gross per 24 hour   Intake              960 ml   Output              700 ml   Net              260 ml     Last shift:    01/06 1901 - 01/07 0700  In: 480 [P.O.:480]  Out: 400 [Urine:400]  Last 3 shifts:    01/05 0701 - 01/06 1900  In: 480 [P.O.:480]  Out: 700 [Urine:700]    Physical Exam:  General: No acute distress. Alert. Cooperative. HEENT: Normocephalic. Atraumatic. Conjunctiva pink. Sclera white. PERRL. MMM   Respiratory: CTAB. No w/r/r/c.   Cardiovascular: HR 57 bpm. Normal S1,S2. No m/r/g. 2+ pulses in DP bilaterally   GI: + bowel sounds. Nontender. No rebound tenderness or guarding. Nondistended   Extremities: No edema. No tenderness.      Laboratory Data  Recent Results (from the past 12 hour(s))   GLUCOSE, POC    Collection Time: 01/06/18  8:54 PM   Result Value Ref Range    Glucose (POC) 119 (H) 65 - 100 mg/dL    Performed by Neetu Smart (PCT)      Hospital Problems:  Hospital Problems  Date Reviewed: 12/29/2017          Codes Class Noted POA    * (Principal)Symptomatic bradycardia ICD-10-CM: R00.1  ICD-9-CM: 427.89  1/5/2018 Yes        Pure hypercholesterolemia ICD-10-CM: E78.00  ICD-9-CM: 272.0  6/22/2016 Yes        Primary open angle glaucoma ICD-10-CM: H40.1190  ICD-9-CM: 365.11, 365.70  6/22/2016 Yes        Type 2 diabetes mellitus without complication (RUST 75.) LLS-64-FM: E11.9  ICD-9-CM: 250.00  6/22/2016 Yes        Osteoporosis ICD-10-CM: M81.0  ICD-9-CM: 733.00  6/22/2016 Yes        Arthritis of left knee ICD-10-CM: M17.12  ICD-9-CM: 716.96  7/14/2014 Yes        GERD (gastroesophageal reflux disease) ICD-10-CM: K21.9  ICD-9-CM: 530.81  3/11/2010 Yes

## 2018-01-07 NOTE — PROGRESS NOTES
Problem: Falls - Risk of  Goal: *Absence of Falls  Document Reynold Fall Risk and appropriate interventions in the flowsheet. Outcome: Progressing Towards Goal  Fall Risk Interventions:  Mobility Interventions: Patient to call before getting OOB         Medication Interventions: Bed/chair exit alarm, Teach patient to arise slowly, Patient to call before getting OOB. Awais Peterson 0700- Bedside and Verbal shift change report given to aline mera  (oncoming nurse) by Donita Judd  (offgoing nurse). Report included the following information SBAR, Kardex and Recent Results. .. Awais Peterson 1500-Bedside and Verbal shift change report given to [id number] Bruce Araujo (oncoming nurse) by Remy Mishra RN (offgoing nurse). Report included the following information SBAR, Kardex and Recent Results. ..

## 2018-01-07 NOTE — PROGRESS NOTES
01/07/18 1315 01/07/18 1317 01/07/18 1319   RT Walking Oximetry   Stage Resting (Room Air) During Walk (Room Air) During Walk (Room Air)   SpO2 97 % 96 % 97 %   HR 58 bpm 63 bpm 77 bpm   Rate of Dyspnea 0 0 0   O2 Device None (Room air) None (Room air) None (Room air)       01/07/18 1321 01/07/18 1323   RT Walking Oximetry   Stage During Walk (Room Air) After Walk   SpO2 96 % 96 %   HR 68 bpm 70 bpm   Rate of Dyspnea 0 0   O2 Device None (Room air) None (Room air)

## 2018-01-07 NOTE — DISCHARGE INSTRUCTIONS
HOME DISCHARGE INSTRUCTIONS    Salome Navas / [id number] : 1940    Admission date: 2018 Discharge date: 2018     Please bring this form with you to show your care provider at your follow-up appointment. Primary care provider:  Jayy Johnston MD    Discharging provider:  Milton Saavedra MD  - Family Medicine Resident  Carmen Alcantar MD  - Attending, Family Medicine     You have been admitted to the hospital with the following diagnoses:    ACUTE DIAGNOSES:  Symptomatic bradycardia  . . . . . . . . . . . . . . . . . . . . . . . . . . . . . . . . . . . . . . . . . . . . . . . . . . . . . . . . . . . . . . . . . . . . . . . Khadijah Avendaño FOLLOW-UP CARE RECOMMENDATIONS:    Appointments  Follow-up Information     Follow up With Details Comments Contact Info    Jayy Johnston MD Schedule an appointment as soon as possible for a visit in 3 days Hospital Stay and [address]  [phone]             Follow-up tests needed: Event Recorder and possible ECHO by Cardiology (Dr. Enrique York)    Pending test results: At the time of your discharge the following test results are still pending: None. Please make sure you review these results with your outpatient follow-up provider(s). Specific symptoms to watch for: chest pain, shortness of breath, fever, chills, nausea, vomiting, diarrhea, change in mentation, falling, weakness, bleeding. DIET/what to eat:  Cardiac Diet    ACTIVITY:  Activity as tolerated    Wound care: None    Equipment needed:  [employer] will mail you a heart monitor after you are discharged. Instructions will be included. What to do if new or unexpected symptoms occur? If you experience any of the above symptoms (or should other concerns or questions arise after discharge) please call your primary care physician. Return to the emergency room if you cannot get hold of your doctor.     · It is very important that you keep your follow-up appointment(s). · Please bring discharge papers, medication list (and/or medication bottles) to your follow-up appointments for review by your outpatient provider(s). · Please check the list of medications and be sure it includes every medication (even non-prescription medications) that your provider wants you to take. · It is important that you take the medication exactly as they are prescribed. · Keep your medication in the bottles provided by the pharmacist and keep a list of the medication names, dosages, and times to be taken in your wallet. · Do not take other medications without consulting your doctor. · If you have any questions about your medications or other instructions, please talk to your nurse or care provider before you leave the hospital.     Information obtained by:     I understand that if any problems occur once I am at home I am to contact my physician. These instructions were explained to me and I had the opportunity to ask questions. I understand and acknowledge receipt of the instructions indicated above. Physician's or R.N.'s Signature                                                                  Date/Time                                                                                                                                              Patient or Representative Signature                                                          Date/Time             Bradycardia: Care Instructions  Your Care Instructions    Bradycardia is a slow heart rate. If your heart beats too slowly, it can't supply your body with enough blood. This can make you weak or dizzy. Or it may make you pass out. Sometimes medicine can cause this problem. If this happens, your doctor may have you adjust one of your medicines.  If a medicine is not the problem, your doctor may recommend a pacemaker. It is important to treat bradycardia so that you don't get more serious health problems. Your doctor will want to see you on a routine schedule to make sure that your heartbeat is normal.  Follow-up care is a key part of your treatment and safety. Be sure to make and go to all appointments, and call your doctor if you are having problems. It's also a good idea to know your test results and keep a list of the medicines you take. How can you care for yourself at home? · Take your medicines exactly as prescribed. Call your doctor if you think you are having a problem with your medicine. If your bradycardia is caused by another disease, your doctor will try to treat the disease. If it is caused by heart medicines, he or she will adjust your medicines. · Make lifestyle changes to improve your heart health. ¨ Get regular exercise. Try for 30 minutes on most days of the week. If you do not have other heart problems, you likely do not have limits on the type or level of activity that you can do. You may want to walk, swim, bike, or do other activities. Ask your doctor what level of exercise is safe for you. ¨ To control your cholesterol, avoid foods with a lot of fat, saturated fat, or sodium. Try to eat more fiber. And if your doctor says it's okay, get some exercise on most days. ¨ Do not smoke. Smoking can make your heart condition worse. If you need help quitting, talk to your doctor about stop-smoking programs and medicines. These can increase your chances of quitting for good. ¨ Limit alcohol to 2 drinks a day for men and 1 drink a day for women. Too much alcohol can cause health problems. Pacemaker  If you have a pacemaker, you will get more specific information about it. Be sure to:  · Check your pulse as your doctor tells you. · Have your pacemaker checked as often as your doctor recommends.  You may be able to do this over the phone or computer. · Avoid strong magnetic or electrical fields. These include wand metal detectors used in airports, MRIs, welding equipment, and generators. · You will be checked several times right after you get your pacemaker and when it is time to have the battery changed. Batteries last for 5 to 15 years. · You can talk on a cell phone. But keep it 6 inches away from your pacemaker. · Microwaves, TVs, radios, and kitchen and bathroom appliances won't harm you. When should you call for help? Call 911 anytime you think you may need emergency care. For example, call if:  ? · You have symptoms of sudden heart failure. These may include:  ¨ Severe trouble breathing. ¨ A fast or irregular heartbeat. ¨ Coughing up pink, foamy mucus. ¨ You passed out. ? · You have symptoms of a stroke. These may include:  ¨ Sudden numbness, tingling, weakness, or loss of movement in your face, arm, or leg, especially on only one side of your body. ¨ Sudden vision changes. ¨ Sudden trouble speaking. ¨ Sudden confusion or trouble understanding simple statements. ¨ Sudden problems with walking or balance. ¨ A sudden, severe headache that is different from past headaches. ?Call your doctor now or seek immediate medical care if:  ? · You have new or changed symptoms of heart failure, such as:  ¨ New or increased shortness of breath. ¨ New or worse swelling in your legs, ankles, or feet. ¨ Sudden weight gain, such as more than 2 to 3 pounds in a day or 5 pounds in a week. (Your doctor may suggest a different range of weight gain.)  ¨ Feeling dizzy or lightheaded or like you may faint. ¨ Feeling so tired or weak that you cannot do your usual activities. ¨ Not sleeping well. Shortness of breath wakes you at night. You need extra pillows to prop yourself up to breathe easier. ? Watch closely for changes in your health, and be sure to contact your doctor if:  ? · You do not get better as expected. Where can you learn more?   Go to http://nikki-alicia.info/. Enter X375 in the search box to learn more about \"Bradycardia: Care Instructions. \"  Current as of: September 21, 2016  Content Version: 11.4  © 6679-2434 [employer]. Care instructions adapted under license by [employer] (which disclaims liability or warranty for this information). If you have questions about a medical condition or this instruction, always ask your healthcare professional. [address] any warranty or liability for your use of this information. Dizziness: Care Instructions  Your Care Instructions  Dizziness is the feeling of unsteadiness or fuzziness in your head. It is different than having vertigo, which is a feeling that the room is spinning or that you are moving or falling. It is also different from lightheadedness, which is the feeling that you are about to faint. It can be hard to know what causes dizziness. Some people feel dizzy when they have migraine headaches. Sometimes bouts of flu can make you feel dizzy. Some medical conditions, such as heart problems or high blood pressure, can make you feel dizzy. Many medicines can cause dizziness, including medicines for high blood pressure, pain, or anxiety. If a medicine causes your symptoms, your doctor may recommend that you stop or change the medicine. If it is a problem with your heart, you may need medicine to help your heart work better. If there is no clear reason for your symptoms, your doctor may suggest watching and waiting for a while to see if the dizziness goes away on its own. Follow-up care is a key part of your treatment and safety. Be sure to make and go to all appointments, and call your doctor if you are having problems. It's also a good idea to know your test results and keep a list of the medicines you take. How can you care for yourself at home?   · If your doctor recommends or prescribes medicine, take it exactly as directed. Call your doctor if you think you are having a problem with your medicine. · Do not drive while you feel dizzy. · Try to prevent falls. Steps you can take include:  ¨ Using nonskid mats, adding grab bars near the tub, and using night-lights. ¨ Clearing your home so that walkways are free of anything you might trip on. ¨ Letting family and friends know that you have been feeling dizzy. This will help them know how to help you. When should you call for help? Call 911 anytime you think you may need emergency care. For example, call if:  ? · You passed out (lost consciousness). ? · You have dizziness along with symptoms of a heart attack. These may include:  ¨ Chest pain or pressure, or a strange feeling in the chest.  ¨ Sweating. ¨ Shortness of breath. ¨ Nausea or vomiting. ¨ Pain, pressure, or a strange feeling in the back, neck, jaw, or upper belly or in one or both shoulders or arms. ¨ Lightheadedness or sudden weakness. ¨ A fast or irregular heartbeat. ? · You have symptoms of a stroke. These may include:  ¨ Sudden numbness, tingling, weakness, or loss of movement in your face, arm, or leg, especially on only one side of your body. ¨ Sudden vision changes. ¨ Sudden trouble speaking. ¨ Sudden confusion or trouble understanding simple statements. ¨ Sudden problems with walking or balance. ¨ A sudden, severe headache that is different from past headaches. ?Call your doctor now or seek immediate medical care if:  ? · You feel dizzy and have a fever, headache, or ringing in your ears. ? · You have new or increased nausea and vomiting. ? · Your dizziness does not go away or comes back. ? Watch closely for changes in your health, and be sure to contact your doctor if:  ? · You do not get better as expected. Where can you learn more? Go to http://nikki-alicia.info/. Enter E167 in the search box to learn more about \"Dizziness: Care Instructions. \"  Current as of: March 20, 2017  Content Version: 11.4  © 1699-4371 [employer]. Care instructions adapted under license by [employer] (which disclaims liability or warranty for this information). If you have questions about a medical condition or this instruction, always ask your healthcare professional. [place] [address] any warranty or liability for your use of this information.

## 2018-01-07 NOTE — PROGRESS NOTES
Cat Callaway MD    [address] [facility], [address] [facility]    Office [phone],SBI [phone]  Pager [phone]    2018     Admit Date: 2018    Admit Diagnosis: Symptomatic bradycardia    NAME: Mai Proctor   :  1940     MRN: [id number]     Assessment/Plan:    Orthostatic/ambulatory dizziness  HTN  Sinus bradycardia  Diabetes  Dyslipidemia   fatigue    Plan:  Daily check for orthostasis/document  TSH - WNL  Pt not on AV tejinder blocking agents. Avoid AV tejinder blocking agents  6 min walk test  Start lisinopril 10/d               Please do not hesitate to contact us with questions or concerns. See note below for details. Cat Callaway MD      Subjective:  Continues to have intermittent dizziness, particularl with ambulation. BP up.  Chronic fatigue/solano    Tele - SB 50s  ROS:  (bold if positive, if negative)          Medications before admission:     Current Facility-Administered Medications   Medication Dose Route Frequency    lisinopril (PRINIVIL, ZESTRIL) tablet 10 mg  10 mg Oral DAILY    arformoterol (BROVANA) neb solution 15 mcg  15 mcg Nebulization DAILY    budesonide (PULMICORT) 500 mcg/2 ml nebulizer suspension  500 mcg Nebulization DAILY    sodium chloride (OCEAN) 0.65 % nasal spray 2 Spray  2 Spray Both Nostrils Q2H PRN    lidocaine (LIDODERM) 5 % patch 1 Patch  1 Patch TransDERmal Q24H    sodium chloride (NS) flush 5-10 mL  5-10 mL IntraVENous Q8H    sodium chloride (NS) flush 5-10 mL  5-10 mL IntraVENous PRN    enoxaparin (LOVENOX) injection 40 mg  40 mg SubCUTAneous Q24H    latanoprost (XALATAN) 0.005 % ophthalmic solution 1 Drop  1 Drop Both Eyes QHS    atorvastatin (LIPITOR) tablet 40 mg  40 mg Oral QHS    0.9% sodium chloride infusion  130 mL/hr IntraVENous CONTINUOUS    acetaminophen (TYLENOL) tablet 650 mg  650 mg Oral Q6H PRN    [START ON 2018] alendronate (FOSAMAX) tablet 70 mg  70 mg Oral every Monday    ketoconazole (NIZORAL) 2 % cream   Topical DAILY    aspirin delayed-release tablet 81 mg  81 mg Oral DAILY    glucose chewable tablet 16 g  4 Tab Oral PRN    dextrose (D50W) injection syrg 12.5-25 g  12.5-25 g IntraVENous PRN    glucagon (GLUCAGEN) injection 1 mg  1 mg IntraMUSCular PRN    insulin lispro (HUMALOG) injection   SubCUTAneous QID WITH MEALS    diclofenac (VOLTAREN) 1 % topical gel 2 g  2 g Topical TID PRN    melatonin tablet 3 mg  3 mg Oral QHS PRN       Physical Exam:  Visit Vitals    BP (!) 195/97    Pulse (!) 50    Temp 98.4 °F (36.9 °C)    Resp 18    Wt 196 lb (88.9 kg)    SpO2 98%    BMI 35.85 kg/m2      O2 Device: Room air      Telemetry:     Gen: Well-developed, well-nourished, in no acute distress  Neck: Supple,No JVD, No Carotid Bruit,   Resp: No accessory muscle use, Clear breath sounds, No rales or rhonchi  Card: Regular Rate,Rythm,Normal S1, S2, 2/6 sys murmur, No rubs or gallop. No thrills.    Abd:  Soft, non-tender, non-distended,BS+,   LE: No edema    EKG:        Cxray:    LABS:        Lab Results   Component Value Date/Time    WBC 5.8 01/07/2018 05:25 AM    HGB 11.8 01/07/2018 05:25 AM    HCT 36.7 01/07/2018 05:25 AM    PLATELET 560 65/75/2123 05:25 AM    MCV 86.8 01/07/2018 05:25 AM     Lab Results   Component Value Date/Time    Sodium 143 01/07/2018 05:25 AM    Potassium 4.2 01/07/2018 05:25 AM    Chloride 111 01/07/2018 05:25 AM    CO2 23 01/07/2018 05:25 AM    Anion gap 9 01/07/2018 05:25 AM    Glucose 114 01/07/2018 05:25 AM    BUN 8 01/07/2018 05:25 AM    Creatinine 0.72 01/07/2018 05:25 AM    BUN/Creatinine ratio 11 01/07/2018 05:25 AM    GFR est AA >60 01/07/2018 05:25 AM    GFR est non-AA >60 01/07/2018 05:25 AM    Calcium 8.4 01/07/2018 05:25 AM     Lab Results   Component Value Date/Time    Troponin-I, Qt. <0.04 01/06/2018 01:58 AM     No results found for: APTT  Lab Results   Component Value Date/Time    INR 1.1 01/06/2017 12:14 PM    INR 1.1 06/16/2014 02:25 PM Prothrombin time 10.7 01/06/2017 12:14 PM    Prothrombin time 11.2 06/16/2014 02:25 PM     No results found for: BNP, BNPP, XBNPT    Care Plan discussed with:   Javi Cameron MD

## 2018-01-07 NOTE — PROGRESS NOTES
Bedside and Verbal shift change report given to Hayed Florez RN (oncoming nurse) by Alyssia Torres RN (offgoing nurse). Report included the following information SBAR, Kardex, ED Summary, Intake/Output, Recent Results, Med Rec Status and Cardiac Rhythm SB/NSR.    2054: Pt alert, oriented, denies pain. Pt reports feeling weak, \"woozy\" when she stands up to transfer to [facility]. Patient Vitals for the past 12 hrs:   Temp Pulse Resp BP SpO2   01/07/18 0808 (P) 98.4 °F (36.9 °C) (!) (P) 51 (P) 12 - 98 %   01/07/18 0701 - 72 - - -   01/07/18 0518 - (!) 57 - 154/80 -   01/07/18 0516 - (!) 56 - 148/80 -   01/07/18 0512 98.4 °F (36.9 °C) 66 16 150/74 98 %   01/07/18 0025 97.7 °F (36.5 °C) (!) 46 16 137/69 94 %   01/06/18 2309 - (!) 51 - - -   01/06/18 2054 97.8 °F (36.6 °C) 62 16 137/65 97 %     0715: Notified by Monitor tech of pt HR dipping down to 35. Pt resting, alert, oriented, denies pain, denies dizziness. Bedside and Verbal shift change report given to Ángel Burgos RN (oncoming nurse) by Hayde Florez RN (offgoing nurse). Report included the following information SBAR, Kardex, ED Summary, Intake/Output, Recent Results, Med Rec Status and Cardiac Rhythm SB/NSR with PAC's.

## 2018-01-08 ENCOUNTER — OFFICE VISIT (OUTPATIENT)
Dept: CARDIOLOGY CLINIC | Age: 78
End: 2018-01-08

## 2018-01-08 VITALS
RESPIRATION RATE: 18 BRPM | OXYGEN SATURATION: 97 % | WEIGHT: 196 LBS | BODY MASS INDEX: 35.85 KG/M2 | SYSTOLIC BLOOD PRESSURE: 168 MMHG | HEART RATE: 51 BPM | DIASTOLIC BLOOD PRESSURE: 95 MMHG | TEMPERATURE: 98.5 F

## 2018-01-08 DIAGNOSIS — R00.1 BRADYCARDIA: Primary | ICD-10-CM

## 2018-01-08 LAB
ANION GAP SERPL CALC-SCNC: 9 MMOL/L (ref 5–15)
BASOPHILS # BLD: 0 K/UL (ref 0–0.1)
BASOPHILS NFR BLD: 0 % (ref 0–1)
BUN SERPL-MCNC: 10 MG/DL (ref 6–20)
BUN/CREAT SERPL: 12 (ref 12–20)
CALCIUM SERPL-MCNC: 9.1 MG/DL (ref 8.5–10.1)
CHLORIDE SERPL-SCNC: 108 MMOL/L (ref 97–108)
CO2 SERPL-SCNC: 26 MMOL/L (ref 21–32)
CREAT SERPL-MCNC: 0.83 MG/DL (ref 0.55–1.02)
EOSINOPHIL # BLD: 0.2 K/UL (ref 0–0.4)
EOSINOPHIL NFR BLD: 2 % (ref 0–7)
ERYTHROCYTE [DISTWIDTH] IN BLOOD BY AUTOMATED COUNT: 14.3 % (ref 11.5–14.5)
GLUCOSE BLD STRIP.AUTO-MCNC: 120 MG/DL (ref 65–100)
GLUCOSE BLD STRIP.AUTO-MCNC: 120 MG/DL (ref 65–100)
GLUCOSE SERPL-MCNC: 113 MG/DL (ref 65–100)
HCT VFR BLD AUTO: 36.5 % (ref 35–47)
HGB BLD-MCNC: 11.8 G/DL (ref 11.5–16)
LYMPHOCYTES # BLD: 2.7 K/UL (ref 0.8–3.5)
LYMPHOCYTES NFR BLD: 36 % (ref 12–49)
MCH RBC QN AUTO: 28 PG (ref 26–34)
MCHC RBC AUTO-ENTMCNC: 32.3 G/DL (ref 30–36.5)
MCV RBC AUTO: 86.5 FL (ref 80–99)
MONOCYTES # BLD: 0.4 K/UL (ref 0–1)
MONOCYTES NFR BLD: 6 % (ref 5–13)
NEUTS SEG # BLD: 4.2 K/UL (ref 1.8–8)
NEUTS SEG NFR BLD: 56 % (ref 32–75)
PLATELET # BLD AUTO: 236 K/UL (ref 150–400)
POTASSIUM SERPL-SCNC: 3.9 MMOL/L (ref 3.5–5.1)
RBC # BLD AUTO: 4.22 M/UL (ref 3.8–5.2)
SERVICE CMNT-IMP: ABNORMAL
SERVICE CMNT-IMP: ABNORMAL
SODIUM SERPL-SCNC: 143 MMOL/L (ref 136–145)
WBC # BLD AUTO: 7.6 K/UL (ref 3.6–11)

## 2018-01-08 PROCEDURE — 97116 GAIT TRAINING THERAPY: CPT

## 2018-01-08 PROCEDURE — 74011250637 HC RX REV CODE- 250/637: Performed by: STUDENT IN AN ORGANIZED HEALTH CARE EDUCATION/TRAINING PROGRAM

## 2018-01-08 PROCEDURE — 97165 OT EVAL LOW COMPLEX 30 MIN: CPT

## 2018-01-08 PROCEDURE — 97535 SELF CARE MNGMENT TRAINING: CPT

## 2018-01-08 PROCEDURE — G8987 SELF CARE CURRENT STATUS: HCPCS

## 2018-01-08 PROCEDURE — G8988 SELF CARE GOAL STATUS: HCPCS

## 2018-01-08 PROCEDURE — 94640 AIRWAY INHALATION TREATMENT: CPT

## 2018-01-08 PROCEDURE — 74011250637 HC RX REV CODE- 250/637: Performed by: SPECIALIST

## 2018-01-08 PROCEDURE — 74011250636 HC RX REV CODE- 250/636: Performed by: STUDENT IN AN ORGANIZED HEALTH CARE EDUCATION/TRAINING PROGRAM

## 2018-01-08 PROCEDURE — 82962 GLUCOSE BLOOD TEST: CPT

## 2018-01-08 PROCEDURE — G8989 SELF CARE D/C STATUS: HCPCS

## 2018-01-08 PROCEDURE — 74011000250 HC RX REV CODE- 250: Performed by: STUDENT IN AN ORGANIZED HEALTH CARE EDUCATION/TRAINING PROGRAM

## 2018-01-08 PROCEDURE — 85025 COMPLETE CBC W/AUTO DIFF WBC: CPT | Performed by: STUDENT IN AN ORGANIZED HEALTH CARE EDUCATION/TRAINING PROGRAM

## 2018-01-08 PROCEDURE — 36415 COLL VENOUS BLD VENIPUNCTURE: CPT | Performed by: STUDENT IN AN ORGANIZED HEALTH CARE EDUCATION/TRAINING PROGRAM

## 2018-01-08 PROCEDURE — 80048 BASIC METABOLIC PNL TOTAL CA: CPT | Performed by: STUDENT IN AN ORGANIZED HEALTH CARE EDUCATION/TRAINING PROGRAM

## 2018-01-08 PROCEDURE — 99218 HC RM OBSERVATION: CPT

## 2018-01-08 RX ORDER — MECLIZINE HCL 12.5 MG 12.5 MG/1
12.5 TABLET ORAL DAILY
Status: COMPLETED | OUTPATIENT
Start: 2018-01-08 | End: 2018-01-08

## 2018-01-08 RX ADMIN — MECLIZINE HCL 12.5 MG: 12.5 TABLET ORAL at 08:22

## 2018-01-08 RX ADMIN — ASPIRIN 81 MG: 81 TABLET, COATED ORAL at 08:22

## 2018-01-08 RX ADMIN — BUDESONIDE 500 MCG: 0.5 INHALANT RESPIRATORY (INHALATION) at 08:01

## 2018-01-08 RX ADMIN — KETOCONAZOLE: 20 CREAM TOPICAL at 08:25

## 2018-01-08 RX ADMIN — ALENDRONATE SODIUM 70 MG: 70 TABLET ORAL at 08:22

## 2018-01-08 RX ADMIN — ARFORMOTEROL TARTRATE 15 MCG: 15 SOLUTION RESPIRATORY (INHALATION) at 08:01

## 2018-01-08 RX ADMIN — LISINOPRIL 10 MG: 5 TABLET ORAL at 08:22

## 2018-01-08 RX ADMIN — Medication 10 ML: at 05:56

## 2018-01-08 NOTE — PROGRESS NOTES
Occupational Therapy EVALUATION/discharge  Patient: Benoit Ferguson (72 y.o. female)  Date: 1/8/2018  Primary Diagnosis: Symptomatic bradycardia        Precautions:  Fall    ASSESSMENT:   Based on the objective data described below, the patient presents at baseline in regards to functional abilities following admission on 1/5 for symptomatic dizziness with c/o dizziness with with any OOB activity. Patient's HR ranging from 50-56 bpm and SpO2 remained >94% on room air with all activity today. Patient lives with her daughter who was present today and confirms she is able to provide assistance PRN. Patient was received sitting EOB and supervision needed for ADL transfers. Patient was independent to mod I level for all ADLs. Education provided regarding pacing and energy conservation techniques and encouraged to move slowly with transitional movements allowing increased time with each position change. Patient has no further skilled OT needs and is cleared from OT services at this time. Further skilled acute occupational therapy is not indicated at this time. Discharge Recommendations: None  Further Equipment Recommendations for Discharge: none       SUBJECTIVE:   Patient stated I am much better today; I'm not dizzy at all.     OBJECTIVE DATA SUMMARY:   HISTORY:   Past Medical History:   Diagnosis Date    Arthritis     Diabetes ([address]     GERD (gastroesophageal reflux disease)     Glaucoma     High cholesterol     Ill-defined condition     EDEMA, LOWER LIMS, URINARY INCONTINENCE    Urge incontinence      Past Surgical History:   Procedure Laterality Date    BREAST SURGERY PROCEDURE UNLISTED      LT BREAST CYST BENIGN    DRAINAGE OF DEEP RECTAL ABSCESS      ENDOSCOPY, COLON, DIAGNOSTIC      2008    HX BREAST BIOPSY Left 1970s    Surgical    HX GI      RECTAL ABSCESS    HX HEART CATHETERIZATION  01/06/2017    HX HEENT      NATHAN CATARACTS    HX HYSTERECTOMY      HX KNEE REPLACEMENT      left Prior Level of Function/Environment/Context: Patient lives with her daughter. She reports independence with ADLs and independent/mod I level for all functional mobility PTA; She reports occasional use of SPC for transfers PTA. Home Situation  Home Environment: Private residence  # Steps to Enter: 0  Wheelchair Ramp: Yes  One/Two Story Residence: One story  Living Alone: No  Support Systems: Child(rodrigo)  Patient Expects to be Discharged to[de-identified] Private residence  Current DME Used/Available at Home: Gina Radish, straight, Walker, rolling, Shower chair, Grab bars  Tub or Shower Type: Tub/Shower combination  [x]  Right hand dominant   []  Left hand dominant    EXAMINATION OF PERFORMANCE DEFICITS:  Cognitive/Behavioral Status:  Neurologic State: Alert  Orientation Level: Oriented X4  Cognition: Appropriate decision making; Appropriate for age attention/concentration; Appropriate safety awareness  Perception: Appears intact  Perseveration: No perseveration noted  Safety/Judgement: Awareness of environment;Good awareness of safety precautions    Skin: Intact in the uppers    Edema: None noted in the uppers    Hearing: Auditory  Auditory Impairment: None    Vision/Perceptual:    Tracking: Able to track stimulus in all quadrants w/o difficulty    Diplopia: No    Acuity: Within Defined Limits    Corrective Lenses: Glasses    Range of Motion:  WDL in the uppers    Strength:  WDL in the uppers    Coordination:  Fine Motor Skills-Upper: Left Intact; Right Intact    Gross Motor Skills-Upper: Left Intact; Right Intact    Tone & Sensation:  Tone: normal  Sensation: intact    Balance:  Sitting: Intact  Standing: Intact    Functional Mobility and Transfers for ADLs:  Bed Mobility:  Rolling:  (pt was received sitting EOB and remained up)  Scooting: Independent    Transfers:  Sit to Stand: Supervision  Stand to Sit: Supervision  Bed to Chair: Supervision  Toilet Transfer : Supervision    ADL Assessment:  Feeding: Independent    Oral Facial Hygiene/Grooming: Independent    Bathing: Modified independent (verbal cues for pacing + energy conservation)    Upper Body Dressing: Independent    Lower Body Dressing: Modified independent (increased time to access distal LE)    Toileting: Modified independent    Cognitive Retraining  Safety/Judgement: Awareness of environment;Good awareness of safety precautions     Patient instructed and indicated understanding energy conservation techniques to increase independence and safety during all ADLs for end goal of returning home. Patient encouraged to use energy conservation techniques during ADLs to increase participation in life activities patient prefers, to ensure more frequent good days. If having a bad day, evaluate tasks completed day before and re-plan how to save energy to complete same task. Patient confirmed understanding of energy conservation techniques and demonstrated understanding during activity. Provided verbal cuing for education for breathing techniques including pursed lip breathing techniques (PLB) and circulatory breathing. Additionally, patient was educated on energy conservation techniques, including how they specifically apply to functional activities; This includes pacing, rest breaks, and planning. Patient with good verbal understanding however needing additional cuing through out tasks to use techniques. Additional time needed during tasks. Functional Measure:  Barthel Index:    Bathin  Bladder: 10  Bowels: 10  Groomin  Dressing: 10  Feeding: 10  Mobility: 10  Stairs: 5  Toilet Use: 10  Transfer (Bed to Chair and Back): 10  Total: 85       Barthel and G-code impairment scale:  Percentage of impairment CH  0% CI  1-19% CJ  20-39% CK  40-59% CL  60-79% CM  80-99% CN  100%   Barthel Score 0-100 100 99-80 79-60 59-40 20-39 1-19   0   Barthel Score 0-20 20 17-19 13-16 9-12 5-8 1-4 0      The Barthel ADL Index: Guidelines  1.  The index should be used as a record of what a patient does, not as a record of what a patient could do. 2. The main aim is to establish degree of independence from any help, physical or verbal, however minor and for whatever reason. 3. The need for supervision renders the patient not independent. 4. A patient's performance should be established using the best available evidence. Asking the patient, friends/relatives and nurses are the usual sources, but direct observation and common sense are also important. However direct testing is not needed. 5. Usually the patient's performance over the preceding 24-48 hours is important, but occasionally longer periods will be relevant. 6. Middle categories imply that the patient supplies over 50 per cent of the effort. 7. Use of aids to be independent is allowed. Grecia Quinonez., Barthel, D.W. (2522). Functional evaluation: the Barthel Index. [facility] (14)2. JESSICA Baires, Jovita Gaxiola., Tammie Law., Melbeta, [facility] (1999). Measuring the change indisability after inpatient rehabilitation; comparison of the responsiveness of the Barthel Index and Functional [place] Measure. Journal of Neurology, Neurosurgery, and Psychiatry, 66(4), 404-781. Harvey Iwona, N.J.A, Kami Aquino,  BRANDO.J.M, & García Flores, M.A. (2004.) Assessment of post-stroke quality of life in cost-effectiveness studies: The usefulness of the Barthel Index and the EuroQoL-5D. Quality of Life Research, 13, 046-19       G codes: In compliance with CMSs Claims Based Outcome Reporting, the following G-code set was chosen for this patient based on their primary functional limitation being treated: The outcome measure chosen to determine the severity of the functional limitation was the Barthel Index with a score of 85/100 which was correlated with the impairment scale. ?  Self Care:     - CURRENT STATUS: CI - 1%-19% impaired, limited or restricted    - GOAL STATUS: CI - 1%-19% impaired, limited or restricted    - D/C STATUS:  CI - 1%-19% impaired, limited or restricted     Occupational Therapy Evaluation Charge Determination   History Examination Decision-Making   LOW Complexity : Brief history review  LOW Complexity : 1-3 performance deficits relating to physical, cognitive , or psychosocial skils that result in activity limitations and / or participation restrictions  LOW Complexity : No comorbidities that affect functional and no verbal or physical assistance needed to complete eval tasks       Based on the above components, the patient evaluation is determined to be of the following complexity level: LOW   Pain:  Pain Scale 1: Numeric (0 - 10)  Pain Intensity 1: 0              Activity Tolerance:   Patient tolerated eval well. After treatment:   [x]  Patient left in no apparent distress sitting up in chair  [x]  Patient left in no apparent distress in sitting edge of bed  [x]  Call bell left within reach  [x]  Nursing notified  [x]  Caregiver present-daughter  []  Bed alarm activated    COMMUNICATION/EDUCATION:   Communication/Collaboration:  [x]      Home safety education was provided and the patient/caregiver indicated understanding. [x]      Patient/family have participated as able and agree with findings and recommendations. []      Patient is unable to participate in plan of care at this time. Findings and recommendations were discussed with: Physical Therapy Assistant, Registered Nurse and patient.     Mitchel Sims OTR/L  Time Calculation: 16 mins

## 2018-01-08 NOTE — PROGRESS NOTES
Bedside and Verbal shift change report given to Payton Amaral RN (oncoming nurse) by Fan Quiroz RN (offgoing nurse). Report included the following information SBAR, Kardex, ED Summary, Intake/Output, Recent Results, Med Rec Status and Cardiac Rhythm SB.    2351: Pt alert, oriented, denies pain. Pt denies any dizziness, or wooziness on ambulation. Patient Vitals for the past 12 hrs:   Temp Pulse Resp BP SpO2   01/08/18 0822 - (!) 52 - 143/64 -   01/08/18 0819 98.3 °F (36.8 °C) (!) 52 22 200/81 98 %   01/08/18 0802 - - - - 98 %   01/08/18 0522 97.9 °F (36.6 °C) (!) 56 16 161/73 94 %   01/08/18 0038 97.9 °F (36.6 °C) (!) 55 16 146/66 97 %   01/07/18 2300 - 65 - - -     Bedside and Verbal shift change report given to Kris Dickinson RN (oncoming nurse) by Payton Amaral RN (offgoing nurse). Report included the following information SBAR, Kardex, ED Summary, Intake/Output, Recent Results, Med Rec Status and Cardiac Rhythm SB/NSR.

## 2018-01-08 NOTE — PROGRESS NOTES
Cardiology Progress Note       [facility]  NAME:  Natali Leroy   :   1940   MRN:   [id number]     Assessment/Plan:   1. Orthostatic/ambulatory dizziness: awaiting 6 MWT. 2.  HTN: lisinopril added . Systolic  this am  3. Sinus bradycardia: stable avoid AV tejinder agents. TSH nml. Will arrange for event monitor as OP. 4.  Diabetes:   5. Dyslipidemia: on statin   6. Fatigue:            ok for d/c cardiac wise. Pt personally seen and examined. Chart reviewed. Agree with advanced NP's history, exam and  A/P with changes/additons. Neck-no JVD  CVS-S1-S2 present, no murmur   RS-   CTAB        Abdomen-  soft/NT  LE-   no edema    ECHO - nml EF  HR in 50's  E cardio event monitor as OP  D/w Pt/daughter        Anna Ray. MD, [facility]      Subjective:   HPI: Patient is a 75-year-old  -American female who has been having symptoms of dizziness for the past few days. No history of syncope. Usually the dizziness occurs when she is standing. She also states that she feels the room spinning around her. However, she also states that occasionally she has dizziness lying down. No chest pain, dyspnea, palpitations, swelling lower extremities. Patient is not taking any AV tejinder blocking agents. In the ED, her heart rate has been noted to be as low as 46. Lowest documented heart rate is 48. However, at the time of the exam her heart rate is in the 50s. No URI symptoms, history of vertigo.               Cardiac ROS: Patient denies any exertional chest pain, dyspnea, palpitations, syncope, orthopnea, edema or paroxysmal nocturnal dyspnea. Previous Cardiac Eval  ECHO 18   LVEF 55-60% No WMA   Cardiac cath 2017 30% stenosis LAD, LVEF 60%    Echo 2016 LVEF 68 % No RWMA. MIld MR. Tricuspid valve: There was near moderate regurgitation. Pulmonary artery systolic pressure: 33 mmHg. Review of Systems: No nausea, indigestion, vomiting, pain, cough, sputum. No bleeding. Taking po. Objective:     Visit Vitals    /64 (BP 1 Location: Right arm)  Comment (BP 1 Location): R forearm     Pulse (!) 52    Temp 98.3 °F (36.8 °C)    Resp 22    Wt 196 lb (88.9 kg)    SpO2 98%    BMI 35.85 kg/m2    O2 Flow Rate (L/min): 3 l/min O2 Device: Room air    Temp (24hrs), Av.3 °F (36.8 °C), Min:97.9 °F (36.6 °C), Max:99.4 °F (37.4 °C)      701 - 1900  In: -   Out: 509 [ULUAM:915]    1901 - 700  In: 880 [P.O.:880]  Out:  [Urine:]  TELE: SB 50's    General: AAOx3 cooperative, no acute distress. HEENT: Atraumatic. Pink and moist.  Anicteric sclerae. Neck : Supple, no thyromegaly. Lungs: CTA bilaterally. No wheezing/rhonchi/rales. Heart: Regular rhythm, no murmur, no rubs, no gallops. No JVD. No carotid bruits. Abdomen: Soft, non-distended, non-tender. + Bowel sounds. No bruits. Extremities: No edema, no clubbing, no cyanosis. No calf tenderness  Neurologic: Grossly intact. Alert and oriented X 3. No acute neurological distress. Psych: Good insight. Not anxious or agitated. Care Plan discussed with:    Comments   Patient x    Family      RN x    Care Manager                    Consultant:          Data Review:     No lab exists for component: ITNL   Recent Labs      18   0158  18   1326   TROIQ  <0.04  <0.04     Recent Labs      18   0105  18   0525  18   0158  18   1326   NA  143  143  143  142   K  3.9  4.2  3.3*  3.6   CL  108  111*  108  106   CO2  26  23  26  27   BUN  10  8  9  10   CREA  0.83  0.72  0.86  0.83   GLU  113*  114*  155*  118*   PHOS   --    --   3.1   --    MG   --    --   2.1   --    ALB   --    --    --   3.8   WBC  7.6  5.8  7.3  5.8   HGB  11.8  11.8  12.1  12.9   HCT  36.5  36.7  37.9  40.5   PLT  236  241  244  242     No results for input(s): INR, PTP, APTT in the last 72 hours.     No lab exists for component: INREXT    Medications reviewed  Current Facility-Administered Medications   Medication Dose Route Frequency    lisinopril (PRINIVIL, ZESTRIL) tablet 10 mg  10 mg Oral DAILY    arformoterol (BROVANA) neb solution 15 mcg  15 mcg Nebulization DAILY    budesonide (PULMICORT) 500 mcg/2 ml nebulizer suspension  500 mcg Nebulization DAILY    sodium chloride (OCEAN) 0.65 % nasal spray 2 Spray  2 Spray Both Nostrils Q2H PRN    lidocaine (LIDODERM) 5 % patch 1 Patch  1 Patch TransDERmal Q24H    sodium chloride (NS) flush 5-10 mL  5-10 mL IntraVENous Q8H    sodium chloride (NS) flush 5-10 mL  5-10 mL IntraVENous PRN    enoxaparin (LOVENOX) injection 40 mg  40 mg SubCUTAneous Q24H    latanoprost (XALATAN) 0.005 % ophthalmic solution 1 Drop  1 Drop Both Eyes QHS    atorvastatin (LIPITOR) tablet 40 mg  40 mg Oral QHS    0.9% sodium chloride infusion  130 mL/hr IntraVENous CONTINUOUS    acetaminophen (TYLENOL) tablet 650 mg  650 mg Oral Q6H PRN    alendronate (FOSAMAX) tablet 70 mg  70 mg Oral every Monday    ketoconazole (NIZORAL) 2 % cream   Topical DAILY    aspirin delayed-release tablet 81 mg  81 mg Oral DAILY    glucose chewable tablet 16 g  4 Tab Oral PRN    dextrose (D50W) injection syrg 12.5-25 g  12.5-25 g IntraVENous PRN    glucagon (GLUCAGEN) injection 1 mg  1 mg IntraMUSCular PRN    insulin lispro (HUMALOG) injection   SubCUTAneous QID WITH MEALS    diclofenac (VOLTAREN) 1 % topical gel 2 g  2 g Topical TID PRN    melatonin tablet 3 mg  3 mg Oral QHS PRN         Radha Real NP

## 2018-01-08 NOTE — PROGRESS NOTES
Problem: Mobility Impaired (Adult and Pediatric)  Goal: *Acute Goals and Plan of Care (Insert Text)  Physical Therapy Goals  Initiated 1/6/2018  1. Patient will move from supine to sit and sit to supine  in bed with independence within 7 day(s). 2.  Patient will transfer from bed to chair and chair to bed with independence using the least restrictive device within 7 day(s). 3.  Patient will perform sit to stand with independence within 7 day(s). 4.  Patient will ambulate with modified independence for 200 feet with the least restrictive device within 7 day(s). physical Therapy TREATMENT  Patient: Annamarie Ang (4 y.o. female)  Date: 1/8/2018  Diagnosis: Symptomatic bradycardia Symptomatic bradycardia       Precautions: Fall    ASSESSMENT:  Pt received sitting EOB, daughter at bedside, agreeable to participate with physical therapy. Denies dizziness with postional changes. HR 53 with seated rest /86 in sitting. Sit<>stand with CGA. Denies dizziness. Gait training x 100' using RW with CGA. Maintained 97% O2 sat on room air throughout session. HR increased to 73 at highest with acitivity, then decreased to 51 with /95 post activity. Pt denies L knee pain during activity. Pt and family decline HHPT, may benefit from OPPT for L knee when appropiate   Progression toward goals:  []    Improving appropriately and progressing toward goals  [x]    Improving slowly and progressing toward goals  []    Not making progress toward goals and plan of care will be adjusted     PLAN:  Patient continues to benefit from skilled intervention to address the above impairments. Continue treatment per established plan of care. Discharge Recommendations:  Outpatient  Further Equipment Recommendations for Discharge:  none     SUBJECTIVE:   Patient stated I feel better today.     OBJECTIVE DATA SUMMARY:   Critical Behavior:  Neurologic State: Alert  Orientation Level: Oriented X4  Cognition: Appropriate decision making, Appropriate for age attention/concentration, Appropriate safety awareness  Safety/Judgement: Awareness of environment, Good awareness of safety precautions  Functional Mobility Training:  Bed Mobility:  Rolling:  (pt was received sitting EOB and remained up)        Scooting: Independent        Transfers:  Sit to Stand: Contact guard assistance  Stand to Sit: Contact guard assistance        Bed to Chair: Supervision                    Balance:  Sitting: Intact  Standing: Intact; With support  Ambulation/Gait Training:  Distance (ft): 100 Feet (ft)  Assistive Device: Walker, rolling;Gait belt  Ambulation - Level of Assistance: Contact guard assistance                                               Stairs:            Neuro Re-Education:    Therapeutic Exercises:     Pain:  Pain Scale 1: Numeric (0 - 10)  Pain Intensity 1: 0              Activity Tolerance:   Good  Please refer to the flowsheet for vital signs taken during this treatment.   After treatment:   []    Patient left in no apparent distress sitting up in chair  [x]    Patient left in no apparent distress sitting Edge of  bed  [x]    Call bell left within reach  [x]    Nursing notified  [x]    Caregiver present  []    Bed alarm activated    COMMUNICATION/COLLABORATION:   The patients plan of care was discussed with: Registered Nurse    Saige Cameron   Time Calculation: 22 mins

## 2018-01-08 NOTE — ROUTINE PROCESS
1/8/18    1:29PM  PCP MELO appt scheduled with Dr. Sol Mcmillan on 1/9/18 at 2:15PM. Appt added to AVS. Essence Ruff CM Specialist

## 2018-01-08 NOTE — PROGRESS NOTES
discharge instructions, including information on new medications, were reviewed with patient. All questions were answered. IV and heart monitor were removed. Patient received a copy of her discharge instructions and prescriptions and will be discharged home with her family. Patient has refused home health. Patient understood that [facility] will be mailing her a cardiac event monitor that she will wear for 1 month and results will be discussed with Dr. Easton Wynn at the visit in February.       Prescriptions were called in to the [facility] #[phone] (at patient's request)

## 2018-01-08 NOTE — PROGRESS NOTES
BP in L upper arm was 200/81; with patient demonstrating considerable pain when cuff would tighten. Recheck on R forearm yielded BP of 143/64.

## 2018-01-09 ENCOUNTER — OFFICE VISIT (OUTPATIENT)
Dept: FAMILY MEDICINE CLINIC | Age: 78
End: 2018-01-09

## 2018-01-09 ENCOUNTER — PATIENT OUTREACH (OUTPATIENT)
Dept: FAMILY MEDICINE CLINIC | Age: 78
End: 2018-01-09

## 2018-01-09 VITALS
WEIGHT: 192.6 LBS | DIASTOLIC BLOOD PRESSURE: 70 MMHG | BODY MASS INDEX: 35.44 KG/M2 | TEMPERATURE: 97.2 F | OXYGEN SATURATION: 96 % | HEART RATE: 68 BPM | SYSTOLIC BLOOD PRESSURE: 128 MMHG | RESPIRATION RATE: 20 BRPM | HEIGHT: 62 IN

## 2018-01-09 DIAGNOSIS — E11.21 TYPE 2 DIABETES MELLITUS WITH NEPHROPATHY (HCC): ICD-10-CM

## 2018-01-09 DIAGNOSIS — I10 HYPERTENSION, UNSPECIFIED TYPE: ICD-10-CM

## 2018-01-09 DIAGNOSIS — R35.0 FREQUENT URINATION: ICD-10-CM

## 2018-01-09 DIAGNOSIS — R45.86 MOOD CHANGE: ICD-10-CM

## 2018-01-09 DIAGNOSIS — I25.9 CHEST PAIN DUE TO MYOCARDIAL ISCHEMIA, UNSPECIFIED ISCHEMIC CHEST PAIN TYPE: ICD-10-CM

## 2018-01-09 DIAGNOSIS — R00.1 SYMPTOMATIC BRADYCARDIA: Primary | ICD-10-CM

## 2018-01-09 DIAGNOSIS — J45.40 MODERATE PERSISTENT REACTIVE AIRWAY DISEASE WITHOUT COMPLICATION: ICD-10-CM

## 2018-01-09 PROBLEM — E11.40 TYPE 2 DIABETES MELLITUS WITH DIABETIC NEUROPATHY (HCC): Status: ACTIVE | Noted: 2018-01-09

## 2018-01-09 PROBLEM — I20.9 ISCHEMIC CHEST PAIN (HCC): Status: ACTIVE | Noted: 2018-01-09

## 2018-01-09 PROBLEM — E11.40 TYPE 2 DIABETES MELLITUS WITH DIABETIC NEUROPATHY (HCC): Status: RESOLVED | Noted: 2018-01-09 | Resolved: 2018-01-09

## 2018-01-09 PROBLEM — E11.8 TYPE 2 DIABETES MELLITUS WITH COMPLICATION, WITHOUT LONG-TERM CURRENT USE OF INSULIN (HCC): Status: ACTIVE | Noted: 2018-01-09

## 2018-01-09 PROBLEM — I20.9 ISCHEMIC CHEST PAIN (HCC): Status: RESOLVED | Noted: 2018-01-09 | Resolved: 2018-01-09

## 2018-01-09 RX ORDER — OXYBUTYNIN CHLORIDE 5 MG/1
5 TABLET ORAL 3 TIMES DAILY
Qty: 270 TAB | Refills: 1 | Status: SHIPPED | OUTPATIENT
Start: 2018-01-09 | End: 2022-03-28

## 2018-01-09 RX ORDER — LISINOPRIL 10 MG/1
10 TABLET ORAL DAILY
Qty: 30 TAB | Refills: 0 | Status: SHIPPED | OUTPATIENT
Start: 2018-01-09 | End: 2018-02-09 | Stop reason: SDUPTHER

## 2018-01-09 NOTE — ASSESSMENT & PLAN NOTE
Cardiac work up to date has not found ischemia. She is also followed by cardiology at this time. Key CAD CHF Meds             lisinopril (PRINIVIL, ZESTRIL) 10 mg tablet  (Taking) Take 1 Tab by mouth daily. aspirin delayed-release 81 mg tablet  (Taking) Take  by mouth daily. furosemide (LASIX) 20 mg tablet  (Taking) Take  by mouth every Monday, Wednesday, Friday. Key Antihyperlipidemia Meds             atorvastatin (LIPITOR) 40 mg tablet  (Taking) Take 1 Tab by mouth once over twenty-four (24) hours for 30 days.         Lab Results   Component Value Date/Time    Sodium 143 01/08/2018 01:05 AM    Potassium 3.9 01/08/2018 01:05 AM    Cholesterol, total 143 08/04/2017 11:14 AM    HDL Cholesterol 45 08/04/2017 11:14 AM    LDL, calculated 82 08/04/2017 11:14 AM    Triglyceride 78 08/04/2017 11:14 AM    INR 1.1 01/06/2017 12:14 PM    Prothrombin time 10.7 01/06/2017 12:14 PM

## 2018-01-09 NOTE — ASSESSMENT & PLAN NOTE
Stable, based on history, physical exam and review of pertinent labs, studies and medications; meds reconciled; continue current treatment plan. Key Antihyperglycemic Medications             glipiZIDE (GLUCOTROL) 5 mg tablet  (Taking) Take 1 Tab by mouth once over twenty-four (24) hours. Other Key Diabetic Medications             lisinopril (PRINIVIL, ZESTRIL) 10 mg tablet  (Taking) Take 1 Tab by mouth daily. atorvastatin (LIPITOR) 40 mg tablet  (Taking) Take 1 Tab by mouth once over twenty-four (24) hours for 30 days. gabapentin (NEURONTIN) 600 mg tablet  (Taking) Take  by mouth three (3) times daily.         Lab Results   Component Value Date/Time    Hemoglobin A1c 6.6 08/04/2017 11:14 AM    Glucose 113 01/08/2018 01:05 AM    Creatinine 0.83 01/08/2018 01:05 AM    Microalb/Creat ratio (ug/mg creat.) <9.8 07/31/2017 10:23 AM    Cholesterol, total 143 08/04/2017 11:14 AM    HDL Cholesterol 45 08/04/2017 11:14 AM    LDL, calculated 82 08/04/2017 11:14 AM    Triglyceride 78 08/04/2017 11:14 AM     Diabetic Foot and Eye Exam HM Status   Topic Date Due    Eye Exam  01/03/1950    Diabetic Foot Care  07/31/2018

## 2018-01-09 NOTE — ASSESSMENT & PLAN NOTE
This situation has improved or resolved. Key Psychotherapeutic Meds     Patient is on no psychotherapeutic meds. Other [address] Meds             gabapentin (NEURONTIN) 600 mg tablet  (Taking) Take  by mouth three (3) times daily.         Lab Results   Component Value Date/Time    Sodium 143 01/08/2018 01:05 AM    Creatinine 0.83 01/08/2018 01:05 AM    TSH 1.66 01/06/2018 01:58 AM    WBC 7.6 01/08/2018 01:05 AM    ALT (SGPT) 22 01/05/2018 01:26 PM    AST (SGOT) 18 01/05/2018 01:26 PM

## 2018-01-09 NOTE — PROGRESS NOTES
[address] Collette Araujo Discharge Follow-Up      Date/Time:  1/9/2018 4:29 PM    Patient listed on discharge [facility]) report on 1/8/18. Patient discharged from [address] [place] [address] for Symptomatic Bradycardia . RRAT score: 14     Moderate    Medical History:     Past Medical History:   Diagnosis Date    Arthritis     Diabetes ([address]     GERD (gastroesophageal reflux disease)     Glaucoma     High cholesterol     Ill-defined condition     EDEMA, LOWER LIMS, URINARY INCONTINENCE    Urge incontinence        Nurse Navigator(NN) attempted to contact the patient during [facility] Consultation with Dr. Jade Dewey and her nurse revealed that Ms. Zain Raman was not symptomatic today. She is still undergoing her Cardiac work up. According to the Pt the Meclizine tablet has helped stop her dizziness the most.    Her next Cardiac appointment is 2/9/18. She is aware that [facility] should [facility] 24/7 with any medical concerns or problems.

## 2018-01-09 NOTE — PROGRESS NOTES
CC:  Chief Complaint   Patient presents with    Slow Heart Rate     S/P hospitalization from 1/5/18 to 1/8/18 dx. Bradycardia       HISTORY OF PRESENT ILLNESS  Stacy Dale is a 66 y.o. female. HPI Comments: 74F who presents for post-hospitalization follow up. She was admitted to [facility] on 1/5/2018 and discharged on 1/8/2018. Apparently, she was in her USOH until the evening of her admission when she presented with symptomatic bradycardia. Etiology was not clear. Admitting pulse was 51 and never went below that level. I note that in the office on 2-occasions, she did have bradycardia, but it improved through the course of the visit (though not documented in the chart). The work up in the hospital was negative for any significant findings. There was some thought that perhaps Tizanidine may have been the culprit as she had been taking this for about 1-month for muscle spasms. However, a thorough cardiac work up had previously been done the year before and was normal. She also had a work up in hospital and this was normal.     She will be wearing a Holter monitor x 30-days and will receive this in the mail. She will see Dr. Simran Wallace (cardiologist) in February to evaluate. Today, she is feeling well. No major concerns. I note that her pulse is in the 60's today without any real intervention. She denies taking any other OTC supplemental medications. Her hospital course is outlined below:         Slow Heart Rate       Conditions treated during recent hospitalization:     Dizziness  - DDx includes vertigo vs drug side effect vs cardiac etiology vs orthostasis vs Labyrinthitis (more likely)   - Pt had prior cardiac workup that was negative for cardiac dysfunction. Echo in 2016 showed no regional wall abnormalities with normal thickness and Lexiscan showed no inducible ischemia. Cath in 2/17 shoed EF of 60% with 30% stenosis of LAD.  Trop neg X2 and TSH normal.  - Pt has been taking tizanidine nightly for the past 3 weeks, could possibly 2/2 drug reaction as muscle relaxations have been known to cause dizziness and bradycardia.    - Orthostatics neg, appropriate BP response to position change. Patient dizziness appears to be related to inner ear or positional changes. MRI, MRA, and carotid dopplers were all negative. Her 6 minute walk test was normal.    -Dizziness improved with 1x dose of Meclizine 12.5mg. Pt discharged on Meclizine.  - Cardiology evaluated patient and did not think that dizziness was cardiac related, however they will arrange for outpatient event recorder. Cardiology also started patient on Lisinopril. Echo  This admission was WNL.     Glaucoma - stable  - Continue home Latanoprost nightly       Candidal Intertrigo - POA, between gluteal folds.  - Continue Ketoconazole cream  - Follow up with PCP      Dyspnea - Stable on Breo. Extensive workup was negative for cardiac etiology. - Substituted home Breo for Brovana/Plumicort while IP  - Patient stated that she cannot afford Breo Ellipta, even the copay of $45 that case management found for her. Patient needs to follow up with PCP for an alternative of treatment or any other method to get it.       OA - Stable. - Tylenol PRN  - Voltaran gel for hands      Diabetes Mellitus T2: Glycemia stable while IP  - Last HgA1c on 8/17 was 6.6.   - Resume home glipizide.      Hyperlipidemia: Last lipid panel on 8/17 and values were , TG 78, HDL 45, LDL 82   -Continue home atorvasatin      DANIELA - Continue CPAP qHS      Obesity  - PT with Body mass index is 36.01 kg/(m^2). .  - Encouraged lifestyle modifications and further follow up outpatient.      Labs/Imaging Needed on follow up: Event Recorder and possible ECHO by Cardiology (Dr. Jaime Alas).     Pending test results: At the time of your discharge the following test results are still pending: None. Please make sure you review these results with your outpatient follow-up provider(s).      Specific symptoms to watch for: chest pain, shortness of breath, fever, chills, nausea, vomiting, diarrhea, change in mentation, falling, weakness, bleeding. Past Medical History:  Past Medical History:   Diagnosis Date    Arthritis     Diabetes ([address]     GERD (gastroesophageal reflux disease)     Glaucoma     High cholesterol     Ill-defined condition     EDEMA, LOWER LIMS, URINARY INCONTINENCE    Urge incontinence          Medications:  Current Outpatient Prescriptions   Medication Sig Dispense Refill    oxybutynin (DITROPAN) 5 mg tablet Take 1 Tab by mouth three (3) times daily for 270 days. 270 Tab 1    lisinopril (PRINIVIL, ZESTRIL) 10 mg tablet Take 1 Tab by mouth daily. 30 Tab 0    beclomethasone (QVAR) 80 mcg/actuation aero Take 1 Puff by inhalation two (2) times a day for 30 days. 1 Inhaler 11    meclizine (ANTIVERT) 12.5 mg tablet Take 1 Tab by mouth three (3) times daily as needed for up to 10 days. 30 Tab 0    alendronate (FOSAMAX) 70 mg tablet Take 1 Tab by mouth every seven (7) days for 90 days. 12 Tab 1    ketoconazole (NIZORAL) 2 % topical cream Apply  to affected area daily. 30 g 3    glipiZIDE (GLUCOTROL) 5 mg tablet Take 1 Tab by mouth once over twenty-four (24) hours. 90 Tab 0    atorvastatin (LIPITOR) 40 mg tablet Take 1 Tab by mouth once over twenty-four (24) hours for 30 days. 30 Tab 5    Calcium Carbonate-Vit D3-Min 600 mg calcium- 400 unit tab Take 1 pill 2 x daily 60 Tab 5    aspirin delayed-release 81 mg tablet Take  by mouth daily.  glucose blood VI test strips (BLOOD GLUCOSE TEST) strip Accu-chek alexei plus test strips Test blood sugar once daily E11.9 100 Strip 1    Lancets misc accu-chek softclix lancets Check blood sugar daily E11.9 100 Each 1    alcohol swabs (BD SINGLE USE SWABS REGULAR) padm 100 Each by Apply Externally route daily. 100 Pad 1    acetaminophen (TYLENOL) 650 mg CR tablet Take 650 mg by mouth every six (6) hours as needed for Pain.       gabapentin (NEURONTIN) 600 mg tablet Take  by mouth three (3) times daily.  latanoprost (XALATAN) 0.005 % ophthalmic solution Administer 1 Drop to both eyes nightly.  furosemide (LASIX) 20 mg tablet Take  by mouth every Monday, Wednesday, Friday. ROS:  Review of Systems   All other systems reviewed and are negative. OBJECTIVE:  /70  Pulse 68  Temp 97.2 °F (36.2 °C) (Temporal)   Resp 20  Ht 5' 2\" (1.575 m)  Wt 192 lb 9.6 oz (87.4 kg)  SpO2 96%  BMI 35.23 kg/m2  Physical Exam   Eyes: Conjunctivae are normal. Pupils are equal, round, and reactive to light. Neck: Normal range of motion. Cardiovascular: Normal rate and regular rhythm. Pulmonary/Chest: Effort normal and breath sounds normal.   Musculoskeletal: Normal range of motion. Neurological: She is alert. Nursing note and vitals reviewed. ASSESSMENT and PLAN  78F who presented for post hospitalization and follow up of symptomatic bradycardia. Unclear what may have contributed to this, but it resolved without any intervention. She is feeling well today and denies any ongoing symptoms. ICD-10-CM ICD-9-CM    1. Symptomatic bradycardia R00.1 427.89 She is being followed by cardiology as well and will be fitted with a Holter Monitor. Will continue to follow. 2. Frequent urination R35.0 788.41 oxybutynin (DITROPAN) 5 mg tablet   3. Hypertension, unspecified type I10 401.9 lisinopril (PRINIVIL, ZESTRIL) 10 mg tablet   4. Moderate persistent reactive airway disease without complication J27.18 069.22 beclomethasone (QVAR) 80 mcg/actuation aero   5. Mood change ([facility]) F39 296.90    6. Chest pain due to myocardial ischemia, unspecified ischemic chest pain type (HCC) I20.9 786.50    7. 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 Symptomatic bradycardia - Primary    2. RESOLVED: Ischemic chest pain ([facility]      Cardiac work up to date has not found ischemia. She is also followed by cardiology at this time. Key CAD CHF Meds             lisinopril (PRINIVIL, ZESTRIL) 10 mg tablet  (Taking) Take 1 Tab by mouth daily. aspirin delayed-release 81 mg tablet  (Taking) Take  by mouth daily. furosemide (LASIX) 20 mg tablet  (Taking) Take  by mouth every Monday, Wednesday, Friday. Key Antihyperlipidemia Meds             atorvastatin (LIPITOR) 40 mg tablet  (Taking) Take 1 Tab by mouth once over twenty-four (24) hours for 30 days. Lab Results   Component Value Date/Time    Sodium 143 01/08/2018 01:05 AM    Potassium 3.9 01/08/2018 01:05 AM    Cholesterol, total 143 08/04/2017 11:14 AM    HDL Cholesterol 45 08/04/2017 11:14 AM    LDL, calculated 82 08/04/2017 11:14 AM    Triglyceride 78 08/04/2017 11:14 AM    INR 1.1 01/06/2017 12:14 PM    Prothrombin time 10.7 01/06/2017 12:14 PM            3. RESOLVED: Mood change ([address]      This situation has improved or resolved. Key Psychotherapeutic Meds     Patient is on no psychotherapeutic meds. Other [address] Meds             gabapentin (NEURONTIN) 600 mg tablet  (Taking) Take  by mouth three (3) times daily. Lab Results   Component Value Date/Time    Sodium 143 01/08/2018 01:05 AM    Creatinine 0.83 01/08/2018 01:05 AM    TSH 1.66 01/06/2018 01:58 AM    WBC 7.6 01/08/2018 01:05 AM    ALT (SGPT) 22 01/05/2018 01:26 PM    AST (SGOT) 18 01/05/2018 01:26 PM            4. RESOLVED: Type 2 diabetes mellitus with nephropathy (HCC)      Stable, based on history, physical exam and review of pertinent labs, studies and medications; meds reconciled; continue current treatment plan. Key Antihyperglycemic Medications             glipiZIDE (GLUCOTROL) 5 mg tablet  (Taking) Take 1 Tab by mouth once over twenty-four (24) hours. Other Key Diabetic Medications             lisinopril (PRINIVIL, ZESTRIL) 10 mg tablet  (Taking) Take 1 Tab by mouth daily.     atorvastatin (LIPITOR) 40 mg tablet (Taking) Take 1 Tab by mouth once over twenty-four (24) hours for 30 days. gabapentin (NEURONTIN) 600 mg tablet  (Taking) Take  by mouth three (3) times daily. Lab Results   Component Value Date/Time    Hemoglobin A1c 6.6 08/04/2017 11:14 AM    Glucose 113 01/08/2018 01:05 AM    Creatinine 0.83 01/08/2018 01:05 AM    Microalb/Creat ratio (ug/mg creat.) <9.8 07/31/2017 10:23 AM    Cholesterol, total 143 08/04/2017 11:14 AM    HDL Cholesterol 45 08/04/2017 11:14 AM    LDL, calculated 82 08/04/2017 11:14 AM    Triglyceride 78 08/04/2017 11:14 AM     Diabetic Foot and Eye Exam HM Status   Topic Date Due    Eye Exam  01/03/1950    Diabetic Foot Care  07/31/2018                I have discussed the diagnosis with the patient and the intended treatment plan as seen in the above orders. The patient has received an after-visit summary and questions were answered concerning future plans. Asked to return should symptoms worsen or not improve with treatment. Any pending labs and studies will be relayed to patient when they become available. Pt verbalizes understanding of plan of care and denies further questions or concerns at this time. Follow-up Disposition:  Return if symptoms worsen or fail to improve.

## 2018-01-09 NOTE — PROGRESS NOTES
Identified pt with two pt identifiers(name and ). Chief Complaint   Patient presents with    Slow Heart Rate     S/P hospitalization from 18 to 18 dx. Bradycardia        Health Maintenance Due   Topic    EYE EXAM RETINAL OR DILATED Q1     Influenza Age 5 to Adult     Pneumococcal 65+ Low/Medium Risk (2 of 2 - PPSV23)    GLAUCOMA SCREENING Q2Y     HEMOGLOBIN A1C Q6M        Wt Readings from Last 3 Encounters:   18 192 lb 9.6 oz (87.4 kg)   18 196 lb (88.9 kg)   17 196 lb 12.8 oz (89.3 kg)     Temp Readings from Last 3 Encounters:   18 97.2 °F (36.2 °C) (Temporal)   18 98.5 °F (36.9 °C)   17 96.6 °F (35.9 °C) (Temporal)     BP Readings from Last 3 Encounters:   18 128/70   18 (!) 168/95   17 135/53     Pulse Readings from Last 3 Encounters:   18 68   18 (!) 51   17 (!) 45         Learning Assessment:  :     Learning Assessment 2017   PRIMARY LEARNER Patient Patient   HIGHEST LEVEL OF EDUCATION - PRIMARY LEARNER  SOME COLLEGE -   BARRIERS PRIMARY LEARNER NONE -   PRIMARY LANGUAGE ENGLISH ENGLISH   LEARNER PREFERENCE PRIMARY READING LISTENING   ANSWERED BY patient patient   RELATIONSHIP SELF SELF       Depression Screening:  :     PHQ over the last two weeks 2018   Little interest or pleasure in doing things Not at all   Feeling down, depressed or hopeless Not at all   Total Score PHQ 2 0       Fall Risk Assessment:  :     Fall Risk Assessment, last 12 mths 2018   Able to walk? Yes   Fall in past 12 months? No       Abuse Screening:  :     Abuse Screening Questionnaire 2017 11/15/2016   Do you ever feel afraid of your partner? N N   Are you in a relationship with someone who physically or mentally threatens you? N N   Is it safe for you to go home? Y Y       Coordination of Care Questionnaire:  :     1) Have you been to an emergency room, urgent care clinic since your last visit?  Yes [facility]   Hospitalized since your last visit? yes  [facility]  1/5/18 to 1/8/18           2) Have you seen or consulted any other health care providers outside of [address] [place] [address] since your last visit? no  (Include any pap smears or colon screenings in this section.)    3) Do you have an Advance Directive on file? no  Are you interested in receiving information about Advance Directives? n.    Reviewed record in preparation for visit and have obtained necessary documentation. Medication reconciliation up to date and corrected with patient at this time.

## 2018-01-09 NOTE — PATIENT INSTRUCTIONS
Bradycardia: Care Instructions  Your Care Instructions    Bradycardia is a slow heart rate. If your heart beats too slowly, it can't supply your body with enough blood. This can make you weak or dizzy. Or it may make you pass out. Sometimes medicine can cause this problem. If this happens, your doctor may have you adjust one of your medicines. If a medicine is not the problem, your doctor may recommend a pacemaker. It is important to treat bradycardia so that you don't get more serious health problems. Your doctor will want to see you on a routine schedule to make sure that your heartbeat is normal.  Follow-up care is a key part of your treatment and safety. Be sure to make and go to all appointments, and call your doctor if you are having problems. It's also a good idea to know your test results and keep a list of the medicines you take. How can you care for yourself at home? · Take your medicines exactly as prescribed. Call your doctor if you think you are having a problem with your medicine. If your bradycardia is caused by another disease, your doctor will try to treat the disease. If it is caused by heart medicines, he or she will adjust your medicines. · Make lifestyle changes to improve your heart health. ¨ Get regular exercise. Try for 30 minutes on most days of the week. If you do not have other heart problems, you likely do not have limits on the type or level of activity that you can do. You may want to walk, swim, bike, or do other activities. Ask your doctor what level of exercise is safe for you. ¨ To control your cholesterol, avoid foods with a lot of fat, saturated fat, or sodium. Try to eat more fiber. And if your doctor says it's okay, get some exercise on most days. ¨ Do not smoke. Smoking can make your heart condition worse. If you need help quitting, talk to your doctor about stop-smoking programs and medicines. These can increase your chances of quitting for good.   ¨ Limit alcohol to 2 drinks a day for men and 1 drink a day for women. Too much alcohol can cause health problems. Pacemaker  If you have a pacemaker, you will get more specific information about it. Be sure to:  · Check your pulse as your doctor tells you. · Have your pacemaker checked as often as your doctor recommends. You may be able to do this over the phone or computer. · Avoid strong magnetic or electrical fields. These include wand metal detectors used in airports, MRIs, welding equipment, and generators. · You will be checked several times right after you get your pacemaker and when it is time to have the battery changed. Batteries last for 5 to 15 years. · You can talk on a cell phone. But keep it 6 inches away from your pacemaker. · Microwaves, TVs, radios, and kitchen and bathroom appliances won't harm you. When should you call for help? Call 911 anytime you think you may need emergency care. For example, call if:  ? · You have symptoms of sudden heart failure. These may include:  ¨ Severe trouble breathing. ¨ A fast or irregular heartbeat. ¨ Coughing up pink, foamy mucus. ¨ You passed out. ? · You have symptoms of a stroke. These may include:  ¨ Sudden numbness, tingling, weakness, or loss of movement in your face, arm, or leg, especially on only one side of your body. ¨ Sudden vision changes. ¨ Sudden trouble speaking. ¨ Sudden confusion or trouble understanding simple statements. ¨ Sudden problems with walking or balance. ¨ A sudden, severe headache that is different from past headaches. ?Call your doctor now or seek immediate medical care if:  ? · You have new or changed symptoms of heart failure, such as:  ¨ New or increased shortness of breath. ¨ New or worse swelling in your legs, ankles, or feet. ¨ Sudden weight gain, such as more than 2 to 3 pounds in a day or 5 pounds in a week.  (Your doctor may suggest a different range of weight gain.)  ¨ Feeling dizzy or lightheaded or like you may faint.  ¨ Feeling so tired or weak that you cannot do your usual activities. ¨ Not sleeping well. Shortness of breath wakes you at night. You need extra pillows to prop yourself up to breathe easier. ? Watch closely for changes in your health, and be sure to contact your doctor if:  ? · You do not get better as expected. Where can you learn more? Go to http://nikki-alicia.info/. Enter L661 in the search box to learn more about \"Bradycardia: Care Instructions. \"  Current as of: September 21, 2016  Content Version: 11.4  © 3269-1390 [employer]. Care instructions adapted under license by [employer] (which disclaims liability or warranty for this information). If you have questions about a medical condition or this instruction, always ask your healthcare professional. [address] any warranty or liability for your use of this information.

## 2018-01-09 NOTE — MR AVS SNAPSHOT
Visit Information Date & Time Provider Department Dept. Phone Encounter #  
 1/9/2018  2:15 PM Hermes Carbajal [id number] [id number] Your Appointments 2/9/2018 10:40 AM  
ESTABLISHED PATIENT with Kassidy Mcdonald MD  
[facility] ([address]) Appt Note: ANNUAL  
 [address] [facility] [place] 600 1007 [facility]  
[address] [facility] [address] [address] Upcoming Health Maintenance Date Due Pneumococcal 65+ Low/Medium Risk (2 of 2 - PPSV23) 11/15/2017 GLAUCOMA SCREENING Q2Y 1/15/2018 MEDICARE YEARLY EXAM 8/1/2018 DTaP/Tdap/Td series (2 - Td) 7/21/2026 Allergies as of 1/9/2018  Review Complete On: 1/9/2018 By: Mayte Kelley MD  
  
 Severity Noted Reaction Type Reactions Sulfa (Sulfonamide Antibiotics) High 03/11/2010    Hives, Itching Current Immunizations  Reviewed on 7/31/2017 No immunizations on file. Not reviewed this visit You Were Diagnosed With   
  
 Codes Comments Symptomatic bradycardia    -  Primary ICD-10-CM: R00.1 ICD-9-CM: 427.89 Frequent urination     ICD-10-CM: R35.0 ICD-9-CM: 788.41 Hypertension, unspecified type     ICD-10-CM: I10 
ICD-9-CM: 401.9 Moderate persistent reactive airway disease without complication     ZGV-39-AA: J45.40 ICD-9-CM: 493.90 Mood change ([facility]     ICD-10-CM: F39 
ICD-9-CM: 296.90 Chest pain due to myocardial ischemia, unspecified ischemic chest pain type ([facility]     ICD-10-CM: I20.9 ICD-9-CM: 786.50 Type 2 diabetes mellitus with nephropathy (HCC)     ICD-10-CM: E11.21 
ICD-9-CM: 250.40, 583.81 Vitals BP Pulse Temp Resp Height(growth percentile) Weight(growth percentile) 128/70 68 97.2 °F (36.2 °C) (Temporal) 20 5' 2\" (1.575 m) 192 lb 9.6 oz (87.4 kg) SpO2 BMI OB Status Smoking Status 96% 35.23 kg/m2 Hysterectomy Former Smoker BMI and BSA Data Body Mass Index Body Surface Area  
 35.23 kg/m 2 1.96 m 2 Preferred Pharmacy Pharmacy Name Phone 1941 [facility], [address] [facility] [phone] Your Updated Medication List  
  
   
This list is accurate as of: 1/9/18  3:26 PM.  Always use your most recent med list.  
  
  
  
  
 acetaminophen 650 mg Tber Commonly known as:  TYLENOL Take 650 mg by mouth every six (6) hours as needed for Pain. alcohol swabs Padm Commonly known as:  BD Single Use Swabs Regular  
100 Each by Apply Externally route daily. alendronate 70 mg tablet Commonly known as:  FOSAMAX Take 1 Tab by mouth every seven (7) days for 90 days. aspirin delayed-release 81 mg tablet Take  by mouth daily. atorvastatin 40 mg tablet Commonly known as:  LIPITOR Take 1 Tab by mouth once over twenty-four (24) hours for 30 days. beclomethasone 80 mcg/actuation [employer] Commonly known as:  QVAR Take 1 Puff by inhalation two (2) times a day for 30 days. Calcium Carbonate-Vit D3-Min 600 mg calcium- 400 unit Tab Take 1 pill 2 x daily  
  
 furosemide 20 mg tablet Commonly known as:  LASIX Take  by mouth every Monday, Wednesday, Friday. gabapentin 600 mg tablet Commonly known as:  NEURONTIN Take  by mouth three (3) times daily. glipiZIDE 5 mg tablet Commonly known as:  Leonetta Neeraj Take 1 Tab by mouth once over twenty-four (24) hours. glucose blood VI test strips strip Commonly known as:  blood glucose test  
Accu-chek alexei plus test strips Test blood sugar once daily E11.9  
  
 ketoconazole 2 % topical cream  
Commonly known as:  NIZORAL Apply  to affected area daily. Lancets Misc  
accu-chek softclix lancets Check blood sugar daily E11.9  
  
 latanoprost 0.005 % ophthalmic solution Commonly known as:  Russell Maya Administer 1 Drop to both eyes nightly. lisinopril 10 mg tablet Commonly known as:  Hayley Dyson Take 1 Tab by mouth daily. meclizine 12.5 mg tablet Commonly known as:  ANTIVERT Take 1 Tab by mouth three (3) times daily as needed for up to 10 days. oxybutynin 5 mg tablet Commonly known as:  AHNNZJWJ Take 1 Tab by mouth three (3) times daily for 270 days. Prescriptions Sent to Pharmacy Refills  
 oxybutynin (DITROPAN) 5 mg tablet 1 Sig: Take 1 Tab by mouth three (3) times daily for 270 days. Class: Normal  
 Pharmacy: [facility] [address] Ph #: [phone] Route: Oral  
 lisinopril (PRINIVIL, ZESTRIL) 10 mg tablet 0 Sig: Take 1 Tab by mouth daily. Class: Normal  
 Pharmacy: [facility] [address] Ph #: [phone] Route: Oral  
 beclomethasone (QVAR) 80 mcg/actuation aero 11 Sig: Take 1 Puff by inhalation two (2) times a day for 30 days. Class: Normal  
 [facility] [address] Ph #: [phone] Route: Inhalation Patient Instructions Bradycardia: Care Instructions Your Care Instructions Bradycardia is a slow heart rate. If your heart beats too slowly, it can't supply your body with enough blood. This can make you weak or dizzy. Or it may make you pass out. Sometimes medicine can cause this problem. If this happens, your doctor may have you adjust one of your medicines. If a medicine is not the problem, your doctor may recommend a pacemaker. It is important to treat bradycardia so that you don't get more serious health problems. Your doctor will want to see you on a routine schedule to make sure that your heartbeat is normal. 
Follow-up care is a key part of your treatment and safety.  Be sure to make and go to all appointments, and call your doctor if you are having problems. It's also a good idea to know your test results and keep a list of the medicines you take. How can you care for yourself at home? · Take your medicines exactly as prescribed. Call your doctor if you think you are having a problem with your medicine. If your bradycardia is caused by another disease, your doctor will try to treat the disease. If it is caused by heart medicines, he or she will adjust your medicines. · Make lifestyle changes to improve your heart health. ¨ Get regular exercise. Try for 30 minutes on most days of the week. If you do not have other heart problems, you likely do not have limits on the type or level of activity that you can do. You may want to walk, swim, bike, or do other activities. Ask your doctor what level of exercise is safe for you. ¨ To control your cholesterol, avoid foods with a lot of fat, saturated fat, or sodium. Try to eat more fiber. And if your doctor says it's okay, get some exercise on most days. ¨ Do not smoke. Smoking can make your heart condition worse. If you need help quitting, talk to your doctor about stop-smoking programs and medicines. These can increase your chances of quitting for good. ¨ Limit alcohol to 2 drinks a day for men and 1 drink a day for women. Too much alcohol can cause health problems. Pacemaker If you have a pacemaker, you will get more specific information about it. Be sure to: · Check your pulse as your doctor tells you. · Have your pacemaker checked as often as your doctor recommends. You may be able to do this over the phone or computer. · Avoid strong magnetic or electrical fields. These include wand metal detectors used in airports, MRIs, welding equipment, and generators. · You will be checked several times right after you get your pacemaker and when it is time to have the battery changed. Batteries last for 5 to 15 years. · You can talk on a cell phone. But keep it 6 inches away from your pacemaker. · Microwaves, TVs, radios, and kitchen and bathroom appliances won't harm you. When should you call for help? Call 911 anytime you think you may need emergency care. For example, call if: 
? · You have symptoms of sudden heart failure. These may include: ¨ Severe trouble breathing. ¨ A fast or irregular heartbeat. ¨ Coughing up pink, foamy mucus. ¨ You passed out. ? · You have symptoms of a stroke. These may include: 
¨ Sudden numbness, tingling, weakness, or loss of movement in your face, arm, or leg, especially on only one side of your body. ¨ Sudden vision changes. ¨ Sudden trouble speaking. ¨ Sudden confusion or trouble understanding simple statements. ¨ Sudden problems with walking or balance. ¨ A sudden, severe headache that is different from past headaches. ?Call your doctor now or seek immediate medical care if: 
? · You have new or changed symptoms of heart failure, such as: ¨ New or increased shortness of breath. ¨ New or worse swelling in your legs, ankles, or feet. ¨ Sudden weight gain, such as more than 2 to 3 pounds in a day or 5 pounds in a week. (Your doctor may suggest a different range of weight gain.) ¨ Feeling dizzy or lightheaded or like you may faint. ¨ Feeling so tired or weak that you cannot do your usual activities. ¨ Not sleeping well. Shortness of breath wakes you at night. You need extra pillows to prop yourself up to breathe easier. ? Watch closely for changes in your health, and be sure to contact your doctor if: 
? · You do not get better as expected. Where can you learn more? Go to http://nikki-alicia.info/. Enter D914 in the search box to learn more about \"Bradycardia: Care Instructions. \" Current as of: September 21, 2016 Content Version: 11.4 © 1308-2660 Flig[employer].  Care instructions adapted under license by [facility] (which disclaims liability or warranty for this information). If you have questions about a medical condition or this instruction, always ask your healthcare professional. [address] any warranty or liability for your use of this information. Introducing [facility] SERVICES! Dear Jefm Epley: Thank you for requesting a CloudLink Tech account. Our records indicate that you already have an active CloudLink Tech account. You can access your account anytime at [email or web address] Did you know that you can access your hospital and ER discharge instructions at any time in CloudLink Tech? You can also review all of your test results from your hospital stay or ER visit. Additional Information If you have questions, please visit the Frequently Asked Questions section of the CloudLink Tech website at [email or web address] Remember, CloudLink Tech is NOT to be used for urgent needs. For medical emergencies, dial 911. Now available from your iPhone and Android! Please provide this summary of care documentation to your next provider. Your primary care clinician is listed as [address]. If you have any questions after today's visit, please call [phone].

## 2018-01-16 ENCOUNTER — TELEPHONE (OUTPATIENT)
Dept: CARDIOLOGY CLINIC | Age: 78
End: 2018-01-16

## 2018-01-30 ENCOUNTER — TELEPHONE (OUTPATIENT)
Dept: FAMILY MEDICINE CLINIC | Age: 78
End: 2018-01-30

## 2018-01-30 NOTE — TELEPHONE ENCOUNTER
Both she and her daughter Costa Ovalles are sick again with what they had back in November. Per Daughter she stated that Dr. Sally Wisdom told her if they get this again to just call rather than coming in. Sore throat, coughing, congestion, chills and fever again. Daughter can be reached at [phone]. If medication is going to be sent over she would like for it to go to Tigre Brizuela at [facility].

## 2018-01-30 NOTE — TELEPHONE ENCOUNTER
Barrera Ramirez, can you please call pt and her daughter Chester Melissa to advise and assist with getting them scheduled for evaluation.

## 2018-02-09 ENCOUNTER — OFFICE VISIT (OUTPATIENT)
Dept: CARDIOLOGY CLINIC | Age: 78
End: 2018-02-09

## 2018-02-09 VITALS
SYSTOLIC BLOOD PRESSURE: 140 MMHG | DIASTOLIC BLOOD PRESSURE: 86 MMHG | OXYGEN SATURATION: 94 % | HEART RATE: 96 BPM | HEIGHT: 62 IN | BODY MASS INDEX: 36.07 KG/M2 | WEIGHT: 196 LBS | RESPIRATION RATE: 20 BRPM

## 2018-02-09 DIAGNOSIS — I10 ESSENTIAL HYPERTENSION: Primary | ICD-10-CM

## 2018-02-09 DIAGNOSIS — G47.33 OSA ON CPAP: ICD-10-CM

## 2018-02-09 DIAGNOSIS — Z99.89 OSA ON CPAP: ICD-10-CM

## 2018-02-09 DIAGNOSIS — I10 HYPERTENSION, UNSPECIFIED TYPE: ICD-10-CM

## 2018-02-09 RX ORDER — ATORVASTATIN CALCIUM 40 MG/1
TABLET, FILM COATED ORAL DAILY
COMMUNITY
End: 2018-07-19 | Stop reason: SDUPTHER

## 2018-02-09 RX ORDER — LISINOPRIL 10 MG/1
10 TABLET ORAL DAILY
Qty: 30 TAB | Refills: 6 | Status: SHIPPED | OUTPATIENT
Start: 2018-02-09 | End: 2018-03-12 | Stop reason: SDUPTHER

## 2018-02-09 RX ORDER — TIZANIDINE HYDROCHLORIDE 2 MG/1
2 CAPSULE, GELATIN COATED ORAL 3 TIMES DAILY
COMMUNITY
End: 2018-08-30 | Stop reason: SDUPTHER

## 2018-02-09 RX ORDER — MECLIZINE HCL 12.5 MG 12.5 MG/1
TABLET ORAL
COMMUNITY
End: 2018-08-30

## 2018-02-09 RX ORDER — FLUTICASONE FUROATE AND VILANTEROL 100; 25 UG/1; UG/1
1 POWDER RESPIRATORY (INHALATION) DAILY
COMMUNITY
End: 2020-04-22

## 2018-02-09 NOTE — MR AVS SNAPSHOT
[address] [facility] 600 1007 [facility] 
[phone] Patient: Oj Serna MRN: [id number] [id number] Visit Information Date & Time Provider Department Dept. Phone Encounter #  
 2/9/2018  3:00 PM Terence Alan MD [facility] [place] [phone] [id number] Your Appointments 4/9/2018 10:30 AM  
ROUTINE CARE with Brandy Guzmán MD  
[address] [place] [address] [facility] Appt Note: 3 month follow up; 3 month follow/caremore form [address] [address]  
[phone]  
  
   
 [address] [place] [address]  
  
    
 8/10/2018 10:00 AM  
ESTABLISHED PATIENT with Terence Alan MD  
[facility] ([facility]) Appt Note: 6 mo fup  
 [address] [facility] 600 1007 [facility]  
[address] Upcoming Health Maintenance Date Due  
 EYE EXAM RETINAL OR DILATED Q1 1/3/1950 Pneumococcal 65+ Low/Medium Risk (2 of 2 - PPSV23) 11/15/2017 GLAUCOMA SCREENING Q2Y 1/15/2018 HEMOGLOBIN A1C Q6M 2/4/2018 FOOT EXAM Q1 7/31/2018 MICROALBUMIN Q1 7/31/2018 MEDICARE YEARLY EXAM 8/1/2018 LIPID PANEL Q1 8/4/2018 DTaP/Tdap/Td series (2 - Td) 7/21/2026 Allergies as of 2/9/2018  Review Complete On: 2/9/2018 By: Nuris Jennings LPN Severity Noted Reaction Type Reactions Sulfa (Sulfonamide Antibiotics) High 03/11/2010    Hives, Itching Current Immunizations  Reviewed on 7/31/2017 No immunizations on file. Not reviewed this visit You Were Diagnosed With   
  
 Codes Comments Essential hypertension    -  Primary ICD-10-CM: I10 
ICD-9-CM: 401.9 DANIELA on CPAP     ICD-10-CM: G47.33, Z99.89 ICD-9-CM: 327.23, V46.8  Hypertension, unspecified type     ICD-10-CM: I10 
 ICD-9-CM: 401.9 Vitals BP Pulse Resp Height(growth percentile) Weight(growth percentile) SpO2  
 140/86 (BP 1 Location: Left arm, BP Patient Position: Sitting) 96 20 5' 2\" (1.575 m) 196 lb (88.9 kg) 94% BMI OB Status Smoking Status 35.85 kg/m2 Hysterectomy Former Smoker Vitals History BMI and BSA Data Body Mass Index Body Surface Area  
 35.85 kg/m 2 1.97 m 2 Preferred Pharmacy Pharmacy Name Phone 1941 [facility], [address] [facility] [phone] Your Updated Medication List  
  
   
This list is accurate as of: 2/9/18  3:01 PM.  Always use your most recent med list.  
  
  
  
  
 acetaminophen 650 mg Tber Commonly known as:  TYLENOL Take 650 mg by mouth every six (6) hours as needed for Pain. alcohol swabs Padm Commonly known as:  BD Single Use Swabs Regular  
100 Each by Apply Externally route daily. alendronate 70 mg tablet Commonly known as:  FOSAMAX Take 1 Tab by mouth every seven (7) days for 90 days. aspirin delayed-release 81 mg tablet Take  by mouth daily. * atorvastatin 40 mg tablet Commonly known as:  LIPITOR Take  by mouth daily. * atorvastatin 40 mg tablet Commonly known as:  LIPITOR Take 1 Tab by mouth once over twenty-four (24) hours for 30 days. BREO ELLIPTA 100-25 mcg/dose inhaler Generic drug:  fluticasone-vilanterol Take 1 Puff by inhalation daily. Calcium Carbonate-Vit D3-Min 600 mg calcium- 400 unit Tab Take 1 pill 2 x daily  
  
 furosemide 20 mg tablet Commonly known as:  LASIX Take  by mouth every Monday, Wednesday, Friday. gabapentin 600 mg tablet Commonly known as:  NEURONTIN Take  by mouth three (3) times daily. glipiZIDE 5 mg tablet Commonly known as:  Louana Kimbolton Take 1 Tab by mouth once over twenty-four (24) hours. glucose blood VI test strips strip Commonly known as:  blood glucose test  
Accu-chek alexei plus test strips Test blood sugar once daily E11.9  
  
 ketoconazole 2 % topical cream  
Commonly known as:  NIZORAL Apply  to affected area daily. Lancets Misc  
accu-chek softclix lancets Check blood sugar daily E11.9  
  
 latanoprost 0.005 % ophthalmic solution Commonly known as:  Alex Jacobsen Administer 1 Drop to both eyes nightly. lisinopril 10 mg tablet Commonly known as:  Mita Likens Take 1 Tab by mouth daily. meclizine 12.5 mg tablet Commonly known as:  ANTIVERT Take  by mouth three (3) times daily as needed. oxybutynin 5 mg tablet Commonly known as:  DJVVBEDX Take 1 Tab by mouth three (3) times daily for 270 days. ZANAFLEX 2 mg capsule Generic drug:  tiZANidine Take 2 mg by mouth three (3) times daily. * Notice: This list has 2 medication(s) that are the same as other medications prescribed for you. Read the directions carefully, and ask your doctor or other care provider to review them with you. Prescriptions Sent to Pharmacy Refills  
 lisinopril (PRINIVIL, ZESTRIL) 10 mg tablet 6 Sig: Take 1 Tab by mouth daily. Class: Normal  
 Pharmacy: [facility] [address], [address] Ph #: [phone] Route: Oral  
  
We Performed the Following METABOLIC PANEL, BASIC [23496 CPT(R)] Introducing [facility] SERVICES! Dear Eileen Jansen: Thank you for requesting a MedAware account. Our records indicate that you already have an active MedAware account. You can access your account anytime at [email or web address] Did you know that you can access your hospital and ER discharge instructions at any time in MedAware? You can also review all of your test results from your hospital stay or ER visit. Additional Information If you have questions, please visit the Frequently Asked Questions section of the SPHARES website at [email or web address] Remember, SPHARES is NOT to be used for urgent needs. For medical emergencies, dial 911. Now available from your iPhone and Android! Please provide this summary of care documentation to your next provider. Your primary care clinician is listed as [address]. If you have any questions after today's visit, please call [phone].

## 2018-02-09 NOTE — PROGRESS NOTES
Rufino Davis MD    [address] [facility], [address] [facility]    Office [phone],NML [phone]  Pager [phone]    Serena Ackerman is a 66 y.o. female is here for f/u visit . Primary care physician:  Beau Fortune MD    Patient Active Problem List   Diagnosis Code    GERD (gastroesophageal reflux disease) K21.9    Arthritis of left knee M17.12    Pure hypercholesterolemia E78.00    Primary open angle glaucoma H40.1190    Osteoporosis M81.0    DANIELA on CPAP G47.33, Z99.89    Symptomatic bradycardia R00.1    Type 2 diabetes mellitus with complication, without long-term current use of insulin ([facility] E11.8    Type 2 diabetes mellitus with diabetic neuropathy ([facility] E11.40       Chief complaint:  Chief Complaint   Patient presents with   [address] [place] [address]  f/u  1-8-18       Dear Dr Helena Fried,    I had the pleasure of seeing Ms. Mcnair in the office today. Assessment:  Recent hospital admission for dizziness/bradycardia. 1/8/2018  HLD  DM  Former smoker. However has been exposed to significant secondhand smoking        Plan:   E cardio event monitor-no significant bradycardia/arrhythmia. Cont meds. Lisinopril refilled. Wearing CPAP machine. Lipids per PCP  Aggressive cardiovascular risk factor modification. Follow-up in 6 mths            Lab Results   Component Value Date/Time    Cholesterol, total 143 08/04/2017 11:14 AM    HDL Cholesterol 45 08/04/2017 11:14 AM    LDL, calculated 82 08/04/2017 11:14 AM    VLDL, calculated 16 08/04/2017 11:14 AM    Triglyceride 78 08/04/2017 11:14 AM    CHOL/HDL Ratio 4.4 09/02/2010 03:17 PM         Patient understands the plan. All questions were answered to the patient's satisfaction.     Medication Side Effects and Warnings were discussed with patient: yes  Patient Labs were reviewed and or requested:  yes  Patient Past Records were reviewed and or requested: yes    I appreciate the opportunity to be involved in . See note below for details. Please do not hesitate to contact us with questions or concerns. Ann Jimenez MD    Cardiac Testing/ Procedures: A. Cardiac Cath/PCI: 2/7/17 R radial approach  Attempted R brachial vein - unable to get access; Switched to R Femoral  vein    R SCV angiogram  R Brachial vein venogram    L Main: Nml    LAD: Mid 30%; Med; D1 - MLI    LCflex: Med; MLI; OM1 - small to med; MLI    RCA: Med to large; Dominant; Nml    LVEF: 60%    No significant gradient across aortic valve. PCI: none    RHC findings:    RAPm8= mmHg  RVSP= 42 mmHg  PAPm= 16 mmHg  PCWPm= 9 mmHg    Specimens Removed : None    B.ECHO/CHIP: 1/6/18-normal EF.  11/30/16 Left ventricle: Systolic function was normal. Ejection fraction was  estimated to be 68 % by Lira's biplane technique. There were no  regional wall motion abnormalities. Doppler parameters were consistent  with abnormal left ventricular relaxation (grade 1 diastolic dysfunction). Mitral valve: There was mild regurgitation. Tricuspid valve: There was near moderate regurgitation. Pulmonary artery  systolic pressure: 33 mmHg. C.StressNuclear/Stress ECHO/Stress test: 11/2016 - Nml Elba nuc stress test    D. Vascular: 12/6/16 - [facility] resting NICKOLAS    E. EP: E cardio event monitor 1/2018-heart rate 59-83. No events. F. Miscellaneous:    Subjective:  Hakeem Ayala is a 66 y.o. female who returns for follow up visit. No dizziness since DC from hosp  No CP/dyspnea  Wearing CPAP      ROS:  (bold if positive, if negative)             Medications before admission:    Current Outpatient Prescriptions   Medication Sig Dispense    meclizine (ANTIVERT) 12.5 mg tablet Take  by mouth three (3) times daily as needed.  atorvastatin (LIPITOR) 40 mg tablet Take  by mouth daily.  fluticasone-vilanterol (BREO ELLIPTA) 100-25 mcg/dose inhaler Take 1 Puff by inhalation daily.      tiZANidine (ZANAFLEX) 2 mg capsule Take 2 mg by mouth three (3) times daily.  oxybutynin (DITROPAN) 5 mg tablet Take 1 Tab by mouth three (3) times daily for 270 days. 270 Tab    lisinopril (PRINIVIL, ZESTRIL) 10 mg tablet Take 1 Tab by mouth daily. 30 Tab    alendronate (FOSAMAX) 70 mg tablet Take 1 Tab by mouth every seven (7) days for 90 days. 12 Tab    ketoconazole (NIZORAL) 2 % topical cream Apply  to affected area daily. 30 g    glipiZIDE (GLUCOTROL) 5 mg tablet Take 1 Tab by mouth once over twenty-four (24) hours. 90 Tab    Calcium Carbonate-Vit D3-Min 600 mg calcium- 400 unit tab Take 1 pill 2 x daily 60 Tab    aspirin delayed-release 81 mg tablet Take  by mouth daily.  acetaminophen (TYLENOL) 650 mg CR tablet Take 650 mg by mouth every six (6) hours as needed for Pain.  gabapentin (NEURONTIN) 600 mg tablet Take  by mouth three (3) times daily.  latanoprost (XALATAN) 0.005 % ophthalmic solution Administer 1 Drop to both eyes nightly.  furosemide (LASIX) 20 mg tablet Take  by mouth every Monday, Wednesday, Friday.  atorvastatin (LIPITOR) 40 mg tablet Take 1 Tab by mouth once over twenty-four (24) hours for 30 days. 30 Tab    glucose blood VI test strips (BLOOD GLUCOSE TEST) strip Accu-chek alexei plus test strips Test blood sugar once daily E11.9 100 Strip    Lancets misc accu-chek softclix lancets Check blood sugar daily E11.9 100 Each    alcohol swabs (BD SINGLE USE SWABS REGULAR) padm 100 Each by Apply Externally route daily. 100 Pad     No current facility-administered medications for this visit.         Physical Exam:  Visit Vitals    /86 (BP 1 Location: Left arm, BP Patient Position: Sitting)    Pulse 96    Resp 20    Ht 5' 2\" (1.575 m)    Wt 196 lb (88.9 kg)    SpO2 94%    BMI 35.85 kg/m2          Gen: Well-developed, well-nourished, in no acute distress  Neck: Supple,No JVD, No Carotid Bruit,   Resp: No accessory muscle use, Clear breath sounds, No rales or rhonchi  Card: Regular Rate,Rythm,Normal S1, S2, No murmurs, rubs or gallop. No thrills.    Abd:  Soft, non-tender, non-distended,BS+,   MSK: No cyanosis  Skin: No rashes    Neuro: moving all four extremities , follows commands appropriately  Psych:  Good insight, oriented to person, place , alert, Nml Affect  LE: No edema    EKG:       LABS:        Lab Results   Component Value Date/Time    WBC 7.6 01/08/2018 01:05 AM    HGB 11.8 01/08/2018 01:05 AM    HCT 36.5 01/08/2018 01:05 AM    PLATELET 778 05/10/4368 01:05 AM    MCV 86.5 01/08/2018 01:05 AM     Lab Results   Component Value Date/Time    Sodium 143 01/08/2018 01:05 AM    Potassium 3.9 01/08/2018 01:05 AM    Chloride 108 01/08/2018 01:05 AM    CO2 26 01/08/2018 01:05 AM    Anion gap 9 01/08/2018 01:05 AM    Glucose 113 (H) 01/08/2018 01:05 AM    BUN 10 01/08/2018 01:05 AM    Creatinine 0.83 01/08/2018 01:05 AM    BUN/Creatinine ratio 12 01/08/2018 01:05 AM    GFR est AA >60 01/08/2018 01:05 AM    GFR est non-AA >60 01/08/2018 01:05 AM    Calcium 9.1 01/08/2018 01:05 AM       No results found for: APTT  Lab Results   Component Value Date/Time    INR 1.1 01/06/2017 12:14 PM    INR 1.1 06/16/2014 02:25 PM    Prothrombin time 10.7 01/06/2017 12:14 PM    Prothrombin time 11.2 06/16/2014 02:25 PM     No components found for: Ronit Cabrera MD

## 2018-02-10 LAB
BUN SERPL-MCNC: 13 MG/DL (ref 8–27)
BUN/CREAT SERPL: 16 (ref 12–28)
CALCIUM SERPL-MCNC: 9.8 MG/DL (ref 8.7–10.3)
CHLORIDE SERPL-SCNC: 102 MMOL/L (ref 96–106)
CO2 SERPL-SCNC: 24 MMOL/L (ref 18–29)
CREAT SERPL-MCNC: 0.82 MG/DL (ref 0.57–1)
GFR SERPLBLD CREATININE-BSD FMLA CKD-EPI: 69 ML/MIN/1.73
GFR SERPLBLD CREATININE-BSD FMLA CKD-EPI: 79 ML/MIN/1.73
GLUCOSE SERPL-MCNC: 81 MG/DL (ref 65–99)
POTASSIUM SERPL-SCNC: 4.3 MMOL/L (ref 3.5–5.2)
SODIUM SERPL-SCNC: 142 MMOL/L (ref 134–144)

## 2018-03-12 DIAGNOSIS — I10 HYPERTENSION, UNSPECIFIED TYPE: ICD-10-CM

## 2018-03-12 RX ORDER — LISINOPRIL 10 MG/1
10 TABLET ORAL DAILY
Qty: 90 TAB | Refills: 3 | Status: SHIPPED | OUTPATIENT
Start: 2018-03-12 | End: 2019-04-15 | Stop reason: SDUPTHER

## 2018-04-02 RX ORDER — GLIPIZIDE 5 MG/1
TABLET ORAL
Qty: 90 TAB | Refills: 0 | Status: SHIPPED | OUTPATIENT
Start: 2018-04-02 | End: 2018-07-03 | Stop reason: SDUPTHER

## 2018-06-14 ENCOUNTER — OFFICE VISIT (OUTPATIENT)
Dept: FAMILY MEDICINE CLINIC | Age: 78
End: 2018-06-14

## 2018-06-14 VITALS
HEIGHT: 62 IN | HEART RATE: 50 BPM | OXYGEN SATURATION: 95 % | SYSTOLIC BLOOD PRESSURE: 133 MMHG | TEMPERATURE: 96.7 F | DIASTOLIC BLOOD PRESSURE: 68 MMHG | BODY MASS INDEX: 36.44 KG/M2 | WEIGHT: 198 LBS | RESPIRATION RATE: 18 BRPM

## 2018-06-14 DIAGNOSIS — E11.8 TYPE 2 DIABETES MELLITUS WITH COMPLICATION, WITHOUT LONG-TERM CURRENT USE OF INSULIN (HCC): Primary | ICD-10-CM

## 2018-06-14 DIAGNOSIS — M54.31 BILATERAL SCIATICA: ICD-10-CM

## 2018-06-14 DIAGNOSIS — E55.9 VITAMIN D DEFICIENCY: ICD-10-CM

## 2018-06-14 DIAGNOSIS — R11.0 NAUSEA: ICD-10-CM

## 2018-06-14 DIAGNOSIS — E78.00 PURE HYPERCHOLESTEROLEMIA: ICD-10-CM

## 2018-06-14 DIAGNOSIS — K21.9 GASTROESOPHAGEAL REFLUX DISEASE, ESOPHAGITIS PRESENCE NOT SPECIFIED: ICD-10-CM

## 2018-06-14 DIAGNOSIS — M54.32 BILATERAL SCIATICA: ICD-10-CM

## 2018-06-14 PROBLEM — E66.01 SEVERE OBESITY (BMI 35.0-39.9): Status: ACTIVE | Noted: 2018-06-14

## 2018-06-14 RX ORDER — ONDANSETRON 8 MG/1
8 TABLET, ORALLY DISINTEGRATING ORAL
Qty: 12 TAB | Refills: 1 | Status: SHIPPED | OUTPATIENT
Start: 2018-06-14 | End: 2018-08-30

## 2018-06-14 RX ORDER — NYSTATIN 100000 [USP'U]/G
POWDER TOPICAL
COMMUNITY
Start: 2018-03-31 | End: 2018-12-27 | Stop reason: SDUPTHER

## 2018-06-14 RX ORDER — PHENOL/SODIUM PHENOLATE
20 AEROSOL, SPRAY (ML) MUCOUS MEMBRANE DAILY
Qty: 30 TAB | Refills: 5 | Status: SHIPPED | OUTPATIENT
Start: 2018-06-14 | End: 2018-07-14

## 2018-06-14 NOTE — PROGRESS NOTES
This is the Subsequent Medicare Annual Wellness Exam, performed 12 months or more after the Initial AWV or the last Subsequent AWV    I have reviewed the patient's medical history in detail and updated the computerized patient record. History     Past Medical History:   Diagnosis Date    Arthritis     Diabetes ([address]     GERD (gastroesophageal reflux disease)     Glaucoma     High cholesterol     Ill-defined condition     EDEMA, LOWER LIMS, URINARY INCONTINENCE    Urge incontinence       Past Surgical History:   Procedure Laterality Date    BREAST SURGERY PROCEDURE UNLISTED      LT BREAST CYST BENIGN    DRAINAGE OF DEEP RECTAL ABSCESS      ENDOSCOPY, COLON, DIAGNOSTIC      2008    HX BREAST BIOPSY Left 1970s    Surgical    HX GI      RECTAL ABSCESS    HX HEART CATHETERIZATION  01/06/2017    HX HEENT      NATHAN CATARACTS    HX HYSTERECTOMY      HX KNEE REPLACEMENT      left     Current Outpatient Prescriptions   Medication Sig Dispense Refill    glipiZIDE (GLUCOTROL) 5 mg tablet TAKE ONE TABLET BY MOUTH ONCE OVER 24 HOURS 90 Tab 0    lisinopril (PRINIVIL, ZESTRIL) 10 mg tablet Take 1 Tab by mouth daily. 90 Tab 3    meclizine (ANTIVERT) 12.5 mg tablet Take  by mouth three (3) times daily as needed.  atorvastatin (LIPITOR) 40 mg tablet Take  by mouth daily.  fluticasone-vilanterol (BREO ELLIPTA) 100-25 mcg/dose inhaler Take 1 Puff by inhalation daily.  tiZANidine (ZANAFLEX) 2 mg capsule Take 2 mg by mouth three (3) times daily.  oxybutynin (DITROPAN) 5 mg tablet Take 1 Tab by mouth three (3) times daily for 270 days. 270 Tab 1    ketoconazole (NIZORAL) 2 % topical cream Apply  to affected area daily. 30 g 3    Calcium Carbonate-Vit D3-Min 600 mg calcium- 400 unit tab Take 1 pill 2 x daily 60 Tab 5    aspirin delayed-release 81 mg tablet Take  by mouth daily.       glucose blood VI test strips (BLOOD GLUCOSE TEST) strip Accu-chek alexei plus test strips Test blood sugar once daily E11.9 100 Strip 1    Lancets misc accu-chek softclix lancets Check blood sugar daily E11.9 100 Each 1    acetaminophen (TYLENOL) 650 mg CR tablet Take 650 mg by mouth every six (6) hours as needed for Pain.  gabapentin (NEURONTIN) 600 mg tablet Take 600 mg by mouth three (3) times daily as needed.  latanoprost (XALATAN) 0.005 % ophthalmic solution Administer 1 Drop to both eyes nightly.  furosemide (LASIX) 20 mg tablet Take  by mouth every Monday, Wednesday, Friday.  NYSTOP powder       alendronate (FOSAMAX) 70 mg tablet Take 1 Tab by mouth every seven (7) days for 90 days. 12 Tab 1     Allergies   Allergen Reactions    Sulfa (Sulfonamide Antibiotics) Hives and Itching     Family History   Problem Relation Age of Onset    Heart Disease Mother     No Known Problems Father      Social History   Substance Use Topics    Smoking status: Former Smoker     Quit date: 3/11/1990    Smokeless tobacco: Never Used    Alcohol use 0.0 oz/week     0 Standard drinks or equivalent per week      Comment: occasionally     Patient Active Problem List   Diagnosis Code    GERD (gastroesophageal reflux disease) K21.9    Arthritis of left knee M17.12    Pure hypercholesterolemia E78.00    Primary open angle glaucoma H40.1190    Osteoporosis M81.0    DANIELA on CPAP G47.33, Z99.89    Symptomatic bradycardia R00.1    Type 2 diabetes mellitus with complication, without long-term current use of insulin ([facility]) E11.8    Type 2 diabetes mellitus with diabetic neuropathy ([facility]) E11.40    Severe obesity (BMI 35.0-39.9) ([facility]) E66.01       Depression Risk Factor Screening:     PHQ over the last two weeks 2018   Little interest or pleasure in doing things Not at all   Feeling down, depressed or hopeless Not at all   Total Score PHQ 2 0     Alcohol Risk Factor Screening:   {screenin}    Functional Ability and Level of Safety:   Hearing Loss  {Desc; hearing loss:41641::\"Hearing is good. \"}    Activities of Daily Living  The home contains: {AWV Home EYKGEH:55353::\"YA safety equipment. \"}  {Functional ADL's:03176::\"Patient does total self care\"}    Fall Risk  Fall Risk Assessment, last 12 mths 2018   Able to walk? Yes   Fall in past 12 months? No       Abuse Screen  {Abuse Screen:::\"Patient is not abused\"}    Cognitive Screening   Evaluation of Cognitive Function:  Has your family/caregiver stated any concerns about your memory: {AWV no/yes:15380::\"no\"}  {Cognitive Screenin::\"Normal\"}    Patient Care Team   Patient Care Team:  Brooklynn Grove MD as PCP - General (Pediatrics)  Jaci Velez MD as Physician (Cardiology)  Beatriz Stauffer RN as Ambulatory Care Navigator    Assessment/Plan   Education and counseling provided:  {Education List, choose as appropriate:::\"Are appropriate based on today's review and evaluation\"}    {There are no diagnoses linked to this encounter.  (Refresh or delete this SmartLink)}    Health Maintenance Due   Topic Date Due    EYE EXAM RETINAL OR DILATED Q1  1950    Pneumococcal 65+ Low/Medium Risk (2 of 2 - PPSV23) 11/15/2017    GLAUCOMA SCREENING Q2Y  01/15/2018    HEMOGLOBIN A1C Q6M  2018

## 2018-06-14 NOTE — MR AVS SNAPSHOT
[address] [facility] 
 
 
 [address] [place] [id number] Patient: Baylee Saucedo MRN: [id number] Visit Information Date & Time Provider Department Dept. Phone Encounter #  
 6/14/2018 10:30 AM Abdiel Bergman Hermes Cheko [phone] [id number] Follow-up Instructions Return in about 6 months (around 12/14/2018), or if symptoms worsen or fail to improve. Your Appointments 8/20/2018  2:00 PM  
ESTABLISHED PATIENT with Denny Fitzgerald MD  
[facility] ([facility]) Appt Note: 6 mo fup; 6 mo fup; 6 mo fup  
 [address] [address] [place] [phone] [facility]  
[address] [place] [phone] [address] Upcoming Health Maintenance Date Due  
 EYE EXAM RETINAL OR DILATED Q1 1/3/1950 Pneumococcal 65+ Low/Medium Risk (2 of 2 - PPSV23) 11/15/2017 GLAUCOMA SCREENING Q2Y 1/15/2018 FOOT EXAM Q1 7/31/2018 MICROALBUMIN Q1 7/31/2018 MEDICARE YEARLY EXAM 8/1/2018 Influenza Age 5 to Adult 8/1/2018 LIPID PANEL Q1 8/4/2018 HEMOGLOBIN A1C Q6M 12/14/2018 DTaP/Tdap/Td series (2 - Td) 7/21/2026 Allergies as of 6/14/2018  Review Complete On: 6/14/2018 By: Abdiel Bergman MD  
  
 Severity Noted Reaction Type Reactions Sulfa (Sulfonamide Antibiotics) High 03/11/2010    Hives, Itching Current Immunizations  Reviewed on 6/14/2018 No immunizations on file. Reviewed by Abdiel Bergman MD on 6/14/2018 at 11:32 AM  
You Were Diagnosed With   
  
 Codes Comments Type 2 diabetes mellitus with complication, without long-term current use of insulin (HCC)    -  Primary ICD-10-CM: E11.8 ICD-9-CM: 250.90 Pure hypercholesterolemia     ICD-10-CM: E78.00 ICD-9-CM: 272.0 Vitamin D deficiency     ICD-10-CM: E55.9 ICD-9-CM: 268.9 Nausea     ICD-10-CM: R11.0 ICD-9-CM: 787.02 Gastroesophageal reflux disease, esophagitis presence not specified     ICD-10-CM: K21.9 ICD-9-CM: 530.81 Bilateral sciatica     ICD-10-CM: M54.31, M54.32 
ICD-9-CM: 724.3 Vitals BP Pulse Temp Resp Height(growth percentile) Weight(growth percentile) 133/68 (BP 1 Location: Right arm, BP Patient Position: Sitting) (!) 50 96.7 °F (35.9 °C) (Oral) 18 5' 2\" (1.575 m) 198 lb (89.8 kg) SpO2 BMI OB Status Smoking Status 95% 36.21 kg/m2 Hysterectomy Former Smoker Vitals History BMI and BSA Data Body Mass Index Body Surface Area  
 36.21 kg/m 2 1.98 m 2 Preferred Pharmacy Pharmacy Name Phone [facility], [address] [place] [address] BRIE Love [facility] [phone] Your Updated Medication List  
  
   
This list is accurate as of 6/14/18 11:35 AM.  Always use your most recent med list.  
  
  
  
  
 acetaminophen 650 mg Tber Commonly known as:  TYLENOL Take 650 mg by mouth every six (6) hours as needed for Pain. alendronate 70 mg tablet Commonly known as:  FOSAMAX Take 1 Tab by mouth every seven (7) days for 90 days. aspirin delayed-release 81 mg tablet Take  by mouth daily. atorvastatin 40 mg tablet Commonly known as:  LIPITOR Take  by mouth daily. BREO ELLIPTA 100-25 mcg/dose inhaler Generic drug:  fluticasone-vilanterol Take 1 Puff by inhalation daily. Calcium Carbonate-Vit D3-Min 600 mg calcium- 400 unit Tab Take 1 pill 2 x daily  
  
 furosemide 20 mg tablet Commonly known as:  LASIX Take  by mouth every Monday, Wednesday, Friday. gabapentin 600 mg tablet Commonly known as:  NEURONTIN Take 600 mg by mouth three (3) times daily as needed. glipiZIDE 5 mg tablet Commonly known as:  GLUCOTROL  
TAKE ONE TABLET BY MOUTH ONCE OVER 24 HOURS  
  
 glucose blood VI test strips strip Commonly known as:  blood glucose test  
 Accu-chek alexei plus test strips Test blood sugar once daily E11.9  
  
 ketoconazole 2 % topical cream  
Commonly known as:  NIZORAL Apply  to affected area daily. Lancets Misc  
accu-chek softclix lancets Check blood sugar daily E11.9  
  
 latanoprost 0.005 % ophthalmic solution Commonly known as:  Ronit Monse Administer 1 Drop to both eyes nightly. lisinopril 10 mg tablet Commonly known as:  Therisa Semen Take 1 Tab by mouth daily. meclizine 12.5 mg tablet Commonly known as:  ANTIVERT Take  by mouth three (3) times daily as needed. NYSTOP powder Generic drug:  nystatin Omeprazole delayed release 20 mg tablet Commonly known as:  PRILOSEC D/R Take 1 Tab by mouth daily for 30 days. ondansetron 8 mg disintegrating tablet Commonly known as:  ZOFRAN ODT Take 1 Tab by mouth every eight (8) hours as needed for Nausea. oxybutynin 5 mg tablet Commonly known as:  LXDCQGFI Take 1 Tab by mouth three (3) times daily for 270 days. pneumococcal 13 chilo conj dip 0.5 mL Syrg injection Commonly known as:  PREVNAR-13  
0.5 mL by IntraMUSCular route PRIOR TO DISCHARGE for 1 dose. ZANAFLEX 2 mg capsule Generic drug:  tiZANidine Take 2 mg by mouth three (3) times daily. Prescriptions Printed Refills  
 pneumococcal 13 chilo conj dip (PREVNAR-13) 0.5 mL syrg injection 0 Si.5 mL by IntraMUSCular route PRIOR TO DISCHARGE for 1 dose. Class: Print Route: IntraMUSCular Prescriptions Sent to Pharmacy Refills Omeprazole delayed release (PRILOSEC D/R) 20 mg tablet 5 Sig: Take 1 Tab by mouth daily for 30 days. Class: Normal  
 Pharmacy: [facility] [id number] [place], [place] [address]wy, [address] Ph #: [phone] Route: Oral  
 ondansetron (ZOFRAN ODT) 8 mg disintegrating tablet 1 Sig: Take 1 Tab by mouth every eight (8) hours as needed for Nausea.   
 Class: Normal  
 Pharmacy: [facility] [address], [address] #: [phone] Route: Oral  
  
We Performed the Following CBC WITH AUTOMATED DIFF [68576 CPT(R)] CRP, HIGH SENSITIVITY [73558 CPT(R)] HEMOGLOBIN A1C WITH EAG [74033 CPT(R)] LIPID PANEL [11195 CPT(R)] METABOLIC PANEL, COMPREHENSIVE [72106 CPT(R)] REFERRAL TO OPHTHALMOLOGY [REF57 Custom] Comments:  
 Patient needs screening for Glaucoma and diabetic eye exam.  
 VITAMIN D, 25 HYDROXY [64862 CPT(R)] Follow-up Instructions Return in about 6 months (around 12/14/2018), or if symptoms worsen or fail to improve. To-Do List   
 06/14/2018 Imaging:  XR SPINE LUMB 2 OR 3 V Referral Information Referral ID Referred By Referred To  
  
 [id number] [facility] [address], [address] Visits Status Start Date End Date 1 New Request 6/14/18 6/14/19 If your referral has a status of pending review or denied, additional information will be sent to support the outcome of this decision. Patient Instructions Medicare Wellness Visit, Female The best way to live healthy is to have a lifestyle where you eat a well-balanced diet, exercise regularly, limit alcohol use, and quit all forms of tobacco/nicotine, if applicable. Regular preventive services are another way to keep healthy. Preventive services (vaccines, screening tests, monitoring & exams) can help personalize your care plan, which helps you manage your own care. Screening tests can find health problems at the earliest stages, when they are easiest to treat. Daphne Salcedo follows the current, evidence-based guidelines published by the [facility] Issac Jaimie (USPSTF) when recommending preventive services for our patients.  Because we follow these guidelines, sometimes recommendations change over time as research supports it. (For example, mammograms used to be recommended annually. Even though Medicare will still pay for an annual mammogram, the newer guidelines recommend a mammogram every two years for women of average risk.) Of course, you and your provider may decide to screen more often for some diseases, based on your risk and co-morbidities (chronic disease you are already diagnosed with). Preventive services for you include: - Medicare offers their members a free annual wellness visit, which is time for you and your primary care provider to discuss and plan for your preventive service needs. Take advantage of this benefit every year! 
 
-All people over age 72 should receive the recommended pneumonia vaccines. Current USPSTF guidelines recommend a series of two vaccines for the best pneumonia protection.  
 
-All adults should have a yearly flu vaccine and a tetanus vaccine every 10 years. All adults age 61 years should receive a shingles vaccine once in their lifetime.   
 
-A bone mass density test is recommended when a woman turns 65 to screen for osteoporosis. This test is only recommended once as a screening. Some providers will use this same test as a disease monitoring tool if you already have osteoporosis. -All adults age 38-68 years who are overweight should have a diabetes screening test once every three years.  
 
-Other screening tests & preventive services for persons with diabetes include: an eye exam to screen for diabetic retinopathy, a kidney function test, a foot exam, and stricter control over your cholesterol.  
 
-Cardiovascular screening for adults with routine risk involves an electrocardiogram (ECG) at intervals determined by the provider.  
 
-Colorectal cancer screenings should be done for adults age 54-65 years with normal risk.  There are a number of acceptable methods of screening for this type of cancer. Each test has its own benefits and drawbacks. Discuss with your provider what is most appropriate for you during your annual wellness visit. The different tests include: colonoscopy (considered the best screening method), a fecal occult blood test, a fecal DNA test, and sigmoidoscopy. -Breast cancer screenings are recommended every other year for women of normal risk age 54-69 years.  
 
-Cervical cancer screenings for women over age 72 are only recommended with certain risk factors.  
 
-All adults born between [facility] should be screened once for Hepatitis C. Here is a list of your current Health Maintenance items (your personalized list of preventive services) with a due date: 
Health Maintenance Due Topic Date Due 24 [facility] Eye Exam  01/03/1950  Pneumococcal Vaccine (2 of 2 - PPSV23) 11/15/2017  Glaucoma Screening   01/15/2018  Hemoglobin A1C    02/04/2018 Introducing [facility] SERVICES! Dear Renee Pisano: Thank you for requesting a [employer] account. Our records indicate that you already have an active [employer] account. You can access your account anytime at [email or web address]. [email or web address] Did you know that you can access your hospital and ER discharge instructions at any time in [employer]? You can also review all of your test results from your hospital stay or ER visit. Additional Information If you have questions, please visit the Frequently Asked Questions section of the [employer] website at [email or web address] Remember, [employer] is NOT to be used for urgent needs. For medical emergencies, dial 911. Now available from your iPhone and Android! Please provide this summary of care documentation to your next provider. Your primary care clinician is listed as [address]. If you have any questions after today's visit, please call [phone].

## 2018-06-14 NOTE — PROGRESS NOTES
CC:  Chief Complaint   Patient presents with    Diabetes    Hypertension    Labs     NPO since midnight    Cough     has had \"hacking dry annoying cough\" for the past two weeks     HISTORY OF PRESENT ILLNESS  Leora Nelson is a 66 y.o. female. HPI Comments: 74F with h/o T2DM, HTN, HLD and also morbid obesity who presents today for 6-month follow up and fasting labs. Today, her specific concerns include a cough that has been hacking and dry and annoying. In addition, she c/o lower back pain. She also is having increased nausea from acid reflux. The back pain is chronic, but recently worsening. She denies any specific trauma. Hard to get comfortable at night. Walking is limited by the pain as well. Denies any bladder or bowel problems. Diabetes     Hypertension      Labs     Cough           ROS:  Review of Systems   Respiratory: Positive for cough. OBJECTIVE:  /68 (BP 1 Location: Right arm, BP Patient Position: Sitting)  Pulse (!) 50  Temp 96.7 °F (35.9 °C) (Oral)   Resp 18  Ht 5' 2\" (1.575 m)  Wt 198 lb (89.8 kg)  SpO2 95%  BMI 36.21 kg/m2  Physical Exam   HENT:   Head: Normocephalic. Eyes: Pupils are equal, round, and reactive to light. Cardiovascular: Normal rate and regular rhythm. Pulmonary/Chest: Effort normal and breath sounds normal.   Abdominal: Soft. She exhibits no distension. There is no tenderness. There is no rebound and no guarding. Musculoskeletal: Normal range of motion. She exhibits tenderness (paraspinous tenderness. decreased ROM due to pain. ). Neurological: She is alert. Skin: Skin is warm. Nursing note and vitals reviewed. ASSESSMENT and PLAN    ICD-10-CM ICD-9-CM    1.  Type 2 diabetes mellitus with complication, without long-term current use of insulin (HCC) E11.8 250.90 HEMOGLOBIN A1C WITH EAG      CBC WITH AUTOMATED DIFF      REFERRAL TO OPHTHALMOLOGY   2. Pure hypercholesterolemia E78.00 272.0 LIPID PANEL      CRP, HIGH SENSITIVITY METABOLIC PANEL, COMPREHENSIVE      CBC WITH AUTOMATED DIFF   3. Vitamin D deficiency E55.9 268.9 VITAMIN D, 25 HYDROXY   4. Nausea R11.0 787.02 ondansetron (ZOFRAN ODT) 8 mg disintegrating tablet   5. Gastroesophageal reflux disease, esophagitis presence not specified K21.9 530.81 Omeprazole delayed release (PRILOSEC D/R) 20 mg tablet   6. Bilateral sciatica M54.31 724.3 XR SPINE LUMB 2 OR 3 V    M54.32       78F who presents for follow up of type II DM, HTN, HLD and vitamin d deficiency. She also is c/o persistent bilateral sciatica getting worse. Pain does not awaken her from sleep, but is decreasing her ROM. She has had PT and does not feel like that has helped well. I have discussed the diagnosis with the patient and the intended treatment plan as seen in the above orders. The patient has received an after-visit summary and questions were answered concerning future plans. Asked to return should symptoms worsen or not improve with treatment. Any pending labs and studies will be relayed to patient when they become available. Pt verbalizes understanding of plan of care and denies further questions or concerns at this time. Follow-up Disposition:  Return in about 6 months (around 12/14/2018), or if symptoms worsen or fail to improve.

## 2018-06-14 NOTE — LETTER
6/20/2018 11:28 AM 
 
Ms. Luis Adame [facility] [address] [facility] [address] Dear Luis Adame: 
 
Please find your most recent results below. Resulted Orders LIPID PANEL Result Value Ref Range Cholesterol, total 186 100 - 199 mg/dL Triglyceride 74 0 - 149 mg/dL HDL Cholesterol 53 >39 mg/dL VLDL, calculated 15 5 - 40 mg/dL LDL, calculated 118 (H) 0 - 99 mg/dL Narrative Performed at:  [address]  [id number] : Gertrudis Schultz MD, Phone:  [phone] CRP, HIGH SENSITIVITY Result Value Ref Range C-Reactive Protein, Cardiac 5.84 (H) 0.00 - 3.00 mg/L Comment:  
            Relative Risk for Future Cardiovascular Event Low                 <1.00 Average       1.00 - 3.00 High                >3.00 Narrative Performed at:  [address]  [id number] : Gertrudis Schultz MD, Phone:  [phone] VITAMIN D, 25 HYDROXY Result Value Ref Range VITAMIN D, 25-HYDROXY 33.6 30.0 - 100.0 ng/mL Comment:  
   Vitamin D deficiency has been defined by the [address] [place] [address] practice guideline as a 
level of serum 25-OH vitamin D less than 20 ng/mL (1,2). The Endocrine Society went on to further define vitamin D 
insufficiency as a level between 21 and 29 ng/mL (2). 1. IOM ([place] [employer]). 2010. Dietary reference 
   intakes for calcium and D. [facility] 
   [place]. 2. Chip MF, Blanca OWUSU, Michael PETTIT, et al. 
   Evaluation, treatment, and prevention of vitamin D 
   deficiency: an Endocrine Society clinical practice 
   guideline. JCEM. 2011 Jul; 96(7):1911-30. Narrative Performed at:  [address]  [id number] : Kun Cary MD, Phone:  [phone] HEMOGLOBIN A1C WITH EAG Result Value Ref Range Hemoglobin A1c 6.1 (H) 4.8 - 5.6 % Comment:  
            Pre-diabetes: 5.7 - 6.4 Diabetes: >6.4 Glycemic control for adults with diabetes: <7.0 Estimated average glucose 128 mg/dL Narrative Performed at:  [address]  [id number] : Kun Cary MD, Phone:  [phone] METABOLIC PANEL, COMPREHENSIVE Result Value Ref Range Glucose 56 (L) 65 - 99 mg/dL BUN 13 8 - 27 mg/dL Creatinine 0.88 0.57 - 1.00 mg/dL GFR est non-AA 63 >59 mL/min/1.73 GFR est AA 73 >59 mL/min/1.73  
 BUN/Creatinine ratio 15 12 - 28 Sodium 141 134 - 144 mmol/L Potassium 4.4 3.5 - 5.2 mmol/L Chloride 101 96 - 106 mmol/L  
 CO2 22 20 - 29 mmol/L Comment: **Please note reference interval change** Calcium 10.2 8.7 - 10.3 mg/dL Protein, total 7.8 6.0 - 8.5 g/dL Albumin 4.4 3.5 - 4.8 g/dL GLOBULIN, TOTAL 3.4 1.5 - 4.5 g/dL A-G Ratio 1.3 1.2 - 2.2 Bilirubin, total 0.3 0.0 - 1.2 mg/dL Alk. phosphatase 86 39 - 117 IU/L  
 AST (SGOT) 18 0 - 40 IU/L  
 ALT (SGPT) 11 0 - 32 IU/L Narrative Performed at:  [address]  [id number] : Kun Cary MD, Phone:  [phone] CBC WITH AUTOMATED DIFF Result Value Ref Range WBC 6.6 3.4 - 10.8 x10E3/uL  
 RBC 4.38 3.77 - 5.28 x10E6/uL HGB 12.4 11.1 - 15.9 g/dL HCT 39.1 34.0 - 46.6 % MCV 89 79 - 97 fL  
 MCH 28.3 26.6 - 33.0 pg  
 MCHC 31.7 31.5 - 35.7 g/dL  
 RDW 15.1 12.3 - 15.4 % PLATELET 814 152 - 695 x10E3/uL NEUTROPHILS 60 Not Estab. % Lymphocytes 34 Not Estab. % MONOCYTES 5 Not Estab. % EOSINOPHILS 1 Not Estab. % BASOPHILS 0 Not Estab. %  
 ABS. NEUTROPHILS 3.9 1.4 - 7.0 x10E3/uL Abs Lymphocytes 2.3 0.7 - 3.1 x10E3/uL ABS. MONOCYTES 0.4 0.1 - 0.9 x10E3/uL  
 ABS. EOSINOPHILS 0.1 0.0 - 0.4 x10E3/uL  
 ABS. BASOPHILS 0.0 0.0 - 0.2 x10E3/uL IMMATURE GRANULOCYTES 0 Not Estab. %  
 ABS. IMM. GRANS. 0.0 0.0 - 0.1 x10E3/uL Narrative Performed at:  [address]  [id number] : Robe Mcdonald MD, Phone:  [phone] CVD REPORT Result Value Ref Range INTERPRETATION Note Comment:  
   Supplemental report is available. Narrative Performed at:  [facility] [place] [address]  [id number] : Gideon Nuno MD, Phone:  [phone] DIABETES PATIENT EDUCATION Result Value Ref Range PDF Image Not applicable Narrative Performed at:  [facility] [place] [address]  [id number] : Gideon Nuno MD, Phone:  [phone] RECOMMENDATIONS: 
Labs stable and improved. Continue with current medications. She did have an elevated CRP probably due to inflammation as her liver function and cholesterol were improved. Return in 6-months for follow up and labs. Continue with working on diet and exercise. Please call me if you have any questions: [phone] Sincerely, Teresa Rutledge MD

## 2018-06-14 NOTE — PROGRESS NOTES
Identified pt with two pt identifiers(name and ). Chief Complaint   Patient presents with    Diabetes    Hypertension    Labs     NPO since midnight    Cough     has had \"hacking dry annoying cough\" for the past two weeks        Health Maintenance Due   Topic    EYE EXAM RETINAL OR DILATED Q1     Pneumococcal 65+ Low/Medium Risk (2 of 2 - PPSV23)    GLAUCOMA SCREENING Q2Y     HEMOGLOBIN A1C Q6M        Wt Readings from Last 3 Encounters:   18 198 lb (89.8 kg)   18 196 lb (88.9 kg)   18 192 lb 9.6 oz (87.4 kg)     Temp Readings from Last 3 Encounters:   18 96.7 °F (35.9 °C) (Oral)   18 97.2 °F (36.2 °C) (Temporal)   18 98.5 °F (36.9 °C)     BP Readings from Last 3 Encounters:   18 133/68   18 140/86   18 128/70     Pulse Readings from Last 3 Encounters:   18 (!) 50   18 96   18 68         Learning Assessment:  :     Learning Assessment 2017   PRIMARY LEARNER Patient Patient   HIGHEST LEVEL OF EDUCATION - PRIMARY LEARNER  SOME COLLEGE -   BARRIERS PRIMARY LEARNER NONE -   PRIMARY LANGUAGE ENGLISH ENGLISH   LEARNER PREFERENCE PRIMARY READING LISTENING   ANSWERED BY patient patient   RELATIONSHIP SELF SELF       Depression Screening:  :     PHQ over the last two weeks 2018   Little interest or pleasure in doing things Not at all   Feeling down, depressed or hopeless Not at all   Total Score PHQ 2 0       Fall Risk Assessment:  :     Fall Risk Assessment, last 12 mths 2018   Able to walk? Yes   Fall in past 12 months? No       Abuse Screening:  :     Abuse Screening Questionnaire 2017 11/15/2016   Do you ever feel afraid of your partner? N N   Are you in a relationship with someone who physically or mentally threatens you? N N   Is it safe for you to go home?  Y Y           Coordination of Care Questionnaire:  :     1) Have you been to an emergency room, urgent care clinic since your last visit? no Hospitalized since your last visit? no             2) Have you seen or consulted any other health care providers outside of [address] [place] [address] since your last visit? no  (Include any pap smears or colon screenings in this section.)    3) Do you have an Advance Directive on file? no  Are you interested in receiving information about Advance Directives? no    Patient is accompanied by daughter. I have received verbal consent from Otoniel Beard to discuss any/all medical information while they are present in the room. Reviewed record in preparation for visit and have obtained necessary documentation. Medication reconciliation up to date and corrected with patient at this time.

## 2018-06-14 NOTE — PATIENT INSTRUCTIONS
Medicare Wellness Visit, Female    The best way to live healthy is to have a lifestyle where you eat a well-balanced diet, exercise regularly, limit alcohol use, and quit all forms of tobacco/nicotine, if applicable. Regular preventive services are another way to keep healthy. Preventive services (vaccines, screening tests, monitoring & exams) can help personalize your care plan, which helps you manage your own care. Screening tests can find health problems at the earliest stages, when they are easiest to treat. [facility] follows the current, evidence-based guidelines published by the [facility] ([facility]) when recommending preventive services for our patients. Because we follow these guidelines, sometimes recommendations change over time as research supports it. (For example, mammograms used to be recommended annually. Even though Medicare will still pay for an annual mammogram, the newer guidelines recommend a mammogram every two years for women of average risk.)    Of course, you and your provider may decide to screen more often for some diseases, based on your risk and co-morbidities (chronic disease you are already diagnosed with). Preventive services for you include:    - Medicare offers their members a free annual wellness visit, which is time for you and your primary care provider to discuss and plan for your preventive service needs. Take advantage of this benefit every year!    -All people over age 72 should receive the recommended pneumonia vaccines. Current USPSTF guidelines recommend a series of two vaccines for the best pneumonia protection.     -All adults should have a yearly flu vaccine and a tetanus vaccine every 10 years. All adults age 61 years should receive a shingles vaccine once in their lifetime.      -A bone mass density test is recommended when a woman turns 65 to screen for osteoporosis.  This test is only recommended once as a screening. Some providers will use this same test as a disease monitoring tool if you already have osteoporosis. -All adults age 38-68 years who are overweight should have a diabetes screening test once every three years.     -Other screening tests & preventive services for persons with diabetes include: an eye exam to screen for diabetic retinopathy, a kidney function test, a foot exam, and stricter control over your cholesterol.     -Cardiovascular screening for adults with routine risk involves an electrocardiogram (ECG) at intervals determined by the provider.     -Colorectal cancer screenings should be done for adults age 54-65 years with normal risk. There are a number of acceptable methods of screening for this type of cancer. Each test has its own benefits and drawbacks. Discuss with your provider what is most appropriate for you during your annual wellness visit. The different tests include: colonoscopy (considered the best screening method), a fecal occult blood test, a fecal DNA test, and sigmoidoscopy. -Breast cancer screenings are recommended every other year for women of normal risk age 54-69 years.     -Cervical cancer screenings for women over age 72 are only recommended with certain risk factors.     -All adults born between [facility] should be screened once for Hepatitis C.      Here is a list of your current Health Maintenance items (your personalized list of preventive services) with a due date:  Health Maintenance Due   Topic Date Due    Eye Exam  01/03/1950    Pneumococcal Vaccine (2 of 2 - PPSV23) 11/15/2017    Glaucoma Screening   01/15/2018    Hemoglobin A1C    02/04/2018

## 2018-06-15 LAB
25(OH)D3+25(OH)D2 SERPL-MCNC: 33.6 NG/ML (ref 30–100)
ALBUMIN SERPL-MCNC: 4.4 G/DL (ref 3.5–4.8)
ALBUMIN/GLOB SERPL: 1.3 {RATIO} (ref 1.2–2.2)
ALP SERPL-CCNC: 86 IU/L (ref 39–117)
ALT SERPL-CCNC: 11 IU/L (ref 0–32)
AST SERPL-CCNC: 18 IU/L (ref 0–40)
BASOPHILS # BLD AUTO: 0 X10E3/UL (ref 0–0.2)
BASOPHILS NFR BLD AUTO: 0 %
BILIRUB SERPL-MCNC: 0.3 MG/DL (ref 0–1.2)
BUN SERPL-MCNC: 13 MG/DL (ref 8–27)
BUN/CREAT SERPL: 15 (ref 12–28)
CALCIUM SERPL-MCNC: 10.2 MG/DL (ref 8.7–10.3)
CHLORIDE SERPL-SCNC: 101 MMOL/L (ref 96–106)
CHOLEST SERPL-MCNC: 186 MG/DL (ref 100–199)
CO2 SERPL-SCNC: 22 MMOL/L (ref 20–29)
CREAT SERPL-MCNC: 0.88 MG/DL (ref 0.57–1)
CRP SERPL HS-MCNC: 5.84 MG/L (ref 0–3)
EOSINOPHIL # BLD AUTO: 0.1 X10E3/UL (ref 0–0.4)
EOSINOPHIL NFR BLD AUTO: 1 %
ERYTHROCYTE [DISTWIDTH] IN BLOOD BY AUTOMATED COUNT: 15.1 % (ref 12.3–15.4)
EST. AVERAGE GLUCOSE BLD GHB EST-MCNC: 128 MG/DL
GFR SERPLBLD CREATININE-BSD FMLA CKD-EPI: 63 ML/MIN/1.73
GFR SERPLBLD CREATININE-BSD FMLA CKD-EPI: 73 ML/MIN/1.73
GLOBULIN SER CALC-MCNC: 3.4 G/DL (ref 1.5–4.5)
GLUCOSE SERPL-MCNC: 56 MG/DL (ref 65–99)
HBA1C MFR BLD: 6.1 % (ref 4.8–5.6)
HCT VFR BLD AUTO: 39.1 % (ref 34–46.6)
HDLC SERPL-MCNC: 53 MG/DL
HGB BLD-MCNC: 12.4 G/DL (ref 11.1–15.9)
IMM GRANULOCYTES # BLD: 0 X10E3/UL (ref 0–0.1)
IMM GRANULOCYTES NFR BLD: 0 %
INTERPRETATION, 910389: NORMAL
LDLC SERPL CALC-MCNC: 118 MG/DL (ref 0–99)
LYMPHOCYTES # BLD AUTO: 2.3 X10E3/UL (ref 0.7–3.1)
LYMPHOCYTES NFR BLD AUTO: 34 %
Lab: NORMAL
MCH RBC QN AUTO: 28.3 PG (ref 26.6–33)
MCHC RBC AUTO-ENTMCNC: 31.7 G/DL (ref 31.5–35.7)
MCV RBC AUTO: 89 FL (ref 79–97)
MONOCYTES # BLD AUTO: 0.4 X10E3/UL (ref 0.1–0.9)
MONOCYTES NFR BLD AUTO: 5 %
NEUTROPHILS # BLD AUTO: 3.9 X10E3/UL (ref 1.4–7)
NEUTROPHILS NFR BLD AUTO: 60 %
PLATELET # BLD AUTO: 260 X10E3/UL (ref 150–379)
POTASSIUM SERPL-SCNC: 4.4 MMOL/L (ref 3.5–5.2)
PROT SERPL-MCNC: 7.8 G/DL (ref 6–8.5)
RBC # BLD AUTO: 4.38 X10E6/UL (ref 3.77–5.28)
SODIUM SERPL-SCNC: 141 MMOL/L (ref 134–144)
TRIGL SERPL-MCNC: 74 MG/DL (ref 0–149)
VLDLC SERPL CALC-MCNC: 15 MG/DL (ref 5–40)
WBC # BLD AUTO: 6.6 X10E3/UL (ref 3.4–10.8)

## 2018-06-20 NOTE — PROGRESS NOTES
Labs stable and improved. Continue with current medications. She did have an elevated CRP probably due to inflammation as her liver function and cholesterol were improved. Return in 6-months for follow up and labs. Continue with working on diet and exercise.

## 2018-06-21 ENCOUNTER — HOSPITAL ENCOUNTER (OUTPATIENT)
Dept: GENERAL RADIOLOGY | Age: 78
Discharge: HOME | End: 2018-06-21
Attending: INTERNAL MEDICINE
Payer: MEDICARE

## 2018-06-21 DIAGNOSIS — M54.32 BILATERAL SCIATICA: ICD-10-CM

## 2018-06-21 DIAGNOSIS — M54.31 BILATERAL SCIATICA: ICD-10-CM

## 2018-06-21 PROCEDURE — 72100 X-RAY EXAM L-S SPINE 2/3 VWS: CPT

## 2018-06-21 NOTE — LETTER
6/25/2018 12:51 PM 
 
Ms. David Watkins [facility] [address] Dear David Watkins: 
 
Please find your most recent results below. Resulted Orders XR SPINE LUMB 2 OR 3 V Narrative History: Worsening low back pain. 3 views of the lumbosacral spine demonstrate facet arthropathy. There is disc 
space narrowing at L4-5 and L5-S1. There is mild anterior osteophyte formation. The vertebral body heights are maintained. Impression IMPRESSION: Degenerative disc disease. RECOMMENDATIONS: 
Xray showed degenerative disc disease. Recommend PT or seeing Orthopedist at this time. Please call me if you have any questions: [phone] Sincerely, [facility] XR 1

## 2018-06-21 NOTE — LETTER
6/25/2018 12:50 PM 
 
Ms. Jeanne Wu [facility] [address] Dear Jeanne Wu: 
 
Please find your most recent results below. Resulted Orders XR SPINE LUMB 2 OR 3 V Narrative History: Worsening low back pain. 3 views of the lumbosacral spine demonstrate facet arthropathy. There is disc 
space narrowing at L4-5 and L5-S1. There is mild anterior osteophyte formation. The vertebral body heights are maintained. Impression IMPRESSION: Degenerative disc disease. RECOMMENDATIONS: 
Xray showed degenerative disc disease. Recommend PT or seeing Orthopedist at this time. Please call me if you have any questions: [phone] Sincerely, [facility] XR 1

## 2018-06-22 ENCOUNTER — TELEPHONE (OUTPATIENT)
Dept: FAMILY MEDICINE CLINIC | Age: 78
End: 2018-06-22

## 2018-06-25 ENCOUNTER — TELEPHONE (OUTPATIENT)
Dept: FAMILY MEDICINE CLINIC | Age: 78
End: 2018-06-25

## 2018-06-25 NOTE — TELEPHONE ENCOUNTER
Spoke with patient regarding xray results and recommendations from Dr. Laurie Valles. Patient stated, \"I would rather see an Ortho doctor.   I'm going out of town now and won't be back until July 1st.\"

## 2018-06-25 NOTE — TELEPHONE ENCOUNTER
----- Message from Louie Ernst MD sent at 6/25/2018 12:31 PM EDT -----  Jayy Delarosa showed degenerative disc disease. Recommend PT or seeing Orthopedist at this time.

## 2018-06-26 ENCOUNTER — TELEPHONE (OUTPATIENT)
Dept: FAMILY MEDICINE CLINIC | Age: 78
End: 2018-06-26

## 2018-06-26 NOTE — TELEPHONE ENCOUNTER
The pt saw Loy Orozco about two weeks ago, she mentioned a cough to  and was told to stop taking her Blood Pressure medicine. She has and is still throwing up and coughing and is now almost out of her medicine. She is needing to know what to do next. She is supposed to go out of town Thursday.    [phone] is the best number

## 2018-06-27 NOTE — TELEPHONE ENCOUNTER
Pt was advised and verbalized understanding. Pt noted she is really just experiencing a dry cough during the day and not vomiting. She has been taking benadryl at night, however, is not on any medication during the day. I suggested that she try the generic OTC plain zyrtec once daily in the morning, for possible allergy that could be causing her cough. She verbalized understanding stating, \"I will restart my prescription medication I had stopped, try some zyrtec during the day and if sx do not get better, I will schedule appt to be seen again\".

## 2018-07-03 RX ORDER — GLIPIZIDE 5 MG/1
TABLET ORAL
Qty: 90 TAB | Refills: 0 | Status: SHIPPED | OUTPATIENT
Start: 2018-07-03 | End: 2018-10-01 | Stop reason: SDUPTHER

## 2018-07-19 RX ORDER — ATORVASTATIN CALCIUM 40 MG/1
40 TABLET, FILM COATED ORAL DAILY
Qty: 90 TAB | Refills: 1 | Status: SHIPPED | OUTPATIENT
Start: 2018-07-19 | End: 2020-04-08

## 2018-08-30 ENCOUNTER — OFFICE VISIT (OUTPATIENT)
Dept: FAMILY MEDICINE CLINIC | Age: 78
End: 2018-08-30

## 2018-08-30 VITALS
TEMPERATURE: 98.2 F | SYSTOLIC BLOOD PRESSURE: 110 MMHG | DIASTOLIC BLOOD PRESSURE: 64 MMHG | BODY MASS INDEX: 35.48 KG/M2 | HEART RATE: 50 BPM | RESPIRATION RATE: 18 BRPM | OXYGEN SATURATION: 95 % | WEIGHT: 192.8 LBS | HEIGHT: 62 IN

## 2018-08-30 DIAGNOSIS — Z00.00 MEDICARE ANNUAL WELLNESS VISIT, SUBSEQUENT: Primary | ICD-10-CM

## 2018-08-30 DIAGNOSIS — Z78.0 POST-MENOPAUSE: ICD-10-CM

## 2018-08-30 DIAGNOSIS — E66.01 SEVERE OBESITY (BMI 35.0-39.9): ICD-10-CM

## 2018-08-30 DIAGNOSIS — E11.40 TYPE 2 DIABETES MELLITUS WITH DIABETIC NEUROPATHY, WITHOUT LONG-TERM CURRENT USE OF INSULIN (HCC): ICD-10-CM

## 2018-08-30 DIAGNOSIS — E11.8 TYPE 2 DIABETES MELLITUS WITH COMPLICATION, WITHOUT LONG-TERM CURRENT USE OF INSULIN (HCC): ICD-10-CM

## 2018-08-30 DIAGNOSIS — I20.8 ANGINAL EQUIVALENT (HCC): ICD-10-CM

## 2018-08-30 DIAGNOSIS — Z13.820 SCREENING FOR OSTEOPOROSIS: ICD-10-CM

## 2018-08-30 DIAGNOSIS — Z12.39 SCREENING FOR BREAST CANCER: ICD-10-CM

## 2018-08-30 RX ORDER — ONDANSETRON 8 MG/1
8 TABLET, ORALLY DISINTEGRATING ORAL
Qty: 15 TAB | Refills: 3 | Status: SHIPPED | OUTPATIENT
Start: 2018-08-30

## 2018-08-30 RX ORDER — ONDANSETRON 8 MG/1
TABLET, ORALLY DISINTEGRATING ORAL
COMMUNITY
Start: 2018-06-14 | End: 2018-08-30 | Stop reason: SDUPTHER

## 2018-08-30 RX ORDER — TIZANIDINE HYDROCHLORIDE 2 MG/1
CAPSULE, GELATIN COATED ORAL
Qty: 30 CAP | Refills: 3 | Status: SHIPPED | OUTPATIENT
Start: 2018-08-30 | End: 2020-04-22

## 2018-08-30 RX ORDER — ALENDRONATE SODIUM 70 MG/1
70 TABLET ORAL
COMMUNITY
Start: 2018-07-28 | End: 2020-04-22 | Stop reason: SDUPTHER

## 2018-08-30 NOTE — ASSESSMENT & PLAN NOTE
Stable, based on history, physical exam and review of pertinent labs, studies and medications; meds reconciled; continue current treatment plan. Key CAD CHF Meds             atorvastatin (LIPITOR) 40 mg tablet  (Taking) Take 1 Tab by mouth daily for 90 days. lisinopril (PRINIVIL, ZESTRIL) 10 mg tablet  (Taking) Take 1 Tab by mouth daily. aspirin delayed-release 81 mg tablet  (Taking) Take  by mouth daily. furosemide (LASIX) 20 mg tablet Take  by mouth every Monday, Wednesday, Friday.         [id number](BNP,BNPP,BNPPPOC,HSCRP,NA,NAPOC,K,KPOCT,CHOL,CHOLPOCT,HDL,HDLPOC,LDLCHOL,LDLPOCT,LDLCPOC,LDLC,LDL,LDLCEXT,TRIGL,TGLPOCT,INR,INREXT,INRPOC,PTP,PTINR,PTEXT,DIG)@

## 2018-08-30 NOTE — ASSESSMENT & PLAN NOTE
Stable, based on history, physical exam and review of pertinent labs, studies and medications; meds reconciled; continue current treatment plan. Key Obesity Meds             furosemide (LASIX) 20 mg tablet Take  by mouth every Monday, Wednesday, Friday.         Lab Results   Component Value Date/Time    Hemoglobin A1c 6.1 06/14/2018 11:46 AM    Glucose 56 06/14/2018 11:46 AM    Cholesterol, total 186 06/14/2018 11:46 AM    HDL Cholesterol 53 06/14/2018 11:46 AM    LDL, calculated 118 06/14/2018 11:46 AM    Triglyceride 74 06/14/2018 11:46 AM    TSH 1.66 01/06/2018 01:58 AM    Sodium 141 06/14/2018 11:46 AM    Potassium 4.4 06/14/2018 11:46 AM    ALT (SGPT) 11 06/14/2018 11:46 AM    AST (SGOT) 18 06/14/2018 11:46 AM    VITAMIN D, 25-HYDROXY 33.6 06/14/2018 11:46 AM

## 2018-08-30 NOTE — PROGRESS NOTES
Identified pt with two pt identifiers(name and ). Chief Complaint   Patient presents with    Annual Wellness Visit     Medicare Wellness Visit        Health Maintenance Due   Topic    EYE EXAM RETINAL OR DILATED Q1     Pneumococcal 65+ Low/Medium Risk (2 of 2 - PPSV23)    GLAUCOMA SCREENING Q2Y     FOOT EXAM Q1     MICROALBUMIN Q1     MEDICARE YEARLY EXAM     Influenza Age 5 to Adult        Wt Readings from Last 3 Encounters:   18 198 lb (89.8 kg)   18 196 lb (88.9 kg)   18 192 lb 9.6 oz (87.4 kg)     Temp Readings from Last 3 Encounters:   18 96.7 °F (35.9 °C) (Oral)   18 97.2 °F (36.2 °C) (Temporal)   18 98.5 °F (36.9 °C)     BP Readings from Last 3 Encounters:   18 133/68   18 140/86   18 128/70     Pulse Readings from Last 3 Encounters:   18 (!) 50   18 96   18 68         Learning Assessment:  :     Learning Assessment 2017   PRIMARY LEARNER Patient Patient   HIGHEST LEVEL OF EDUCATION - PRIMARY LEARNER  SOME COLLEGE -   BARRIERS PRIMARY LEARNER NONE -   PRIMARY LANGUAGE ENGLISH ENGLISH   LEARNER PREFERENCE PRIMARY READING LISTENING   ANSWERED BY patient patient   RELATIONSHIP SELF SELF       Depression Screening:  :     PHQ over the last two weeks 2018   Little interest or pleasure in doing things Not at all   Feeling down, depressed, irritable, or hopeless Not at all   Total Score PHQ 2 0       Fall Risk Assessment:  :     Fall Risk Assessment, last 12 mths 2018   Able to walk? Yes   Fall in past 12 months? No       Abuse Screening:  :     Abuse Screening Questionnaire 2017 11/15/2016   Do you ever feel afraid of your partner? N N   Are you in a relationship with someone who physically or mentally threatens you? N N   Is it safe for you to go home?  Y Y       Coordination of Care Questionnaire:  :     1) Have you been to an emergency room, urgent care clinic since your last visit? no   Hospitalized since your last visit? no            2) Have you seen or consulted any other health care providers outside of [facility] since your last visit? no  (Include any pap smears or colon screenings in this section.)    3) Do you have an Advance Directive on file? no  Are you interested in receiving information about Advance Directives? no.    Reviewed record in preparation for visit and have obtained necessary documentation. Medication reconciliation up to date and corrected with patient at this time.

## 2018-08-30 NOTE — MR AVS SNAPSHOT
[address] [place] [address] 
 
 
 [address] [facility] 
[phone] Patient: Anupama Rodriguez MRN: [id number] MOA:7/8/[birth year] Visit Information Date & Time Provider Department Dept. Phone Encounter #  
 8/30/2018 11:15 AM Hermes Yadav [phone] Upcoming Health Maintenance Date Due  
 EYE EXAM RETINAL OR DILATED Q1 1/3/1950 Pneumococcal 65+ Low/Medium Risk (2 of 2 - PPSV23) 11/15/2017 GLAUCOMA SCREENING Q2Y 1/15/2018 Influenza Age 5 to Adult 8/1/2018 HEMOGLOBIN A1C Q6M 12/14/2018 LIPID PANEL Q1 6/14/2019 FOOT EXAM Q1 8/30/2019 MICROALBUMIN Q1 8/30/2019 MEDICARE YEARLY EXAM 8/31/2019 DTaP/Tdap/Td series (2 - Td) 7/21/2026 Allergies as of 8/30/2018  Review Complete On: 8/30/2018 By: Lida Redd MD  
  
 Severity Noted Reaction Type Reactions Sulfa (Sulfonamide Antibiotics) High 03/11/2010    Hives, Itching Current Immunizations  Reviewed on 6/14/2018 No immunizations on file. Not reviewed this visit You Were Diagnosed With   
  
 Codes Comments Medicare annual wellness visit, subsequent    -  Primary ICD-10-CM: Z00.00 ICD-9-CM: V70.0 Type 2 diabetes mellitus with complication, without long-term current use of insulin (HCC)     ICD-10-CM: E11.8 ICD-9-CM: 250.90 Type 2 diabetes mellitus with diabetic neuropathy, without long-term current use of insulin (HCC)     ICD-10-CM: E11.40 ICD-9-CM: 250.60, 357.2 Severe obesity (BMI 35.0-39.9) (HCC)     ICD-10-CM: E66.01 
ICD-9-CM: 278.01 Anginal equivalent ([address]     ICD-10-CM: I20.8 ICD-9-CM: 413.9 Screening for breast cancer     ICD-10-CM: Z12.31 
ICD-9-CM: V76.10 Screening for osteoporosis     ICD-10-CM: Z13.820 ICD-9-CM: V82.81 Post-menopause     ICD-10-CM: Z78.0 ICD-9-CM: V49.81 Vitals BP Pulse Temp Resp Height(growth percentile) Weight(growth percentile) 110/64 (BP 1 Location: Left arm, BP Patient Position: Sitting) (!) 50 98.2 °F (36.8 °C) (Oral) 18 5' 2\" (1.575 m) 192 lb 12.8 oz (87.5 kg) SpO2 BMI OB Status Smoking Status 95% 35.26 kg/m2 Hysterectomy Former Smoker BMI and BSA Data Body Mass Index Body Surface Area  
 35.26 kg/m 2 1.96 m 2 Preferred Pharmacy Pharmacy Name Phone [address] [facility], [address] [place] [address] [facility] [phone] Your Updated Medication List  
  
   
This list is accurate as of 8/30/18 12:14 PM.  Always use your most recent med list.  
  
  
  
  
 acetaminophen 650 mg Tber Commonly known as:  TYLENOL Take 650 mg by mouth every six (6) hours as needed for Pain. * alendronate 70 mg tablet Commonly known as:  FOSAMAX Take 1 Tab by mouth every seven (7) days for 90 days. * alendronate 70 mg tablet Commonly known as:  FOSAMAX  
  
 aspirin delayed-release 81 mg tablet Take  by mouth daily. atorvastatin 40 mg tablet Commonly known as:  LIPITOR Take 1 Tab by mouth daily for 90 days. BREO ELLIPTA 100-25 mcg/dose inhaler Generic drug:  fluticasone-vilanterol Take 1 Puff by inhalation daily. Calcium Carbonate-Vit D3-Min 600 mg calcium- 400 unit Tab Take 1 pill 2 x daily  
  
 furosemide 20 mg tablet Commonly known as:  LASIX Take  by mouth every Monday, Wednesday, Friday. gabapentin 600 mg tablet Commonly known as:  NEURONTIN Take 600 mg by mouth three (3) times daily as needed. glipiZIDE 5 mg tablet Commonly known as:  GLUCOTROL  
TAKE 1 TABLET BY MOUTH ONCE OVER 24 HOURS (ONCE DAILY) glucose blood VI test strips strip Commonly known as:  blood glucose test  
Accu-chek alexei plus test strips Test blood sugar once daily E11.9  
  
 ketoconazole 2 % topical cream  
Commonly known as:  NIZORAL Apply  to affected area daily. Lancets Misc accu-chek softclix lancets Check blood sugar daily E11.9  
  
 latanoprost 0.005 % ophthalmic solution Commonly known as:  Kevin Horton Administer 1 Drop to both eyes nightly. lisinopril 10 mg tablet Commonly known as:  Aundra Sarahi Take 1 Tab by mouth daily. NYSTOP powder Generic drug:  nystatin  
  
 ondansetron 8 mg disintegrating tablet Commonly known as:  ZOFRAN ODT Take 1 Tab by mouth every twelve (12) hours as needed for Nausea. oxybutynin 5 mg tablet Commonly known as:  ODRBSXSJ Take 1 Tab by mouth three (3) times daily for 270 days. pneumococcal 13 chilo conj dip 0.5 mL Syrg injection Commonly known as:  PREVNAR-13  
0.5 mL by IntraMUSCular route PRIOR TO DISCHARGE for 1 dose. tiZANidine 2 mg capsule Commonly known as:  ZANAFLEX  
1-2 tablets, 1-2 hours prior to bedtime as needed for spasm. * Notice: This list has 2 medication(s) that are the same as other medications prescribed for you. Read the directions carefully, and ask your doctor or other care provider to review them with you. Prescriptions Sent to Pharmacy Refills  
 tiZANidine (ZANAFLEX) 2 mg capsule 3 Si-2 tablets, 1-2 hours prior to bedtime as needed for spasm. Class: Normal  
 Pharmacy: [facility] [address] [place] [address] Ph #: [phone]  
 ondansetron (ZOFRAN ODT) 8 mg disintegrating tablet 3 Sig: Take 1 Tab by mouth every twelve (12) hours as needed for Nausea. Class: Normal  
 Pharmacy: [facility] [address], [address] Ph #: [phone] Route: Oral  
  
To-Do List   
 2018 Imaging:  DEXA BONE DENSITY STUDY AXIAL   
  
 2018 Imaging:  HUSSAIN MAMMO BI SCREENING INCL CAD Patient Instructions Medicare Wellness Visit, Female The best way to live healthy is to have a lifestyle where you eat a well-balanced diet, exercise regularly, limit alcohol use, and quit all forms of tobacco/nicotine, if applicable. Regular preventive services are another way to keep healthy. Preventive services (vaccines, screening tests, monitoring & exams) can help personalize your care plan, which helps you manage your own care. Screening tests can find health problems at the earliest stages, when they are easiest to treat. [facility] follows the current, evidence-based guidelines published by the [facility] Issac Jaimie ([facility]) when recommending preventive services for our patients. Because we follow these guidelines, sometimes recommendations change over time as research supports it. (For example, mammograms used to be recommended annually. Even though Medicare will still pay for an annual mammogram, the newer guidelines recommend a mammogram every two years for women of average risk.) Of course, you and your doctor may decide to screen more often for some diseases, based on your risk and your health status. Preventive services for you include: - Medicare offers their members a free annual wellness visit, which is time for you and your primary care provider to discuss and plan for your preventive service needs. Take advantage of this benefit every year! 
-All adults over the age of 72 should receive the recommended pneumonia vaccines. Current USPSTF guidelines recommend a series of two vaccines for the best pneumonia protection.  
-All adults should have a flu vaccine yearly and a tetanus vaccine every 10 years. All adults age 61 and older should receive a shingles vaccine once in their lifetime.   
-A bone mass density test is recommended when a woman turns 65 to screen for osteoporosis. This test is only recommended one time, as a screening. Some providers will use this same test as a disease monitoring tool if you already have osteoporosis. -All adults age 38-68 who are overweight should have a diabetes screening test once every three years.  
-Other screening tests and preventive services for persons with diabetes include: an eye exam to screen for diabetic retinopathy, a kidney function test, a foot exam, and stricter control over your cholesterol.  
-Cardiovascular screening for adults with routine risk involves an electrocardiogram (ECG) at intervals determined by your doctor.  
-Colorectal cancer screenings should be done for adults age 54-65 with no increased risk factors for colorectal cancer. There are a number of acceptable methods of screening for this type of cancer. Each test has its own benefits and drawbacks. Discuss with your doctor what is most appropriate for you during your annual wellness visit. The different tests include: colonoscopy (considered the best screening method), a fecal occult blood test, a fecal DNA test, and sigmoidoscopy. -Breast cancer screenings are recommended every other year for women of normal risk, age 54-69. 
-Cervical cancer screenings for women over age 72 are only recommended with certain risk factors.  
-All adults born between [facility] should be screened once for Hepatitis C. Here is a list of your current Health Maintenance items (your personalized list of preventive services) with a due date: 
Health Maintenance Due Topic Date Due [facility] Eye Exam  01/03/1950  Pneumococcal Vaccine (2 of 2 - PPSV23) 11/15/2017  Glaucoma Screening   01/15/2018  Flu Vaccine  08/01/2018 Well Visit, Over 72: Care Instructions Your Care Instructions Physical exams can help you stay healthy. Your doctor has checked your overall health and may have suggested ways to take good care of yourself. He or she also may have recommended tests. At home, you can help prevent illness with healthy eating, regular exercise, and other steps. Follow-up care is a key part of your treatment and safety. Be sure to make and go to all appointments, and call your doctor if you are having problems. It's also a good idea to know your test results and keep a list of the medicines you take. How can you care for yourself at home? · Reach and stay at a healthy weight. This will lower your risk for many problems, such as obesity, diabetes, heart disease, and high blood pressure. · Get at least 30 minutes of exercise on most days of the week. Walking is a good choice. You also may want to do other activities, such as running, swimming, cycling, or playing tennis or team sports. · Do not smoke. Smoking can make health problems worse. If you need help quitting, talk to your doctor about stop-smoking programs and medicines. These can increase your chances of quitting for good. · Protect your skin from too much sun. When you're outdoors from 10 a.m. to 4 p.m., stay in the shade or cover up with clothing and a hat with a wide brim. Wear sunglasses that block UV rays. Even when it's cloudy, put broad-spectrum sunscreen (SPF 30 or higher) on any exposed skin. · See a dentist one or two times a year for checkups and to have your teeth cleaned. · Wear a seat belt in the car. · Limit alcohol to 2 drinks a day for men and 1 drink a day for women. Too much alcohol can cause health problems. Follow your doctor's advice about when to have certain tests. These tests can spot problems early. For men and women · Cholesterol. Your doctor will tell you how often to have this done based on your overall health and other things that can increase your risk for heart attack and stroke. · Blood pressure. Have your blood pressure checked during a routine doctor visit. Your doctor will tell you how often to check your blood pressure based on your age, your blood pressure results, and other factors. · Diabetes. Ask your doctor whether you should have tests for diabetes. · Vision. Experts recommend that you have yearly exams for glaucoma and other age-related eye problems. · Hearing. Tell your doctor if you notice any change in your hearing. You can have tests to find out how well you hear. · Colon cancer tests. Keep having colon cancer tests as your doctor recommends. You can have one of several types of tests. · Heart attack and stroke risk. At least every 4 to 6 years, you should have your risk for heart attack and stroke assessed. Your doctor uses factors such as your age, blood pressure, cholesterol, and whether you smoke or have diabetes to show what your risk for a heart attack or stroke is over the next 10 years. · Osteoporosis. Talk to your doctor about whether you should have a bone density test to find out whether you have thinning bones. Also ask your doctor about whether you should take calcium and vitamin D supplements. For women · Pap test and pelvic exam. You may no longer need a Pap test. Talk with your doctor about whether to stop or continue to have Pap tests. · Breast exam and mammogram. Ask how often you should have a mammogram, which is an X-ray of your breasts. A mammogram can spot breast cancer before it can be felt and when it is easiest to treat. · Thyroid disease. Talk to your doctor about whether to have your thyroid checked as part of a regular physical exam. Women have an increased chance of a thyroid problem. For men · Prostate exam. Talk to your doctor about whether you should have a blood test (called a PSA test) for prostate cancer. Experts disagree on whether men should have this test. Some experts recommend that you discuss the benefits and risks of the test with your doctor. · Abdominal aortic aneurysm. Ask your doctor whether you should have a test to check for an aneurysm. You may need a test if you ever smoked or if your parent, brother, sister, or child has had an aneurysm. When should you call for help? Watch closely for changes in your health, and be sure to contact your doctor if you have any problems or symptoms that concern you. Where can you learn more? Go to http://nikki-alicia.info/. Enter J639 in the search box to learn more about \"Well Visit, Over 65: Care Instructions. \" Current as of: May 16, 2017 Content Version: 11.7 © 5169-4703 [employer]. Care instructions adapted under license by [employer] (which disclaims liability or warranty for this information). If you have questions about a medical condition or this instruction, always ask your healthcare professional. [place] [address] any warranty or liability for your use of this information. Introducing [facility]! Dear Liana Adame: Thank you for requesting a [employer] account. Our records indicate that you already have an active [employer] account. You can access your account anytime at [email or web address] Did you know that you can access your hospital and ER discharge instructions at any time in [employer]? You can also review all of your test results from your hospital stay or ER visit. Additional Information If you have questions, please visit the Frequently Asked Questions section of the [employer] website at [email or web address] Remember, [employer] is NOT to be used for urgent needs. For medical emergencies, dial 911. Now available from your iPhone and Android! Please provide this summary of care documentation to your next provider. Your primary care clinician is listed as [address]. If you have any questions after today's visit, please call [phone].

## 2018-08-30 NOTE — PATIENT INSTRUCTIONS
Medicare Wellness Visit, Female     The best way to live healthy is to have a lifestyle where you eat a well-balanced diet, exercise regularly, limit alcohol use, and quit all forms of tobacco/nicotine, if applicable. Regular preventive services are another way to keep healthy. Preventive services (vaccines, screening tests, monitoring & exams) can help personalize your care plan, which helps you manage your own care. Screening tests can find health problems at the earliest stages, when they are easiest to treat. [facility] follows the current, evidence-based guidelines published by the [facility] Issac Jaimie ([facility]) when recommending preventive services for our patients. Because we follow these guidelines, sometimes recommendations change over time as research supports it. (For example, mammograms used to be recommended annually. Even though Medicare will still pay for an annual mammogram, the newer guidelines recommend a mammogram every two years for women of average risk.)  Of course, you and your doctor may decide to screen more often for some diseases, based on your risk and your health status. Preventive services for you include:  - Medicare offers their members a free annual wellness visit, which is time for you and your primary care provider to discuss and plan for your preventive service needs. Take advantage of this benefit every year!  -All adults over the age of 72 should receive the recommended pneumonia vaccines. Current USPSTF guidelines recommend a series of two vaccines for the best pneumonia protection.   -All adults should have a flu vaccine yearly and a tetanus vaccine every 10 years. All adults age 61 and older should receive a shingles vaccine once in their lifetime.    -A bone mass density test is recommended when a woman turns 65 to screen for osteoporosis. This test is only recommended one time, as a screening.  Some providers will use this same test as a disease monitoring tool if you already have osteoporosis. -All adults age 38-68 who are overweight should have a diabetes screening test once every three years.   -Other screening tests and preventive services for persons with diabetes include: an eye exam to screen for diabetic retinopathy, a kidney function test, a foot exam, and stricter control over your cholesterol.   -Cardiovascular screening for adults with routine risk involves an electrocardiogram (ECG) at intervals determined by your doctor.   -Colorectal cancer screenings should be done for adults age 54-65 with no increased risk factors for colorectal cancer. There are a number of acceptable methods of screening for this type of cancer. Each test has its own benefits and drawbacks. Discuss with your doctor what is most appropriate for you during your annual wellness visit. The different tests include: colonoscopy (considered the best screening method), a fecal occult blood test, a fecal DNA test, and sigmoidoscopy. -Breast cancer screenings are recommended every other year for women of normal risk, age 54-69.  -Cervical cancer screenings for women over age 72 are only recommended with certain risk factors.   -All adults born between [facility] should be screened once for Hepatitis C. Here is a list of your current Health Maintenance items (your personalized list of preventive services) with a due date:  Health Maintenance Due   Topic Date Due    Eye Exam  01/03/1950    Pneumococcal Vaccine (2 of 2 - PPSV23) 11/15/2017    Glaucoma Screening   01/15/2018    Flu Vaccine  08/01/2018            Well Visit, Over 72: Care Instructions  Your Care Instructions    Physical exams can help you stay healthy. Your doctor has checked your overall health and may have suggested ways to take good care of yourself. He or she also may have recommended tests.  At home, you can help prevent illness with healthy eating, regular exercise, and other steps.  Follow-up care is a key part of your treatment and safety. Be sure to make and go to all appointments, and call your doctor if you are having problems. It's also a good idea to know your test results and keep a list of the medicines you take. How can you care for yourself at home? · Reach and stay at a healthy weight. This will lower your risk for many problems, such as obesity, diabetes, heart disease, and high blood pressure. · Get at least 30 minutes of exercise on most days of the week. Walking is a good choice. You also may want to do other activities, such as running, swimming, cycling, or playing tennis or team sports. · Do not smoke. Smoking can make health problems worse. If you need help quitting, talk to your doctor about stop-smoking programs and medicines. These can increase your chances of quitting for good. · Protect your skin from too much sun. When you're outdoors from 10 a.m. to 4 p.m., stay in the shade or cover up with clothing and a hat with a wide brim. Wear sunglasses that block UV rays. Even when it's cloudy, put broad-spectrum sunscreen (SPF 30 or higher) on any exposed skin. · See a dentist one or two times a year for checkups and to have your teeth cleaned. · Wear a seat belt in the car. · Limit alcohol to 2 drinks a day for men and 1 drink a day for women. Too much alcohol can cause health problems. Follow your doctor's advice about when to have certain tests. These tests can spot problems early. For men and women  · Cholesterol. Your doctor will tell you how often to have this done based on your overall health and other things that can increase your risk for heart attack and stroke. · Blood pressure. Have your blood pressure checked during a routine doctor visit. Your doctor will tell you how often to check your blood pressure based on your age, your blood pressure results, and other factors. · Diabetes.  Ask your doctor whether you should have tests for diabetes. · Vision. Experts recommend that you have yearly exams for glaucoma and other age-related eye problems. · Hearing. Tell your doctor if you notice any change in your hearing. You can have tests to find out how well you hear. · Colon cancer tests. Keep having colon cancer tests as your doctor recommends. You can have one of several types of tests. · Heart attack and stroke risk. At least every 4 to 6 years, you should have your risk for heart attack and stroke assessed. Your doctor uses factors such as your age, blood pressure, cholesterol, and whether you smoke or have diabetes to show what your risk for a heart attack or stroke is over the next 10 years. · Osteoporosis. Talk to your doctor about whether you should have a bone density test to find out whether you have thinning bones. Also ask your doctor about whether you should take calcium and vitamin D supplements. For women  · Pap test and pelvic exam. You may no longer need a Pap test. Talk with your doctor about whether to stop or continue to have Pap tests. · Breast exam and mammogram. Ask how often you should have a mammogram, which is an X-ray of your breasts. A mammogram can spot breast cancer before it can be felt and when it is easiest to treat. · Thyroid disease. Talk to your doctor about whether to have your thyroid checked as part of a regular physical exam. Women have an increased chance of a thyroid problem. For men  · Prostate exam. Talk to your doctor about whether you should have a blood test (called a PSA test) for prostate cancer. Experts disagree on whether men should have this test. Some experts recommend that you discuss the benefits and risks of the test with your doctor. · Abdominal aortic aneurysm. Ask your doctor whether you should have a test to check for an aneurysm. You may need a test if you ever smoked or if your parent, brother, sister, or child has had an aneurysm. When should you call for help?   Watch closely for changes in your health, and be sure to contact your doctor if you have any problems or symptoms that concern you. Where can you learn more? Go to http://nikki-alicia.info/. Enter G214 in the search box to learn more about \"Well Visit, Over 65: Care Instructions. \"  Current as of: May 16, 2017  Content Version: 11.7  © 0189-1180 [employer]. Care instructions adapted under license by [employer] (which disclaims liability or warranty for this information). If you have questions about a medical condition or this instruction, always ask your healthcare professional. [place] [address] any warranty or liability for your use of this information.

## 2018-08-30 NOTE — ASSESSMENT & PLAN NOTE
Stable, based on history, physical exam and review of pertinent labs, studies and medications; meds reconciled; continue current treatment plan. Key Antihyperglycemic Medications             glipiZIDE (GLUCOTROL) 5 mg tablet  (Taking) TAKE 1 TABLET BY MOUTH ONCE OVER 24 HOURS (ONCE DAILY)        Other Key Diabetic Medications             atorvastatin (LIPITOR) 40 mg tablet  (Taking) Take 1 Tab by mouth daily for 90 days. lisinopril (PRINIVIL, ZESTRIL) 10 mg tablet  (Taking) Take 1 Tab by mouth daily. gabapentin (NEURONTIN) 600 mg tablet  (Taking) Take 600 mg by mouth three (3) times daily as needed.         Lab Results   Component Value Date/Time    Hemoglobin A1c 6.1 06/14/2018 11:46 AM    Glucose 56 06/14/2018 11:46 AM    Creatinine 0.88 06/14/2018 11:46 AM    Microalb/Creat ratio (ug/mg creat.) <9.8 07/31/2017 10:23 AM    Cholesterol, total 186 06/14/2018 11:46 AM    HDL Cholesterol 53 06/14/2018 11:46 AM    LDL, calculated 118 06/14/2018 11:46 AM    Triglyceride 74 06/14/2018 11:46 AM     Diabetic Foot and Eye Exam HM Status   Topic Date Due    Eye Exam  01/03/1950    Diabetic Foot Care  08/30/2019

## 2018-08-30 NOTE — PROGRESS NOTES
This is the Subsequent Medicare Annual Wellness Exam, performed 12 months or more after the Initial AWV or the last Subsequent AWV    I have reviewed the patient's medical history in detail and updated the computerized patient record. History     78F who presents today for her AWV. She has been healthy and well. No major concerns or issues. She will return in December for fasting labs and general check up. She will need a microalbumin urine today and also foot exam.     Past Medical History:   Diagnosis Date    Arthritis     Diabetes ([address]     GERD (gastroesophageal reflux disease)     Glaucoma     High cholesterol     Ill-defined condition     EDEMA, LOWER LIMS, URINARY INCONTINENCE    Urge incontinence       Past Surgical History:   Procedure Laterality Date    BREAST SURGERY PROCEDURE UNLISTED      LT BREAST CYST BENIGN    DRAINAGE OF DEEP RECTAL ABSCESS      ENDOSCOPY, COLON, DIAGNOSTIC      2008    HX BREAST BIOPSY Left 1970s    Surgical    HX GI      RECTAL ABSCESS    HX HEART CATHETERIZATION  01/06/2017    HX HEENT      NATHAN CATARACTS    HX HYSTERECTOMY      HX KNEE REPLACEMENT      left     Current Outpatient Prescriptions   Medication Sig Dispense Refill    ondansetron (ZOFRAN ODT) 8 mg disintegrating tablet       atorvastatin (LIPITOR) 40 mg tablet Take 1 Tab by mouth daily for 90 days. 90 Tab 1    glipiZIDE (GLUCOTROL) 5 mg tablet TAKE 1 TABLET BY MOUTH ONCE OVER 24 HOURS (ONCE DAILY) 90 Tab 0    NYSTOP powder       pneumococcal 13 chilo conj dip (PREVNAR-13) 0.5 mL syrg injection 0.5 mL by IntraMUSCular route PRIOR TO DISCHARGE for 1 dose. 0.5 mL 0    lisinopril (PRINIVIL, ZESTRIL) 10 mg tablet Take 1 Tab by mouth daily. 90 Tab 3    fluticasone-vilanterol (BREO ELLIPTA) 100-25 mcg/dose inhaler Take 1 Puff by inhalation daily.  tiZANidine (ZANAFLEX) 2 mg capsule Take 2 mg by mouth three (3) times daily.       oxybutynin (DITROPAN) 5 mg tablet Take 1 Tab by mouth three (3) times daily for 270 days. 270 Tab 1    ketoconazole (NIZORAL) 2 % topical cream Apply  to affected area daily. 30 g 3    Calcium Carbonate-Vit D3-Min 600 mg calcium- 400 unit tab Take 1 pill 2 x daily 60 Tab 5    aspirin delayed-release 81 mg tablet Take  by mouth daily.  glucose blood VI test strips (BLOOD GLUCOSE TEST) strip Accu-chek alexei plus test strips Test blood sugar once daily E11.9 100 Strip 1    Lancets misc accu-chek softclix lancets Check blood sugar daily E11.9 100 Each 1    acetaminophen (TYLENOL) 650 mg CR tablet Take 650 mg by mouth every six (6) hours as needed for Pain.  gabapentin (NEURONTIN) 600 mg tablet Take 600 mg by mouth three (3) times daily as needed.  latanoprost (XALATAN) 0.005 % ophthalmic solution Administer 1 Drop to both eyes nightly.  alendronate (FOSAMAX) 70 mg tablet       alendronate (FOSAMAX) 70 mg tablet Take 1 Tab by mouth every seven (7) days for 90 days. 12 Tab 1    furosemide (LASIX) 20 mg tablet Take  by mouth every Monday, Wednesday, Friday.        Allergies   Allergen Reactions    Sulfa (Sulfonamide Antibiotics) Hives and Itching     Family History   Problem Relation Age of Onset    Heart Disease Mother     No Known Problems Father      Social History   Substance Use Topics    Smoking status: Former Smoker     Quit date: 3/11/1990    Smokeless tobacco: Never Used    Alcohol use 0.0 oz/week     0 Standard drinks or equivalent per week      Comment: occasionally     Patient Active Problem List   Diagnosis Code    GERD (gastroesophageal reflux disease) K21.9    Arthritis of left knee M17.12    Pure hypercholesterolemia E78.00    Primary open angle glaucoma H40.1190    Osteoporosis M81.0    DANIELA on CPAP G47.33, Z99.89    Symptomatic bradycardia R00.1    Type 2 diabetes mellitus with complication, without long-term current use of insulin (HCC) E11.8    Type 2 diabetes mellitus with diabetic neuropathy ([facility] [address] E11.40    Severe obesity (BMI 35.0-39.9) ([facility]) E66.01    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 Functional Ability and Level of Safety:   Hearing Loss  Hearing is good. Activities of Daily Living  The home contains: handrails and grab bars  Patient does total self care    Fall Risk  Fall Risk Assessment, last 12 mths 8/30/2018   Able to walk? Yes   Fall in past 12 months? No       Abuse Screen  Patient is not abused    Cognitive Screening   Evaluation of Cognitive Function:  Has your family/caregiver stated any concerns about your memory: no  Normal    Patient Care Team   Patient Care Team:  Ruslan Vargas MD as PCP - General (Pediatrics)  Brandin Gonzalez MD as Physician (Cardiology)  Jada Duran RN as Ambulatory Care Navigator    Assessment/Plan   Education and counseling provided:  Are appropriate based on today's review and evaluation  End-of-Life planning (with patient's consent)  Pneumococcal Vaccine  Screening Mammography  Cardiovascular screening blood test  Bone mass measurement (DEXA)  Screening for glaucoma  Diabetes screening test    Diagnoses and all orders for this visit:    1. Type 2 diabetes mellitus with complication, without long-term current use of insulin ([facility])  Assessment & Plan:  Stable, based on history, physical exam and review of pertinent labs, studies and medications; meds reconciled; continue current treatment plan. Key Antihyperglycemic Medications             glipiZIDE (GLUCOTROL) 5 mg tablet  (Taking) TAKE 1 TABLET BY MOUTH ONCE OVER 24 HOURS (ONCE DAILY)        Other Key Diabetic Medications             atorvastatin (LIPITOR) 40 mg tablet  (Taking) Take 1 Tab by mouth daily for 90 days.     lisinopril (PRINIVIL, ZESTRIL) 10 mg tablet  (Taking) Take 1 Tab by mouth daily. gabapentin (NEURONTIN) 600 mg tablet  (Taking) Take 600 mg by mouth three (3) times daily as needed. Lab Results   Component Value Date/Time    Hemoglobin A1c 6.1 06/14/2018 11:46 AM    Glucose 56 06/14/2018 11:46 AM    Creatinine 0.88 06/14/2018 11:46 AM    Microalb/Creat ratio (ug/mg creat.) <9.8 07/31/2017 10:23 AM    Cholesterol, total 186 06/14/2018 11:46 AM    HDL Cholesterol 53 06/14/2018 11:46 AM    LDL, calculated 118 06/14/2018 11:46 AM    Triglyceride 74 06/14/2018 11:46 AM     Diabetic Foot and Eye Exam HM Status   Topic Date Due    Eye Exam  01/03/1950    Diabetic Foot Care  08/30/2019         2. Type 2 diabetes mellitus with diabetic neuropathy, without long-term current use of insulin ([facility])  Assessment & Plan:  Stable, based on history, physical exam and review of pertinent labs, studies and medications; meds reconciled; continue current treatment plan. Key Antihyperglycemic Medications             glipiZIDE (GLUCOTROL) 5 mg tablet  (Taking) TAKE 1 TABLET BY MOUTH ONCE OVER 24 HOURS (ONCE DAILY)        Other Key Diabetic Medications             atorvastatin (LIPITOR) 40 mg tablet  (Taking) Take 1 Tab by mouth daily for 90 days. lisinopril (PRINIVIL, ZESTRIL) 10 mg tablet  (Taking) Take 1 Tab by mouth daily. gabapentin (NEURONTIN) 600 mg tablet  (Taking) Take 600 mg by mouth three (3) times daily as needed.         Lab Results   Component Value Date/Time    Hemoglobin A1c 6.1 06/14/2018 11:46 AM    Glucose 56 06/14/2018 11:46 AM    Creatinine 0.88 06/14/2018 11:46 AM    Microalb/Creat ratio (ug/mg creat.) <9.8 07/31/2017 10:23 AM    Cholesterol, total 186 06/14/2018 11:46 AM    HDL Cholesterol 53 06/14/2018 11:46 AM    LDL, calculated 118 06/14/2018 11:46 AM    Triglyceride 74 06/14/2018 11:46 AM     Diabetic Foot and Eye Exam HM Status   Topic Date Due    Eye Exam  Discussed    Diabetic Foot Care  08/30/2019 3. Severe obesity (BMI 35.0-39.9) ([facility])  Assessment & Plan:  Stable, based on history, physical exam and review of pertinent labs, studies and medications; meds reconciled; continue current treatment plan. Key Obesity Meds             furosemide (LASIX) 20 mg tablet Take  by mouth every Monday, Wednesday, Friday. Lab Results   Component Value Date/Time    Hemoglobin A1c 6.1 06/14/2018 11:46 AM    Glucose 56 06/14/2018 11:46 AM    Cholesterol, total 186 06/14/2018 11:46 AM    HDL Cholesterol 53 06/14/2018 11:46 AM    LDL, calculated 118 06/14/2018 11:46 AM    Triglyceride 74 06/14/2018 11:46 AM    TSH 1.66 01/06/2018 01:58 AM    Sodium 141 06/14/2018 11:46 AM    Potassium 4.4 06/14/2018 11:46 AM    ALT (SGPT) 11 06/14/2018 11:46 AM    AST (SGOT) 18 06/14/2018 11:46 AM    VITAMIN D, 25-HYDROXY 33.6 06/14/2018 11:46 AM       4. Anginal equivalent [facility])  Assessment & Plan:  Stable, based on history, physical exam and review of pertinent labs, studies and medications; meds reconciled; continue current treatment plan. Key CAD CHF Meds             atorvastatin (LIPITOR) 40 mg tablet  (Taking) Take 1 Tab by mouth daily for 90 days. lisinopril (PRINIVIL, ZESTRIL) 10 mg tablet  (Taking) Take 1 Tab by mouth daily. aspirin delayed-release 81 mg tablet  (Taking) Take  by mouth daily. furosemide (LASIX) 20 mg tablet Take  by mouth every Monday, Wednesday, Friday. Other orders  -     tiZANidine (ZANAFLEX) 2 mg capsule; Take 1 Cap by mouth three (3) times daily. -     ondansetron (ZOFRAN ODT) 8 mg disintegrating tablet; Take 1 Tab by mouth every twelve (12) hours as needed for Nausea.       Health Maintenance Due   Topic Date Due    EYE EXAM RETINAL OR DILATED Q1  Discussed    Pneumococcal 65+ Low/Medium Risk (2 of 2 - PPSV23) Discussed    GLAUCOMA SCREENING Q2Y  Discussed    Influenza Age 5 to Adult  Discussed     I have discussed the diagnosis with the patient and the intended treatment plan as seen in the above orders. The patient has received an after-visit summary and questions were answered concerning future plans. Asked to return should symptoms worsen or not improve with treatment. Any pending labs and studies will be relayed to patient when they become available. Pt verbalizes understanding of plan of care and denies further questions or concerns at this time. Follow-up Disposition:  Return in about 1 year (around 8/30/2019), or if symptoms worsen or fail to improve.

## 2018-08-31 LAB
ALBUMIN/CREAT UR: 4 MG/G CREAT (ref 0–30)
CREAT UR-MCNC: 92.6 MG/DL
MICROALBUMIN UR-MCNC: 3.7 UG/ML

## 2018-10-02 RX ORDER — GLIPIZIDE 5 MG/1
TABLET ORAL
Qty: 90 TAB | Refills: 0 | Status: SHIPPED | OUTPATIENT
Start: 2018-10-02 | End: 2019-01-01 | Stop reason: SDUPTHER

## 2018-11-05 ENCOUNTER — HOSPITAL ENCOUNTER (OUTPATIENT)
Dept: MAMMOGRAPHY | Age: 78
Discharge: HOME | End: 2018-11-05
Attending: INTERNAL MEDICINE
Payer: MEDICARE

## 2018-11-05 DIAGNOSIS — Z78.0 POST-MENOPAUSE: ICD-10-CM

## 2018-11-05 DIAGNOSIS — Z12.39 SCREENING FOR BREAST CANCER: ICD-10-CM

## 2018-11-05 DIAGNOSIS — Z13.820 SCREENING FOR OSTEOPOROSIS: ICD-10-CM

## 2018-11-05 PROCEDURE — 77067 SCR MAMMO BI INCL CAD: CPT

## 2018-11-05 PROCEDURE — 77080 DXA BONE DENSITY AXIAL: CPT

## 2018-11-15 ENCOUNTER — HOSPITAL ENCOUNTER (OUTPATIENT)
Dept: GENERAL RADIOLOGY | Age: 78
Discharge: HOME | End: 2018-11-15
Attending: PODIATRIST
Payer: MEDICARE

## 2018-11-15 DIAGNOSIS — M60.272 FOREIGN BODY GRANULOMA OF SOFT TISSUE, NEC, LEFT ANK/FT: ICD-10-CM

## 2018-11-15 PROCEDURE — 73630 X-RAY EXAM OF FOOT: CPT

## 2018-12-27 ENCOUNTER — OFFICE VISIT (OUTPATIENT)
Dept: FAMILY MEDICINE CLINIC | Age: 78
End: 2018-12-27

## 2018-12-27 VITALS
HEIGHT: 62 IN | HEART RATE: 84 BPM | SYSTOLIC BLOOD PRESSURE: 110 MMHG | WEIGHT: 186.6 LBS | TEMPERATURE: 98.1 F | BODY MASS INDEX: 34.34 KG/M2 | OXYGEN SATURATION: 95 % | DIASTOLIC BLOOD PRESSURE: 74 MMHG | RESPIRATION RATE: 20 BRPM

## 2018-12-27 DIAGNOSIS — E11.40 TYPE 2 DIABETES MELLITUS WITH DIABETIC NEUROPATHY, WITHOUT LONG-TERM CURRENT USE OF INSULIN (HCC): Primary | ICD-10-CM

## 2018-12-27 DIAGNOSIS — E78.00 PURE HYPERCHOLESTEROLEMIA: ICD-10-CM

## 2018-12-27 DIAGNOSIS — E55.9 VITAMIN D DEFICIENCY: ICD-10-CM

## 2018-12-27 NOTE — PROGRESS NOTES
Lab Results   Component Value Date/Time    Microalb/Creat ratio (ug/mg creat.) 4.0 08/30/2018 01:05 PM      Chief Complaint   Patient presents with   [facility] Follow Up     4 month follow up     she is a 66y.o. year old female who presents for evaluation. She is here for her 4-month follow up of type II DM. She reports that she has been doing well. She has lost some 12-lbs since June 2018. She has been monitoring and changing her diet. She has no major complaints. Will be having foot surgery on January 11th through [facility]. See Diabetic Report Card listed above. Patient Active Problem List    Diagnosis    Anginal equivalent ([facility] [address]    Severe obesity (BMI 35.0-39. 9)    Type 2 diabetes mellitus with complication, without long-term current use of insulin (HCC)    Type 2 diabetes mellitus with diabetic neuropathy (HCC)    Symptomatic bradycardia    DANIELA on CPAP     Set at 5-8 per patient.  Pure hypercholesterolemia    Primary open angle glaucoma    Osteoporosis    Arthritis of left knee    GERD (gastroesophageal reflux disease)       Reviewed PmHx, RxHx, FmHx, SocHx, AllgHx--dated and updated in the chart.     Review of Systems - negative except as listed above in the HPI    Objective:     Vitals:    12/27/18 0923   BP: 110/74   Pulse: 84   Resp: 20   Temp: 98.1 °F (36.7 °C)   TempSrc: Oral   SpO2: 95%   Weight: 186 lb 9.6 oz (84.6 kg)   Height: 5' 2\" (1.575 m)     Physical Examination: General appearance - alert, well appearing, and in no distress and acyanotic, in no respiratory distress  Mental status - alert, oriented to person, place, and time  Mouth - mucous membranes moist, pharynx normal without lesions  Chest - clear to auscultation, no wheezes, rales or rhonchi, symmetric air entry  Heart - normal rate, regular rhythm, normal S1, S2, no murmurs, rubs, clicks or gallops  Neurological - alert, oriented, normal speech, no focal findings or movement disorder noted  Musculoskeletal - no joint tenderness, deformity or swelling  Extremities - peripheral pulses normal, no pedal edema, no clubbing or cyanosis  Skin - normal coloration and turgor, no rashes, no suspicious skin lesions noted    Assessment/ Plan:      Follow-up Disposition: Not on File  Lab Results   Component Value Date/Time    Cholesterol, total 186 06/14/2018 11:46 AM    HDL Cholesterol 53 06/14/2018 11:46 AM    LDL, calculated 118 (H) 06/14/2018 11:46 AM    Triglyceride 74 06/14/2018 11:46 AM    CHOL/HDL Ratio 4.4 09/02/2010 03:17 PM     Lab Results   Component Value Date/Time    Hemoglobin A1c 6.1 (H) 06/14/2018 11:46 AM    Hemoglobin A1c 6.6 (H) 08/04/2017 11:14 AM    Hemoglobin A1c 6.5 (H) 07/12/2016 09:37 AM    Microalb/Creat ratio (ug/mg creat.) 4.0 08/30/2018 01:05 PM    LDL, calculated 118 (H) 06/14/2018 11:46 AM    Creatinine 0.88 06/14/2018 11:46 AM      Discussed with patient goal of Diabetes to include:  HgA1C <7, LDL cholesterol <100, Blood pressure <140/80. Discussed with patient diet and weight management and to get regular exercise. Recommend yearly eye exams and daily foot care. The patient understands and agrees with the plan. I have discussed the diagnosis with the patient and the intended plan as seen in the above orders. The patient has received an after-visit summary and questions were answered concerning future plans. Medication Side Effects and Warnings were discussed with patient  Patient Labs were reviewed and or requested  Patient Past Records were reviewed and or requested    Des Carson MD  [place], [address] [place] [address]  There are no Patient Instructions on file for this visit.

## 2018-12-27 NOTE — PATIENT INSTRUCTIONS
Diabetes Foot Health: Care Instructions  Your Care Instructions    When you have diabetes, your feet need extra care and attention. Diabetes can damage the nerve endings and blood vessels in your feet, making you less likely to notice when your feet are injured. Diabetes also limits your body's ability to fight infection and get blood to areas that need it. If you get a minor foot injury, it could become an ulcer or a serious infection. With good foot care, you can prevent most of these problems. Caring for your feet can be quick and easy. Most of the care can be done when you are bathing or getting ready for bed. Follow-up care is a key part of your treatment and safety. Be sure to make and go to all appointments, and call your doctor if you are having problems. It's also a good idea to know your test results and keep a list of the medicines you take. How can you care for yourself at home? · Keep your blood sugar close to normal by watching what and how much you eat, monitoring blood sugar, taking medicines if prescribed, and getting regular exercise. · Do not smoke. Smoking affects blood flow and can make foot problems worse. If you need help quitting, talk to your doctor about stop-smoking programs and medicines. These can increase your chances of quitting for good. · Eat a diet that is low in fats. High fat intake can cause fat to build up in your blood vessels and decrease blood flow. · Inspect your feet daily for blisters, cuts, cracks, or sores. If you cannot see well, use a mirror or have someone help you. · Take care of your feet:  ? Wash your feet every day. Use warm (not hot) water. Check the water temperature with your wrists or other part of your body, not your feet. ? Dry your feet well. Pat them dry. Do not rub the skin on your feet too hard. Dry well between your toes. If the skin on your feet stays moist, bacteria or a fungus can grow, which can lead to infection. ?  Keep your skin soft. Use moisturizing skin cream to keep the skin on your feet soft and prevent calluses and cracks. But do not put the cream between your toes, and stop using any cream that causes a rash. ? Clean underneath your toenails carefully. Do not use a sharp object to clean underneath your toenails. Use the blunt end of a nail file or other rounded tool. ? Trim and file your toenails straight across to prevent ingrown toenails. Use a nail clipper, not scissors. Use an emery board to smooth the edges. · Change socks daily. Socks without seams are best, because seams often rub the feet. You can find socks for people with diabetes from specialty catalogs. · Look inside your shoes every day for things like gravel or torn linings, which could cause blisters or sores. · Buy shoes that fit well:  ? Look for shoes that have plenty of space around the toes. This helps prevent bunions and blisters. ? Try on shoes while wearing the kind of socks you will usually wear with the shoes. ? Avoid plastic shoes. They may rub your feet and cause blisters. Good shoes should be made of materials that are flexible and breathable, such as leather or cloth. ? Break in new shoes slowly by wearing them for no more than an hour a day for several days. Take extra time to check your feet for red areas, blisters, or other problems after you wear new shoes. · Do not go barefoot. Do not wear sandals, and do not wear shoes with very thin soles. Thin soles are easy to puncture. They also do not protect your feet from hot pavement or cold weather. · Have your doctor check your feet during each visit. If you have a foot problem, see your doctor. Do not try to treat an early foot problem at home. Home remedies or treatments that you can buy without a prescription (such as corn removers) can be harmful. · Always get early treatment for foot problems. A minor irritation can lead to a major problem if not properly cared for early.   When should you call for help? Call your doctor now or seek immediate medical care if:    · You have a foot sore, an ulcer or break in the skin that is not healing after 4 days, bleeding corns or calluses, or an ingrown toenail.     · You have blue or black areas, which can mean bruising or blood flow problems.     · You have peeling skin or tiny blisters between your toes or cracking or oozing of the skin.     · You have a fever for more than 24 hours and a foot sore.     · You have new numbness or tingling in your feet that does not go away after you move your feet or change positions.     · You have unexplained or unusual swelling of the foot or ankle.    Watch closely for changes in your health, and be sure to contact your doctor if:    · You cannot do proper foot care. Where can you learn more? Go to http://nikki-alicia.info/. Enter A739 in the search box to learn more about \"Diabetes Foot Health: Care Instructions. \"  Current as of: December 7, 2017  Content Version: 11.8  © 7447-4909 [employer]. Care instructions adapted under license by [employer] (which disclaims liability or warranty for this information). If you have questions about a medical condition or this instruction, always ask your healthcare professional. [address] any warranty or liability for your use of this information.

## 2018-12-27 NOTE — PROGRESS NOTES
Identified pt with two pt identifiers(name and ). Chief Complaint   Patient presents with   [facility] Follow Up     4 month follow up        Health Maintenance Due   Topic    Shingrix Vaccine Age 50> (1 of 2)    Pneumococcal 65+ Low/Medium Risk (2 of 2 - PPSV23)    Influenza Age 5 to Adult     HEMOGLOBIN A1C Q6M        Wt Readings from Last 3 Encounters:   18 192 lb 12.8 oz (87.5 kg)   18 198 lb (89.8 kg)   18 196 lb (88.9 kg)     Temp Readings from Last 3 Encounters:   18 98.2 °F (36.8 °C) (Oral)   18 96.7 °F (35.9 °C) (Oral)   18 97.2 °F (36.2 °C) (Temporal)     BP Readings from Last 3 Encounters:   18 110/64   18 133/68   18 140/86     Pulse Readings from Last 3 Encounters:   18 (!) 50   18 (!) 50   18 96         Learning Assessment:  :     Learning Assessment 2017   PRIMARY LEARNER Patient Patient   HIGHEST LEVEL OF EDUCATION - PRIMARY LEARNER  SOME COLLEGE -   BARRIERS PRIMARY LEARNER NONE -   PRIMARY LANGUAGE ENGLISH ENGLISH   LEARNER PREFERENCE PRIMARY READING LISTENING   ANSWERED BY patient patient   RELATIONSHIP SELF SELF       Depression Screening:  :     PHQ over the last two weeks 2018   Little interest or pleasure in doing things Not at all   Feeling down, depressed, irritable, or hopeless Not at all   Total Score PHQ 2 0       Fall Risk Assessment:  :     Fall Risk Assessment, last 12 mths 2018   Able to walk? Yes   Fall in past 12 months? No       Abuse Screening:  :     Abuse Screening Questionnaire 2017 11/15/2016   Do you ever feel afraid of your partner? N N   Are you in a relationship with someone who physically or mentally threatens you? N N   Is it safe for you to go home?  Y Y       Coordination of Care Questionnaire:  :     1) Have you been to an emergency room, urgent care clinic since your last visit? no   Hospitalized since your last visit? no             2) Have you seen or consulted any other health care providers outside of [address] [place] [address] since your last visit? yes  (Include any pap smears or colon screenings in this section.)    3) Do you have an Advance Directive on file? no  Are you interested in receiving information about Advance Directives? no    Reviewed record in preparation for visit and have obtained necessary documentation. Medication reconciliation up to date and corrected with patient at this time.

## 2018-12-28 LAB
25(OH)D3+25(OH)D2 SERPL-MCNC: 37.1 NG/ML (ref 30–100)
ALBUMIN SERPL-MCNC: 4.1 G/DL (ref 3.5–4.8)
ALBUMIN/GLOB SERPL: 1.2 {RATIO} (ref 1.2–2.2)
ALP SERPL-CCNC: 88 IU/L (ref 39–117)
ALT SERPL-CCNC: 12 IU/L (ref 0–32)
AST SERPL-CCNC: 15 IU/L (ref 0–40)
BASOPHILS # BLD AUTO: 0 X10E3/UL (ref 0–0.2)
BASOPHILS NFR BLD AUTO: 0 %
BILIRUB SERPL-MCNC: <0.2 MG/DL (ref 0–1.2)
BUN SERPL-MCNC: 14 MG/DL (ref 8–27)
BUN/CREAT SERPL: 12 (ref 12–28)
CALCIUM SERPL-MCNC: 9.8 MG/DL (ref 8.7–10.3)
CHLORIDE SERPL-SCNC: 102 MMOL/L (ref 96–106)
CHOLEST SERPL-MCNC: 151 MG/DL (ref 100–199)
CO2 SERPL-SCNC: 25 MMOL/L (ref 20–29)
CREAT SERPL-MCNC: 1.17 MG/DL (ref 0.57–1)
CRP SERPL HS-MCNC: 4.69 MG/L (ref 0–3)
EOSINOPHIL # BLD AUTO: 0.1 X10E3/UL (ref 0–0.4)
EOSINOPHIL NFR BLD AUTO: 2 %
ERYTHROCYTE [DISTWIDTH] IN BLOOD BY AUTOMATED COUNT: 14.7 % (ref 12.3–15.4)
EST. AVERAGE GLUCOSE BLD GHB EST-MCNC: 131 MG/DL
GLOBULIN SER CALC-MCNC: 3.5 G/DL (ref 1.5–4.5)
GLUCOSE SERPL-MCNC: 89 MG/DL (ref 65–99)
HBA1C MFR BLD: 6.2 % (ref 4.8–5.6)
HCT VFR BLD AUTO: 36.5 % (ref 34–46.6)
HDLC SERPL-MCNC: 52 MG/DL
HGB BLD-MCNC: 12.1 G/DL (ref 11.1–15.9)
IMM GRANULOCYTES # BLD: 0 X10E3/UL (ref 0–0.1)
IMM GRANULOCYTES NFR BLD: 0 %
INTERPRETATION, 910389: NORMAL
INTERPRETATION: NORMAL
LDLC SERPL CALC-MCNC: 80 MG/DL (ref 0–99)
LYMPHOCYTES # BLD AUTO: 1.9 X10E3/UL (ref 0.7–3.1)
LYMPHOCYTES NFR BLD AUTO: 32 %
Lab: NORMAL
MCH RBC QN AUTO: 28.7 PG (ref 26.6–33)
MCHC RBC AUTO-ENTMCNC: 33.2 G/DL (ref 31.5–35.7)
MCV RBC AUTO: 87 FL (ref 79–97)
MONOCYTES # BLD AUTO: 0.4 X10E3/UL (ref 0.1–0.9)
MONOCYTES NFR BLD AUTO: 7 %
NEUTROPHILS # BLD AUTO: 3.4 X10E3/UL (ref 1.4–7)
NEUTROPHILS NFR BLD AUTO: 59 %
PDF IMAGE, 910387: NORMAL
PLATELET # BLD AUTO: 280 X10E3/UL (ref 150–379)
POTASSIUM SERPL-SCNC: 5 MMOL/L (ref 3.5–5.2)
PROT SERPL-MCNC: 7.6 G/DL (ref 6–8.5)
RBC # BLD AUTO: 4.22 X10E6/UL (ref 3.77–5.28)
SODIUM SERPL-SCNC: 142 MMOL/L (ref 134–144)
TRIGL SERPL-MCNC: 95 MG/DL (ref 0–149)
VLDLC SERPL CALC-MCNC: 19 MG/DL (ref 5–40)
WBC # BLD AUTO: 5.8 X10E3/UL (ref 3.4–10.8)

## 2019-01-02 RX ORDER — GLIPIZIDE 5 MG/1
TABLET ORAL
Qty: 90 TAB | Refills: 0 | Status: SHIPPED | OUTPATIENT
Start: 2019-01-02 | End: 2019-04-06 | Stop reason: SDUPTHER

## 2019-01-09 NOTE — H&P
Valentina Spencer DPM - Alley Hubbard DPM                                     Podiatric Surgery Pre-Operative History & Physical  Subjective:    Soft tissue mass left foot is painful and uncomforable     Patient also complains of hammertoe right 5th digit. Pt has tried conservative measures such as padding and strapping  which have failed. Pt at this time ready to undergo surgical correction for their condition. There are no interval updates to the patient's H&P since PCP clearance. History:  SOFT TISSUE MASS IN LEFT SECOND TOE AND A RIGHT FOOT HAMMERTOE  Allergies   Allergen Reactions    Sulfa (Sulfonamide Antibiotics) Hives and Itching     Family History   Problem Relation Age of Onset    Heart Disease Mother     No Known Problems Father       Past Medical History:   Diagnosis Date    Arthritis     Diabetes ([address]     GERD (gastroesophageal reflux disease)     Glaucoma     High cholesterol     Ill-defined condition     EDEMA, LOWER LIMS, URINARY INCONTINENCE    Urge incontinence      Past Surgical History:   Procedure Laterality Date    BREAST SURGERY PROCEDURE UNLISTED      LT BREAST CYST BENIGN    DRAINAGE OF DEEP RECTAL ABSCESS      ENDOSCOPY, COLON, DIAGNOSTIC          HX BREAST BIOPSY Left 1970s    Surgical    HX GI      RECTAL ABSCESS    HX HEART CATHETERIZATION  2017    HX HEENT      NATHAN CATARACTS    HX HYSTERECTOMY      HX KNEE REPLACEMENT      left     Social History     Tobacco Use    Smoking status: Former Smoker     Last attempt to quit: 3/11/1990     Years since quittin.8    Smokeless tobacco: Never Used   Substance Use Topics    Alcohol use:  Yes     Alcohol/week: 0.0 oz     Comment: occasionally       Social History     Substance and Sexual Activity   Alcohol Use Yes    Alcohol/week: 0.0 oz    Comment: occasionally     Social History     Substance and Sexual Activity   Drug Use No      Social History     Tobacco Use   Smoking Status Former Smoker    Last attempt to quit: 3/11/1990    Years since quittin.8   Smokeless Tobacco Never Used     No current facility-administered medications for this encounter. Current Outpatient Medications   Medication Sig    glipiZIDE (GLUCOTROL) 5 mg tablet TAKE 1 TABLET BY MOUTH ONCE DAILY    NYSTOP powder APPLY POWDER TOPICALLY TO AFFECTED AREA 4 TIMES DAILY UNTIL RASH RESOLVES    alendronate (FOSAMAX) 70 mg tablet     tiZANidine (ZANAFLEX) 2 mg capsule 1-2 tablets, 1-2 hours prior to bedtime as needed for spasm.  ondansetron (ZOFRAN ODT) 8 mg disintegrating tablet Take 1 Tab by mouth every twelve (12) hours as needed for Nausea.  atorvastatin (LIPITOR) 40 mg tablet Take 1 Tab by mouth daily for 90 days.  pneumococcal 13 chilo conj dip (PREVNAR-13) 0.5 mL syrg injection 0.5 mL by IntraMUSCular route PRIOR TO DISCHARGE for 1 dose.  lisinopril (PRINIVIL, ZESTRIL) 10 mg tablet Take 1 Tab by mouth daily.  fluticasone-vilanterol (BREO ELLIPTA) 100-25 mcg/dose inhaler Take 1 Puff by inhalation daily.  oxybutynin (DITROPAN) 5 mg tablet Take 1 Tab by mouth three (3) times daily for 270 days.  alendronate (FOSAMAX) 70 mg tablet Take 1 Tab by mouth every seven (7) days for 90 days.  ketoconazole (NIZORAL) 2 % topical cream Apply  to affected area daily.  Calcium Carbonate-Vit D3-Min 600 mg calcium- 400 unit tab Take 1 pill 2 x daily    aspirin delayed-release 81 mg tablet Take  by mouth daily.  glucose blood VI test strips (BLOOD GLUCOSE TEST) strip Accu-chek alexei plus test strips Test blood sugar once daily E11.9    Lancets misc accu-chek softclix lancets Check blood sugar daily E11.9    acetaminophen (TYLENOL) 650 mg CR tablet Take 650 mg by mouth every six (6) hours as needed for Pain.  gabapentin (NEURONTIN) 600 mg tablet Take 600 mg by mouth three (3) times daily as needed.     latanoprost (XALATAN) 0.005 % ophthalmic solution Administer 1 Drop to both eyes nightly.  furosemide (LASIX) 20 mg tablet Take  by mouth every Monday, Wednesday, Friday. Objective:  Vitals: No data found. CV: regular rate / rhythm, +S1 / S2    Pulm: lungs clear to ascultation b/l, no wheezes/rales/rhonchi    Vascular:  DP 2 of 4, PT 2 of 4, capillary fill time brisk, edema is absent, skin temperature is warm, varicosities are absent. Dermatological:  Nails are normal, thickened, elongated, discolored, painful to palpation, 3 mm thick, with subungual debris. Skin is normal in turgor, temperature and elasticity. Hyperkeratotic lesion right 5th digit dorsal    Soft tissue mass lateral second digit, mobile, firm, not translucent and radiopaque     Neurological:  DTR are present, protective sensation per 5.07 Pierron Nunu monofilament is intact, patient is AAOx3, mood is normal.    Orthopedic:  Adductovarus right 5th digit, rigid    B/L LE are symmetric, ROM of ankle, STJ, 1st MTPJ is WNL, MMT 5/5 for B/L LE. Pedal amputations: none     Constitutional: Pt is a well developed elderly obese female     Lymphatics: no tenderness to palpation of neck/axillary/inguinal nodes. Imaging:    RFXR: EXAM:  XR FOOT LT MIN 3 V     INDICATION:   m60.272. Foreign body granuloma of soft tissue of left  ankle/foot.     COMPARISON:  None.     FINDINGS:  Three views of the left foot demonstrate no fracture or other acute  osseous or articular abnormality. There is a 3 mm peripherally calcified lesion  in the soft tissues along the lateral aspect of the base of the second toe. No  other soft tissue abnormality is evident.     IMPRESSION  IMPRESSION:  No acute osseous or articular abnormality. 3 mm peripherally  calcified lesion in the soft tissues of the second toe.           Labs:  No results for input(s): HGB, HCT, INR, NA, K, CL, CO2, BUN, CREA, GLU, HGBEXT, HCTEXT in the last 72 hours.     No lab exists for component: INREXT    Assessment/Plan:       79 year old male/female had a PMH signification for DM, HTN, GERD presents for surgical correction of left foot soft tissue mass and right fifth digit hammer toe via left foot mass excision and right fifth digit arthroplasty. - Patient is evaluated and treated  - Patient had exhausted all conservative measures  - Patient would like to proceed with surgical correction of deformity All alternatives, risks, benefits, and complications of proposed procedure(s) discussed at length with the patient. All questions/concerns answered/addressed. Pt advised to be NPO since midnight and has proper medical clearance.

## 2019-01-10 ENCOUNTER — HOSPITAL ENCOUNTER (OUTPATIENT)
Dept: PREADMISSION TESTING | Age: 79
Discharge: HOME | End: 2019-01-10
Payer: MEDICARE

## 2019-01-10 VITALS
HEART RATE: 60 BPM | OXYGEN SATURATION: 95 % | BODY MASS INDEX: 33.68 KG/M2 | RESPIRATION RATE: 18 BRPM | HEIGHT: 63 IN | DIASTOLIC BLOOD PRESSURE: 58 MMHG | SYSTOLIC BLOOD PRESSURE: 120 MMHG | WEIGHT: 190.06 LBS | TEMPERATURE: 98.4 F

## 2019-01-10 LAB
ATRIAL RATE: 55 BPM
CALCULATED P AXIS, ECG09: 63 DEGREES
CALCULATED R AXIS, ECG10: -34 DEGREES
CALCULATED T AXIS, ECG11: 38 DEGREES
DIAGNOSIS, 93000: NORMAL
P-R INTERVAL, ECG05: 146 MS
Q-T INTERVAL, ECG07: 424 MS
QRS DURATION, ECG06: 74 MS
QTC CALCULATION (BEZET), ECG08: 405 MS
VENTRICULAR RATE, ECG03: 55 BPM

## 2019-01-10 PROCEDURE — 93005 ELECTROCARDIOGRAM TRACING: CPT

## 2019-01-10 RX ORDER — DIPHENHYDRAMINE HYDROCHLORIDE 50 MG/ML
12.5 INJECTION, SOLUTION INTRAMUSCULAR; INTRAVENOUS AS NEEDED
Status: CANCELLED | OUTPATIENT
Start: 2019-01-10 | End: 2019-01-10

## 2019-01-10 RX ORDER — SODIUM CHLORIDE 0.9 % (FLUSH) 0.9 %
5-40 SYRINGE (ML) INJECTION AS NEEDED
Status: CANCELLED | OUTPATIENT
Start: 2019-01-10

## 2019-01-10 RX ORDER — SODIUM CHLORIDE, SODIUM LACTATE, POTASSIUM CHLORIDE, CALCIUM CHLORIDE 600; 310; 30; 20 MG/100ML; MG/100ML; MG/100ML; MG/100ML
125 INJECTION, SOLUTION INTRAVENOUS CONTINUOUS
Status: CANCELLED | OUTPATIENT
Start: 2019-01-10

## 2019-01-10 RX ORDER — SODIUM CHLORIDE 0.9 % (FLUSH) 0.9 %
5-40 SYRINGE (ML) INJECTION EVERY 8 HOURS
Status: CANCELLED | OUTPATIENT
Start: 2019-01-10

## 2019-01-10 RX ORDER — HYDROMORPHONE HYDROCHLORIDE 1 MG/ML
.25-1 INJECTION, SOLUTION INTRAMUSCULAR; INTRAVENOUS; SUBCUTANEOUS
Status: CANCELLED | OUTPATIENT
Start: 2019-01-10

## 2019-01-10 NOTE — PERIOP NOTES
[address] [address] [facility]                            MAIN OR                                  [phone]   MAIN PRE OP                          [phone]                                                                                AMBULATORY PRE OP          [phone]  PRE-ADMISSION TESTING    [phone]     Surgery Date:   01/11/2018         Is surgery arrival time given by surgeon? NO  If NO, [facility] staff will call you between 3 and 7pm the day before your surgery with your arrival time. (If your surgery is on a Monday, we will call you the Friday before.)    Call [phone] after 7pm Monday-Friday if you did not receive your arrival time. INSTRUCTIONS BEFORE YOUR SURGERY   When You  Arrive   Arrive at the [address] [facility] on the day of your surgery  Have your insurance card, photo ID, and any copayment (if needed)     Food   and   Drink   NO food or drink after midnight the night before surgery    This means NO water, gum, mints, coffee, juice, etc.  No alcohol (beer, wine, liquor) 24 hours before and after surgery     Medications to   TAKE   Morning of Surgery   MEDICATIONS TO TAKE THE MORNING OF SURGERY WITH A SIP OF WATER:    Lipitor, Breo     Medications  To  STOP      7 days before surgery    Non-Steroidal anti-inflammatory Drugs (NSAID's): for example, Ibuprofen (Advil, Motrin), Naproxen (Aleve)   Aspirin, if taking for pain    Herbal supplements, vitamins, and fish oil   Other:  (Pain medications not listed above, including Tylenol may be taken)   Blood  Thinners    If you take  Aspirin, Plavix, Coumadin, or any blood-thinning or anti-blood clot medicine, talk to the doctor who prescribed the medications for pre-operative instructions.      Bathing Clothing  Jewelry  Valuables       If you shower the morning of surgery, please do not apply anything to your skin (lotions, powders, deodorant, or makeup, especially mascara)   Follow all special bath instructions (for total joint replacement, spine and bowel surgeries)   Do not shave or trim anywhere 24 hours before surgery   Wear your hair loose or down; no pony-tails, buns, or metal hair clips   Wear loose, comfortable, clean clothes   Wear glasses instead of contacts   Leave money, valuables, and jewelry, including body piercings, at home     Going Home       or Spending the Night    SAME-DAY SURGERY: You must have a responsible adult drive you home and stay with you 24 hours after surgery   ADMITS: If your doctor is keeping you into the hospital after surgery, leave personal belongings/luggage in your car until you have a hospital room number. Hospital discharge time is 12 noon  Drivers must be here before 12 noon unless you are told differently   Special Instructions          Follow all instructions so your surgery wont be cancelled. Please, be on time. If a situation occurs and you are delayed the day of surgery, call [phone]   If your physical condition changes (like a fever, cold, flu, etc.) call your surgeon. The patient was contacted  in person. The patient verbalizes understanding of all instructions and does not  need reinforcement.

## 2019-01-10 NOTE — PERIOP NOTES
Patient here for PAT, DOS 1/11/19. Patient currently scheduled for local anesthesia, call placed to Dr. Ethan Pearson office and patient should be scheduled for MAC. Dr. Ethan Pearson office to notify scheduling for correction.

## 2019-01-11 ENCOUNTER — ANESTHESIA EVENT (OUTPATIENT)
Dept: SURGERY | Age: 79
End: 2019-01-11
Payer: MEDICARE

## 2019-01-11 ENCOUNTER — APPOINTMENT (OUTPATIENT)
Dept: GENERAL RADIOLOGY | Age: 79
End: 2019-01-11
Attending: PODIATRIST
Payer: MEDICARE

## 2019-01-11 ENCOUNTER — ANESTHESIA (OUTPATIENT)
Dept: SURGERY | Age: 79
End: 2019-01-11
Payer: MEDICARE

## 2019-01-11 ENCOUNTER — HOSPITAL ENCOUNTER (OUTPATIENT)
Age: 79
Setting detail: OUTPATIENT SURGERY
Discharge: HOME | End: 2019-01-11
Attending: PODIATRIST | Admitting: PODIATRIST
Payer: MEDICARE

## 2019-01-11 VITALS
RESPIRATION RATE: 14 BRPM | SYSTOLIC BLOOD PRESSURE: 125 MMHG | HEART RATE: 54 BPM | TEMPERATURE: 98.2 F | OXYGEN SATURATION: 97 % | DIASTOLIC BLOOD PRESSURE: 65 MMHG

## 2019-01-11 DIAGNOSIS — G89.18 POST-OP PAIN: Primary | ICD-10-CM

## 2019-01-11 LAB
GLUCOSE BLD STRIP.AUTO-MCNC: 111 MG/DL (ref 65–100)
GLUCOSE BLD STRIP.AUTO-MCNC: 114 MG/DL (ref 65–100)
SERVICE CMNT-IMP: ABNORMAL
SERVICE CMNT-IMP: ABNORMAL

## 2019-01-11 PROCEDURE — 77030018836 HC SOL IRR NACL ICUM -A: Performed by: PODIATRIST

## 2019-01-11 PROCEDURE — 88305 TISSUE EXAM BY PATHOLOGIST: CPT

## 2019-01-11 PROCEDURE — 76210000022 HC REC RM PH II 1.5 TO 2 HR: Performed by: PODIATRIST

## 2019-01-11 PROCEDURE — 82962 GLUCOSE BLOOD TEST: CPT

## 2019-01-11 PROCEDURE — 73620 X-RAY EXAM OF FOOT: CPT

## 2019-01-11 PROCEDURE — 74011250636 HC RX REV CODE- 250/636: Performed by: ANESTHESIOLOGY

## 2019-01-11 PROCEDURE — 77030031139 HC SUT VCRL2 J&J -A: Performed by: PODIATRIST

## 2019-01-11 PROCEDURE — 74011250636 HC RX REV CODE- 250/636

## 2019-01-11 PROCEDURE — 74011250636 HC RX REV CODE- 250/636: Performed by: PODIATRIST

## 2019-01-11 PROCEDURE — 76060000032 HC ANESTHESIA 0.5 TO 1 HR: Performed by: PODIATRIST

## 2019-01-11 PROCEDURE — 77030002933 HC SUT MCRYL J&J -A: Performed by: PODIATRIST

## 2019-01-11 PROCEDURE — 77030002986 HC SUT PROL J&J -A: Performed by: PODIATRIST

## 2019-01-11 PROCEDURE — 77030020782 HC GWN BAIR PAWS FLX 3M -B

## 2019-01-11 PROCEDURE — 77030000032 HC CUF TRNQT ZIMM -B: Performed by: PODIATRIST

## 2019-01-11 PROCEDURE — 77030002916 HC SUT ETHLN J&J -A: Performed by: PODIATRIST

## 2019-01-11 PROCEDURE — 74011000250 HC RX REV CODE- 250: Performed by: PODIATRIST

## 2019-01-11 PROCEDURE — 77030006773 HC BLD SAW OSC BRSM -A: Performed by: PODIATRIST

## 2019-01-11 PROCEDURE — 76010000138 HC OR TIME 0.5 TO 1 HR: Performed by: PODIATRIST

## 2019-01-11 PROCEDURE — 77030036687 HC SHOE PSTOP S2SG -A

## 2019-01-11 RX ORDER — SODIUM CHLORIDE 0.9 % (FLUSH) 0.9 %
5-40 SYRINGE (ML) INJECTION EVERY 8 HOURS
Status: CANCELLED | OUTPATIENT
Start: 2019-01-11

## 2019-01-11 RX ORDER — MIDAZOLAM HYDROCHLORIDE 1 MG/ML
INJECTION, SOLUTION INTRAMUSCULAR; INTRAVENOUS AS NEEDED
Status: DISCONTINUED | OUTPATIENT
Start: 2019-01-11 | End: 2019-01-11 | Stop reason: HOSPADM

## 2019-01-11 RX ORDER — LIDOCAINE HYDROCHLORIDE 20 MG/ML
INJECTION, SOLUTION INFILTRATION; PERINEURAL AS NEEDED
Status: DISCONTINUED | OUTPATIENT
Start: 2019-01-11 | End: 2019-01-11 | Stop reason: HOSPADM

## 2019-01-11 RX ORDER — HYDROMORPHONE HYDROCHLORIDE 1 MG/ML
1 INJECTION, SOLUTION INTRAMUSCULAR; INTRAVENOUS; SUBCUTANEOUS
Status: CANCELLED | OUTPATIENT
Start: 2019-01-11

## 2019-01-11 RX ORDER — FLUMAZENIL 0.1 MG/ML
0.2 INJECTION INTRAVENOUS
Status: DISCONTINUED | OUTPATIENT
Start: 2019-01-11 | End: 2019-01-11 | Stop reason: HOSPADM

## 2019-01-11 RX ORDER — SODIUM CHLORIDE, SODIUM LACTATE, POTASSIUM CHLORIDE, CALCIUM CHLORIDE 600; 310; 30; 20 MG/100ML; MG/100ML; MG/100ML; MG/100ML
125 INJECTION, SOLUTION INTRAVENOUS CONTINUOUS
Status: DISCONTINUED | OUTPATIENT
Start: 2019-01-11 | End: 2019-01-11 | Stop reason: HOSPADM

## 2019-01-11 RX ORDER — NALOXONE HYDROCHLORIDE 0.4 MG/ML
0.04 INJECTION, SOLUTION INTRAMUSCULAR; INTRAVENOUS; SUBCUTANEOUS
Status: DISCONTINUED | OUTPATIENT
Start: 2019-01-11 | End: 2019-01-11 | Stop reason: HOSPADM

## 2019-01-11 RX ORDER — PROPOFOL 10 MG/ML
INJECTION, EMULSION INTRAVENOUS
Status: DISCONTINUED | OUTPATIENT
Start: 2019-01-11 | End: 2019-01-11 | Stop reason: HOSPADM

## 2019-01-11 RX ORDER — SODIUM CHLORIDE 0.9 % (FLUSH) 0.9 %
5-40 SYRINGE (ML) INJECTION AS NEEDED
Status: CANCELLED | OUTPATIENT
Start: 2019-01-11

## 2019-01-11 RX ORDER — HYDROCODONE BITARTRATE AND ACETAMINOPHEN 5; 325 MG/1; MG/1
1 TABLET ORAL
Status: CANCELLED | OUTPATIENT
Start: 2019-01-11

## 2019-01-11 RX ORDER — CEPHALEXIN 250 MG/1
500 CAPSULE ORAL 2 TIMES DAILY
Qty: 20 CAP | Refills: 0 | Status: SHIPPED | OUTPATIENT
Start: 2019-01-11 | End: 2019-03-01 | Stop reason: ALTCHOICE

## 2019-01-11 RX ORDER — PROPOFOL 10 MG/ML
INJECTION, EMULSION INTRAVENOUS AS NEEDED
Status: DISCONTINUED | OUTPATIENT
Start: 2019-01-11 | End: 2019-01-11 | Stop reason: HOSPADM

## 2019-01-11 RX ORDER — LIDOCAINE HYDROCHLORIDE 10 MG/ML
0.1 INJECTION, SOLUTION EPIDURAL; INFILTRATION; INTRACAUDAL; PERINEURAL AS NEEDED
Status: DISCONTINUED | OUTPATIENT
Start: 2019-01-11 | End: 2019-01-11 | Stop reason: HOSPADM

## 2019-01-11 RX ORDER — CEFAZOLIN SODIUM/WATER 2 G/20 ML
2 SYRINGE (ML) INTRAVENOUS ONCE
Status: COMPLETED | OUTPATIENT
Start: 2019-01-11 | End: 2019-01-11

## 2019-01-11 RX ORDER — SODIUM CHLORIDE 0.9 % (FLUSH) 0.9 %
5-40 SYRINGE (ML) INJECTION EVERY 8 HOURS
Status: DISCONTINUED | OUTPATIENT
Start: 2019-01-11 | End: 2019-01-11 | Stop reason: HOSPADM

## 2019-01-11 RX ORDER — SODIUM CHLORIDE 0.9 % (FLUSH) 0.9 %
5-40 SYRINGE (ML) INJECTION AS NEEDED
Status: DISCONTINUED | OUTPATIENT
Start: 2019-01-11 | End: 2019-01-11 | Stop reason: HOSPADM

## 2019-01-11 RX ORDER — FENTANYL CITRATE 50 UG/ML
INJECTION, SOLUTION INTRAMUSCULAR; INTRAVENOUS AS NEEDED
Status: DISCONTINUED | OUTPATIENT
Start: 2019-01-11 | End: 2019-01-11 | Stop reason: HOSPADM

## 2019-01-11 RX ORDER — IBUPROFEN 200 MG
800 TABLET ORAL
Qty: 60 TAB | Refills: 2 | Status: SHIPPED | OUTPATIENT
Start: 2019-01-11 | End: 2020-08-05

## 2019-01-11 RX ORDER — ACETAMINOPHEN 325 MG/1
650 TABLET ORAL
Status: CANCELLED | OUTPATIENT
Start: 2019-01-11

## 2019-01-11 RX ORDER — HYDROCODONE BITARTRATE AND ACETAMINOPHEN 5; 325 MG/1; MG/1
1 TABLET ORAL
Qty: 40 TAB | Refills: 0 | Status: SHIPPED | OUTPATIENT
Start: 2019-01-11 | End: 2019-03-29

## 2019-01-11 RX ADMIN — LIDOCAINE HYDROCHLORIDE 60 MG: 20 INJECTION, SOLUTION INFILTRATION; PERINEURAL at 12:17

## 2019-01-11 RX ADMIN — PROPOFOL 20 MG: 10 INJECTION, EMULSION INTRAVENOUS at 12:30

## 2019-01-11 RX ADMIN — Medication 2 G: at 12:17

## 2019-01-11 RX ADMIN — FENTANYL CITRATE 25 MCG: 50 INJECTION, SOLUTION INTRAMUSCULAR; INTRAVENOUS at 12:17

## 2019-01-11 RX ADMIN — PROPOFOL 50 MCG/KG/MIN: 10 INJECTION, EMULSION INTRAVENOUS at 12:17

## 2019-01-11 RX ADMIN — MIDAZOLAM HYDROCHLORIDE 1 MG: 1 INJECTION, SOLUTION INTRAMUSCULAR; INTRAVENOUS at 12:21

## 2019-01-11 RX ADMIN — PROPOFOL 75 MCG/KG/MIN: 10 INJECTION, EMULSION INTRAVENOUS at 12:20

## 2019-01-11 RX ADMIN — SODIUM CHLORIDE, SODIUM LACTATE, POTASSIUM CHLORIDE, AND CALCIUM CHLORIDE 125 ML/HR: 600; 310; 30; 20 INJECTION, SOLUTION INTRAVENOUS at 11:43

## 2019-01-11 RX ADMIN — PROPOFOL 20 MG: 10 INJECTION, EMULSION INTRAVENOUS at 12:17

## 2019-01-11 NOTE — H&P
Date of Surgery Update:  Elliott Martel was seen and examined. History and physical has been reviewed. The patient has been examined.  There have been no significant clinical changes since the completion of the originally dated History and Physical.    Signed By: Rodolfo Ayon DPM     January 11, 2019 11:43 AM

## 2019-01-11 NOTE — OP NOTES
Operative Report      Procedure date: 1/11/19       Surgeon:  Dr. Sandra Torres     Assistant: Dr. Matthieu Judd diagnosis:   1. RT 5th  hammer toe   2. Pain in RT 5th toe   3. LT 2nd toe ST mass  4. Pain in LT 2nd toe      Post op diagnosis:   1. RT 5th  hammer toe   2. Pain in RT 5th toe   3. LT 2nd toe ST mass  4. Pain in LT 2nd toe      Procedure:  1. RT 5th toe hammertoe correction  2. LT 2nd toe ST mass excision      Pathology: ST mass LT 2nd toe      Anesthesia: local (15 cc 1:1 mix 1% lidocaine and 0.5% marcaine) with MAC      Hemostasis: Ankle tourniquet at 250mmHg for 28 min      Estimated Blood loss: <  5 cc      Materials: 4-0 monocryl, 4-0 nylon      Injectables: none      Complications: none    Description of procedure:    Pre-operative patient identification was carried out by myself, then the patient was brought to the operating room and placed on the operating table in a supine position. After adequate anesthesia was obtained the B/L LE was then prepped and draped in the normal sterile fashion. The operative LE was exanguinated and tournquet inflated to 250mmHg. Attention was then directed to the 5th toe of the RT foot. An elliptical incision oriented distal medial proximal lateral was made over the proximal interphalangeal joint, and continued down to the capsule, being careful to avoid all neurovascular structures. At this time, a transverse tenotomy & capsulotomy was performed to the proximal interphalangeal joint. The head of the proximal phalanx was then freed of its capsular & ligamentous attachments. Next utilizing sagittal saw, the head of the proximal phalanx was resected & passed from the operative site. Correction of the deformity was assessed at this time and noted to be excellent. The wound was then irrigated with copious amounts of sterile normal saline.  The extensor tendon was reapproximated and coapted utilizing 4-0 monocryl and the skin was reapproximated and coapted utilizing 4-0 prolene in simple interrupted fashion. Attention was then directed to the LT 2nd toe where there was a palpable mass. A curvilinear incision was made over the mass encompassing it's dorsal extent. Dissection was carried to the subcutaneous tissues where a mass comprised of brownish granular fatty tissue with calcifications was visualized. The mass was then dissected out with a #15 blade and excised and will be sent for pathological assessment. Satisfied that there was no further abnormal tissue, the site was copiously irrigated with NSS, and then closed in simple interrupted fashion with 4-0 prolene    Surgical site was splinted with xeroform, betadine soaked gauze and dressed with dry 4x4 gauze, cling and ACE bandage. The patient tolerated the procedure and anesthesia well, and was transported from the operating room to the PACU with vital signs stable and with the neurovascular status of the operative feet intact.

## 2019-01-11 NOTE — ANESTHESIA PREPROCEDURE EVALUATION
Anesthetic History   No history of anesthetic complications            Review of Systems / Medical History  Patient summary reviewed, nursing notes reviewed and pertinent labs reviewed    Pulmonary  Within defined limits      Sleep apnea           Neuro/Psych   Within defined limits           Cardiovascular  Within defined limits            CAD    Exercise tolerance: >4 METS     GI/Hepatic/Renal  Within defined limits   GERD           Endo/Other  Within defined limits  Diabetes    Arthritis     Other Findings              Physical Exam    Airway  Mallampati: II    Neck ROM: normal range of motion   Mouth opening: Normal     Cardiovascular  Regular rate and rhythm,  S1 and S2 normal,  no murmur, click, rub, or gallop  Rhythm: regular  Rate: normal         Dental    Dentition: Full upper dentures and Full lower dentures     Pulmonary  Breath sounds clear to auscultation               Abdominal  GI exam deferred       Other Findings            Anesthetic Plan    ASA: 2  Anesthesia type: MAC          Induction: Intravenous  Anesthetic plan and risks discussed with: Patient

## 2019-01-11 NOTE — DISCHARGE INSTRUCTIONS
Steven Parry DPM - Silvano Serrano. Leola Mar DPM          Podiatric Surgery Post-Operative Instructions    1. Foot surgery is more inconvenient than painful. Although it is normal to have some pain after surgery, you may be surprised at how well you actually feel. In spite of this, it is imperative that you follow the instructions carefully and prepare yourself and your family for an extended period of rest and convalescence. 2. Numbness in the feet will wear off after approximately five to twelve hours (this will vary patient to patient). 3. Start taking your pain medication immediately upon returning home. First take the anti-inflammatory medicine with food or milk. Take the anti-inflammatory even if you experience no pain. The painkiller is only for pain not relieved by the anti-inflammatory and should be taken only if needed. Stop the medication if you develop any abnormal rash, stomach pains, or unusual sensation and call the office. The following medications have been prescribed to you:    Rx pain killer: Hydrocodone Acetominophen 5/325 one tab by mouth every 4 hrs as needed for pain    Rx antibiotic: Keflex 500 mg one tab by mouth twice daily for 10 days    Rx NSAID:  Motrin 800 mg one tab by mouth three three times a day  4. Black and blue toes or black and blue in areas un-bandaged are a normal occurrence and resolves unremarkably after surgery. 5. Use a shower protector prescribed by your doctor (can obtain from [employer]), doubled up plastic trash bags with rubber bands, or you may sponge bathe. The bandage must be kept completely dry after surgery. Notify the doctor if your bandage becomes wet. 6. Your feet should be elevated above your hip at all times. DO not sit with the foot on a hassock or chair. When traveling, sit in the back seat with the foot elevated on pillows.   You should sleep with the surgical shoe on for the first three weeks post-operatively or until notified by your doctor that it is safe to be removed while sleeping. 7. Ice may be indicated for your surgery, you should put a cold pack behind the knee of the operative leg, 20 minutes on, 20 minutes off, especially in the first 1-2 days post op. This will reduce pain and swelling and aid in a faster recovery. Instructions for Walking After Foot Surgery    1. Weightbearing status as tolerated  2. Most patients can walk with only the surgical shoe after foot surgery (canes and walkers may be needed for additional stability; this will be determined by the doctor or nurses post-operatively. 3. Crutch walking is usually not necessary post-operatively. Many patients cannot use crutches safely due to body type, muscle weakness, or balance issues. In fact, these patients are usually at a greater risk of falling with crutches than without. If crutches are recommended you will be instructed as to their proper use. The following are important rules regarding walking after foot surgery for the first one to three weeks: You may walk or stand for bathroom or meals only. No prolonged standing or walking. No public places. Extremely limited use of stairs. Try to stay on one level of your home as long as possible during the day. General rule; 50 minutes off foot with limb elevated, 10 minutes on foot (maximum) for meals and bathroom only. When climbing stairs carefully advance one foot at a time (flatfooted) with assistance of a person and the banister or on your buttocks. Walking With the Surgical Shoe    The shoe should be snuggly applied as to avoid movement or shifting while attempting to stand. Please sleep with the shoe on as stated unless otherwise directed by your doctor. While walking, the right foot should be turned out at approximately 2 oclock. Left foot should be turned outward at approximately 10 oclock.   Walk with the surgical foot rotated outward as shown. DO not pull toes up in the air by walking on your heel. Keep your foot flat and literally drag it along slightly off the ground surface. Do not push off or bend the front of your foot while walking (no propulsion). Wear a shoe or sneaker of comparable heel height on the non-surgical foot. SIMPLY WALK ON THE FLAT OF YOUR FOOT WITH THE WEIGHT SHIFTED BACKWARDS TOWARDS THE HEEL AND THE BIG TOE TURNED OUTWARD. If you have additional questions or need assistance please call our office for emergency service 24 hours a day. Do not initiate any action or remove the bandages unless directed by your doctor. If you notice any excessive bleeding or have pain not relieved by the prescribed medications, please call the emergency number without hesitation. DISCHARGE SUMMARY from your Nurse    The following personal items collected during your admission are returned to you:   Dental Appliance: Dental Appliances: None  Vision: Visual Aid: None  Hearing Aid:    Jewelry: Jewelry: None  Clothing: Clothing: (street clothes to preop lcoker)  Other Valuables: Other Valuables: None  Valuables sent to safe:      PATIENT INSTRUCTIONS:    After general anesthesia or intravenous sedation, for 24 hours or while taking prescription Narcotics:  · Limit your activities  · Do not drive and operate hazardous machinery  · Do not make important personal or business decisions  · Do  not drink alcoholic beverages  · If you have not urinated within 8 hours after discharge, please contact your surgeon on call.     Report the following to your surgeon:  · Excessive pain, swelling, redness or odor of or around the surgical area  · Temperature over 100.5  · Nausea and vomiting lasting longer than 4 hours or if unable to take medications  · Any signs of decreased circulation or nerve impairment to extremity: change in color, persistent  numbness, tingling, coldness or increase pain  · Any questions    COUGH AND DEEP BREATHE    Breathing deep and coughing are very important exercises to do after surgery. Deep breathing and coughing open the little air tubes and air sacks in your lungs. You take deep breaths every day. You may not even notice - it is just something you do when you sigh or yawn. It is a natural exercise you do to keep these air passages open. After surgery, take deep breaths and cough, on purpose. Coughing and deep breathing help prevent bronchitis and pneumonia after surgery. If you had chest or belly surgery, use a pillow as a \"hug kashif\" and hold it tightly to your chest or belly when you cough. DIRECTIONS:  6. Take 10 to 15 slow deep breaths every hour while awake. 7. Breathe in deeply, and hold it for 2 seconds. 8. Exhale slowly through puckered lips, like blowing up a balloon. 9. After every 4th or 5th deep breath, hug your pillow to your chest or belly and give a hard, deep cough. Yes, it will probably hurt. But doing this exercise is very important part of healing after surgery. Take your pain medicine to help you do this exercise without too much pain. IF YOU HAVE BEEN DIAGNOSED WITH SLEEP APNEA, PLEASE USE YOUR SLEEP APNEA DEVICE OR CPAP MACHINE WHEN YOU INTEND TO NAP AFTER TAKING PAIN MEDICATION. Ankle Pumps    Ankle pumps increase the circulation of oxygenated blood to your lower extremities and decrease your risk for circulation problems such as blood clots. They also stretch the muscles, tendons and ligaments in your foot and ankle, and prevent joint contracture in the ankle and foot, especially after surgeries on the legs. It is important to do ankle pump exercises regularly after surgery because immobility increases your risk for developing a blood clot. Your doctor may also have you take an Aspirin for the next few days as well.     If your doctor did not ask you to take an Aspirin, consult with him before starting Aspirin therapy on your own.      Slowly point your foot forward, feeling the muscles on the top of your lower leg stretch, and hold this position for 5 seconds. Next, pull your foot back toward you as far as possible, stretching the calf muscles, and hold that position for 5 seconds. Repeat with the other foot. Perform 10 repetitions every hour while awake for both ankles if possible (down and then up with the foot once is one repetition). You should feel gentle stretching of the muscles in your lower leg when doing this exercise. If you feel pain, or your range of motion is limited, don't  Push too hard. Only go the limit your joint and muscles will let you go. If you have increasing pain, progressively worsening leg warmth or swelling, STOP the exercise and call your doctor. Below is information about the medications your doctor is prescribing after your visit:    Other information in your discharge envelope:  []     PRESCRIPTIONS  []     PHYSICAL THERAPY PRESCRIPTION  []     APPOINTMENT CARDS  []     Regional Anesthesia Pamphlet for block or block with On-Q Catheter from Anesthesia Service  []     Medical device information sheets/pamphlets from their    []     School/work excuse note. []     /parent work excuse note. These are general instructions for a healthy lifestyle:    *  Please give a list of your current medications to your Primary Care Provider. *  Please update this list whenever your medications are discontinued, doses are      changed, or new medications (including over-the-counter products) are added. *  Please carry medication information at all times in case of emergency situations. About Smoking  No smoking / No tobacco products / Avoid exposure to second hand smoke    Surgeon General's Warning:  Quitting smoking now greatly reduces serious risk to your health.     Obesity, smoking, and sedentary lifestyle greatly increases your risk for illness and disease. A healthy diet, regular physical exercise & weight monitoring are important for maintaining a healthy lifestyle. Congestive Heart Failure  You may be retaining fluid if you have a history of heart failure or if you experience any of the following symptoms:  Weight gain of 3 pounds or more overnight or 5 pounds in a week, increased swelling in our hands or feet or shortness of breath while lying flat in bed. Please call your doctor as soon as you notice any of these symptoms; do not wait until your next office visit. Recognize signs and symptoms of STROKE:  F - face looks uneven  A - arms unable to move or move even  S - speech slurred or non-existent  T - time-call 911 as soon as signs and symptoms begin-DO NOT go         Back to bed or wait to see if you get better-TIME IS BRAIN. Warning signs of HEART ATTACK  Call 911 if you have these symptoms    · Chest discomfort. Most heart attacks involve discomfort in the center of the chest that lasts more than a few minutes, or that goes away and comes back. It can feel like uncomfortable pressure, squeezing, fullness, or pain. · Discomfort in other areas of the upper body. Symptoms can include pain or discomfort in one or both        Arms, the back, neck, jaw, or stomach. ·  Shortness of breath with or without chest discomfort. · Other signs may include breaking out in a cold sweat, nausea, or lightheadedness    Don't wait more than five minutes to call 911 - MINUTES MATTER! Fast action can save your life. Calling 911 is almost always the fastest way to get lifesaving treatment. Emergency Medical Services staff can begin treatment when they arrive - up to an hour sooner than if someone gets to the hospital by car. [facility] MEDICATION AND SIDE EFFECT GUIDE    The [facility] MEDICATION AND SIDE EFFECT GUIDE was provided to the PATIENT AND CARE PROVIDER.   Information provided includes instruction about drug purpose and common side effects for the following medications:    · Keflex  · ibuorifen  · norco  ·

## 2019-01-11 NOTE — BRIEF OP NOTE
BRIEF OPERATIVE NOTE    Date of Procedure: 1/11/2019   Preoperative Diagnosis: SOFT TISSUE MASS IN LEFT SECOND TOE AND A RIGHT FOOT HAMMERTOE  Postoperative Diagnosis: SOFT TISSUE MASS IN LEFT SECOND TOE AND A RIGHT toe fifth hammertoe     Procedure(s):  LEFT SECOND TOE SOFT TISSUE MASS EXCISION, RIGHT FOOT FIFTH HAMMERTOE CORRECTION  Surgeon(s) and Role:     * Luis Manuel Trevino DPM - Primary     * Myrna Gupta DPM - 1st assistant      Surgical Staff:  Circ-1Mildred Arias RN; Kobe Moore RN  Scrub Tech-1: Natali RODRIGUEZ  Event Time In Time Out   Incision Start 1230    Incision Close 1257      Anesthesia: MAC with digital blocks  Estimated Blood Loss: minimal  Specimens:   ID Type Source Tests Collected by Time Destination   1 : left second toe mass Preservative Toe  Luis Manuel Trevino DPM 1/11/2019 1240 Pathology      Findings: LT 2nd toe ST mass with small piece of calcified tissue (all sent for px), RT 5th hammertoe   Complications: none  Implants: * No implants in log *

## 2019-01-12 NOTE — ANESTHESIA POSTPROCEDURE EVALUATION
Procedure(s):  LEFT SECOND TOE SOFT TISSUE MASS EXCISION, RIGHT FOOT FIFTH HAMMERTOE CORRECTION. Anesthesia Post Evaluation        Patient location during evaluation: PACU  Level of consciousness: awake  Pain management: adequate  Airway patency: patent  Anesthetic complications: no  Cardiovascular status: acceptable  Respiratory status: acceptable  Hydration status: acceptable        Visit Vitals  /65 (BP 1 Location: Right arm, BP Patient Position: At rest;Supine; Head of bed elevated (Comment degrees))   Pulse (!) 54   Temp 36.8 °C (98.2 °F)   Resp 14   SpO2 97%

## 2019-01-13 PROBLEM — E11.21 TYPE 2 DIABETES WITH NEPHROPATHY (HCC): Status: ACTIVE | Noted: 2019-01-13

## 2019-01-14 ENCOUNTER — PATIENT OUTREACH (OUTPATIENT)
Dept: FAMILY MEDICINE CLINIC | Age: 79
End: 2019-01-14

## 2019-01-14 NOTE — PROGRESS NOTES
Discharge Follow-Up    Date/Time:     2019 12:02 PM    Patient presented to [facility]  on 19 for Left second toe mass/Right foot fifth hammertoe correction. Top Challenges reviewed with the provider   Patient had low pulse level 54 during procedure        Method of communication with provider :chart routing    Nurse Navigator(NN) contacted the  family  by telephone to perform post ED discharge assessment. Verified name and  with family as identifiers. Provided introduction to self, and explanation of the Nurse Navigator role. Family reported assessment: Spoke with this patients daughter iLno Higuera and she states that her mother is doing well. She states that she is now resting. Reviewed discharge paperwork with the patient daughter and she states that the surgeon states that her mothers pulse was in the low 50's during the procedure and the patient should follow up with PCP and Cardiology. Encouraged the daughter to schedule follow up appointment to address those concerns. Medication(s):   New Medications at Discharge: Hydrocodone 5-325mg, Cephalexin 250 mg, Ibuprofen 200 mg  Changed Medications at Discharge: None  Discontinued Medications at Discharge: None    There were no barriers to obtaining medications identified at this time. Reviewed discharge instructions and red flags with  family who voiced understanding. Family given an opportunity to ask questions and does not have any further questions or concerns at this time. The family agrees to contact the PCP office for questions related to their healthcare. Patient reminded that there are physicians on call 24 hours a day / 7 days a week (M-F 5pm to 8am and from Friday 5pm until Monday 8am for the weekend) should the patient have questions or concerns. NN provided contact information for future reference. Offered follow up appointment with PCP: yes   [facility] follow up appointment(s): No future appointments. Non-[facility] follow up appointment(s): Patient states that she will call today and schedule f/u for Friday of this week. [facility]:  information provided as a resource    www. [employer] 9 AM- 9 PM.   Phone [phone]

## 2019-01-23 ENCOUNTER — OFFICE VISIT (OUTPATIENT)
Dept: FAMILY MEDICINE CLINIC | Age: 79
End: 2019-01-23

## 2019-01-23 VITALS
BODY MASS INDEX: 33.66 KG/M2 | WEIGHT: 190 LBS | DIASTOLIC BLOOD PRESSURE: 80 MMHG | TEMPERATURE: 97.8 F | HEART RATE: 60 BPM | OXYGEN SATURATION: 97 % | SYSTOLIC BLOOD PRESSURE: 130 MMHG | HEIGHT: 63 IN | RESPIRATION RATE: 18 BRPM

## 2019-01-23 DIAGNOSIS — R00.1 SYMPTOMATIC BRADYCARDIA: Primary | ICD-10-CM

## 2019-01-23 NOTE — PATIENT INSTRUCTIONS
Bradycardia: Care Instructions  Your Care Instructions    Bradycardia is a slow heart rate. If your heart beats too slowly, it can't supply your body with enough blood. This can make you weak or dizzy. Or it may make you pass out. Sometimes medicine can cause this problem. If this happens, your doctor may have you adjust one of your medicines. If a medicine is not the problem, your doctor may recommend a pacemaker. It is important to treat bradycardia so that you don't get more serious health problems. Your doctor will want to see you on a routine schedule to make sure that your heartbeat is normal.  Follow-up care is a key part of your treatment and safety. Be sure to make and go to all appointments, and call your doctor if you are having problems. It's also a good idea to know your test results and keep a list of the medicines you take. How can you care for yourself at home? · Take your medicines exactly as prescribed. Call your doctor if you think you are having a problem with your medicine. If your bradycardia is caused by another disease, your doctor will try to treat the disease. If it is caused by heart medicines, he or she will adjust your medicines. · Make lifestyle changes to improve your heart health. ? Get regular exercise. Try for 30 minutes on most days of the week. If you do not have other heart problems, you likely do not have limits on the type or level of activity that you can do. You may want to walk, swim, bike, or do other activities. Ask your doctor what level of exercise is safe for you. ? To control your cholesterol, avoid foods with a lot of fat, saturated fat, or sodium. Try to eat more fiber. And if your doctor says it's okay, get some exercise on most days. ? Do not smoke. Smoking can make your heart condition worse. If you need help quitting, talk to your doctor about stop-smoking programs and medicines. These can increase your chances of quitting for good. ?  Limit alcohol to 2 drinks a day for men and 1 drink a day for women. Too much alcohol can cause health problems. Pacemaker  If you have a pacemaker, you will get more specific information about it. Be sure to:  · Check your pulse as your doctor tells you. · Have your pacemaker checked as often as your doctor recommends. You may be able to do this over the phone or computer. · Avoid strong magnetic or electrical fields. These include MRIs, welding equipment, and generators. · You will be checked several times right after you get your pacemaker and when it is time to have the battery changed. Batteries last for 5 to 15 years. · You can talk on a cell phone. But keep it 6 inches away from your pacemaker. · Microwaves, TVs, radios, and kitchen and bathroom appliances won't harm you. When should you call for help? Call 911 anytime you think you may need emergency care. For example, call if:    · You have symptoms of sudden heart failure. These may include:  ? Severe trouble breathing. ? A fast or irregular heartbeat. ? Coughing up pink, foamy mucus. ? You passed out.     · You have symptoms of a stroke. These may include:  ? Sudden numbness, tingling, weakness, or loss of movement in your face, arm, or leg, especially on only one side of your body. ? Sudden vision changes. ? Sudden trouble speaking. ? Sudden confusion or trouble understanding simple statements. ? Sudden problems with walking or balance. ? A sudden, severe headache that is different from past headaches.    Call your doctor now or seek immediate medical care if:    · You have new or changed symptoms of heart failure, such as:  ? New or increased shortness of breath. ? New or worse swelling in your legs, ankles, or feet. ? Sudden weight gain, such as more than 2 to 3 pounds in a day or 5 pounds in a week. (Your doctor may suggest a different range of weight gain.)  ? Feeling dizzy or lightheaded or like you may faint. ?  Feeling so tired or weak that you cannot do your usual activities. ? Not sleeping well. Shortness of breath wakes you at night. You need extra pillows to prop yourself up to breathe easier.    Watch closely for changes in your health, and be sure to contact your doctor if:    · You do not get better as expected. Where can you learn more? Go to http://nikki-alicia.info/. Enter D087 in the search box to learn more about \"Bradycardia: Care Instructions. \"  Current as of: July 22, 2018  Content Version: 11.9  © 8241-8267 [employer]. Care instructions adapted under license by [employer] (which disclaims liability or warranty for this information). If you have questions about a medical condition or this instruction, always ask your healthcare professional. [address] any warranty or liability for your use of this information. Bradycardia: Care Instructions  Your Care Instructions    Bradycardia is a slow heart rate. If your heart beats too slowly, it can't supply your body with enough blood. This can make you weak or dizzy. Or it may make you pass out. Sometimes medicine can cause this problem. If this happens, your doctor may have you adjust one of your medicines. If a medicine is not the problem, your doctor may recommend a pacemaker. It is important to treat bradycardia so that you don't get more serious health problems. Your doctor will want to see you on a routine schedule to make sure that your heartbeat is normal.  Follow-up care is a key part of your treatment and safety. Be sure to make and go to all appointments, and call your doctor if you are having problems. It's also a good idea to know your test results and keep a list of the medicines you take. How can you care for yourself at home? · Take your medicines exactly as prescribed. Call your doctor if you think you are having a problem with your medicine.  If your bradycardia is caused by another disease, your doctor will try to treat the disease. If it is caused by heart medicines, he or she will adjust your medicines. · Make lifestyle changes to improve your heart health. ? Get regular exercise. Try for 30 minutes on most days of the week. If you do not have other heart problems, you likely do not have limits on the type or level of activity that you can do. You may want to walk, swim, bike, or do other activities. Ask your doctor what level of exercise is safe for you. ? To control your cholesterol, avoid foods with a lot of fat, saturated fat, or sodium. Try to eat more fiber. And if your doctor says it's okay, get some exercise on most days. ? Do not smoke. Smoking can make your heart condition worse. If you need help quitting, talk to your doctor about stop-smoking programs and medicines. These can increase your chances of quitting for good. ? Limit alcohol to 2 drinks a day for men and 1 drink a day for women. Too much alcohol can cause health problems. Pacemaker  If you have a pacemaker, you will get more specific information about it. Be sure to:  · Check your pulse as your doctor tells you. · Have your pacemaker checked as often as your doctor recommends. You may be able to do this over the phone or computer. · Avoid strong magnetic or electrical fields. These include MRIs, welding equipment, and generators. · You will be checked several times right after you get your pacemaker and when it is time to have the battery changed. Batteries last for 5 to 15 years. · You can talk on a cell phone. But keep it 6 inches away from your pacemaker. · Microwaves, TVs, radios, and kitchen and bathroom appliances won't harm you. When should you call for help? Call 911 anytime you think you may need emergency care. For example, call if:    · You have symptoms of sudden heart failure. These may include:  ? Severe trouble breathing. ? A fast or irregular heartbeat. ? Coughing up pink, foamy mucus. ?  You passed out.     · You have symptoms of a stroke. These may include:  ? Sudden numbness, tingling, weakness, or loss of movement in your face, arm, or leg, especially on only one side of your body. ? Sudden vision changes. ? Sudden trouble speaking. ? Sudden confusion or trouble understanding simple statements. ? Sudden problems with walking or balance. ? A sudden, severe headache that is different from past headaches.    Call your doctor now or seek immediate medical care if:    · You have new or changed symptoms of heart failure, such as:  ? New or increased shortness of breath. ? New or worse swelling in your legs, ankles, or feet. ? Sudden weight gain, such as more than 2 to 3 pounds in a day or 5 pounds in a week. (Your doctor may suggest a different range of weight gain.)  ? Feeling dizzy or lightheaded or like you may faint. ? Feeling so tired or weak that you cannot do your usual activities. ? Not sleeping well. Shortness of breath wakes you at night. You need extra pillows to prop yourself up to breathe easier.    Watch closely for changes in your health, and be sure to contact your doctor if:    · You do not get better as expected. Where can you learn more? Go to http://nikki-alicia.info/. Enter Q595 in the search box to learn more about \"Bradycardia: Care Instructions. \"  Current as of: July 22, 2018  Content Version: 11.9  © 9537-8680 [email or web address]. Care instructions adapted under license by [employer] (which disclaims liability or warranty for this information). If you have questions about a medical condition or this instruction, always ask your healthcare professional. [place] [address] any warranty or liability for your use of this information.

## 2019-01-23 NOTE — PROGRESS NOTES
Chief Complaint:  Chief Complaint   Patient presents with    Post-Op Problem     follow up with PCP for Low BP during foot procedures. Would like refferal to Cardiologist.        History of Present Illness:  She is a 78 y.o. 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 During her surgery, she had episodes of bradycardia with hypotension unrelated to the anesthesia. There was some concern about an underlying cardiac etiology. She had did not have any asystole or resuscitation, but enough concern that she was told to come in for formal evaluation and referral to cardiology. She reports that she is getting better. She denies chest pain. She does report occasional SOB and increased fatigue which has been getting worse since her surgery. She does have Type II DM, HNT and Obesity. Reviewed PmHx, RxHx, FmHx, SocHx, AllgHx and updated and dated in the chart. Patient Active Problem List    Diagnosis    Type 2 diabetes with nephropathy ([address]    Anginal equivalent ([address]    Severe obesity (BMI 35.0-39. 9)    Type 2 diabetes mellitus with complication, without long-term current use of insulin (HCC)    Type 2 diabetes mellitus with diabetic neuropathy (HCC)    Symptomatic bradycardia    DANIELA on CPAP     Set at 5-8 per patient.        Pure hypercholesterolemia    Primary open angle glaucoma    Osteoporosis    Arthritis of left knee    GERD (gastroesophageal reflux disease)       Review of Systems - negative except as listed above in the HPI    Objective:     Vitals:    01/23/19 1117   BP: 130/80   Pulse: 60   Resp: 18   Temp: 97.8 °F (36.6 °C)   TempSrc: Oral   SpO2: 97%   Weight: 190 lb (86.2 kg)   Height: 5' 3\" (1.6 m)     Physical Examination:   General appearance - alert, well appearing, and in no distress and overweight  Mental status - alert, oriented to person, place, and time  Chest - clear to auscultation, no wheezes, rales or rhonchi, symmetric air entry  Heart - bradycardia, no gallops or rubs. Musculoskeletal - patient has on bilateral cast on feet. Extremities - peripheral pulses normal, no pedal edema, no clubbing or cyanosis  Skin - normal coloration and turgor, no rashes, no suspicious skin lesions noted    Assessment/ Plan:       ICD-10-CM ICD-9-CM    1. Symptomatic bradycardia R00.1 427.89 REFERRAL TO CARDIOLOGY     I have discussed the diagnosis with the patient and the intended treatment plan as seen in the above orders. The patient has received an after-visit summary and questions were answered concerning future plans. Asked to return should symptoms worsen or not improve with treatment. Any pending labs and studies will be relayed to patient when they become available. Pt verbalizes understanding of plan of care and denies further questions or concerns at this time. Follow-up Disposition:  Return if symptoms worsen or fail to improve. Quang Boothe MD    Patient Instructions          Bradycardia: Care Instructions  Your Care Instructions    Bradycardia is a slow heart rate. If your heart beats too slowly, it can't supply your body with enough blood. This can make you weak or dizzy. Or it may make you pass out. Sometimes medicine can cause this problem. If this happens, your doctor may have you adjust one of your medicines. If a medicine is not the problem, your doctor may recommend a pacemaker. It is important to treat bradycardia so that you don't get more serious health problems. Your doctor will want to see you on a routine schedule to make sure that your heartbeat is normal.  Follow-up care is a key part of your treatment and safety. Be sure to make and go to all appointments, and call your doctor if you are having problems. It's also a good idea to know your test results and keep a list of the medicines you take. How can you care for yourself at home?   · Take your medicines exactly as prescribed. Call your doctor if you think you are having a problem with your medicine. If your bradycardia is caused by another disease, your doctor will try to treat the disease. If it is caused by heart medicines, he or she will adjust your medicines. · Make lifestyle changes to improve your heart health. ? Get regular exercise. Try for 30 minutes on most days of the week. If you do not have other heart problems, you likely do not have limits on the type or level of activity that you can do. You may want to walk, swim, bike, or do other activities. Ask your doctor what level of exercise is safe for you. ? To control your cholesterol, avoid foods with a lot of fat, saturated fat, or sodium. Try to eat more fiber. And if your doctor says it's okay, get some exercise on most days. ? Do not smoke. Smoking can make your heart condition worse. If you need help quitting, talk to your doctor about stop-smoking programs and medicines. These can increase your chances of quitting for good. ? Limit alcohol to 2 drinks a day for men and 1 drink a day for women. Too much alcohol can cause health problems. Pacemaker  If you have a pacemaker, you will get more specific information about it. Be sure to:  · Check your pulse as your doctor tells you. · Have your pacemaker checked as often as your doctor recommends. You may be able to do this over the phone or computer. · Avoid strong magnetic or electrical fields. These include MRIs, welding equipment, and generators. · You will be checked several times right after you get your pacemaker and when it is time to have the battery changed. Batteries last for 5 to 15 years. · You can talk on a cell phone. But keep it 6 inches away from your pacemaker. · Microwaves, TVs, radios, and kitchen and bathroom appliances won't harm you. When should you call for help? Call 911 anytime you think you may need emergency care.  For example, call if:    · You have symptoms of sudden heart failure. These may include:  ? Severe trouble breathing. ? A fast or irregular heartbeat. ? Coughing up pink, foamy mucus. ? You passed out.     · You have symptoms of a stroke. These may include:  ? Sudden numbness, tingling, weakness, or loss of movement in your face, arm, or leg, especially on only one side of your body. ? Sudden vision changes. ? Sudden trouble speaking. ? Sudden confusion or trouble understanding simple statements. ? Sudden problems with walking or balance. ? A sudden, severe headache that is different from past headaches.    Call your doctor now or seek immediate medical care if:    · You have new or changed symptoms of heart failure, such as:  ? New or increased shortness of breath. ? New or worse swelling in your legs, ankles, or feet. ? Sudden weight gain, such as more than 2 to 3 pounds in a day or 5 pounds in a week. (Your doctor may suggest a different range of weight gain.)  ? Feeling dizzy or lightheaded or like you may faint. ? Feeling so tired or weak that you cannot do your usual activities. ? Not sleeping well. Shortness of breath wakes you at night. You need extra pillows to prop yourself up to breathe easier.    Watch closely for changes in your health, and be sure to contact your doctor if:    · You do not get better as expected. Where can you learn more? Go to http://nikki-alicia.info/. Enter C691 in the search box to learn more about \"Bradycardia: Care Instructions. \"  Current as of: July 22, 2018  Content Version: 11.9  © 3694-4145 [employer]. Care instructions adapted under license by [employer] (which disclaims liability or warranty for this information). If you have questions about a medical condition or this instruction, always ask your healthcare professional. [address] any warranty or liability for your use of this information.          Bradycardia: Care Instructions  Your Care Instructions    Bradycardia is a slow heart rate. If your heart beats too slowly, it can't supply your body with enough blood. This can make you weak or dizzy. Or it may make you pass out. Sometimes medicine can cause this problem. If this happens, your doctor may have you adjust one of your medicines. If a medicine is not the problem, your doctor may recommend a pacemaker. It is important to treat bradycardia so that you don't get more serious health problems. Your doctor will want to see you on a routine schedule to make sure that your heartbeat is normal.  Follow-up care is a key part of your treatment and safety. Be sure to make and go to all appointments, and call your doctor if you are having problems. It's also a good idea to know your test results and keep a list of the medicines you take. How can you care for yourself at home? · Take your medicines exactly as prescribed. Call your doctor if you think you are having a problem with your medicine. If your bradycardia is caused by another disease, your doctor will try to treat the disease. If it is caused by heart medicines, he or she will adjust your medicines. · Make lifestyle changes to improve your heart health. ? Get regular exercise. Try for 30 minutes on most days of the week. If you do not have other heart problems, you likely do not have limits on the type or level of activity that you can do. You may want to walk, swim, bike, or do other activities. Ask your doctor what level of exercise is safe for you. ? To control your cholesterol, avoid foods with a lot of fat, saturated fat, or sodium. Try to eat more fiber. And if your doctor says it's okay, get some exercise on most days. ? Do not smoke. Smoking can make your heart condition worse. If you need help quitting, talk to your doctor about stop-smoking programs and medicines. These can increase your chances of quitting for good. ?  Limit alcohol to 2 drinks a day for men and 1 drink a day for women. Too much alcohol can cause health problems. Pacemaker  If you have a pacemaker, you will get more specific information about it. Be sure to:  · Check your pulse as your doctor tells you. · Have your pacemaker checked as often as your doctor recommends. You may be able to do this over the phone or computer. · Avoid strong magnetic or electrical fields. These include MRIs, welding equipment, and generators. · You will be checked several times right after you get your pacemaker and when it is time to have the battery changed. Batteries last for 5 to 15 years. · You can talk on a cell phone. But keep it 6 inches away from your pacemaker. · Microwaves, TVs, radios, and kitchen and bathroom appliances won't harm you. When should you call for help? Call 911 anytime you think you may need emergency care. For example, call if:    · You have symptoms of sudden heart failure. These may include:  ? Severe trouble breathing. ? A fast or irregular heartbeat. ? Coughing up pink, foamy mucus. ? You passed out.     · You have symptoms of a stroke. These may include:  ? Sudden numbness, tingling, weakness, or loss of movement in your face, arm, or leg, especially on only one side of your body. ? Sudden vision changes. ? Sudden trouble speaking. ? Sudden confusion or trouble understanding simple statements. ? Sudden problems with walking or balance. ? A sudden, severe headache that is different from past headaches.    Call your doctor now or seek immediate medical care if:    · You have new or changed symptoms of heart failure, such as:  ? New or increased shortness of breath. ? New or worse swelling in your legs, ankles, or feet. ? Sudden weight gain, such as more than 2 to 3 pounds in a day or 5 pounds in a week. (Your doctor may suggest a different range of weight gain.)  ? Feeling dizzy or lightheaded or like you may faint. ?  Feeling so tired or weak that you cannot do your usual activities. ? Not sleeping well. Shortness of breath wakes you at night. You need extra pillows to prop yourself up to breathe easier.    Watch closely for changes in your health, and be sure to contact your doctor if:    · You do not get better as expected. Where can you learn more? Go to http://nikki-alicia.info/. Enter R408 in the search box to learn more about \"Bradycardia: Care Instructions. \"  Current as of: July 22, 2018  Content Version: 11.9  © 5942-3544 [employer]. Care instructions adapted under license by [employer] (which disclaims liability or warranty for this information). If you have questions about a medical condition or this instruction, always ask your healthcare professional. [address] any warranty or liability for your use of this information.

## 2019-01-23 NOTE — PROGRESS NOTES
Identified pt with two pt identifiers(name and ). Chief Complaint   Patient presents with    Post-Op Problem     follow up with PCP for Low BP during foot procedures. Would like refferal to Cardiologist.         Health Maintenance Due   Topic    Shingrix Vaccine Age 50> (1 of 2)    Pneumococcal 65+ Low/Medium Risk (2 of 2 - PPSV23)       Wt Readings from Last 3 Encounters:   19 190 lb (86.2 kg)   01/10/19 190 lb 1 oz (86.2 kg)   18 186 lb 9.6 oz (84.6 kg)     Temp Readings from Last 3 Encounters:   19 97.8 °F (36.6 °C) (Oral)   19 98.2 °F (36.8 °C)   01/10/19 98.4 °F (36.9 °C)     BP Readings from Last 3 Encounters:   19 130/80   19 125/65   01/10/19 120/58     Pulse Readings from Last 3 Encounters:   19 60   19 (!) 54   01/10/19 60         Learning Assessment:  :     Learning Assessment 2017   PRIMARY LEARNER Patient Patient   HIGHEST LEVEL OF EDUCATION - PRIMARY LEARNER  SOME COLLEGE -   BARRIERS PRIMARY LEARNER NONE -   PRIMARY LANGUAGE ENGLISH ENGLISH   LEARNER PREFERENCE PRIMARY READING LISTENING   ANSWERED BY patient patient   RELATIONSHIP SELF SELF       Depression Screening:  :     PHQ over the last two weeks 2019   Little interest or pleasure in doing things Not at all   Feeling down, depressed, irritable, or hopeless Not at all   Total Score PHQ 2 0       Fall Risk Assessment:  :     Fall Risk Assessment, last 12 mths 2019   Able to walk? Yes   Fall in past 12 months? No       Abuse Screening:  :     Abuse Screening Questionnaire 2019 2017 11/15/2016   Do you ever feel afraid of your partner? N N N   Are you in a relationship with someone who physically or mentally threatens you? N N N   Is it safe for you to go home? Martha Jerome       Coordination of Care Questionnaire:  :     1) Have you been to an emergency room, urgent care clinic since your last visit?  Yes [facility]  Hospitalized since your last visit? no             2) Have you seen or consulted any other health care providers outside of [address] [place] [address] since your last visit? no  (Include any pap smears or colon screenings in this section.)    3) Do you have an Advance Directive on file? no  Are you interested in receiving information about Advance Directives? No    Reviewed record in preparation for visit and have obtained necessary documentation. Medication reconciliation up to date and corrected with patient at this time.

## 2019-01-28 ENCOUNTER — PATIENT OUTREACH (OUTPATIENT)
Dept: FAMILY MEDICINE CLINIC | Age: 79
End: 2019-01-28

## 2019-01-28 NOTE — PROGRESS NOTES
Spoke with this patients daughter and she states that her mother is doing well. She states that she has an Cardiology appointment with Dr. Edgar Stallworth on 1/29/19 at 10:00 am. Patients daughter states that she is unsure of the address to this Cardiology office. Informed this patient that I will call Cardiology and confirm the location of this appointment. Outreach made to Dr. Edgar Stallworth office and spoke with staff worker. This patient has an appointment at the [facility] location address [address] [place] [address]. [address]. Notified this patients daughter of the address to the patients appointment on 1/29/19.

## 2019-01-29 ENCOUNTER — OFFICE VISIT (OUTPATIENT)
Dept: CARDIOLOGY CLINIC | Age: 79
End: 2019-01-29

## 2019-01-29 ENCOUNTER — TELEPHONE (OUTPATIENT)
Dept: CARDIOLOGY CLINIC | Age: 79
End: 2019-01-29

## 2019-01-29 VITALS
BODY MASS INDEX: 33.31 KG/M2 | HEART RATE: 52 BPM | SYSTOLIC BLOOD PRESSURE: 110 MMHG | HEIGHT: 63 IN | OXYGEN SATURATION: 93 % | DIASTOLIC BLOOD PRESSURE: 60 MMHG | WEIGHT: 188 LBS

## 2019-01-29 DIAGNOSIS — Z99.89 OSA ON CPAP: ICD-10-CM

## 2019-01-29 DIAGNOSIS — R00.1 SINUS BRADYCARDIA: Primary | ICD-10-CM

## 2019-01-29 DIAGNOSIS — G47.33 OSA ON CPAP: ICD-10-CM

## 2019-01-29 DIAGNOSIS — I10 ESSENTIAL HYPERTENSION: ICD-10-CM

## 2019-01-29 DIAGNOSIS — R00.1 SYMPTOMATIC BRADYCARDIA: Primary | ICD-10-CM

## 2019-01-29 NOTE — PROGRESS NOTES
Allison Sanders MD    [address] [facility] [place], [address] [facility]    Office [phone],TVW [phone]  Pager [phone]    Zain Broussard is a 78 y.o. female is here for f/u visit . Primary care physician:  Holden Martin MD    Patient Active Problem List   Diagnosis Code    GERD (gastroesophageal reflux disease) K21.9    Arthritis of left knee M17.12    Pure hypercholesterolemia E78.00    Primary open angle glaucoma H40.1190    Osteoporosis M81.0    DANIELA on CPAP G47.33, Z99.89    Symptomatic bradycardia R00.1    Type 2 diabetes mellitus with complication, without long-term current use of insulin ([address] E11.8    Type 2 diabetes mellitus with diabetic neuropathy ([address] E11.40    Severe obesity (BMI 35.0-39. 9) E66.01    Anginal equivalent ([address] I20.8    Type 2 diabetes with nephropathy ([address] E11.21       Chief complaint:  No chief complaint on file. Dear Dr Tariq Early,    I had the pleasure of seeing Ms. Mcnair in the office today. Assessment:  Sinus bradycardia/ fatigue  HLD  DM  Former smoker. However has been exposed to significant secondhand smoking        Plan:     48-hour Holter monitor. If significant bradycardia-may need pacemaker. Not on AV Tamica blocking agents. Cont meds. Has stopped wering CPAP machine but will try to start wearing it agaim  Lipids per PCP  Aggressive cardiovascular risk factor modification. Follow-up in 6 mths            Lab Results   Component Value Date/Time    Cholesterol, total 151 12/27/2018 09:55 AM    HDL Cholesterol 52 12/27/2018 09:55 AM    LDL, calculated 80 12/27/2018 09:55 AM    VLDL, calculated 19 12/27/2018 09:55 AM    Triglyceride 95 12/27/2018 09:55 AM    CHOL/HDL Ratio 4.4 09/02/2010 03:17 PM         Patient understands the plan. All questions were answered to the patient's satisfaction.     Medication Side Effects and Warnings were discussed with patient: yes  Patient Labs were reviewed and or requested: yes  Patient Past Records were reviewed and or requested: yes    I appreciate the opportunity to be involved in Ms. Pichardo. See note below for details. Please do not hesitate to contact us with questions or concerns. Mimi Fox MD    Cardiac Testing/ Procedures: A. Cardiac Cath/PCI: 2/7/17 R radial approach  Attempted R brachial vein - unable to get access; Switched to R Femoral  vein    R SCV angiogram  R Brachial vein venogram    L Main: Nml    LAD: Mid 30%; Med; D1 - MLI    LCflex: Med; MLI; OM1 - small to med; MLI    RCA: Med to large; Dominant; Nml    LVEF: 60%    No significant gradient across aortic valve. PCI: none    RHC findings:    RAPm8= mmHg  RVSP= 42 mmHg  PAPm= 16 mmHg  PCWPm= 9 mmHg    Specimens Removed : None    B.ECHO/CHIP: 1/6/18-normal EF.  11/30/16 Left ventricle: Systolic function was normal. Ejection fraction was  estimated to be 68 % by Lira's biplane technique. There were no  regional wall motion abnormalities. Doppler parameters were consistent  with abnormal left ventricular relaxation (grade 1 diastolic dysfunction). Mitral valve: There was mild regurgitation. Tricuspid valve: There was near moderate regurgitation. Pulmonary artery  systolic pressure: 33 mmHg. C.StressNuclear/Stress ECHO/Stress test: 11/2016 - Nml [place] nuc stress test    D. Vascular: 12/6/16 - RUST resting NICKOLAS    E. EP: E cardio event monitor 1/2018-heart rate 59-83. No events. F. Miscellaneous:    Subjective:  Leora Nelson is a 78 y.o. female who returns for follow up visit. History of fatigue, occasional dizziness. No syncope. Recently when she went in for her foot surgery, she was told by the anesthetist that her heart rate was very low.   No CP/dyspnea        ROS:  (bold if positive, if negative)             Medications before admission:    Current Outpatient Medications   Medication Sig Dispense    HYDROcodone-acetaminophen (NORCO) 5-325 mg per tablet Take 1 Tab by mouth every six (6) hours as needed for Pain. Max Daily Amount: 4 Tabs. 40 Tab    ibuprofen (MOTRIN) 200 mg tablet Take 4 Tabs by mouth every eight (8) hours as needed for Pain. 60 Tab    cephALEXin (KEFLEX) 250 mg capsule Take 2 Caps by mouth two (2) times a day. 20 Cap    glipiZIDE (GLUCOTROL) 5 mg tablet TAKE 1 TABLET BY MOUTH ONCE DAILY 90 Tab    NYSTOP powder APPLY POWDER TOPICALLY TO AFFECTED AREA 4 TIMES DAILY UNTIL RASH RESOLVES 60 g    alendronate (FOSAMAX) 70 mg tablet      tiZANidine (ZANAFLEX) 2 mg capsule 1-2 tablets, 1-2 hours prior to bedtime as needed for spasm. 30 Cap    ondansetron (ZOFRAN ODT) 8 mg disintegrating tablet Take 1 Tab by mouth every twelve (12) hours as needed for Nausea. 15 Tab    atorvastatin (LIPITOR) 40 mg tablet Take 1 Tab by mouth daily for 90 days. 90 Tab    lisinopril (PRINIVIL, ZESTRIL) 10 mg tablet Take 1 Tab by mouth daily. 90 Tab    fluticasone-vilanterol (BREO ELLIPTA) 100-25 mcg/dose inhaler Take 1 Puff by inhalation daily.  oxybutynin (DITROPAN) 5 mg tablet Take 1 Tab by mouth three (3) times daily for 270 days. 270 Tab    ketoconazole (NIZORAL) 2 % topical cream Apply  to affected area daily. 30 g    Calcium Carbonate-Vit D3-Min 600 mg calcium- 400 unit tab Take 1 pill 2 x daily 60 Tab    aspirin delayed-release 81 mg tablet Take  by mouth daily.  glucose blood VI test strips (BLOOD GLUCOSE TEST) strip Accu-chek alexei plus test strips Test blood sugar once daily E11.9 100 Strip    Lancets misc accu-chek softclix lancets Check blood sugar daily E11.9 100 Each    acetaminophen (TYLENOL) 650 mg CR tablet Take 650 mg by mouth every six (6) hours as needed for Pain.  gabapentin (NEURONTIN) 600 mg tablet Take 600 mg by mouth three (3) times daily as needed.  latanoprost (XALATAN) 0.005 % ophthalmic solution Administer 1 Drop to both eyes nightly.  furosemide (LASIX) 20 mg tablet Take  by mouth every Monday, Wednesday, Friday.      pneumococcal 13 chilo conj dip (PREVNAR-13) 0.5 mL syrg injection 0.5 mL by IntraMUSCular route PRIOR TO DISCHARGE for 1 dose. 0.5 mL     No current facility-administered medications for this visit. Physical Exam:  Visit Vitals  /60 (BP 1 Location: Left arm, BP Patient Position: Sitting)   Pulse (!) 52   Ht 5' 3\" (1.6 m)   Wt 188 lb (85.3 kg)   SpO2 93%   BMI 33.30 kg/m²          Gen: Well-developed, well-nourished, in no acute distress  Neck: Supple,No JVD, No Carotid Bruit,   Resp: No accessory muscle use, Clear breath sounds, No rales or rhonchi  Card: Regular Rythm,Normal S1, S2, No murmurs, rubs or gallop. No thrills.    Abd:  Soft, non-tender, non-distended,BS+,   MSK: No cyanosis  Skin: No rashes    Neuro: moving all four extremities , follows commands appropriately  Psych:  Good insight, oriented to person, place , alert, Nml Affect  LE: No edema    EKG: Sinus bradycardia, nonspecific ST-T changes      LABS:        Lab Results   Component Value Date/Time    WBC 5.8 12/27/2018 09:55 AM    HGB 12.1 12/27/2018 09:55 AM    HCT 36.5 12/27/2018 09:55 AM    PLATELET 374 72/19/6879 09:55 AM    MCV 87 12/27/2018 09:55 AM     Lab Results   Component Value Date/Time    Sodium 142 12/27/2018 09:55 AM    Potassium 5.0 12/27/2018 09:55 AM    Chloride 102 12/27/2018 09:55 AM    CO2 25 12/27/2018 09:55 AM    Anion gap 9 01/08/2018 01:05 AM    Glucose 89 12/27/2018 09:55 AM    BUN 14 12/27/2018 09:55 AM    Creatinine 1.17 (H) 12/27/2018 09:55 AM    BUN/Creatinine ratio 12 12/27/2018 09:55 AM    GFR est AA 52 (L) 12/27/2018 09:55 AM    GFR est non-AA 45 (L) 12/27/2018 09:55 AM    Calcium 9.8 12/27/2018 09:55 AM       No results found for: APTT  Lab Results   Component Value Date/Time    INR 1.1 01/06/2017 12:14 PM    INR 1.1 06/16/2014 02:25 PM    Prothrombin time 10.7 01/06/2017 12:14 PM    Prothrombin time 11.2 06/16/2014 02:25 PM     No components found for: Moises Olmstead MD

## 2019-01-29 NOTE — PROGRESS NOTES
Patient has a little shortness of breath.   Patient says that her hands always swell  Patient has been weak also      Visit Vitals  /60 (BP 1 Location: Left arm, BP Patient Position: Sitting)   Pulse (!) 52   Ht 5' 3\" (1.6 m)   Wt 188 lb (85.3 kg)   SpO2 93%   BMI 33.30 kg/m²

## 2019-02-04 ENCOUNTER — TELEPHONE (OUTPATIENT)
Dept: CARDIOLOGY CLINIC | Age: 79
End: 2019-02-04

## 2019-02-04 NOTE — TELEPHONE ENCOUNTER
Pt called inquiring if paperwork from her CPAP company(pt doesn't know name of company) was received by nurse or not.   Phone # [phone] or # [phone]  Thanks

## 2019-02-06 ENCOUNTER — CLINICAL SUPPORT (OUTPATIENT)
Dept: CARDIOLOGY CLINIC | Age: 79
End: 2019-02-06

## 2019-02-06 DIAGNOSIS — R00.1 SYMPTOMATIC BRADYCARDIA: ICD-10-CM

## 2019-02-20 ENCOUNTER — DOCUMENTATION ONLY (OUTPATIENT)
Dept: CARDIOLOGY CLINIC | Age: 79
End: 2019-02-20

## 2019-02-20 NOTE — PROGRESS NOTES
2/6/19-48-hour Holter monitor-minimum heart rate 42 bpm, maximum heart rate 120 bpm average heart rate 67 bpm.  Rare isolated supraventricular beats, rate is V paced, rate experience, rare isolated ventricular beats

## 2019-02-26 ENCOUNTER — HOSPITAL ENCOUNTER (OUTPATIENT)
Dept: GENERAL RADIOLOGY | Age: 79
Discharge: HOME | End: 2019-02-26
Attending: PODIATRIST
Payer: MEDICARE

## 2019-02-26 DIAGNOSIS — M20.41 HAMMER TOES, BILATERAL: ICD-10-CM

## 2019-02-26 DIAGNOSIS — M20.42 HAMMER TOES, BILATERAL: ICD-10-CM

## 2019-02-26 PROCEDURE — 73630 X-RAY EXAM OF FOOT: CPT

## 2019-03-01 ENCOUNTER — OFFICE VISIT (OUTPATIENT)
Dept: CARDIOLOGY CLINIC | Age: 79
End: 2019-03-01

## 2019-03-01 VITALS
DIASTOLIC BLOOD PRESSURE: 90 MMHG | OXYGEN SATURATION: 95 % | RESPIRATION RATE: 18 BRPM | SYSTOLIC BLOOD PRESSURE: 164 MMHG | BODY MASS INDEX: 34.05 KG/M2 | WEIGHT: 192.2 LBS | HEIGHT: 63 IN | HEART RATE: 95 BPM

## 2019-03-01 DIAGNOSIS — R00.1 SYMPTOMATIC BRADYCARDIA: ICD-10-CM

## 2019-03-01 DIAGNOSIS — Z99.89 OSA ON CPAP: ICD-10-CM

## 2019-03-01 DIAGNOSIS — I10 ESSENTIAL HYPERTENSION: Primary | ICD-10-CM

## 2019-03-01 DIAGNOSIS — G47.33 OSA ON CPAP: ICD-10-CM

## 2019-03-01 DIAGNOSIS — R06.00 DYSPNEA, UNSPECIFIED TYPE: ICD-10-CM

## 2019-03-01 NOTE — PROGRESS NOTES
Chief Complaint   Patient presents with    Follow-up     Patient presents today for a follow up visit    Hypertension     Visit Vitals  /90 (BP 1 Location: Left arm, BP Patient Position: Sitting)   Pulse 95   Resp 18   Ht 5' 3\" (1.6 m)   Wt 192 lb 3.2 oz (87.2 kg)   SpO2 95%   BMI 34.05 kg/m²

## 2019-03-01 NOTE — PROGRESS NOTES
Jennifer Pablo MD    [address] [facility] [place], [address] [facility]    Office [phone],AYK [phone]  Pager [phone]    Tremaine Bray is a 78 y.o. female is here for f/u visit . Primary care physician:  Monique Hunt MD    Patient Active Problem List   Diagnosis Code    GERD (gastroesophageal reflux disease) K21.9    Arthritis of left knee M17.12    Pure hypercholesterolemia E78.00    Primary open angle glaucoma H40.1190    Osteoporosis M81.0    DANIELA on CPAP G47.33, Z99.89    Symptomatic bradycardia R00.1    Type 2 diabetes mellitus with complication, without long-term current use of insulin ([address] E11.8    Type 2 diabetes mellitus with diabetic neuropathy ([address] E11.40    Severe obesity (BMI 35.0-39. 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    Type 2 diabetes with nephropathy ([address] E11.21       Chief complaint:  Chief Complaint   Patient presents with    Follow-up     Patient presents today for a follow up visit    Hypertension       Dear Dr Jeane Sherman,    I had the pleasure of seeing Ms. Mcnair in the office today. Assessment:  Sinus bradycardia/ fatigue  HLD  Dyspnea  DM - HbA1C 6.2 12/2018  HTN  Former smoker. However has been exposed to significant secondhand smoking        Plan:     48-hour Holter monitor HR 42 - 120 ;  Stres Nuc study - [place]  Continues to have significant fatigue. Will go ahead with a stress nuclear study. Will also refer to Dr. Talia David for possible pacemaker evaluation. Pressure elevated. Will increase lisinopril 10 mg to 1-1/2 tablet that is 15 mg daily. Monitor blood pressure. May have to keep up titrating it. He has had difficulty getting the CPAP supplies but is working on it. I encouraged her to get it as soon as possible because that may affect the heart rate. Lipids per PCP - reviewed on 12/27/19  Aggressive cardiovascular risk factor modification.   Follow-up in 6 mths            Lab Results Component Value Date/Time    Cholesterol, total 151 12/27/2018 09:55 AM    HDL Cholesterol 52 12/27/2018 09:55 AM    LDL, calculated 80 12/27/2018 09:55 AM    VLDL, calculated 19 12/27/2018 09:55 AM    Triglyceride 95 12/27/2018 09:55 AM    CHOL/HDL Ratio 4.4 09/02/2010 03:17 PM         Patient understands the plan. All questions were answered to the patient's satisfaction. Medication Side Effects and Warnings were discussed with patient: yes  Patient Labs were reviewed and or requested:  yes  Patient Past Records were reviewed and or requested: yes    I appreciate the opportunity to be involved in Ms. Pichardo. See note below for details. Please do not hesitate to contact us with questions or concerns. Micah Vance MD    Cardiac Testing/ Procedures: A. Cardiac Cath/PCI: 2/7/17 R radial approach  Attempted R brachial vein - unable to get access; Switched to R Femoral  vein    R SCV angiogram  R Brachial vein venogram    L Main: Nml    LAD: Mid 30%; Med; D1 - MLI    LCflex: Med; MLI; OM1 - small to med; MLI    RCA: Med to large; Dominant; Nml    LVEF: 60%    No significant gradient across aortic valve. PCI: none    RHC findings:    RAPm8= mmHg  RVSP= 42 mmHg  PAPm= 16 mmHg  PCWPm= 9 mmHg    Specimens Removed : None    B.ECHO/CHIP: 1/6/18-normal EF.  11/30/16 Left ventricle: Systolic function was normal. Ejection fraction was  estimated to be 68 % by Lira's biplane technique. There were no  regional wall motion abnormalities. Doppler parameters were consistent  with abnormal left ventricular relaxation (grade 1 diastolic dysfunction). Mitral valve: There was mild regurgitation. Tricuspid valve: There was near moderate regurgitation. Pulmonary artery  systolic pressure: 33 mmHg. C.StressNuclear/Stress ECHO/Stress test: 11/2016 - Nml [place] nuc stress test    D. Vascular: 12/6/16 - Nml resting NICKOLAS    E. EP:2/6/19-48-hour Holter monitor-minimum   heart rate 42 bpm, maximum heart rate 120 bpm average heart rate 67 bpm.  Rare isolated supraventricular beats, rate is V paced, rate experience, rare isolated ventricular beats   E cardio event monitor 1/2018-heart rate 59-83. No events. F. Miscellaneous:    Subjective:  Baylee Saucedo is a 78 y.o. female who returns for follow up visit. History of fatigue, occasional dizziness. No syncope. SOme MOTTA/ No CP        ROS:  (bold if positive, if negative)             Medications before admission:    Current Outpatient Medications   Medication Sig Dispense    ibuprofen (MOTRIN) 200 mg tablet Take 4 Tabs by mouth every eight (8) hours as needed for Pain. 60 Tab    glipiZIDE (GLUCOTROL) 5 mg tablet TAKE 1 TABLET BY MOUTH ONCE DAILY 90 Tab    NYSTOP powder APPLY POWDER TOPICALLY TO AFFECTED AREA 4 TIMES DAILY UNTIL RASH RESOLVES 60 g    alendronate (FOSAMAX) 70 mg tablet      tiZANidine (ZANAFLEX) 2 mg capsule 1-2 tablets, 1-2 hours prior to bedtime as needed for spasm. 30 Cap    ondansetron (ZOFRAN ODT) 8 mg disintegrating tablet Take 1 Tab by mouth every twelve (12) hours as needed for Nausea. 15 Tab    atorvastatin (LIPITOR) 40 mg tablet Take 1 Tab by mouth daily for 90 days. 90 Tab    lisinopril (PRINIVIL, ZESTRIL) 10 mg tablet Take 1 Tab by mouth daily. 90 Tab    fluticasone-vilanterol (BREO ELLIPTA) 100-25 mcg/dose inhaler Take 1 Puff by inhalation daily.  oxybutynin (DITROPAN) 5 mg tablet Take 1 Tab by mouth three (3) times daily for 270 days. 270 Tab    ketoconazole (NIZORAL) 2 % topical cream Apply  to affected area daily. 30 g    Calcium Carbonate-Vit D3-Min 600 mg calcium- 400 unit tab Take 1 pill 2 x daily 60 Tab    aspirin delayed-release 81 mg tablet Take  by mouth daily.      glucose blood VI test strips (BLOOD GLUCOSE TEST) strip Accu-chek alexei plus test strips Test blood sugar once daily E11.9 100 Strip    Lancets misc accu-chek softclix lancets Check blood sugar daily E11.9 100 Each    acetaminophen (TYLENOL) 650 mg CR tablet Take 650 mg by mouth every six (6) hours as needed for Pain.  latanoprost (XALATAN) 0.005 % ophthalmic solution Administer 1 Drop to both eyes nightly.  furosemide (LASIX) 20 mg tablet Take  by mouth every Monday, Wednesday, Friday.  HYDROcodone-acetaminophen (NORCO) 5-325 mg per tablet Take 1 Tab by mouth every six (6) hours as needed for Pain. Max Daily Amount: 4 Tabs. (Patient not taking: Reported on 3/1/2019) 40 Tab    pneumococcal 13 chilo conj dip (PREVNAR-13) 0.5 mL syrg injection 0.5 mL by IntraMUSCular route PRIOR TO DISCHARGE for 1 dose. 0.5 mL    gabapentin (NEURONTIN) 600 mg tablet Take 600 mg by mouth three (3) times daily as needed. No current facility-administered medications for this visit. Physical Exam:  Visit Vitals  /90 (BP 1 Location: Left arm, BP Patient Position: Sitting)   Pulse 95   Resp 18   Ht 5' 3\" (1.6 m)   Wt 192 lb 3.2 oz (87.2 kg)   SpO2 95%   BMI 34.05 kg/m²          Gen: Well-developed, well-nourished, in no acute distress  Neck: Supple,No JVD, No Carotid Bruit,   Resp: No accessory muscle use, Clear breath sounds, No rales or rhonchi  Card: Regular Rythm,Normal S1, S2, No murmurs, rubs or gallop. No thrills.    Abd:  Soft, non-tender, non-distended,BS+,   MSK: No cyanosis  Skin: No rashes    Neuro: moving all four extremities , follows commands appropriately  Psych:  Good insight, oriented to person, place , alert, Nml Affect  LE: No edema    EKG: Sinus bradycardia, nonspecific ST-T changes      LABS:        Lab Results   Component Value Date/Time    WBC 5.8 12/27/2018 09:55 AM    HGB 12.1 12/27/2018 09:55 AM    HCT 36.5 12/27/2018 09:55 AM    PLATELET 097 75/32/3662 09:55 AM    MCV 87 12/27/2018 09:55 AM     Lab Results   Component Value Date/Time    Sodium 142 12/27/2018 09:55 AM    Potassium 5.0 12/27/2018 09:55 AM    Chloride 102 12/27/2018 09:55 AM    CO2 25 12/27/2018 09:55 AM    Anion gap 9 01/08/2018 01:05 AM    Glucose 89 12/27/2018 09:55 AM    BUN 14 12/27/2018 09:55 AM    Creatinine 1.17 (H) 12/27/2018 09:55 AM    BUN/Creatinine ratio 12 12/27/2018 09:55 AM    GFR est AA 52 (L) 12/27/2018 09:55 AM    GFR est non-AA 45 (L) 12/27/2018 09:55 AM    Calcium 9.8 12/27/2018 09:55 AM       No results found for: APTT  Lab Results   Component Value Date/Time    INR 1.1 01/06/2017 12:14 PM    INR 1.1 06/16/2014 02:25 PM    Prothrombin time 10.7 01/06/2017 12:14 PM    Prothrombin time 11.2 06/16/2014 02:25 PM     No components found for: Carrie Murguia MD

## 2019-03-22 ENCOUNTER — TELEPHONE (OUTPATIENT)
Dept: CARDIOLOGY CLINIC | Age: 79
End: 2019-03-22

## 2019-03-22 NOTE — TELEPHONE ENCOUNTER
Patient's daughter is calling regarding her mother's nuclear scheduled for Monday 3/25/19, she states that she was told that her mother was also having a pacemaker implanted the same day. Please advise. Krystyna Ram can be reached at:  [phone].   Thanks

## 2019-03-22 NOTE — TELEPHONE ENCOUNTER
Returned patient's call advised appointment Monday, March 25th at   8:00 am is for the Stress Nuclear Study. Per Dr Finley Lala notes on 3/1/19 he will refer patient to Dr. Opal Jennings - Electrophysiologist for possible pacemaker evaluation. I informed patient that would be a separate appointment that someone would be contacting her to schedule that appointment. Patient verbalized understanding.

## 2019-03-26 ENCOUNTER — TELEPHONE (OUTPATIENT)
Dept: CARDIOLOGY CLINIC | Age: 79
End: 2019-03-26

## 2019-03-26 NOTE — TELEPHONE ENCOUNTER
Called patient no answer, Lm/Vm to call office in regards to below message.       MD Cristy Crain, REBECA             Nml stress nuc

## 2019-03-29 ENCOUNTER — OFFICE VISIT (OUTPATIENT)
Dept: CARDIOLOGY CLINIC | Age: 79
End: 2019-03-29

## 2019-03-29 VITALS
WEIGHT: 189 LBS | OXYGEN SATURATION: 96 % | HEART RATE: 96 BPM | RESPIRATION RATE: 16 BRPM | HEIGHT: 63 IN | DIASTOLIC BLOOD PRESSURE: 66 MMHG | BODY MASS INDEX: 33.49 KG/M2 | SYSTOLIC BLOOD PRESSURE: 124 MMHG

## 2019-03-29 DIAGNOSIS — R00.1 BRADYCARDIA: Primary | ICD-10-CM

## 2019-03-29 NOTE — PROGRESS NOTES
Room # 2    Extreme fatigue    Visit Vitals  /66 (BP 1 Location: Left arm, BP Patient Position: Sitting)   Pulse 96   Resp 16   Ht 5' 3\" (1.6 m)   Wt 189 lb (85.7 kg)   SpO2 96%   BMI 33.48 kg/m²

## 2019-03-29 NOTE — PROGRESS NOTES
HISTORY OF PRESENTING ILLNESS      Angelita Nageotte is a 78 y.o. female with diabetes, DANIELA on CPAP, dyslipidemia presenting with significant fatigue at rest that is worsened with exertion. She has been noted to have bradycardia with heart rates in the 40s however heart rate today is in the 90s. She denies syncope. She experienced similar fatigue several years ago which responded with adjustment in inhalers. She underwent a Lexiscan nuclear stress test recently during which she failed to reach target heart rate.        ACTIVE PROBLEM LIST     Patient Active Problem List    Diagnosis Date Noted    Type 2 diabetes with nephropathy ([address] 01/13/2019    Anginal equivalent ([address] 08/30/2018    Severe obesity (BMI 35.0-39.9) 06/14/2018    Type 2 diabetes mellitus with complication, without long-term current use of insulin ([address] 01/09/2018    Type 2 diabetes mellitus with diabetic neuropathy ([address] 01/09/2018    Symptomatic bradycardia 01/05/2018    DANIELA on CPAP 07/31/2017    Pure hypercholesterolemia 06/22/2016    Primary open angle glaucoma 06/22/2016    Osteoporosis 06/22/2016    Arthritis of left knee 07/14/2014    GERD (gastroesophageal reflux disease) 03/11/2010           PAST MEDICAL HISTORY     Past Medical History:   Diagnosis Date    Arthritis     Diabetes ([address]     GERD (gastroesophageal reflux disease)     Glaucoma     High cholesterol     Ill-defined condition     EDEMA, LOWER LIMS, URINARY INCONTINENCE    Urge incontinence            PAST SURGICAL HISTORY     Past Surgical History:   Procedure Laterality Date    BREAST SURGERY PROCEDURE UNLISTED      LT BREAST CYST BENIGN    DRAINAGE OF DEEP RECTAL ABSCESS      ENDOSCOPY, COLON, DIAGNOSTIC      2008    HX BREAST BIOPSY Left 1970s    Surgical    HX GI      RECTAL ABSCESS    HX HEART CATHETERIZATION  01/06/2017    HX HEENT      NATHAN CATARACTS    HX HYSTERECTOMY      HX KNEE REPLACEMENT      left    HX UROLOGICAL      bladder surgery          ALLERGIES     Allergies   Allergen Reactions    Sulfa (Sulfonamide Antibiotics) Hives and Itching          FAMILY HISTORY     Family History   Problem Relation Age of Onset    Heart Disease Mother     No Known Problems Father     negative for cardiac disease       SOCIAL HISTORY     Social History     Socioeconomic History    Marital status:      Spouse name: Not on file    Number of children: Not on file    Years of education: Not on file    Highest education level: Not on file   Tobacco Use    Smoking status: Former Smoker     Last attempt to quit: 3/11/1990     Years since quittin.0    Smokeless tobacco: Never Used   Substance and Sexual Activity    Alcohol use: Yes     Alcohol/week: 0.0 oz     Comment: occasionally    Drug use: No    Sexual activity: Not Currently   Other Topics Concern     Service No    Blood Transfusions No    Caffeine Concern No    Occupational Exposure No    Hobby Hazards No    Sleep Concern Yes    Stress Concern Yes    Weight Concern Yes    Special Diet No    Back Care No    Exercise Yes    Bike Helmet Yes    Seat Belt Yes    Self-Exams No         MEDICATIONS     Current Outpatient Medications   Medication Sig    HYDROcodone-acetaminophen (NORCO) 5-325 mg per tablet Take 1 Tab by mouth every six (6) hours as needed for Pain. Max Daily Amount: 4 Tabs. (Patient not taking: Reported on 3/1/2019)    ibuprofen (MOTRIN) 200 mg tablet Take 4 Tabs by mouth every eight (8) hours as needed for Pain.  glipiZIDE (GLUCOTROL) 5 mg tablet TAKE 1 TABLET BY MOUTH ONCE DAILY    NYSTOP powder APPLY POWDER TOPICALLY TO AFFECTED AREA 4 TIMES DAILY UNTIL RASH RESOLVES    alendronate (FOSAMAX) 70 mg tablet     tiZANidine (ZANAFLEX) 2 mg capsule 1-2 tablets, 1-2 hours prior to bedtime as needed for spasm.  ondansetron (ZOFRAN ODT) 8 mg disintegrating tablet Take 1 Tab by mouth every twelve (12) hours as needed for Nausea.     atorvastatin (LIPITOR) 40 mg tablet Take 1 Tab by mouth daily for 90 days.  pneumococcal 13 chilo conj dip (PREVNAR-13) 0.5 mL syrg injection 0.5 mL by IntraMUSCular route PRIOR TO DISCHARGE for 1 dose.  lisinopril (PRINIVIL, ZESTRIL) 10 mg tablet Take 1 Tab by mouth daily.  fluticasone-vilanterol (BREO ELLIPTA) 100-25 mcg/dose inhaler Take 1 Puff by inhalation daily.  oxybutynin (DITROPAN) 5 mg tablet Take 1 Tab by mouth three (3) times daily for 270 days.  ketoconazole (NIZORAL) 2 % topical cream Apply  to affected area daily.  Calcium Carbonate-Vit D3-Min 600 mg calcium- 400 unit tab Take 1 pill 2 x daily    aspirin delayed-release 81 mg tablet Take  by mouth daily.  glucose blood VI test strips (BLOOD GLUCOSE TEST) strip Accu-chek alexei plus test strips Test blood sugar once daily E11.9    Lancets misc accu-chek softclix lancets Check blood sugar daily E11.9    acetaminophen (TYLENOL) 650 mg CR tablet Take 650 mg by mouth every six (6) hours as needed for Pain.  gabapentin (NEURONTIN) 600 mg tablet Take 600 mg by mouth three (3) times daily as needed.  latanoprost (XALATAN) 0.005 % ophthalmic solution Administer 1 Drop to both eyes nightly.  furosemide (LASIX) 20 mg tablet Take  by mouth every Monday, Wednesday, Friday. No current facility-administered medications for this visit. I have reviewed the nurses notes, vitals, problem list, allergy list, medical history, family, social history and medications. REVIEW OF SYMPTOMS      General: Pt denies excessive weight gain or loss. Pt is able to conduct ADL's  HEENT: Denies blurred vision, headaches, hearing loss, epistaxis and difficulty swallowing. Respiratory: Denies cough, congestion, shortness of breath, MOTTA, wheezing or stridor.   Cardiovascular: Denies precordial pain, palpitations, edema or PND  Gastrointestinal: Denies poor appetite, indigestion, abdominal pain or blood in stool  Genitourinary: Denies hematuria, dysuria, increased urinary frequency  Musculoskeletal: Denies joint pain or swelling from muscles or joints  Neurologic: Denies tremor, paresthesias, headache, or sensory motor disturbance  Psychiatric: Denies confusion, insomnia, depression  Integumentray: Denies rash, itching or ulcers. Hematologic: Denies easy bruising, bleeding       PHYSICAL EXAMINATION      There were no vitals filed for this visit. General: Well developed, in no acute distress. HEENT: No jaundice, oral mucosa moist, no oral ulcers  Neck: Supple, no stiffness, no lymphadenopathy, supple  Heart:  Normal S1/S2 negative S3 or S4. Regular, no murmur, gallop or rub, no jugular venous distention  Respiratory: Clear bilaterally x 4, no wheezing or rales  Abdomen:   Soft, non-tender, bowel sounds are active.   Extremities:  No edema, normal cap refill, no cyanosis. Musculoskeletal: No clubbing, no deformities  Neuro: A&Ox3, speech clear, gait stable, cooperative, no focal neurologic deficits  Skin: Skin color is normal. No rashes or lesions. Non diaphoretic, moist.  Vascular: 2+ pulses symmetric in all extremities       DIAGNOSTIC DATA      EKG:        LABORATORY DATA      Lab Results   Component Value Date/Time    WBC 5.8 12/27/2018 09:55 AM    HGB 12.1 12/27/2018 09:55 AM    HCT 36.5 12/27/2018 09:55 AM    PLATELET 954 49/53/8108 09:55 AM    MCV 87 12/27/2018 09:55 AM      Lab Results   Component Value Date/Time    Sodium 142 12/27/2018 09:55 AM    Potassium 5.0 12/27/2018 09:55 AM    Chloride 102 12/27/2018 09:55 AM    CO2 25 12/27/2018 09:55 AM    Anion gap 9 01/08/2018 01:05 AM    Glucose 89 12/27/2018 09:55 AM    BUN 14 12/27/2018 09:55 AM    Creatinine 1.17 (H) 12/27/2018 09:55 AM    BUN/Creatinine ratio 12 12/27/2018 09:55 AM    GFR est AA 52 (L) 12/27/2018 09:55 AM    GFR est non-AA 45 (L) 12/27/2018 09:55 AM    Calcium 9.8 12/27/2018 09:55 AM    Bilirubin, total <0.2 12/27/2018 09:55 AM    AST (SGOT) 15 12/27/2018 09:55 AM    Alk.  phosphatase 88 12/27/2018 09:55 AM    Protein, total 7.6 12/27/2018 09:55 AM    Albumin 4.1 12/27/2018 09:55 AM    Globulin 4.4 (H) 01/05/2018 01:26 PM    A-G Ratio 1.2 12/27/2018 09:55 AM    ALT (SGPT) 12 12/27/2018 09:55 AM           ASSESSMENT      1. Bradycardia   2. Fatigue  3. Diabetes mellitus  4. Dyslipidemia  5. DANIELA on CPAP         PLAN     Unclear whether patient's bradycardia is driving her fatigue. Will obtain an exercise treadmill test.  She reports having a history of a left knee replacement however is able to walk on a treadmill. Further recommendations on whether to proceed with pacemaker based on findings on treadmill test.       FOLLOW-UP     After testing      Thank you, Shanta Ching MD and Dr. Anna Marie Myrick for allowing me to participate in the care of this extraordinarily pleasant female. Please do not hesitate to contact me for further questions/concerns.          Cedric Albright MD  Cardiac Electrophysiology / Cardiology    [address]  [address]  [phone] / [phone] Fax   [phone] / [phone] Fax

## 2019-04-02 NOTE — TELEPHONE ENCOUNTER
Spoke with patient (IDx2). Informed her of normal stress test.  Patient stated that she had just seen Dr. Oleksandr Wade on 3/29 and was asking if there were any cancellations for the treadmill stress test.  None available. Patient will keep appointment but would like a sooner test if there are cancellations.

## 2019-04-06 DIAGNOSIS — L30.4 PRURITIC INTERTRIGO: ICD-10-CM

## 2019-04-08 RX ORDER — GLIPIZIDE 5 MG/1
TABLET ORAL
Qty: 90 TAB | Refills: 0 | Status: SHIPPED | OUTPATIENT
Start: 2019-04-08 | End: 2019-07-04 | Stop reason: SDUPTHER

## 2019-04-08 RX ORDER — NYSTATIN 100000 [USP'U]/G
POWDER TOPICAL
Qty: 60 G | Refills: 4 | Status: SHIPPED | OUTPATIENT
Start: 2019-04-08 | End: 2020-08-31

## 2019-04-15 DIAGNOSIS — I10 HYPERTENSION, UNSPECIFIED TYPE: ICD-10-CM

## 2019-04-15 RX ORDER — LISINOPRIL 10 MG/1
TABLET ORAL
Qty: 135 TAB | Refills: 0 | Status: SHIPPED | OUTPATIENT
Start: 2019-04-15 | End: 2019-07-04 | Stop reason: SDUPTHER

## 2019-04-15 NOTE — TELEPHONE ENCOUNTER
Per Dr Char Desai last office visit notes on 3/1/19 patient's Rx Lisinopril was increased to 1 1/2 tablet - 15 mg daily. Medication per verbal order Dr. Anabella Engel. Requested Prescriptions     Pending Prescriptions Disp Refills    lisinopril (PRINIVIL, ZESTRIL) 10 mg tablet [Pharmacy Med Name: LISINOPRIL 10MG  TAB] 135 Tab 0     Sig: Take 1 1/2 Tablet by mouth once daily.

## 2019-04-18 ENCOUNTER — DOCUMENTATION ONLY (OUTPATIENT)
Dept: CARDIOLOGY CLINIC | Age: 79
End: 2019-04-18

## 2019-04-18 NOTE — PROGRESS NOTES
Stress test was ok. She has limited exercise tolerance. Appears deconditioned. Needs to walk more. If sx's persist would do lexiscan Cardiolite.     Radha Sullivan MD

## 2019-04-29 ENCOUNTER — TELEPHONE (OUTPATIENT)
Dept: CARDIOLOGY CLINIC | Age: 79
End: 2019-04-29

## 2019-04-29 NOTE — TELEPHONE ENCOUNTER
Spoke with patient. Informed her of normal stress test results but does show exercise intolerance. Patient states he continues to be very fatigued and SOB. Was scheduled for visit with Dylan Gupta on 5/10, but will require rescheduling. Please advise if additional testing is needed or if pacemaker implant is recommended.

## 2019-04-30 NOTE — TELEPHONE ENCOUNTER
Returned call. Spoke with patient. Stated she was evaluated by pulmonary about 2 years ago with the same symptoms. Will supply pulmonologist info so records can be requested before or at her upcoming visit on 5/6.

## 2019-05-03 NOTE — PROGRESS NOTES
HISTORY OF PRESENTING ILLNESS      Manas Elise is a 78 y.o. female with diabetes, DANIELA on CPAP, dyslipidemia presenting with significant fatigue at rest that is worsened with exertion. She has been noted to have bradycardia with heart rates in the 40s however heart rate today is in the 90s. She denies syncope. She experienced similar fatigue several years ago which responded with adjustment in inhalers. She underwent a Lexiscan nuclear stress test recently during which she failed to reach target heart rate. It was unclear whether patient's bradycardia was driving her fatigue therefore she completed an exercise treadmill test during which she achieved appropriate max HR. Overall, she notes improvement in fatigue and SOB with CPAP use. She is only sleeping 3 hours per night and reports high level of stress.      ACTIVE PROBLEM LIST     Patient Active Problem List    Diagnosis Date Noted    Type 2 diabetes with nephropathy ([address] 01/13/2019    Anginal equivalent ([address] 08/30/2018    Severe obesity (BMI 35.0-39.9) 06/14/2018    Type 2 diabetes mellitus with complication, without long-term current use of insulin ([address] 01/09/2018    Type 2 diabetes mellitus with diabetic neuropathy ([address] 01/09/2018    Symptomatic bradycardia 01/05/2018    DANIELA on CPAP 07/31/2017    Pure hypercholesterolemia 06/22/2016    Primary open angle glaucoma 06/22/2016    Osteoporosis 06/22/2016    Arthritis of left knee 07/14/2014    GERD (gastroesophageal reflux disease) 03/11/2010           PAST MEDICAL HISTORY     Past Medical History:   Diagnosis Date    Arthritis     Diabetes ([address]     GERD (gastroesophageal reflux disease)     Glaucoma     High cholesterol     Ill-defined condition     EDEMA, LOWER LIMS, URINARY INCONTINENCE    Urge incontinence            PAST SURGICAL HISTORY     Past Surgical History:   Procedure Laterality Date    BREAST SURGERY PROCEDURE UNLISTED      LT BREAST CYST BENIGN    DRAINAGE OF DEEP RECTAL ABSCESS      ENDOSCOPY, COLON, DIAGNOSTIC          HX BREAST BIOPSY Left 1970s    Surgical    HX GI      RECTAL ABSCESS    HX HEART CATHETERIZATION  2017    HX HEENT      NATHAN CATARACTS    HX HYSTERECTOMY      HX KNEE REPLACEMENT      left    HX UROLOGICAL      bladder surgery          ALLERGIES     Allergies   Allergen Reactions    Sulfa (Sulfonamide Antibiotics) Hives and Itching          FAMILY HISTORY     Family History   Problem Relation Age of Onset    Heart Disease Mother     No Known Problems Father     negative for cardiac disease       SOCIAL HISTORY     Social History     Socioeconomic History    Marital status:      Spouse name: Not on file    Number of children: Not on file    Years of education: Not on file    Highest education level: Not on file   Tobacco Use    Smoking status: Former Smoker     Last attempt to quit: 3/11/1990     Years since quittin.1    Smokeless tobacco: Never Used   Substance and Sexual Activity    Alcohol use: Yes     Alcohol/week: 0.0 oz     Comment: occasionally    Drug use: No    Sexual activity: Not Currently   Other Topics Concern     Service No    Blood Transfusions No    Caffeine Concern No    Occupational Exposure No    Hobby Hazards No    Sleep Concern Yes    Stress Concern Yes    Weight Concern Yes    Special Diet No    Back Care No    Exercise Yes    Bike Helmet Yes    Seat Belt Yes    Self-Exams No         MEDICATIONS     Current Outpatient Medications   Medication Sig    lisinopril (PRINIVIL, ZESTRIL) 10 mg tablet Take 1 1/2 Tablet by mouth once daily.  glipiZIDE (GLUCOTROL) 5 mg tablet TAKE 1 TABLET BY MOUTH ONCE DAILY    NYSTOP powder APPLY POWDER TOPICALLY TO THE AFFECTED AREA FOUR TIMES DAILY UNTIL RASH RESOLVES    ibuprofen (MOTRIN) 200 mg tablet Take 4 Tabs by mouth every eight (8) hours as needed for Pain.     alendronate (FOSAMAX) 70 mg tablet Take 70 mg by mouth every seven (7) days.  tiZANidine (ZANAFLEX) 2 mg capsule 1-2 tablets, 1-2 hours prior to bedtime as needed for spasm.  ondansetron (ZOFRAN ODT) 8 mg disintegrating tablet Take 1 Tab by mouth every twelve (12) hours as needed for Nausea.  atorvastatin (LIPITOR) 40 mg tablet Take 1 Tab by mouth daily for 90 days.  fluticasone-vilanterol (BREO ELLIPTA) 100-25 mcg/dose inhaler Take 1 Puff by inhalation daily.  oxybutynin (DITROPAN) 5 mg tablet Take 1 Tab by mouth three (3) times daily for 270 days. (Patient taking differently: Take 5 mg by mouth three (3) times daily as needed.)    ketoconazole (NIZORAL) 2 % topical cream Apply  to affected area daily.  Calcium Carbonate-Vit D3-Min 600 mg calcium- 400 unit tab Take 1 pill 2 x daily    aspirin delayed-release 81 mg tablet Take  by mouth daily.  glucose blood VI test strips (BLOOD GLUCOSE TEST) strip Accu-chek alexei plus test strips Test blood sugar once daily E11.9    Lancets misc accu-chek softclix lancets Check blood sugar daily E11.9    acetaminophen (TYLENOL) 650 mg CR tablet Take 650 mg by mouth every six (6) hours as needed for Pain.  latanoprost (XALATAN) 0.005 % ophthalmic solution Administer 1 Drop to both eyes nightly.  furosemide (LASIX) 20 mg tablet Take  by mouth every Monday, Wednesday, Friday. No current facility-administered medications for this visit. I have reviewed the nurses notes, vitals, problem list, allergy list, medical history, family, social history and medications. REVIEW OF SYMPTOMS      General: Pt denies excessive weight gain or loss. Pt is able to conduct ADL's  HEENT: Denies blurred vision, headaches, hearing loss, epistaxis and difficulty swallowing. Respiratory: Denies cough, congestion, shortness of breath, MOTTA, wheezing or stridor.   Cardiovascular: Denies precordial pain, palpitations, edema or PND  Gastrointestinal: Denies poor appetite, indigestion, abdominal pain or blood in stool  Genitourinary: Denies hematuria, dysuria, increased urinary frequency  Musculoskeletal: Denies joint pain or swelling from muscles or joints  Neurologic: Denies tremor, paresthesias, headache, or sensory motor disturbance  Psychiatric: Denies confusion, insomnia, depression  Integumentray: Denies rash, itching or ulcers. Hematologic: Denies easy bruising, bleeding       PHYSICAL EXAMINATION      There were no vitals filed for this visit. General: Well developed, in no acute distress. HEENT: No jaundice, oral mucosa moist, no oral ulcers  Neck: Supple, no stiffness, no lymphadenopathy, supple  Heart:  Normal S1/S2 negative S3 or S4. Regular, no murmur, gallop or rub, no jugular venous distention  Respiratory: Clear bilaterally x 4, no wheezing or rales  Abdomen:   Soft, non-tender, bowel sounds are active.   Extremities:  No edema, normal cap refill, no cyanosis. Musculoskeletal: No clubbing, no deformities  Neuro: A&Ox3, speech clear, gait stable, cooperative, no focal neurologic deficits  Skin: Skin color is normal. No rashes or lesions.  Non diaphoretic, moist.  Vascular: 2+ pulses symmetric in all extremities       DIAGNOSTIC DATA      EKG:        LABORATORY DATA      Lab Results   Component Value Date/Time    WBC 5.8 12/27/2018 09:55 AM    HGB 12.1 12/27/2018 09:55 AM    HCT 36.5 12/27/2018 09:55 AM    PLATELET 123 05/17/7567 09:55 AM    MCV 87 12/27/2018 09:55 AM      Lab Results   Component Value Date/Time    Sodium 142 12/27/2018 09:55 AM    Potassium 5.0 12/27/2018 09:55 AM    Chloride 102 12/27/2018 09:55 AM    CO2 25 12/27/2018 09:55 AM    Anion gap 9 01/08/2018 01:05 AM    Glucose 89 12/27/2018 09:55 AM    BUN 14 12/27/2018 09:55 AM    Creatinine 1.17 (H) 12/27/2018 09:55 AM    BUN/Creatinine ratio 12 12/27/2018 09:55 AM    GFR est AA 52 (L) 12/27/2018 09:55 AM    GFR est non-AA 45 (L) 12/27/2018 09:55 AM    Calcium 9.8 12/27/2018 09:55 AM    Bilirubin, total <0.2 12/27/2018 09:55 AM    AST (SGOT) 15 12/27/2018 09:55 AM    Alk. phosphatase 88 12/27/2018 09:55 AM    Protein, total 7.6 12/27/2018 09:55 AM    Albumin 4.1 12/27/2018 09:55 AM    Globulin 4.4 (H) 01/05/2018 01:26 PM    A-G Ratio 1.2 12/27/2018 09:55 AM    ALT (SGPT) 12 12/27/2018 09:55 AM           ASSESSMENT      1. Bradycardia   2. Fatigue  3. Diabetes mellitus  4. Dyslipidemia  5. DANIELA on CPAP        PLAN     Patient had normal stress testing with recommendation for increased exercise to improve conditioning. Recommend continued monitoring clinically for symptoms, Pacemaker is not warranted at this time. Will follow up as needed. FOLLOW-UP   PRN    Thank you, Bro Lucas MD and Dr. Kateryna Hannon for allowing me to participate in the care of this extraordinarily pleasant female. Please do not hesitate to contact me for further questions/concerns.        Villa Dietz, LAMAR

## 2019-05-06 ENCOUNTER — OFFICE VISIT (OUTPATIENT)
Dept: CARDIOLOGY CLINIC | Age: 79
End: 2019-05-06

## 2019-05-06 VITALS
RESPIRATION RATE: 16 BRPM | HEIGHT: 63 IN | SYSTOLIC BLOOD PRESSURE: 114 MMHG | BODY MASS INDEX: 33.59 KG/M2 | HEART RATE: 64 BPM | OXYGEN SATURATION: 94 % | DIASTOLIC BLOOD PRESSURE: 62 MMHG | WEIGHT: 189.6 LBS

## 2019-05-06 DIAGNOSIS — Z99.89 OSA ON CPAP: ICD-10-CM

## 2019-05-06 DIAGNOSIS — I10 HYPERTENSION, UNSPECIFIED TYPE: ICD-10-CM

## 2019-05-06 DIAGNOSIS — G47.33 OSA ON CPAP: ICD-10-CM

## 2019-05-06 DIAGNOSIS — R00.1 BRADYCARDIA: Primary | ICD-10-CM

## 2019-05-06 DIAGNOSIS — E11.8 TYPE 2 DIABETES MELLITUS WITH COMPLICATION, WITHOUT LONG-TERM CURRENT USE OF INSULIN (HCC): ICD-10-CM

## 2019-05-06 NOTE — Clinical Note
Dr. Kian Seymour- Ms. Henrry Reno has follow up in June with you. She is very worried over co-pays as she is having a hard time paying off office/hospital visits. Could you please advise if she needs to be soon or if it could be pushed out? Thanks! Harrison Zendejas

## 2019-05-31 ENCOUNTER — TELEPHONE (OUTPATIENT)
Dept: CARDIOLOGY CLINIC | Age: 79
End: 2019-05-31

## 2019-05-31 NOTE — TELEPHONE ENCOUNTER
Patient states she would like to speak to you in regards to the appointment she had scheduled for 6/7/19, which she has canceled.    Phone: [phone]

## 2019-07-04 DIAGNOSIS — I10 HYPERTENSION, UNSPECIFIED TYPE: ICD-10-CM

## 2019-07-05 RX ORDER — LISINOPRIL 10 MG/1
TABLET ORAL
Qty: 135 TAB | Refills: 0 | Status: SHIPPED | OUTPATIENT
Start: 2019-07-05 | End: 2020-04-22 | Stop reason: ALTCHOICE

## 2019-07-05 NOTE — TELEPHONE ENCOUNTER
Refill is per verbal order of Dr. Lavetta Aschoff.     Requested Prescriptions     Pending Prescriptions Disp Refills    lisinopril (PRINIVIL, ZESTRIL) 10 mg tablet [Pharmacy Med Name: LISINOPRIL 10MG  TAB] 135 Tab 0     Sig: TAKE 1 & 1/2 (ONE & ONE-HALF) TABLETS BY MOUTH ONCE DAILY

## 2019-07-08 RX ORDER — GLIPIZIDE 5 MG/1
TABLET ORAL
Qty: 90 TAB | Refills: 0 | Status: SHIPPED | OUTPATIENT
Start: 2019-07-08 | End: 2019-10-06 | Stop reason: SDUPTHER

## 2019-10-03 ENCOUNTER — TELEPHONE (OUTPATIENT)
Dept: FAMILY MEDICINE CLINIC | Age: 79
End: 2019-10-03

## 2019-10-03 NOTE — TELEPHONE ENCOUNTER
General Message/Vendor Calls     Caller's first and last name:       Reason for call: Pt is needing an authorization for a new diabetic meter.  Her previous meter has stopped working       Callback required yes/no and why: yes       Best contact number(s): [phone]       Details to clarify the request:       Nicolle De Leon

## 2019-10-04 RX ORDER — INSULIN PUMP SYRINGE, 3 ML
EACH MISCELLANEOUS
Qty: 1 KIT | Refills: 0 | Status: SHIPPED | OUTPATIENT
Start: 2019-10-04

## 2019-10-07 RX ORDER — GLIPIZIDE 5 MG/1
TABLET ORAL
Qty: 90 TAB | Refills: 0 | Status: SHIPPED | OUTPATIENT
Start: 2019-10-07 | End: 2020-12-31 | Stop reason: SDUPTHER

## 2019-10-10 RX ORDER — LANCETS
EACH MISCELLANEOUS
Qty: 100 EACH | Refills: 1 | Status: SHIPPED | OUTPATIENT
Start: 2019-10-10

## 2020-04-08 RX ORDER — ATORVASTATIN CALCIUM 40 MG/1
TABLET, FILM COATED ORAL
Qty: 90 TAB | Refills: 0 | Status: SHIPPED | OUTPATIENT
Start: 2020-04-08 | End: 2020-11-17

## 2020-04-22 ENCOUNTER — VIRTUAL VISIT (OUTPATIENT)
Dept: FAMILY MEDICINE CLINIC | Age: 80
End: 2020-04-22

## 2020-04-22 VITALS
HEART RATE: 61 BPM | DIASTOLIC BLOOD PRESSURE: 64 MMHG | WEIGHT: 193.6 LBS | BODY MASS INDEX: 34.3 KG/M2 | HEIGHT: 63 IN | SYSTOLIC BLOOD PRESSURE: 126 MMHG

## 2020-04-22 DIAGNOSIS — Z00.00 MEDICARE ANNUAL WELLNESS VISIT, SUBSEQUENT: Primary | ICD-10-CM

## 2020-04-22 DIAGNOSIS — I10 ESSENTIAL HYPERTENSION: ICD-10-CM

## 2020-04-22 DIAGNOSIS — M81.0 AGE RELATED OSTEOPOROSIS, UNSPECIFIED PATHOLOGICAL FRACTURE PRESENCE: ICD-10-CM

## 2020-04-22 DIAGNOSIS — K21.9 GASTROESOPHAGEAL REFLUX DISEASE WITHOUT ESOPHAGITIS: ICD-10-CM

## 2020-04-22 DIAGNOSIS — Z13.0 SCREENING FOR DEFICIENCY ANEMIA: ICD-10-CM

## 2020-04-22 DIAGNOSIS — Z12.39 SCREENING FOR BREAST CANCER: ICD-10-CM

## 2020-04-22 DIAGNOSIS — E11.8 TYPE 2 DIABETES MELLITUS WITH COMPLICATION, WITHOUT LONG-TERM CURRENT USE OF INSULIN (HCC): ICD-10-CM

## 2020-04-22 DIAGNOSIS — E11.9 TYPE 2 DIABETES MELLITUS WITHOUT COMPLICATION, UNSPECIFIED WHETHER LONG TERM INSULIN USE (HCC): ICD-10-CM

## 2020-04-22 DIAGNOSIS — M62.838 MUSCLE SPASM: ICD-10-CM

## 2020-04-22 DIAGNOSIS — Z13.220 SCREENING FOR HYPERLIPIDEMIA: ICD-10-CM

## 2020-04-22 DIAGNOSIS — E55.9 VITAMIN D DEFICIENCY: ICD-10-CM

## 2020-04-22 RX ORDER — LOSARTAN POTASSIUM 50 MG/1
50 TABLET ORAL DAILY
Qty: 90 TAB | Refills: 1
Start: 2020-04-22 | End: 2020-07-21

## 2020-04-22 RX ORDER — ALENDRONATE SODIUM 70 MG/1
70 TABLET ORAL
Qty: 4 TAB | Refills: 3 | Status: ON HOLD | OUTPATIENT
Start: 2020-04-22 | End: 2021-11-01

## 2020-04-22 RX ORDER — PHENOL/SODIUM PHENOLATE
20 AEROSOL, SPRAY (ML) MUCOUS MEMBRANE DAILY
Qty: 30 TAB | Refills: 5 | Status: SHIPPED | OUTPATIENT
Start: 2020-04-22 | End: 2020-05-22

## 2020-04-22 RX ORDER — TIZANIDINE HYDROCHLORIDE 2 MG/1
CAPSULE, GELATIN COATED ORAL
Qty: 30 CAP | Refills: 5 | Status: SHIPPED | OUTPATIENT
Start: 2020-04-22 | End: 2020-12-07 | Stop reason: SDUPTHER

## 2020-04-22 NOTE — ASSESSMENT & PLAN NOTE
Stable, based on history, physical exam and review of pertinent labs, studies and medications; meds reconciled; continue current treatment plan. Key Antihyperglycemic Medications             glipiZIDE (GLUCOTROL) 5 mg tablet (Taking) TAKE 1 TABLET BY MOUTH ONCE DAILY        Other Key Diabetic Medications             losartan (COZAAR) 50 mg tablet (Taking) Take 1 Tab by mouth daily for 90 days.     atorvastatin (LIPITOR) 40 mg tablet (Taking) Take 1 tablet by mouth once daily        Lab Results   Component Value Date/Time    Hemoglobin A1c 6.2 12/27/2018 09:55 AM    Glucose 89 12/27/2018 09:55 AM    Creatinine 1.17 12/27/2018 09:55 AM    Cholesterol, total 151 12/27/2018 09:55 AM    HDL Cholesterol 52 12/27/2018 09:55 AM    LDL, calculated 80 12/27/2018 09:55 AM    Triglyceride 95 12/27/2018 09:55 AM     Diabetic Foot and Eye Exam HM Status   Topic Date Due    Eye Exam  01/03/1950    Diabetic Foot Care  12/19/2019

## 2020-04-22 NOTE — ASSESSMENT & PLAN NOTE
Key Antihyperglycemic Medications   
    
  
 glipiZIDE (GLUCOTROL) 5 mg tablet (Taking) TAKE 1 TABLET BY MOUTH ONCE DAILY Other Key Diabetic Medications   
    
  
 losartan (COZAAR) 50 mg tablet (Taking) Take 1 Tab by mouth daily for 90 days. atorvastatin (LIPITOR) 40 mg tablet (Taking) Take 1 tablet by mouth once daily Lab Results Component Value Date/Time Hemoglobin A1c 6.2 12/27/2018 09:55 AM  
 Glucose 89 12/27/2018 09:55 AM  
 Creatinine 1.17 12/27/2018 09:55 AM  
 Cholesterol, total 151 12/27/2018 09:55 AM  
 HDL Cholesterol 52 12/27/2018 09:55 AM  
 LDL, calculated 80 12/27/2018 09:55 AM  
 Triglyceride 95 12/27/2018 09:55 AM  
 
Diabetic Foot and Eye Exam HM Status Topic Date Due [facility] Eye Exam  01/03/1950 [facility] Diabetic Foot Care  12/19/2019

## 2020-04-22 NOTE — PROGRESS NOTES
CC:  Chief Complaint   Patient presents with    Annual Wellness Visit     Consent: Kelle Velez, who was seen by synchronous (real-time) audio-video technology, and/or her healthcare decision maker, is aware that this patient-initiated, Telehealth encounter on 4/22/2020 is a billable service, with coverage as determined by her insurance carrier. She is aware that she may receive a bill and has provided verbal consent to proceed: Yes. I spent at least 15 minutes with this established patient, and >50% of the time was spent counseling and/or coordinating care regarding AWV  712  This is the Subsequent Medicare Annual Wellness Exam, performed 12 months or more after the Initial AWV or the last Subsequent AWV    I have reviewed the patient's medical history in detail and updated the computerized patient record. History     Patient Active Problem List   Diagnosis Code    GERD (gastroesophageal reflux disease) K21.9    Arthritis of left knee M17.12    Pure hypercholesterolemia E78.00    Primary open angle glaucoma H40.1190    Osteoporosis M81.0    DANIELA on CPAP G47.33, Z99.89    Symptomatic bradycardia R00.1    Type 2 diabetes mellitus with complication, without long-term current use of insulin ([address] E11.8    Type 2 diabetes mellitus with diabetic neuropathy (HCC) E11.40    Severe obesity (BMI 35.0-39. 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    Type 2 diabetes with nephropathy ([address] E11.21     Past Medical History:   Diagnosis Date    Arthritis     Diabetes ([address]     GERD (gastroesophageal reflux disease)     Glaucoma     High cholesterol     Ill-defined condition     EDEMA, LOWER LIMS, URINARY INCONTINENCE    Urge incontinence       Past Surgical History:   Procedure Laterality Date    BREAST SURGERY PROCEDURE UNLISTED      LT BREAST CYST BENIGN    DRAINAGE OF DEEP RECTAL ABSCESS      ENDOSCOPY, COLON, DIAGNOSTIC      2008    HX BREAST BIOPSY Left 1970s    Surgical    HX GI Progress Notes by Magalie Bates MD at 03/16/17 04:14 PM     Author:  Magalie Bates MD Service:  (none) Author Type:  Physician     Filed:  03/16/17 04:30 PM Encounter Date:  3/16/2017 Status:  Signed     :  Magalie Bates MD (Physician)            NEW PT-[VM1.1M]     9-10 Year Well Child Visit   Roomed by: Jessy Quintanilla RN 4:13 PM     Accompanied by:[VM1.2T] Father[VM1.1M]  DEVELOPMENT:  School Hx: presently is[VM1.2T] in the 3rd grade[VM1.1M].  School Progress:[VM1.2T] good grades[VM1.1M]  Activities:[VM1.2T] active and sports- tennis[VM1.1M]    TB History[VM1.2T]  No[VM1.1M] -- Is there a family history of TB?[VM1.2T]   No[VM1.1M] -- Has the child been exposed to anyone who was in a correctional facility or works in one in the past 5 years?[VM1.2T]    No[VM1.1M] -- Is the child an immigrant or adopted from an endemic country - ([place], [place] or [place])?[VM1.2T]    No[VM1.1M] -- Is there a travel history to an endemic country ([place], [place], [place] or [place] with exposure to the local population?[VM1.2T]  No[VM1.1M] -- Is there a patient history of HIV or family member with HIV?[VM1.2T]  No[VM1.1M] -- Has the child been exposed to a  resident of a nursing home, institutionalized adolescent, foster home or homeless shelter?   TB skin test indicated:[VM1.2T] Not at this time[VM1.1M]    Diabetic Screening[VM1.2T]  No[VM1.1M] -- Diabetes screening: family history[VM1.2T]   and [VM1.1M] -- Ethnic background[VM1.2T]  No[VM1.1M] -- Signs of insulin resistance (hypertension, dyslipidemia, polycystic ovarian syndrome).    SUBJECTIVE:   Present health:[VM1.2T] Concerns: sore throat, side of mouth hurts[VM1.1M]  Hearing & vision:[VM1.2T] No Problems[VM1.1M]  Dental:[VM1.2T] No Problems  , wears braces, side of cheek hurts[VM1.1M]  Diet:[VM1.2T] appropriate diet[VM1.1M]  Elimination:   BM s:[VM1.2T] No problems- occassional constipation- large and  RECTAL ABSCESS    HX HEART CATHETERIZATION  01/06/2017    HX HEENT      NATHAN CATARACTS    HX HYSTERECTOMY      HX KNEE REPLACEMENT      left    HX UROLOGICAL      bladder surgery     Current Outpatient Medications   Medication Sig Dispense Refill    losartan (COZAAR) 50 mg tablet Take 1 Tab by mouth daily for 90 days. 90 Tab 1    tiZANidine (Zanaflex) 2 mg capsule 1-2 tablets, 1-2 hours prior to bedtime as needed for spasm. 30 Cap 5    Omeprazole delayed release (PRILOSEC D/R) 20 mg tablet Take 1 Tab by mouth daily for 30 days. 30 Tab 5    alendronate (FOSAMAX) 70 mg tablet Take 1 Tab by mouth every seven (7) days for 30 days. 4 Tab 3    atorvastatin (LIPITOR) 40 mg tablet Take 1 tablet by mouth once daily 90 Tab 0    lancets misc accu-chek softclix lancets Check blood sugar daily E11.9 100 Each 1    glucose blood VI test strips (BLOOD GLUCOSE TEST) strip Accu-chek alexei plus test strips Test blood sugar once daily E11.9 100 Strip 1    glipiZIDE (GLUCOTROL) 5 mg tablet TAKE 1 TABLET BY MOUTH ONCE DAILY 90 Tab 0    Blood-Glucose Meter monitoring kit Pt with T2DM. Needs meter to check BS 1-2 x daily. E11.9. Please give meter approved by her insurance. 1 Kit 0    NYSTOP powder APPLY POWDER TOPICALLY TO THE AFFECTED AREA FOUR TIMES DAILY UNTIL RASH RESOLVES 60 g 4    ibuprofen (MOTRIN) 200 mg tablet Take 4 Tabs by mouth every eight (8) hours as needed for Pain. 60 Tab 2    ondansetron (ZOFRAN ODT) 8 mg disintegrating tablet Take 1 Tab by mouth every twelve (12) hours as needed for Nausea. 15 Tab 3    oxybutynin (DITROPAN) 5 mg tablet Take 1 Tab by mouth three (3) times daily for 270 days. (Patient taking differently: Take 5 mg by mouth three (3) times daily as needed.) 270 Tab 1    ketoconazole (NIZORAL) 2 % topical cream Apply  to affected area daily.  30 g 3    Calcium Carbonate-Vit D3-Min 600 mg calcium- 400 unit tab Take 1 pill 2 x daily 60 Tab 5    aspirin delayed-release 81 mg tablet Take  by hard[VM1.1M]  Urine:[VM1.2T] No problems[VM1.1M]  Sleep:[VM1.2T] No problems[VM1.1M]    ROS  All other systems reviewed were negative     Allergies:  Review of patient's allergies indicates no known allergies.     No current outpatient prescriptions on file.       Past Medical history:[VM1.2T] No change in past medical history[VM1.1M]     Family history:[VM1.2T] No change in family history[VM1.1M]    Social history:[VM1.2T] Attends school[VM1.1M]     OBJECTIVE:   Physical exam  BP 98/62  Pulse 81  Temp 98.6 °F (37 °C) (Temporal)   Resp 24  Ht 4' 8.6\" (1.438 m)  Wt 63 lb 12.8 oz (28.9 kg)  SpO2 98%  BMI 14 kg/m2     GENERAL: Evaluation of appearance.  Findings:[VM1.2T] Normal[SS1.1T]    HEAD & SCALP: Evaluation for lesions, swelling, tenderness and abnormalities.  Findings:[VM1.2T] Normal[SS1.1T]    EYES: Evaluation for redness, swelling, drainage and abnormalities.  Findings:[VM1.2T] Both eyes normal[SS1.1T]    EARS: Evaluation of  external ears, canals, and tm's  Findings:[VM1.2T] Normal[SS1.1T]    NOSE: Evaluation for swelling and drainage  Findings:[VM1.2T] Normal[SS1.1T]    MOUTH: Evaluation of tongue and mucosal membranes  Findings:[VM1.2T] Normal[SS1.1T]    THROAT: Evaluation for redness and lesions  Findings:[VM1.2T] Normal[SS1.1T]    NECK: Evaluation for masses, swelling and soreness  Findings:[VM1.2T] Normal[SS1.1T]    CHEST: Evaluation - auscultation and observation  Findings:[VM1.2T] Normal[SS1.1T]. BREAST:[VM1.2T] Normal[SS1.1T].  SYED SCALE:[VM1.2T] not examined[SS1.1M]     CARDIO: Evaluation by auscultation   Findings:[VM1.2T] Normal[SS1.1T]    ABDOMEN: Evaluation for bowel sounds, organomegaly, masses and tenderness  Findings:[VM1.2T] Normal - soft, no tenderness, no masses[SS1.1T]    : Evaluation by inspections.  Findings:[VM1.2T] normal female[SS1.1T].  SYED SCALE:[VM1.2T] not examined[SS1.1M]     MUS-SKEL: Evaluation for swelling, edema, range of motion or other  abnormalities.  Findings:[VM1.2T] Normal[SS1.1T].      SKIN: Evaluation for rashes, acne and lesions  Findings:[VM1.2T] Normal[SS1.1T]    NEURO: Evaluation by observation.  Findings:[VM1.2T] Normal[SS1.1T]       ASSESSMENT:[VM1.2T]   Normal Health Maintenance Visit[SS1.1T]    PLAN:  Medication changes:[VM1.2T] No[SS1.1M]  Immunizations given today?[VM1.2T] No.[SS1.1M]  See Orders  Discussed anticipatory guidance for screen time activity   Reviewed Discussion and Guidance Topics:[VM1.2T] healthy eating habits, physical activity,[SS1.1T] diet: fast food, fluoride, sleep: regular bed time, discipline: chores, limits, school: study time, limit TV, social: peers, safety: seat belt, bike helmet, firearms, sunscreen, prevention: drugs, alcohol, smoking, development: puberty/body changes, periods-girls and School form signed and returned to parent/patient.[SS1.1M]    Nutrition Assessment and Counseling  Diet is[VM1.2T] appropriate for age[SS1.1T].  Discussed healthy eating habits:[VM1.2T] Yes[SS1.1T]    Physical Activity Assessment and Counseling  Patient[VM1.2T] participates in physical activity[SS1.1T].  Discussed physical activity:[VM1.2T] Yes[SS1.1T]    Screen Time Assessment and Counseling  Number of hours of screen time per day:[VM1.2T] 1-2 hours[SS1.1M].  Discussed anticipatory guidance for screen time activity:[VM1.2T] Yes[SS1.1T]    Blood Pressure  Blood pressures is[VM1.2T] appropriate for age[SS1.1T].      Next Well Child Checkup in 1 year.  Call if questions or problems.[VM1.2T]    Electronically Signed by:    Magalie Bates MD , 3/16/2017[SS1.2T]        Revision History        User Key Date/Time User Provider Type Action    > SS1.2 03/16/17 04:30 PM Magalie Bates MD Physician Sign     SS1.1 03/16/17 04:29 PM Magalie Bates MD Physician      VM1.1 03/16/17 04:19 PM Jessy Quintanilla, RN Registered Nurse Sign at close encounter     VM1.2 03/16/17 04:13 PM Jessy Quintanilla, RN Registered Nurse   mouth daily.  acetaminophen (TYLENOL) 650 mg CR tablet Take 650 mg by mouth every six (6) hours as needed for Pain.  latanoprost (XALATAN) 0.005 % ophthalmic solution Administer 1 Drop to both eyes nightly.  furosemide (LASIX) 20 mg tablet Take  by mouth every Monday, Wednesday, Friday. Allergies   Allergen Reactions    Sulfa (Sulfonamide Antibiotics) Hives and Itching       Family History   Problem Relation Age of Onset    Heart Disease Mother     No Known Problems Father      Social History     Tobacco Use    Smoking status: Former Smoker     Last attempt to quit: 3/11/1990     Years since quittin.1    Smokeless tobacco: Never Used   Substance Use Topics    Alcohol use: Yes     Alcohol/week: 0.0 standard drinks     Comment: occasionally       Depression Risk Factor Screening:     3 most recent PHQ Screens 2019   Little interest or pleasure in doing things Not at all   Feeling down, depressed, irritable, or hopeless Not at all   Total Score PHQ 2 0       Alcohol Risk Factor Screening:   Do you average 1 drink per night or more than 7 drinks a week:  No    On any one occasion in the past three months have you have had more than 3 drinks containing alcohol:  No      Functional Ability and Level of Safety:   Hearing: Hearing is good. Activities of Daily Living: The home contains: no safety equipment. Patient does total self care    Ambulation: with no difficulty    Fall Risk:  Fall Risk Assessment, last 12 mths 2019   Able to walk? Yes   Fall in past 12 months?  No       Abuse Screen:  Patient is not abused    Cognitive Screening   Has your family/caregiver stated any concerns about your memory: no      Patient Care Team   Patient Care Team:  Mary Jerome MD as PCP - General (Pediatrics)  Mary Jerome MD as PCP - REHABILITATION [facility] Empaneled Provider  Matheus Schuster MD as Physician (Cardiology)    Assessment/Plan   Education and counseling provided:  Are appropriate based    M - Manual, T - Template             on today's review and evaluation  End-of-Life planning (with patient's consent)  Pneumococcal Vaccine  Influenza Vaccine  Screening Mammography  Cardiovascular screening blood test  Bone mass measurement (DEXA)  Screening for glaucoma  Diabetes screening test    Diagnoses and all orders for this visit:    1. Medicare annual wellness visit, subsequent   Anticipatory guidance discussed. Immunizations reviewed   HM updated. 2. Vitamin D deficiency   -     VITAMIN D, 25 HYDROXY; Future    3. Type 2 diabetes mellitus with complication, without long-term current use of insulin (HCC)  -     METABOLIC PANEL, COMPREHENSIVE; Future  -     CBC WITH AUTOMATED DIFF; Future  -     HEMOGLOBIN A1C WITH EAG; Future    4. Age related osteoporosis, unspecified pathological fracture presence  -     alendronate (FOSAMAX) 70 mg tablet; Take 1 Tab by mouth every seven (7) days for 30 days. 5. Essential hypertension  -     losartan (COZAAR) 50 mg tablet; Take 1 Tab by mouth daily for 90 days. 6. Muscle spasm  -     tiZANidine (Zanaflex) 2 mg capsule; 1-2 tablets, 1-2 hours prior to bedtime as needed for spasm. 7. Gastroesophageal reflux disease without esophagitis  -     Omeprazole delayed release (PRILOSEC D/R) 20 mg tablet; Take 1 Tab by mouth daily for 30 days. 8. Screening for hyperlipidemia  -     LIPID PANEL; Future    9. Screening for deficiency anemia  -     CBC WITH AUTOMATED DIFF; Future    10. Screening for breast cancer  -     HUSSAIN MAMMO RT SCREENING INCL CAD; Future      Health Maintenance reviewed - mammogram ordered, patient to schedule appointment, glycohemoglobin ordered.       Objective:   Vital Signs: (As obtained by patient/caregiver at home)  Visit Vitals  /64   Pulse 61   Ht 5' 3\" (1.6 m)   Wt 193 lb 9.6 oz (87.8 kg)   BMI 34.29 kg/m²        General: alert, cooperative, no distress   Mental  status: normal mood, behavior, speech, dress, motor activity, and thought processes, able to follow commands   HENT: NCAT   Neck: no visualized mass   Resp: no respiratory distress   Neuro: no gross deficits   Skin: no discoloration or lesions of concern on visible areas   Psychiatric: normal affect, consistent with stated mood, no evidence of hallucinations       We discussed the expected course, resolution and complications of the diagnosis(es) in detail. Medication risks, benefits, costs, interactions, and alternatives were discussed as indicated. I advised her to contact the office if her condition worsens, changes or fails to improve as anticipated. She expressed understanding with the diagnosis(es) and plan. Jaleel Wall is a [de-identified] y.o. female being evaluated by a video visit encounter for concerns as above. A caregiver was present when appropriate. Due to this being a TeleHealth encounter (During [facility] public health emergency), evaluation of the following organ systems was limited: Vitals/Constitutional/EENT/Resp/CV/GI//MS/Neuro/Skin/Heme-Lymph-Imm. Pursuant to the emergency declaration under the [facility], 1135 waiver authority and the [employer] Act, this Virtual  Visit was conducted, with patient's (and/or legal guardian's) consent, to reduce the patient's risk of exposure to COVID-19 and provide necessary medical care. Services were provided through a video synchronous discussion virtually to substitute for in-person clinic visit. Patient and provider were located at their individual homes. Follow-up and Dispositions    · Return in about 1 year (around 4/22/2021), or if symptoms worsen or fail to improve, for Follow up 6 months for chronic issues.          Armani Vargas MD

## 2020-04-27 ENCOUNTER — TELEPHONE (OUTPATIENT)
Dept: FAMILY MEDICINE CLINIC | Age: 80
End: 2020-04-27

## 2020-04-27 ENCOUNTER — HOSPITAL ENCOUNTER (OUTPATIENT)
Dept: LAB | Age: 80
Discharge: HOME | End: 2020-04-27

## 2020-04-27 DIAGNOSIS — Z13.0 SCREENING FOR DEFICIENCY ANEMIA: ICD-10-CM

## 2020-04-27 DIAGNOSIS — E11.8 TYPE 2 DIABETES MELLITUS WITH COMPLICATION, WITHOUT LONG-TERM CURRENT USE OF INSULIN (HCC): ICD-10-CM

## 2020-04-27 DIAGNOSIS — K21.9 GASTROESOPHAGEAL REFLUX DISEASE WITHOUT ESOPHAGITIS: ICD-10-CM

## 2020-04-27 DIAGNOSIS — Z13.220 SCREENING FOR HYPERLIPIDEMIA: ICD-10-CM

## 2020-04-27 RX ORDER — TIZANIDINE 2 MG/1
TABLET ORAL
Qty: 30 TAB | Refills: 5 | Status: SHIPPED | OUTPATIENT
Start: 2020-04-27 | End: 2020-07-20

## 2020-04-27 RX ORDER — OMEPRAZOLE 20 MG/1
20 CAPSULE, DELAYED RELEASE ORAL DAILY
Qty: 30 CAP | Refills: 5 | Status: SHIPPED | OUTPATIENT
Start: 2020-04-27 | End: 2020-12-07 | Stop reason: SDUPTHER

## 2020-04-28 LAB
25(OH)D3+25(OH)D2 SERPL-MCNC: 35.9 NG/ML (ref 30–100)
ALBUMIN SERPL-MCNC: 4.2 G/DL (ref 3.7–4.7)
ALBUMIN/GLOB SERPL: 1.4 {RATIO} (ref 1.2–2.2)
ALP SERPL-CCNC: 78 IU/L (ref 39–117)
ALT SERPL-CCNC: 11 IU/L (ref 0–32)
AST SERPL-CCNC: 12 IU/L (ref 0–40)
BASOPHILS # BLD AUTO: 0 X10E3/UL (ref 0–0.2)
BASOPHILS NFR BLD AUTO: 0 %
BILIRUB SERPL-MCNC: <0.2 MG/DL (ref 0–1.2)
BUN SERPL-MCNC: 13 MG/DL (ref 8–27)
BUN/CREAT SERPL: 15 (ref 12–28)
CALCIUM SERPL-MCNC: 9.4 MG/DL (ref 8.7–10.3)
CHLORIDE SERPL-SCNC: 104 MMOL/L (ref 96–106)
CHOLEST SERPL-MCNC: 170 MG/DL (ref 100–199)
CO2 SERPL-SCNC: 20 MMOL/L (ref 20–29)
CREAT SERPL-MCNC: 0.87 MG/DL (ref 0.57–1)
EOSINOPHIL # BLD AUTO: 0 X10E3/UL (ref 0–0.4)
EOSINOPHIL NFR BLD AUTO: 1 %
ERYTHROCYTE [DISTWIDTH] IN BLOOD BY AUTOMATED COUNT: 13.4 % (ref 11.7–15.4)
EST. AVERAGE GLUCOSE BLD GHB EST-MCNC: 126 MG/DL
GLOBULIN SER CALC-MCNC: 3 G/DL (ref 1.5–4.5)
GLUCOSE SERPL-MCNC: 102 MG/DL (ref 65–99)
HBA1C MFR BLD: 6 % (ref 4.8–5.6)
HCT VFR BLD AUTO: 34.3 % (ref 34–46.6)
HDLC SERPL-MCNC: 57 MG/DL
HGB BLD-MCNC: 11.6 G/DL (ref 11.1–15.9)
IMM GRANULOCYTES # BLD AUTO: 0 X10E3/UL (ref 0–0.1)
IMM GRANULOCYTES NFR BLD AUTO: 0 %
INTERPRETATION, 910389: NORMAL
LDLC SERPL CALC-MCNC: 99 MG/DL (ref 0–99)
LYMPHOCYTES # BLD AUTO: 1.6 X10E3/UL (ref 0.7–3.1)
LYMPHOCYTES NFR BLD AUTO: 25 %
Lab: NORMAL
MCH RBC QN AUTO: 29.2 PG (ref 26.6–33)
MCHC RBC AUTO-ENTMCNC: 33.8 G/DL (ref 31.5–35.7)
MCV RBC AUTO: 86 FL (ref 79–97)
MONOCYTES # BLD AUTO: 0.4 X10E3/UL (ref 0.1–0.9)
MONOCYTES NFR BLD AUTO: 7 %
NEUTROPHILS # BLD AUTO: 4.2 X10E3/UL (ref 1.4–7)
NEUTROPHILS NFR BLD AUTO: 67 %
PLATELET # BLD AUTO: 285 X10E3/UL (ref 150–450)
POTASSIUM SERPL-SCNC: 4.2 MMOL/L (ref 3.5–5.2)
PROT SERPL-MCNC: 7.2 G/DL (ref 6–8.5)
RBC # BLD AUTO: 3.97 X10E6/UL (ref 3.77–5.28)
SODIUM SERPL-SCNC: 142 MMOL/L (ref 134–144)
TRIGL SERPL-MCNC: 68 MG/DL (ref 0–149)
VLDLC SERPL CALC-MCNC: 14 MG/DL (ref 5–40)
WBC # BLD AUTO: 6.2 X10E3/UL (ref 3.4–10.8)

## 2020-06-29 ENCOUNTER — TELEPHONE (OUTPATIENT)
Dept: FAMILY MEDICINE CLINIC | Age: 80
End: 2020-06-29

## 2020-06-29 DIAGNOSIS — G89.29 CHRONIC PAIN OF RIGHT KNEE: ICD-10-CM

## 2020-06-29 DIAGNOSIS — Z96.652 STATUS POST TOTAL LEFT KNEE REPLACEMENT: Primary | ICD-10-CM

## 2020-06-29 DIAGNOSIS — M25.561 CHRONIC PAIN OF RIGHT KNEE: ICD-10-CM

## 2020-07-20 ENCOUNTER — HOSPITAL ENCOUNTER (OUTPATIENT)
Dept: PREADMISSION TESTING | Age: 80
Discharge: HOME | End: 2020-07-20
Payer: MEDICARE

## 2020-07-20 VITALS
RESPIRATION RATE: 20 BRPM | HEART RATE: 72 BPM | HEIGHT: 63 IN | WEIGHT: 192.68 LBS | SYSTOLIC BLOOD PRESSURE: 110 MMHG | TEMPERATURE: 98 F | BODY MASS INDEX: 34.14 KG/M2 | DIASTOLIC BLOOD PRESSURE: 73 MMHG

## 2020-07-20 LAB
ANION GAP SERPL CALC-SCNC: 7 MMOL/L (ref 5–15)
APPEARANCE UR: CLEAR
ATRIAL RATE: 58 BPM
BACTERIA URNS QL MICRO: NEGATIVE /HPF
BILIRUB UR QL: NEGATIVE
BUN SERPL-MCNC: 12 MG/DL (ref 6–20)
BUN/CREAT SERPL: 10 (ref 12–20)
CALCIUM SERPL-MCNC: 9.5 MG/DL (ref 8.5–10.1)
CALCULATED P AXIS, ECG09: 2 DEGREES
CALCULATED R AXIS, ECG10: -32 DEGREES
CALCULATED T AXIS, ECG11: 12 DEGREES
CHLORIDE SERPL-SCNC: 104 MMOL/L (ref 97–108)
CO2 SERPL-SCNC: 28 MMOL/L (ref 21–32)
COLOR UR: NORMAL
CREAT SERPL-MCNC: 1.18 MG/DL (ref 0.55–1.02)
DIAGNOSIS, 93000: NORMAL
EPITH CASTS URNS QL MICRO: NORMAL /LPF
ERYTHROCYTE [DISTWIDTH] IN BLOOD BY AUTOMATED COUNT: 14 % (ref 11.5–14.5)
EST. AVERAGE GLUCOSE BLD GHB EST-MCNC: 120 MG/DL
GLUCOSE SERPL-MCNC: 59 MG/DL (ref 65–100)
GLUCOSE UR STRIP.AUTO-MCNC: NEGATIVE MG/DL
HBA1C MFR BLD: 5.8 % (ref 4–5.6)
HCT VFR BLD AUTO: 36.8 % (ref 35–47)
HGB BLD-MCNC: 11.4 G/DL (ref 11.5–16)
HGB UR QL STRIP: NEGATIVE
HYALINE CASTS URNS QL MICRO: NORMAL /LPF (ref 0–5)
INR PPP: 1.1 (ref 0.9–1.1)
KETONES UR QL STRIP.AUTO: NEGATIVE MG/DL
LEUKOCYTE ESTERASE UR QL STRIP.AUTO: NEGATIVE
MCH RBC QN AUTO: 28.6 PG (ref 26–34)
MCHC RBC AUTO-ENTMCNC: 31 G/DL (ref 30–36.5)
MCV RBC AUTO: 92.5 FL (ref 80–99)
NITRITE UR QL STRIP.AUTO: NEGATIVE
NRBC # BLD: 0 K/UL (ref 0–0.01)
NRBC BLD-RTO: 0 PER 100 WBC
P-R INTERVAL, ECG05: 162 MS
PH UR STRIP: 5.5 [PH] (ref 5–8)
PLATELET # BLD AUTO: 265 K/UL (ref 150–400)
PMV BLD AUTO: 10.5 FL (ref 8.9–12.9)
POTASSIUM SERPL-SCNC: 3.7 MMOL/L (ref 3.5–5.1)
PROT UR STRIP-MCNC: NEGATIVE MG/DL
PROTHROMBIN TIME: 10.9 SEC (ref 9–11.1)
Q-T INTERVAL, ECG07: 418 MS
QRS DURATION, ECG06: 74 MS
QTC CALCULATION (BEZET), ECG08: 410 MS
RBC # BLD AUTO: 3.98 M/UL (ref 3.8–5.2)
RBC #/AREA URNS HPF: NORMAL /HPF (ref 0–5)
SODIUM SERPL-SCNC: 139 MMOL/L (ref 136–145)
SP GR UR REFRACTOMETRY: 1.01 (ref 1–1.03)
UA: UC IF INDICATED,UAUC: NORMAL
UROBILINOGEN UR QL STRIP.AUTO: 0.2 EU/DL (ref 0.2–1)
VENTRICULAR RATE, ECG03: 58 BPM
WBC # BLD AUTO: 5.4 K/UL (ref 3.6–11)
WBC URNS QL MICRO: NORMAL /HPF (ref 0–4)

## 2020-07-20 PROCEDURE — 93005 ELECTROCARDIOGRAM TRACING: CPT

## 2020-07-20 PROCEDURE — 85027 COMPLETE CBC AUTOMATED: CPT

## 2020-07-20 PROCEDURE — 81001 URINALYSIS AUTO W/SCOPE: CPT

## 2020-07-20 PROCEDURE — 86902 BLOOD TYPE ANTIGEN DONOR EA: CPT

## 2020-07-20 PROCEDURE — 80048 BASIC METABOLIC PNL TOTAL CA: CPT

## 2020-07-20 PROCEDURE — 86920 COMPATIBILITY TEST SPIN: CPT

## 2020-07-20 PROCEDURE — 86922 COMPATIBILITY TEST ANTIGLOB: CPT

## 2020-07-20 PROCEDURE — 86921 COMPATIBILITY TEST INCUBATE: CPT

## 2020-07-20 PROCEDURE — 86870 RBC ANTIBODY IDENTIFICATION: CPT

## 2020-07-20 PROCEDURE — 36415 COLL VENOUS BLD VENIPUNCTURE: CPT

## 2020-07-20 PROCEDURE — 85610 PROTHROMBIN TIME: CPT

## 2020-07-20 PROCEDURE — 86900 BLOOD TYPING SEROLOGIC ABO: CPT

## 2020-07-20 PROCEDURE — 83036 HEMOGLOBIN GLYCOSYLATED A1C: CPT

## 2020-07-20 RX ORDER — DIPHENHYDRAMINE HCL 25 MG
25 CAPSULE ORAL
COMMUNITY
End: 2020-08-20

## 2020-07-20 RX ORDER — ASCORBIC ACID 250 MG
250 TABLET ORAL DAILY
COMMUNITY

## 2020-07-20 RX ORDER — LANOLIN ALCOHOL/MO/W.PET/CERES
1000 CREAM (GRAM) TOPICAL DAILY
COMMUNITY

## 2020-07-20 RX ORDER — MELATONIN
1000 DAILY
COMMUNITY

## 2020-07-21 LAB
BACTERIA SPEC CULT: NORMAL
BACTERIA SPEC CULT: NORMAL
SERVICE CMNT-IMP: NORMAL

## 2020-07-28 NOTE — H&P
Date of Surgery Update:  Walter Monroe was seen and examined. Past Medical History:   Diagnosis Date    Arthritis     Diabetes ([address]     GERD (gastroesophageal reflux disease)     Glaucoma     High cholesterol     Ill-defined condition     EDEMA, LOWER LIMS, URINARY INCONTINENCE    Ill-defined condition     GLAUCOMA    Sleep apnea     NO CPAP    Urge incontinence      Prior to Admission Medications   Prescriptions Last Dose Informant Patient Reported? Taking? Blood-Glucose Meter monitoring kit 7/27/2020 at Unknown time  No Yes   Sig: Pt with T2DM. Needs meter to check BS 1-2 x daily. E11.9. Please give meter approved by her insurance. Calcium Carbonate-Vit D3-Min 600 mg calcium- 400 unit tab 7/27/2020 at Unknown time  No Yes   Sig: Take 1 pill 2 x daily   NYSTOP powder 7/27/2020 at Unknown time  No Yes   Sig: APPLY POWDER TOPICALLY TO THE AFFECTED AREA FOUR TIMES DAILY UNTIL RASH RESOLVES   acetaminophen (TYLENOL) 650 mg CR tablet 7/27/2020 at Unknown time  Yes Yes   Sig: Take 650 mg by mouth every six (6) hours as needed for Pain. alendronate (FOSAMAX) 70 mg tablet   No No   Sig: Take 1 Tab by mouth every seven (7) days for 30 days. ascorbic acid, vitamin C, (Vitamin C) 250 mg tablet 7/27/2020 at Unknown time  Yes Yes   Sig: Take 250 mg by mouth daily. aspirin delayed-release 81 mg tablet 7/27/2020 at Unknown time  Yes Yes   Sig: Take  by mouth daily. atorvastatin (LIPITOR) 40 mg tablet 7/27/2020 at Unknown time  No Yes   Sig: Take 1 tablet by mouth once daily   Patient taking differently: 40 mg nightly. cholecalciferol (Vitamin D3) (1000 Units /25 mcg) tablet 7/27/2020 at Unknown time  Yes Yes   Sig: Take 1,000 Units by mouth daily. cyanocobalamin (Vitamin B-12) 1,000 mcg tablet 7/27/2020 at Unknown time  Yes Yes   Sig: Take 1,000 mcg by mouth daily. diphenhydrAMINE (BenadryL) 25 mg capsule 7/27/2020 at Unknown time  Yes Yes   Sig: Take 25 mg by mouth every six (6) hours as needed. furosemide (LASIX) 20 mg tablet 2020 at Unknown time  Yes Yes   Sig: Take  by mouth daily as needed. glipiZIDE (GLUCOTROL) 5 mg tablet 2020 at Unknown time  No Yes   Sig: TAKE 1 TABLET BY MOUTH ONCE DAILY   glucose blood VI test strips (BLOOD GLUCOSE TEST) strip 2020 at Unknown time  No Yes   Sig: Accu-chek alexei plus test strips Test blood sugar once daily E11.9   ibuprofen (MOTRIN) 200 mg tablet 2020 at Unknown time  No Yes   Sig: Take 4 Tabs by mouth every eight (8) hours as needed for Pain.   ketoconazole (NIZORAL) 2 % topical cream 2020 at Unknown time  No Yes   Sig: Apply  to affected area daily. Patient taking differently: Apply  to affected area as needed. lancets misc 2020 at Unknown time  No Yes   Sig: accu-chek softclix lancets Check blood sugar daily E11.9   latanoprost (XALATAN) 0.005 % ophthalmic solution 2020  Yes No   Sig: Administer 1 Drop to both eyes nightly. omeprazole (PRILOSEC) 20 mg capsule 2020 at Unknown time  No Yes   Sig: Take 1 Cap by mouth daily. Patient taking differently: Take 20 mg by mouth as needed. ondansetron (ZOFRAN ODT) 8 mg disintegrating tablet 2020 at Unknown time  No Yes   Sig: Take 1 Tab by mouth every twelve (12) hours as needed for Nausea. oxybutynin (DITROPAN) 5 mg tablet   No No   Sig: Take 1 Tab by mouth three (3) times daily for 270 days. Patient taking differently: Take 5 mg by mouth three (3) times daily as needed. tiZANidine (Zanaflex) 2 mg capsule 2020 at Unknown time  No Yes   Si-2 tablets, 1-2 hours prior to bedtime as needed for spasm.       Facility-Administered Medications: None      Allergy to:Sulfa (sulfonamide antibiotics)  Physical Examination: General appearance - alert, well appearing, and in no distress  Chest - clear to auscultation, no wheezes, rales or rhonchi, symmetric air entry  Heart - normal rate and regular rhythm  Abdomen - soft, nontender, nondistended, no masses or organomegaly  History and physical has been reviewed. The patient has been examined. There have been no significant clinical changes since the completion of the originally dated History and Physical.    Signed By: Lucian Greenwood PA-C     August 3, 2020 9:04 AM             PCP: No Pcp      Patient Active Problem List   Diagnosis    Hypertension    Diabetes mellitus    Osteoarthritis    Osteoporosis    Arthritis, rheumatoid     Subjective:   Chief Complaint: Pain of the Neck and Pain of the Right Knee    HPI: Francisco Javier French is a [de-identified] y.o. female who presents today for evaluation and treatment of his neck and right knee pain. She points to her anterior right knee when describing her pain. She notes her right knee pops and gabriel on her frequently. She also reports cervical spasms that radiate inferiorly along her back, however, her main complaint in clinic today is her knee. She reports the pain interferes with her normal daily activities and ability to ambulate long distances. She notes the pain keeps her awake at night. She says he has tried OTC analgesics in the past without relief. She is ambulating with a single point cane today.         Objective:      There were no vitals filed for this visit. Height: 5' 3\"   Weight: 195 lb   Body mass index is 34.54 kg/m².     Constitutional:  No acute distress. Well nourished. Well developed. Eyes:  Sclera are nonicteric. Respiratory:  No labored breathing. Cardiovascular:  No marked edema. Skin:  No marked skin ulcers. Neurological:  No marked sensory loss noted. Psychiatric: Alert and oriented x3.     Musculoskeletal : Bilateral Hips and Knees:  She has normal range of motion of the right hip with no significant pain on motion. She has normal range of motion of the left hip with no significant pain on motion. She has normal range of motion of the right knee with significant pain on motion and diffuse crepitance.   She has normal range of motion of the left knee with no significant pain on motion. Well-healed incision of the left knee, c/d/i. No signs of deep infection, cellulitis, or DVT. Active extension and flexion intact of the bilateral knees. She is tender over the medial joint line of the right knee. Ligaments intact. Leg lengths appear equal. Skin healthy and intact. Neurovascularly intact. No apparent atrophy. Strong pedal pulses.        Radiographs:     Order: XR KNEE 3 VW RIGHT - Indication: Primary osteoarthritis of right   knee  Order: XR SPINE CERVICAL 2 OR 3 VW - Indication: Neck pain        X-ray Cervical Spine 2 Or 3 Views ([id number])     Result Date: 7/2/2020  AP, Lateral.      Impression: 2-view x-rays of the cervical spine shows mild degenerative changes which would be expected given her age.     X-ray Knee Right 3 Views ([id number])     Result Date: 7/2/2020  Views: Standing AP, Lat, [place].      Impression: 3-view x-rays of the right knee shows varus deformity with bone-on-bone osteoarthritis of the medial compartment and wear of the medial tibial plateau. There is medial shift of the femur on the tibia. Lateral compartment & patellofemoral joint space is relatively well-preserved. Assessment:      1. Primary osteoarthritis of right knee    2. Neck pain      Plan:      I reviewed x-rays ordered of the right knee with the patient today. She has varus deformity with bone-on-bone osteoarthritis of the medial compartment and wear of the medial tibial plateau. There is medial shift of the femur on the tibia. I reviewed x-rays ordered of the cervical spine with the patient today. She has mild degenerative changes which would be expected given her age. I discussed the diagnosis of right knee osteoarthritis with the patient as well as treatment options. We discussed treatment options including continued conservative management vs total knee replacement surgery as well as the pros and cons of each.   She reports she has failed conservative management and would like to discuss surgery further. We discussed right total knee replacement surgery at length including expected outcomes and post-op recovery as well as risks and complications, specifically DVT, PE, infection, and nerve injury. After a lengthy discussion, the patient desires to proceed with surgery. We will schedule surgery at the patient's convenience. Follow-up for scheduled surgery.         Orders Placed This Encounter    X-ray knee right 3 views ([id number])    X-ray cervical spine 2 or 3 views ([id number])      Return for Scheduled surgery.      Medical documentation was entered by me, Ben Duffy, Medical Scribe for Dr. Shannon Darden. 7/2/2020  11:21 AM  I, Renita Mclaughlin MD, personally, performed the services described in this documentation, as scribed in my presence, and is accurate and complete.

## 2020-07-30 ENCOUNTER — HOSPITAL ENCOUNTER (OUTPATIENT)
Dept: PREADMISSION TESTING | Age: 80
Discharge: HOME | End: 2020-07-30
Payer: MEDICARE

## 2020-07-30 DIAGNOSIS — U07.1 COVID-19: ICD-10-CM

## 2020-07-30 PROCEDURE — 87635 SARS-COV-2 COVID-19 AMP PRB: CPT

## 2020-07-31 LAB — SARS-COV-2, COV2NT: NOT DETECTED

## 2020-08-01 ENCOUNTER — ANESTHESIA EVENT (OUTPATIENT)
Dept: SURGERY | Age: 80
End: 2020-08-01
Payer: MEDICARE

## 2020-08-03 ENCOUNTER — HOSPITAL ENCOUNTER (OUTPATIENT)
Age: 80
Discharge: HOME HEALTH | End: 2020-08-05
Attending: ORTHOPAEDIC SURGERY | Admitting: ORTHOPAEDIC SURGERY
Payer: MEDICARE

## 2020-08-03 ENCOUNTER — ANESTHESIA (OUTPATIENT)
Dept: SURGERY | Age: 80
End: 2020-08-03
Payer: MEDICARE

## 2020-08-03 DIAGNOSIS — Z96.651 STATUS POST TOTAL RIGHT KNEE REPLACEMENT: ICD-10-CM

## 2020-08-03 DIAGNOSIS — M17.11 PRIMARY OSTEOARTHRITIS OF RIGHT KNEE: Primary | ICD-10-CM

## 2020-08-03 LAB
GLUCOSE BLD STRIP.AUTO-MCNC: 113 MG/DL (ref 65–100)
GLUCOSE BLD STRIP.AUTO-MCNC: 127 MG/DL (ref 65–100)
GLUCOSE BLD STRIP.AUTO-MCNC: 161 MG/DL (ref 65–100)
GLUCOSE BLD STRIP.AUTO-MCNC: 195 MG/DL (ref 65–100)
SERVICE CMNT-IMP: ABNORMAL

## 2020-08-03 PROCEDURE — 76210000016 HC OR PH I REC 1 TO 1.5 HR: Performed by: ORTHOPAEDIC SURGERY

## 2020-08-03 PROCEDURE — 97530 THERAPEUTIC ACTIVITIES: CPT

## 2020-08-03 PROCEDURE — 77030028907 HC WRP KNEE WO BGS SOLM -B

## 2020-08-03 PROCEDURE — 76010000162 HC OR TIME 1.5 TO 2 HR INTENSV-TIER 1: Performed by: ORTHOPAEDIC SURGERY

## 2020-08-03 PROCEDURE — C1713 ANCHOR/SCREW BN/BN,TIS/BN: HCPCS | Performed by: ORTHOPAEDIC SURGERY

## 2020-08-03 PROCEDURE — 77030008462 HC STPLR SKN PROX J&J -A: Performed by: ORTHOPAEDIC SURGERY

## 2020-08-03 PROCEDURE — 77030040922 HC BLNKT HYPOTHRM STRY -A

## 2020-08-03 PROCEDURE — 74011000258 HC RX REV CODE- 258: Performed by: PHYSICIAN ASSISTANT

## 2020-08-03 PROCEDURE — C1776 JOINT DEVICE (IMPLANTABLE): HCPCS | Performed by: ORTHOPAEDIC SURGERY

## 2020-08-03 PROCEDURE — 74011636637 HC RX REV CODE- 636/637: Performed by: PHYSICIAN ASSISTANT

## 2020-08-03 PROCEDURE — 74011250636 HC RX REV CODE- 250/636: Performed by: ANESTHESIOLOGY

## 2020-08-03 PROCEDURE — 74011250636 HC RX REV CODE- 250/636: Performed by: NURSE ANESTHETIST, CERTIFIED REGISTERED

## 2020-08-03 PROCEDURE — 77030011640 HC PAD GRND REM COVD -A: Performed by: ORTHOPAEDIC SURGERY

## 2020-08-03 PROCEDURE — 74011250636 HC RX REV CODE- 250/636: Performed by: PHYSICIAN ASSISTANT

## 2020-08-03 PROCEDURE — 77030014077 HC TOWER MX CEM J&J -C: Performed by: ORTHOPAEDIC SURGERY

## 2020-08-03 PROCEDURE — 77030040361 HC SLV COMPR DVT MDII -B

## 2020-08-03 PROCEDURE — 74011000250 HC RX REV CODE- 250: Performed by: PHYSICIAN ASSISTANT

## 2020-08-03 PROCEDURE — 76060000034 HC ANESTHESIA 1.5 TO 2 HR: Performed by: ORTHOPAEDIC SURGERY

## 2020-08-03 PROCEDURE — 74011000250 HC RX REV CODE- 250: Performed by: ORTHOPAEDIC SURGERY

## 2020-08-03 PROCEDURE — 77030018836 HC SOL IRR NACL ICUM -A: Performed by: ORTHOPAEDIC SURGERY

## 2020-08-03 PROCEDURE — 74011000250 HC RX REV CODE- 250: Performed by: NURSE ANESTHETIST, CERTIFIED REGISTERED

## 2020-08-03 PROCEDURE — 97116 GAIT TRAINING THERAPY: CPT

## 2020-08-03 PROCEDURE — 74011250637 HC RX REV CODE- 250/637: Performed by: PHYSICIAN ASSISTANT

## 2020-08-03 PROCEDURE — 99218 HC RM OBSERVATION: CPT

## 2020-08-03 PROCEDURE — 97161 PT EVAL LOW COMPLEX 20 MIN: CPT

## 2020-08-03 PROCEDURE — 77030006822 HC BLD SAW SAG BRSM -B: Performed by: ORTHOPAEDIC SURGERY

## 2020-08-03 PROCEDURE — 77030035236 HC SUT PDS STRATFX BARB J&J -B: Performed by: ORTHOPAEDIC SURGERY

## 2020-08-03 PROCEDURE — 77030005513 HC CATH URETH FOL11 MDII -B: Performed by: ORTHOPAEDIC SURGERY

## 2020-08-03 PROCEDURE — 77030027138 HC INCENT SPIROMETER -A

## 2020-08-03 PROCEDURE — 82962 GLUCOSE BLOOD TEST: CPT

## 2020-08-03 PROCEDURE — 77030031139 HC SUT VCRL2 J&J -A: Performed by: ORTHOPAEDIC SURGERY

## 2020-08-03 PROCEDURE — 77030003601 HC NDL NRV BLK BBMI -A

## 2020-08-03 PROCEDURE — 74011000250 HC RX REV CODE- 250: Performed by: ANESTHESIOLOGY

## 2020-08-03 DEVICE — PAT PSN VE POLY 35MM -- PERSONA: Type: IMPLANTABLE DEVICE | Site: KNEE | Status: FUNCTIONAL

## 2020-08-03 DEVICE — KNEE K1 TOT HEMI STD CEM IMPL CAPPED K1 ZIM: Type: IMPLANTABLE DEVICE | Status: FUNCTIONAL

## 2020-08-03 DEVICE — IMPLANTABLE DEVICE: Type: IMPLANTABLE DEVICE | Site: KNEE | Status: FUNCTIONAL

## 2020-08-03 DEVICE — SMARTSET GHV GENTAMICIN HIGH VISCOSITY BONE CEMENT 40G
Type: IMPLANTABLE DEVICE | Site: KNEE | Status: FUNCTIONAL
Brand: SMARTSET

## 2020-08-03 DEVICE — TIB PRN NP STM 5 DEG SZ ER -- PERSONA: Type: IMPLANTABLE DEVICE | Site: KNEE | Status: FUNCTIONAL

## 2020-08-03 RX ORDER — SODIUM CHLORIDE 0.9 % (FLUSH) 0.9 %
5-40 SYRINGE (ML) INJECTION AS NEEDED
Status: DISCONTINUED | OUTPATIENT
Start: 2020-08-03 | End: 2020-08-03 | Stop reason: HOSPADM

## 2020-08-03 RX ORDER — POLYETHYLENE GLYCOL 3350 17 G/17G
17 POWDER, FOR SOLUTION ORAL DAILY
Status: DISCONTINUED | OUTPATIENT
Start: 2020-08-04 | End: 2020-08-05 | Stop reason: HOSPADM

## 2020-08-03 RX ORDER — NALOXONE HYDROCHLORIDE 0.4 MG/ML
0.4 INJECTION, SOLUTION INTRAMUSCULAR; INTRAVENOUS; SUBCUTANEOUS AS NEEDED
Status: DISCONTINUED | OUTPATIENT
Start: 2020-08-03 | End: 2020-08-05 | Stop reason: HOSPADM

## 2020-08-03 RX ORDER — DEXAMETHASONE SODIUM PHOSPHATE 4 MG/ML
INJECTION, SOLUTION INTRA-ARTICULAR; INTRALESIONAL; INTRAMUSCULAR; INTRAVENOUS; SOFT TISSUE AS NEEDED
Status: DISCONTINUED | OUTPATIENT
Start: 2020-08-03 | End: 2020-08-03 | Stop reason: HOSPADM

## 2020-08-03 RX ORDER — TRANEXAMIC ACID 100 MG/ML
INJECTION, SOLUTION INTRAVENOUS AS NEEDED
Status: DISCONTINUED | OUTPATIENT
Start: 2020-08-03 | End: 2020-08-03 | Stop reason: HOSPADM

## 2020-08-03 RX ORDER — ROPIVACAINE HYDROCHLORIDE 5 MG/ML
30 INJECTION, SOLUTION EPIDURAL; INFILTRATION; PERINEURAL AS NEEDED
Status: DISCONTINUED | OUTPATIENT
Start: 2020-08-03 | End: 2020-08-03 | Stop reason: HOSPADM

## 2020-08-03 RX ORDER — LIDOCAINE HYDROCHLORIDE 10 MG/ML
INJECTION, SOLUTION EPIDURAL; INFILTRATION; INTRACAUDAL; PERINEURAL
Status: COMPLETED | OUTPATIENT
Start: 2020-08-03 | End: 2020-08-03

## 2020-08-03 RX ORDER — FACIAL-BODY WIPES
10 EACH TOPICAL DAILY PRN
Status: DISCONTINUED | OUTPATIENT
Start: 2020-08-05 | End: 2020-08-05 | Stop reason: HOSPADM

## 2020-08-03 RX ORDER — SODIUM CHLORIDE 0.9 % (FLUSH) 0.9 %
5-40 SYRINGE (ML) INJECTION EVERY 8 HOURS
Status: DISCONTINUED | OUTPATIENT
Start: 2020-08-03 | End: 2020-08-03 | Stop reason: HOSPADM

## 2020-08-03 RX ORDER — HYDROMORPHONE HYDROCHLORIDE 1 MG/ML
0.2 INJECTION, SOLUTION INTRAMUSCULAR; INTRAVENOUS; SUBCUTANEOUS
Status: DISCONTINUED | OUTPATIENT
Start: 2020-08-03 | End: 2020-08-03 | Stop reason: HOSPADM

## 2020-08-03 RX ORDER — OXYBUTYNIN CHLORIDE 5 MG/1
5 TABLET ORAL
Status: DISCONTINUED | OUTPATIENT
Start: 2020-08-04 | End: 2020-08-05 | Stop reason: HOSPADM

## 2020-08-03 RX ORDER — OXYCODONE HYDROCHLORIDE 5 MG/1
5 TABLET ORAL
Status: DISCONTINUED | OUTPATIENT
Start: 2020-08-03 | End: 2020-08-05 | Stop reason: HOSPADM

## 2020-08-03 RX ORDER — SODIUM CHLORIDE 0.9 % (FLUSH) 0.9 %
5-40 SYRINGE (ML) INJECTION AS NEEDED
Status: DISCONTINUED | OUTPATIENT
Start: 2020-08-03 | End: 2020-08-05 | Stop reason: HOSPADM

## 2020-08-03 RX ORDER — ATORVASTATIN CALCIUM 40 MG/1
40 TABLET, FILM COATED ORAL
Status: DISCONTINUED | OUTPATIENT
Start: 2020-08-03 | End: 2020-08-05 | Stop reason: HOSPADM

## 2020-08-03 RX ORDER — LIDOCAINE HYDROCHLORIDE 10 MG/ML
0.1 INJECTION, SOLUTION EPIDURAL; INFILTRATION; INTRACAUDAL; PERINEURAL AS NEEDED
Status: DISCONTINUED | OUTPATIENT
Start: 2020-08-03 | End: 2020-08-03 | Stop reason: HOSPADM

## 2020-08-03 RX ORDER — FENTANYL CITRATE 50 UG/ML
50 INJECTION, SOLUTION INTRAMUSCULAR; INTRAVENOUS AS NEEDED
Status: DISCONTINUED | OUTPATIENT
Start: 2020-08-03 | End: 2020-08-03 | Stop reason: HOSPADM

## 2020-08-03 RX ORDER — ONDANSETRON 2 MG/ML
INJECTION INTRAMUSCULAR; INTRAVENOUS AS NEEDED
Status: DISCONTINUED | OUTPATIENT
Start: 2020-08-03 | End: 2020-08-03 | Stop reason: HOSPADM

## 2020-08-03 RX ORDER — ONDANSETRON 4 MG/1
4 TABLET, ORALLY DISINTEGRATING ORAL
Status: DISCONTINUED | OUTPATIENT
Start: 2020-08-03 | End: 2020-08-05 | Stop reason: HOSPADM

## 2020-08-03 RX ORDER — GLYCOPYRROLATE 0.2 MG/ML
INJECTION INTRAMUSCULAR; INTRAVENOUS AS NEEDED
Status: DISCONTINUED | OUTPATIENT
Start: 2020-08-03 | End: 2020-08-03 | Stop reason: HOSPADM

## 2020-08-03 RX ORDER — DEXTROSE MONOHYDRATE 100 MG/ML
0-250 INJECTION, SOLUTION INTRAVENOUS AS NEEDED
Status: DISCONTINUED | OUTPATIENT
Start: 2020-08-03 | End: 2020-08-05 | Stop reason: HOSPADM

## 2020-08-03 RX ORDER — INSULIN LISPRO 100 [IU]/ML
INJECTION, SOLUTION INTRAVENOUS; SUBCUTANEOUS
Status: DISCONTINUED | OUTPATIENT
Start: 2020-08-03 | End: 2020-08-05 | Stop reason: HOSPADM

## 2020-08-03 RX ORDER — SODIUM CHLORIDE 0.9 % (FLUSH) 0.9 %
5-40 SYRINGE (ML) INJECTION EVERY 8 HOURS
Status: DISCONTINUED | OUTPATIENT
Start: 2020-08-03 | End: 2020-08-05 | Stop reason: HOSPADM

## 2020-08-03 RX ORDER — ROPIVACAINE HYDROCHLORIDE 5 MG/ML
INJECTION, SOLUTION EPIDURAL; INFILTRATION; PERINEURAL
Status: COMPLETED | OUTPATIENT
Start: 2020-08-03 | End: 2020-08-03

## 2020-08-03 RX ORDER — ACETAMINOPHEN 500 MG
1000 TABLET ORAL EVERY 6 HOURS
Status: DISCONTINUED | OUTPATIENT
Start: 2020-08-03 | End: 2020-08-05 | Stop reason: HOSPADM

## 2020-08-03 RX ORDER — BUPIVACAINE HYDROCHLORIDE 5 MG/ML
INJECTION, SOLUTION EPIDURAL; INTRACAUDAL
Status: COMPLETED | OUTPATIENT
Start: 2020-08-03 | End: 2020-08-03

## 2020-08-03 RX ORDER — MELOXICAM 7.5 MG/1
7.5 TABLET ORAL DAILY
Status: DISCONTINUED | OUTPATIENT
Start: 2020-08-03 | End: 2020-08-05 | Stop reason: HOSPADM

## 2020-08-03 RX ORDER — FENTANYL CITRATE 50 UG/ML
25 INJECTION, SOLUTION INTRAMUSCULAR; INTRAVENOUS
Status: DISCONTINUED | OUTPATIENT
Start: 2020-08-03 | End: 2020-08-03 | Stop reason: HOSPADM

## 2020-08-03 RX ORDER — TIZANIDINE 2 MG/1
2 TABLET ORAL
Status: DISCONTINUED | OUTPATIENT
Start: 2020-08-03 | End: 2020-08-05 | Stop reason: HOSPADM

## 2020-08-03 RX ORDER — CEFAZOLIN SODIUM/WATER 2 G/20 ML
2 SYRINGE (ML) INTRAVENOUS EVERY 8 HOURS
Status: COMPLETED | OUTPATIENT
Start: 2020-08-03 | End: 2020-08-04

## 2020-08-03 RX ORDER — MELOXICAM 7.5 MG/1
7.5 TABLET ORAL DAILY
Status: DISCONTINUED | OUTPATIENT
Start: 2020-08-04 | End: 2020-08-03

## 2020-08-03 RX ORDER — PREGABALIN 75 MG/1
75 CAPSULE ORAL ONCE
Status: COMPLETED | OUTPATIENT
Start: 2020-08-03 | End: 2020-08-03

## 2020-08-03 RX ORDER — MIDAZOLAM HYDROCHLORIDE 1 MG/ML
1 INJECTION, SOLUTION INTRAMUSCULAR; INTRAVENOUS AS NEEDED
Status: DISCONTINUED | OUTPATIENT
Start: 2020-08-03 | End: 2020-08-03 | Stop reason: HOSPADM

## 2020-08-03 RX ORDER — SODIUM CHLORIDE, SODIUM LACTATE, POTASSIUM CHLORIDE, CALCIUM CHLORIDE 600; 310; 30; 20 MG/100ML; MG/100ML; MG/100ML; MG/100ML
50 INJECTION, SOLUTION INTRAVENOUS CONTINUOUS
Status: DISCONTINUED | OUTPATIENT
Start: 2020-08-03 | End: 2020-08-03 | Stop reason: HOSPADM

## 2020-08-03 RX ORDER — MAGNESIUM SULFATE 100 %
4 CRYSTALS MISCELLANEOUS AS NEEDED
Status: DISCONTINUED | OUTPATIENT
Start: 2020-08-03 | End: 2020-08-05 | Stop reason: HOSPADM

## 2020-08-03 RX ORDER — OXYCODONE HYDROCHLORIDE 5 MG/1
10 TABLET ORAL
Status: DISCONTINUED | OUTPATIENT
Start: 2020-08-03 | End: 2020-08-05 | Stop reason: HOSPADM

## 2020-08-03 RX ORDER — INSULIN GLARGINE 100 [IU]/ML
0.2 INJECTION, SOLUTION SUBCUTANEOUS DAILY
Status: DISCONTINUED | OUTPATIENT
Start: 2020-08-03 | End: 2020-08-03

## 2020-08-03 RX ORDER — MORPHINE SULFATE 2 MG/ML
2 INJECTION, SOLUTION INTRAMUSCULAR; INTRAVENOUS
Status: ACTIVE | OUTPATIENT
Start: 2020-08-03 | End: 2020-08-04

## 2020-08-03 RX ORDER — LATANOPROST 50 UG/ML
1 SOLUTION/ DROPS OPHTHALMIC
Status: DISCONTINUED | OUTPATIENT
Start: 2020-08-03 | End: 2020-08-05 | Stop reason: HOSPADM

## 2020-08-03 RX ORDER — FAMOTIDINE 20 MG/1
20 TABLET, FILM COATED ORAL DAILY
Status: DISCONTINUED | OUTPATIENT
Start: 2020-08-04 | End: 2020-08-05 | Stop reason: HOSPADM

## 2020-08-03 RX ORDER — PROPOFOL 10 MG/ML
INJECTION, EMULSION INTRAVENOUS
Status: DISCONTINUED | OUTPATIENT
Start: 2020-08-03 | End: 2020-08-03 | Stop reason: HOSPADM

## 2020-08-03 RX ORDER — ACETAMINOPHEN 500 MG
1000 TABLET ORAL ONCE
Status: COMPLETED | OUTPATIENT
Start: 2020-08-03 | End: 2020-08-03

## 2020-08-03 RX ORDER — SODIUM CHLORIDE 9 MG/ML
125 INJECTION, SOLUTION INTRAVENOUS CONTINUOUS
Status: DISPENSED | OUTPATIENT
Start: 2020-08-03 | End: 2020-08-04

## 2020-08-03 RX ORDER — HYDROXYZINE HYDROCHLORIDE 10 MG/1
10 TABLET, FILM COATED ORAL
Status: COMPLETED | OUTPATIENT
Start: 2020-08-03 | End: 2020-08-04

## 2020-08-03 RX ORDER — CEFAZOLIN SODIUM/WATER 2 G/20 ML
2 SYRINGE (ML) INTRAVENOUS ONCE
Status: COMPLETED | OUTPATIENT
Start: 2020-08-03 | End: 2020-08-03

## 2020-08-03 RX ORDER — ONDANSETRON 2 MG/ML
4 INJECTION INTRAMUSCULAR; INTRAVENOUS AS NEEDED
Status: DISCONTINUED | OUTPATIENT
Start: 2020-08-03 | End: 2020-08-03 | Stop reason: HOSPADM

## 2020-08-03 RX ORDER — PROPOFOL 10 MG/ML
INJECTION, EMULSION INTRAVENOUS AS NEEDED
Status: DISCONTINUED | OUTPATIENT
Start: 2020-08-03 | End: 2020-08-03 | Stop reason: HOSPADM

## 2020-08-03 RX ORDER — ONDANSETRON 2 MG/ML
4 INJECTION INTRAMUSCULAR; INTRAVENOUS
Status: ACTIVE | OUTPATIENT
Start: 2020-08-03 | End: 2020-08-04

## 2020-08-03 RX ORDER — AMOXICILLIN 250 MG
1 CAPSULE ORAL 2 TIMES DAILY
Status: DISCONTINUED | OUTPATIENT
Start: 2020-08-03 | End: 2020-08-05 | Stop reason: HOSPADM

## 2020-08-03 RX ORDER — MORPHINE SULFATE 10 MG/ML
2 INJECTION, SOLUTION INTRAMUSCULAR; INTRAVENOUS
Status: DISCONTINUED | OUTPATIENT
Start: 2020-08-03 | End: 2020-08-03 | Stop reason: HOSPADM

## 2020-08-03 RX ADMIN — Medication 10 ML: at 14:11

## 2020-08-03 RX ADMIN — CEFAZOLIN SODIUM 2 G: 300 INJECTION, POWDER, LYOPHILIZED, FOR SOLUTION INTRAVENOUS at 17:02

## 2020-08-03 RX ADMIN — SODIUM CHLORIDE 125 ML/HR: 900 INJECTION, SOLUTION INTRAVENOUS at 23:28

## 2020-08-03 RX ADMIN — PROPOFOL 15 MG: 10 INJECTION, EMULSION INTRAVENOUS at 10:34

## 2020-08-03 RX ADMIN — FENTANYL CITRATE 50 MCG: 50 INJECTION, SOLUTION INTRAMUSCULAR; INTRAVENOUS at 09:46

## 2020-08-03 RX ADMIN — ACETAMINOPHEN 1000 MG: 500 TABLET ORAL at 09:32

## 2020-08-03 RX ADMIN — PROPOFOL 25 MCG/KG/MIN: 10 INJECTION, EMULSION INTRAVENOUS at 10:10

## 2020-08-03 RX ADMIN — BUPIVACAINE HYDROCHLORIDE 10 MG: 5 INJECTION, SOLUTION EPIDURAL; INTRACAUDAL; PERINEURAL at 10:30

## 2020-08-03 RX ADMIN — ACETAMINOPHEN 1000 MG: 500 TABLET ORAL at 16:59

## 2020-08-03 RX ADMIN — Medication 2 G: at 10:30

## 2020-08-03 RX ADMIN — PROPOFOL 30 MG: 10 INJECTION, EMULSION INTRAVENOUS at 10:14

## 2020-08-03 RX ADMIN — PROPOFOL 25 MG: 10 INJECTION, EMULSION INTRAVENOUS at 10:32

## 2020-08-03 RX ADMIN — SODIUM CHLORIDE 500 ML: 900 INJECTION, SOLUTION INTRAVENOUS at 14:10

## 2020-08-03 RX ADMIN — PHENYLEPHRINE HYDROCHLORIDE 40 MCG/MIN: 10 INJECTION INTRAVENOUS at 10:20

## 2020-08-03 RX ADMIN — MELOXICAM 7.5 MG: 7.5 TABLET ORAL at 17:51

## 2020-08-03 RX ADMIN — ACETAMINOPHEN 1000 MG: 500 TABLET ORAL at 23:27

## 2020-08-03 RX ADMIN — DEXAMETHASONE SODIUM PHOSPHATE 4 MG: 4 INJECTION, SOLUTION INTRAMUSCULAR; INTRAVENOUS at 10:25

## 2020-08-03 RX ADMIN — ONDANSETRON HYDROCHLORIDE 4 MG: 2 INJECTION, SOLUTION INTRAMUSCULAR; INTRAVENOUS at 11:07

## 2020-08-03 RX ADMIN — MIDAZOLAM 2 MG: 1 INJECTION INTRAMUSCULAR; INTRAVENOUS at 09:45

## 2020-08-03 RX ADMIN — SODIUM CHLORIDE 500 ML: 900 INJECTION, SOLUTION INTRAVENOUS at 17:45

## 2020-08-03 RX ADMIN — PROPOFOL 30 MG: 10 INJECTION, EMULSION INTRAVENOUS at 10:10

## 2020-08-03 RX ADMIN — ROPIVACAINE HYDROCHLORIDE 30 ML: 5 INJECTION, SOLUTION EPIDURAL; INFILTRATION; PERINEURAL at 10:00

## 2020-08-03 RX ADMIN — TRANEXAMIC ACID 1 G: 100 INJECTION, SOLUTION INTRAVENOUS at 10:28

## 2020-08-03 RX ADMIN — SODIUM CHLORIDE 125 ML/HR: 900 INJECTION, SOLUTION INTRAVENOUS at 12:03

## 2020-08-03 RX ADMIN — LIDOCAINE HYDROCHLORIDE 5 MG: 10 INJECTION, SOLUTION EPIDURAL; INFILTRATION; INTRACAUDAL; PERINEURAL at 10:30

## 2020-08-03 RX ADMIN — GLYCOPYRROLATE 0.2 MG: 0.2 INJECTION, SOLUTION INTRAMUSCULAR; INTRAVENOUS at 10:37

## 2020-08-03 RX ADMIN — DOCUSATE SODIUM 50MG AND SENNOSIDES 8.6MG 1 TABLET: 8.6; 5 TABLET, FILM COATED ORAL at 17:51

## 2020-08-03 RX ADMIN — ATORVASTATIN CALCIUM 40 MG: 40 TABLET, FILM COATED ORAL at 23:27

## 2020-08-03 RX ADMIN — PREGABALIN 75 MG: 75 CAPSULE ORAL at 09:32

## 2020-08-03 RX ADMIN — SODIUM CHLORIDE, SODIUM LACTATE, POTASSIUM CHLORIDE, AND CALCIUM CHLORIDE 50 ML/HR: 600; 310; 30; 20 INJECTION, SOLUTION INTRAVENOUS at 09:34

## 2020-08-03 RX ADMIN — ONDANSETRON HYDROCHLORIDE 4 MG: 2 INJECTION, SOLUTION INTRAMUSCULAR; INTRAVENOUS at 11:31

## 2020-08-03 RX ADMIN — INSULIN LISPRO 2 UNITS: 100 INJECTION, SOLUTION INTRAVENOUS; SUBCUTANEOUS at 16:57

## 2020-08-03 NOTE — PROGRESS NOTES
Problem: Mobility Impaired (Adult and Pediatric)  Goal: *Acute Goals and Plan of Care (Insert Text)  Description: FUNCTIONAL STATUS PRIOR TO ADMISSION: Patient was independent and active without use of DME.    HOME SUPPORT PRIOR TO ADMISSION: The patient lived with daughter but did not require assist.    Physical Therapy Goals  Initiated 8/3/2020    1. Patient will move from supine to sit and sit to supine , scoot up and down, and roll side to side in bed with mod independence within 4 days. 2. Patient will perform sit to stand with modified independence within 4 days. 3. Patient will ambulate with modified independence for 150 feet with the least restrictive device within 4 days. 4. Patient will ascend/descend 1 stairs with no handrail(s) with modified independence within 4 days to get onto her bed. 5. Patient will perform home exercise program per protocol with modified independence within 4 days. 6. Patient will demonstrate AROM 0-90 degrees in operative joint within 4 days. Outcome: Not Met   PHYSICAL THERAPY EVALUATION  Patient: Mary Lou Fajardo (50 y.o. female)  Date: 8/3/2020  Primary Diagnosis: Primary osteoarthritis of right knee [M17.11]  Procedure(s) (LRB):  RIGHT TOTAL KNEE ARTHROPLASTY (Right) Day of Surgery   Precautions: fall, WBAT    ASSESSMENT  Pt was assessed POD1 s/p R TKA. She was alert and attended by her daughter. Pt was inde w/o AD at baseline but lives with daughter in 1 story home. Pt presents with diminished functional mobility s/t to decreased balance, sensation, weakness, and ROM. Her bed mobility was CGA, transfers sit>stand Kellie, and ambulation CGA. During ambulation, she was able to use a reciprocal gait pattern with a slightly shortened R step and slow speed. Her vital signs were stable with positional changes and she was receptive to education on exercises and acute care expectations. Pt owns a rollator which she has used for a previous knee replacement.  Recommend [facility] upon discharge. Current Level of Function Impacting Discharge (mobility/balance): Kellie sit> stand     Functional Outcome Measure: The patient scored 60/100 on the Barthel outcome measure which is indicative of 40% impairment or dependence on another for ADLs and functional mobility. Other factors to consider for discharge: prior TKA, elevated bed      Patient will benefit from skilled therapy intervention to address the above noted impairments. PLAN :  Recommendations and Planned Interventions: bed mobility training, transfer training, gait training, therapeutic exercises, patient and family training/education, and therapeutic activities      Frequency/Duration: Patient will be followed by physical therapy:  twice daily to address goals. Recommendation for discharge: (in order for the patient to meet his/her long term goals)  Physical therapy at least 2 days/week in the home     This discharge recommendation:  Has not yet been discussed the attending provider and/or case management    IF patient discharges home will need the following DME: pt owns DME for d/c (rollator). If rolling walker is needed, one will need to be ordered. SUBJECTIVE:   Patient stated if they ever get rid of clothes, I'd be the first to get rid of mine.     OBJECTIVE DATA SUMMARY:   HISTORY:    Past Medical History:   Diagnosis Date    Arthritis     Diabetes ([facility] [address]     GERD (gastroesophageal reflux disease)     Glaucoma     High cholesterol     Ill-defined condition     EDEMA, LOWER LIMS, URINARY INCONTINENCE    Ill-defined condition     GLAUCOMA    Sleep apnea     NO CPAP    Urge incontinence      Past Surgical History:   Procedure Laterality Date    BREAST SURGERY PROCEDURE UNLISTED      LT BREAST CYST BENIGN    DRAINAGE OF DEEP RECTAL ABSCESS      ENDOSCOPY, COLON, DIAGNOSTIC      2008    HX BREAST BIOPSY Left 1970s    Surgical    HX CATARACT REMOVAL Bilateral     HX GI      RECTAL ABSCESS    HX HEART CATHETERIZATION  2017    HX HYSTERECTOMY      HX KNEE REPLACEMENT Left     HX UROLOGICAL      BOTOX FOR FREQUENT URINATION       Home Situation  Home Environment: Private residence  # Steps to Enter: 0  Wheelchair Ramp: Yes  One/Two Story Residence: One story  Living Alone: No  Support Systems: Child(rodrigo)  Current DME Used/Available at Home: [place], straight, Walker, rollator  Tub or Shower Type: Tub/Shower combination    EXAMINATION/PRESENTATION/DECISION MAKING:   Critical Behavior:  Neurologic State: Drowsy         Range Of Motion:  AROM: Generally decreased, functional(R)           PROM: Generally decreased, functional(R)           Strength:    Strength: Generally decreased, functional        Tone & Sensation:   Tone: Normal              Sensation: Impaired(decreased)               Coordination:  Coordination: Within functional limits  Vision:      Functional Mobility:  Bed Mobility:  Rolling: Independent  Supine to Sit: Contact guard assistance  Sit to Supine: Contact guard assistance  Scooting: Modified independent  Transfers:  Sit to Stand: Minimum assistance  Stand to Sit: Contact guard assistance        Bed to Chair: Contact guard assistance              Balance:   Sitting: Intact  Standing: Impaired  Standing - Static: Good  Standing - Dynamic : Constant support;Good  Ambulation/Gait Training:  Distance (ft): 30 Feet (ft)  Assistive Device: Gait belt;Walker, rolling  Ambulation - Level of Assistance: Contact guard assistance        Gait Abnormalities: Decreased step clearance; Antalgic           Stance: Right decreased  Speed/Stephanie: Slow          Functional Measure:  Barthel Index:    Bathin  Bladder: 10  Bowels: 10  Groomin  Dressin  Feeding: 10  Mobility: 0  Stairs: 0  Toilet Use: 10  Transfer (Bed to Chair and Back): 10  Total: 60/100       The Barthel ADL Index: Guidelines  1.  The index should be used as a record of what a patient does, not as a record of what a patient could do.  2. The main aim is to establish degree of independence from any help, physical or verbal, however minor and for whatever reason. 3. The need for supervision renders the patient not independent. 4. A patient's performance should be established using the best available evidence. Asking the patient, friends/relatives and nurses are the usual sources, but direct observation and common sense are also important. However direct testing is not needed. 5. Usually the patient's performance over the preceding 24-48 hours is important, but occasionally longer periods will be relevant. 6. Middle categories imply that the patient supplies over 50 per cent of the effort. 7. Use of aids to be independent is allowed. Latasha Rubi., Barthel, D.W. (2127). Functional evaluation: the Barthel Index. [address] [facility] (14)2. [facility] [employer] jack JESSICA Wu, Melina June., Karlee Tijerina., [place], [address] [place] [address] (1999). Measuring the change indisability after inpatient rehabilitation; comparison of the responsiveness of the Barthel Index and Functional Cochran Measure. Journal of Neurology, Neurosurgery, and Psychiatry, 66(4), 183-405. Em June, DIANA.TIANA.A, TERESITA Gallegos, & Randy Shepard M.A. (2004.) Assessment of post-stroke quality of life in cost-effectiveness studies: The usefulness of the Barthel Index and the EuroQoL-5D.  Quality of Life Research, 15, 997-99          Physical Therapy Evaluation Charge Determination   History Examination Presentation Decision-Making   MEDIUM  Complexity : 1-2 comorbidities / personal factors will impact the outcome/ POC  HIGH Complexity : 4+ Standardized tests and measures addressing body structure, function, activity limitation and / or participation in recreation  LOW Complexity : Stable, uncomplicated  Other outcome measures Barthel  MEDIUM      Based on the above components, the patient evaluation is determined to be of the following complexity level: LOW     Pain Rating:  No complaints about pain  Activity Tolerance:   Good  Please refer to the flowsheet for vital signs taken during this treatment. After treatment patient left in no apparent distress:   Seated in bed with dinner, call bell, nurse notified    COMMUNICATION/EDUCATION:   The patients plan of care was discussed with: Registered nurse and Rehabilitation technician. Fall prevention education was provided and the patient/caregiver indicated understanding., Patient/family have participated as able in goal setting and plan of care. , and Patient/family agree to work toward stated goals and plan of care. Thank you for this referral.  Preeti Willett, SPT   Time Calculation: 35 mins    Regarding student involvement in patient care:  A student participated in this treatment session. Per CMS Medicare statements and APTA guidelines I certify that the following was true:  1. I was present and directly observed the entire session. 2. I made all skilled judgments and clinical decisions regarding care. 3. I am the practitioner responsible for assessment, treatment, and documentation.

## 2020-08-03 NOTE — ANESTHESIA POSTPROCEDURE EVALUATION
Post-Anesthesia Evaluation and Assessment    Patient: Isaac Kline MRN: [id number]  SSN: [id number]    YOB: 1940  Age: [de-identified] y.o. Sex: female      I have evaluated the patient and they are stable and ready for discharge from the PACU. Cardiovascular Function/Vital Signs  Visit Vitals  /59   Pulse 76   Temp 36.4 °C (97.6 °F)   Resp 20   Ht 5' 3\" (1.6 m)   Wt 87.1 kg (192 lb)   SpO2 98%   BMI 34.01 kg/m²       Patient is status post Spinal anesthesia for Procedure(s):  RIGHT TOTAL KNEE ARTHROPLASTY. Nausea/Vomiting: None    Postoperative hydration reviewed and adequate. Pain:  Pain Scale 1: Numeric (0 - 10) (08/03/20 0854)  Pain Intensity 1: 7 (08/03/20 0854)   Managed    Neurological Status:   Neuro (WDL): Exceptions to WDL (08/03/20 1142)  Neuro  Neurologic State: Drowsy (08/03/20 1142)  LLE Motor Response: Pharmacologically paralyzed (08/03/20 1142)  RLE Motor Response: Pharmacologically paralyzed (08/03/20 1142)   At baseline    Mental Status, Level of Consciousness: Alert and  oriented to person, place, and time    Pulmonary Status:   O2 Device: CO2 nasal cannula (08/03/20 1144)   Adequate oxygenation and airway patent    Complications related to anesthesia: None    Post-anesthesia assessment completed. No concerns    Signed By: Chuck Abdullahi MD     August 3, 2020              Procedure(s):  RIGHT TOTAL KNEE ARTHROPLASTY. spinal    <BSHSIANPOST>    INITIAL Post-op Vital signs:   Vitals Value Taken Time   /59 8/3/2020 12:00 PM   Temp 36.4 °C (97.6 °F) 8/3/2020 11:44 AM   Pulse 69 8/3/2020 12:11 PM   Resp 13 8/3/2020 12:11 PM   SpO2 98 % 8/3/2020 12:11 PM   Vitals shown include unvalidated device data.

## 2020-08-03 NOTE — ANESTHESIA PROCEDURE NOTES
Peripheral Block    Start time: 8/3/2020 9:50 AM  End time: 8/3/2020 9:55 AM  Performed by: Ambika Wolf MD  Authorized by: Ambika Wolf MD       Pre-procedure: Indications: at surgeon's request    Preanesthetic Checklist: patient identified, risks and benefits discussed, site marked, timeout performed, anesthesia consent given and patient being monitored    Timeout Time: 09:55          Block Type:   Block Type:   Adductor canal  Laterality:  Right  Monitoring:  Oxygen, responsive to questions, standard ASA monitoring, continuous pulse ox, frequent vital sign checks and heart rate  Injection Technique:  Single shot  Procedures: ultrasound guided    Patient Position: supine  Prep: chlorhexidine    Location:  Lower thigh  Needle Type:  Stimuplex  Needle Gauge:  20 G  Needle Localization:  Ultrasound guidance    Assessment:  Number of attempts:  1  Injection Assessment:  Ultrasound image on chart, no paresthesia, negative aspiration for CSF, incremental injection every 5 mL, low pressure verified by pressure monitor, no intravascular symptoms, negative aspiration for blood and local visualized surrounding nerve on ultrasound  Patient tolerance:  Patient tolerated the procedure well with no immediate complications

## 2020-08-03 NOTE — PROGRESS NOTES
PHYSICAL THERAPY  Patient was checked for initiation of mobility but R LE is still numb.    Destin Roberts

## 2020-08-03 NOTE — ANESTHESIA PROCEDURE NOTES
Spinal Block    Start time: 8/3/2020 10:10 AM  End time: 8/3/2020 10:18 AM  Performed by: King Hicks CRNA  Authorized by: Adam Spauldnig MD     Pre-procedure:   Indications: primary anesthetic  Preanesthetic Checklist: patient identified, risks and benefits discussed, anesthesia consent, site marked, patient being monitored and timeout performed    Timeout Time: 10:10          Spinal Block:   Patient Position:  Seated  Prep Region:  Lumbar  Prep: Betadine      Location:  L1-2  Technique:  Single shot        Needle:   Needle Type:  Pencil-tip  Needle Gauge:  24 G  Attempts:  1      Events: CSF confirmed, no blood with aspiration and no paresthesia        Assessment:  Insertion:  Uncomplicated  Patient tolerance:  Patient tolerated the procedure well with no immediate complications

## 2020-08-03 NOTE — BRIEF OP NOTE
Brief Postoperative Note    Patient: Velia Bruner  YOB: 1940  MRN: [id number]    Date of Procedure: 8/3/2020     Pre-Op Diagnosis: OA RIGHT KNEE    Post-Op Diagnosis: Same as preoperative diagnosis. Procedure(s):  RIGHT TOTAL KNEE ARTHROPLASTY    Surgeon(s):  Rui Resendez MD    Surgical Assistant: Physician Assistant: Flores Armstrong PA-C    Anesthesia: Spinal     Estimated Blood Loss (mL): less than 753     Complications: None    Specimens: * No specimens in log *     Implants:   Implant Name Type Inv.  Item Serial No.  Lot No. LRB No. Used Action   CEMENT BNE GENTAMC GHV 40GM -- SMARTSET - SNA  CEMENT BNE GENTAMC GHV 40GM -- SMARTSET NA [facility] [employer] [id number] Right 1 Implanted   CEMENT BNE GENTAMC GHV 40GM -- SMARTSET - SNA  CEMENT BNE GENTAMC GHV 40GM -- SMARTSET NA [facility] [employer] [id number] Right 1 Implanted   persona corsslinked polythylene mc 14mm right    NA [employer] [id number] Right 1 Implanted   IMPL KNEE PATELLA ALL POLY PERSONA [[id number]] [[employer]] - SNA  IMPL KNEE PATELLA ALL POLY PERSONA [[id number]] [[employer]] NA [employer] [id number] Right 1 Implanted   TIB PRN NP STM 5 DEG SZ ER -- PERSONA - SNA  TIB PRN NP STM 5 DEG SZ ER -- PERSONA NA [employer] [id number] Right 1 Implanted   IMPL KNEE FEMUR PERSONA RADHA CR ISAAC RT SZ 7 - SNA  IMPL KNEE FEMUR PERSONA RADHA CR ISAAC RT SZ 7 NA [employer] [id number] Right 1 Implanted       Drains: * No LDAs found *    Findings: DJD    Electronically Signed by Crispin Ibarra MD on 8/3/2020 at 11:22 AM

## 2020-08-03 NOTE — PROGRESS NOTES
Ortho Post Op Note    8/3/2020  2:00 PM    POD:  Day of Surgery  S/P:  Procedure(s):  RIGHT TOTAL KNEE ARTHROPLASTY   Spinal with dexamethasone admin ([id number] on glucotrol)    Afebrile/low BP's in PACU , NAD, A&O x3  Doing well without complaints of nausea  Pain well controlled  Calves soft/NTTP Bilaterally  Leg soft. Dressing clean and dry  Moving lower extremities well. Neurocirculatory exam intact and within normal range. Consistent glucose control over the past 2 years. Will hold glucotrol (PTA med) and use corrective lispro  Lab Results   Component Value Date/Time    HGB 11.4 (L) 07/20/2020 08:21 AM    INR 1.1 07/20/2020 08:21 AM     Recent Labs     07/20/20  0821 04/27/20  0000   CREA 1.18* 0.87   BUN 12 13     Estimated Creatinine Clearance: 39.8 mL/min (A) (by C-G formula based on SCr of 1.18 mg/dL (H)).     PLAN:  DVT prophylaxis: Eliquis 2.5 mg bid  WBAT with PT-mobilization  Pain Control: tylenol, mobic, oxycodone  Plan to D/C home with HH/PT in 1-2 days      SRAVANI Rosa

## 2020-08-03 NOTE — PERIOP NOTES
Call to Dr. Citlaly Cosby, notified her pt's heart rate is running 48 to 50. She states pt may go to room without additional treatment.

## 2020-08-03 NOTE — PROGRESS NOTES
Renal Dosing/Monitoring  Medication: famotidine  Current regimen:  20 every 12 hr  No results for input(s): CREA, BUN in the last 72 hours.   Estimated CrCl:  40 ml/min  Plan: Change to famotidine 20 mg daily     **close after initial review

## 2020-08-03 NOTE — ROUTINE PROCESS
Patient: Linda Tse MRN: [id number]  SSN: [id number]   YOB: 1940  Age: [de-identified] y.o. Sex: female     Patient is status post Procedure(s):  RIGHT TOTAL KNEE ARTHROPLASTY.     Surgeon(s) and Role:     * Emily Mariscal MD - Primary    Local/Dose/Irrigation:  See mar                  Peripheral IV 08/03/20 Right Wrist (Active)                           Dressing/Packing:  Wound Knee Right-Dressing Type: 4 x 4;ABD pad;Cast padding;Elastic bandage;Non adherent (08/03/20 1122)    Splint/Cast:  ]    Other:

## 2020-08-03 NOTE — PERIOP NOTES
TRANSFER - OUT REPORT:    Verbal report given to Tyra on Huy Elaine  being transferred to [phone] for routine post - op       Report consisted of patients Situation, Background, Assessment and   Recommendations(SBAR). Time Pre op antibiotic given:1030  Anesthesia Stop time: 6647  Mireles Present on Transfer to floor:no  Order for Mireles on Chart:no  Discharge Prescriptions with Chart:no    Information from the following report(s) SBAR, OR Summary, Intake/Output and MAR was reviewed with the receiving nurse. Opportunity for questions and clarification was provided. Is the patient on 02? YES       L/Min 1       Other     Is the patient on a monitor? NO    Is the nurse transporting with the patient? NO    Surgical Waiting Area notified of patient's transfer from PACU? NO      The following personal items collected during your admission accompanied patient upon transfer:   Dental Appliance: Dental Appliances: Uppers(sent to pacu)  Vision:    Hearing Aid:    Jewelry:    Clothing: Clothing: (walker overnight bag and clothes to pacu, dentures to pacu)  Other Valuables:    Valuables sent to safe:      Call to pt's daughter Court Murdock, notified her that Aleah Diggs is going to room 576.

## 2020-08-03 NOTE — OP NOTES
8/3/2020  OPERATIVE REPORT      PREOPERATIVE DIAGNOSIS: Osteoarthritis, right knee. POSTOPERATIVE DIAGNOSIS: Osteoarthritis, right knee. OPERATIVE PROCEDURE: right total knee replacement. SURGEON: Nicole Chow MD    ASSISTANT SURGEON: Mario Stokes, [facility]    ANESTHESIA: Spinal.    Antibiotic:Ancef    INDICATIONS: : A [de-identified] y.o.  female  with end stage osteoarthritis to the right knee, not responsive to conservative management. COMPLICATIONS:None    PROCEDURE: The patient was taken to the operating room, underwent  placement of spinal anesthesia. The knee and leg were prepped and  draped in the usual fashion. The leg was exsanguinated with the Esmarch  bandage and tourniquet inflated to 350 mmHg. A longitudinal midline  incision made down through skin and subcutaneous tissue. A medial  parapatellar arthrotomy was performed, and extended proximally along the  medial border of the quadriceps tendon, distally along the medial border of  the insertion of the patella tendon. Medial release was performed. Retropatellar fat pad was sharply excised. The patella was subluxated  laterally, the knee was flexed to 90 degrees. Drill was used to enter the  intramedullary canal of the distal femur. The 5 degree intramedullary guide  was applied and the distal femoral cut was performed. The femur was sized  to a 7. A 7 cutting block was applied, and the anterior, posterior,  chamfer cuts were performed. The extramedullary tibial guide was applied, and the tibia was cut in  neutral alignment. The tibia was sized to a E. Knee was balanced to varus  and valgus stress. Flexion and extension gaps were equal. Trial reduction  was performed, using 14 mm insert. Excellent range of motion and stability  were noted. The patella was everted, and the patella was cut using freehand  technique. Patella was sized to a 35. Drill holes were placed, and patella  button applied. The patella tracked normally.  All trial components were  removed, and surfaces were prepared using drill and punch. All residual  meniscal tissue was sharply excised. Hemostasis was obtained using Bovie  apparatus. All components were cemented in place, taking care to remove all  excess cement. The knee was maintained in full extension while the cement  hardened. The tourniquet was let down after a total tourniquet time of 31  minutes. Hemostasis was obtained using the Bovie apparatus. The joint was  extensively irrigated with antibiotic solution. The arthrotomy was repaired  using #2 Vicryl in interrupted figure-of-eight fashion. Subcutaneous tissue  was approximated using 2-0 Vicryl in interrupted fashion. Skin edges were  approximated using stapling apparatus. Joint was infiltrated with 30 mL of  0.5% Marcaine with epinephrine. Bulky sterile dressing was applied, and the  patient was transferred to recovery room in stable condition. There were no  Complications. The Physician assistant was critical during the procedure in assisting with positioning ,retraction and wound closure. ESTIMATED BLOOD LOSS: 100 cc.     SPECIMEN: Adnrea Le M.D.  8/3/2020  11:22 AM

## 2020-08-03 NOTE — ANESTHESIA PREPROCEDURE EVALUATION
Relevant Problems   No relevant active problems       Anesthetic History   No history of anesthetic complications            Review of Systems / Medical History  Patient summary reviewed, nursing notes reviewed and pertinent labs reviewed    Pulmonary        Sleep apnea: CPAP           Neuro/Psych              Cardiovascular              CAD    Exercise tolerance: >4 METS  Comments: EF 60-65%   GI/Hepatic/Renal     GERD           Endo/Other    Diabetes    Obesity and arthritis     Other Findings              Physical Exam    Airway  Mallampati: I  TM Distance: > 6 cm  Neck ROM: normal range of motion   Mouth opening: Normal     Cardiovascular    Rhythm: regular  Rate: normal         Dental    Dentition: Edentulous     Pulmonary  Breath sounds clear to auscultation               Abdominal         Other Findings            Anesthetic Plan    ASA: 3  Anesthesia type: spinal          Induction: Intravenous  Anesthetic plan and risks discussed with: Patient

## 2020-08-04 ENCOUNTER — HOME HEALTH ADMISSION (OUTPATIENT)
Dept: HOME HEALTH SERVICES | Facility: HOME HEALTH | Age: 80
End: 2020-08-04
Payer: MEDICARE

## 2020-08-04 LAB
ABO + RH BLD: NORMAL
ANION GAP SERPL CALC-SCNC: 6 MMOL/L (ref 5–15)
ANTIGENS PRESENT RBC DONR: NORMAL
BLD PROD TYP BPU: NORMAL
BLOOD BANK CMNT PATIENT-IMP: NORMAL
BLOOD GROUP ANTIBODIES SERPL: NORMAL
BLOOD GROUP ANTIBODIES SERPL: NORMAL
BPU ID: NORMAL
BUN SERPL-MCNC: 8 MG/DL (ref 6–20)
BUN/CREAT SERPL: 9 (ref 12–20)
CALCIUM SERPL-MCNC: 8.7 MG/DL (ref 8.5–10.1)
CHLORIDE SERPL-SCNC: 112 MMOL/L (ref 97–108)
CO2 SERPL-SCNC: 21 MMOL/L (ref 21–32)
CREAT SERPL-MCNC: 0.93 MG/DL (ref 0.55–1.02)
CROSSMATCH RESULT,%XM: NORMAL
GLUCOSE BLD STRIP.AUTO-MCNC: 105 MG/DL (ref 65–100)
GLUCOSE BLD STRIP.AUTO-MCNC: 122 MG/DL (ref 65–100)
GLUCOSE BLD STRIP.AUTO-MCNC: 125 MG/DL (ref 65–100)
GLUCOSE BLD STRIP.AUTO-MCNC: 127 MG/DL (ref 65–100)
GLUCOSE SERPL-MCNC: 152 MG/DL (ref 65–100)
HGB BLD-MCNC: 10.2 G/DL (ref 11.5–16)
POTASSIUM SERPL-SCNC: 3.9 MMOL/L (ref 3.5–5.1)
SERVICE CMNT-IMP: ABNORMAL
SODIUM SERPL-SCNC: 139 MMOL/L (ref 136–145)
SPECIMEN EXP DATE BLD: NORMAL
STATUS OF UNIT,%ST: NORMAL
UNIT DIVISION, %UDIV: 0

## 2020-08-04 PROCEDURE — 74011250637 HC RX REV CODE- 250/637: Performed by: PHYSICIAN ASSISTANT

## 2020-08-04 PROCEDURE — 82962 GLUCOSE BLOOD TEST: CPT

## 2020-08-04 PROCEDURE — 97535 SELF CARE MNGMENT TRAINING: CPT

## 2020-08-04 PROCEDURE — 97116 GAIT TRAINING THERAPY: CPT

## 2020-08-04 PROCEDURE — 74011250636 HC RX REV CODE- 250/636: Performed by: PHYSICIAN ASSISTANT

## 2020-08-04 PROCEDURE — 36415 COLL VENOUS BLD VENIPUNCTURE: CPT

## 2020-08-04 PROCEDURE — 80048 BASIC METABOLIC PNL TOTAL CA: CPT

## 2020-08-04 PROCEDURE — 85018 HEMOGLOBIN: CPT

## 2020-08-04 PROCEDURE — 97165 OT EVAL LOW COMPLEX 30 MIN: CPT

## 2020-08-04 PROCEDURE — 74011000250 HC RX REV CODE- 250: Performed by: PHYSICIAN ASSISTANT

## 2020-08-04 RX ADMIN — SODIUM CHLORIDE 500 ML: 9 INJECTION, SOLUTION INTRAVENOUS at 10:12

## 2020-08-04 RX ADMIN — MELOXICAM 7.5 MG: 7.5 TABLET ORAL at 10:06

## 2020-08-04 RX ADMIN — OXYCODONE HYDROCHLORIDE 5 MG: 5 TABLET ORAL at 10:58

## 2020-08-04 RX ADMIN — ACETAMINOPHEN 1000 MG: 500 TABLET ORAL at 21:48

## 2020-08-04 RX ADMIN — Medication 10 ML: at 21:48

## 2020-08-04 RX ADMIN — OXYCODONE HYDROCHLORIDE 5 MG: 5 TABLET ORAL at 03:23

## 2020-08-04 RX ADMIN — HYDROXYZINE HYDROCHLORIDE 10 MG: 10 TABLET, FILM COATED ORAL at 07:18

## 2020-08-04 RX ADMIN — Medication 10 ML: at 15:04

## 2020-08-04 RX ADMIN — FAMOTIDINE 20 MG: 20 TABLET ORAL at 10:06

## 2020-08-04 RX ADMIN — OXYCODONE HYDROCHLORIDE 5 MG: 5 TABLET ORAL at 17:16

## 2020-08-04 RX ADMIN — DOCUSATE SODIUM 50MG AND SENNOSIDES 8.6MG 1 TABLET: 8.6; 5 TABLET, FILM COATED ORAL at 10:06

## 2020-08-04 RX ADMIN — CEFAZOLIN SODIUM 2 G: 300 INJECTION, POWDER, LYOPHILIZED, FOR SOLUTION INTRAVENOUS at 00:51

## 2020-08-04 RX ADMIN — DOCUSATE SODIUM 50MG AND SENNOSIDES 8.6MG 1 TABLET: 8.6; 5 TABLET, FILM COATED ORAL at 17:16

## 2020-08-04 RX ADMIN — ACETAMINOPHEN 1000 MG: 500 TABLET ORAL at 10:06

## 2020-08-04 RX ADMIN — ATORVASTATIN CALCIUM 40 MG: 40 TABLET, FILM COATED ORAL at 20:20

## 2020-08-04 RX ADMIN — OXYCODONE HYDROCHLORIDE 5 MG: 5 TABLET ORAL at 06:37

## 2020-08-04 RX ADMIN — APIXABAN 2.5 MG: 2.5 TABLET, FILM COATED ORAL at 20:20

## 2020-08-04 RX ADMIN — OXYCODONE HYDROCHLORIDE 5 MG: 5 TABLET ORAL at 20:20

## 2020-08-04 RX ADMIN — OXYCODONE HYDROCHLORIDE 5 MG: 5 TABLET ORAL at 14:36

## 2020-08-04 RX ADMIN — APIXABAN 2.5 MG: 2.5 TABLET, FILM COATED ORAL at 03:25

## 2020-08-04 RX ADMIN — HYDROXYZINE HYDROCHLORIDE 10 MG: 10 TABLET, FILM COATED ORAL at 00:53

## 2020-08-04 RX ADMIN — POLYETHYLENE GLYCOL 3350 17 G: 17 POWDER, FOR SOLUTION ORAL at 10:09

## 2020-08-04 RX ADMIN — ACETAMINOPHEN 1000 MG: 500 TABLET ORAL at 03:23

## 2020-08-04 NOTE — ACP (ADVANCE CARE PLANNING)
Request by patient to assist with advance medical directive. Consulted with patients nurse. Explained document to patient and Marin Harkins, her daughter, who was present in the room. Assisted patient in completing the document. Made copy for patients chart and placed a copy in the patient's chart. Returned original document and three (3) copies to the patient. Rev.  Humphrey Ventura MDiv, [facility], [facility]   paging service: [phone])

## 2020-08-04 NOTE — PROGRESS NOTES
Ortho Progress Note  1 Day Post-Op  Procedure(s):  RIGHT TOTAL KNEE ARTHROPLASTY    Subjective: Pt doing well, no pain. Walking well with nursing. HR has been fluctuant, but pt remains asymptomaticl. Pt denies N/V, lightheadedness, dizziness, CP, palpitations, or abdominal pain. Physical Exam:   Visit Vitals  /66 (BP 1 Location: Right arm, BP Patient Position: At rest)   Pulse (!) 45   Temp 98.2 °F (36.8 °C)   Resp 15   Ht 5' 3\" (1.6 m)   Wt 87.1 kg (192 lb)   SpO2 96%   BMI 34.01 kg/m²     General appearance: alert, cooperative, no distress, appears stated age  Abdomen: soft, non-tender.  Bowel sounds normal. No masses,  no organomegaly  Extremities: Homans sign is negative, no sign of DVT, good ROM RTKR, limited swelling R knee, no pain with palpation R thigh, calf NTTP and soft  Pulses: 2+ and symmetric  Wound: bulky dressing c/d/i, no drainage  Neurologic: Grossly normal, ankle dorsi/plantar flexion intact, heel raise intact    Recent Results (from the past 24 hour(s))   GLUCOSE, POC    Collection Time: 08/03/20  4:26 PM   Result Value Ref Range    Glucose (POC) 195 (H) 65 - 100 mg/dL    Performed by Queenie Reese    GLUCOSE, POC    Collection Time: 08/03/20  9:39 PM   Result Value Ref Range    Glucose (POC) 161 (H) 65 - 100 mg/dL    Performed by Jessica Bansal    HEMOGLOBIN    Collection Time: 08/04/20  3:32 AM   Result Value Ref Range    HGB 10.2 (L) 11.5 - 41.6 g/dL   METABOLIC PANEL, BASIC    Collection Time: 08/04/20  3:32 AM   Result Value Ref Range    Sodium 139 136 - 145 mmol/L    Potassium 3.9 3.5 - 5.1 mmol/L    Chloride 112 (H) 97 - 108 mmol/L    CO2 21 21 - 32 mmol/L    Anion gap 6 5 - 15 mmol/L    Glucose 152 (H) 65 - 100 mg/dL    BUN 8 6 - 20 MG/DL    Creatinine 0.93 0.55 - 1.02 MG/DL    BUN/Creatinine ratio 9 (L) 12 - 20      GFR est AA >60 >60 ml/min/1.73m2    GFR est non-AA 58 (L) >60 ml/min/1.73m2    Calcium 8.7 8.5 - 10.1 MG/DL   GLUCOSE, POC    Collection Time: 08/04/20  6:20 AM   Result Value Ref Range    Glucose (POC) 127 (H) 65 - 100 mg/dL    Performed by Kacey Snow (CON)    GLUCOSE, POC    Collection Time: 08/04/20 11:19 AM   Result Value Ref Range    Glucose (POC) 105 (H) 65 - 100 mg/dL    Performed by Stefanie Adams        Assessment:  Stable Post Op    Plan:  DVT Prophylaxis:Eliquis 2.5 BID x 2 weeks  Physical Therapy: [facility]  Discharge Planning: home with home health when able                                   F/u in office in 2 weeks  Pain control: tylenol, mobic, oxycodone  Acute blood loss anemia: hgb 10.2, normal post op finding, pt asymptomatic, will continue to monitor  Obesity: BMI 34.01, chronic, encourage OOB for all meals, use of IS, good pulmonary toilet, may limit pt's ability to progress with PT/OT

## 2020-08-04 NOTE — PROGRESS NOTES
Transition of Care Plan  Observation Status    Observation notice provided in writing to patient and/or caregiver as well as verbal explanation of the policy. Patients who are in outpatient status also receive the Observation notice. Medicare and State observation letters provided to patient. Signed copies placed in chart    Disposition  Home with daughter   Transportation   Noel Dyson  [address]    [facility] [phone]     Cm met with patient and daughter. . Patient was alert and oriented   Had right knee replacement and will need home health services. Patient is a [facility] patient. Patient in agreement  The Plan for Transition of Care is related to the following treatment goals: home health    The Patient was provided with a choice of provider and agrees   with the discharge plan. [x] Yes [] No    Freedom of choice list was provided with basic dialogue that supports the patient's individualized plan of care/goals, treatment preferences and shares the quality data associated with the providers. [x] Yes [] No    Freedom of choice letter signed and placed in chart. Patient chose [facility] and referral sent but the agency no longer is in network with patient's insurance. Referral sent via [facility] to [facility] and accepted. Name and number placed on discharge instructions and patient advised to call agency if not contacted by noon the day after discharge to inquire as to the day and time of initial visit. Patient was self care and independent prior to admission  She has dme-- grab bars, raised toilet seat RW and shower chair. Patient does not have MPOA   She is interested in completing one and CM contacted [facility] to meet with patient to complete one. No other services identified. Care Management Interventions  PCP Verified by CM:  Yes  Mode of Transport at Discharge: (car)  Transition of Care Consult (CM Consult): Discharge Planning  Discharge Durable Medical Equipment: No  Physical Therapy Consult: Yes  Occupational Therapy Consult: Yes  Speech Therapy Consult: Yes  Current Support Network: Relative's Home(lives with daughter and was self care prior to admission   no amd)  Confirm Follow Up Transport: Family  The Plan for Transition of Care is Related to the Following Treatment Goals : Home health   The Patient and/or Patient Representative was Provided with a Choice of Provider and Agrees with the Discharge Plan?: Yes  Freedom of Choice List was Provided with Basic Dialogue that Supports the Patient's Individualized Plan of Care/Goals, Treatment Preferences and Shares the Quality Data Associated with the Providers?: Yes  Discharge Location  Discharge Placement: Home with home health

## 2020-08-04 NOTE — PROGRESS NOTES
13:45- RN informed SRAVANI Leahy about patient running bradycardic in the mid to upper 40s (patient asymptomatic and pulse would come up as she talked). PA to place order for bolus. 17:15- RN informed PA that patient's pulse was still in the 50s following first bolus. Order received for 500 mL NS bolus to run over 30 minutes.

## 2020-08-04 NOTE — PROGRESS NOTES
Problem: Mobility Impaired (Adult and Pediatric)  Goal: *Acute Goals and Plan of Care (Insert Text)  Description: FUNCTIONAL STATUS PRIOR TO ADMISSION: Patient was independent and active without use of DME.    HOME SUPPORT PRIOR TO ADMISSION: The patient lived with daughter but did not require assist.    Physical Therapy Goals  Initiated 8/3/2020    1. Patient will move from supine to sit and sit to supine , scoot up and down, and roll side to side in bed with mod independence within 4 days. 2. Patient will perform sit to stand with modified independence within 4 days. 3. Patient will ambulate with modified independence for 150 feet with the least restrictive device within 4 days. 4. Patient will ascend/descend 1 stairs with no handrail(s) with modified independence within 4 days to get onto her bed. 5. Patient will perform home exercise program per protocol with modified independence within 4 days. 6. Patient will demonstrate AROM 0-90 degrees in operative joint within 4 days. Outcome: Progressing Towards Goal   PHYSICAL THERAPY TREATMENT  Patient: David Yates (38 y.o. female)  Date: 8/4/2020  Diagnosis: Primary osteoarthritis of right knee [M17.11]   Primary osteoarthritis of right knee  Procedure(s) (LRB):  RIGHT TOTAL KNEE ARTHROPLASTY (Right) 1 Day Post-Op  Precautions:    Chart, physical therapy assessment, plan of care and goals were reviewed. ASSESSMENT  Patient continues with skilled PT services and is progressing towards goals. Pt found sitting on bedside commode. Transferred sit to stand CGA and gait trained her to the gym for stair practice. Completed 4 stairs using R sided railing, tolerated well. Pt participated in further gait training for a total of 90 feet CGA before returning the pt to her room. Pt seemed a little cloudy and lethargic when out walking in the halls, but seems to perk up in her room with her daughter.  Transferred back to her bed, min A because needs assistance getting her legs up into bed. HR was assessed and was in the 60 bpm, once she was seated, begin lowering into the 50s as she laid down. Left her supine in bed with her daughter present. Pt and family seem to be not bothered by bradycardia, states \"this happens everytime she is in the hospital\". Current Level of Function Impacting Discharge (mobility/balance): lives with supportive family, low HR    Other factors to consider for discharge: low HR         PLAN :  Patient continues to benefit from skilled intervention to address the above impairments. Continue treatment per established plan of care. to address goals. Recommendation for discharge: (in order for the patient to meet his/her long term goals)  Physical therapy at least 2 days/week in the home     This discharge recommendation:  Has been made in collaboration with the attending provider and/or case management    IF patient discharges home will need the following DME: patient owns DME required for discharge       SUBJECTIVE:   Patient stated I feel fine.  Pt seems cloudy when walking in the halls, seems more energetic when in the room with her daughter. Minimal pain complaints. OBJECTIVE DATA SUMMARY:   Critical Behavior:  Neurologic State: Alert, Appropriate for age  Orientation Level: Oriented X4  Cognition: Appropriate decision making, Appropriate for age attention/concentration, Appropriate safety awareness, Follows commands  Safety/Judgement: Awareness of environment, Insight into deficits  Functional Mobility Training:  Bed Mobility:     Supine to Sit: Supervision;Stand-by assistance  Sit to Supine: Stand-by assistance;Minimum assistance(assistance lifting legs into bed)  Scooting: Stand-by assistance;Supervision        Transfers:  Sit to Stand: Contact guard assistance  Stand to Sit: Contact guard assistance                             Balance:  Sitting: Intact; With support  Standing: Impaired; With support  Standing - Static: Good  Standing - Dynamic : Fair  Ambulation/Gait Training:  Distance (ft): 70 Feet (ft)  Assistive Device: Gait belt;Walker, rolling  Ambulation - Level of Assistance: Contact guard assistance        Gait Abnormalities: Decreased step clearance; Antalgic           Stance: Left increased  Speed/Stephanie: Slow                    Stairs:  Number of Stairs Trained: 4  Stairs - Level of Assistance: Contact guard assistance   Rail Use: Right     Therapeutic Exercises:   none  Pain Ratin/10 sitting   5-6/10 walking     Activity Tolerance:   Fair  Please refer to the flowsheet for vital signs taken during this treatment. After treatment patient left in no apparent distress:   Supine in bed, Call bell within reach, and Caregiver / family present    COMMUNICATION/COLLABORATION:   The patients plan of care was discussed with: Registered nurse and Case management. [facility] [address] student involvement in patient care:  A student participated in this treatment session. Per CMS Medicare statements and APTA guidelines I certify that the following was true:  1. I was present and directly observed the entire session. 2. I made all skilled judgments and clinical decisions regarding care. 3. I am the practitioner responsible for assessment, treatment, and documentation.      Vivienne Lombard, DPT, PT

## 2020-08-04 NOTE — PROGRESS NOTES
Bedside and Verbal shift change report given to Amanda Crowder (oncoming nurse) by Cher Mireles (offgoing nurse). Report included the following information SBAR and Kardex.

## 2020-08-04 NOTE — PROGRESS NOTES
Problem: Self Care Deficits Care Plan (Adult)  Goal: *Acute Goals and Plan of Care (Insert Text)  Description:   FUNCTIONAL STATUS PRIOR TO ADMISSION: Patient was independent and active without use of DME, though she has plenty of DME at home from prior surgeries. HOME SUPPORT: The patient lived with daughter but did not require assist. Will be going home with daughter who will provide assist PRN. Occupational Therapy Goals  Initiated 8/4/2020  1. Patient will perform grooming with modified independence within 7 day(s). 2.  Patient will perform upper body dressing with modified independence within 7 day(s). 3.  Patient will perform lower body dressing with supervision/set-up within 7 day(s). 4.  Patient will perform toilet transfers with supervision/set-up within 7 day(s). 5.  Patient will perform all aspects of toileting with modified independence within 7 day(s). Outcome: Progressing Towards Goal    OCCUPATIONAL THERAPY EVALUATION  Patient: Juvenal Phillip (46 y.o. female)  Date: 8/4/2020  Primary Diagnosis: Primary osteoarthritis of right knee [M17.11]  Procedure(s) (LRB):  RIGHT TOTAL KNEE ARTHROPLASTY (Right) 1 Day Post-Op   Precautions: fall       ASSESSMENT  Based on the objective data described below, the patient presents with  decreased functional mobility and decr independence with ADLs 2/2 recent R TKA (POD #1). Pt received in bed, HOB elevated, daughter in room. Pt eager to get up with OTS to use bathroom. Pt sit<>stand CGA, ambulated with RW to bathroom. Toilet transfer CGA, performed all aspects of toileting SBA. Grooming at sink, including wiping down UB and marla area with washcloth, with SUP/setup. Needed Min A to don new brief standing in bathroom. Pt returned to bed with HOB elevated, ice on knee, educated not to put pillow under R knee, and daughter in room giving pt fresh coffee.      Pt's daughter is concerned about pt being sinus carlos, saying it's not normal for her baseline. Pt's HR was 59 and SpO2 92% while seated EOB post-session, and pt had no c/o of dizziness and did not exhibit SOB with activity. Nurse and PA aware. Anticipate pt will continue to progress well in acute setting. OT will continue to follow. Recommend d/c home with [facility] and daughter support. Current Level of Function Impacting Discharge (ADLs/self-care): CGA-supervision for mobility, inferred Min A with LB dressing    Functional Outcome Measure: The patient scored 50 on the Barthel Index outcome measure which is indicative of ~50% functional impairment. Other factors to consider for discharge: lives with daughter, lots of DME at home, good standing balance     Patient will benefit from skilled therapy intervention to address the above noted impairments. PLAN :  Recommendations and Planned Interventions: self care training, functional mobility training, patient education, home safety training, and family training/education    Frequency/Duration: Patient will be followed by occupational therapy 5 times a week to address goals. Recommendation for discharge: (in order for the patient to meet his/her long term goals)  Occupational therapy at least 2 days/week in the home     This discharge recommendation:  Has not yet been discussed the attending provider and/or case management    IF patient discharges home will need the following DME: none       SUBJECTIVE:   Patient stated we basically have a hospital at our house, we have so much equipment.     OBJECTIVE DATA SUMMARY:   HISTORY:   Past Medical History:   Diagnosis Date    Arthritis     Diabetes ([address]     GERD (gastroesophageal reflux disease)     Glaucoma     High cholesterol     Ill-defined condition     EDEMA, LOWER LIMS, URINARY INCONTINENCE    Ill-defined condition     GLAUCOMA    Sleep apnea     NO CPAP    Urge incontinence      Past Surgical History:   Procedure Laterality Date    BREAST SURGERY PROCEDURE UNLISTED      LT BREAST CYST BENIGN    DRAINAGE OF DEEP RECTAL ABSCESS      ENDOSCOPY, COLON, DIAGNOSTIC      2008    HX BREAST BIOPSY Left 1970s    Surgical    HX CATARACT REMOVAL Bilateral     HX GI      RECTAL ABSCESS    HX HEART CATHETERIZATION  01/06/2017    HX HYSTERECTOMY      HX KNEE REPLACEMENT Left 2014    HX UROLOGICAL      BOTOX FOR FREQUENT URINATION       Expanded or extensive additional review of patient history:     Home Situation  Home Environment: Private residence  # Steps to Enter: 0  Wheelchair Ramp: Yes  One/Two Story Residence: One story  Living Alone: No  Support Systems: Child(rodrigo)  Patient Expects to be Discharged to[de-identified] Private residence  Current DME Used/Available at Home: Grab bars, Raised toilet seat, Shower chair, Walker, rollator  Tub or Shower Type: Shower    Hand dominance: Right    EXAMINATION OF PERFORMANCE DEFICITS:  Cognitive/Behavioral Status:  Neurologic State: Alert; Appropriate for age  Orientation Level: Oriented X4  Cognition: Appropriate decision making; Appropriate for age attention/concentration; Appropriate safety awareness; Follows commands  Perception: Appears intact  Perseveration: No perseveration noted  Safety/Judgement: Awareness of environment; Insight into deficits    Skin: intact    Edema: none noted    Hearing:       Vision/Perceptual:                           Acuity: Within Defined Limits         Range of Motion:    AROM: Generally decreased, functional                         Strength:    Strength: Generally decreased, functional                Coordination:  Coordination: Generally decreased, functional  Fine Motor Skills-Upper: Left Intact; Right Intact    Gross Motor Skills-Upper: Left Intact; Right Intact    Tone & Sensation:    Tone: Normal  Sensation: Intact                      Balance:  Sitting: Intact; Without support  Standing: Impaired; With support  Standing - Static: Good  Standing - Dynamic : Fair    Functional Mobility and Transfers for ADLs:  Bed Mobility:  Supine to Sit: Supervision;Stand-by assistance  Sit to Supine: Supervision;Stand-by assistance    Transfers:  Sit to Stand: Contact guard assistance  Stand to Sit: Contact guard assistance  Bathroom Mobility: Supervision/set up;Stand-by assistance  Toilet Transfer : Stand-by assistance    ADL Assessment:  Feeding: Independent    Oral Facial Hygiene/Grooming: Stand-by assistance    Bathing: Minimum assistance(inferred)    Upper Body Dressing: Supervision;Setup(inferred)    Lower Body Dressing: Minimum assistance(inferred)    Toileting: Stand by assistance;Supervision                ADL Intervention and task modifications:       Grooming  Grooming Assistance: Supervision;Set-up  Position Performed: Standing  Washing Hands: Supervision  Brushing Teeth: Supervision;Stand-by assistance;Set-up  Brushing/Combing Hair: Supervision;Stand-by assistance;Set-up                        Toileting  Toileting Assistance: Supervision;Stand-by assistance  Bladder Hygiene: Supervision  Clothing Management: Minimum assistance(hospital gown)    Cognitive Retraining  Safety/Judgement: Awareness of environment; Insight into deficits      Functional Measure:  Barthel Index:    Bathin  Bladder: 10  Bowels: 10  Groomin  Dressin  Feeding: 10  Mobility: 0  Stairs: 0  Toilet Use: 5  Transfer (Bed to Chair and Back): 10  Total: 50/100        The Barthel ADL Index: Guidelines  1. The index should be used as a record of what a patient does, not as a record of what a patient could do. 2. The main aim is to establish degree of independence from any help, physical or verbal, however minor and for whatever reason. 3. The need for supervision renders the patient not independent. 4. A patient's performance should be established using the best available evidence. Asking the patient, friends/relatives and nurses are the usual sources, but direct observation and common sense are also important.  However direct testing is not needed. 5. Usually the patient's performance over the preceding 24-48 hours is important, but occasionally longer periods will be relevant. 6. Middle categories imply that the patient supplies over 50 per cent of the effort. 7. Use of aids to be independent is allowed. Jonathon Norris., Barthel, D.W. (3775). Functional evaluation: the Barthel Index. [facility] (14)2. JESSICA Rai, Syd Zaman, Esther Serrano, [facility], [address] [facility] (1999). Measuring the change indisability after inpatient rehabilitation; comparison of the responsiveness of the Barthel Index and Functional [place] Measure. Journal of Neurology, Neurosurgery, and Psychiatry, 66(4), 831-902. Lendon Soulier, N.J.A, TERESITA Gallegos, & Vinny Pickett M.A. (2004.) Assessment of post-stroke quality of life in cost-effectiveness studies: The usefulness of the Barthel Index and the EuroQoL-5D. Quality of Life Research, 15, 840-07         Occupational Therapy Evaluation Charge Determination   History Examination Decision-Making   LOW Complexity : Brief history review  LOW Complexity : 1-3 performance deficits relating to physical, cognitive , or psychosocial skils that result in activity limitations and / or participation restrictions  LOW Complexity : No comorbidities that affect functional and no verbal or physical assistance needed to complete eval tasks       Based on the above components, the patient evaluation is determined to be of the following complexity level: LOW   Pain Rating:  No c/o pain    Activity Tolerance:   Good, tolerates ADLs without rest breaks, and SpO2 stable on RA  Please refer to the flowsheet for vital signs taken during this treatment.     After treatment patient left in no apparent distress:    Call bell within reach, Caregiver / family present, Side rails x 3, and in bed with HOB elevated    COMMUNICATION/EDUCATION:   The patients plan of care was discussed with: Physical therapist.     Patient/family have participated as able in goal setting and plan of care. and Patient/family agree to work toward stated goals and plan of care. This patients plan of care is appropriate for delegation to [facility].     Thank you for this referral.  Tomas Hartman OTS

## 2020-08-04 NOTE — PROGRESS NOTES
Spiritual Care Assessment/Progress Note  [facility]      NAME: Isaac Kline      MRN: [id number]  AGE: [de-identified] y.o. SEX: female  Oriental orthodox Affiliation: Baptism   Language: English     8/4/2020     Total Time (in minutes): 55     Spiritual Assessment begun in [facility] through conversation with:         [x]Patient        [x] Family    [] Friend(s)        Reason for Consult: Advance medical directive consult, Initial/Spiritual assessment, critical care     Spiritual beliefs: (Please include comment if needed)     [x] Identifies with a sharron tradition: Baptism       [] Supported by a sharron community:            [] Claims no spiritual orientation:           [] Seeking spiritual identity:                [] Adheres to an individual form of spirituality:           [] Not able to assess:                           Identified resources for coping:      [] Prayer                               [] Music                  [] Guided Imagery     [x] Family/friends                 [] Pet visits     [] Devotional reading                         [] Unknown     [] Other:                                               Interventions offered during this visit: (See comments for more details)    Patient Interventions: Advance medical directive completed, Advance medical directive consult, Affirmation of emotions/emotional suffering, Affirmation of sharron, Catharsis/review of pertinent events in supportive environment, Coping skills reviewed/reinforced, End of life issues discussed, Iconic (affirming the presence of God/Higher Power)     Family/Friend(s):  Affirmation of emotions/emotional suffering, Affirmation of sharron, Catharsis/review of pertinent events in supportive environment, Coping skills reviewed/reinforced, Iconic (affirming the presence of God/Higher Power), Advance medical directive consult, Advance medical directive completed(Daughter (Marjorie))     Plan of Care:     [] Support spiritual and/or cultural needs [x] Support AMD and/or advance care planning process      [] Support grieving process   [] Coordinate Rites and/or Rituals    [] Coordination with community clergy   [] No spiritual needs identified at this time   [] Detailed Plan of Care below (See Comments)  [] Make referral to Music Therapy  [] Make referral to Pet Therapy     [] Make referral to Addiction services  [] Make referral to [facility]  [] Make referral to Spiritual Care Partner  [] No future visits requested        [x] Follow up visits as needed     Comments:  visit for initial spiritual assessment and Advance Directive (AMD) consult. Patient resting in bed, daughter, Valma Bernheim, at bedside. Good eye contact, smiling, friendly. Says she is feeling good. Provided spiritual presence and listening as she spoke of her present thoughts, feelings, and concerns. Request by patient to assist with advance medical directive. Consulted with patients nurse. Explained document to patient and Delores Olson, her daughter, who was present in the room. Assisted patient in completing the document. Made copy for patients chart and placed a copy in the patient's chart. Returned original document and three (3) copies to the patient. Appeared comforted and encouraged as a result of this visit and expressed gratitude for this visit. Informed patient of availability of  and pastoral care services. Rev.  Arya Valero MDiv, [facility], [facility]   paging service: [phone])

## 2020-08-04 NOTE — PROGRESS NOTES
TRANSFER - IN REPORT:    Verbal report received from Miah Hendrix (name) on Donald Litter  being received from PACU (unit) for routine post - op      Report consisted of patients Situation, Background, Assessment and   Recommendations(SBAR). Information from the following report(s) SBAR and Kardex was reviewed with the receiving nurse. Opportunity for questions and clarification was provided. Assessment completed upon patients arrival to unit and care assumed.

## 2020-08-04 NOTE — PROGRESS NOTES
0730  Bedside and Verbal shift change report given to Maurice Wilson RN (oncoming nurse) by Brittany Riggs (offgoing nurse). Report included the following information SBAR, Kardex, OR Summary, Procedure Summary, Intake/Output, MAR and Recent Results.

## 2020-08-04 NOTE — PROGRESS NOTES
Primary Nurse Carito Lo and Harsh Fletcher RN performed a dual skin assessment on this patient No impairment noted  Elfego score is 21

## 2020-08-04 NOTE — PROGRESS NOTES
Problem: Mobility Impaired (Adult and Pediatric)  Goal: *Acute Goals and Plan of Care (Insert Text)  Description: FUNCTIONAL STATUS PRIOR TO ADMISSION: Patient was independent and active without use of DME.    HOME SUPPORT PRIOR TO ADMISSION: The patient lived with daughter but did not require assist.    Physical Therapy Goals  Initiated 8/3/2020    1. Patient will move from supine to sit and sit to supine , scoot up and down, and roll side to side in bed with mod independence within 4 days. 2. Patient will perform sit to stand with modified independence within 4 days. 3. Patient will ambulate with modified independence for 150 feet with the least restrictive device within 4 days. 4. Patient will ascend/descend 1 stairs with no handrail(s) with modified independence within 4 days to get onto her bed. 5. Patient will perform home exercise program per protocol with modified independence within 4 days. 6. Patient will demonstrate AROM 0-90 degrees in operative joint within 4 days. 8/4/2020 1605 by Eryn Degree  Outcome: Progressing Towards Goal  8/4/2020 1301 by Eryn Degree  Outcome: Progressing Towards Goal   PHYSICAL THERAPY TREATMENT  Patient: Wendy Wild (89 y.o. female)  Date: 8/4/2020  Diagnosis: Primary osteoarthritis of right knee [M17.11]   Primary osteoarthritis of right knee  Procedure(s) (LRB):  RIGHT TOTAL KNEE ARTHROPLASTY (Right) 1 Day Post-Op  Precautions:    Chart, physical therapy assessment, plan of care and goals were reviewed. ASSESSMENT  Patient continues with skilled PT services and is progressing towards goals. Pt presented supine in bed. Pt was transferred supine to sit min A, requiring assistance to move her legs. Pt transferred sit to stand CGA to wheeled walker and was gait trained 300 feet, which she tolerated well. Once back in her room, practiced how she would be getting into her very high bed at home with a step stool.  Pt was able to complete the transfer with min assist. Pt educated on how her daughter will need to help her get into and out of her bed at home and stabilize the step stool. Pt acknowledges and accepts risk associated with utilizing step stool to get into bed as she doesn't want to change sleeping arrangements. HHPT will continue to assess safety and efficacy of her bed step up. Current Level of Function Impacting Discharge (mobility/balance): pain, min A with supine to sit/sit to supine transfers     Other factors to consider for discharge: supportive family living in the home          PLAN :  Patient continues to benefit from skilled intervention to address the above impairments. Continue treatment per established plan of care. to address goals. Recommendation for discharge: (in order for the patient to meet his/her long term goals)  Physical therapy at least 2 days/week in the home     This discharge recommendation:  Has been made in collaboration with the attending provider and/or case management    IF patient discharges home will need the following DME: rolling walker       SUBJECTIVE:   Patient stated I don't like have other people do stuff for me.  Pt seemed more clear headed in therapy this afternoon, making jokes and laughing. Fiercely independent spirit.      OBJECTIVE DATA SUMMARY:   Critical Behavior:  Neurologic State: Alert, Appropriate for age  Orientation Level: Oriented X4  Cognition: Appropriate decision making, Appropriate for age attention/concentration, Appropriate safety awareness, Follows commands  Safety/Judgement: Awareness of environment, Insight into deficits  Functional Mobility Training:  Bed Mobility:  Rolling: Modified independent  Supine to Sit: Contact guard assistance  Sit to Supine: Minimum assistance  Scooting: Modified independent        Transfers:  Sit to Stand: Contact guard assistance  Stand to Sit: Contact guard assistance                             Balance:  Sitting: Intact  Standing: Intact; With support  Standing - Static: Good;Constant support  Standing - Dynamic : Fair;Constant support  Ambulation/Gait Training:  Distance (ft): 300 Feet (ft)  Assistive Device: Gait belt;Walker, rolling  Ambulation - Level of Assistance: Contact guard assistance        Gait Abnormalities: Antalgic  Right Side Weight Bearing: As tolerated     Base of Support: Narrowed  Stance: Left increased  Speed/Stephanie: Slow  Step Length: Right shortened     Stairs:  Number of Stairs Trained: 4  Stairs - Level of Assistance: Contact guard assistance   Rail Use: Right     Therapeutic Exercises:   none  Pain Rating:  None reported during session, controlled by meds     Activity Tolerance:   Fair and observed SOB with activity  Please refer to the flowsheet for vital signs taken during this treatment. After treatment patient left in no apparent distress:   Supine in bed, Call bell within reach, and Caregiver / family present    COMMUNICATION/COLLABORATION:   The patients plan of care was discussed with: Registered nurse and Case management. [address] student involvement in patient care:  A student participated in this treatment session. Per CMS Medicare statements and APTA guidelines I certify that the following was true:  1. I was present and directly observed the entire session. 2. I made all skilled judgments and clinical decisions regarding care. 3. I am the practitioner responsible for assessment, treatment, and documentation.      Pedro Luis Knapp, DPT, PT

## 2020-08-05 VITALS
HEART RATE: 65 BPM | TEMPERATURE: 98.1 F | SYSTOLIC BLOOD PRESSURE: 148 MMHG | BODY MASS INDEX: 34.02 KG/M2 | RESPIRATION RATE: 16 BRPM | WEIGHT: 192 LBS | OXYGEN SATURATION: 94 % | HEIGHT: 63 IN | DIASTOLIC BLOOD PRESSURE: 60 MMHG

## 2020-08-05 LAB
GLUCOSE BLD STRIP.AUTO-MCNC: 107 MG/DL (ref 65–100)
GLUCOSE BLD STRIP.AUTO-MCNC: 125 MG/DL (ref 65–100)
SERVICE CMNT-IMP: ABNORMAL
SERVICE CMNT-IMP: ABNORMAL

## 2020-08-05 PROCEDURE — 74011250637 HC RX REV CODE- 250/637: Performed by: PHYSICIAN ASSISTANT

## 2020-08-05 PROCEDURE — 82962 GLUCOSE BLOOD TEST: CPT

## 2020-08-05 RX ORDER — HYDROXYZINE HYDROCHLORIDE 10 MG/1
10 TABLET, FILM COATED ORAL
Qty: 30 TAB | Refills: 2 | Status: SHIPPED | OUTPATIENT
Start: 2020-08-05 | End: 2020-11-09

## 2020-08-05 RX ORDER — AMOXICILLIN 250 MG
1 CAPSULE ORAL 2 TIMES DAILY
Qty: 30 TAB | Refills: 0 | Status: SHIPPED | OUTPATIENT
Start: 2020-08-05 | End: 2020-08-20 | Stop reason: ALTCHOICE

## 2020-08-05 RX ORDER — OXYCODONE HYDROCHLORIDE 5 MG/1
5 TABLET ORAL
Qty: 40 TAB | Refills: 0 | Status: SHIPPED | OUTPATIENT
Start: 2020-08-05 | End: 2020-08-15

## 2020-08-05 RX ORDER — MELOXICAM 7.5 MG/1
7.5 TABLET ORAL DAILY
Qty: 30 TAB | Refills: 0 | Status: SHIPPED | OUTPATIENT
Start: 2020-08-06 | End: 2020-08-20

## 2020-08-05 RX ADMIN — MELOXICAM 7.5 MG: 7.5 TABLET ORAL at 09:18

## 2020-08-05 RX ADMIN — DOCUSATE SODIUM 50MG AND SENNOSIDES 8.6MG 1 TABLET: 8.6; 5 TABLET, FILM COATED ORAL at 09:18

## 2020-08-05 RX ADMIN — Medication 10 ML: at 06:00

## 2020-08-05 RX ADMIN — OXYCODONE 10 MG: 5 TABLET ORAL at 07:09

## 2020-08-05 RX ADMIN — ACETAMINOPHEN 1000 MG: 500 TABLET ORAL at 03:34

## 2020-08-05 RX ADMIN — ACETAMINOPHEN 1000 MG: 500 TABLET ORAL at 09:18

## 2020-08-05 RX ADMIN — FAMOTIDINE 20 MG: 20 TABLET ORAL at 09:18

## 2020-08-05 RX ADMIN — APIXABAN 2.5 MG: 2.5 TABLET, FILM COATED ORAL at 09:18

## 2020-08-05 RX ADMIN — OXYCODONE 10 MG: 5 TABLET ORAL at 13:39

## 2020-08-05 RX ADMIN — OXYCODONE 10 MG: 5 TABLET ORAL at 10:52

## 2020-08-05 RX ADMIN — OXYCODONE HYDROCHLORIDE 5 MG: 5 TABLET ORAL at 03:34

## 2020-08-05 RX ADMIN — POLYETHYLENE GLYCOL 3350 17 G: 17 POWDER, FOR SOLUTION ORAL at 09:18

## 2020-08-05 NOTE — PROGRESS NOTES
Ortho Daily Progress Note    8/5/2020  9:30 AM    POD:  2 Days Post-Op  S/P:  Procedure(s):  RIGHT TOTAL KNEE ARTHROPLASTY    Afebrile/VSS, NAD, A&O x 3  Doing well without complaints of nausea  Pain well controlled  Calves soft/NTTP Bilaterally  Incision OK, no drainage or dehiscence. Leg soft. Dressing clean and dry  Moving lower extremities well. Neurocirculatory exam intact and within normal range. C/O itching, more of a chronic issue rather than of  hospital origin  Lab Results   Component Value Date/Time    HGB 10.2 (L) 08/04/2020 03:32 AM    INR 1.1 07/20/2020 08:21 AM     Recent Labs     08/04/20  0332 07/20/20  0821 04/27/20  0000   CREA 0.93 1.18* 0.87   BUN 8 12 13     Estimated Creatinine Clearance: 50.5 mL/min (based on SCr of 0.93 mg/dL). PLAN: Atarax 10 mg tid prn urticaria  DVT prophylaxis: Eliquis 2.5 mg bid for 2 weeks  WBAT with PT-mobilization  Pain Control: tylenol, mobic, oxcodone  Plan to D/C home today.       SRAVANI Olivo

## 2020-08-05 NOTE — DISCHARGE INSTRUCTIONS
Discharge Instructions Knee Replacement  Dr. Lennox Cousins      Patient Name  Pineda Doyle  Date of procedure  8/3/2020    Procedure  Procedure(s):  RIGHT TOTAL KNEE ARTHROPLASTY  Surgeon  Surgeon(s) and Role:     Anthony Aranda MD - Primary  Date of discharge: 8/5/20  PCP: David Dowling MD    Follow up care   Follow up visit with Dr. Lennox Cousins in 2 weeks. Call [phone] to make an appointment   The staples in your knee incision will be taken out at this follow up appointment    Activity at home   Take a short walk every hour; except at night when sleeping   Do your Home Exercise Program 3 times every day    After exercising lie down and elevate your leg on pillows for 15-30 minutes to decrease swelling   Refer to your patient notebook for more information  Bathing and caring for your incision   You may take a shower and wash your incision with soap and water   Change your knee dressing daily for one week. You may then leave your incision open to air unless you see drainage from your knee. Preventing blood clots   Take Eliquis 2.5 mg twice a day as prescribed for 2 weeks   Call Dr. Lennox Cousins if you have side effects of blood thinning medication: bleeding, bruising, upset stomach, or diarrhea.  Call Dr. Lennox Cousins for signs of a blood clot in your leg: calf pain, tenderness, redness, swelling of lower leg   Preventing lung congestion   Use your incentive spirometer 4 times a day; do 10 repetitions each time   Remember to keep the small blue ball between the two arrows when taking a slow, deep breath   Pain Management   Get up and walk a short distance to relieve pain and stiffness.  Place ice wrap on your knee except when you are walking.  The gel ice packs should be changed about every 4 hours   Elevate your leg on pillows for 15-30 minutes    Take Tylenol 1000mg (take two 500mg tablets) every 6 hours for the next 2 weeks   Take anti-inflammatory medication (NSAID) as prescribed each day.   If needed, take a narcotic pain pill every 4-6 hours as prescribed. Take with a small amount of food.  As your pain decreases, take the narcotics less often or take ½ of a pill   Call Dr. Rhea Proctor if you have side effects from your narcotic pain medication: itching, drowsiness, dizziness, upset stomach, dry mouth, constipation or if you medication is not relieving your pain. Diet after surgery   You may resume your normal diet. Include vegetables, fruit, whole grains, lean meats, and low-fat dairy products. Eat food high in fiber    Drink plenty of fluids, including 8 cups of water daily   Take over-the-counter stool softeners and laxatives to prevent constipation    Avoid after surgery   Do not take any over-the-counter medication for pain except Tylenol     Do not take more than 4000 mg (4 Grams) of Tylenol in 24 hours     Do not drink alcoholic beverages   Do not smoke   Do not drive until seen for follow up appointment   Do not place frozen gel pack directly on your skin. It can cause frostbite.  Do not take a tub bath, swim or get in a hot tub for 6 weeks  Prevention of falls and safety at home   Set up an area where you can rest comfortably leaving space around furniture to allow you to walk with your walker   Keep stairs, hallways and bathrooms well lit; especially at night   Arrange for care for your pets   Keep your home free of clutter   Call Dr. Rhea Proctor at [phone] for:   Pain that is not relieved by pain medication, ice and activity   Side effects of medications   Increased/spread of bruising   Warning signs of infection:  ? persistent fever greater than 100 degrees  ?  shaking or chills  ? increased redness, tenderness, swelling or drainage from incision  ? increased pain during activity or rest   Warning signs of a blood clot in your leg:  ? increased pain in your calf  ? tenderness or redness  ? increased swelling or knee, calf, ankle or foot    If you call after 5pm or on a weekend, the on call physician will return your phone call  Call your Primary Care Doctor for:    Concerns about your medical conditions such as diabetes, high blood pressure, asthma, congestive heart failure.    Blood sugars greater than 180   Persistent headache or dizziness   Coughing or congestion   Constipation or diarrhea   Burning when you go to the bathroom   Abnormal heart rate (fast or slow)               Call 911 and go to the nearest hospital for:    Sudden increased shortness of breath   Sudden onset of chest pain   Difficulty breathing   Localized chest pain with coughing or taking a deep breath

## 2020-08-05 NOTE — PROGRESS NOTES
09:30- RN informed SRAVANI Kamara that patient's pulse was still in mid 40-50s at rest (once the patient woke up she would come up to the 50s/60s). Order received for 500 cc NS bolus. 12:00- RN informed SRAVANI Cartagena that patient's pulse was still in the 50s at rest. Per PA- continue to monitor since patient is asymptomatic.

## 2020-08-05 NOTE — PROGRESS NOTES
Bedside and Verbal shift change report given to Meeta Ernandez (oncoming nurse) by Marie Bhagat (offgoing nurse). Report included the following information SBAR and Kardex.

## 2020-08-05 NOTE — PROGRESS NOTES
Bedside and Verbal shift change report given to Shirly Romberg, RN (oncoming nurse) by Javi Alexander RN (offgoing nurse). Report included the following information SBAR.

## 2020-08-05 NOTE — PROGRESS NOTES
Problem: Knee Replacement: Post-Op Day 2  Goal: Activity/Safety  Outcome: Progressing Towards Goal  Patient ambulates safely with assistance, gait belt, and walker. Goal: *Hemodynamically stable  Outcome: Progressing Towards Goal   Patient BP is stable. Goal: *Tolerating diet  Outcome: Progressing Towards Goal  Patient tolerating diet. No nausea or vomiting. Problem: Falls - Risk of  Goal: *Absence of Falls  Description: Document Javi Cunningham Fall Risk and appropriate interventions in the flowsheet.   Note: Fall Risk Interventions:  Mobility Interventions: Communicate number of staff needed for ambulation/transfer, Patient to call before getting OOB         Medication Interventions: Evaluate medications/consider consulting pharmacy, Patient to call before getting OOB, Teach patient to arise slowly    Elimination Interventions: Call light in reach, Patient to call for help with toileting needs, Toileting schedule/hourly rounds

## 2020-08-05 NOTE — ROUTINE PROCESS
Rolling walker order faxed to [employer]. Awaiting insurance approval. Will follow up. The Rehabilitation department at [facility] has ordered the following durable medical equipment (DME):   rolling walker  From:  [place] [phone]  If the rehab department or DME company is waiting for insurance approval for the equipment and the patient decides to discharge from the hospital before the medical equipment arrives, the patient may contact the company above to work out the delivery. Please keep in mind that some DME companies WILL NOT deliver to the home. Insurance companies and DME companies are not open on the weekends to approve authorization and deliver to the hospital. Therefore it is the patient's responsibility to figure out a way to access the DME medical equipment. Thank you so much for your help as we provide the equipment the patient requires.

## 2020-08-05 NOTE — PROGRESS NOTES
Problem: Mobility Impaired (Adult and Pediatric)  Goal: *Acute Goals and Plan of Care (Insert Text)  Description: FUNCTIONAL STATUS PRIOR TO ADMISSION: Patient was independent and active without use of DME.    HOME SUPPORT PRIOR TO ADMISSION: The patient lived with daughter but did not require assist.    Physical Therapy Goals  Initiated 8/3/2020    1. Patient will move from supine to sit and sit to supine , scoot up and down, and roll side to side in bed with mod independence within 4 days. 2. Patient will perform sit to stand with modified independence within 4 days. 3. Patient will ambulate with modified independence for 150 feet with the least restrictive device within 4 days. 4. Patient will ascend/descend 1 stairs with no handrail(s) with modified independence within 4 days to get onto her bed. 5. Patient will perform home exercise program per protocol with modified independence within 4 days. 6. Patient will demonstrate AROM 0-90 degrees in operative joint within 4 days. Outcome: Progressing Towards Goal   PHYSICAL THERAPY TREATMENT  Patient: Walter Monroe (83 y.o. female)  Date: 8/5/2020  Diagnosis: Primary osteoarthritis of right knee [M17.11]   Primary osteoarthritis of right knee  Procedure(s) (LRB):  RIGHT TOTAL KNEE ARTHROPLASTY (Right) 2 Days Post-Op  Precautions:    Chart, physical therapy assessment, plan of care and goals were reviewed. ASSESSMENT  Patient continues with skilled PT services and is progressing towards goals. Pt presented supine in bed. Pt was CGA for her transfer supine to sit and sit to stand with elevated bed, similar to what she had at home. Educated her again on how she will need her daughter's help to get and out of the bed each day. Pt was gait trained 300 feet with RW, demonstrated some shortness of breath but VS remained stable. Educated her on taking breaks when she feels short of breath. Pt was CGA for stand to sit and sit to supine in elevated bed. Required verbal cues to scoot herself back onto edge of the bed. Pt was informed she was cleared from PT services, and a RW was ordered for her. Current Level of Function Impacting Discharge (mobility/balance): CGA for bed mobility, decreased endurance    Other factors to consider for discharge: supportive daughter who lives with her         PLAN :  Patient continues to benefit from skilled intervention to address the above impairments. Continue treatment per established plan of care. to address goals. Recommendation for discharge: (in order for the patient to meet his/her long term goals)  Physical therapy at least 2 days/week in the home     This discharge recommendation:  Has been made in collaboration with the attending provider and/or case management    IF patient discharges home will need the following DME: rolling walker       SUBJECTIVE:   Patient stated I actually don't have a lot of pain right now, nothing like this morning.  Pt was pleasant and excited to be going home. Some frustration that she needed rest breaks. OBJECTIVE DATA SUMMARY:   Critical Behavior:  Neurologic State: Alert  Orientation Level: Oriented X4  Cognition: Appropriate decision making, Appropriate for age attention/concentration, Appropriate safety awareness, Follows commands  Safety/Judgement: Awareness of environment, Insight into deficits  Functional Mobility Training:  Bed Mobility:  Rolling: Modified independent  Supine to Sit: Modified independent;Supervision  Sit to Supine: Contact guard assistance  Scooting: Modified independent        Transfers:  Sit to Stand: Modified independent  Stand to Sit: Moderate assistance                             Balance:  Sitting: Intact  Standing: Intact; With support  Standing - Static: Good;Constant support  Standing - Dynamic : Fair;Constant support  Ambulation/Gait Training:  Distance (ft): 300 Feet (ft)  Assistive Device: Walker, rolling;Gait belt  Ambulation - Level of Assistance: Contact guard assistance        Gait Abnormalities: Antalgic  Right Side Weight Bearing: As tolerated     Base of Support: Shift to left;Narrowed  Stance: Left increased  Speed/Stephanie: Slow  Step Length: Right shortened     Stairs:  Number of Stairs Trained: 1  Stairs - Level of Assistance: Contact guard assistance        Therapeutic Exercises:     Pain Ratin/10 after gait training     Activity Tolerance:   Fair, requires frequent rest breaks, and observed SOB with activity  Please refer to the flowsheet for vital signs taken during this treatment. After treatment patient left in no apparent distress:   Call bell within reach and Caregiver / family present, at the edge of the bed, RN redressing incision and administering medication and daughter about to dress her    COMMUNICATION/COLLABORATION:   The patients plan of care was discussed with: Registered nurse and Case management. PATO Gabriel    Regarding student involvement in patient care:  A student participated in this treatment session. Per CMS Medicare statements and APTA guidelines I certify that the following was true:  1. I was present and directly observed the entire session. 2. I made all skilled judgments and clinical decisions regarding care. 3. I am the practitioner responsible for assessment, treatment, and documentation.

## 2020-08-05 NOTE — PROGRESS NOTES
Occupational Therapy  Attempt to see for treatment, patient declining due to pain (reported just having pain med) and verbalized being up and washed up earlier. Request to rest at this time. Will follow up as able. 2nd attempt: patient's daughter present. Patient declines need for OT at this time, patient and daughter feel she does not need OT at home either.    Mariaelena Riley, OTR/L

## 2020-08-05 NOTE — PROGRESS NOTES
Problem: Falls - Risk of  Goal: *Absence of Falls  Description: Document Dennise Ginivalencia Fall Risk and appropriate interventions in the flowsheet.   8/4/2020 2105 by Jesica Stevenson  Outcome: Progressing Towards Goal  Note: Fall Risk Interventions:  Mobility Interventions: Patient to call before getting OOB, PT Consult for mobility concerns    Medication Interventions: Patient to call before getting OOB, Teach patient to arise slowly    Elimination Interventions: Call light in reach, Patient to call for help with toileting needs, Stay With Me (per policy)

## 2020-08-05 NOTE — PROGRESS NOTES
The Joint Replacement Discharge Education video was reviewed by the patient/family. The content was discussed utilizing teach back, questions were answered. The patient verbalized an understanding of the instructions. I have reviewed discharge instructions with the patient and daughter. The patient and daughter verbalized understanding. Patient was taken in a wheelchair to Patient Discharge along with discharge instructions, dressing supplies, ice packs and sleeve, and patient's belongings.

## 2020-08-05 NOTE — PROGRESS NOTES
Problem: Falls - Risk of  Goal: *Absence of Falls  Description: Document Ronn Nolan Fall Risk and appropriate interventions in the flowsheet.   Outcome: Progressing Towards Goal  Note: Fall Risk Interventions:  Mobility Interventions: Patient to call before getting OOB, PT Consult for mobility concerns    Medication Interventions: Patient to call before getting OOB, Teach patient to arise slowly    Elimination Interventions: Call light in reach, Patient to call for help with toileting needs, Stay With Me (per policy)

## 2020-08-06 ENCOUNTER — HOME CARE VISIT (OUTPATIENT)
Dept: SCHEDULING | Facility: HOME HEALTH | Age: 80
End: 2020-08-06
Payer: MEDICARE

## 2020-08-06 VITALS
TEMPERATURE: 97 F | HEART RATE: 47 BPM | SYSTOLIC BLOOD PRESSURE: 122 MMHG | RESPIRATION RATE: 16 BRPM | DIASTOLIC BLOOD PRESSURE: 70 MMHG | OXYGEN SATURATION: 96 %

## 2020-08-06 PROCEDURE — G0151 HHCP-SERV OF PT,EA 15 MIN: HCPCS

## 2020-08-06 PROCEDURE — 400013 HH SOC

## 2020-08-07 ENCOUNTER — HOME CARE VISIT (OUTPATIENT)
Dept: SCHEDULING | Facility: HOME HEALTH | Age: 80
End: 2020-08-07
Payer: MEDICARE

## 2020-08-07 PROCEDURE — G0299 HHS/HOSPICE OF RN EA 15 MIN: HCPCS

## 2020-08-09 ENCOUNTER — E-VISIT (OUTPATIENT)
Dept: FAMILY MEDICINE CLINIC | Age: 80
End: 2020-08-09

## 2020-08-09 VITALS
DIASTOLIC BLOOD PRESSURE: 68 MMHG | RESPIRATION RATE: 16 BRPM | HEART RATE: 60 BPM | TEMPERATURE: 97.8 F | SYSTOLIC BLOOD PRESSURE: 128 MMHG | OXYGEN SATURATION: 96 %

## 2020-08-09 DIAGNOSIS — R69 ILLNESS: Primary | ICD-10-CM

## 2020-08-10 ENCOUNTER — APPOINTMENT (OUTPATIENT)
Dept: GENERAL RADIOLOGY | Age: 80
End: 2020-08-10
Attending: EMERGENCY MEDICINE
Payer: MEDICARE

## 2020-08-10 ENCOUNTER — APPOINTMENT (OUTPATIENT)
Dept: VASCULAR SURGERY | Age: 80
End: 2020-08-10
Attending: EMERGENCY MEDICINE
Payer: MEDICARE

## 2020-08-10 ENCOUNTER — HOSPITAL ENCOUNTER (EMERGENCY)
Age: 80
Discharge: HOME | End: 2020-08-10
Attending: EMERGENCY MEDICINE
Payer: MEDICARE

## 2020-08-10 VITALS
SYSTOLIC BLOOD PRESSURE: 146 MMHG | RESPIRATION RATE: 18 BRPM | DIASTOLIC BLOOD PRESSURE: 74 MMHG | OXYGEN SATURATION: 95 % | TEMPERATURE: 99.4 F | HEART RATE: 63 BPM

## 2020-08-10 DIAGNOSIS — M79.89 LEG SWELLING: Primary | ICD-10-CM

## 2020-08-10 DIAGNOSIS — Z98.890 POST-OPERATIVE STATE: ICD-10-CM

## 2020-08-10 LAB
ALBUMIN SERPL-MCNC: 3.5 G/DL (ref 3.5–5)
ALBUMIN/GLOB SERPL: 0.7 {RATIO} (ref 1.1–2.2)
ALP SERPL-CCNC: 92 U/L (ref 45–117)
ALT SERPL-CCNC: 41 U/L (ref 12–78)
ANION GAP SERPL CALC-SCNC: 7 MMOL/L (ref 5–15)
APPEARANCE UR: CLEAR
AST SERPL-CCNC: 57 U/L (ref 15–37)
BACTERIA URNS QL MICRO: NEGATIVE /HPF
BASOPHILS # BLD: 0 K/UL (ref 0–0.1)
BASOPHILS NFR BLD: 0 % (ref 0–1)
BILIRUB SERPL-MCNC: 0.6 MG/DL (ref 0.2–1)
BILIRUB UR QL: NEGATIVE
BUN SERPL-MCNC: 10 MG/DL (ref 6–20)
BUN/CREAT SERPL: 11 (ref 12–20)
CALCIUM SERPL-MCNC: 9.7 MG/DL (ref 8.5–10.1)
CHLORIDE SERPL-SCNC: 107 MMOL/L (ref 97–108)
CO2 SERPL-SCNC: 26 MMOL/L (ref 21–32)
COLOR UR: ABNORMAL
COMMENT, HOLDF: NORMAL
CREAT SERPL-MCNC: 0.95 MG/DL (ref 0.55–1.02)
DIFFERENTIAL METHOD BLD: ABNORMAL
EOSINOPHIL # BLD: 0.3 K/UL (ref 0–0.4)
EOSINOPHIL NFR BLD: 4 % (ref 0–7)
EPITH CASTS URNS QL MICRO: ABNORMAL /LPF
ERYTHROCYTE [DISTWIDTH] IN BLOOD BY AUTOMATED COUNT: 14 % (ref 11.5–14.5)
GLOBULIN SER CALC-MCNC: 4.7 G/DL (ref 2–4)
GLUCOSE SERPL-MCNC: 90 MG/DL (ref 65–100)
GLUCOSE UR STRIP.AUTO-MCNC: NEGATIVE MG/DL
HCT VFR BLD AUTO: 34 % (ref 35–47)
HGB BLD-MCNC: 10.7 G/DL (ref 11.5–16)
HGB UR QL STRIP: NEGATIVE
IMM GRANULOCYTES # BLD AUTO: 0.1 K/UL (ref 0–0.04)
IMM GRANULOCYTES NFR BLD AUTO: 1 % (ref 0–0.5)
KETONES UR QL STRIP.AUTO: NEGATIVE MG/DL
LACTATE SERPL-SCNC: 1.4 MMOL/L (ref 0.4–2)
LEUKOCYTE ESTERASE UR QL STRIP.AUTO: ABNORMAL
LYMPHOCYTES # BLD: 1.9 K/UL (ref 0.8–3.5)
LYMPHOCYTES NFR BLD: 22 % (ref 12–49)
MCH RBC QN AUTO: 28.9 PG (ref 26–34)
MCHC RBC AUTO-ENTMCNC: 31.5 G/DL (ref 30–36.5)
MCV RBC AUTO: 91.9 FL (ref 80–99)
MONOCYTES # BLD: 0.5 K/UL (ref 0–1)
MONOCYTES NFR BLD: 5 % (ref 5–13)
NEUTS SEG # BLD: 6.1 K/UL (ref 1.8–8)
NEUTS SEG NFR BLD: 68 % (ref 32–75)
NITRITE UR QL STRIP.AUTO: NEGATIVE
NRBC # BLD: 0 K/UL (ref 0–0.01)
NRBC BLD-RTO: 0 PER 100 WBC
PH UR STRIP: 6 [PH] (ref 5–8)
PLATELET # BLD AUTO: 297 K/UL (ref 150–400)
PMV BLD AUTO: 9.9 FL (ref 8.9–12.9)
POTASSIUM SERPL-SCNC: 3.4 MMOL/L (ref 3.5–5.1)
PROT SERPL-MCNC: 8.2 G/DL (ref 6.4–8.2)
PROT UR STRIP-MCNC: NEGATIVE MG/DL
RBC # BLD AUTO: 3.7 M/UL (ref 3.8–5.2)
RBC #/AREA URNS HPF: ABNORMAL /HPF (ref 0–5)
SAMPLES BEING HELD,HOLD: NORMAL
SODIUM SERPL-SCNC: 140 MMOL/L (ref 136–145)
SP GR UR REFRACTOMETRY: 1.01 (ref 1–1.03)
UR CULT HOLD, URHOLD: NORMAL
UROBILINOGEN UR QL STRIP.AUTO: 1 EU/DL (ref 0.2–1)
WBC # BLD AUTO: 8.8 K/UL (ref 3.6–11)
WBC URNS QL MICRO: ABNORMAL /HPF (ref 0–4)

## 2020-08-10 PROCEDURE — 74011250637 HC RX REV CODE- 250/637: Performed by: EMERGENCY MEDICINE

## 2020-08-10 PROCEDURE — 93971 EXTREMITY STUDY: CPT

## 2020-08-10 PROCEDURE — 83605 ASSAY OF LACTIC ACID: CPT

## 2020-08-10 PROCEDURE — 36415 COLL VENOUS BLD VENIPUNCTURE: CPT

## 2020-08-10 PROCEDURE — 87040 BLOOD CULTURE FOR BACTERIA: CPT

## 2020-08-10 PROCEDURE — 81001 URINALYSIS AUTO W/SCOPE: CPT

## 2020-08-10 PROCEDURE — 80053 COMPREHEN METABOLIC PANEL: CPT

## 2020-08-10 PROCEDURE — 99283 EMERGENCY DEPT VISIT LOW MDM: CPT

## 2020-08-10 PROCEDURE — 85025 COMPLETE CBC W/AUTO DIFF WBC: CPT

## 2020-08-10 PROCEDURE — 71046 X-RAY EXAM CHEST 2 VIEWS: CPT

## 2020-08-10 RX ORDER — OXYCODONE AND ACETAMINOPHEN 5; 325 MG/1; MG/1
1 TABLET ORAL
Status: COMPLETED | OUTPATIENT
Start: 2020-08-10 | End: 2020-08-10

## 2020-08-10 RX ADMIN — OXYCODONE HYDROCHLORIDE AND ACETAMINOPHEN 1 TABLET: 5; 325 TABLET ORAL at 20:28

## 2020-08-10 NOTE — ED TRIAGE NOTES
She arrives by wheelchair with family member. She had right knee surgery a week ago. She has had increasing swelling right lower leg with \"discoloration\" on the back of her lower leg. She had a fever today. She also is experiencing some SOB. She was told by Ortho to come to the ED for evaluation.

## 2020-08-11 ENCOUNTER — HOME CARE VISIT (OUTPATIENT)
Dept: SCHEDULING | Facility: HOME HEALTH | Age: 80
End: 2020-08-11
Payer: MEDICARE

## 2020-08-11 ENCOUNTER — PATIENT OUTREACH (OUTPATIENT)
Dept: CASE MANAGEMENT | Age: 80
End: 2020-08-11

## 2020-08-11 PROCEDURE — G0151 HHCP-SERV OF PT,EA 15 MIN: HCPCS

## 2020-08-11 NOTE — ED PROVIDER NOTES
Patient is an 40-year-old woman who presents to the emergency department approximately 1 week postop from right knee replacement. Patient reports that she has been having increased swelling to her right lower extremity which prompted her to come into the emergency department. She also reports that yesterday and today she had fevers with a T-max of 100.2. Patient denies dysuria and reports to me that she has not had any cough or shortness of breath.            Past Medical History:   Diagnosis Date    Arthritis     Diabetes ([address]     GERD (gastroesophageal reflux disease)     Glaucoma     High cholesterol     Ill-defined condition     EDEMA, LOWER LIMS, URINARY INCONTINENCE    Ill-defined condition     GLAUCOMA    Sleep apnea     NO CPAP    Urge incontinence        Past Surgical History:   Procedure Laterality Date    BREAST SURGERY PROCEDURE UNLISTED      LT BREAST CYST BENIGN    DRAINAGE OF DEEP RECTAL ABSCESS      ENDOSCOPY, COLON, DIAGNOSTIC      2008    HX BREAST BIOPSY Left 1970s    Surgical    HX CATARACT REMOVAL Bilateral     HX GI      RECTAL ABSCESS    HX HEART CATHETERIZATION  01/06/2017    HX HYSTERECTOMY      HX KNEE REPLACEMENT Left 2014    HX UROLOGICAL      BOTOX FOR FREQUENT URINATION         Family History:   Problem Relation Age of Onset    Heart Disease Mother     No Known Problems Father     No Known Problems Sister     Cancer Brother         PANCREAS    Heart Disease Sister     Cancer Sister         BREAST    Alcohol abuse Brother     No Known Problems Brother     No Known Problems Brother     No Known Problems Brother     Anesth Problems Neg Hx        Social History     Socioeconomic History    Marital status:      Spouse name: Not on file    Number of children: Not on file    Years of education: Not on file    Highest education level: Not on file   Occupational History    Not on file   Social Needs    Financial resource strain: Not on file   [place] insecurity     Worry: Not on file     Inability: Not on file    Transportation needs     Medical: Not on file     Non-medical: Not on file   Tobacco Use    Smoking status: Former Smoker     Packs/day: 0.50     Years: 5.00     Pack years: 2.50     Last attempt to quit: 1966     Years since quittin.1    Smokeless tobacco: Never Used   Substance and Sexual Activity    Alcohol use: Yes     Alcohol/week: 0.0 standard drinks     Comment: OCCASIONALLY    Drug use: No    Sexual activity: Not Currently   Lifestyle    Physical activity     Days per week: Not on file     Minutes per session: Not on file    Stress: Not on file   Relationships    Social connections     Talks on phone: Not on file     Gets together: Not on file     Attends Judaism service: Not on file     Active member of club or organization: Not on file     Attends meetings of clubs or organizations: Not on file     Relationship status: Not on file    Intimate partner violence     Fear of current or ex partner: Not on file     Emotionally abused: Not on file     Physically abused: Not on file     Forced sexual activity: Not on file   Other Topics Concern     Service No    Blood Transfusions No    Caffeine Concern No    Occupational Exposure No    Hobby Hazards No    Sleep Concern Yes    Stress Concern Yes    Weight Concern Yes    Special Diet No    Back Care No    Exercise Yes    Bike Helmet Yes    Seat Belt Yes    Self-Exams No   Social History Narrative    Not on file         ALLERGIES: Sulfa (sulfonamide antibiotics)    Review of Systems   Constitutional: Negative for chills and fever. Respiratory: Negative for shortness of breath. Cardiovascular: Positive for leg swelling. Negative for chest pain. Gastrointestinal: Negative for abdominal pain, constipation, diarrhea and vomiting. Neurological: Negative for dizziness and light-headedness. All other systems reviewed and are negative.       Vitals: 08/10/20 1549   BP: 146/74   Pulse: 63   Resp: 18   Temp: 99.4 °F (37.4 °C)   SpO2: 95%            Physical Exam  Vitals signs and nursing note reviewed. Constitutional:       Appearance: She is well-developed. HENT:      Head: Normocephalic and atraumatic. Eyes:      Pupils: Pupils are equal, round, and reactive to light. Neck:      Musculoskeletal: Normal range of motion and neck supple. Cardiovascular:      Rate and Rhythm: Normal rate and regular rhythm. Pulmonary:      Effort: Pulmonary effort is normal.      Breath sounds: Normal breath sounds. Abdominal:      General: There is no distension. Palpations: Abdomen is soft. Tenderness: There is no abdominal tenderness. Musculoskeletal:      Right lower leg: Edema present. Skin:     General: Skin is warm and dry. Capillary Refill: Capillary refill takes less than 2 seconds. Comments: Patient has 2+ edema to her right lower extremity. Incision along the knee is clean, dry, and well approximated without erythema, induration, fluctuance, or tenderness   Neurological:      General: No focal deficit present. Mental Status: She is alert and oriented to person, place, and time. Psychiatric:         Mood and Affect: Mood normal.         Behavior: Behavior normal.          MDM       Procedures      8:31 PM  Pt signed out to Dr. Roberta Gautam for additional management. Reviewed presentation, results, and pending tests.

## 2020-08-11 NOTE — ED NOTES
Care assumed from Dr. Vini Ocampo at 8 PM.  Please see her note for full H&P.  57-year-old female presents with lower extremity swelling and reported low-grade fevers. Lower extremity Doppler negative for DVT at time of signout UA and chest x-ray pending. UA returned showing no evidence of infection. Chest x-ray was viewed by myself and read by radiology showing no acute abnormalities. Reassurance was given. Feel that symptoms likely secondary to postoperative swelling. She was recommended to follow-up with orthopedics as scheduled for further evaluation and return precautions were given for worsening or concerns.

## 2020-08-11 NOTE — PROGRESS NOTES
Patient contacted regarding recent discharge and COVID-19 risk. Did not discuss COVID-19 related testing which was available at this time. Test results were negative. Patient informed of results, if available? Test was completed prior to right knee replacement surgery 20  Ambulatory Care Manager contacted the patient by telephone to perform post discharge assessment. Verified name and  with patient as identifiers. Patient has following risk factors of: immunocompromised, diabetes and ED visit 8/10/20. ACM reviewed discharge instructions, medical action plan and red flags related to discharge diagnosis. There were no new or changed medications related to discharge diagnosis. Patient denied questions or concerns regarding discharge instructions or medications. Advance Care Planning:   Does patient have an Advance Directive: patient completed ACP document while in hospital for knee replacement 20     Patient verified healthcare decision makers as correctly listed in chart:  Kailyn Gaytan (daughter) [phone] and Deborah Jimenez (granddaughter) [phone]    Patient states she has a FU appointment with ortho 20, but daughter plans to call them today to see if patient can come sooner due to continued fever. Today's temperature 99.1. Daughter also plans to call PCP office today for FU and questions regarding taking additional \"fluid pill\" to help reduce RLE edema. ACM recommended elevate RLE and discuss with PT during [facility] visit. [facility] PT has contacted patient and is setting schedule to visit today. Education provided regarding infection prevention, and signs and symptoms of COVID-19 and when to seek medical attention with patient who verbalized understanding. Discussed exposure protocols and quarantine from [address] you at higher risk for severe illness  and given an opportunity for questions and concerns.  The patient was supplied the [address] [place] [address] hotline [phone] and [facility] hotline [phone]. [facility] recommended patient follow up with PCP and provided [facility] contact information for future reference. From CDC: Are you at higher risk for severe illness?  Wash your hands often and avoid touching eyes, nose and mouth.  Avoid close contact (6 feet, which is about two arm lengths) with people who are sick.  Put distance between yourself and other people if COVID-19 is spreading in your community.  Clean and disinfect frequently touched surfaces.  Avoid all cruise travel and non-essential air travel.  Call your healthcare professional if you have concerns about COVID-19 and your underlying condition or if you are sick. For more information on steps you can take to protect yourself, see CDC's How to Protect Yourself      Patient/family/caregiver given information for GetWell Loop and agrees to enroll yes  Patient's preferred e-mail:  [email or web address]   Patient's preferred phone number: [phone]  Based on Loop alert triggers, patient will be contacted by nurse care manager for worsening symptoms. Pt will be further monitored by COVID Loop Team based on severity of symptoms and risk factors.     Page Ramírez RN  Ambulatory Care Manager

## 2020-08-12 VITALS
DIASTOLIC BLOOD PRESSURE: 62 MMHG | OXYGEN SATURATION: 94 % | TEMPERATURE: 97.9 F | HEART RATE: 62 BPM | SYSTOLIC BLOOD PRESSURE: 140 MMHG | RESPIRATION RATE: 20 BRPM

## 2020-08-13 ENCOUNTER — HOME CARE VISIT (OUTPATIENT)
Dept: SCHEDULING | Facility: HOME HEALTH | Age: 80
End: 2020-08-13
Payer: MEDICARE

## 2020-08-13 PROCEDURE — G0151 HHCP-SERV OF PT,EA 15 MIN: HCPCS

## 2020-08-14 VITALS — HEART RATE: 55 BPM | OXYGEN SATURATION: 93 % | TEMPERATURE: 97.7 F

## 2020-08-15 LAB
BACTERIA SPEC CULT: NORMAL
SERVICE CMNT-IMP: NORMAL

## 2020-08-20 ENCOUNTER — VIRTUAL VISIT (OUTPATIENT)
Dept: FAMILY MEDICINE CLINIC | Age: 80
End: 2020-08-20
Payer: MEDICARE

## 2020-08-20 DIAGNOSIS — Z96.651 STATUS POST TOTAL RIGHT KNEE REPLACEMENT: Primary | ICD-10-CM

## 2020-08-20 DIAGNOSIS — G62.9 NEUROPATHY: ICD-10-CM

## 2020-08-20 PROCEDURE — G8427 DOCREV CUR MEDS BY ELIG CLIN: HCPCS | Performed by: INTERNAL MEDICINE

## 2020-08-20 PROCEDURE — 1101F PT FALLS ASSESS-DOCD LE1/YR: CPT | Performed by: INTERNAL MEDICINE

## 2020-08-20 PROCEDURE — G8756 NO BP MEASURE DOC: HCPCS | Performed by: INTERNAL MEDICINE

## 2020-08-20 PROCEDURE — 1111F DSCHRG MED/CURRENT MED MERGE: CPT | Performed by: INTERNAL MEDICINE

## 2020-08-20 PROCEDURE — 1090F PRES/ABSN URINE INCON ASSESS: CPT | Performed by: INTERNAL MEDICINE

## 2020-08-20 PROCEDURE — 99213 OFFICE O/P EST LOW 20 MIN: CPT | Performed by: INTERNAL MEDICINE

## 2020-08-20 PROCEDURE — G8432 DEP SCR NOT DOC, RNG: HCPCS | Performed by: INTERNAL MEDICINE

## 2020-08-20 RX ORDER — DULOXETIN HYDROCHLORIDE 30 MG/1
30 CAPSULE, DELAYED RELEASE ORAL DAILY
Qty: 30 CAP | Refills: 5 | Status: SHIPPED | OUTPATIENT
Start: 2020-08-20 | End: 2020-09-19

## 2020-08-20 NOTE — PROGRESS NOTES
Chief Complaint   Patient presents with   [facility] Follow Up     R-TKR         Basil Ponce is a [de-identified] y.o. female who was seen by synchronous (real-time) audio-video technology on 8/20/2020 for Hospital Follow Up (R-TKR)        Assessment & Plan:   Diagnoses and all orders for this visit:    1. Status post total right knee replacement  -     SD DISCHARGE MEDS RECONCILED W/ CURRENT OUTPATIENT MED LIST    2. Neuropathy  -     DULoxetine (CYMBALTA) 30 mg capsule; Take 1 Cap by mouth daily for 30 days. I spent at least 15 minutes on this visit with this established patient. Subjective:   80F who presents for follow up after recent hospitalization for R-TKR. She has been recovering well, but has swelling in the R-leg. She was seen x 2 by her orthopedist and has had 2-doppler US and no evidence of DVT. More than likely, the swelling is due to the trauma from surgery. She denies any worsening pain or discomfort. Also, she reports that Neurotin is no longer helping her neuropathy and would like to try something different. We discussed Cymbalta at this time. Potential side effects discussed in detail. Hospitalization was reviewed. Medications were updated and reconciled. She has no other worrisome concerns or complaints. Her daughter is with her and aiding in support. Patient Active Problem List   Diagnosis Code    GERD (gastroesophageal reflux disease) K21.9    Arthritis of left knee M17.12    Pure hypercholesterolemia E78.00    Primary open angle glaucoma H40.1190    Osteoporosis M81.0    DANIELA on CPAP G47.33, Z99.89    Symptomatic bradycardia R00.1    Type 2 diabetes mellitus with complication, without long-term current use of insulin (HCC) E11.8    Type 2 diabetes mellitus with diabetic neuropathy (HCC) E11.40    Severe obesity (BMI 35.0-39. 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    Type 2 diabetes with nephropathy ([facility] [address] E11.21    Primary osteoarthritis of right knee M17.11    Arthritis, rheumatoid (HCC) M06.9    Hypertension I10    Osteoarthrosis M19.90     Allergies   Allergen Reactions    Sulfa (Sulfonamide Antibiotics) Hives and Itching     Past Medical History:   Diagnosis Date    Arthritis     Diabetes ([address]     GERD (gastroesophageal reflux disease)     Glaucoma     High cholesterol     Ill-defined condition     EDEMA, LOWER LIMS, URINARY INCONTINENCE    Ill-defined condition     GLAUCOMA    Sleep apnea     NO CPAP    Urge incontinence      Past Surgical History:   Procedure Laterality Date    BREAST SURGERY PROCEDURE UNLISTED      LT BREAST CYST BENIGN    DRAINAGE OF DEEP RECTAL ABSCESS      ENDOSCOPY, COLON, DIAGNOSTIC          HX BREAST BIOPSY Left     Surgical    HX CATARACT REMOVAL Bilateral     HX GI      RECTAL ABSCESS    HX HEART CATHETERIZATION  2017    HX HYSTERECTOMY      HX KNEE REPLACEMENT Left     HX UROLOGICAL      BOTOX FOR FREQUENT URINATION     Family History   Problem Relation Age of Onset    Heart Disease Mother     No Known Problems Father     No Known Problems Sister     Cancer Brother         PANCREAS    Heart Disease Sister     Cancer Sister         BREAST    Alcohol abuse Brother     No Known Problems Brother     No Known Problems Brother     No Known Problems Brother     Anesth Problems Neg Hx      Social History     Tobacco Use    Smoking status: Former Smoker     Packs/day: 0.50     Years: 5.00     Pack years: 2.50     Last attempt to quit: 1966     Years since quittin.1    Smokeless tobacco: Never Used   Substance Use Topics    Alcohol use: Yes     Alcohol/week: 0.0 standard drinks     Comment: OCCASIONALLY       Review of Systems   Constitutional: Negative. HENT: Negative. Eyes: Negative. Cardiovascular: Positive for leg swelling. Gastrointestinal: Negative. Genitourinary: Negative. Musculoskeletal: Positive for myalgias. Skin: Negative. Neurological: Negative. Endo/Heme/Allergies: Negative. Psychiatric/Behavioral: Negative. All other systems reviewed and are negative. Objective:   No flowsheet data found. General: alert, cooperative, no distress   Mental  status: normal mood, behavior, speech, dress, motor activity, and thought processes, able to follow commands   HENT: NCAT   Neck: no visualized mass   Resp: no respiratory distress   Neuro: no gross deficits   Skin: no discoloration or lesions of concern on visible areas   Psychiatric: normal affect, consistent with stated mood, no evidence of hallucinations       We discussed the expected course, resolution and complications of the diagnosis(es) in detail. Medication risks, benefits, costs, interactions, and alternatives were discussed as indicated. I advised her to contact the office if her condition worsens, changes or fails to improve as anticipated. She expressed understanding with the diagnosis(es) and plan. Mary Ann Tejada, who was evaluated through a patient-initiated, synchronous (real-time) audio-video encounter, and/or her healthcare decision maker, is aware that it is a billable service, with coverage as determined by her insurance carrier. She provided verbal consent to proceed: Yes, and patient identification was verified. It was conducted pursuant to the emergency declaration under the [address] [place] [address], [address] [place] [address] authority and the [employer] Act. A caregiver was present when appropriate. Ability to conduct physical exam was limited. I was in the office. The patient was at home. Follow-up and Dispositions    · Return if symptoms worsen or fail to improve.          Eloina Coreas MD

## 2020-08-21 PROBLEM — I10 HYPERTENSION: Status: ACTIVE | Noted: 2020-08-21

## 2020-08-21 PROBLEM — M19.90 OSTEOARTHROSIS: Status: ACTIVE | Noted: 2020-08-21

## 2020-08-21 PROBLEM — M06.9 ARTHRITIS, RHEUMATOID (HCC): Status: ACTIVE | Noted: 2020-08-21

## 2020-08-21 RX ORDER — LOSARTAN POTASSIUM 50 MG/1
50 TABLET ORAL DAILY
COMMUNITY
Start: 2020-06-29 | End: 2021-05-10 | Stop reason: SDUPTHER

## 2020-08-21 RX ORDER — OXYCODONE HYDROCHLORIDE 5 MG/1
5 TABLET ORAL
COMMUNITY
Start: 2020-08-19 | End: 2020-08-26

## 2020-08-31 DIAGNOSIS — L30.4 PRURITIC INTERTRIGO: ICD-10-CM

## 2020-08-31 RX ORDER — NYSTATIN 100000 [USP'U]/G
POWDER TOPICAL
Qty: 60 G | Refills: 0 | Status: SHIPPED | OUTPATIENT
Start: 2020-08-31 | End: 2020-12-24

## 2020-09-01 DIAGNOSIS — R23.8 SKIN IRRITATION: ICD-10-CM

## 2020-09-01 RX ORDER — KETOCONAZOLE 20 MG/G
CREAM TOPICAL DAILY
Qty: 30 G | Refills: 3 | Status: SHIPPED | OUTPATIENT
Start: 2020-09-01 | End: 2021-01-28 | Stop reason: SDUPTHER

## 2020-11-09 RX ORDER — HYDROXYZINE HYDROCHLORIDE 10 MG/1
TABLET, FILM COATED ORAL
Qty: 30 TAB | Refills: 2 | Status: SHIPPED | OUTPATIENT
Start: 2020-11-09 | End: 2020-12-07 | Stop reason: SDUPTHER

## 2020-11-17 RX ORDER — ATORVASTATIN CALCIUM 40 MG/1
40 TABLET, FILM COATED ORAL
Qty: 90 TAB | Refills: 1 | Status: SHIPPED | OUTPATIENT
Start: 2020-11-17 | End: 2022-03-28

## 2020-12-07 ENCOUNTER — VIRTUAL VISIT (OUTPATIENT)
Dept: FAMILY MEDICINE CLINIC | Age: 80
End: 2020-12-07
Payer: MEDICARE

## 2020-12-07 DIAGNOSIS — R09.81 NASAL CONGESTION: Primary | ICD-10-CM

## 2020-12-07 DIAGNOSIS — M62.838 MUSCLE SPASM: ICD-10-CM

## 2020-12-07 DIAGNOSIS — R42 LIGHTHEADEDNESS: ICD-10-CM

## 2020-12-07 DIAGNOSIS — K21.9 GASTROESOPHAGEAL REFLUX DISEASE, UNSPECIFIED WHETHER ESOPHAGITIS PRESENT: ICD-10-CM

## 2020-12-07 DIAGNOSIS — E11.40 TYPE 2 DIABETES MELLITUS WITH DIABETIC NEUROPATHY, WITHOUT LONG-TERM CURRENT USE OF INSULIN (HCC): ICD-10-CM

## 2020-12-07 DIAGNOSIS — L29.9 ITCHING: ICD-10-CM

## 2020-12-07 DIAGNOSIS — I10 ESSENTIAL HYPERTENSION: ICD-10-CM

## 2020-12-07 PROCEDURE — G8432 DEP SCR NOT DOC, RNG: HCPCS | Performed by: INTERNAL MEDICINE

## 2020-12-07 PROCEDURE — 1101F PT FALLS ASSESS-DOCD LE1/YR: CPT | Performed by: INTERNAL MEDICINE

## 2020-12-07 PROCEDURE — 1090F PRES/ABSN URINE INCON ASSESS: CPT | Performed by: INTERNAL MEDICINE

## 2020-12-07 PROCEDURE — 99213 OFFICE O/P EST LOW 20 MIN: CPT | Performed by: INTERNAL MEDICINE

## 2020-12-07 PROCEDURE — G8756 NO BP MEASURE DOC: HCPCS | Performed by: INTERNAL MEDICINE

## 2020-12-07 PROCEDURE — G8427 DOCREV CUR MEDS BY ELIG CLIN: HCPCS | Performed by: INTERNAL MEDICINE

## 2020-12-07 NOTE — PROGRESS NOTES
Chief Complaint   Patient presents with    Nasal Congestion    Dizziness     described as light headedness         Betty Diaz is a [de-identified] y.o. female who was seen by synchronous (real-time) audio-video technology on 12/7/2020 for Nasal Congestion and Dizziness (described as light headedness)        Assessment & Plan:   Diagnoses and all orders for this visit:    1. Nasal congestion   Suggested A[place] nasal spray 2-3 squirts each nostril 3-4 x per day while awake. Flonase 2 squirts each nostril 1 x daily. Follow up of change of color or copious amounts. 2. Muscle spasm  -     tiZANidine (Zanaflex) 2 mg capsule; 1-2 tablets, 1-2 hours prior to bedtime as needed for spasm. 3. Type 2 diabetes mellitus with diabetic neuropathy, without long-term current use of insulin (HCC)   Stable. 4. Lightheadedness   May be due to nasal congestion. Worrisome symptoms discussed in detail to monitor. 5. Essential hypertension  -     furosemide (LASIX) 20 mg tablet; Take 1 Tab by mouth daily as needed for Pain (fluid retention). 6. Gastroesophageal reflux disease, unspecified whether esophagitis present  -     omeprazole (PRILOSEC) 20 mg capsule; Take 1 Cap by mouth daily. 7. Itching  -     hydrOXYzine HCL (ATARAX) 10 mg tablet; TAKE 1 TABLET BY MOUTH THREE TIMES DAILY AS NEEDED FOR ITCHING FOR UP TO 30 DAYS. I spent at least 17 minutes on this visit with this established patient. Subjective:   80F with h/o HTN, HLD, T2DM who presents with nasal congestion, feeling somewhat lightheaded. Denies headache, focal neurological findings. Feels that these symptoms are provoked by her sinuses and allergies. Denies copious amounts of yellow secrtions. No fever or chills. No change in taste or smell of food. /76. HR 63    In addition, she needs some of her medications refilled today.        Patient Active Problem List    Diagnosis Date Noted    Arthritis, rheumatoid ([facility] [address] 08/21/2020    Hypertension 08/21/2020    Osteoarthrosis 08/21/2020    Primary osteoarthritis of right knee 08/03/2020    Type 2 diabetes with nephropathy ([facility] [address] 01/13/2019    Anginal equivalent ([facility] [address] 08/30/2018    Severe obesity (BMI 35.0-39.9) 06/14/2018    Type 2 diabetes mellitus with complication, without long-term current use of insulin ([facility] [address] 01/09/2018    Type 2 diabetes mellitus with diabetic neuropathy ([facility] [address] 01/09/2018    Symptomatic bradycardia 01/05/2018    DANIELA on CPAP 07/31/2017    Pure hypercholesterolemia 06/22/2016    Primary open angle glaucoma 06/22/2016    Osteoporosis 06/22/2016    Arthritis of left knee 07/14/2014    GERD (gastroesophageal reflux disease) 03/11/2010       Current Outpatient Medications   Medication Sig Dispense Refill    hydrOXYzine HCL (ATARAX) 10 mg tablet TAKE 1 TABLET BY MOUTH THREE TIMES DAILY AS NEEDED FOR ITCHING FOR UP TO 30 DAYS. 30 Tab 2    omeprazole (PRILOSEC) 20 mg capsule Take 1 Cap by mouth daily. 30 Cap 5    furosemide (LASIX) 20 mg tablet Take 1 Tab by mouth daily as needed for Pain (fluid retention). 30 Tab 5    tiZANidine (Zanaflex) 2 mg capsule 1-2 tablets, 1-2 hours prior to bedtime as needed for spasm. 30 Cap 5    DULoxetine (CYMBALTA) 30 mg capsule TK 1 C PO D      Nebulizers (Sidestream) misc       atorvastatin (LIPITOR) 40 mg tablet Take 1 Tab by mouth nightly for 90 days. 90 Tab 1    ketoconazole (NIZORAL) 2 % topical cream Apply  to affected area daily. 30 g 3    Nystop powder APPLY  POWDER TOPICALLY TO THE AFFECTED AREA  4 TIMES DAILY UNTIL RASH RESOLVES 60 g 0    losartan (COZAAR) 50 mg tablet Take 50 mg by mouth daily.  cholecalciferol (Vitamin D3) (1000 Units /25 mcg) tablet Take 1,000 Units by mouth daily.  cyanocobalamin (Vitamin B-12) 1,000 mcg tablet Take 1,000 mcg by mouth daily.  alendronate (FOSAMAX) 70 mg tablet Take 1 Tab by mouth every seven (7) days for 30 days.  4 Tab 3    lancets misc accu-chek softclix lancets Check blood sugar daily E11.9 100 Each 1    glucose blood VI test strips (BLOOD GLUCOSE TEST) strip Accu-chek alexei plus test strips Test blood sugar once daily E11.9 100 Strip 1    glipiZIDE (GLUCOTROL) 5 mg tablet TAKE 1 TABLET BY MOUTH ONCE DAILY 90 Tab 0    Blood-Glucose Meter monitoring kit Pt with T2DM. Needs meter to check BS 1-2 x daily. E11.9. Please give meter approved by her insurance. 1 Kit 0    ondansetron (ZOFRAN ODT) 8 mg disintegrating tablet Take 1 Tab by mouth every twelve (12) hours as needed for Nausea. 15 Tab 3    oxybutynin (DITROPAN) 5 mg tablet Take 1 Tab by mouth three (3) times daily for 270 days. (Patient taking differently: Take 5 mg by mouth three (3) times daily as needed.) 270 Tab 1    Calcium Carbonate-Vit D3-Min 600 mg calcium- 400 unit tab Take 1 pill 2 x daily (Patient taking differently: Take 1 pill 2 x daily by mouth) 60 Tab 5    latanoprost (XALATAN) 0.005 % ophthalmic solution Administer 1 Drop to both eyes nightly.  ascorbic acid, vitamin C, (Vitamin C) 250 mg tablet Take 250 mg by mouth daily.  acetaminophen (TYLENOL) 650 mg CR tablet Take 650 mg by mouth every six (6) hours as needed for Pain.          Allergies   Allergen Reactions    Sulfa (Sulfonamide Antibiotics) Hives and Itching       Past Medical History:   Diagnosis Date    Arthritis     Diabetes ([facility] [address]     GERD (gastroesophageal reflux disease)     Glaucoma     High cholesterol     Ill-defined condition     EDEMA, LOWER LIMS, URINARY INCONTINENCE    Ill-defined condition     GLAUCOMA    Sleep apnea     NO CPAP    Urge incontinence        Past Surgical History:   Procedure Laterality Date    BREAST SURGERY PROCEDURE UNLISTED      LT BREAST CYST BENIGN    DRAINAGE OF DEEP RECTAL ABSCESS      ENDOSCOPY, COLON, DIAGNOSTIC      2008    HX BREAST BIOPSY Left 1970s    Surgical    HX CATARACT REMOVAL Bilateral     HX GI      RECTAL ABSCESS    HX HEART CATHETERIZATION  01/06/2017    HX HYSTERECTOMY      HX KNEE REPLACEMENT Left     HX UROLOGICAL      BOTOX FOR FREQUENT URINATION       Family History   Problem Relation Age of Onset    Heart Disease Mother     No Known Problems Father     No Known Problems Sister     Cancer Brother         PANCREAS    Heart Disease Sister     Cancer Sister         BREAST    Alcohol abuse Brother     No Known Problems Brother     No Known Problems Brother     No Known Problems Brother     Anesth Problems Neg Hx        Social History     Tobacco Use    Smoking status: Former Smoker     Packs/day: 0.50     Years: 5.00     Pack years: 2.50     Last attempt to quit: 1966     Years since quittin.4    Smokeless tobacco: Never Used   Substance Use Topics    Alcohol use: Yes     Alcohol/week: 0.0 standard drinks     Comment: OCCASIONALLY       Review of Systems   Constitutional: Negative. Negative for fever. HENT: Positive for congestion. Eyes: Negative. Respiratory: Negative. Negative for cough, sputum production and shortness of breath. Cardiovascular: Negative. Negative for chest pain. Gastrointestinal: Negative. Genitourinary: Negative. Musculoskeletal: Negative. Skin: Negative. Neurological: Positive for dizziness (lightheadedness). Endo/Heme/Allergies: Negative. Psychiatric/Behavioral: Negative. All other systems reviewed and are negative. Objective: There were no vitals taken for this visit. .     General: alert, cooperative, no distress   Mental  status: normal mood, behavior, speech, dress, motor activity, and thought processes, able to follow commands   HENT: NCAT   Neck: no visualized mass   Resp: no respiratory distress   Neuro: no gross deficits   Skin: no discoloration or lesions of concern on visible areas   Psychiatric: normal affect, consistent with stated mood, no evidence of hallucinations       We discussed the expected course, resolution and complications of the diagnosis(es) in detail. Medication risks, benefits, costs, interactions, and alternatives were discussed as indicated. I advised her to contact the office if her condition worsens, changes or fails to improve as anticipated. She expressed understanding with the diagnosis(es) and plan. Daina Young, who was evaluated through a patient-initiated, synchronous (real-time) audio-video encounter, and/or her healthcare decision maker, is aware that it is a billable service, with coverage as determined by her insurance carrier. She provided verbal consent to proceed: Yes, and patient identification was verified. It was conducted pursuant to the emergency declaration under the [address] [place] [address], [address] [place] [address] authority and the [employer] and [employer] Act. A caregiver was present when appropriate. Ability to conduct physical exam was limited. I was in the office. The patient was at home. Follow-up and Dispositions    · Return if symptoms worsen or fail to improve.          Becca Garnica MD

## 2020-12-09 RX ORDER — NEBULIZER
EACH MISCELLANEOUS
COMMUNITY
Start: 2020-08-28

## 2020-12-09 RX ORDER — DULOXETIN HYDROCHLORIDE 30 MG/1
CAPSULE, DELAYED RELEASE ORAL
COMMUNITY
Start: 2020-09-14 | End: 2021-01-28 | Stop reason: SDUPTHER

## 2020-12-09 RX ORDER — OMEPRAZOLE 20 MG/1
20 CAPSULE, DELAYED RELEASE ORAL DAILY
Qty: 30 CAP | Refills: 5 | Status: SHIPPED | OUTPATIENT
Start: 2020-12-09 | End: 2021-12-15

## 2020-12-09 RX ORDER — HYDROXYZINE HYDROCHLORIDE 10 MG/1
TABLET, FILM COATED ORAL
Qty: 30 TAB | Refills: 2 | Status: SHIPPED | OUTPATIENT
Start: 2020-12-09 | End: 2021-03-31

## 2020-12-09 RX ORDER — TIZANIDINE HYDROCHLORIDE 2 MG/1
CAPSULE, GELATIN COATED ORAL
Qty: 30 CAP | Refills: 5 | Status: SHIPPED | OUTPATIENT
Start: 2020-12-09 | End: 2020-12-31

## 2020-12-09 RX ORDER — FUROSEMIDE 20 MG/1
20 TABLET ORAL
Qty: 30 TAB | Refills: 5 | Status: SHIPPED | OUTPATIENT
Start: 2020-12-09 | End: 2022-04-20

## 2020-12-14 ENCOUNTER — OFFICE VISIT (OUTPATIENT)
Dept: FAMILY MEDICINE CLINIC | Age: 80
End: 2020-12-14
Payer: MEDICARE

## 2020-12-14 VITALS
TEMPERATURE: 99.1 F | HEART RATE: 65 BPM | SYSTOLIC BLOOD PRESSURE: 128 MMHG | RESPIRATION RATE: 20 BRPM | WEIGHT: 189.6 LBS | BODY MASS INDEX: 33.59 KG/M2 | DIASTOLIC BLOOD PRESSURE: 70 MMHG | OXYGEN SATURATION: 97 % | HEIGHT: 63 IN

## 2020-12-14 DIAGNOSIS — E11.9 ENCOUNTER FOR DIABETIC FOOT EXAM (HCC): ICD-10-CM

## 2020-12-14 DIAGNOSIS — Z23 ENCOUNTER FOR IMMUNIZATION: ICD-10-CM

## 2020-12-14 DIAGNOSIS — E55.9 VITAMIN D DEFICIENCY: ICD-10-CM

## 2020-12-14 DIAGNOSIS — E66.01 SEVERE OBESITY WITH BODY MASS INDEX (BMI) OF 35.0 TO 39.9 WITH SERIOUS COMORBIDITY (HCC): ICD-10-CM

## 2020-12-14 DIAGNOSIS — I10 ESSENTIAL HYPERTENSION: ICD-10-CM

## 2020-12-14 DIAGNOSIS — E11.40 TYPE 2 DIABETES MELLITUS WITH DIABETIC NEUROPATHY, WITHOUT LONG-TERM CURRENT USE OF INSULIN (HCC): Primary | ICD-10-CM

## 2020-12-14 DIAGNOSIS — I20.8 ANGINAL EQUIVALENT (HCC): ICD-10-CM

## 2020-12-14 DIAGNOSIS — E78.5 DYSLIPIDEMIA: ICD-10-CM

## 2020-12-14 PROCEDURE — 1090F PRES/ABSN URINE INCON ASSESS: CPT | Performed by: INTERNAL MEDICINE

## 2020-12-14 PROCEDURE — G8754 DIAS BP LESS 90: HCPCS | Performed by: INTERNAL MEDICINE

## 2020-12-14 PROCEDURE — G8752 SYS BP LESS 140: HCPCS | Performed by: INTERNAL MEDICINE

## 2020-12-14 PROCEDURE — G8510 SCR DEP NEG, NO PLAN REQD: HCPCS | Performed by: INTERNAL MEDICINE

## 2020-12-14 PROCEDURE — G8536 NO DOC ELDER MAL SCRN: HCPCS | Performed by: INTERNAL MEDICINE

## 2020-12-14 PROCEDURE — G8417 CALC BMI ABV UP PARAM F/U: HCPCS | Performed by: INTERNAL MEDICINE

## 2020-12-14 PROCEDURE — G8427 DOCREV CUR MEDS BY ELIG CLIN: HCPCS | Performed by: INTERNAL MEDICINE

## 2020-12-14 PROCEDURE — 99214 OFFICE O/P EST MOD 30 MIN: CPT | Performed by: INTERNAL MEDICINE

## 2020-12-14 PROCEDURE — 1101F PT FALLS ASSESS-DOCD LE1/YR: CPT | Performed by: INTERNAL MEDICINE

## 2020-12-14 RX ORDER — ZOSTER VACCINE RECOMBINANT, ADJUVANTED 50 MCG/0.5
0.5 KIT INTRAMUSCULAR ONCE
Qty: 0.5 ML | Refills: 1 | Status: SHIPPED | OUTPATIENT
Start: 2020-12-14 | End: 2020-12-14

## 2020-12-14 NOTE — PATIENT INSTRUCTIONS
Diabetes Foot Health: Care Instructions  Your Care Instructions     When you have diabetes, your feet need extra care and attention. Diabetes can damage the nerve endings and blood vessels in your feet, making you less likely to notice when your feet are injured. Diabetes also limits your body's ability to fight infection and get blood to areas that need it. If you get a minor foot injury, it could become an ulcer or a serious infection. With good foot care, you can prevent most of these problems. Caring for your feet can be quick and easy. Most of the care can be done when you are bathing or getting ready for bed. Follow-up care is a key part of your treatment and safety. Be sure to make and go to all appointments, and call your doctor if you are having problems. It's also a good idea to know your test results and keep a list of the medicines you take. How can you care for yourself at home? · Keep your blood sugar close to normal by watching what and how much you eat, monitoring blood sugar, taking medicines if prescribed, and getting regular exercise. · Do not smoke. Smoking affects blood flow and can make foot problems worse. If you need help quitting, talk to your doctor about stop-smoking programs and medicines. These can increase your chances of quitting for good. · Eat a diet that is low in fats. High fat intake can cause fat to build up in your blood vessels and decrease blood flow. · Inspect your feet daily for blisters, cuts, cracks, or sores. If you cannot see well, use a mirror or have someone help you. · Take care of your feet:  ? Wash your feet every day. Use warm (not hot) water. Check the water temperature with your wrists or other part of your body, not your feet. ? Dry your feet well. Pat them dry. Do not rub the skin on your feet too hard. Dry well between your toes. If the skin on your feet stays moist, bacteria or a fungus can grow, which can lead to infection. ?  Keep your skin soft. Use moisturizing skin cream to keep the skin on your feet soft and prevent calluses and cracks. But do not put the cream between your toes, and stop using any cream that causes a rash. ? Clean underneath your toenails carefully. Do not use a sharp object to clean underneath your toenails. Use the blunt end of a nail file or other rounded tool. ? Trim and file your toenails straight across to prevent ingrown toenails. Use a nail clipper, not scissors. Use an emery board to smooth the edges. · Change socks daily. Socks without seams are best, because seams often rub the feet. You can find socks for people with diabetes from specialty catalogs. · Look inside your shoes every day for things like gravel or torn linings, which could cause blisters or sores. · Buy shoes that fit well:  ? Look for shoes that have plenty of space around the toes. This helps prevent bunions and blisters. ? Try on shoes while wearing the kind of socks you will usually wear with the shoes. ? Avoid plastic shoes. They may rub your feet and cause blisters. Good shoes should be made of materials that are flexible and breathable, such as leather or cloth. ? Break in new shoes slowly by wearing them for no more than an hour a day for several days. Take extra time to check your feet for red areas, blisters, or other problems after you wear new shoes. · Do not go barefoot. Do not wear sandals, and do not wear shoes with very thin soles. Thin soles are easy to puncture. They also do not protect your feet from hot pavement or cold weather. · Have your doctor check your feet during each visit. If you have a foot problem, see your doctor. Do not try to treat an early foot problem at home. Home remedies or treatments that you can buy without a prescription (such as corn removers) can be harmful. · Always get early treatment for foot problems. A minor irritation can lead to a major problem if not properly cared for early.   When should you call for help? Call your doctor now or seek immediate medical care if:    · You have a foot sore, an ulcer or break in the skin that is not healing after 4 days, bleeding corns or calluses, or an ingrown toenail.     · You have blue or black areas, which can mean bruising or blood flow problems.     · You have peeling skin or tiny blisters between your toes or cracking or oozing of the skin.     · You have a fever for more than 24 hours and a foot sore.     · You have new numbness or tingling in your feet that does not go away after you move your feet or change positions.     · You have unexplained or unusual swelling of the foot or ankle. Watch closely for changes in your health, and be sure to contact your doctor if:    · You cannot do proper foot care. Where can you learn more? Go to http://www.gray.com/  Enter A739 in the search box to learn more about \"Diabetes Foot Health: Care Instructions. \"  Current as of: December 20, 2019               Content Version: 12.6  © 5271-2658 [employer]. Care instructions adapted under license by [employer] (which disclaims liability or warranty for this information). If you have questions about a medical condition or this instruction, always ask your healthcare professional. [address] any warranty or liability for your use of this information.

## 2020-12-14 NOTE — PROGRESS NOTES
Chief Complaint   Patient presents with    Diabetes       Subjective:   Larry Bernal is a [de-identified] y.o. female who presents for 6-month follow up and fasting labs. H/o THN, T2DM OA and HLD. She reports that her symptoms of light headedness and dizziness resolved. More than likely due to nasal congestion and using the normal saline and Flonase helped immensely. She denies any recent ER visits. She has generally been doing well. Medications are working as expected. BS have been stable at home. Patient Active Problem List    Diagnosis Date Noted    Arthritis, rheumatoid ([facility] [address] 08/21/2020    Hypertension 08/21/2020    Osteoarthrosis 08/21/2020    Primary osteoarthritis of right knee 08/03/2020    Type 2 diabetes with nephropathy ([facility] [address] 01/13/2019    Anginal equivalent ([facility] [address] 08/30/2018    Severe obesity (BMI 35.0-39.9) 06/14/2018    Type 2 diabetes mellitus with complication, without long-term current use of insulin ([facility] [address] 01/09/2018    Type 2 diabetes mellitus with diabetic neuropathy ([facility] [address] 01/09/2018    Symptomatic bradycardia 01/05/2018    DANIELA on CPAP 07/31/2017    Pure hypercholesterolemia 06/22/2016    Primary open angle glaucoma 06/22/2016    Osteoporosis 06/22/2016    Arthritis of left knee 07/14/2014    GERD (gastroesophageal reflux disease) 03/11/2010         Current Outpatient Medications   Medication Sig Dispense Refill    hydrOXYzine HCL (ATARAX) 10 mg tablet TAKE 1 TABLET BY MOUTH THREE TIMES DAILY AS NEEDED FOR ITCHING FOR UP TO 30 DAYS. 30 Tab 2    omeprazole (PRILOSEC) 20 mg capsule Take 1 Cap by mouth daily. 30 Cap 5    furosemide (LASIX) 20 mg tablet Take 1 Tab by mouth daily as needed for Pain (fluid retention). 30 Tab 5    DULoxetine (CYMBALTA) 30 mg capsule TK 1 C PO D      Nebulizers (Sidestream) misc       atorvastatin (LIPITOR) 40 mg tablet Take 1 Tab by mouth nightly for 90 days. 90 Tab 1    ketoconazole (NIZORAL) 2 % topical cream Apply  to affected area daily.  30 g 3    losartan (COZAAR) 50 mg tablet Take 50 mg by mouth daily.  cholecalciferol (Vitamin D3) (1000 Units /25 mcg) tablet Take 1,000 Units by mouth daily.  cyanocobalamin (Vitamin B-12) 1,000 mcg tablet Take 1,000 mcg by mouth daily.  ascorbic acid, vitamin C, (Vitamin C) 250 mg tablet Take 250 mg by mouth daily.  alendronate (FOSAMAX) 70 mg tablet Take 1 Tab by mouth every seven (7) days for 30 days. 4 Tab 3    lancets misc accu-chek softclix lancets Check blood sugar daily E11.9 100 Each 1    glucose blood VI test strips (BLOOD GLUCOSE TEST) strip Accu-chek alexei plus test strips Test blood sugar once daily E11.9 100 Strip 1    glipiZIDE (GLUCOTROL) 5 mg tablet TAKE 1 TABLET BY MOUTH ONCE DAILY 90 Tab 0    Blood-Glucose Meter monitoring kit Pt with T2DM. Needs meter to check BS 1-2 x daily. E11.9. Please give meter approved by her insurance. 1 Kit 0    oxybutynin (DITROPAN) 5 mg tablet Take 1 Tab by mouth three (3) times daily for 270 days. (Patient taking differently: Take 5 mg by mouth three (3) times daily as needed.) 270 Tab 1    Calcium Carbonate-Vit D3-Min 600 mg calcium- 400 unit tab Take 1 pill 2 x daily (Patient taking differently: Take 1 pill 2 x daily by mouth) 60 Tab 5    latanoprost (XALATAN) 0.005 % ophthalmic solution Administer 1 Drop to both eyes nightly.  tiZANidine (Zanaflex) 2 mg capsule 1-2 tablets, 1-2 hours prior to bedtime as needed for spasm. 30 Cap 5    Nystop powder APPLY  POWDER TOPICALLY TO THE AFFECTED AREA  4 TIMES DAILY UNTIL RASH RESOLVES 60 g 0    ondansetron (ZOFRAN ODT) 8 mg disintegrating tablet Take 1 Tab by mouth every twelve (12) hours as needed for Nausea. 15 Tab 3    acetaminophen (TYLENOL) 650 mg CR tablet Take 650 mg by mouth every six (6) hours as needed for Pain.            Allergies   Allergen Reactions    Sulfa (Sulfonamide Antibiotics) Hives and Itching         Past Medical History:   Diagnosis Date    Arthritis     Diabetes ([facility] [address]     GERD (gastroesophageal reflux disease)     Glaucoma     High cholesterol     Ill-defined condition     EDEMA, LOWER LIMS, URINARY INCONTINENCE    Ill-defined condition     GLAUCOMA    Sleep apnea     NO CPAP    Urge incontinence          Past Surgical History:   Procedure Laterality Date    BREAST SURGERY PROCEDURE UNLISTED      LT BREAST CYST BENIGN    DRAINAGE OF DEEP RECTAL ABSCESS      ENDOSCOPY, COLON, DIAGNOSTIC          HX BREAST BIOPSY Left     Surgical    HX CATARACT REMOVAL Bilateral     HX GI      RECTAL ABSCESS    HX HEART CATHETERIZATION  2017    HX HYSTERECTOMY      HX KNEE REPLACEMENT Left     HX UROLOGICAL      BOTOX FOR FREQUENT URINATION         Family History   Problem Relation Age of Onset    Heart Disease Mother     No Known Problems Father     No Known Problems Sister     Cancer Brother         PANCREAS    Heart Disease Sister     Cancer Sister         BREAST    Alcohol abuse Brother     No Known Problems Brother     No Known Problems Brother     No Known Problems Brother     Anesth Problems Neg Hx          Social History     Tobacco Use    Smoking status: Former Smoker     Packs/day: 0.50     Years: 5.00     Pack years: 2.50     Last attempt to quit: 1966     Years since quittin.4    Smokeless tobacco: Never Used   Substance Use Topics    Alcohol use: Yes     Alcohol/week: 0.0 standard drinks     Comment: OCCASIONALLY          Review of Systems  A comprehensive review of systems was negative except for that written in the HPI.        Objective:     Vitals:    20 1418   BP: 128/70   Pulse: 65   Resp: 20   Temp: 99.1 °F (37.3 °C)   TempSrc: Oral   SpO2: 97%   Weight: 189 lb 9.6 oz (86 kg)   Height: 5' 3\" (1.6 m)       General: alert, cooperative, no distress   Mental  status: normal mood, behavior, speech, dress, motor activity, and thought processes, able to follow commands   HENT: NCAT   Neck: no visualized mass   Resp: no respiratory distress   Neuro: no gross deficits   Skin: no discoloration or lesions of concern on visible areas   Psychiatric: normal affect, consistent with stated mood, no evidence of hallucinations     Diabetic foot exam:      Left Foot:              Visual Exam: normal               Pulse DP: 2+ (normal)              Filament test: normal sensation               Vibratory sensation: Vibratory sensation: normal                       Right Foot:              Visual Exam: normal               Pulse DP: 2+ (normal)              Filament test: normal sensation               Vibratory sensation: Vibratory sensation: normal      Assessment/ Plan:   Diagnoses and all orders for this visit:    1. Type 2 diabetes mellitus with diabetic neuropathy, without long-term current use of insulin (HCC)  -     MICROALBUMIN, UR, RAND W/ MICROALB/CREAT RATIO; Future  -      DIABETES FOOT EXAM  -     METABOLIC PANEL, COMPREHENSIVE; Future  -     HEMOGLOBIN A1C WITH EAG; Future    2. Essential hypertension    3. Vitamin D deficiency  -     VITAMIN D, 25 HYDROXY; Future    4. Severe obesity with body mass index (BMI) of 35.0 to 39.9 with serious comorbidity ([facility])   I have reviewed/discussed the above normal BMI with the patient. I have recommended the following interventions: dietary management education, guidance, and counseling and encourage exercise . Kris Luciano 5. Anginal equivalent (HCC)   Stable. No worrisome findings. 6. Encounter for diabetic foot exam ([facility])  -      DIABETES FOOT EXAM    7. Encounter for immunization  -     ADMIN INFLUENZA VIRUS VAC    8. Dyslipidemia  -     CBC WITH AUTOMATED DIFF; Future  -     LIPID PANEL; Future    Other orders  -     varicella-zoster recombinant, PF, (Shingrix, PF,) 50 mcg/0.5 mL susr injection; 0.5 mL by IntraMUSCular route once for 1 dose.  2nd dose in 2-4 months.  -     CBC WITH AUTOMATED DIFF  -     METABOLIC PANEL, COMPREHENSIVE  -     LIPID PANEL  -     MICROALBUMIN, UR, RAND W/ MICROALB/CREAT RATIO  -     HEMOGLOBIN A1C WITH EAG  -     VITAMIN D, 25 HYDROXY  -     CVD REPORT  -     DIABETES PATIENT EDUCATION    I have discussed the diagnosis with the patient and the intended treatment plan as seen in the above orders. The patient has received an after-visit summary and questions were answered concerning future plans. Asked to return should symptoms worsen or not improve with treatment. Any pending labs and studies will be relayed to patient when they become available. Pt verbalizes understanding of plan of care and denies further questions or concerns at this time. Follow-up and Dispositions    · Return in about 6 months (around 6/14/2021), or if symptoms worsen or fail to improve, for Check BP, Fasting labs, Follow up 6 months for chronic issues. Tereza Collins MD    Patient Instructions          Diabetes Foot Health: Care Instructions  Your Care Instructions     When you have diabetes, your feet need extra care and attention. Diabetes can damage the nerve endings and blood vessels in your feet, making you less likely to notice when your feet are injured. Diabetes also limits your body's ability to fight infection and get blood to areas that need it. If you get a minor foot injury, it could become an ulcer or a serious infection. With good foot care, you can prevent most of these problems. Caring for your feet can be quick and easy. Most of the care can be done when you are bathing or getting ready for bed. Follow-up care is a key part of your treatment and safety. Be sure to make and go to all appointments, and call your doctor if you are having problems. It's also a good idea to know your test results and keep a list of the medicines you take. How can you care for yourself at home? · Keep your blood sugar close to normal by watching what and how much you eat, monitoring blood sugar, taking medicines if prescribed, and getting regular exercise.   · Do not smoke. Smoking affects blood flow and can make foot problems worse. If you need help quitting, talk to your doctor about stop-smoking programs and medicines. These can increase your chances of quitting for good. · Eat a diet that is low in fats. High fat intake can cause fat to build up in your blood vessels and decrease blood flow. · Inspect your feet daily for blisters, cuts, cracks, or sores. If you cannot see well, use a mirror or have someone help you. · Take care of your feet:  ? Wash your feet every day. Use warm (not hot) water. Check the water temperature with your wrists or other part of your body, not your feet. ? Dry your feet well. Pat them dry. Do not rub the skin on your feet too hard. Dry well between your toes. If the skin on your feet stays moist, bacteria or a fungus can grow, which can lead to infection. ? Keep your skin soft. Use moisturizing skin cream to keep the skin on your feet soft and prevent calluses and cracks. But do not put the cream between your toes, and stop using any cream that causes a rash. ? Clean underneath your toenails carefully. Do not use a sharp object to clean underneath your toenails. Use the blunt end of a nail file or other rounded tool. ? Trim and file your toenails straight across to prevent ingrown toenails. Use a nail clipper, not scissors. Use an emery board to smooth the edges. · Change socks daily. Socks without seams are best, because seams often rub the feet. You can find socks for people with diabetes from specialty catalogs. · Look inside your shoes every day for things like gravel or torn linings, which could cause blisters or sores. · Buy shoes that fit well:  ? Look for shoes that have plenty of space around the toes. This helps prevent bunions and blisters. ? Try on shoes while wearing the kind of socks you will usually wear with the shoes. ? Avoid plastic shoes. They may rub your feet and cause blisters.  Good shoes should be made of materials that are flexible and breathable, such as leather or cloth. ? Break in new shoes slowly by wearing them for no more than an hour a day for several days. Take extra time to check your feet for red areas, blisters, or other problems after you wear new shoes. · Do not go barefoot. Do not wear sandals, and do not wear shoes with very thin soles. Thin soles are easy to puncture. They also do not protect your feet from hot pavement or cold weather. · Have your doctor check your feet during each visit. If you have a foot problem, see your doctor. Do not try to treat an early foot problem at home. Home remedies or treatments that you can buy without a prescription (such as corn removers) can be harmful. · Always get early treatment for foot problems. A minor irritation can lead to a major problem if not properly cared for early. When should you call for help? Call your doctor now or seek immediate medical care if:    · You have a foot sore, an ulcer or break in the skin that is not healing after 4 days, bleeding corns or calluses, or an ingrown toenail.     · You have blue or black areas, which can mean bruising or blood flow problems.     · You have peeling skin or tiny blisters between your toes or cracking or oozing of the skin.     · You have a fever for more than 24 hours and a foot sore.     · You have new numbness or tingling in your feet that does not go away after you move your feet or change positions.     · You have unexplained or unusual swelling of the foot or ankle. Watch closely for changes in your health, and be sure to contact your doctor if:    · You cannot do proper foot care. Where can you learn more? Go to http://www.gray.com/  Enter A739 in the search box to learn more about \"Diabetes Foot Health: Care Instructions. \"  Current as of: December 20, 2019               Content Version: 12.6  © 6221-7205 [employer].    Care instructions adapted under license by [employer] (which disclaims liability or warranty for this information). If you have questions about a medical condition or this instruction, always ask your healthcare professional. [address] any warranty or liability for your use of this information.

## 2020-12-14 NOTE — PROGRESS NOTES
Identified pt with two pt identifiers(name and ). Chief Complaint   Patient presents with    Diabetes        Health Maintenance Due   Topic    Eye Exam Retinal or Dilated     Shingrix Vaccine Age 50> (1 of 2)    MICROALBUMIN Q1     Foot Exam Q1     Flu Vaccine (1)   They are getting flu shot after they leave here    Wt Readings from Last 3 Encounters:   20 189 lb 9.6 oz (86 kg)   20 192 lb (87.1 kg)   20 192 lb 10.9 oz (87.4 kg)     Temp Readings from Last 3 Encounters:   20 99.1 °F (37.3 °C) (Oral)   20 97.7 °F (36.5 °C)   20 97.9 °F (36.6 °C) (Temporal)     BP Readings from Last 3 Encounters:   20 128/70   20 140/62   08/10/20 146/74     Pulse Readings from Last 3 Encounters:   20 65   20 (!) 55   20 62         Learning Assessment:  :     Learning Assessment 2017   PRIMARY LEARNER Patient Patient   HIGHEST LEVEL OF EDUCATION - PRIMARY LEARNER  SOME COLLEGE -   BARRIERS PRIMARY LEARNER NONE -   PRIMARY LANGUAGE ENGLISH ENGLISH   LEARNER PREFERENCE PRIMARY READING LISTENING   ANSWERED BY patient patient   RELATIONSHIP SELF SELF       Depression Screening:  :     3 most recent PHQ Screens 2020   Little interest or pleasure in doing things Not at all   Feeling down, depressed, irritable, or hopeless Not at all   Total Score PHQ 2 0       Fall Risk Assessment:  :     Fall Risk Assessment, last 12 mths 2020   Able to walk? Yes   Fall in past 12 months? No       Abuse Screening:  :     Abuse Screening Questionnaire 2020 2019 2017 11/15/2016   Do you ever feel afraid of your partner? N N N N   Are you in a relationship with someone who physically or mentally threatens you? N N N N   Is it safe for you to go home?  Y Y Y Y       Coordination of Care Questionnaire:  :     1) Have you been to an emergency room, urgent care clinic since your last visit? no   Hospitalized since your last visit? no 2) Have you seen or consulted any other health care providers outside of [address] [place] [address] since your last visit? no  (Include any pap smears or colon screenings in this section.)    3) Do you have an Advance Directive on file? yes  Are you interested in receiving information about Advance Directives? no    Patient is accompanied by daughter I have received verbal consent from Emmy Laguna to discuss any/all medical information while they are present in the room. Reviewed record in preparation for visit and have obtained necessary documentation. Medication reconciliation up to date and corrected with patient at this time.

## 2020-12-16 LAB
25(OH)D3+25(OH)D2 SERPL-MCNC: 39.4 NG/ML (ref 30–100)
ALBUMIN SERPL-MCNC: 4.2 G/DL (ref 3.7–4.7)
ALBUMIN/CREAT UR: 6 MG/G CREAT (ref 0–29)
ALBUMIN/GLOB SERPL: 1.3 {RATIO} (ref 1.2–2.2)
ALP SERPL-CCNC: 97 IU/L (ref 39–117)
ALT SERPL-CCNC: 11 IU/L (ref 0–32)
AST SERPL-CCNC: 14 IU/L (ref 0–40)
BASOPHILS # BLD AUTO: 0 X10E3/UL (ref 0–0.2)
BASOPHILS NFR BLD AUTO: 1 %
BILIRUB SERPL-MCNC: 0.3 MG/DL (ref 0–1.2)
BUN SERPL-MCNC: 9 MG/DL (ref 8–27)
BUN/CREAT SERPL: 12 (ref 12–28)
CALCIUM SERPL-MCNC: 9.8 MG/DL (ref 8.7–10.3)
CHLORIDE SERPL-SCNC: 104 MMOL/L (ref 96–106)
CHOLEST SERPL-MCNC: 162 MG/DL (ref 100–199)
CO2 SERPL-SCNC: 26 MMOL/L (ref 20–29)
CREAT SERPL-MCNC: 0.77 MG/DL (ref 0.57–1)
CREAT UR-MCNC: 115 MG/DL
EOSINOPHIL # BLD AUTO: 0.1 X10E3/UL (ref 0–0.4)
EOSINOPHIL NFR BLD AUTO: 2 %
ERYTHROCYTE [DISTWIDTH] IN BLOOD BY AUTOMATED COUNT: 13.6 % (ref 11.7–15.4)
EST. AVERAGE GLUCOSE BLD GHB EST-MCNC: 120 MG/DL
GLOBULIN SER CALC-MCNC: 3.2 G/DL (ref 1.5–4.5)
GLUCOSE SERPL-MCNC: 83 MG/DL (ref 65–99)
HBA1C MFR BLD: 5.8 % (ref 4.8–5.6)
HCT VFR BLD AUTO: 34.2 % (ref 34–46.6)
HDLC SERPL-MCNC: 57 MG/DL
HGB BLD-MCNC: 11.6 G/DL (ref 11.1–15.9)
IMM GRANULOCYTES # BLD AUTO: 0 X10E3/UL (ref 0–0.1)
IMM GRANULOCYTES NFR BLD AUTO: 0 %
INTERPRETATION, 910389: NORMAL
LDLC SERPL CALC-MCNC: 88 MG/DL (ref 0–99)
LYMPHOCYTES # BLD AUTO: 1 X10E3/UL (ref 0.7–3.1)
LYMPHOCYTES NFR BLD AUTO: 24 %
Lab: NORMAL
MCH RBC QN AUTO: 29.5 PG (ref 26.6–33)
MCHC RBC AUTO-ENTMCNC: 33.9 G/DL (ref 31.5–35.7)
MCV RBC AUTO: 87 FL (ref 79–97)
MICROALBUMIN UR-MCNC: 6.6 UG/ML
MONOCYTES # BLD AUTO: 0.3 X10E3/UL (ref 0.1–0.9)
MONOCYTES NFR BLD AUTO: 7 %
NEUTROPHILS # BLD AUTO: 2.9 X10E3/UL (ref 1.4–7)
NEUTROPHILS NFR BLD AUTO: 66 %
PLATELET # BLD AUTO: 282 X10E3/UL (ref 150–450)
POTASSIUM SERPL-SCNC: 4.1 MMOL/L (ref 3.5–5.2)
PROT SERPL-MCNC: 7.4 G/DL (ref 6–8.5)
RBC # BLD AUTO: 3.93 X10E6/UL (ref 3.77–5.28)
SODIUM SERPL-SCNC: 143 MMOL/L (ref 134–144)
TRIGL SERPL-MCNC: 93 MG/DL (ref 0–149)
VLDLC SERPL CALC-MCNC: 17 MG/DL (ref 5–40)
WBC # BLD AUTO: 4.3 X10E3/UL (ref 3.4–10.8)

## 2020-12-24 DIAGNOSIS — L30.4 PRURITIC INTERTRIGO: ICD-10-CM

## 2020-12-24 RX ORDER — NYSTATIN 100000 [USP'U]/G
POWDER TOPICAL
Qty: 60 G | Refills: 0 | Status: SHIPPED | OUTPATIENT
Start: 2020-12-24

## 2020-12-31 ENCOUNTER — VIRTUAL VISIT (OUTPATIENT)
Dept: FAMILY MEDICINE CLINIC | Age: 80
End: 2020-12-31
Payer: MEDICARE

## 2020-12-31 DIAGNOSIS — M62.838 MUSCLE SPASM: ICD-10-CM

## 2020-12-31 DIAGNOSIS — M54.9 UPPER BACK PAIN: Primary | ICD-10-CM

## 2020-12-31 PROCEDURE — G8432 DEP SCR NOT DOC, RNG: HCPCS | Performed by: INTERNAL MEDICINE

## 2020-12-31 PROCEDURE — G8756 NO BP MEASURE DOC: HCPCS | Performed by: INTERNAL MEDICINE

## 2020-12-31 PROCEDURE — 1090F PRES/ABSN URINE INCON ASSESS: CPT | Performed by: INTERNAL MEDICINE

## 2020-12-31 PROCEDURE — 99213 OFFICE O/P EST LOW 20 MIN: CPT | Performed by: INTERNAL MEDICINE

## 2020-12-31 PROCEDURE — G8427 DOCREV CUR MEDS BY ELIG CLIN: HCPCS | Performed by: INTERNAL MEDICINE

## 2020-12-31 PROCEDURE — 1101F PT FALLS ASSESS-DOCD LE1/YR: CPT | Performed by: INTERNAL MEDICINE

## 2020-12-31 RX ORDER — GLIPIZIDE 5 MG/1
TABLET ORAL
Qty: 90 TAB | Refills: 3 | Status: SHIPPED | OUTPATIENT
Start: 2020-12-31 | End: 2022-03-16

## 2020-12-31 RX ORDER — TIZANIDINE 4 MG/1
TABLET ORAL
Qty: 30 TAB | Refills: 0 | Status: SHIPPED | OUTPATIENT
Start: 2020-12-31 | End: 2021-10-13 | Stop reason: SDUPTHER

## 2020-12-31 NOTE — PROGRESS NOTES
Chief Complaint   Patient presents with    Spasms     Upper trapezius back pain and muscle spasm. Sometimes, extends into the elbow. Not relieved by current medications         Sacha Coy is a [de-identified] y.o. female who was seen by synchronous (real-time) audio-video technology on 12/31/2020 for Spasms (Upper trapezius back pain and muscle spasm. Sometimes, extends into the elbow. Not relieved by current medications)        Assessment & Plan:   Diagnoses and all orders for this visit:    1. Upper back pain  -     tiZANidine (ZANAFLEX) 4 mg tablet; 1-2 tablets, 1-2 hours prior to bedtime. 2. Muscle spasm  -     tiZANidine (ZANAFLEX) 4 mg tablet; 1-2 tablets, 1-2 hours prior to bedtime. Acute Back Pain Care  1. Salon Pas 4% Lidocaine patches. Apply directly to the area of discomfort. Leave on for 12 hours. Off for 12 hours or VOLTAREN GEL 4 x per day  2. Tylenol arthritis 650 mgs 3 x pday. 3. Warm compress or heating pad. Avoid high temperatures and getting burned. Monitor closely. 4. Muscle Relaxant as prescribed (Tizanidine as above). 5. Consider PT if not getting better and/or xray. 6. Gentle stretching exercises as per the after visit summary. 7. Return as needed. I spent at least 15 minutes on this visit with this established patient. Subjective:   80F who presents with new c/o upper posterior back pain and spasming muscles that are moving down her arm. She has been taking Tylenol arthritis. Has not been using a heating pad routinely. She does have Voltaren gel at home, but barely uses it. She denies any trauma. Can visualize her straightening up and pulling her shoulders up and down to get comfortable. She denies any trauma or recent falls. Recently changed her mattress.        Patient Active Problem List    Diagnosis Date Noted    Arthritis, rheumatoid ([facility] 08/21/2020    Hypertension 08/21/2020    Osteoarthrosis 08/21/2020    Primary osteoarthritis of right knee 08/03/2020    Type 2 diabetes with nephropathy ([email or web address] 01/13/2019    [facility] 08/30/2018    Severe obesity (BMI 35.0-39.9) 06/14/2018    Type 2 diabetes mellitus with complication, without long-term current use of insulin ([facility] 01/09/2018    Type 2 diabetes mellitus with diabetic neuropathy ([facility] 01/09/2018    Symptomatic bradycardia 01/05/2018    DANIELA on CPAP 07/31/2017    Pure hypercholesterolemia 06/22/2016    Primary open angle glaucoma 06/22/2016    Osteoporosis 06/22/2016    Arthritis of left knee 07/14/2014    GERD (gastroesophageal reflux disease) 03/11/2010     Current Outpatient Medications   Medication Sig Dispense Refill    glipiZIDE (GLUCOTROL) 5 mg tablet TAKE 1 TABLET BY MOUTH ONCE DAILY 90 Tab 3    tiZANidine (ZANAFLEX) 4 mg tablet 1-2 tablets, 1-2 hours prior to bedtime. 30 Tab 0    Nystop powder APPLY 1 APPLICATION OF POWDER TOPICALLY TO AFFECTED AREA 4 TIMES DAILY UNTIL RASH RESOLVES 60 g 0    hydrOXYzine HCL (ATARAX) 10 mg tablet TAKE 1 TABLET BY MOUTH THREE TIMES DAILY AS NEEDED FOR ITCHING FOR UP TO 30 DAYS. 30 Tab 2    omeprazole (PRILOSEC) 20 mg capsule Take 1 Cap by mouth daily. 30 Cap 5    furosemide (LASIX) 20 mg tablet Take 1 Tab by mouth daily as needed for Pain (fluid retention). 30 Tab 5    DULoxetine (CYMBALTA) 30 mg capsule TK 1 C PO D      Nebulizers (Sidestream) misc       atorvastatin (LIPITOR) 40 mg tablet Take 1 Tab by mouth nightly for 90 days. 90 Tab 1    ketoconazole (NIZORAL) 2 % topical cream Apply  to affected area daily. 30 g 3    losartan (COZAAR) 50 mg tablet Take 50 mg by mouth daily.  cholecalciferol (Vitamin D3) (1000 Units /25 mcg) tablet Take 1,000 Units by mouth daily.  cyanocobalamin (Vitamin B-12) 1,000 mcg tablet Take 1,000 mcg by mouth daily.  ascorbic acid, vitamin C, (Vitamin C) 250 mg tablet Take 250 mg by mouth daily.  alendronate (FOSAMAX) 70 mg tablet Take 1 Tab by mouth every seven (7) days for 30 days.  4 Tab 3  lancets misc accu-chek softclix lancets Check blood sugar daily E11.9 100 Each 1    glucose blood VI test strips (BLOOD GLUCOSE TEST) strip Accu-chek alexei plus test strips Test blood sugar once daily E11.9 100 Strip 1    Blood-Glucose Meter monitoring kit Pt with T2DM. Needs meter to check BS 1-2 x daily. E11.9. Please give meter approved by her insurance. 1 Kit 0    ondansetron (ZOFRAN ODT) 8 mg disintegrating tablet Take 1 Tab by mouth every twelve (12) hours as needed for Nausea. 15 Tab 3    oxybutynin (DITROPAN) 5 mg tablet Take 1 Tab by mouth three (3) times daily for 270 days. (Patient taking differently: Take 5 mg by mouth three (3) times daily as needed.) 270 Tab 1    Calcium Carbonate-Vit D3-Min 600 mg calcium- 400 unit tab Take 1 pill 2 x daily (Patient taking differently: Take 1 pill 2 x daily by mouth) 60 Tab 5    acetaminophen (TYLENOL) 650 mg CR tablet Take 650 mg by mouth every six (6) hours as needed for Pain.  latanoprost (XALATAN) 0.005 % ophthalmic solution Administer 1 Drop to both eyes nightly.        Allergies   Allergen Reactions    Sulfa (Sulfonamide Antibiotics) Hives and Itching     Past Medical History:   Diagnosis Date    Arthritis     Diabetes ([address]     GERD (gastroesophageal reflux disease)     Glaucoma     High cholesterol     Ill-defined condition     EDEMA, LOWER LIMS, URINARY INCONTINENCE    Ill-defined condition     GLAUCOMA    Sleep apnea     NO CPAP    Urge incontinence      Past Surgical History:   Procedure Laterality Date    BREAST SURGERY PROCEDURE UNLISTED      LT BREAST CYST BENIGN    DRAINAGE OF DEEP RECTAL ABSCESS      ENDOSCOPY, COLON, DIAGNOSTIC      2008    HX BREAST BIOPSY Left 1970s    Surgical    HX CATARACT REMOVAL Bilateral     HX GI      RECTAL ABSCESS    HX HEART CATHETERIZATION  01/06/2017    HX HYSTERECTOMY      HX KNEE REPLACEMENT Left 2014    HX UROLOGICAL      BOTOX FOR FREQUENT URINATION     Family History   Problem Relation Age of Onset    Heart Disease Mother     No Known Problems Father     No Known Problems Sister     Cancer Brother         PANCREAS    Heart Disease Sister     Cancer Sister         BREAST    Alcohol abuse Brother     No Known Problems Brother     No Known Problems Brother     No Known Problems Brother     Anesth Problems Neg Hx      Social History     Tobacco Use    Smoking status: Former Smoker     Packs/day: 0.50     Years: 5.00     Pack years: 2.50     Quit date: 1966     Years since quittin.5    Smokeless tobacco: Never Used   Substance Use Topics    Alcohol use: Yes     Alcohol/week: 0.0 standard drinks     Comment: OCCASIONALLY       Review of Systems   Constitutional: Negative. HENT: Negative. Eyes: Negative. Respiratory: Negative. Cardiovascular: Negative. Gastrointestinal: Negative. Genitourinary: Negative. Musculoskeletal: Positive for back pain (upper back, trapezius) and myalgias. Skin: Negative. Neurological: Negative. Endo/Heme/Allergies: Negative. Psychiatric/Behavioral: Negative. All other systems reviewed and are negative. Objective:   No flowsheet data found. General: alert, cooperative, no distress   Mental  status: normal mood, behavior, speech, dress, motor activity, and thought processes, able to follow commands   HENT: NCAT   Neck: no visualized mass   Resp: no respiratory distress   Neuro: no gross deficits   Skin: no discoloration or lesions of concern on visible areas   Psychiatric: normal affect, consistent with stated mood, no evidence of hallucinations       We discussed the expected course, resolution and complications of the diagnosis(es) in detail. Medication risks, benefits, costs, interactions, and alternatives were discussed as indicated. I advised her to contact the office if her condition worsens, changes or fails to improve as anticipated. She expressed understanding with the diagnosis(es) and plan. Teja Bennett, who was evaluated through a patient-initiated, synchronous (real-time) audio-video encounter, and/or her healthcare decision maker, is aware that it is a billable service, with coverage as determined by her insurance carrier. She provided verbal consent to proceed: Yes, and patient identification was verified. It was conducted pursuant to the emergency declaration under the [address] [place] [address] and the [employer] Act. A caregiver was present when appropriate. Ability to conduct physical exam was limited. I was in the office. The patient was at home. Follow-up and Dispositions    · Return if symptoms worsen or fail to improve.          Bela Montiel MD

## 2021-01-27 ENCOUNTER — TELEPHONE (OUTPATIENT)
Dept: FAMILY MEDICINE CLINIC | Age: 81
End: 2021-01-27

## 2021-01-27 NOTE — TELEPHONE ENCOUNTER
----- Message from Onita Pals sent at 1/27/2021  8:10 AM EST -----  Regarding: Dr. Aidee Rosado Message/Vendor Calls    Caller's first and last name: Samanta Laughlin      Reason for call: Nurse Call       Callback required yes/no and why: yes       Best contact number(s):[phone]      Details to clarify the request: Patients daughter Chepe Reynolds called and stated that the patients legs are swelling and they can't get them to go down, the water pills aren't working, and she is concerned- scheduled for soonest available tomorrow at 2pm, pLease call the patients daughter at [phone] to discuss       Onita Pals

## 2021-01-27 NOTE — TELEPHONE ENCOUNTER
Spoke to pt and her daughter and relayed Dr Timmy Chacon message. They understood. Pt denies having any chest pain or SOB. I told them should pt develop SOB or chest pain or any other symptoms that require emergent care to please go to the ER. They understood.

## 2021-01-28 ENCOUNTER — OFFICE VISIT (OUTPATIENT)
Dept: FAMILY MEDICINE CLINIC | Age: 81
End: 2021-01-28
Attending: INTERNAL MEDICINE
Payer: MEDICARE

## 2021-01-28 ENCOUNTER — HOSPITAL ENCOUNTER (OUTPATIENT)
Dept: ULTRASOUND IMAGING | Age: 81
Discharge: HOME | End: 2021-01-28
Attending: INTERNAL MEDICINE
Payer: MEDICARE

## 2021-01-28 VITALS
WEIGHT: 186 LBS | DIASTOLIC BLOOD PRESSURE: 64 MMHG | RESPIRATION RATE: 22 BRPM | BODY MASS INDEX: 32.96 KG/M2 | OXYGEN SATURATION: 98 % | HEIGHT: 63 IN | HEART RATE: 88 BPM | SYSTOLIC BLOOD PRESSURE: 116 MMHG | TEMPERATURE: 98.9 F

## 2021-01-28 DIAGNOSIS — M79.661 RIGHT CALF PAIN: ICD-10-CM

## 2021-01-28 DIAGNOSIS — M79.661 RIGHT CALF PAIN: Primary | ICD-10-CM

## 2021-01-28 DIAGNOSIS — R23.8 SKIN IRRITATION: ICD-10-CM

## 2021-01-28 DIAGNOSIS — M06.9 RHEUMATOID ARTHRITIS, INVOLVING UNSPECIFIED SITE, UNSPECIFIED WHETHER RHEUMATOID FACTOR PRESENT (HCC): ICD-10-CM

## 2021-01-28 DIAGNOSIS — I20.8 ANGINAL EQUIVALENT (HCC): ICD-10-CM

## 2021-01-28 DIAGNOSIS — E11.40 TYPE 2 DIABETES MELLITUS WITH DIABETIC NEUROPATHY, WITHOUT LONG-TERM CURRENT USE OF INSULIN (HCC): ICD-10-CM

## 2021-01-28 DIAGNOSIS — E66.01 SEVERE OBESITY WITH BODY MASS INDEX (BMI) OF 35.0 TO 39.9 WITH SERIOUS COMORBIDITY (HCC): ICD-10-CM

## 2021-01-28 PROCEDURE — 99214 OFFICE O/P EST MOD 30 MIN: CPT | Performed by: INTERNAL MEDICINE

## 2021-01-28 PROCEDURE — 1090F PRES/ABSN URINE INCON ASSESS: CPT | Performed by: INTERNAL MEDICINE

## 2021-01-28 PROCEDURE — G8432 DEP SCR NOT DOC, RNG: HCPCS | Performed by: INTERNAL MEDICINE

## 2021-01-28 PROCEDURE — G8752 SYS BP LESS 140: HCPCS | Performed by: INTERNAL MEDICINE

## 2021-01-28 PROCEDURE — G8754 DIAS BP LESS 90: HCPCS | Performed by: INTERNAL MEDICINE

## 2021-01-28 PROCEDURE — G8417 CALC BMI ABV UP PARAM F/U: HCPCS | Performed by: INTERNAL MEDICINE

## 2021-01-28 PROCEDURE — 1101F PT FALLS ASSESS-DOCD LE1/YR: CPT | Performed by: INTERNAL MEDICINE

## 2021-01-28 PROCEDURE — 93971 EXTREMITY STUDY: CPT

## 2021-01-28 PROCEDURE — G8536 NO DOC ELDER MAL SCRN: HCPCS | Performed by: INTERNAL MEDICINE

## 2021-01-28 PROCEDURE — G8427 DOCREV CUR MEDS BY ELIG CLIN: HCPCS | Performed by: INTERNAL MEDICINE

## 2021-01-28 RX ORDER — LATANOPROST 50 UG/ML
1 SOLUTION/ DROPS OPHTHALMIC
Status: CANCELLED | OUTPATIENT
Start: 2021-01-28

## 2021-01-28 RX ORDER — DULOXETIN HYDROCHLORIDE 30 MG/1
CAPSULE, DELAYED RELEASE ORAL
Qty: 90 CAP | Refills: 0 | Status: SHIPPED | OUTPATIENT
Start: 2021-01-28 | End: 2021-05-03

## 2021-01-28 RX ORDER — KETOCONAZOLE 20 MG/G
CREAM TOPICAL DAILY
Qty: 30 G | Refills: 3 | Status: SHIPPED | OUTPATIENT
Start: 2021-01-28

## 2021-01-28 NOTE — PROGRESS NOTES
Identified pt with two pt identifiers(name and ). Chief Complaint   Patient presents with    Leg Swelling     for a few months- rt leg worse        Health Maintenance Due   Topic    Eye Exam Retinal or Dilated     COVID-19 Vaccine (1 of 2)    Shingrix Vaccine Age 50> (1 of 2)    GLAUCOMA SCREENING Q2Y        Wt Readings from Last 3 Encounters:   21 186 lb (84.4 kg)   20 189 lb 9.6 oz (86 kg)   20 192 lb (87.1 kg)     Temp Readings from Last 3 Encounters:   21 98.9 °F (37.2 °C) (Oral)   20 99.1 °F (37.3 °C) (Oral)   20 97.7 °F (36.5 °C)     BP Readings from Last 3 Encounters:   21 116/64   20 128/70   20 140/62     Pulse Readings from Last 3 Encounters:   21 88   20 65   20 (!) 55         Learning Assessment:  :     Learning Assessment 2017   PRIMARY LEARNER Patient Patient   HIGHEST LEVEL OF EDUCATION - PRIMARY LEARNER  SOME COLLEGE -   BARRIERS PRIMARY LEARNER NONE -   PRIMARY LANGUAGE ENGLISH ENGLISH   LEARNER PREFERENCE PRIMARY READING LISTENING   ANSWERED BY patient patient   RELATIONSHIP SELF SELF       Depression Screening:  :     3 most recent PHQ Screens 2020   Little interest or pleasure in doing things Not at all   Feeling down, depressed, irritable, or hopeless Not at all   Total Score PHQ 2 0       Fall Risk Assessment:  :     Fall Risk Assessment, last 12 mths 2020   Able to walk? Yes   Fall in past 12 months? 0   Do you feel unsteady? 0   Are you worried about falling 0       Abuse Screening:  :     Abuse Screening Questionnaire 2020 2019 2017 11/15/2016   Do you ever feel afraid of your partner? N N N N   Are you in a relationship with someone who physically or mentally threatens you? N N N N   Is it safe for you to go home?  Y Y Y Y       Coordination of Care Questionnaire:  :     1) Have you been to an emergency room, urgent care clinic since your last visit? no   Hospitalized since your last visit? no             2) Have you seen or consulted any other health care providers outside of [address] [place] [address] since your last visit? no  (Include any pap smears or colon screenings in this section.)    3) Do you have an Advance Directive on file? yes  Are you interested in receiving information about Advance Directives? no    Reviewed record in preparation for visit and have obtained necessary documentation.

## 2021-01-28 NOTE — PATIENT INSTRUCTIONS

## 2021-01-28 NOTE — ASSESSMENT & PLAN NOTE
Improving, based on history, physical exam and review of pertinent labs, studies and medications; meds reconciled; continue current treatment plan, lifestyle modifications recommended, medication compliance emphasized.

## 2021-01-28 NOTE — PROGRESS NOTES
Chief Complaint   Patient presents with    Leg Swelling     for a few months- rt leg worse       Subjective:   Marybeth Henry is a 80 y.o. female who presents today with c/o right leg swelling and discomfort mostly at night time. She reports that the swelling and discomfort resolves during the day. This has been happening for the past few months. She denies increased sodium in her diet. She does not have SOB or chest pain with the symptoms. In addition, has the below listed medical problems. In particular, she has a h/o Type 2 DM. She reports that her BS have been running in the normal range. Diabetic Report Card  Diabetic Goals:  HgA1C <7,  LDL cholesterol <100, Blood pressure <140/80. Lab Results   Component Value Date/Time    Hemoglobin A1c 5.8 (H) 12/15/2020 10:38 AM    Hemoglobin A1c 5.8 (H) 07/20/2020 08:21 AM    Hemoglobin A1c 6.0 (H) 04/27/2020 12:00 AM     Lab Results   Component Value Date/Time    Cholesterol, total 162 12/15/2020 10:38 AM    HDL Cholesterol 57 12/15/2020 10:38 AM    LDL, calculated 88 12/15/2020 10:38 AM    VLDL, calculated 17 12/15/2020 10:38 AM    Triglyceride 93 12/15/2020 10:38 AM     Lab Results   Component Value Date/Time    Microalb/Creat ratio (ug/mg creat.) 6 12/15/2020 10:38 AM       I also recommended yearly eye exams and daily foot care.        Patient Active Problem List    Diagnosis Date Noted    Arthritis, rheumatoid ([address] 08/21/2020    Hypertension 08/21/2020    Osteoarthrosis 08/21/2020    Primary osteoarthritis of right knee 08/03/2020    Type 2 diabetes with nephropathy ([address] 01/13/2019    Anginal equivalent ([address] 08/30/2018    Severe obesity (BMI 35.0-39.9) 06/14/2018    Type 2 diabetes mellitus with complication, without long-term current use of insulin ([address] 01/09/2018    Type 2 diabetes mellitus with diabetic neuropathy ([address] 01/09/2018    Symptomatic bradycardia 01/05/2018    DANIELA on CPAP 07/31/2017    Pure hypercholesterolemia 06/22/2016    Primary open angle glaucoma 06/22/2016    Osteoporosis 06/22/2016    Arthritis of left knee 07/14/2014    GERD (gastroesophageal reflux disease) 03/11/2010         Current Outpatient Medications   Medication Sig Dispense Refill    DULoxetine (CYMBALTA) 30 mg capsule TK 1 C PO D 90 Cap 0    mv,calcium,min/iron/folic/vitK (ONE-A-DAY WOMEN'S COMPLETE PO) Take 1 Tab by mouth daily.  glipiZIDE (GLUCOTROL) 5 mg tablet TAKE 1 TABLET BY MOUTH ONCE DAILY 90 Tab 3    tiZANidine (ZANAFLEX) 4 mg tablet 1-2 tablets, 1-2 hours prior to bedtime. 30 Tab 0    Nystop powder APPLY 1 APPLICATION OF POWDER TOPICALLY TO AFFECTED AREA 4 TIMES DAILY UNTIL RASH RESOLVES 60 g 0    hydrOXYzine HCL (ATARAX) 10 mg tablet TAKE 1 TABLET BY MOUTH THREE TIMES DAILY AS NEEDED FOR ITCHING FOR UP TO 30 DAYS. 30 Tab 2    omeprazole (PRILOSEC) 20 mg capsule Take 1 Cap by mouth daily. 30 Cap 5    furosemide (LASIX) 20 mg tablet Take 1 Tab by mouth daily as needed for Pain (fluid retention). 30 Tab 5    Nebulizers (Sidestream) misc       atorvastatin (LIPITOR) 40 mg tablet Take 1 Tab by mouth nightly for 90 days. 90 Tab 1    ketoconazole (NIZORAL) 2 % topical cream Apply  to affected area daily. 30 g 3    losartan (COZAAR) 50 mg tablet Take 50 mg by mouth daily.  cholecalciferol (Vitamin D3) (1000 Units /25 mcg) tablet Take 1,000 Units by mouth daily.  cyanocobalamin (Vitamin B-12) 1,000 mcg tablet Take 1,000 mcg by mouth daily.  ascorbic acid, vitamin C, (Vitamin C) 250 mg tablet Take 250 mg by mouth daily.  alendronate (FOSAMAX) 70 mg tablet Take 1 Tab by mouth every seven (7) days for 30 days. 4 Tab 3    lancets misc accu-chek softclix lancets Check blood sugar daily E11.9 100 Each 1    glucose blood VI test strips (BLOOD GLUCOSE TEST) strip Accu-chek alexei plus test strips Test blood sugar once daily E11.9 100 Strip 1    Blood-Glucose Meter monitoring kit Pt with T2DM.  Needs meter to check BS 1-2 x daily. E11.9. Please give meter approved by her insurance. 1 Kit 0    ondansetron (ZOFRAN ODT) 8 mg disintegrating tablet Take 1 Tab by mouth every twelve (12) hours as needed for Nausea. 15 Tab 3    oxybutynin (DITROPAN) 5 mg tablet Take 1 Tab by mouth three (3) times daily for 270 days. (Patient taking differently: Take 5 mg by mouth three (3) times daily as needed.) 270 Tab 1    Calcium Carbonate-Vit D3-Min 600 mg calcium- 400 unit tab Take 1 pill 2 x daily (Patient taking differently: Take 1 Tab by mouth daily.) 60 Tab 5    acetaminophen (TYLENOL) 650 mg CR tablet Take 650 mg by mouth every six (6) hours as needed for Pain.  latanoprost (XALATAN) 0.005 % ophthalmic solution Administer 1 Drop to both eyes nightly.            Allergies   Allergen Reactions    Sulfa (Sulfonamide Antibiotics) Hives and Itching         Past Medical History:   Diagnosis Date    Arthritis     Diabetes ([address]     GERD (gastroesophageal reflux disease)     Glaucoma     High cholesterol     Ill-defined condition     EDEMA, LOWER LIMS, URINARY INCONTINENCE    Ill-defined condition     GLAUCOMA    Sleep apnea     NO CPAP    Urge incontinence          Past Surgical History:   Procedure Laterality Date    ENDOSCOPY, COLON, DIAGNOSTIC      2008    HX BREAST BIOPSY Left 1970s    Surgical    HX CATARACT REMOVAL Bilateral     HX GI      RECTAL ABSCESS    HX HEART CATHETERIZATION  01/06/2017    HX HYSTERECTOMY      HX KNEE REPLACEMENT Left 2014    HX UROLOGICAL      BOTOX FOR FREQUENT URINATION    [place] BREAST SURGERY PROCEDURE UNLISTED      LT BREAST CYST BENIGN    [place] DRAINAGE OF DEEP RECTAL ABSCESS           Family History   Problem Relation Age of Onset    Heart Disease Mother     No Known Problems Father     No Known Problems Sister     Cancer Brother         PANCREAS    Heart Disease Sister     Cancer Sister         BREAST    Alcohol abuse Brother     No Known Problems Brother     No Known Problems Brother     No Known Problems Brother     Anesth Problems Neg Hx          Social History     Tobacco Use    Smoking status: Former Smoker     Packs/day: 0.50     Years: 5.00     Pack years: 2.50     Quit date: 1966     Years since quittin.6    Smokeless tobacco: Never Used   Substance Use Topics    Alcohol use: Yes     Alcohol/week: 0.0 standard drinks     Comment: OCCASIONALLY          Review of Systems  Pertinent items are noted in HPI. Objective:     Vitals:    21 1401   BP: 116/64   Pulse: 88   Resp: 22   Temp: 98.9 °F (37.2 °C)   TempSrc: Oral   SpO2: 98%   Weight: 186 lb (84.4 kg)   Height: 5' 3\" (1.6 m)       General: alert, cooperative, no distress   Mental  status: normal mood, behavior, speech, dress, motor activity, and thought processes, able to follow commands   HENT: NCAT   Neck: no visualized mass   Resp: no respiratory distress   Neuro: no gross deficits   Skin: no discoloration or lesions of concern on visible areas   Psychiatric: normal affect, consistent with stated mood, no evidence of hallucinations   Exam of extremities: peripheral pulses normal, no pedal edema, no clubbing or cyanosis, no pedal edema noted, intact peripheral pulses, feet normal, good pulses, normal color, temperature and sensation, monofilament sensory exam is normal in both feet, Karen's sign positive on right calf. No notable swelling is noted. Diabetic foot exam:      Left Foot:              Visual Exam: normal               Pulse DP: 2+ (normal)              Filament test: normal sensation               Vibratory sensation: Vibratory sensation: normal                       Right Foot:              Visual Exam: normal               Pulse DP: 2+ (normal)              Filament test: normal sensation               Vibratory sensation: Vibratory sensation: normal      Assessment/ Plan:   No appreciable swelling was noted in her legs.  Encouraged the use of compression stockings and keeping legs elevated. Also the importance of keeping sodium at 1500 mgs per day was discussed in detail. Diagnoses and all orders for this visit:    1. Right calf pain  -     DUPLEX LOWER EXT VENOUS RIGHT; Future  Duplex US was negative on review before closing this record. Patient will be made aware of the results. 2. Skin irritation  -     ketoconazole (NIZORAL) 2 % topical cream; Apply  to affected area daily. 3. Rheumatoid arthritis, involving unspecified site, unspecified whether rheumatoid factor present [facility])  Assessment & Plan:  Stable, based on history, physical exam and review of pertinent labs, studies and medications; meds reconciled; continue current treatment plan, lifestyle modifications recommended, medication compliance emphasized. 4. Type 2 diabetes mellitus with diabetic neuropathy, without long-term current use of insulin (HCC)  Assessment & Plan:  Stable, based on history, physical exam and review of pertinent labs, studies and medications; meds reconciled; continue current treatment plan, lifestyle modifications recommended, medication compliance emphasized. 5. Severe obesity with body mass index (BMI) of 35.0 to 39.9 with serious comorbidity [facility])  Assessment & Plan:  Improving, based on history, physical exam and review of pertinent labs, studies and medications; meds reconciled; continue current treatment plan, lifestyle modifications recommended, medication compliance emphasized. 6. Anginal equivalent ([address]  Assessment & Plan:  Stable, based on history, physical exam and review of pertinent labs, studies and medications; meds reconciled; continue current treatment plan, lifestyle modifications recommended, medication compliance emphasized. , This condition is managed by Specialist.    I have discussed the diagnosis with the patient and the intended treatment plan as seen in the above orders.  The patient has received an after-visit summary and questions were answered concerning future plans. Asked to return should symptoms worsen or not improve with treatment. Any pending labs and studies will be relayed to patient when they become available. Pt verbalizes understanding of plan of care and denies further questions or concerns at this time. Follow-up and Dispositions    · Return if symptoms worsen or fail to improve. More than 30 minutes was spent with this patient face:face coordinating studies and updating medical record. Latesha Hart MD    Patient Instructions          Leg and Ankle Edema: Care Instructions  Your Care Instructions  Swelling in the legs, ankles, and feet is called edema. It is common after you sit or stand for a while. Long plane flights or car rides often cause swelling in the legs and feet. You may also have swelling if you have to stand for long periods of time at your job. Problems with the veins in the legs (varicose veins) and changes in hormones can also cause swelling. Sometimes the swelling in the ankles and feet is caused by a more serious problem, such as heart failure, infection, blood clots, or liver or kidney disease. Follow-up care is a key part of your treatment and safety. Be sure to make and go to all appointments, and call your doctor if you are having problems. It's also a good idea to know your test results and keep a list of the medicines you take. How can you care for yourself at home? · If your doctor gave you medicine, take it as prescribed. Call your doctor if you think you are having a problem with your medicine. · Whenever you are resting, raise your legs up. Try to keep the swollen area higher than the level of your heart. · Take breaks from standing or sitting in one position. ? Walk around to increase the blood flow in your lower legs. ? Move your feet and ankles often while you stand, or tighten and relax your leg muscles. · Wear support stockings.  Put them on in the morning, before swelling gets worse.  · Eat a balanced diet. Lose weight if you need to. · Limit the amount of salt (sodium) in your diet. Salt holds fluid in the body and may increase swelling. When should you call for help? Call 911 anytime you think you may need emergency care. For example, call if:    · You have symptoms of a blood clot in your lung (called a pulmonary embolism). These may include:  ? Sudden chest pain. ? Trouble breathing. ? Coughing up blood. Call your doctor now or seek immediate medical care if:    · You have signs of a blood clot, such as:  ? Pain in your calf, back of the knee, thigh, or groin. ? Redness and swelling in your leg or groin.     · You have symptoms of infection, such as:  ? Increased pain, swelling, warmth, or redness. ? Red streaks or pus. ? A fever. Watch closely for changes in your health, and be sure to contact your doctor if:    · Your swelling is getting worse.     · You have new or worsening pain in your legs.     · You do not get better as expected. Where can you learn more? Go to http://www.gray.com/  Enter E238 in the search box to learn more about \"Leg and Ankle Edema: Care Instructions. \"  Current as of: June 26, 2019               Content Version: 12.6  © 5132-1603 [employer]. Care instructions adapted under license by [employer] (which disclaims liability or warranty for this information). If you have questions about a medical condition or this instruction, always ask your healthcare professional. [place] [address] any warranty or liability for your use of this information.

## 2021-01-29 ENCOUNTER — TELEPHONE (OUTPATIENT)
Dept: FAMILY MEDICINE CLINIC | Age: 81
End: 2021-01-29

## 2021-01-29 NOTE — PROGRESS NOTES
Please let Ms. Tyrese Newsome know that her ultrasound was negative for DVT. 1. Keep legs elevated at night time. 2. Consider wearing compression stocking. 3. Follow up if symptoms worsen or change.    Thanks,   Dr. Rene Alaniz

## 2021-01-29 NOTE — TELEPHONE ENCOUNTER
----- Message from Sarika Green MD sent at 1/29/2021 12:01 PM EST -----  Please let Ms. Guanakito Cruz know that her ultrasound was negative for DVT. 1. Keep legs elevated at night time. 2. Consider wearing compression stocking. 3. Follow up if symptoms worsen or change.    Thanks,   Dr. Samuel Pisano

## 2021-03-30 DIAGNOSIS — L29.9 ITCHING: ICD-10-CM

## 2021-03-31 RX ORDER — HYDROXYZINE HYDROCHLORIDE 10 MG/1
TABLET, FILM COATED ORAL
Qty: 30 TAB | Refills: 2 | Status: SHIPPED | OUTPATIENT
Start: 2021-03-31 | End: 2021-08-17

## 2021-05-03 RX ORDER — DULOXETIN HYDROCHLORIDE 30 MG/1
CAPSULE, DELAYED RELEASE ORAL
Qty: 90 CAP | Refills: 0 | Status: SHIPPED | OUTPATIENT
Start: 2021-05-03 | End: 2022-03-28

## 2021-05-06 RX ORDER — TIZANIDINE 2 MG/1
TABLET ORAL
Qty: 30 TAB | Refills: 0 | Status: SHIPPED | OUTPATIENT
Start: 2021-05-06 | End: 2021-06-04

## 2021-05-10 RX ORDER — LOSARTAN POTASSIUM 50 MG/1
50 TABLET ORAL DAILY
Qty: 90 TAB | Refills: 0 | Status: SHIPPED | OUTPATIENT
Start: 2021-05-10

## 2021-05-10 NOTE — TELEPHONE ENCOUNTER
----- Message from Patricia Wick sent at 5/10/2021  1:35 PM EDT -----  Regarding: Dr. Derian Alvarado (if not patient): Jonathan Lv Swanson      Relationship of caller (if not patient): daughter      Best contact number(s): [phone]       Name of medication and dosage if known: losarton potassium tabs      Is patient out of this medication (yes/no): yes      Pharmacy name: [facility] listed in chart? (yes/no): yes  Pharmacy phone number: [phone]      Details to clarify the request: n/a      Patricia Wick

## 2021-05-21 ENCOUNTER — OFFICE VISIT (OUTPATIENT)
Dept: FAMILY MEDICINE CLINIC | Age: 81
End: 2021-05-21
Payer: MEDICARE

## 2021-05-21 VITALS
RESPIRATION RATE: 20 BRPM | SYSTOLIC BLOOD PRESSURE: 122 MMHG | HEIGHT: 63 IN | TEMPERATURE: 98.5 F | OXYGEN SATURATION: 97 % | WEIGHT: 191.25 LBS | HEART RATE: 88 BPM | BODY MASS INDEX: 33.89 KG/M2 | DIASTOLIC BLOOD PRESSURE: 68 MMHG

## 2021-05-21 DIAGNOSIS — E55.9 VITAMIN D DEFICIENCY: ICD-10-CM

## 2021-05-21 DIAGNOSIS — R42 DIZZINESS: ICD-10-CM

## 2021-05-21 DIAGNOSIS — Z00.00 MEDICARE ANNUAL WELLNESS VISIT, SUBSEQUENT: Primary | ICD-10-CM

## 2021-05-21 DIAGNOSIS — E11.40 TYPE 2 DIABETES MELLITUS WITH DIABETIC NEUROPATHY, WITHOUT LONG-TERM CURRENT USE OF INSULIN (HCC): ICD-10-CM

## 2021-05-21 DIAGNOSIS — E78.00 PURE HYPERCHOLESTEROLEMIA: ICD-10-CM

## 2021-05-21 PROCEDURE — G8510 SCR DEP NEG, NO PLAN REQD: HCPCS | Performed by: INTERNAL MEDICINE

## 2021-05-21 PROCEDURE — G8752 SYS BP LESS 140: HCPCS | Performed by: INTERNAL MEDICINE

## 2021-05-21 PROCEDURE — G8427 DOCREV CUR MEDS BY ELIG CLIN: HCPCS | Performed by: INTERNAL MEDICINE

## 2021-05-21 PROCEDURE — 1101F PT FALLS ASSESS-DOCD LE1/YR: CPT | Performed by: INTERNAL MEDICINE

## 2021-05-21 PROCEDURE — G0439 PPPS, SUBSEQ VISIT: HCPCS | Performed by: INTERNAL MEDICINE

## 2021-05-21 PROCEDURE — G8536 NO DOC ELDER MAL SCRN: HCPCS | Performed by: INTERNAL MEDICINE

## 2021-05-21 PROCEDURE — G8754 DIAS BP LESS 90: HCPCS | Performed by: INTERNAL MEDICINE

## 2021-05-21 PROCEDURE — G8417 CALC BMI ABV UP PARAM F/U: HCPCS | Performed by: INTERNAL MEDICINE

## 2021-05-21 NOTE — PATIENT INSTRUCTIONS
Well Visit, Over 72: Care Instructions  Overview     Well visits can help you stay healthy. Your doctor has checked your overall health and may have suggested ways to take good care of yourself. Your doctor also may have recommended tests. At home, you can help prevent illness with healthy eating, regular exercise, and other steps. Follow-up care is a key part of your treatment and safety. Be sure to make and go to all appointments, and call your doctor if you are having problems. It's also a good idea to know your test results and keep a list of the medicines you take. How can you care for yourself at home? · Get screening tests that you and your doctor decide on. Screening helps find diseases before any symptoms appear. · Eat healthy foods. Choose fruits, vegetables, whole grains, protein, and low-fat dairy foods. Limit fat, especially saturated fat. Reduce salt in your diet. · Limit alcohol. If you are a man, have no more than 2 drinks a day or 14 drinks a week. If you are a woman, have no more than 1 drink a day or 7 drinks a week. Since alcohol affects older adults differently, you may want to limit alcohol even more. Or you may not want to drink at all. · Get at least 30 minutes of exercise on most days of the week. Walking is a good choice. You also may want to do other activities, such as running, swimming, cycling, or playing tennis or team sports. · Reach and stay at a healthy weight. This will lower your risk for many problems, such as obesity, diabetes, heart disease, and high blood pressure. · Do not smoke. Smoking can make health problems worse. If you need help quitting, talk to your doctor about stop-smoking programs and medicines. These can increase your chances of quitting for good. · Care for your mental health. It is easy to get weighed down by worry and stress. Learn strategies to manage stress, like deep breathing and mindfulness, and stay connected with your family and community. If you find you often feel sad or hopeless, talk with your doctor. Treatment can help. · Talk to your doctor about whether you have any risk factors for sexually transmitted infections (STIs). You can help prevent STIs if you wait to have sex with a new partner (or partners) until you've each been tested for STIs. It also helps if you use condoms (male or female condoms) and if you limit your sex partners to one person who only has sex with you. Vaccines are available for some STIs. · If you think you may have a problem with alcohol or drug use, talk to your doctor. This includes prescription medicines (such as amphetamines and opioids) and illegal drugs (such as cocaine and methamphetamine). Your doctor can help you figure out what type of treatment is best for you. · Protect your skin from too much sun. When you're outdoors from 10 a.m. to 4 p.m., stay in the shade or cover up with clothing and a hat with a wide brim. Wear sunglasses that block UV rays. Even when it's cloudy, put broad-spectrum sunscreen (SPF 30 or higher) on any exposed skin. · See a dentist one or two times a year for checkups and to have your teeth cleaned. · Wear a seat belt in the car. When should you call for help? Watch closely for changes in your health, and be sure to contact your doctor if you have any problems or symptoms that concern you. Where can you learn more? Go to http://www.gray.com/  Enter [id number] in the search box to learn more about \"Well Visit, Over 65: Care Instructions. \"  Current as of: May 27, 2020               Content Version: 12.8  © 5828-1355 [employer]. Care instructions adapted under license by [employer] (which disclaims liability or warranty for this information).  If you have questions about a medical condition or this instruction, always ask your healthcare professional. [address] any warranty or liability for your use of this information.

## 2021-05-21 NOTE — PROGRESS NOTES
Identified pt with two pt identifiers(name and ). Chief Complaint   Patient presents with    Dizziness     started 1 week ago, when standing up        Health Maintenance Due   Topic    Eye Exam Retinal or Dilated     COVID-19 Vaccine (1)    Shingrix Vaccine Age 49> (1 of 2)    Pneumococcal 65+ years (1 of 1 - PPSV23)    Medicare Yearly Exam        Wt Readings from Last 3 Encounters:   21 191 lb 4 oz (86.8 kg)   21 186 lb (84.4 kg)   20 189 lb 9.6 oz (86 kg)     Temp Readings from Last 3 Encounters:   21 98.5 °F (36.9 °C) (Oral)   21 98.9 °F (37.2 °C) (Oral)   20 99.1 °F (37.3 °C) (Oral)     BP Readings from Last 3 Encounters:   21 122/68   21 116/64   20 128/70     Pulse Readings from Last 3 Encounters:   21 88   21 88   20 65         Learning Assessment:  :     Learning Assessment 2017   PRIMARY LEARNER Patient Patient   HIGHEST LEVEL OF EDUCATION - PRIMARY LEARNER  SOME COLLEGE -   BARRIERS PRIMARY LEARNER NONE -   PRIMARY LANGUAGE ENGLISH ENGLISH   LEARNER PREFERENCE PRIMARY READING LISTENING   ANSWERED BY patient patient   RELATIONSHIP SELF SELF       Depression Screening:  :     3 most recent PHQ Screens 2021   Little interest or pleasure in doing things Not at all   Feeling down, depressed, irritable, or hopeless Not at all   Total Score PHQ 2 0       Fall Risk Assessment:  :     Fall Risk Assessment, last 12 mths 2020   Able to walk? Yes   Fall in past 12 months? 0   Do you feel unsteady? 0   Are you worried about falling 0       Abuse Screening:  :     Abuse Screening Questionnaire 2021 2020 2019 2017 11/15/2016   Do you ever feel afraid of your partner? N N N N N   Are you in a relationship with someone who physically or mentally threatens you? N N N N N   Is it safe for you to go home?  Y Y Y Y Y       Coordination of Care Questionnaire:  :     1) Have you been to an emergency room, urgent care clinic since your last visit? no   Hospitalized since your last visit? no             2) Have you seen or consulted any other health care providers outside of [address] [place] [address] since your last visit? no  (Include any pap smears or colon screenings in this section.)    3) Do you have an Advance Directive on file? yes   Are you interested in receiving information about Advance Directives?  no    Reviewed record in preparation for visit and have obtained necessary documentatio

## 2021-05-21 NOTE — PROGRESS NOTES
CC:  Chief Complaint   Patient presents with    Dizziness     started 1 week ago, when standing up       This is the Subsequent Medicare Annual Wellness Exam, performed 12 months or more after the Initial AWV or the last Subsequent AWV    I have reviewed the patient's medical history in detail and updated the computerized patient record. 81F who presents today for follow up and has a concern about dizziness that has been ongoing for the past week. She denies, headache, SOB or chest pain with the episodes. They tend to occur with positional change. Or if she stands up quickly. Her labs are due, but she is not fasting today. She reports that her BS have been normal. The highest it has been is in the 116 range. No hypoglycemic events. Additionally, she has not noticed her BP falling when she has these dizzy episodes. She thinks she is drinking enough water, but not sure. She feels fine in the office. \"I have not had the dizziness today yet. \"    The patient denies syncopal episodes. She did have a work up about 2-years ago for possible bradycardia and at one point, there had been some discussion about pacemaker, but work up with Dr. Radha Sharpe was essentially normal. I have encouraged her to follow up with him. Information given. Assessment/Plan   Education and counseling provided:  Are appropriate based on today's review and evaluation  End-of-Life planning (with patient's consent)  Pneumococcal Vaccine  Influenza Vaccine  Hepatitis B Vaccine  Screening Mammography  Cardiovascular screening blood test  Bone mass measurement (DEXA)  Screening for glaucoma  Diabetes screening test     Diagnoses and all orders for this visit:    1. Medicare annual wellness visit, subsequent   Anticipatory guidance discussed. Immunizations reviewed   HM updated. 2. Type 2 diabetes mellitus with diabetic neuropathy, without long-term current use of insulin (HCC)  -     HEMOGLOBIN A1C WITH EAG; Future    3.  Pure hypercholesterolemia  -     METABOLIC PANEL, COMPREHENSIVE; Future  -     CBC WITH AUTOMATED DIFF; Future  -     LIPID PANEL; Future    4. Vitamin D deficiency  -     VITAMIN D, 25 HYDROXY; Future    5. Dizziness   Transient. No syncope. HR and BP both stable. No episodes today. Review of medications. Will obtain labs as outlined above. Encouraged to follow up with cardiologist.     I have discussed the diagnosis with the patient and the intended treatment plan as seen in the above orders. The patient has received an after-visit summary and questions were answered concerning future plans. Asked to return should symptoms worsen or not improve with treatment. Any pending labs and studies will be relayed to patient when they become available. Pt verbalizes understanding of plan of care and denies further questions or concerns at this time. Follow-up and Dispositions    · Return in about 1 year (around 5/21/2022), or if symptoms worsen or fail to improve, for Follow up 6 months for chronic issues. Depression Risk Factor Screening     3 most recent PHQ Screens 5/21/2021   Little interest or pleasure in doing things Not at all   Feeling down, depressed, irritable, or hopeless Not at all   Total Score PHQ 2 0       Alcohol Risk Screen    Do you average more than 1 drink per night or more than 7 drinks a week:  No    On any one occasion in the past three months have you have had more than 3 drinks containing alcohol:  No        Functional Ability and Level of Safety    Hearing: The patient needs further evaluation. Activities of Daily Living: The home contains: handrails and grab bars  Patient does total self care      Ambulation: with no difficulty     Fall Risk:  Fall Risk Assessment, last 12 mths 12/31/2020   Able to walk? Yes   Fall in past 12 months? 0   Do you feel unsteady?  0   Are you worried about falling 0      Abuse Screen:  Patient is not abused       Cognitive Screening Has your family/caregiver stated any concerns about your memory: yes - feels like her memory has been less than it use to be. Cognitive Screening: Normal - Clock Drawing Test, Mini Cog Test    Health Maintenance Due     Health Maintenance Due   Topic Date Due    Eye Exam Retinal or Dilated  Never done    COVID-19 Vaccine (1) Never done    Shingrix Vaccine Age 50> (1 of 2) Never done    Pneumococcal 65+ years (1 of 1 - PPSV23) Never done       Patient Care Team   Patient Care Team:  Wanda Fajardo MD as PCP - General (Pediatric Medicine)  Wanda Fajardo MD as PCP - [address] [place] [address] Provider  Staci Marin MD as Physician (Cardiology)    History     Patient Active Problem List   Diagnosis Code    GERD (gastroesophageal reflux disease) K21.9    Arthritis of left knee M17.12    Pure hypercholesterolemia E78.00    Primary open angle glaucoma H40.1190    Osteoporosis M81.0    DANIELA on CPAP G47.33, Z99.89    Symptomatic bradycardia R00.1    Type 2 diabetes mellitus with complication, without long-term current use of insulin ([address] E11.8    Type 2 diabetes mellitus with diabetic neuropathy ([address] E11.40    Severe obesity (BMI 35.0-39. 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    Type 2 diabetes with nephropathy ([address] E11.21    Primary osteoarthritis of right knee M17.11    Arthritis, rheumatoid (HCC) M06.9    Hypertension I10    Osteoarthrosis M19.90     Past Medical History:   Diagnosis Date    Arthritis     Diabetes ([address]     GERD (gastroesophageal reflux disease)     Glaucoma     High cholesterol     Ill-defined condition     EDEMA, LOWER LIMS, URINARY INCONTINENCE    Ill-defined condition     GLAUCOMA    Sleep apnea     NO CPAP    Urge incontinence       Past Surgical History:   Procedure Laterality Date    ENDOSCOPY, COLON, DIAGNOSTIC      2008    HX BREAST BIOPSY Left 1970s    Surgical    HX CATARACT REMOVAL Bilateral     HX GI      RECTAL ABSCESS    HX HEART CATHETERIZATION  01/06/2017    HX HYSTERECTOMY      HX KNEE REPLACEMENT Left 2014    HX UROLOGICAL      BOTOX FOR FREQUENT URINATION    GA BREAST SURGERY PROCEDURE UNLISTED      LT BREAST CYST BENIGN    GA DRAINAGE OF DEEP RECTAL ABSCESS       Current Outpatient Medications   Medication Sig Dispense Refill    losartan (COZAAR) 50 mg tablet Take 1 Tab by mouth daily. 90 Tab 0    DULoxetine (CYMBALTA) 30 mg capsule TAKE 1 CAPSULE BY MOUTH EVERY DAY 90 Cap 0    hydrOXYzine HCL (ATARAX) 10 mg tablet TAKE 1 TABLET BY MOUTH THREE TIMES DAILY AS NEEDED FOR ITCHING 30 Tab 2    mv,calcium,min/iron/folic/vitK (ONE-A-DAY WOMEN'S COMPLETE PO) Take 1 Tab by mouth daily.  ketoconazole (NIZORAL) 2 % topical cream Apply  to affected area daily. 30 g 3    glipiZIDE (GLUCOTROL) 5 mg tablet TAKE 1 TABLET BY MOUTH ONCE DAILY 90 Tab 3    tiZANidine (ZANAFLEX) 4 mg tablet 1-2 tablets, 1-2 hours prior to bedtime. 30 Tab 0    Nystop powder APPLY 1 APPLICATION OF POWDER TOPICALLY TO AFFECTED AREA 4 TIMES DAILY UNTIL RASH RESOLVES 60 g 0    omeprazole (PRILOSEC) 20 mg capsule Take 1 Cap by mouth daily. 30 Cap 5    furosemide (LASIX) 20 mg tablet Take 1 Tab by mouth daily as needed for Pain (fluid retention). 30 Tab 5    cholecalciferol (Vitamin D3) (1000 Units /25 mcg) tablet Take 1,000 Units by mouth daily.  cyanocobalamin (Vitamin B-12) 1,000 mcg tablet Take 1,000 mcg by mouth daily.  ascorbic acid, vitamin C, (Vitamin C) 250 mg tablet Take 250 mg by mouth daily.  lancets misc accu-chek softclix lancets Check blood sugar daily E11.9 100 Each 1    glucose blood VI test strips (BLOOD GLUCOSE TEST) strip Accu-chek alexei plus test strips Test blood sugar once daily E11.9 100 Strip 1    Blood-Glucose Meter monitoring kit Pt with T2DM. Needs meter to check BS 1-2 x daily. E11.9. Please give meter approved by her insurance.  1 Kit 0    ondansetron (ZOFRAN ODT) 8 mg disintegrating tablet Take 1 Tab by mouth every twelve (12) hours as needed for Nausea. 15 Tab 3    Calcium Carbonate-Vit D3-Min 600 mg calcium- 400 unit tab Take 1 pill 2 x daily (Patient taking differently: Take 1 Tab by mouth daily.) 60 Tab 5    acetaminophen (TYLENOL) 650 mg CR tablet Take 650 mg by mouth every six (6) hours as needed for Pain.  latanoprost (XALATAN) 0.005 % ophthalmic solution Administer 1 Drop to both eyes nightly.  tiZANidine (ZANAFLEX) 2 mg tablet TAKE 1 TO 2 TABLETS BY MOUTH 1 TO 2 HOURS PRIOR TO BEDTIME AS NEEDED FOR SPASM (Patient not taking: Reported on 2021) 30 Tab 0    Nebulizers (Sidestream) misc       atorvastatin (LIPITOR) 40 mg tablet Take 1 Tab by mouth nightly for 90 days. 90 Tab 1    alendronate (FOSAMAX) 70 mg tablet Take 1 Tab by mouth every seven (7) days for 30 days. 4 Tab 3    oxybutynin (DITROPAN) 5 mg tablet Take 1 Tab by mouth three (3) times daily for 270 days. (Patient taking differently: Take 5 mg by mouth three (3) times daily as needed.) 270 Tab 1     Allergies   Allergen Reactions    Sulfa (Sulfonamide Antibiotics) Hives and Itching       Family History   Problem Relation Age of Onset    Heart Disease Mother     No Known Problems Father     No Known Problems Sister     Cancer Brother         PANCREAS    Heart Disease Sister     Cancer Sister         BREAST    Alcohol abuse Brother     No Known Problems Brother     No Known Problems Brother     No Known Problems Brother     Anesth Problems Neg Hx      Social History     Tobacco Use    Smoking status: Former Smoker     Packs/day: 0.50     Years: 5.00     Pack years: 2.50     Quit date: 1966     Years since quittin.8    Smokeless tobacco: Never Used   Substance Use Topics    Alcohol use: Yes     Alcohol/week: 0.0 standard drinks     Comment: OCCASIONALLY       Gali Alexander MD    Patient Instructions        Well Visit, Over 72: Care Instructions  Overview     Well visits can help you stay healthy.  Your doctor has checked your overall health and may have suggested ways to take good care of yourself. Your doctor also may have recommended tests. At home, you can help prevent illness with healthy eating, regular exercise, and other steps. Follow-up care is a key part of your treatment and safety. Be sure to make and go to all appointments, and call your doctor if you are having problems. It's also a good idea to know your test results and keep a list of the medicines you take. How can you care for yourself at home? · Get screening tests that you and your doctor decide on. Screening helps find diseases before any symptoms appear. · Eat healthy foods. Choose fruits, vegetables, whole grains, protein, and low-fat dairy foods. Limit fat, especially saturated fat. Reduce salt in your diet. · Limit alcohol. If you are a man, have no more than 2 drinks a day or 14 drinks a week. If you are a woman, have no more than 1 drink a day or 7 drinks a week. Since alcohol affects older adults differently, you may want to limit alcohol even more. Or you may not want to drink at all. · Get at least 30 minutes of exercise on most days of the week. Walking is a good choice. You also may want to do other activities, such as running, swimming, cycling, or playing tennis or team sports. · Reach and stay at a healthy weight. This will lower your risk for many problems, such as obesity, diabetes, heart disease, and high blood pressure. · Do not smoke. Smoking can make health problems worse. If you need help quitting, talk to your doctor about stop-smoking programs and medicines. These can increase your chances of quitting for good. · Care for your mental health. It is easy to get weighed down by worry and stress. Learn strategies to manage stress, like deep breathing and mindfulness, and stay connected with your family and community. If you find you often feel sad or hopeless, talk with your doctor. Treatment can help.   · Talk to your doctor about whether you have any risk factors for sexually transmitted infections (STIs). You can help prevent STIs if you wait to have sex with a new partner (or partners) until you've each been tested for STIs. It also helps if you use condoms (male or female condoms) and if you limit your sex partners to one person who only has sex with you. Vaccines are available for some STIs. · If you think you may have a problem with alcohol or drug use, talk to your doctor. This includes prescription medicines (such as amphetamines and opioids) and illegal drugs (such as cocaine and methamphetamine). Your doctor can help you figure out what type of treatment is best for you. · Protect your skin from too much sun. When you're outdoors from 10 a.m. to 4 p.m., stay in the shade or cover up with clothing and a hat with a wide brim. Wear sunglasses that block UV rays. Even when it's cloudy, put broad-spectrum sunscreen (SPF 30 or higher) on any exposed skin. · See a dentist one or two times a year for checkups and to have your teeth cleaned. · Wear a seat belt in the car. When should you call for help? Watch closely for changes in your health, and be sure to contact your doctor if you have any problems or symptoms that concern you. Where can you learn more? Go to http://www.gray.com/  Enter [id number] in the search box to learn more about \"Well Visit, Over 65: Care Instructions. \"  Current as of: May 27, 2020               Content Version: 12.8  © 2006-2021 [employer]. Care instructions adapted under license by [employer] (which disclaims liability or warranty for this information). If you have questions about a medical condition or this instruction, always ask your healthcare professional. [address] any warranty or liability for your use of this information.

## 2021-06-04 LAB
25(OH)D3+25(OH)D2 SERPL-MCNC: 47.5 NG/ML (ref 30–100)
ALBUMIN SERPL-MCNC: 4.7 G/DL (ref 3.6–4.6)
ALBUMIN/GLOB SERPL: 1.3 {RATIO} (ref 1.2–2.2)
ALP SERPL-CCNC: 106 IU/L (ref 48–121)
ALT SERPL-CCNC: 12 IU/L (ref 0–32)
AST SERPL-CCNC: 16 IU/L (ref 0–40)
BASOPHILS # BLD AUTO: 0 X10E3/UL (ref 0–0.2)
BASOPHILS NFR BLD AUTO: 1 %
BILIRUB SERPL-MCNC: 0.3 MG/DL (ref 0–1.2)
BUN SERPL-MCNC: 18 MG/DL (ref 8–27)
BUN/CREAT SERPL: 20 (ref 12–28)
CALCIUM SERPL-MCNC: 10.4 MG/DL (ref 8.7–10.3)
CHLORIDE SERPL-SCNC: 100 MMOL/L (ref 96–106)
CHOLEST SERPL-MCNC: 184 MG/DL (ref 100–199)
CO2 SERPL-SCNC: 25 MMOL/L (ref 20–29)
CREAT SERPL-MCNC: 0.88 MG/DL (ref 0.57–1)
EOSINOPHIL # BLD AUTO: 0 X10E3/UL (ref 0–0.4)
EOSINOPHIL NFR BLD AUTO: 1 %
ERYTHROCYTE [DISTWIDTH] IN BLOOD BY AUTOMATED COUNT: 13.8 % (ref 11.7–15.4)
EST. AVERAGE GLUCOSE BLD GHB EST-MCNC: 123 MG/DL
GLOBULIN SER CALC-MCNC: 3.7 G/DL (ref 1.5–4.5)
GLUCOSE SERPL-MCNC: 111 MG/DL (ref 65–99)
HBA1C MFR BLD: 5.9 % (ref 4.8–5.6)
HCT VFR BLD AUTO: 38.2 % (ref 34–46.6)
HDLC SERPL-MCNC: 52 MG/DL
HGB BLD-MCNC: 12.8 G/DL (ref 11.1–15.9)
IMM GRANULOCYTES # BLD AUTO: 0 X10E3/UL (ref 0–0.1)
IMM GRANULOCYTES NFR BLD AUTO: 0 %
IMP & REVIEW OF LAB RESULTS: NORMAL
LDLC SERPL CALC-MCNC: 113 MG/DL (ref 0–99)
LYMPHOCYTES # BLD AUTO: 1.3 X10E3/UL (ref 0.7–3.1)
LYMPHOCYTES NFR BLD AUTO: 22 %
MCH RBC QN AUTO: 29 PG (ref 26.6–33)
MCHC RBC AUTO-ENTMCNC: 33.5 G/DL (ref 31.5–35.7)
MCV RBC AUTO: 87 FL (ref 79–97)
MONOCYTES # BLD AUTO: 0.4 X10E3/UL (ref 0.1–0.9)
MONOCYTES NFR BLD AUTO: 6 %
NEUTROPHILS # BLD AUTO: 4.4 X10E3/UL (ref 1.4–7)
NEUTROPHILS NFR BLD AUTO: 70 %
PLATELET # BLD AUTO: 306 X10E3/UL (ref 150–450)
POTASSIUM SERPL-SCNC: 4.4 MMOL/L (ref 3.5–5.2)
PROT SERPL-MCNC: 8.4 G/DL (ref 6–8.5)
RBC # BLD AUTO: 4.41 X10E6/UL (ref 3.77–5.28)
SODIUM SERPL-SCNC: 137 MMOL/L (ref 134–144)
TRIGL SERPL-MCNC: 105 MG/DL (ref 0–149)
VLDLC SERPL CALC-MCNC: 19 MG/DL (ref 5–40)
WBC # BLD AUTO: 6.1 X10E3/UL (ref 3.4–10.8)

## 2021-06-04 RX ORDER — TIZANIDINE 2 MG/1
TABLET ORAL
Qty: 30 TABLET | Refills: 0 | Status: SHIPPED | OUTPATIENT
Start: 2021-06-04 | End: 2021-07-15

## 2021-07-15 RX ORDER — TIZANIDINE 2 MG/1
TABLET ORAL
Qty: 30 TABLET | Refills: 0 | Status: SHIPPED | OUTPATIENT
Start: 2021-07-15

## 2021-08-17 DIAGNOSIS — L29.9 ITCHING: ICD-10-CM

## 2021-08-17 RX ORDER — HYDROXYZINE HYDROCHLORIDE 10 MG/1
TABLET, FILM COATED ORAL
Qty: 30 TABLET | Refills: 0 | Status: SHIPPED | OUTPATIENT
Start: 2021-08-17 | End: 2021-10-13 | Stop reason: SDUPTHER

## 2021-10-11 ENCOUNTER — OFFICE VISIT (OUTPATIENT)
Dept: FAMILY MEDICINE CLINIC | Age: 81
End: 2021-10-11
Payer: MEDICARE

## 2021-10-11 VITALS
OXYGEN SATURATION: 96 % | BODY MASS INDEX: 35.79 KG/M2 | SYSTOLIC BLOOD PRESSURE: 118 MMHG | RESPIRATION RATE: 20 BRPM | HEIGHT: 63 IN | DIASTOLIC BLOOD PRESSURE: 68 MMHG | TEMPERATURE: 98.2 F | HEART RATE: 92 BPM | WEIGHT: 202 LBS

## 2021-10-11 DIAGNOSIS — M79.89 RIGHT LEG SWELLING: ICD-10-CM

## 2021-10-11 DIAGNOSIS — R19.09 UMBILICAL MASS: Primary | ICD-10-CM

## 2021-10-11 PROCEDURE — 1090F PRES/ABSN URINE INCON ASSESS: CPT | Performed by: INTERNAL MEDICINE

## 2021-10-11 PROCEDURE — G8427 DOCREV CUR MEDS BY ELIG CLIN: HCPCS | Performed by: INTERNAL MEDICINE

## 2021-10-11 PROCEDURE — G8536 NO DOC ELDER MAL SCRN: HCPCS | Performed by: INTERNAL MEDICINE

## 2021-10-11 PROCEDURE — G8510 SCR DEP NEG, NO PLAN REQD: HCPCS | Performed by: INTERNAL MEDICINE

## 2021-10-11 PROCEDURE — G8754 DIAS BP LESS 90: HCPCS | Performed by: INTERNAL MEDICINE

## 2021-10-11 PROCEDURE — G8417 CALC BMI ABV UP PARAM F/U: HCPCS | Performed by: INTERNAL MEDICINE

## 2021-10-11 PROCEDURE — 1101F PT FALLS ASSESS-DOCD LE1/YR: CPT | Performed by: INTERNAL MEDICINE

## 2021-10-11 PROCEDURE — 99213 OFFICE O/P EST LOW 20 MIN: CPT | Performed by: INTERNAL MEDICINE

## 2021-10-11 PROCEDURE — G8752 SYS BP LESS 140: HCPCS | Performed by: INTERNAL MEDICINE

## 2021-10-11 NOTE — PATIENT INSTRUCTIONS
Learning About Low-Sodium Foods  What foods are low in sodium? The foods you eat contain nutrients, such as vitamins and minerals. Sodium is a nutrient. Your body needs the right amount to stay healthy and work as it should. You can use the list below to help you make choices about which foods to eat. Fruits  · Fresh, frozen, canned, or dried fruit  Vegetables  · Fresh or frozen vegetables, with no added salt  · Canned vegetables, low-sodium or with no added salt  Grains  · Bagels without salted tops  · Cereal with no added salt  · Corn tortillas  · Crackers with no added salt  · Oatmeal, cooked without salt  · Popcorn with no salt  · Pasta and noodles, cooked without salt  · Rice, cooked without salt  · Unsalted pretzels  Dairy and dairy alternatives  · Butter, unsalted  · Cream cheese  · Ice cream  · Milk  · Soy milk  Meats and other protein foods  · Beans and peas, canned with no salt  · Eggs  · Fresh fish (not smoked)  · Fresh meats (not smoked or cured)  · Nuts and nut butter, prepared without salt  · Poultry, not packaged with sodium solution  · Tofu, unseasoned  · Tuna, canned without salt  Seasonings  · Garlic  · Herbs and spices  · Lemon juice  · Mustard  · Olive oil  · Salt-free seasoning mixes  · Vinegar  Work with your doctor to find out how much of this nutrient you need. Depending on your health, you may need more or less of it in your diet. Where can you learn more? Go to http://www.gray.com/  Enter S460 in the search box to learn more about \"Learning About Low-Sodium Foods. \"  Current as of: December 17, 2020               Content Version: 13.0  © 2684-5084 [employer]. Care instructions adapted under license by [employer] (which disclaims liability or warranty for this information).  If you have questions about a medical condition or this instruction, always ask your healthcare professional. Nimisha Alaniz disclaims any warranty or liability for your use of this information.

## 2021-10-11 NOTE — PROGRESS NOTES
Identified pt with two pt identifiers(name and ). Chief Complaint   Patient presents with    Swelling     legs, ankles, feet over a week- LT leg worse    Hernia (Non Specific)     navel area x 1 month        Health Maintenance Due   Topic    Eye Exam Retinal or Dilated     Shingrix Vaccine Age 50> (1 of 2)    Pneumococcal 65+ years (1 of 1 - PPSV23)    Flu Vaccine (1)       Wt Readings from Last 3 Encounters:   10/11/21 202 lb (91.6 kg)   21 191 lb 4 oz (86.8 kg)   21 186 lb (84.4 kg)     Temp Readings from Last 3 Encounters:   10/11/21 98.2 °F (36.8 °C) (Oral)   21 98.5 °F (36.9 °C) (Oral)   21 98.9 °F (37.2 °C) (Oral)     BP Readings from Last 3 Encounters:   10/11/21 118/68   21 122/68   21 116/64     Pulse Readings from Last 3 Encounters:   10/11/21 92   21 88   21 88         Learning Assessment:  :     Learning Assessment 2017   PRIMARY LEARNER Patient Patient   HIGHEST LEVEL OF EDUCATION - PRIMARY LEARNER  SOME COLLEGE -   BARRIERS PRIMARY LEARNER NONE -   PRIMARY LANGUAGE ENGLISH ENGLISH   LEARNER PREFERENCE PRIMARY READING LISTENING   ANSWERED BY patient patient   RELATIONSHIP SELF SELF       Depression Screening:  :     3 most recent PHQ Screens 10/11/2021   Little interest or pleasure in doing things Not at all   Feeling down, depressed, irritable, or hopeless Not at all   Total Score PHQ 2 0       Fall Risk Assessment:  :     Fall Risk Assessment, last 12 mths 2020   Able to walk? Yes   Fall in past 12 months? 0   Do you feel unsteady? 0   Are you worried about falling 0       Abuse Screening:  :     Abuse Screening Questionnaire 2021 2020 2019 2017 11/15/2016   Do you ever feel afraid of your partner? N N N N N   Are you in a relationship with someone who physically or mentally threatens you? N N N N N   Is it safe for you to go home?  Irma Vega       Coordination of Care Questionnaire:  :     1) Have you been to an emergency room, urgent care clinic since your last visit? no   Hospitalized since your last visit? no             2) Have you seen or consulted any other health care providers outside of [address] [place] [address] since your last visit? no  (Include any pap smears or colon screenings in this section.)    3) Do you have an Advance Directive on file? yes  Are you interested in receiving information about Advance Directives? no    Patient is accompanied by daughter I have received verbal consent from Ingrid De Leon to discuss any/all medical information while they are present in the room. Reviewed record in preparation for visit and have obtained necessary documentation.

## 2021-10-13 DIAGNOSIS — M54.9 UPPER BACK PAIN: ICD-10-CM

## 2021-10-13 DIAGNOSIS — M62.838 MUSCLE SPASM: ICD-10-CM

## 2021-10-13 DIAGNOSIS — L29.9 ITCHING: ICD-10-CM

## 2021-10-13 RX ORDER — HYDROXYZINE HYDROCHLORIDE 10 MG/1
TABLET, FILM COATED ORAL
Qty: 30 TABLET | Refills: 0 | Status: SHIPPED | OUTPATIENT
Start: 2021-10-13 | End: 2021-12-02

## 2021-10-13 RX ORDER — TIZANIDINE 4 MG/1
TABLET ORAL
Qty: 30 TABLET | Refills: 0 | Status: SHIPPED | OUTPATIENT
Start: 2021-10-13

## 2021-10-17 NOTE — PROGRESS NOTES
Chief Complaint   Patient presents with    Swelling     legs, ankles, feet over a week- LT leg worse    Hernia (Non Specific)     navel area x 1 month         Assessment/ Plan:   Diagnoses and all orders for this visit:    1. Umbilical mass  -     US ABD LTD; Future  - If swelling protrudes and cannot be reduced, then this is an emergency. 2. Right leg swelling  -     DUPLEX LOWER EXT VENOUS RIGHT; Future    I have discussed the diagnosis with the patient and the intended treatment plan as seen in the above orders. The patient has received an after-visit summary and questions were answered concerning future plans. Asked to return should symptoms worsen or not improve with treatment. Any pending labs and studies will be relayed to patient when they become available. Pt verbalizes understanding of plan of care and denies further questions or concerns at this time. Follow-up and Dispositions    · Return if symptoms worsen or fail to improve. Subjective:   Martha Mead is a 80 y.o. female who presents today for an acute visit and complaints of  worsening swelling in both legs. L>>R  for the past month. She denies any injuries to her legs. She has not had SOB or dizziness. She does not wear supportive hose because they are all \"too tight\". IN addition, she is troubled by a slight swelling and protrusion she has noticed in the navel area for about 1 month. She has no previous h/o hernia. The swelling can be uncomfortable at times. She does have a h/o T2DM, HTN, HLD and GERD.      Patient Active Problem List    Diagnosis Date Noted    Arthritis, rheumatoid ([address] 08/21/2020    Hypertension 08/21/2020    Osteoarthrosis 08/21/2020    Primary osteoarthritis of right knee 08/03/2020    Type 2 diabetes with nephropathy ([address] 01/13/2019    Anginal equivalent ([address] 08/30/2018    Severe obesity (BMI 35.0-39.9) 06/14/2018    Type 2 diabetes mellitus with complication, without long-term current use of insulin ([facility] 01/09/2018    Type 2 diabetes mellitus with diabetic neuropathy ([facility] 01/09/2018    Symptomatic bradycardia 01/05/2018    DANIELA on CPAP 07/31/2017    Pure hypercholesterolemia 06/22/2016    Primary open angle glaucoma 06/22/2016    Osteoporosis 06/22/2016    Arthritis of left knee 07/14/2014    GERD (gastroesophageal reflux disease) 03/11/2010         Current Outpatient Medications   Medication Sig Dispense Refill    losartan (COZAAR) 50 mg tablet Take 1 Tab by mouth daily. 90 Tab 0    DULoxetine (CYMBALTA) 30 mg capsule TAKE 1 CAPSULE BY MOUTH EVERY DAY 90 Cap 0    mv,calcium,min/iron/folic/vitK (ONE-A-DAY WOMEN'S COMPLETE PO) Take 1 Tab by mouth daily.  ketoconazole (NIZORAL) 2 % topical cream Apply  to affected area daily. 30 g 3    glipiZIDE (GLUCOTROL) 5 mg tablet TAKE 1 TABLET BY MOUTH ONCE DAILY 90 Tab 3    Nystop powder APPLY 1 APPLICATION OF POWDER TOPICALLY TO AFFECTED AREA 4 TIMES DAILY UNTIL RASH RESOLVES 60 g 0    omeprazole (PRILOSEC) 20 mg capsule Take 1 Cap by mouth daily. 30 Cap 5    furosemide (LASIX) 20 mg tablet Take 1 Tab by mouth daily as needed for Pain (fluid retention). 30 Tab 5    Nebulizers (Sidestream) misc       atorvastatin (LIPITOR) 40 mg tablet Take 1 Tab by mouth nightly for 90 days. 90 Tab 1    cholecalciferol (Vitamin D3) (1000 Units /25 mcg) tablet Take 1,000 Units by mouth daily.  cyanocobalamin (Vitamin B-12) 1,000 mcg tablet Take 1,000 mcg by mouth daily.  ascorbic acid, vitamin C, (Vitamin C) 250 mg tablet Take 250 mg by mouth daily.  alendronate (FOSAMAX) 70 mg tablet Take 1 Tab by mouth every seven (7) days for 30 days. 4 Tab 3    lancets misc accu-chek softclix lancets Check blood sugar daily E11.9 100 Each 1    glucose blood VI test strips (BLOOD GLUCOSE TEST) strip Accu-chek alexei plus test strips Test blood sugar once daily E11.9 100 Strip 1    Blood-Glucose Meter monitoring kit Pt with T2DM.  Needs meter to check BS 1-2 x daily. E11.9. Please give meter approved by her insurance. 1 Kit 0    ondansetron (ZOFRAN ODT) 8 mg disintegrating tablet Take 1 Tab by mouth every twelve (12) hours as needed for Nausea. 15 Tab 3    oxybutynin (DITROPAN) 5 mg tablet Take 1 Tab by mouth three (3) times daily for 270 days. (Patient taking differently: Take 5 mg by mouth three (3) times daily as needed.) 270 Tab 1    Calcium Carbonate-Vit D3-Min 600 mg calcium- 400 unit tab Take 1 pill 2 x daily (Patient taking differently: Take 1 Tab by mouth daily.) 60 Tab 5    acetaminophen (TYLENOL) 650 mg CR tablet Take 650 mg by mouth every six (6) hours as needed for Pain.  latanoprost (XALATAN) 0.005 % ophthalmic solution Administer 1 Drop to both eyes nightly.  tiZANidine (ZANAFLEX) 4 mg tablet 1-2 tablets, 1-2 hours prior to bedtime.  30 Tablet 0    hydrOXYzine HCL (ATARAX) 10 mg tablet TAKE 1 TABLET BY MOUTH THREE TIMES DAILY AS NEEDED FOR ITCHING 30 Tablet 0    tiZANidine (ZANAFLEX) 2 mg tablet TAKE 1 TO 2 TABLETS BY MOUTH 1-2 HOURS TO BEDTIME AS NEEDED FOR SPASM (Patient not taking: Reported on 10/11/2021) 30 Tablet 0         Allergies   Allergen Reactions    Sulfa (Sulfonamide Antibiotics) Hives and Itching         Past Medical History:   Diagnosis Date    Arthritis     Diabetes [facility]     GERD (gastroesophageal reflux disease)     Glaucoma     High cholesterol     Ill-defined condition     EDEMA, LOWER LIMS, URINARY INCONTINENCE    Ill-defined condition     GLAUCOMA    Sleep apnea     NO CPAP    Urge incontinence          Past Surgical History:   Procedure Laterality Date    ENDOSCOPY, COLON, DIAGNOSTIC      2008    HX BREAST BIOPSY Left 1970s    Surgical    HX CATARACT REMOVAL Bilateral     HX GI      RECTAL ABSCESS    HX HEART CATHETERIZATION  01/06/2017    HX HYSTERECTOMY      HX KNEE REPLACEMENT Left 2014    HX UROLOGICAL      BOTOX FOR FREQUENT URINATION    TN BREAST SURGERY PROCEDURE UNLISTED      LT BREAST CYST BENIGN    [place] DRAINAGE OF DEEP RECTAL ABSCESS           Family History   Problem Relation Age of Onset    Heart Disease Mother     No Known Problems Father     No Known Problems Sister     Cancer Brother         PANCREAS    Heart Disease Sister     Cancer Sister         BREAST    Alcohol abuse Brother     No Known Problems Brother     No Known Problems Brother     No Known Problems Brother     Anesth Problems Neg Hx          Social History     Tobacco Use    Smoking status: Former Smoker     Packs/day: 0.50     Years: 5.00     Pack years: 2.50     Quit date: 1966     Years since quittin.3    Smokeless tobacco: Never Used   Substance Use Topics    Alcohol use: Yes     Alcohol/week: 0.0 standard drinks     Comment: OCCASIONALLY          Review of Systems  Review of Systems   Constitutional: Positive for malaise/fatigue. HENT: Negative. Eyes: Negative. Respiratory: Negative. Cardiovascular: Positive for leg swelling. Gastrointestinal: Positive for abdominal pain (discomfort near umbilical bulge. ). Genitourinary: Negative. Musculoskeletal: Negative. Skin: Negative. Neurological: Negative. Endo/Heme/Allergies: Negative. Psychiatric/Behavioral: Negative. All other systems reviewed and are negative. Objective:     Vitals:    10/11/21 1449   BP: 118/68   Pulse: 92   Resp: 20   Temp: 98.2 °F (36.8 °C)   TempSrc: Oral   SpO2: 96%   Weight: 202 lb (91.6 kg)   Height: 5' 3\" (1.6 m)     General appearance: alert, well appearing, and in no distress. Chest: clear to auscultation, no wheezes, rales or rhonchi, symmetric air entry. CVS exam: normal rate, regular rhythm, normal S1, S2, no murmurs, rubs, clicks or gallops. Abdominal exam: tenderness noted round umbilical area. BS normal. No HSM.   Exam of extremities: peripheral pulses normal, no pedal edema, no clubbing or cyanosis  Skin exam - normal coloration and turgor, no rashes, no suspicious skin lesions noted. Neurological exam reveals alert, oriented, normal speech, no focal findings or movement disorder noted. Angela Glass MD  Patient Instructions        Learning About Low-Sodium Foods  What foods are low in sodium? The foods you eat contain nutrients, such as vitamins and minerals. Sodium is a nutrient. Your body needs the right amount to stay healthy and work as it should. You can use the list below to help you make choices about which foods to eat. Fruits  · Fresh, frozen, canned, or dried fruit  Vegetables  · Fresh or frozen vegetables, with no added salt  · Canned vegetables, low-sodium or with no added salt  Grains  · Bagels without salted tops  · Cereal with no added salt  · Corn tortillas  · Crackers with no added salt  · Oatmeal, cooked without salt  · Popcorn with no salt  · Pasta and noodles, cooked without salt  · Rice, cooked without salt  · Unsalted pretzels  Dairy and dairy alternatives  · Butter, unsalted  · Cream cheese  · Ice cream  · Milk  · Soy milk  Meats and other protein foods  · Beans and peas, canned with no salt  · Eggs  · Fresh fish (not smoked)  · Fresh meats (not smoked or cured)  · Nuts and nut butter, prepared without salt  · Poultry, not packaged with sodium solution  · Tofu, unseasoned  · Tuna, canned without salt  Seasonings  · Garlic  · Herbs and spices  · Lemon juice  · Mustard  · Olive oil  · Salt-free seasoning mixes  · Vinegar  Work with your doctor to find out how much of this nutrient you need. Depending on your health, you may need more or less of it in your diet. Where can you learn more? Go to http://www.gray.com/  Enter S460 in the search box to learn more about \"Learning About Low-Sodium Foods. \"  Current as of: December 17, 2020               Content Version: 13.0  © 8693-3899 [employer].    Care instructions adapted under license by [employer] (which disclaims liability or warranty for this information). If you have questions about a medical condition or this instruction, always ask your healthcare professional. [place] [address] any warranty or liability for your use of this information.

## 2021-10-18 ENCOUNTER — HOSPITAL ENCOUNTER (OUTPATIENT)
Dept: ULTRASOUND IMAGING | Age: 81
Discharge: HOME | End: 2021-10-18
Attending: INTERNAL MEDICINE
Payer: MEDICARE

## 2021-10-18 ENCOUNTER — TELEPHONE (OUTPATIENT)
Dept: FAMILY MEDICINE CLINIC | Age: 81
End: 2021-10-18

## 2021-10-18 ENCOUNTER — HOSPITAL ENCOUNTER (OUTPATIENT)
Dept: VASCULAR SURGERY | Age: 81
Discharge: HOME | End: 2021-10-18
Attending: INTERNAL MEDICINE
Payer: MEDICARE

## 2021-10-18 DIAGNOSIS — M79.89 RIGHT LEG SWELLING: ICD-10-CM

## 2021-10-18 DIAGNOSIS — M79.89 LEFT LEG SWELLING: ICD-10-CM

## 2021-10-18 DIAGNOSIS — K42.9 UMBILICAL HERNIA WITHOUT OBSTRUCTION AND WITHOUT GANGRENE: Primary | ICD-10-CM

## 2021-10-18 DIAGNOSIS — R19.09 UMBILICAL MASS: ICD-10-CM

## 2021-10-18 PROCEDURE — 76705 ECHO EXAM OF ABDOMEN: CPT

## 2021-10-18 PROCEDURE — 93970 EXTREMITY STUDY: CPT

## 2021-10-18 NOTE — TELEPHONE ENCOUNTER
----- Message from Alexandre Chung MD sent at 10/18/2021 10:27 AM EDT -----  Please let patient know that she does have an umbilical hernia and I will be referring her to see Dr. Emma Gordon (surgeon). Referral in the system.

## 2021-10-18 NOTE — TELEPHONE ENCOUNTER
Spoke to pt's daughter and relayed message and referral information. She will call to schedule appointment.

## 2021-10-18 NOTE — PROGRESS NOTES
Please let patient know that she does have an umbilical hernia and I will be referring her to see Dr. Clay Mcintosh (surgeon). Referral in the system.

## 2021-10-20 ENCOUNTER — TELEPHONE (OUTPATIENT)
Dept: FAMILY MEDICINE CLINIC | Age: 81
End: 2021-10-20

## 2021-10-20 ENCOUNTER — OFFICE VISIT (OUTPATIENT)
Dept: SURGERY | Age: 81
End: 2021-10-20
Payer: MEDICARE

## 2021-10-20 VITALS
HEART RATE: 68 BPM | DIASTOLIC BLOOD PRESSURE: 60 MMHG | HEIGHT: 63 IN | WEIGHT: 203 LBS | TEMPERATURE: 98.6 F | BODY MASS INDEX: 35.97 KG/M2 | SYSTOLIC BLOOD PRESSURE: 117 MMHG

## 2021-10-20 DIAGNOSIS — R59.9 LYMPH NODES ENLARGED: Primary | ICD-10-CM

## 2021-10-20 DIAGNOSIS — K43.6 INCARCERATED VENTRAL HERNIA: ICD-10-CM

## 2021-10-20 PROCEDURE — 1090F PRES/ABSN URINE INCON ASSESS: CPT | Performed by: SURGERY

## 2021-10-20 PROCEDURE — G8752 SYS BP LESS 140: HCPCS | Performed by: SURGERY

## 2021-10-20 PROCEDURE — G8754 DIAS BP LESS 90: HCPCS | Performed by: SURGERY

## 2021-10-20 PROCEDURE — G8417 CALC BMI ABV UP PARAM F/U: HCPCS | Performed by: SURGERY

## 2021-10-20 PROCEDURE — 1101F PT FALLS ASSESS-DOCD LE1/YR: CPT | Performed by: SURGERY

## 2021-10-20 PROCEDURE — 99204 OFFICE O/P NEW MOD 45 MIN: CPT | Performed by: SURGERY

## 2021-10-20 PROCEDURE — G8427 DOCREV CUR MEDS BY ELIG CLIN: HCPCS | Performed by: SURGERY

## 2021-10-20 PROCEDURE — G8536 NO DOC ELDER MAL SCRN: HCPCS | Performed by: SURGERY

## 2021-10-20 PROCEDURE — G8432 DEP SCR NOT DOC, RNG: HCPCS | Performed by: SURGERY

## 2021-10-20 RX ORDER — GUAIFENESIN 100 MG/5ML
81 LIQUID (ML) ORAL DAILY
COMMUNITY

## 2021-10-20 RX ORDER — DIPHENHYDRAMINE HCL 25 MG
25 TABLET ORAL
COMMUNITY

## 2021-10-20 NOTE — PROGRESS NOTES
Surgery History and Physical    Subjective:      Portia Pike is a 80 y.o. black female who presents for evaluation of an umbilical hernia. About 3 months ago, Mrs. Yaz Cohen noticed a bulge above her umbilicus. The bulge intermittently causes some discomfort. She does not know if it is reducible. She is eating fine and moving her bowels normally. She denies any previous hernia repairs. She has had a lap GYN procedure and SARAH. Her PCP ordered an US which revealed a periumbilical hernia containing bowel and fat. Past Medical History:   Diagnosis Date    Arrhythmia     Bradycardia.  Arthritis     Burning with urination     Incontinence.  Diabetes ([address]     GERD (gastroesophageal reflux disease)     Glaucoma     High cholesterol     Hypertension     Ill-defined condition     EDEMA, LOWER LIMS, URINARY INCONTINENCE    Obesity, Class II, BMI 35-39.9     Sleep apnea     NO CPAP     Past Surgical History:   Procedure Laterality Date    ENDOSCOPY, COLON, DIAGNOSTIC      2008    HX BREAST BIOPSY Left 1970s    Excision of benign breast cysts.  HX CATARACT REMOVAL Bilateral     HX GYN      Laparoscopic resection of ?ovarian cysts.     HX HEART CATHETERIZATION  01/06/2017    HX HYSTERECTOMY      HX KNEE REPLACEMENT Left 2014    HX KNEE REPLACEMENT Right     HX UROLOGICAL      BOTOX FOR FREQUENT URINATION    [place] DRAINAGE OF DEEP RECTAL ABSCESS        Family History   Problem Relation Age of Onset    Heart Disease Mother     No Known Problems Father     No Known Problems Sister     Cancer Brother         PANCREAS    Heart Disease Sister     Cancer Sister         BREAST    Alcohol abuse Brother     No Known Problems Brother     No Known Problems Brother     No Known Problems Brother     Anesth Problems Neg Hx      Social History     Tobacco Use    Smoking status: Former Smoker     Packs/day: 0.50     Years: 5.00     Pack years: 2.50     Quit date: 7/11/1966     Years since quittin.3    Smokeless tobacco: Never Used   Substance Use Topics    Alcohol use: Yes     Alcohol/week: 0.0 standard drinks     Comment: rarely      Prior to Admission medications    Medication Sig Start Date End Date Taking? Authorizing Provider   aspirin 81 mg chewable tablet Take 81 mg by mouth daily. Yes Provider, Historical   diphenhydrAMINE (Benadryl Allergy) 25 mg tablet Take 25 mg by mouth every six (6) hours as needed. 2tabs As needed   Yes Provider, Historical   tiZANidine (ZANAFLEX) 4 mg tablet 1-2 tablets, 1-2 hours prior to bedtime. 10/13/21  Yes Esperanza Ugalde MD   hydrOXYzine HCL (ATARAX) 10 mg tablet TAKE 1 TABLET BY MOUTH THREE TIMES DAILY AS NEEDED FOR ITCHING 10/13/21  Yes Esperanza Ugalde MD   losartan (COZAAR) 50 mg tablet Take 1 Tab by mouth daily. 5/10/21  Yes Esperanza Ugalde MD   DULoxetine (CYMBALTA) 30 mg capsule TAKE 1 CAPSULE BY MOUTH EVERY DAY 5/3/21  Yes Esperanza Ugalde MD   mv,calcium,min/iron/folic/vitK (ONE-A-DAY WOMEN'S COMPLETE PO) Take 1 Tab by mouth daily. Yes Provider, Historical   ketoconazole (NIZORAL) 2 % topical cream Apply  to affected area daily. 21  Yes Esperanza Ugalde MD   glipiZIDE (GLUCOTROL) 5 mg tablet TAKE 1 TABLET BY MOUTH ONCE DAILY 20  Yes sEperanza Ugalde MD   Nystop powder APPLY 1 APPLICATION OF POWDER TOPICALLY TO AFFECTED AREA 4 TIMES DAILY UNTIL RASH RESOLVES 20  Yes Esperanza Ugalde MD   omeprazole (PRILOSEC) 20 mg capsule Take 1 Cap by mouth daily. 20  Yes Esperanza Ugalde MD   furosemide (LASIX) 20 mg tablet Take 1 Tab by mouth daily as needed for Pain (fluid retention). 20  Yes Esperanza Ugalde MD   Nebulizers (Sidestream) [facility]  20  Yes Provider, Historical   cholecalciferol (Vitamin D3) (1000 Units /25 mcg) tablet Take 1,000 Units by mouth daily. Yes Provider, Historical   cyanocobalamin (Vitamin B-12) 1,000 mcg tablet Take 1,000 mcg by mouth daily.    Yes Provider, Historical   ascorbic acid, vitamin C, (Vitamin C) 250 mg tablet Take 250 mg by mouth daily. Yes Provider, Historical   lancets misc accu-chek softclix lancets Check blood sugar daily E11.9 10/10/19  Yes Kishore Elena MD   glucose blood VI test strips (BLOOD GLUCOSE TEST) strip Accu-chek alexei plus test strips Test blood sugar once daily E11.9 10/10/19  Yes Kishore Elena MD   Blood-Glucose Meter monitoring kit Pt with T2DM. Needs meter to check BS 1-2 x daily. E11.9. Please give meter approved by her insurance. 10/4/19  Yes Kishore Elena MD   ondansetron (ZOFRAN ODT) 8 mg disintegrating tablet Take 1 Tab by mouth every twelve (12) hours as needed for Nausea. 8/30/18  Yes Kishore Elena MD   Calcium Carbonate-Vit D3-Min 600 mg calcium- 400 unit tab Take 1 pill 2 x daily  Patient taking differently: Take 1 Tab by mouth daily. 10/31/17  Yes Kishore Elena MD   acetaminophen (TYLENOL) 650 mg CR tablet Take 650 mg by mouth every six (6) hours as needed for Pain. Yes Provider, Historical   latanoprost (XALATAN) 0.005 % ophthalmic solution Administer 1 Drop to both eyes nightly. Yes Provider, Historical   tiZANidine (ZANAFLEX) 2 mg tablet TAKE 1 TO 2 TABLETS BY MOUTH 1-2 HOURS TO BEDTIME AS NEEDED FOR SPASM  Patient not taking: Reported on 10/11/2021 7/15/21   Kishore Elena MD   atorvastatin (LIPITOR) 40 mg tablet Take 1 Tab by mouth nightly for 90 days. 11/17/20 10/11/21  Kishore Elena MD   alendronate (FOSAMAX) 70 mg tablet Take 1 Tab by mouth every seven (7) days for 30 days. 4/22/20 10/11/21  Kishore Elena MD   oxybutynin (DITROPAN) 5 mg tablet Take 1 Tab by mouth three (3) times daily for 270 days. Patient taking differently: Take 5 mg by mouth three (3) times daily as needed.  1/9/18 10/11/21  Kishore Elena MD      Allergies   Allergen Reactions    Sulfa (Sulfonamide Antibiotics) Hives and Itching       Review of Systems:  A comprehensive review of systems was negative except for that written in the History of Present Illness. Objective:      Physical Exam:  GENERAL: alert, cooperative, no distress, appears stated age, EYE: negative findings: anicteric sclera, LYMPHATIC: Cervical, supraclavicular nodes normal. , THROAT & NECK: normal, LUNG: clear to auscultation bilaterally, HEART: regular rate and rhythm, ABDOMEN: Soft, obese, NT, ND. There is an incarcerated supraumbilical hernia., EXTREMITIES:  no edema, SKIN: Normal., NEUROLOGIC: negative, PSYCHIATRIC: non focal    Assessment:     Incarcerated supraumbilical hernia without gangrene or obstruction. Plan:     Mrs. Luis E Hernandez would like to have her hernia repaired soon and is fine with any time. I discussed the risks of the procedure including bleeding, infection, wound healing problems, recurrent hernia, seroma, mesh complications, injury to the bowel, blood clots, and reaction to the prep or local and general anesthetic. She understands the risks; any and all questions were answered to her satisfaction. Mrs. Luis E Hernandez will be scheduled for an elective outpatient robotic-assisted laparoscopic supraumbilical herniorrhaphy with mesh, possible open under general anesthesia. The patient was counseled at length about the risks of nalini Covid-19 during their perioperative period and any recovery window from their procedure. The patient was made aware that nalini Covid-19  may worsen their prognosis for recovering from their procedure and lend to a higher morbidity and/or mortality risk. All material risks, benefits, and reasonable alternatives including postponing the procedure were discussed. The patient does wish to proceed with the procedure at this time.

## 2021-10-20 NOTE — TELEPHONE ENCOUNTER
Put biopsy order in system please. Spoke to pt and pt's daughter and relayed result message. They understood. Pt will have hernia surgery next week or the following week.

## 2021-10-20 NOTE — PROGRESS NOTES
1. Have you been to the ER, urgent care clinic since your last visit? Hospitalized since your last visit? No    2. Have you seen or consulted any other health care providers outside of the [address] [place] [address] since your last visit? Include any pap smears or colon screening.  No

## 2021-10-20 NOTE — TELEPHONE ENCOUNTER
Pt had doppler results Monday 10/18/21 and see results in my chart but don't know what it means.  Pt needs to know if they need to discuss with surgeon at her appt today at 1:45 pm    Call [facility] [employer] at [phone]

## 2021-10-20 NOTE — H&P (VIEW-ONLY)
Surgery History and Physical    Subjective:      Eden Mijares is a 80 y.o. black female who presents for evaluation of an umbilical hernia. About 3 months ago, Mrs. Abraham Rodriguez noticed a bulge above her umbilicus. The bulge intermittently causes some discomfort. She does not know if it is reducible. She is eating fine and moving her bowels normally. She denies any previous hernia repairs. She has had a lap GYN procedure and SARAH. Her PCP ordered an US which revealed a periumbilical hernia containing bowel and fat. Past Medical History:   Diagnosis Date    Arrhythmia     Bradycardia.  Arthritis     Burning with urination     Incontinence.  Diabetes ([address]     GERD (gastroesophageal reflux disease)     Glaucoma     High cholesterol     Hypertension     Ill-defined condition     EDEMA, LOWER LIMS, URINARY INCONTINENCE    Obesity, Class II, BMI 35-39.9     Sleep apnea     NO CPAP     Past Surgical History:   Procedure Laterality Date    ENDOSCOPY, COLON, DIAGNOSTIC      2008    HX BREAST BIOPSY Left 1970s    Excision of benign breast cysts.  HX CATARACT REMOVAL Bilateral     HX GYN      Laparoscopic resection of ?ovarian cysts.     HX HEART CATHETERIZATION  01/06/2017    HX HYSTERECTOMY      HX KNEE REPLACEMENT Left 2014    HX KNEE REPLACEMENT Right     HX UROLOGICAL      BOTOX FOR FREQUENT URINATION    CA DRAINAGE OF DEEP RECTAL ABSCESS        Family History   Problem Relation Age of Onset    Heart Disease Mother     No Known Problems Father     No Known Problems Sister     Cancer Brother         PANCREAS    Heart Disease Sister     Cancer Sister         BREAST    Alcohol abuse Brother     No Known Problems Brother     No Known Problems Brother     No Known Problems Brother     Anesth Problems Neg Hx      Social History     Tobacco Use    Smoking status: Former Smoker     Packs/day: 0.50     Years: 5.00     Pack years: 2.50     Quit date: 7/11/1966     Years since quittin.3    Smokeless tobacco: Never Used   Substance Use Topics    Alcohol use: Yes     Alcohol/week: 0.0 standard drinks     Comment: rarely      Prior to Admission medications    Medication Sig Start Date End Date Taking? Authorizing Provider   aspirin 81 mg chewable tablet Take 81 mg by mouth daily. Yes Provider, Historical   diphenhydrAMINE (Benadryl Allergy) 25 mg tablet Take 25 mg by mouth every six (6) hours as needed. 2tabs As needed   Yes Provider, Historical   tiZANidine (ZANAFLEX) 4 mg tablet 1-2 tablets, 1-2 hours prior to bedtime. 10/13/21  Yes Shanti Gutierrez MD   hydrOXYzine HCL (ATARAX) 10 mg tablet TAKE 1 TABLET BY MOUTH THREE TIMES DAILY AS NEEDED FOR ITCHING 10/13/21  Yes Shanti Gutierrez MD   losartan (COZAAR) 50 mg tablet Take 1 Tab by mouth daily. 5/10/21  Yes Shanti Gutierrez MD   DULoxetine (CYMBALTA) 30 mg capsule TAKE 1 CAPSULE BY MOUTH EVERY DAY 5/3/21  Yes Shanti Gutierrez MD   mv,calcium,min/iron/folic/vitK (ONE-A-DAY WOMEN'S COMPLETE PO) Take 1 Tab by mouth daily. Yes Provider, Historical   ketoconazole (NIZORAL) 2 % topical cream Apply  to affected area daily. 21  Yes Shanti Gutierrez MD   glipiZIDE (GLUCOTROL) 5 mg tablet TAKE 1 TABLET BY MOUTH ONCE DAILY 20  Yes Shanti Gutierrez MD   Nystop powder APPLY 1 APPLICATION OF POWDER TOPICALLY TO AFFECTED AREA 4 TIMES DAILY UNTIL RASH RESOLVES 20  Yes Shanti Gutierrez MD   omeprazole (PRILOSEC) 20 mg capsule Take 1 Cap by mouth daily. 20  Yes Shanti Gutierrez MD   furosemide (LASIX) 20 mg tablet Take 1 Tab by mouth daily as needed for Pain (fluid retention). 20  Yes Shanti Gutierrez MD   Nebulizers (Sidestream) [facility]  20  Yes Provider, Historical   cholecalciferol (Vitamin D3) (1000 Units /25 mcg) tablet Take 1,000 Units by mouth daily. Yes Provider, Historical   cyanocobalamin (Vitamin B-12) 1,000 mcg tablet Take 1,000 mcg by mouth daily.    Yes Provider, Historical   ascorbic acid, vitamin C, (Vitamin C) 250 mg tablet Take 250 mg by mouth daily. Yes Provider, Historical   lancets misc accu-chek softclix lancets Check blood sugar daily E11.9 10/10/19  Yes Alona Harris MD   glucose blood VI test strips (BLOOD GLUCOSE TEST) strip Accu-chek alexei plus test strips Test blood sugar once daily E11.9 10/10/19  Yes Alona Harris MD   Blood-Glucose Meter monitoring kit Pt with T2DM. Needs meter to check BS 1-2 x daily. E11.9. Please give meter approved by her insurance. 10/4/19  Yes Alona Harris MD   ondansetron (ZOFRAN ODT) 8 mg disintegrating tablet Take 1 Tab by mouth every twelve (12) hours as needed for Nausea. 8/30/18  Yes Alona Harris MD   Calcium Carbonate-Vit D3-Min 600 mg calcium- 400 unit tab Take 1 pill 2 x daily  Patient taking differently: Take 1 Tab by mouth daily. 10/31/17  Yes Alona Harris MD   acetaminophen (TYLENOL) 650 mg CR tablet Take 650 mg by mouth every six (6) hours as needed for Pain. Yes Provider, Historical   latanoprost (XALATAN) 0.005 % ophthalmic solution Administer 1 Drop to both eyes nightly. Yes Provider, Historical   tiZANidine (ZANAFLEX) 2 mg tablet TAKE 1 TO 2 TABLETS BY MOUTH 1-2 HOURS TO BEDTIME AS NEEDED FOR SPASM  Patient not taking: Reported on 10/11/2021 7/15/21   Alona Harris MD   atorvastatin (LIPITOR) 40 mg tablet Take 1 Tab by mouth nightly for 90 days. 11/17/20 10/11/21  Alona Harris MD   alendronate (FOSAMAX) 70 mg tablet Take 1 Tab by mouth every seven (7) days for 30 days. 4/22/20 10/11/21  Alona Harris MD   oxybutynin (DITROPAN) 5 mg tablet Take 1 Tab by mouth three (3) times daily for 270 days. Patient taking differently: Take 5 mg by mouth three (3) times daily as needed.  1/9/18 10/11/21  Alona Harris MD      Allergies   Allergen Reactions    Sulfa (Sulfonamide Antibiotics) Hives and Itching       Review of Systems:  A comprehensive review of systems was negative except for that written in the History of Present Illness. Objective:      Physical Exam:  GENERAL: alert, cooperative, no distress, appears stated age, EYE: negative findings: anicteric sclera, LYMPHATIC: Cervical, supraclavicular nodes normal. , THROAT & NECK: normal, LUNG: clear to auscultation bilaterally, HEART: regular rate and rhythm, ABDOMEN: Soft, obese, NT, ND. There is an incarcerated supraumbilical hernia., EXTREMITIES:  no edema, SKIN: Normal., NEUROLOGIC: negative, PSYCHIATRIC: non focal    Assessment:     Incarcerated supraumbilical hernia without gangrene or obstruction. Plan:     Mrs. Luis Dennison would like to have her hernia repaired soon and is fine with any time. I discussed the risks of the procedure including bleeding, infection, wound healing problems, recurrent hernia, seroma, mesh complications, injury to the bowel, blood clots, and reaction to the prep or local and general anesthetic. She understands the risks; any and all questions were answered to her satisfaction. Mrs. Luis Dennison will be scheduled for an elective outpatient robotic-assisted laparoscopic supraumbilical herniorrhaphy with mesh, possible open under general anesthesia. The patient was counseled at length about the risks of nalini Covid-19 during their perioperative period and any recovery window from their procedure. The patient was made aware that nalini Covid-19  may worsen their prognosis for recovering from their procedure and lend to a higher morbidity and/or mortality risk. All material risks, benefits, and reasonable alternatives including postponing the procedure were discussed. The patient does wish to proceed with the procedure at this time.

## 2021-10-21 ENCOUNTER — TELEPHONE (OUTPATIENT)
Dept: SURGERY | Age: 81
End: 2021-10-21

## 2021-10-21 NOTE — TELEPHONE ENCOUNTER
Patient called stating she was returning a call she received from our office. I could not find documentation of anyone trying to contact this patient.  I informed patient that I would get a message about this to Dr. Jyothi Sauer and his nurse

## 2021-10-21 NOTE — TELEPHONE ENCOUNTER
Returned call to patient. Verified using 2 patient identifiers. Informed her that I did not call her but it may have been our surgery scheduler that tried to reach out to her. The patient was not able to see the number that previously called but she saw that it was from [facility]. I told her that I would reach out to our [address] to let her know that she did return the call.

## 2021-10-26 ENCOUNTER — TELEPHONE (OUTPATIENT)
Dept: FAMILY MEDICINE CLINIC | Age: 81
End: 2021-10-26

## 2021-10-26 DIAGNOSIS — R59.9 LYMPH NODE ENLARGEMENT: Primary | ICD-10-CM

## 2021-10-26 NOTE — TELEPHONE ENCOUNTER
[facility] scheduling department called regarding the biopsy order that was sent for the pt. The order sent for the biopsy didn't include the site of where the biopsy needed to take place, so they are requesting that that a new order is sent in with the biopsy site specified.     A fax request was also sent through

## 2021-10-28 ENCOUNTER — APPOINTMENT (OUTPATIENT)
Dept: ULTRASOUND IMAGING | Age: 81
End: 2021-10-28
Attending: INTERNAL MEDICINE
Payer: MEDICARE

## 2021-10-28 ENCOUNTER — HOSPITAL ENCOUNTER (OUTPATIENT)
Dept: ULTRASOUND IMAGING | Age: 81
Discharge: HOME | End: 2021-10-28
Attending: INTERNAL MEDICINE
Payer: MEDICARE

## 2021-10-28 ENCOUNTER — HOSPITAL ENCOUNTER (OUTPATIENT)
Dept: PREADMISSION TESTING | Age: 81
Discharge: HOME | End: 2021-10-28
Payer: MEDICARE

## 2021-10-28 DIAGNOSIS — R59.9 LYMPH NODES ENLARGED: ICD-10-CM

## 2021-10-28 PROCEDURE — 88342 IMHCHEM/IMCYTCHM 1ST ANTB: CPT

## 2021-10-28 PROCEDURE — 88185 FLOWCYTOMETRY/TC ADD-ON: CPT

## 2021-10-28 PROCEDURE — 77030014115

## 2021-10-28 PROCEDURE — U0005 INFEC AGEN DETEC AMPLI PROBE: HCPCS

## 2021-10-28 PROCEDURE — 88360 TUMOR IMMUNOHISTOCHEM/MANUAL: CPT

## 2021-10-28 PROCEDURE — 88305 TISSUE EXAM BY PATHOLOGIST: CPT

## 2021-10-28 PROCEDURE — 88184 FLOWCYTOMETRY/ TC 1 MARKER: CPT

## 2021-10-28 PROCEDURE — 88341 IMHCHEM/IMCYTCHM EA ADD ANTB: CPT

## 2021-10-28 PROCEDURE — 10005 FNA BX W/US GDN 1ST LES: CPT

## 2021-10-28 PROCEDURE — 88333 PATH CONSLTJ SURG CYTO XM 1: CPT

## 2021-10-28 RX ORDER — LIDOCAINE HYDROCHLORIDE 10 MG/ML
10 INJECTION, SOLUTION EPIDURAL; INFILTRATION; INTRACAUDAL; PERINEURAL
Status: COMPLETED | OUTPATIENT
Start: 2021-10-28 | End: 2021-10-28

## 2021-10-28 RX ADMIN — LIDOCAINE HYDROCHLORIDE 10 ML: 10 INJECTION, SOLUTION EPIDURAL; INFILTRATION; INTRACAUDAL; PERINEURAL at 14:08

## 2021-10-28 NOTE — DISCHARGE INSTRUCTIONS
Patient Education        Fine-Needle Thyroid Biopsy: What to Expect at [address] [place] [address]     During your biopsy, your doctor placed a thin needle through your skin and into your thyroid gland to take a sample of tissue. This may have been done to find what is causing a lump or growth in your thyroid. You may find it uncomfortable to lie still with your head tipped backward. The biopsy site may be sore and tender for 1 to 2 days. This care sheet gives you a general idea about how long it will take for you to recover. But each person recovers at a different pace. Follow the steps below to feel better as quickly as possible. How can you care for yourself at home? Activity    · Rest when you feel tired. Getting enough sleep will help you recover. Diet    · You can eat your normal diet. If your stomach is upset, try bland, low-fat foods like plain rice, broiled chicken, toast, and yogurt. Medicines    · Your doctor will tell you if and when you can restart your medicines. He or she will also give you instructions about taking any new medicines.     · If you take aspirin or some other blood thinner, ask your doctor if and when to start taking it again. Make sure that you understand exactly what your doctor wants you to do.     · Take pain medicines exactly as directed. ? If the doctor gave you a prescription medicine for pain, take it as prescribed. ? If you are not taking a prescription pain medicine, ask your doctor if you can take an over-the-counter medicine.     · If you think your pain medicine is making you sick to your stomach:  ? Take your medicine after meals (unless your doctor has told you not to). ? Ask your doctor for a different pain medicine. Incision care  Keep the biopsy site covered and dry for 48 hours. A small amount of bleeding from the biopsy site can be expected. Ask your doctor how much drainage to expect. Follow-up care is a key part of your treatment and safety.  Be sure to make and go to all appointments, and call your doctor if you are having problems. It's also a good idea to know your test results and keep a list of the medicines you take. When should you call for help? Call 911  anytime you think you may need emergency care. For example, call if:    · You have severe trouble breathing. Call your doctor now or seek immediate medical care if:    · You have a lot of bleeding through the bandage.     · You have a hard time swallowing.     · You have new or worsening pain.     · You have symptoms of infection, such as:  ? Increased pain, swelling, warmth, or redness. ? Red streaks leading from the biopsy site. ? Pus draining from the biopsy site. ? A fever. Watch closely for any changes in your health, and be sure to contact your doctor if:    · You're not getting better as expected.     · You notice a change in your voice. Where can you learn more? Go to http://www.gray.com/  Enter F910 in the search box to learn more about \"Fine-Needle Thyroid Biopsy: What to Expect at Home. \"  Current as of: December 2, 2020               Content Version: 13.0  © 0427-6779 [employer]. Care instructions adapted under license by [employer] (which disclaims liability or warranty for this information). If you have questions about a medical condition or this instruction, always ask your healthcare professional. [address] any warranty or liability for your use of this information.

## 2021-10-29 ENCOUNTER — ANESTHESIA EVENT (OUTPATIENT)
Dept: SURGERY | Age: 81
End: 2021-10-29
Payer: MEDICARE

## 2021-10-29 LAB
SARS-COV-2, XPLCVT: NOT DETECTED
SOURCE, COVRS: NORMAL

## 2021-11-01 ENCOUNTER — ANESTHESIA (OUTPATIENT)
Dept: SURGERY | Age: 81
End: 2021-11-01
Payer: MEDICARE

## 2021-11-01 ENCOUNTER — HOSPITAL ENCOUNTER (OUTPATIENT)
Age: 81
Setting detail: OUTPATIENT SURGERY
Discharge: HOME | End: 2021-11-01
Attending: SURGERY | Admitting: SURGERY
Payer: MEDICARE

## 2021-11-01 VITALS
TEMPERATURE: 98.1 F | SYSTOLIC BLOOD PRESSURE: 102 MMHG | BODY MASS INDEX: 36.29 KG/M2 | HEART RATE: 50 BPM | OXYGEN SATURATION: 100 % | DIASTOLIC BLOOD PRESSURE: 67 MMHG | RESPIRATION RATE: 15 BRPM | WEIGHT: 204.81 LBS | HEIGHT: 63 IN

## 2021-11-01 DIAGNOSIS — Z98.890 S/P LAPAROSCOPIC HERNIA REPAIR: Primary | ICD-10-CM

## 2021-11-01 DIAGNOSIS — Z87.19 S/P LAPAROSCOPIC HERNIA REPAIR: Primary | ICD-10-CM

## 2021-11-01 DIAGNOSIS — K43.6 INCARCERATED VENTRAL HERNIA: ICD-10-CM

## 2021-11-01 LAB
ANION GAP SERPL CALC-SCNC: 7 MMOL/L (ref 5–15)
ATRIAL RATE: 55 BPM
BUN SERPL-MCNC: 18 MG/DL (ref 6–20)
BUN/CREAT SERPL: 20 (ref 12–20)
CALCIUM SERPL-MCNC: 9.1 MG/DL (ref 8.5–10.1)
CALCULATED P AXIS, ECG09: 38 DEGREES
CALCULATED R AXIS, ECG10: -30 DEGREES
CALCULATED T AXIS, ECG11: 9 DEGREES
CHLORIDE SERPL-SCNC: 109 MMOL/L (ref 97–108)
CO2 SERPL-SCNC: 24 MMOL/L (ref 21–32)
CREAT SERPL-MCNC: 0.88 MG/DL (ref 0.55–1.02)
DIAGNOSIS, 93000: NORMAL
GLUCOSE BLD STRIP.AUTO-MCNC: 107 MG/DL (ref 65–117)
GLUCOSE BLD STRIP.AUTO-MCNC: 98 MG/DL (ref 65–117)
GLUCOSE SERPL-MCNC: 102 MG/DL (ref 65–100)
P-R INTERVAL, ECG05: 170 MS
POTASSIUM SERPL-SCNC: 4.3 MMOL/L (ref 3.5–5.1)
Q-T INTERVAL, ECG07: 428 MS
QRS DURATION, ECG06: 72 MS
QTC CALCULATION (BEZET), ECG08: 409 MS
SERVICE CMNT-IMP: NORMAL
SERVICE CMNT-IMP: NORMAL
SODIUM SERPL-SCNC: 140 MMOL/L (ref 136–145)
VENTRICULAR RATE, ECG03: 55 BPM

## 2021-11-01 PROCEDURE — 74011000250 HC RX REV CODE- 250: Performed by: SURGERY

## 2021-11-01 PROCEDURE — 77030033188 HC TBNG FLTRD BIIFUR DISP CNMD -C: Performed by: SURGERY

## 2021-11-01 PROCEDURE — 74011250636 HC RX REV CODE- 250/636: Performed by: ANESTHESIOLOGY

## 2021-11-01 PROCEDURE — 77030038613 HC SUT PDS STRATA SPIRL J&J -B: Performed by: SURGERY

## 2021-11-01 PROCEDURE — 77030002966 HC SUT PDS J&J -A: Performed by: SURGERY

## 2021-11-01 PROCEDURE — 74011000258 HC RX REV CODE- 258: Performed by: SURGERY

## 2021-11-01 PROCEDURE — 77030012770 HC TRCR OPT FX AMR -B: Performed by: SURGERY

## 2021-11-01 PROCEDURE — 77030035277 HC OBTRTR BLDELSS DISP INTU -B: Performed by: SURGERY

## 2021-11-01 PROCEDURE — 77030013079 HC BLNKT BAIR HGGR 3M -A: Performed by: ANESTHESIOLOGY

## 2021-11-01 PROCEDURE — 77030040922 HC BLNKT HYPOTHRM STRY -A

## 2021-11-01 PROCEDURE — 74011250636 HC RX REV CODE- 250/636: Performed by: SURGERY

## 2021-11-01 PROCEDURE — 82962 GLUCOSE BLOOD TEST: CPT

## 2021-11-01 PROCEDURE — C9290 INJ, BUPIVACAINE LIPOSOME: HCPCS | Performed by: SURGERY

## 2021-11-01 PROCEDURE — 77030035236 HC SUT PDS STRATFX BARB J&J -B: Performed by: SURGERY

## 2021-11-01 PROCEDURE — 77030002933 HC SUT MCRYL J&J -A: Performed by: SURGERY

## 2021-11-01 PROCEDURE — 76010000876 HC OR TIME 2 TO 2.5HR INTENSV - TIER 2: Performed by: SURGERY

## 2021-11-01 PROCEDURE — C1781 MESH (IMPLANTABLE): HCPCS | Performed by: SURGERY

## 2021-11-01 PROCEDURE — 77030008684 HC TU ET CUF COVD -B: Performed by: NURSE ANESTHETIST, CERTIFIED REGISTERED

## 2021-11-01 PROCEDURE — 77030026438 HC STYL ET INTUB CARD -A: Performed by: NURSE ANESTHETIST, CERTIFIED REGISTERED

## 2021-11-01 PROCEDURE — 36415 COLL VENOUS BLD VENIPUNCTURE: CPT

## 2021-11-01 PROCEDURE — S2900 ROBOTIC SURGICAL SYSTEM: HCPCS | Performed by: SURGERY

## 2021-11-01 PROCEDURE — 77030019908 HC STETH ESOPH SIMS -A: Performed by: NURSE ANESTHETIST, CERTIFIED REGISTERED

## 2021-11-01 PROCEDURE — 77030018673: Performed by: SURGERY

## 2021-11-01 PROCEDURE — 74011000250 HC RX REV CODE- 250: Performed by: NURSE ANESTHETIST, CERTIFIED REGISTERED

## 2021-11-01 PROCEDURE — 76210000006 HC OR PH I REC 0.5 TO 1 HR: Performed by: SURGERY

## 2021-11-01 PROCEDURE — 77030031139 HC SUT VCRL2 J&J -A: Performed by: SURGERY

## 2021-11-01 PROCEDURE — 77030040361 HC SLV COMPR DVT MDII -B

## 2021-11-01 PROCEDURE — 77030020703 HC SEAL CANN DISP INTU -B: Performed by: SURGERY

## 2021-11-01 PROCEDURE — 49653 PR LAP, VENTRAL HERNIA REPAIR,INCARCERATED: CPT | Performed by: SURGERY

## 2021-11-01 PROCEDURE — 77030003666 HC NDL SPINAL BD -A: Performed by: SURGERY

## 2021-11-01 PROCEDURE — 2709999900 HC NON-CHARGEABLE SUPPLY: Performed by: SURGERY

## 2021-11-01 PROCEDURE — 77030036554: Performed by: SURGERY

## 2021-11-01 PROCEDURE — 93005 ELECTROCARDIOGRAM TRACING: CPT

## 2021-11-01 PROCEDURE — 74011250636 HC RX REV CODE- 250/636: Performed by: NURSE ANESTHETIST, CERTIFIED REGISTERED

## 2021-11-01 PROCEDURE — 80048 BASIC METABOLIC PNL TOTAL CA: CPT

## 2021-11-01 PROCEDURE — 76210000020 HC REC RM PH II FIRST 0.5 HR: Performed by: SURGERY

## 2021-11-01 PROCEDURE — 77030037032 HC INSRT SCIS CLICKLLINE DISP STOR -B: Performed by: SURGERY

## 2021-11-01 PROCEDURE — 76060000035 HC ANESTHESIA 2 TO 2.5 HR: Performed by: SURGERY

## 2021-11-01 DEVICE — VENTRALIGHT ST MESH, 4.5" (11.4 CM), CIRCLE
Type: IMPLANTABLE DEVICE | Site: ABDOMEN | Status: FUNCTIONAL
Brand: VENTRALIGHT

## 2021-11-01 RX ORDER — PROPOFOL 10 MG/ML
INJECTION, EMULSION INTRAVENOUS AS NEEDED
Status: DISCONTINUED | OUTPATIENT
Start: 2021-11-01 | End: 2021-11-01 | Stop reason: HOSPADM

## 2021-11-01 RX ORDER — ROCURONIUM BROMIDE 10 MG/ML
INJECTION, SOLUTION INTRAVENOUS AS NEEDED
Status: DISCONTINUED | OUTPATIENT
Start: 2021-11-01 | End: 2021-11-01 | Stop reason: HOSPADM

## 2021-11-01 RX ORDER — SODIUM CHLORIDE 0.9 % (FLUSH) 0.9 %
5-40 SYRINGE (ML) INJECTION AS NEEDED
Status: DISCONTINUED | OUTPATIENT
Start: 2021-11-01 | End: 2021-11-01 | Stop reason: HOSPADM

## 2021-11-01 RX ORDER — FLUMAZENIL 0.1 MG/ML
0.2 INJECTION INTRAVENOUS
Status: DISCONTINUED | OUTPATIENT
Start: 2021-11-01 | End: 2021-11-01 | Stop reason: HOSPADM

## 2021-11-01 RX ORDER — NALOXONE HYDROCHLORIDE 0.4 MG/ML
0.2 INJECTION, SOLUTION INTRAMUSCULAR; INTRAVENOUS; SUBCUTANEOUS
Status: DISCONTINUED | OUTPATIENT
Start: 2021-11-01 | End: 2021-11-01 | Stop reason: HOSPADM

## 2021-11-01 RX ORDER — LIDOCAINE HYDROCHLORIDE 20 MG/ML
INJECTION, SOLUTION EPIDURAL; INFILTRATION; INTRACAUDAL; PERINEURAL AS NEEDED
Status: DISCONTINUED | OUTPATIENT
Start: 2021-11-01 | End: 2021-11-01 | Stop reason: HOSPADM

## 2021-11-01 RX ORDER — SODIUM CHLORIDE, SODIUM LACTATE, POTASSIUM CHLORIDE, CALCIUM CHLORIDE 600; 310; 30; 20 MG/100ML; MG/100ML; MG/100ML; MG/100ML
100 INJECTION, SOLUTION INTRAVENOUS CONTINUOUS
Status: DISCONTINUED | OUTPATIENT
Start: 2021-11-01 | End: 2021-11-01 | Stop reason: HOSPADM

## 2021-11-01 RX ORDER — HYDROCODONE BITARTRATE AND ACETAMINOPHEN 5; 325 MG/1; MG/1
1 TABLET ORAL
Qty: 20 TABLET | Refills: 0 | Status: SHIPPED | OUTPATIENT
Start: 2021-11-01 | End: 2021-11-04

## 2021-11-01 RX ORDER — SODIUM CHLORIDE, SODIUM LACTATE, POTASSIUM CHLORIDE, CALCIUM CHLORIDE 600; 310; 30; 20 MG/100ML; MG/100ML; MG/100ML; MG/100ML
125 INJECTION, SOLUTION INTRAVENOUS CONTINUOUS
Status: DISCONTINUED | OUTPATIENT
Start: 2021-11-01 | End: 2021-11-01 | Stop reason: HOSPADM

## 2021-11-01 RX ORDER — LIDOCAINE HYDROCHLORIDE 10 MG/ML
0.1 INJECTION, SOLUTION EPIDURAL; INFILTRATION; INTRACAUDAL; PERINEURAL AS NEEDED
Status: DISCONTINUED | OUTPATIENT
Start: 2021-11-01 | End: 2021-11-01 | Stop reason: HOSPADM

## 2021-11-01 RX ORDER — GLYCOPYRROLATE 0.2 MG/ML
INJECTION INTRAMUSCULAR; INTRAVENOUS AS NEEDED
Status: DISCONTINUED | OUTPATIENT
Start: 2021-11-01 | End: 2021-11-01 | Stop reason: HOSPADM

## 2021-11-01 RX ORDER — ONDANSETRON 2 MG/ML
4 INJECTION INTRAMUSCULAR; INTRAVENOUS AS NEEDED
Status: DISCONTINUED | OUTPATIENT
Start: 2021-11-01 | End: 2021-11-01 | Stop reason: HOSPADM

## 2021-11-01 RX ORDER — CEFAZOLIN SODIUM 1 G/3ML
INJECTION, POWDER, FOR SOLUTION INTRAMUSCULAR; INTRAVENOUS AS NEEDED
Status: DISCONTINUED | OUTPATIENT
Start: 2021-11-01 | End: 2021-11-01

## 2021-11-01 RX ORDER — SODIUM CHLORIDE 0.9 % (FLUSH) 0.9 %
5-40 SYRINGE (ML) INJECTION EVERY 8 HOURS
Status: DISCONTINUED | OUTPATIENT
Start: 2021-11-01 | End: 2021-11-01 | Stop reason: HOSPADM

## 2021-11-01 RX ORDER — FENTANYL CITRATE 50 UG/ML
INJECTION, SOLUTION INTRAMUSCULAR; INTRAVENOUS AS NEEDED
Status: DISCONTINUED | OUTPATIENT
Start: 2021-11-01 | End: 2021-11-01 | Stop reason: HOSPADM

## 2021-11-01 RX ORDER — ONDANSETRON 2 MG/ML
INJECTION INTRAMUSCULAR; INTRAVENOUS AS NEEDED
Status: DISCONTINUED | OUTPATIENT
Start: 2021-11-01 | End: 2021-11-01 | Stop reason: HOSPADM

## 2021-11-01 RX ORDER — HYDROMORPHONE HYDROCHLORIDE 1 MG/ML
.5-1 INJECTION, SOLUTION INTRAMUSCULAR; INTRAVENOUS; SUBCUTANEOUS
Status: DISCONTINUED | OUTPATIENT
Start: 2021-11-01 | End: 2021-11-01 | Stop reason: HOSPADM

## 2021-11-01 RX ORDER — NEOSTIGMINE METHYLSULFATE 1 MG/ML
INJECTION, SOLUTION INTRAVENOUS AS NEEDED
Status: DISCONTINUED | OUTPATIENT
Start: 2021-11-01 | End: 2021-11-01 | Stop reason: HOSPADM

## 2021-11-01 RX ORDER — DEXAMETHASONE SODIUM PHOSPHATE 4 MG/ML
INJECTION, SOLUTION INTRA-ARTICULAR; INTRALESIONAL; INTRAMUSCULAR; INTRAVENOUS; SOFT TISSUE AS NEEDED
Status: DISCONTINUED | OUTPATIENT
Start: 2021-11-01 | End: 2021-11-01 | Stop reason: HOSPADM

## 2021-11-01 RX ORDER — BUPIVACAINE HYDROCHLORIDE 5 MG/ML
INJECTION, SOLUTION EPIDURAL; INTRACAUDAL AS NEEDED
Status: DISCONTINUED | OUTPATIENT
Start: 2021-11-01 | End: 2021-11-01 | Stop reason: HOSPADM

## 2021-11-01 RX ADMIN — FENTANYL CITRATE 25 MCG: 50 INJECTION, SOLUTION INTRAMUSCULAR; INTRAVENOUS at 12:42

## 2021-11-01 RX ADMIN — PROPOFOL 100 MG: 10 INJECTION, EMULSION INTRAVENOUS at 10:37

## 2021-11-01 RX ADMIN — HYDROMORPHONE HYDROCHLORIDE 0.5 MG: 1 INJECTION, SOLUTION INTRAMUSCULAR; INTRAVENOUS; SUBCUTANEOUS at 12:49

## 2021-11-01 RX ADMIN — FENTANYL CITRATE 25 MCG: 50 INJECTION, SOLUTION INTRAMUSCULAR; INTRAVENOUS at 12:25

## 2021-11-01 RX ADMIN — ONDANSETRON HYDROCHLORIDE 4 MG: 2 SOLUTION INTRAMUSCULAR; INTRAVENOUS at 12:18

## 2021-11-01 RX ADMIN — SODIUM CHLORIDE, POTASSIUM CHLORIDE, SODIUM LACTATE AND CALCIUM CHLORIDE: 600; 310; 30; 20 INJECTION, SOLUTION INTRAVENOUS at 10:42

## 2021-11-01 RX ADMIN — FENTANYL CITRATE 50 MCG: 50 INJECTION, SOLUTION INTRAMUSCULAR; INTRAVENOUS at 11:06

## 2021-11-01 RX ADMIN — FENTANYL CITRATE 25 MCG: 50 INJECTION, SOLUTION INTRAMUSCULAR; INTRAVENOUS at 10:34

## 2021-11-01 RX ADMIN — GLYCOPYRROLATE 0.4 MG: 0.2 INJECTION INTRAMUSCULAR; INTRAVENOUS at 12:18

## 2021-11-01 RX ADMIN — Medication 3 MG: at 12:18

## 2021-11-01 RX ADMIN — FENTANYL CITRATE 25 MCG: 50 INJECTION, SOLUTION INTRAMUSCULAR; INTRAVENOUS at 10:31

## 2021-11-01 RX ADMIN — ROCURONIUM BROMIDE 40 MG: 10 INJECTION INTRAVENOUS at 10:37

## 2021-11-01 RX ADMIN — ROCURONIUM BROMIDE 5 MG: 10 INJECTION INTRAVENOUS at 11:26

## 2021-11-01 RX ADMIN — CEFAZOLIN SODIUM 2 G: 1 POWDER, FOR SOLUTION INTRAMUSCULAR; INTRAVENOUS at 11:03

## 2021-11-01 RX ADMIN — SODIUM CHLORIDE, POTASSIUM CHLORIDE, SODIUM LACTATE AND CALCIUM CHLORIDE: 600; 310; 30; 20 INJECTION, SOLUTION INTRAVENOUS at 10:45

## 2021-11-01 RX ADMIN — HYDROMORPHONE HYDROCHLORIDE 0.5 MG: 1 INJECTION, SOLUTION INTRAMUSCULAR; INTRAVENOUS; SUBCUTANEOUS at 13:13

## 2021-11-01 RX ADMIN — LIDOCAINE HYDROCHLORIDE 60 MG: 20 INJECTION, SOLUTION EPIDURAL; INFILTRATION; INTRACAUDAL; PERINEURAL at 10:37

## 2021-11-01 RX ADMIN — DEXAMETHASONE SODIUM PHOSPHATE 4 MG: 4 INJECTION, SOLUTION INTRAMUSCULAR; INTRAVENOUS at 11:03

## 2021-11-01 RX ADMIN — SODIUM CHLORIDE, POTASSIUM CHLORIDE, SODIUM LACTATE AND CALCIUM CHLORIDE 125 ML/HR: 600; 310; 30; 20 INJECTION, SOLUTION INTRAVENOUS at 09:44

## 2021-11-01 RX ADMIN — FENTANYL CITRATE 50 MCG: 50 INJECTION, SOLUTION INTRAMUSCULAR; INTRAVENOUS at 10:37

## 2021-11-01 NOTE — DISCHARGE INSTRUCTIONS
Patient Education        Abdominal Hernia Repair: What to Expect at Home  Your Recovery  After surgery to repair your hernia, you are likely to have pain for a few days. You may also feel tired and have less energy than normal.  This is common. You should feel better after a few days and will probably feel much better in 7 days. For several weeks you may feel discomfort or pulling in the hernia repair when you move. You may have some bruising around the area of the repair. This is normal.  This care sheet gives you a general idea about how long it will take for you to recover, but each person recovers at a different pace. Follow the steps below to get better as quickly as possible. How can you care for yourself at home? Activity    · Rest when you feel tired. Getting enough sleep will help you recover.     · Try to walk each day. Start by walking a little more than you did the day before. Bit by bit, increase the amount you walk. Walking boosts blood flow and helps prevent pneumonia and constipation.     · If your doctor gives you an abdominal binder to wear, use it as directed. This is an elastic bandage that wraps around your belly and upper hips. It helps support your belly muscles after surgery.     · Avoid strenuous activities, such as biking, jogging, weight lifting, or aerobic exercise, until your doctor says it is okay.     · Avoid lifting anything over 30 pounds or that would make you strain for 6 week! This may include heavy grocery bags and milk containers, a heavy briefcase or backpack, cat litter or dog food bags, a vacuum , or a child.     · You may drive after 1 week and when no longer taking narcotic pain medication!     · Most people are able to return to work within 1 to 2 weeks after surgery, but if your job requires that you do heavy lifting or strenuous activity, you may need to take 4 to 6 weeks off from work.     · You may shower 24 hours after surgery.   Pat the cuts (incisions) dry. Do not take a bath for the first 2 weeks, and until seen by your doctor.     ·    Diet    · You can eat your normal diet. If your stomach is upset, try bland, low-fat foods like plain rice, broiled chicken, toast, and yogurt.     · Drink plenty of fluids (unless your doctor tells you not to).     · You may notice that your bowel movements are not regular right after your surgery. This is common. Avoid constipation and straining with bowel movements. You may want to take a fiber supplement every day. If you have not had a bowel movement after a couple of days, ask your doctor about taking a mild laxative. Medicines    · You can restart your medicines. You will also be given instructions about taking any new medicines.     ·      · Be safe with medicines. Take pain medicines exactly as directed. ? If the doctor gave you a prescription medicine for pain, take it as prescribed. ? If you are not taking a prescription pain medicine, ask your doctor if you can take an over-the-counter medicine.     · If your doctor prescribed antibiotics, take them as directed. Do not stop taking them just because you feel better. You need to take the full course of antibiotics.     · If you think your pain medicine is making you sick to your stomach:  ? Take your medicine after meals (unless your doctor has told you not to). ? Ask your doctor for a different pain medicine. Incision care    · Remove your bandages on Wednesday, 11/3. Leave the incisions uncovered. · You have strips of tape on the cuts (incisions) the doctor made. Leave the tape on for a week and then remove it on Monday, 11/8!     ·      · Wash the area daily with warm, soapy water, and pat it dry. Don't use hydrogen peroxide or alcohol, which can slow healing. You may cover the incisions with a gauze bandage if they weep or rub against clothing. Change the bandage every day.    Other instructions    · Hold a pillow over your incision when you cough or take deep breaths. This will support your belly and decrease your pain.     · Do breathing exercises at home as instructed by your doctor. This will help prevent pneumonia.     · If you had laparoscopic surgery, you may also have pain in your shoulder. The pain usually lasts about a day or two. Follow-up care is a key part of your treatment and safety. Be sure to make and go to all appointments, and call your doctor if you are having problems. It's also a good idea to know your test results and keep a list of the medicines you take. When should you call for help? Call 911 anytime you think you may need emergency care. For example, call if:    · You passed out (lost consciousness).     · You are short of breath. Call your doctor now or seek immediate medical care if:    · You are sick to your stomach and cannot drink fluids.     · You have signs of a blood clot in your leg (called a deep vein thrombosis), such as:  ? Pain in your calf, back of the knee, thigh, or groin. ? Redness and swelling in your leg or groin.     · You have signs of infection, such as:  ? Increased pain, swelling, warmth, or redness. ? Red streaks leading from the incision. ? Pus draining from the incision. ? A fever > 101.     · You cannot pass stool or gas.     · You have abdominal pain that does not get better after you take pain medicine.     · You have loose stitches, or your incision comes open.     · Bright red blood has soaked through the bandage over your incision. Watch closely for changes in your health, and be sure to contact your doctor if you have any problems. Where can you learn more? Go to http://www.gray.com/  Enter B577 in the search box to learn more about \"Abdominal Hernia Repair: What to Expect at Home. \"  Current as of: February 10, 2021               Content Version: 13.0  © 9342-9907 Healthwise, Incorporated.    Care instructions adapted under license by [employer] (which disclaims liability or warranty for this information). If you have questions about a medical condition or this instruction, always ask your healthcare professional. [place] [address] any warranty or liability for your use of this information. Glenn Moody. Mindy Wilkins MD, [address] [facility] at [address] [facility] [address] [place] [address]  [address]  [phone]  Fax [phone]    DISCHARGE SUMMARY from your Nurse      PATIENT INSTRUCTIONS    After general anesthesia or intravenous sedation, for 24 hours or while taking prescription Narcotics:  · Limit your activities  · Do not drive and operate hazardous machinery  · Do not make important personal or business decisions  · Do  not drink alcoholic beverages  · If you have not urinated within 8 hours after discharge, please contact your surgeon on call. Report the following to your surgeon:  · Excessive pain, swelling, redness or odor of or around the surgical area  · Temperature over 100.5  · Nausea and vomiting lasting longer than 4 hours or if unable to take medications  · Any signs of decreased circulation or nerve impairment to extremity: change in color, persistent  numbness, tingling, coldness or increase pain  · Any questions      GOOD HELP TO FIGHT AN INFECTION  Here are a few tip to help reduce the chance of getting an infection after surgery:   Wash Your Hands   Good handwashing is the most important thing you and your caregiver can do.  Wash before and after caring for any wounds. Dry your hand with a clean towel.  Wash with soap and water for at least 20 seconds. A TIP: sing the \"Happy Birthday\" song through one time while washing to help with the timing.  Use a hand  in between washings.      Shower   When your surgeon says it is OK to take a shower, use a new bar of antibacterial soap (if that is what you use, and keep that bar of soap ONLY for your use), or antibacterial body wash.  Use a clean wash cloth or sponge when you bathe.  Dry off with a clean towel  after every bath - be careful around any wounds, skin staples, sutures or surgical glue over/on wounds.  Do not enter swimming pools, hot tubs, lakes, rivers and/or ocean until wounds are healed and your doctor/surgeon says it is OK.  Use Clean Sheets   Sleep on freshly laundered sheets after your surgery.  Keep the surgery site covered with a clean, dry bandage (if instructed to do so). If the bandage becomes soiled, reapply a new, dry, clean bandage.  Do not allow pets to sleep with you while your wound is healing.  Lifestyle Modification and Controlling Your Blood Sugar   Smoking slows wound healing. Stop smoking and limit exposure to second-hand smoke.  High blood sugar slows wound healing. Eat a well-balanced diet to provide proper nutrition while healing   Monitor your blood sugar (if you are a diabetic) and take your medications as you are suppose to so you can control you blood sugar after surgery. COUGH AND DEEP BREATHE    Breathing deeply and coughing are very important exercises to do after surgery. Deep breathing and coughing open the little air tubes and air sacks in your lungs. You take deep breaths every day. You may not even notice - it is just something you do when you sigh or yawn. It is a natural exercise you do to keep these air passages open. After surgery, take deep breaths and cough, on purpose. DIRECTIONS:  · Take 10 to 15 slow deep breaths every hour while awake. · Breathe in deeply, and hold it for 2 seconds. · Exhale slowly through puckered lips, like blowing up a balloon. · After every 4th or 5th deep breath, hug your pillow to your chest or belly and give a hard, deep cough. Yes, it will probably hurt. But doing this exercise is a very important part of healing after surgery.   Take your pain medicine to help you do this exercise without too much pain. Coughing and deep breathing help prevent bronchitis and pneumonia after surgery. If you had chest or belly surgery, use a pillow as a \"hug kashif\" and hold it tightly to your chest or belly when you cough. ANKLE PUMPS    Ankle pumps increase the circulation of oxygenated blood to your lower extremities and decrease your risk for circulation problems such as blood clots. They also stretch the muscles, tendons and ligaments in your foot and ankle, and prevent joint contracture in the ankle and foot, especially after surgeries on the legs. It is important to do ankle pump exercises regularly after surgery because immobility increases your risk for developing a blood clot. Your doctor may also have you take an Aspirin for the next few days as well. If your doctor did not ask you to take an Aspirin, consult with him before starting Aspirin therapy on your own. The exercise is quite simple. · Slowly point your foot forward, feeling the muscles on the top of your lower leg stretch, and hold this position for 5 seconds. · Next, pull your foot back toward you as far as possible, stretching the calf muscles, and hold that position for 5 seconds. · Repeat with the other foot. · Perform 10 repetitions every hour while awake for both ankles if possible (down and then up with the foot once is one repetition). You should feel gentle stretching of the muscles in your lower leg when doing this exercise. If you feel pain, or your range of motion is limited, don't push too hard. Only go the limit your joint and muscles will let you go. If you have increasing pain, progressively worsening leg warmth or swelling, STOP the exercise and call your doctor. MEDICATION AND   SIDE EFFECT GUIDE    The [facility] MEDICATION AND SIDE EFFECT GUIDE was provided to the PATIENT AND CARE PROVIDER.   Information provided includes instruction about drug purpose and common side effects for the following medications:   · Hydrocodone        These are general instructions for a healthy lifestyle:    *   Please give a list of your current medications to your Primary Care Provider. *   Please update this list whenever your medications are discontinued, doses are changed, or new medications (including over-the-counter products) are added. *   Please carry medication information at all times in case of emergency situations. About Smoking  No smoking / No tobacco products  Avoid exposure to second hand smoke     Surgeon General's Warning:  Quitting smoking now greatly reduces serious risk to your health. Obesity, smoking, and sedentary lifestyle greatly increases your risk for illness and disease. A healthy diet, regular physical exercise & weight monitoring are important for maintaining a healthy lifestyle. Congestive Heart Failure  You may be retaining fluid if you have a history of heart failure or if you experience any of the following symptoms:  Weight gain of 3 pounds or more overnight or 5 pounds in a week, increased swelling in your hands or feet or shortness of breath while lying flat in bed. Please call your doctor as soon as you notice any of these symptoms; do not wait until your next office visit. Recognize signs and symptoms of STROKE:  F -  Face looks uneven  A -  Arms unable to move or move evenly  S -  Speech slurred or non-existent  T -  Time-call 911 as soon as signs and symptoms begin-DO NOT go          back to bed or wait to see if you get better-TIME IS BRAIN. Warning Signs of HEART ATTACK   Call 911 if you have these symptoms:     Chest discomfort. Most heart attacks involve discomfort in the center of the chest that lasts more than a few minutes, or that goes away and comes back. It can feel like uncomfortable pressure, squeezing, fullness, or pain.  Discomfort in other areas of the upper body. Symptoms can include pain or discomfort in one or both arms, the back, neck, jaw, or stomach.  Shortness of breath with or without chest discomfort.  Other signs may include breaking out in a cold sweat, nausea, or lightheadedness. Don't wait more than five minutes to call 911 - MINUTES MATTER! Fast action can save your life. Calling 911 is almost always the fastest way to get lifesaving treatment. Emergency Medical Services staff can begin treatment when they arrive -- up to an hour sooner than if someone gets to the hospital by car. Learning About Coronavirus [phone])  Coronavirus [phone]): Overview  What is coronavirus (COVID-19)? The coronavirus disease (COVID-19) is caused by a virus. It is an illness that was first found in [place], [place], in December 2019. It has since spread worldwide. The virus can cause fever, cough, and trouble breathing. In severe cases, it can cause pneumonia and make it hard to breathe without help. It can cause death. Coronaviruses are a large group of viruses. They cause the common cold. They also cause more serious illnesses like Middle East respiratory syndrome (MERS) and severe acute respiratory syndrome (SARS). COVID-19 is caused by a novel coronavirus. That means it's a new type that has not been seen in people before. This virus spreads person-to-person through droplets from coughing and sneezing. It can also spread when you are close to someone who is infected. And it can spread when you touch something that has the virus on it, such as a doorknob or a tabletop. What can you do to protect yourself from coronavirus (COVID-19)? The best way to protect yourself from getting sick is to:  · Avoid areas where there is an outbreak. · Avoid contact with people who may be infected. · Wash your hands often with soap or alcohol-based hand sanitizers. · Avoid crowds and try to stay at least 6 feet away from other people.   · Wash your hands often, especially after you cough or sneeze. Use soap and water, and scrub for at least 20 seconds. If soap and water aren't available, use an alcohol-based hand . · Avoid touching your mouth, nose, and eyes. What can you do to avoid spreading the virus to others? To help avoid spreading the virus to others:  · Cover your mouth with a tissue when you cough or sneeze. Then throw the tissue in the trash. · Use a disinfectant to clean things that you touch often. · Stay home if you are sick or have been exposed to the virus. Don't go to school, work, or public areas. And don't use public transportation. · If you are sick:  ? Leave your home only if you need to get medical care. But call the doctor's office first so they know you're coming. And wear a face mask, if you have one.  ? If you have a face mask, wear it whenever you're around other people. It can help stop the spread of the virus when you cough or sneeze. ? Clean and disinfect your home every day. Use household  and disinfectant wipes or sprays. Take special care to clean things that you grab with your hands. These include doorknobs, remote controls, phones, and handles on your refrigerator and microwave. And don't forget countertops, tabletops, bathrooms, and computer keyboards. When to call for help  Call 911 anytime you think you may need emergency care. For example, call if:  · You have severe trouble breathing. (You can't talk at all.)  · You have constant chest pain or pressure. · You are severely dizzy or lightheaded. · You are confused or can't think clearly. · Your face and lips have a blue color. · You pass out (lose consciousness) or are very hard to wake up. Call your doctor now if you develop symptoms such as:  · Shortness of breath. · Fever. · Cough. If you need to get care, call ahead to the doctor's office for instructions before you go.  Make sure you wear a face mask, if you have one, to prevent exposing other people to the virus.  Where can you get the latest information? The following health organizations are tracking and studying this virus. Their websites contain the most up-to-date information. Michelle Re also learn what to do if you think you may have been exposed to the virus. · U.S. Centers for Disease Control and Prevention (CDC): The CDC provides updated news about the disease and travel advice. The website also tells you how to prevent the spread of infection. www.cdc.gov  · World Health Organization [facility]): WHO offers information about the virus outbreaks. WHO also has travel advice. www.who.int  Current as of: April 1, 2020               Content Version: 12.4  © 4417-1148 Healthwise, Incorporated. Care instructions adapted under license by your healthcare professional. If you have questions about a medical condition or this instruction, always ask your healthcare professional. [address] any warranty or liability for your use of this information. The discharge information has been reviewed with the {PATIENT PARENT GUARDIAN:52716}. Any questions and concerns from the {PATIENT PARENT GUARDIAN:07382} have been addressed. The {PATIENT PARENT GUARDIAN:09087} verbalized understanding.         CONTENTS FOUND IN YOUR DISCHARGE ENVELOPE:  [x]     Surgeon and Hospital Discharge Instructions  [x]     [facility] Care Provider Card  [x]     Medication & Side Effect Guide            (your newly prescribed medications have been marked/highlighted showing the most common side effects from   the classes of drugs on your prescriptions)  [x]     Medication Prescription(s) x *** ( [] These have been sent electronically to your pharmacy by your surgeon,   - OR -       your surgeon has already provided these to you during a previous/pre-op office visit)  [x]     [facility] Education Information  []     Physical Therapy Prescription  [x]     Follow-up Appointment Cards  []     Surgery-related Pictures/Media  []     Pain block and/or block with On-Q Catheter from Anesthesia Service (information included in your instructions above)  []     Medical device information sheets/pamphlets from their    []     School/work excuse note. []     /parent work excuse note.       The following personal items collected during your admission are returned to you:   Dental Appliance: Dental Appliances: Partials, Uppers  Vision: Visual Aid: None  Hearing Aid:    Jewelry: Jewelry: (P) None  Clothing: Clothing: (P)  (street clothes to [employer])  Other Valuables:    Valuables sent to safe:

## 2021-11-01 NOTE — INTERVAL H&P NOTE
Update History & Physical    The Patient's History and Physical of October 20,   The plan was reviewed with the patient and I examined the patient. There was no change. The surgical site was confirmed by the patient and me. Plan:  The risk, benefits, expected outcome, and alternative to the recommended procedure have been discussed with the patient. Patient understands and wants to proceed with the procedure.     Electronically signed by Stephen Vaughn MD on 11/1/2021 at 9:57 AM

## 2021-11-01 NOTE — ANESTHESIA POSTPROCEDURE EVALUATION
Procedure(s):  ROBOTIC ASSISTED LAPAROSCOPIC SUPRAUMBILICAL HERNIORRHAPHY WITH MESH. Diana Palomo No value filed. Anesthesia Post Evaluation      Multimodal analgesia: multimodal analgesia not used between 6 hours prior to anesthesia start to PACU discharge  Patient location during evaluation: PACU  Patient participation: complete - patient participated  Level of consciousness: awake and alert  Pain score: 3  Pain management: satisfactory to patient  Airway patency: patent  Anesthetic complications: no  Cardiovascular status: hemodynamically stable and acceptable  Respiratory status: acceptable  Hydration status: acceptable  Comments: Patient seen and evaluated; no concerns. Post anesthesia nausea and vomiting:  none      INITIAL Post-op Vital signs:   Vitals Value Taken Time   /55 11/01/21 1320   Temp 36.6 °C (97.8 °F) 11/01/21 1245   Pulse 53 11/01/21 1324   Resp 13 11/01/21 1324   SpO2 99 % 11/01/21 1324   Vitals shown include unvalidated device data.

## 2021-11-01 NOTE — ANESTHESIA PREPROCEDURE EVALUATION
Anesthetic History   No history of anesthetic complications            Review of Systems / Medical History  Patient summary reviewed, nursing notes reviewed and pertinent labs reviewed    Pulmonary  Within defined limits      Sleep apnea           Neuro/Psych   Within defined limits          Comments: Glaucoma Cardiovascular    Hypertension          CAD    Exercise tolerance: >4 METS     GI/Hepatic/Renal     GERD: well controlled           Endo/Other    Diabetes: well controlled    Obesity and arthritis     Other Findings              Physical Exam    Airway  Mallampati: II    Neck ROM: normal range of motion   Mouth opening: Normal     Cardiovascular    Rhythm: regular  Rate: normal         Dental    Dentition: Full upper dentures and Full lower dentures     Pulmonary  Breath sounds clear to auscultation               Abdominal         Other Findings            Anesthetic Plan    ASA: 3  Anesthesia type: general          Induction: Intravenous  Anesthetic plan and risks discussed with: Patient and Son / Daughter      Informed consent obtained.

## 2021-11-01 NOTE — OP NOTES
Lucas [facility]elsen [facility] 79  OPERATIVE REPORT    Name:  Monica Puga  MR#:  [id number]  :  1940  ACCOUNT #:  [de-identified]  DATE OF SERVICE:  2021    SURGEON:    Papito Aguila. Chio Bustamante MD.    Fany Gonzales    ANESTHESIA:  1. General endotracheal.  2.  0.5% Marcaine. 3.  Exparel. PREOPERATIVE DIAGNOSIS:    Incarcerated supraumbilical hernia. POSTOPERATIVE DIAGNOSES:  1. Incarcerated supraumbilical hernia. 2.  Incarcerated umbilical hernia. PROCEDURE:    Robotic-assisted laparoscopic repair of incarcerated supraumbilical and umbilical hernias with 55.4 cm circular Ventralight ST mesh. INDICATION:    Mrs. Morris Soria is an 80year-old black female who was seen in the office last month for evaluation of a bulge above her umbilicus. The bulge has been present over the past four months now and intermittently causes some discomfort. Her primary care physician ordered an ultrasound which revealed a periumbilical hernia containing loops of bowel and fat. Clinically, she has an incarcerated supraumbilical hernia. She presents at this time for her elective repair. PROCEDURE:    The patient was seen preoperatively in the holding area. The risks, benefits, and expected outcome were discussed with the patient, and all questions were answered satisfactorily. The patient concurred with the proposed plan, giving informed consent. The hernia was identified by the patient, and this was verified by me. The hernia was then marked. The patient was taken to the OR. The patient was identified as Philip EwelinaJonathan Mcnair, and the procedure verified as Robotic-assisted laparoscopic supraumbilical herniorrhaphy with mesh, possible open. The patient was placed on the OR table in the supine position. Prior to induction, antibiotic prophylaxis was administered. General anesthesia was administered and tolerated well. Both arms were tucked, and the left side was propped up.   The bed was christina-knifed. The patient's abdomen was prepped with ChloraPrep and draped in the usual sterile fashion with Arlene Heather placed over the skin. A time-out was performed, and the above information was confirmed. The local anesthetic was injected into the skin and subcutaneous tissue in the left mid abdomen. Using a #15 blade, an incision was made. Towel clips were used to elevate the patient's abdominal wall. Using the Optiview technique, a 5 mm trocar was introduced into the abdomen. Insufflation was provided through this trocar to establish a pneumoperitoneum of 15 mmHg, which the patient tolerated. The abdominal cavity was examined, and there were no adhesions noted to prevent a laparoscopic approach. The hernia was identified at the anticipated site. The local anesthetic was injected at the remaining intended trocar sites. Two 8 mm trocars were placed in the left upper quadrant and left lower quadrant, respectively, under direct laparoscopic vision. The 5 mm trocar was up-sized to an 8 mm trocar. The robotic arms were docked. At this time, I scrubbed out and went to the surgeon's console. I took control of the robotic arms for the majority of the procedure. Attention was turned to the periumbilical region. The omentum was manually reduced from the supraumbilical hernia. While clearing off the preperitoneal fat, multiple Swiss cheese-type defects were noted just caudal to the dominant supraumbilical fascial defect at the umbilicus. The supraumbilical fascial defect measured approximately 1.5 x 1.25 cm with the longest dimension in the transverse direction. The area of umbilical fascial defects measured approximately 2.5 x 2.5 cm. Preperitoneal fat was reduced from the fascial defects. The pneumoperitoneum taken down to 10 mmHg. The supraumbilical and umbilical fascial defects were closed with a running 2-0 Stratafix in the cranial to caudal direction. The needle was removed.   The pneumoperitoneum was taken back up to 15 mmHg. An 11.4 cm circular Ventralight ST mesh was chosen for repair. The mesh was rolled up and placed into the abdomen. The mesh was unrolled and pulled up to the abdominal wall to center along the fascial repair with the rough side apposing the abdominal wall. The mesh was secured at the 6 and 12 o'clock positions with 2-0 Stratafix. The ends of the mesh were sewn to the abdominal wall on each side with the respective sutures, pulling the mesh taut. All needles were removed. The underlying bowel was examined, and no injuries were noted. The robotic arms were undocked. At this time, I went back to the patient's bedside. Exparel was injected into the subcutaneous tissue circumferentially around the mesh and along the fascial repairs. The left upper quadrant and left lower quadrant trocars were removed under direct laparoscopic vision, and there was no bleeding noted internally from either site. The laparoscope was removed, and the abdomen was decompressed. The left mid abdomen trocar was then removed. Hemostasis was obtained within all wounds as needed with cautery. The skin at all sites was closed with 4-0 Monocryl in the usual subcuticular fashion. The wounds were cleaned and dried, and Steri-Strips and bandages were applied. An abdominal binder was placed. The patient was extubated in the room. ESTIMATED BLOOD LOSS LESS:    Less than 25 mL. SPECIMENS:    None. IMPLANTS:    An 11.4 cm circular Ventralight ST mesh which was placed as an intraperitoneal onlay mesh directly below the supraumbilical and umbilical fascial repairs. FINDINGS:  1. An approximately 1.5 x 5.45 cm supraumbilical fascial defect with incarcerated omentum. 2.  An approximately 2.5 x 2.5 cm area of Swiss cheese-type umbilical fascial defects with incarcerated preperitoneal fat.     COUNTS:    All sponge, needle, and instrument counts were correct x 2. COMPLICATIONS:    None. DISPOSITION:    The patient was transferred to the recovery room in stable condition, having tolerated the procedure and anesthesia well.       Vivienne King MD [id number]  D:  11/01/2021 12:47  T:  11/01/2021 17:26  JOB #:  [id number]  CC:  MD Johanny Lopez MD Arvin Round, MD Sharlette Sparks, DPM

## 2021-11-01 NOTE — BRIEF OP NOTE
Brief Postoperative Note    Patient: Ingrid De Leon  YOB: 1940  MRN: [id number]    Date of Procedure: 11/1/2021     Pre-Op Diagnosis: INCARCERATED SUPRAUMBILICAL HERNIA    Post-Op Diagnosis:   1. INCARCERATED SUPRAUMBILICAL HERNIA. 2. INCARCERATED UMBILICAL HERNIA. Procedure(s):  ROBOTIC ASSISTED LAPAROSCOPIC REPAIR OF INCARCERATED SUPRAUMBILICAL AND UMBILICAL HERNIAS WITH 32.2 CM CIRCULAR VENTRALIGHT ST MESH. Surgeon(s):  Elizabeth Taylor MD    Surgical Assistant: Surg Asst-1: Zita Cox I    Anesthesia:   1. General endotracheal.  2. 0.5% Marcaine. 3. Exparel. Estimated Blood Loss (mL): Less than 25 ml. Complications: None    Specimens: * No specimens in log *     Implants:   Implant Name Type Inv. Item Serial No.  Lot No. LRB No. Used Action   MESH CHARY [employer] 4. 5IN -- VENTRALIGHT S/T - SNA  MESH CHARY [employer] 4. 5IN -- VENTRALIGHT S/T NA BARD [id number]_[id number] N/A 1 Implanted       Drains: * No LDAs found *    Findings:   1. An approximately 1.5 x 4.18 cm supraumbilical fascial defect with incarcerated omentum. 2. An approximately 2.5 x 2.5 cm area of swiss cheese type umbilical fascial defects with incarcerated preperitoneal fat.     Electronically Signed by Mikaela Maldonado MD on 11/1/2021 at 12:25 PM

## 2021-11-02 ENCOUNTER — TELEPHONE (OUTPATIENT)
Dept: SURGERY | Age: 81
End: 2021-11-02

## 2021-11-09 ENCOUNTER — TELEPHONE (OUTPATIENT)
Dept: FAMILY MEDICINE CLINIC | Age: 81
End: 2021-11-09

## 2021-11-09 DIAGNOSIS — M54.10 BILATERAL RADIATING LEG PAIN: Primary | ICD-10-CM

## 2021-11-09 NOTE — TELEPHONE ENCOUNTER
Pain has been going on for awhile and getting worse. Pain is on top and side of feet and going up back of legs to her thighs and makes walking hard and painful. What can be done? Can she have a referral for neuropathy?

## 2021-11-09 NOTE — TELEPHONE ENCOUNTER
----- Message from Loy Brendan sent at 11/9/2021  9:40 AM EST -----  Subject: Message to Provider    QUESTIONS  Information for Provider? Patient is experiencing pain in both legs   starting from her feet up to her thigh, per. daughter she is also   experiencing pain in both feet and legs. painful to walk as well.   ---------------------------------------------------------------------------  --------------  CALL BACK INFO  What is the best way for the office to contact you? OK to leave message on   voicemail  Preferred Call Back Phone Number? [phone]  ---------------------------------------------------------------------------  --------------  SCRIPT ANSWERS  Relationship to Patient? Other  Representative Name? Tano Morataya  Is the Representative on the appropriate HIPAA document in Epic?  Yes

## 2021-11-10 NOTE — TELEPHONE ENCOUNTER
Spoke to pt's daughter and relayed message. She understood. Pt does not have a rheumatologist.  Also pt is wondering if this could be gout?

## 2021-11-16 ENCOUNTER — OFFICE VISIT (OUTPATIENT)
Dept: SURGERY | Age: 81
End: 2021-11-16
Payer: MEDICARE

## 2021-11-16 ENCOUNTER — HOSPITAL ENCOUNTER (OUTPATIENT)
Dept: VASCULAR SURGERY | Age: 81
Discharge: HOME | End: 2021-11-16
Attending: INTERNAL MEDICINE
Payer: MEDICARE

## 2021-11-16 VITALS
WEIGHT: 206 LBS | DIASTOLIC BLOOD PRESSURE: 61 MMHG | HEART RATE: 81 BPM | BODY MASS INDEX: 36.5 KG/M2 | RESPIRATION RATE: 20 BRPM | OXYGEN SATURATION: 98 % | HEIGHT: 63 IN | TEMPERATURE: 98.1 F | SYSTOLIC BLOOD PRESSURE: 114 MMHG

## 2021-11-16 DIAGNOSIS — Z98.890 S/P LAPAROSCOPIC HERNIA REPAIR: ICD-10-CM

## 2021-11-16 DIAGNOSIS — Z87.19 S/P LAPAROSCOPIC HERNIA REPAIR: ICD-10-CM

## 2021-11-16 DIAGNOSIS — M54.10 BILATERAL RADIATING LEG PAIN: ICD-10-CM

## 2021-11-16 PROBLEM — K43.6 INCARCERATED VENTRAL HERNIA: Status: RESOLVED | Noted: 2021-10-20 | Resolved: 2021-11-16

## 2021-11-16 PROCEDURE — 93970 EXTREMITY STUDY: CPT

## 2021-11-16 PROCEDURE — 99024 POSTOP FOLLOW-UP VISIT: CPT | Performed by: SURGERY

## 2021-11-16 NOTE — PROGRESS NOTES
1. Have you been to the ER, urgent care clinic since your last visit? Hospitalized since your last visit? No    2. Have you seen or consulted any other health care providers outside of the [address] [place] [address] since your last visit? Include any pap smears or colon screening.  No

## 2021-11-16 NOTE — PROGRESS NOTES
Subjective:      Anastacia Castillo is a 80 y.o. black female presents for postop care 2 weeks following a lap hernia repair. Mrs. Yaquelin Camarillo is doing fine. Her appetite is good, and she is eating a regular diet without difficulty. Her bowel movements are regular. She is not having any pain or problems with her incisions. She is having an US after this visit because she continues to have swelling in her lower extremities. Objective:     Visit Vitals  /61 (BP 1 Location: Left upper arm, BP Patient Position: Sitting, BP Cuff Size: Adult)   Pulse 81   Temp 98.1 °F (36.7 °C) (Oral)   Resp 20   Ht 5' 3\" (1.6 m)   Wt 206 lb (93.4 kg)   SpO2 98%   BMI 36.49 kg/m²       General:  alert, cooperative, no distress   Abdomen:  Soft, obese, NT, ND. The incisions are clean, dry, and intact with no erythema. There is a small, firm seroma, but no hernia. Assessment:     1st POV, s/p robot lap supraumbilical and umbilical herniorrhaphy with mesh. Plan:     Mrs. Yaquelin Camarillo is doing fine from her procedure. She will continue with light activity for another 4 weeks and then gradually resume normal activity as tolerated. She can f/u with me as needed.

## 2021-11-17 ENCOUNTER — TELEPHONE (OUTPATIENT)
Dept: FAMILY MEDICINE CLINIC | Age: 81
End: 2021-11-17

## 2021-11-17 NOTE — TELEPHONE ENCOUNTER
----- Message from Sonny Amaya sent at 11/17/2021  4:05 PM EST -----  Subject: Message to Provider    QUESTIONS  Information for Provider? pt would like a call back she has a cyst behind   her knee and would like to know what further to do   ---------------------------------------------------------------------------  --------------  CALL BACK INFO  What is the best way for the office to contact you? OK to leave message on   voicemail  Preferred Call Back Phone Number? [phone]  ---------------------------------------------------------------------------  --------------  SCRIPT ANSWERS  Relationship to Patient?  Self

## 2021-12-02 DIAGNOSIS — L29.9 ITCHING: ICD-10-CM

## 2021-12-02 RX ORDER — HYDROXYZINE HYDROCHLORIDE 10 MG/1
TABLET, FILM COATED ORAL
Qty: 30 TABLET | Refills: 0 | Status: SHIPPED | OUTPATIENT
Start: 2021-12-02 | End: 2022-10-19 | Stop reason: SDUPTHER

## 2021-12-13 ENCOUNTER — NURSE TRIAGE (OUTPATIENT)
Dept: OTHER | Facility: CLINIC | Age: 81
End: 2021-12-13

## 2021-12-13 NOTE — TELEPHONE ENCOUNTER
Received call from Edna Morley at [facility] with Red Flag Complaint. Brief description of triage: Pt called in c/o chronic bilateral leg pain and swelling > in the left leg and foot, onset multiple months, see triage assessment below. Triage indicates for patient to go the ED now or to Office w/PCP approval.    Writer attempted to reach PCP office, immediately placed on extended hold > 4 minutes, Pedrito Banks at the PCP, office for 2nd level triage. Care advice provided, patient verbalizes understanding; denies any other questions or concerns; instructed to call back for any new or worsening symptoms. Attention Provider: Thank you for allowing me to participate in the care of your patient. The patient was connected to triage in response to information provided to the ECC. Please do not respond through this encounter as the response is not directed to a shared pool. Reason for Disposition   Patient sounds very sick or weak to the triager    Answer Assessment - Initial Assessment Questions  1. ONSET: \"When did the swelling start? \" (e.g., minutes, hours, days)      Ongoing for months    2. LOCATION: \"What part of the leg is swollen? \"  \"Are both legs swollen or just one leg? \"      Bilateral leg swelling; Left leg worse; from left foot to left calf    3. SEVERITY: \"How bad is the swelling? \" (e.g., localized; mild, moderate, severe)   - Localized - small area of swelling localized to one leg   - MILD pedal edema - swelling limited to foot and ankle, pitting edema < 1/4 inch (6 mm) deep, rest and elevation eliminate most or all swelling   - MODERATE edema - swelling of lower leg to knee, pitting edema > 1/4 inch (6 mm) deep, rest and elevation only partially reduce swelling   - SEVERE edema - swelling extends above knee, facial or hand swelling present       Moderate    4. REDNESS: \"Does the swelling look red or infected? \"      Unknown    5. PAIN: \"Is the swelling painful to touch? \" If so, ask: \"How painful is it?\"   (Scale 1-10; mild, moderate or severe)      Painful to touch, moderate    6. FEVER: \"Do you have a fever? \" If so, ask: \"What is it, how was it measured, and when did it start? \"       Denies    7. CAUSE: \"What do you think is causing the leg swelling? \"       Diabetic    8. MEDICAL HISTORY: \"Do you have a history of heart failure, kidney disease, liver failure, or cancer? \"      Bradycardia    9. RECURRENT SYMPTOM: \"Have you had leg swelling before? \" If so, ask: \"When was the last time? \" \"What happened that time? \"      Ongoing, diagnostic testing    10. OTHER SYMPTOMS: \"Do you have any other symptoms? \" (e.g., chest pain, difficulty breathing)        Intermittent difficulty breathing, SOB w/exertion; Left foot pain, tingling, numbness and coldness to toes on left foot    11. PREGNANCY: \"Is there any chance you are pregnant? \" \"When was your last menstrual period? \"        NA    Protocols used: LEG SWELLING AND EDEMA-ADULT-[place]

## 2021-12-15 DIAGNOSIS — K21.9 GASTROESOPHAGEAL REFLUX DISEASE, UNSPECIFIED WHETHER ESOPHAGITIS PRESENT: ICD-10-CM

## 2021-12-15 RX ORDER — OMEPRAZOLE 20 MG/1
CAPSULE, DELAYED RELEASE ORAL
Qty: 30 CAPSULE | Refills: 0 | Status: SHIPPED | OUTPATIENT
Start: 2021-12-15 | End: 2022-06-03

## 2022-03-16 RX ORDER — GLIPIZIDE 5 MG/1
TABLET ORAL
Qty: 90 TABLET | Refills: 0 | Status: SHIPPED | OUTPATIENT
Start: 2022-03-16

## 2022-03-17 ENCOUNTER — APPOINTMENT (OUTPATIENT)
Dept: CT IMAGING | Age: 82
End: 2022-03-17
Payer: MEDICARE

## 2022-03-17 ENCOUNTER — NURSE TRIAGE (OUTPATIENT)
Dept: OTHER | Facility: CLINIC | Age: 82
End: 2022-03-17

## 2022-03-17 ENCOUNTER — HOSPITAL ENCOUNTER (EMERGENCY)
Age: 82
Discharge: HOME | End: 2022-03-17
Attending: EMERGENCY MEDICINE
Payer: MEDICARE

## 2022-03-17 ENCOUNTER — APPOINTMENT (OUTPATIENT)
Dept: GENERAL RADIOLOGY | Age: 82
End: 2022-03-17
Payer: MEDICARE

## 2022-03-17 VITALS
OXYGEN SATURATION: 98 % | RESPIRATION RATE: 20 BRPM | WEIGHT: 201 LBS | BODY MASS INDEX: 35.61 KG/M2 | DIASTOLIC BLOOD PRESSURE: 47 MMHG | HEART RATE: 61 BPM | TEMPERATURE: 98.8 F | SYSTOLIC BLOOD PRESSURE: 156 MMHG | HEIGHT: 63 IN

## 2022-03-17 DIAGNOSIS — M62.838 MUSCLE SPASMS OF NECK: Primary | ICD-10-CM

## 2022-03-17 LAB
ALBUMIN SERPL-MCNC: 3.4 G/DL (ref 3.5–5)
ALBUMIN/GLOB SERPL: 0.8 {RATIO} (ref 1.1–2.2)
ALP SERPL-CCNC: 103 U/L (ref 45–117)
ALT SERPL-CCNC: 16 U/L (ref 12–78)
ANION GAP SERPL CALC-SCNC: 8 MMOL/L (ref 5–15)
APPEARANCE UR: CLEAR
AST SERPL-CCNC: 12 U/L (ref 15–37)
BACTERIA URNS QL MICRO: ABNORMAL /HPF
BASOPHILS # BLD: 0 K/UL (ref 0–0.1)
BASOPHILS NFR BLD: 1 % (ref 0–1)
BILIRUB SERPL-MCNC: 0.2 MG/DL (ref 0.2–1)
BILIRUB UR QL: NEGATIVE
BUN SERPL-MCNC: 16 MG/DL (ref 6–20)
BUN/CREAT SERPL: 16 (ref 12–20)
CALCIUM SERPL-MCNC: 9.4 MG/DL (ref 8.5–10.1)
CHLORIDE SERPL-SCNC: 107 MMOL/L (ref 97–108)
CO2 SERPL-SCNC: 28 MMOL/L (ref 21–32)
COLOR UR: ABNORMAL
COMMENT, HOLDF: NORMAL
CREAT SERPL-MCNC: 0.97 MG/DL (ref 0.55–1.02)
DIFFERENTIAL METHOD BLD: NORMAL
EOSINOPHIL # BLD: 0.1 K/UL (ref 0–0.4)
EOSINOPHIL NFR BLD: 2 % (ref 0–7)
EPITH CASTS URNS QL MICRO: ABNORMAL /LPF
ERYTHROCYTE [DISTWIDTH] IN BLOOD BY AUTOMATED COUNT: 13.7 % (ref 11.5–14.5)
GLOBULIN SER CALC-MCNC: 4.5 G/DL (ref 2–4)
GLUCOSE SERPL-MCNC: 84 MG/DL (ref 65–100)
GLUCOSE UR STRIP.AUTO-MCNC: NEGATIVE MG/DL
HCT VFR BLD AUTO: 36.1 % (ref 35–47)
HGB BLD-MCNC: 11.6 G/DL (ref 11.5–16)
HGB UR QL STRIP: NEGATIVE
HYALINE CASTS URNS QL MICRO: ABNORMAL /LPF (ref 0–2)
IMM GRANULOCYTES # BLD AUTO: 0 K/UL (ref 0–0.04)
IMM GRANULOCYTES NFR BLD AUTO: 0 % (ref 0–0.5)
KETONES UR QL STRIP.AUTO: NEGATIVE MG/DL
LEUKOCYTE ESTERASE UR QL STRIP.AUTO: ABNORMAL
LYMPHOCYTES # BLD: 1.5 K/UL (ref 0.8–3.5)
LYMPHOCYTES NFR BLD: 25 % (ref 12–49)
MCH RBC QN AUTO: 28.5 PG (ref 26–34)
MCHC RBC AUTO-ENTMCNC: 32.1 G/DL (ref 30–36.5)
MCV RBC AUTO: 88.7 FL (ref 80–99)
MONOCYTES # BLD: 0.4 K/UL (ref 0–1)
MONOCYTES NFR BLD: 7 % (ref 5–13)
NEUTS SEG # BLD: 3.9 K/UL (ref 1.8–8)
NEUTS SEG NFR BLD: 65 % (ref 32–75)
NITRITE UR QL STRIP.AUTO: NEGATIVE
NRBC # BLD: 0 K/UL (ref 0–0.01)
NRBC BLD-RTO: 0 PER 100 WBC
PH UR STRIP: 7 [PH] (ref 5–8)
PLATELET # BLD AUTO: 253 K/UL (ref 150–400)
PMV BLD AUTO: 9.2 FL (ref 8.9–12.9)
POTASSIUM SERPL-SCNC: 3.7 MMOL/L (ref 3.5–5.1)
PROT SERPL-MCNC: 7.9 G/DL (ref 6.4–8.2)
PROT UR STRIP-MCNC: NEGATIVE MG/DL
RBC # BLD AUTO: 4.07 M/UL (ref 3.8–5.2)
RBC #/AREA URNS HPF: ABNORMAL /HPF (ref 0–5)
SAMPLES BEING HELD,HOLD: NORMAL
SODIUM SERPL-SCNC: 143 MMOL/L (ref 136–145)
SP GR UR REFRACTOMETRY: 1.01 (ref 1–1.03)
TROPONIN-HIGH SENSITIVITY: 6 NG/L (ref 0–51)
UR CULT HOLD, URHOLD: NORMAL
UROBILINOGEN UR QL STRIP.AUTO: 0.2 EU/DL (ref 0.2–1)
WBC # BLD AUTO: 5.9 K/UL (ref 3.6–11)
WBC URNS QL MICRO: ABNORMAL /HPF (ref 0–4)

## 2022-03-17 PROCEDURE — 85025 COMPLETE CBC W/AUTO DIFF WBC: CPT

## 2022-03-17 PROCEDURE — 74011250637 HC RX REV CODE- 250/637: Performed by: PHYSICIAN ASSISTANT

## 2022-03-17 PROCEDURE — 71045 X-RAY EXAM CHEST 1 VIEW: CPT

## 2022-03-17 PROCEDURE — 93005 ELECTROCARDIOGRAM TRACING: CPT

## 2022-03-17 PROCEDURE — 70450 CT HEAD/BRAIN W/O DYE: CPT

## 2022-03-17 PROCEDURE — 84484 ASSAY OF TROPONIN QUANT: CPT

## 2022-03-17 PROCEDURE — 81001 URINALYSIS AUTO W/SCOPE: CPT

## 2022-03-17 PROCEDURE — 99285 EMERGENCY DEPT VISIT HI MDM: CPT

## 2022-03-17 PROCEDURE — 80053 COMPREHEN METABOLIC PANEL: CPT

## 2022-03-17 PROCEDURE — 36415 COLL VENOUS BLD VENIPUNCTURE: CPT

## 2022-03-17 RX ORDER — NAPROXEN 250 MG/1
500 TABLET ORAL
Status: COMPLETED | OUTPATIENT
Start: 2022-03-17 | End: 2022-03-17

## 2022-03-17 RX ORDER — METHOCARBAMOL 500 MG/1
500 TABLET, FILM COATED ORAL 4 TIMES DAILY
Qty: 20 TABLET | Refills: 0 | Status: SHIPPED | OUTPATIENT
Start: 2022-03-17 | End: 2022-03-22

## 2022-03-17 RX ORDER — NAPROXEN 500 MG/1
500 TABLET ORAL 2 TIMES DAILY WITH MEALS
Qty: 20 TABLET | Refills: 0 | Status: SHIPPED | OUTPATIENT
Start: 2022-03-17 | End: 2022-03-27

## 2022-03-17 RX ORDER — METHOCARBAMOL 750 MG/1
750 TABLET, FILM COATED ORAL
Status: COMPLETED | OUTPATIENT
Start: 2022-03-17 | End: 2022-03-17

## 2022-03-17 RX ADMIN — NAPROXEN 500 MG: 250 TABLET ORAL at 21:16

## 2022-03-17 RX ADMIN — METHOCARBAMOL TABLETS 750 MG: 750 TABLET, COATED ORAL at 21:16

## 2022-03-17 NOTE — ED TRIAGE NOTES
Pt presents to ER with c/o left neck pain radiating to left arm and leg. Pt c/o exertional dyspnea, denies CP or dizziness. Pt called PCP and sent in for evaluation by Dr. Tony Portillo. Pt denies extremity weakness, vision changes. 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 Pt was formerly on gabapentin but stopped taking it. She takes tizanidine at night. Pt reports h/o HF and weight gain.

## 2022-03-17 NOTE — ED PROVIDER NOTES
Date of Service:  3/17/2022    Patient:  Jose Luis Bianchi    Chief Complaint:  Leg Pain and Arm Pain       HPI:  Jose Luis Bianchi is a 80 y.o.  female who presents for evaluation of left sided arm and left leg pain x2 weeks. Also patient states left-sided weakness x2 weeks. States intermittent headaches. Denies chest pain, shortness of breath, or abdominal pain. Patient denies any known trauma or injury. Denies nausea, vomiting, chills or fever. Patient denies history of CVA. Past Medical History:   Diagnosis Date    Arrhythmia     Bradycardia.  Arthritis     Burning with urination     Incontinence.  Diabetes ([facility] [address]     GERD (gastroesophageal reflux disease)     Glaucoma     High cholesterol     Hypertension     Ill-defined condition     EDEMA, LOWER LIMS, URINARY INCONTINENCE    Obesity, Class II, BMI 35-39.9     Sleep apnea     NO CPAP       Past Surgical History:   Procedure Laterality Date    ENDOSCOPY, COLON, DIAGNOSTIC      2008    HX BREAST BIOPSY Left 1970s    Excision of benign breast cysts.  HX CATARACT REMOVAL Bilateral     HX GYN      Laparoscopic resection of ?ovarian cysts.  HX HEART CATHETERIZATION  01/06/2017    HX HERNIA REPAIR  11/01/2021    Robotic-assisted laparoscopic repair of incarcerated supraumbilical and umbilical hernias with mesh.     HX HYSTERECTOMY      HX KNEE REPLACEMENT Left 2014    HX KNEE REPLACEMENT Right     HX UROLOGICAL      BOTOX FOR FREQUENT URINATION    SD DRAINAGE OF DEEP RECTAL ABSCESS           Family History:   Problem Relation Age of Onset    Heart Disease Mother     No Known Problems Father     No Known Problems Sister     Cancer Brother         PANCREAS    Heart Disease Sister     Cancer Sister         BREAST    Alcohol abuse Brother     No Known Problems Brother     No Known Problems Brother     No Known Problems Brother     Anesth Problems Neg Hx        Social History     Socioeconomic History    Marital status:      Spouse name: Not on file    Number of children: Not on file    Years of education: Not on file    Highest education level: Not on file   Occupational History    Not on file   Tobacco Use    Smoking status: Former Smoker     Packs/day: 0.50     Years: 5.00     Pack years: 2.50     Quit date: 1966     Years since quittin.7    Smokeless tobacco: Never Used   Vaping Use    Vaping Use: Never used   Substance and Sexual Activity    Alcohol use: Yes     Alcohol/week: 0.0 standard drinks     Comment: rarely    Drug use: No    Sexual activity: Not Currently   Other Topics Concern     Service No    Blood Transfusions No    Caffeine Concern No    Occupational Exposure No    Hobby Hazards No    Sleep Concern Yes    Stress Concern Yes    Weight Concern Yes    Special Diet No    Back Care No    Exercise Yes    Bike Helmet Yes    Seat Belt Yes    Self-Exams No   Social History Narrative    Not on file     Social Determinants of Health     Financial Resource Strain:     Difficulty of Paying Living Expenses: Not on file   Food Insecurity:     Worried About Running Out of Food in the Last Year: Not on file    Mary of Food in the Last Year: Not on file   Transportation Needs:     Lack of Transportation (Medical): Not on file    Lack of Transportation (Non-Medical):  Not on file   Physical Activity:     Days of Exercise per Week: Not on file    Minutes of Exercise per Session: Not on file   Stress:     Feeling of Stress : Not on file   Social Connections:     Frequency of Communication with Friends and Family: Not on file    Frequency of Social Gatherings with Friends and Family: Not on file    Attends [employer] Services: Not on file    Active Member of Clubs or Organizations: Not on file    Attends Club or Organization Meetings: Not on file    Marital Status: Not on file   Intimate Partner Violence:     Fear of Current or Ex-Partner: Not on file   [place] Emotionally Abused: Not on file    Physically Abused: Not on file    Sexually Abused: Not on file   Housing Stability:     Unable to Pay for Housing in the Last Year: Not on file    Number of Places Lived in the Last Year: Not on file    Unstable Housing in the Last Year: Not on file         ALLERGIES: Sulfa (sulfonamide antibiotics)    Review of Systems   Constitutional: Negative for chills and fever. Eyes: Negative for visual disturbance. Respiratory: Negative for shortness of breath. Cardiovascular: Negative for chest pain. Gastrointestinal: Negative for abdominal pain and vomiting. Genitourinary: Negative for dysuria. Musculoskeletal: Negative for neck pain. Skin: Negative for rash. Allergic/Immunologic: Negative for immunocompromised state. Neurological: Positive for weakness, numbness and headaches. Negative for dizziness, tremors and speech difficulty. Psychiatric/Behavioral: Negative for agitation. All other systems reviewed and are negative. Vitals:    03/17/22 1710 03/17/22 2118   BP: (!) 182/52 (!) 156/47   Pulse: 61    Resp: 20    Temp: 98.8 °F (37.1 °C)    SpO2: 97% 98%   Weight: 91.2 kg (201 lb)    Height: 5' 3\" (1.6 m)             Physical Exam  Vitals and nursing note reviewed. Constitutional:       General: She is not in acute distress. HENT:      Head: Atraumatic. Right Ear: External ear normal.      Left Ear: External ear normal.      Mouth/Throat:      Mouth: Mucous membranes are moist.   Eyes:      Conjunctiva/sclera: Conjunctivae normal.   Cardiovascular:      Rate and Rhythm: Normal rate and regular rhythm. Heart sounds: No murmur heard. Pulmonary:      Effort: Pulmonary effort is normal.      Breath sounds: Normal breath sounds. Abdominal:      Palpations: Abdomen is soft. Tenderness: There is no abdominal tenderness. Musculoskeletal:         General: Normal range of motion. Cervical back: Normal range of motion and neck supple. Skin:     General: Skin is warm and dry. Neurological:      Mental Status: She is alert and oriented to person, place, and time. Mental status is at baseline. MDM       Procedures      VITAL SIGNS:  Patient Vitals for the past 4 hrs:   BP SpO2   03/17/22 2118 (!) 156/47 98 %         LABS:  Recent Results (from the past 6 hour(s))   CBC WITH AUTOMATED DIFF    Collection Time: 03/17/22  7:26 PM   Result Value Ref Range    WBC 5.9 3.6 - 11.0 K/uL    RBC 4.07 3.80 - 5.20 M/uL    HGB 11.6 11.5 - 16.0 g/dL    HCT 36.1 35.0 - 47.0 %    MCV 88.7 80.0 - 99.0 FL    MCH 28.5 26.0 - 34.0 PG    MCHC 32.1 30.0 - 36.5 g/dL    RDW 13.7 11.5 - 14.5 %    PLATELET 799 627 - 865 K/uL    MPV 9.2 8.9 - 12.9 FL    NRBC 0.0 0  WBC    ABSOLUTE NRBC 0.00 0.00 - 0.01 K/uL    NEUTROPHILS 65 32 - 75 %    LYMPHOCYTES 25 12 - 49 %    MONOCYTES 7 5 - 13 %    EOSINOPHILS 2 0 - 7 %    BASOPHILS 1 0 - 1 %    IMMATURE GRANULOCYTES 0 0.0 - 0.5 %    ABS. NEUTROPHILS 3.9 1.8 - 8.0 K/UL    ABS. LYMPHOCYTES 1.5 0.8 - 3.5 K/UL    ABS. MONOCYTES 0.4 0.0 - 1.0 K/UL    ABS. EOSINOPHILS 0.1 0.0 - 0.4 K/UL    ABS. BASOPHILS 0.0 0.0 - 0.1 K/UL    ABS. IMM. GRANS. 0.0 0.00 - 0.04 K/UL    DF AUTOMATED     METABOLIC PANEL, COMPREHENSIVE    Collection Time: 03/17/22  7:26 PM   Result Value Ref Range    Sodium 143 136 - 145 mmol/L    Potassium 3.7 3.5 - 5.1 mmol/L    Chloride 107 97 - 108 mmol/L    CO2 28 21 - 32 mmol/L    Anion gap 8 5 - 15 mmol/L    Glucose 84 65 - 100 mg/dL    BUN 16 6 - 20 MG/DL    Creatinine 0.97 0.55 - 1.02 MG/DL    BUN/Creatinine ratio 16 12 - 20      GFR est AA >60 >60 ml/min/1.73m2    GFR est non-AA 55 (L) >60 ml/min/1.73m2    Calcium 9.4 8.5 - 10.1 MG/DL    Bilirubin, total 0.2 0.2 - 1.0 MG/DL    ALT (SGPT) 16 12 - 78 U/L    AST (SGOT) 12 (L) 15 - 37 U/L    Alk.  phosphatase 103 45 - 117 U/L    Protein, total 7.9 6.4 - 8.2 g/dL    Albumin 3.4 (L) 3.5 - 5.0 g/dL    Globulin 4.5 (H) 2.0 - 4.0 g/dL    A-G Ratio 0.8 (L) 1.1 - 2. 2     TROPONIN-HIGH SENSITIVITY    Collection Time: 03/17/22  7:26 PM   Result Value Ref Range    Troponin-High Sensitivity 6 0 - 51 ng/L   SAMPLES BEING HELD    Collection Time: 03/17/22  7:26 PM   Result Value Ref Range    SAMPLES BEING HELD RED GOLD BLUE     COMMENT        Add-on orders for these samples will be processed based on acceptable specimen integrity and analyte stability, which may vary by analyte. EKG, 12 LEAD, INITIAL    Collection Time: 03/17/22  7:40 PM   Result Value Ref Range    Ventricular Rate 59 BPM    Atrial Rate 59 BPM    P-R Interval 166 ms    QRS Duration 74 ms    Q-T Interval 424 ms    QTC Calculation (Bezet) 419 ms    Calculated P Axis 47 degrees    Calculated R Axis -31 degrees    Calculated T Axis 15 degrees    Diagnosis       Sinus bradycardia  Left axis deviation  Cannot rule out Anterior infarct (cited on or before 17-MAR-2022)  Abnormal ECG  When compared with ECG of 01-NOV-2021 09:24,  No significant change was found     URINALYSIS W/MICROSCOPIC    Collection Time: 03/17/22  8:27 PM   Result Value Ref Range    Color YELLOW/STRAW      Appearance CLEAR CLEAR      Specific gravity 1.008 1.003 - 1.030      pH (UA) 7.0 5.0 - 8.0      Protein Negative NEG mg/dL    Glucose Negative NEG mg/dL    Ketone Negative NEG mg/dL    Bilirubin Negative NEG      Blood Negative NEG      Urobilinogen 0.2 0.2 - 1.0 EU/dL    Nitrites Negative NEG      Leukocyte Esterase TRACE (A) NEG      WBC 0-4 0 - 4 /hpf    RBC 0-5 0 - 5 /hpf    Epithelial cells MANY (A) FEW /lpf    Bacteria 1+ (A) NEG /hpf    Hyaline cast 0-2 0 - 2 /lpf   URINE CULTURE HOLD SAMPLE    Collection Time: 03/17/22  8:27 PM    Specimen: Serum; Urine   Result Value Ref Range    Urine culture hold        Urine on hold in Microbiology dept for 2 days. If unpreserved urine is submitted, it cannot be used for addtional testing after 24 hours, recollection will be required.         IMAGING:  CT HEAD WO CONT   Final Result   No acute intracranial hemorrhage, mass or infarct. XR CHEST PORT   Final Result   No acute cardiopulmonary disease. Medications During Visit:  Medications   methocarbamoL (ROBAXIN) tablet 750 mg (750 mg Oral Given 3/17/22 2116)   naproxen (NAPROSYN) tablet 500 mg (500 mg Oral Given 3/17/22 2116)         DECISION MAKING:  Ana Savage is a 80 y.o. female who comes in as above. CT of the head and chest x-ray showed no acute abnormality. Results reviewed with patient. Patient symptoms likely related to neck muscle spasms. Patient stable upon discharge. Return precautions given to patient. IMPRESSION:  1. Muscle spasms of neck        DISPOSITION:  Discharged      Current Discharge Medication List      START taking these medications    Details   naproxen (Naprosyn) 500 mg tablet Take 1 Tablet by mouth two (2) times daily (with meals) for 10 days. As needed for pain  Qty: 20 Tablet, Refills: 0  Start date: 3/17/2022, End date: 3/27/2022      methocarbamoL (Robaxin) 500 mg tablet Take 1 Tablet by mouth four (4) times daily for 5 days.  As needed for muscle spasms  Qty: 20 Tablet, Refills: 0  Start date: 3/17/2022, End date: 3/22/2022              Follow-up Information     Follow up With Specialties Details Why Contact Info    Rodrigo Ashley MD Pediatric Medicine, Family Medicine Schedule an appointment as soon as possible for a visit   [address] [place], [place] [id number] [phone]      [facility] EMERGENCY DEPT Emergency Medicine  If symptoms worsen [address] [place] [address]  [phone]

## 2022-03-17 NOTE — TELEPHONE ENCOUNTER
Received call from Marla at [facility] with [employer] Complaint. Subjective: Caller states \"this has been going on for over 2 months, I have neuropathy and I have shooting pain and numbness in left side\"     Current Symptoms: L arm pain, increasing, hx of neuropathy, newer onset of arm pain x 2 months and worsening. Pt states pain started after last covid shot. Pain is from L neck to L triceps area. Area is also weak, pt is unable to hold objects. Onset: 2 months ago; worsening    Associated Symptoms: NA    Pain Severity: 10/10; sharp; constant, more at night    Temperature: Denies     What has been tried: tylenol with no relief    LMP: NA Pregnant: NA    Recommended disposition: Go to ED Now    Care advice provided, patient verbalizes understanding; denies any other questions or concerns; instructed to call back for any new or worsening symptoms. Patient/caller agrees to proceed to [facility] Emergency Department, daughter is on the phone with writer and pt will be taking pt to ER     Attention Provider: Thank you for allowing me to participate in the care of your patient. The patient was connected to triage in response to information provided to the [facility]. Please do not respond through this encounter as the response is not directed to a shared pool.       Reason for Disposition   Age > 36 and no obvious cause for pain, pain still present even when not moving the arm    Protocols used: ARM PAIN-ADULT-[place]

## 2022-03-18 PROBLEM — E11.8 TYPE 2 DIABETES MELLITUS WITH COMPLICATION, WITHOUT LONG-TERM CURRENT USE OF INSULIN (HCC): Status: ACTIVE | Noted: 2018-01-09

## 2022-03-18 PROBLEM — M19.90 OSTEOARTHROSIS: Status: ACTIVE | Noted: 2020-08-21

## 2022-03-18 PROBLEM — Z87.19 S/P LAPAROSCOPIC HERNIA REPAIR: Status: ACTIVE | Noted: 2021-11-16

## 2022-03-18 PROBLEM — R00.1 SYMPTOMATIC BRADYCARDIA: Status: ACTIVE | Noted: 2018-01-05

## 2022-03-18 PROBLEM — Z98.890 S/P LAPAROSCOPIC HERNIA REPAIR: Status: ACTIVE | Noted: 2021-11-16

## 2022-03-19 PROBLEM — M17.11 PRIMARY OSTEOARTHRITIS OF RIGHT KNEE: Status: ACTIVE | Noted: 2020-08-03

## 2022-03-19 PROBLEM — M06.9 ARTHRITIS, RHEUMATOID (HCC): Status: ACTIVE | Noted: 2020-08-21

## 2022-03-19 PROBLEM — E11.40 TYPE 2 DIABETES MELLITUS WITH DIABETIC NEUROPATHY (HCC): Status: ACTIVE | Noted: 2018-01-09

## 2022-03-19 PROBLEM — Z99.89 OSA ON CPAP: Status: ACTIVE | Noted: 2017-07-31

## 2022-03-19 PROBLEM — G47.33 OSA ON CPAP: Status: ACTIVE | Noted: 2017-07-31

## 2022-03-19 PROBLEM — I20.8 ANGINAL EQUIVALENT (HCC): Status: ACTIVE | Noted: 2018-08-30

## 2022-03-19 PROBLEM — I20.89 ANGINAL EQUIVALENT: Status: ACTIVE | Noted: 2018-08-30

## 2022-03-19 LAB
ATRIAL RATE: 59 BPM
CALCULATED P AXIS, ECG09: 47 DEGREES
CALCULATED R AXIS, ECG10: -31 DEGREES
CALCULATED T AXIS, ECG11: 15 DEGREES
DIAGNOSIS, 93000: NORMAL
P-R INTERVAL, ECG05: 166 MS
Q-T INTERVAL, ECG07: 424 MS
QRS DURATION, ECG06: 74 MS
QTC CALCULATION (BEZET), ECG08: 419 MS
VENTRICULAR RATE, ECG03: 59 BPM

## 2022-03-20 PROBLEM — I10 HYPERTENSION: Status: ACTIVE | Noted: 2020-08-21

## 2022-03-20 PROBLEM — E11.21 TYPE 2 DIABETES WITH NEPHROPATHY (HCC): Status: ACTIVE | Noted: 2019-01-13

## 2022-03-25 RX ORDER — METHOCARBAMOL 500 MG/1
500 TABLET, FILM COATED ORAL 4 TIMES DAILY
Qty: 120 TABLET | Refills: 0 | Status: SHIPPED | OUTPATIENT
Start: 2022-03-25 | End: 2022-06-23

## 2022-03-25 RX ORDER — NAPROXEN 500 MG/1
500 TABLET ORAL 2 TIMES DAILY WITH MEALS
Qty: 20 TABLET | Refills: 0 | OUTPATIENT
Start: 2022-03-25 | End: 2022-04-04

## 2022-03-25 NOTE — TELEPHONE ENCOUNTER
----- Message from Mykel Chappell sent at 3/25/2022  2:00 PM EDT -----  Subject: Message to Provider    QUESTIONS  Information for Provider? Pt was in hospital and received 2 medications   that she is almost out of methocarbam 500 mil and Naproxine 500 mil Pt   states that pharmacy sent request over for Dr. Matheus Marquis and she needs those   two called into the pharmacy.  ---------------------------------------------------------------------------  --------------  [address]  What is the best way for the office to contact you? OK to leave message on   voicemail  Preferred Call Back Phone Number? [phone]  ---------------------------------------------------------------------------  --------------  SCRIPT ANSWERS  Relationship to Patient? Other  Representative Name? Jakob Campbell  Is the Representative on the appropriate HIPAA document in Epic?  Yes

## 2022-03-25 NOTE — TELEPHONE ENCOUNTER
Spoke to pt and pt's daughter and let them now refill request for naproxen was denied because it is dangerous for long term use with HTN. They understood. They are asking for a refill on her methocarbamol 500 mg 4 times a day. They state this has been helping.

## 2022-03-25 NOTE — TELEPHONE ENCOUNTER
Patient has a h/o HTN and long term NSAID's especially Naprosyn are dangerous in this patient population.

## 2022-03-28 ENCOUNTER — OFFICE VISIT (OUTPATIENT)
Dept: FAMILY MEDICINE CLINIC | Age: 82
End: 2022-03-28
Payer: MEDICARE

## 2022-03-28 VITALS
OXYGEN SATURATION: 96 % | WEIGHT: 208 LBS | DIASTOLIC BLOOD PRESSURE: 76 MMHG | SYSTOLIC BLOOD PRESSURE: 136 MMHG | BODY MASS INDEX: 36.86 KG/M2 | RESPIRATION RATE: 20 BRPM | TEMPERATURE: 98.5 F | HEIGHT: 63 IN | HEART RATE: 94 BPM

## 2022-03-28 DIAGNOSIS — E11.40 TYPE 2 DIABETES MELLITUS WITH DIABETIC NEUROPATHY, WITHOUT LONG-TERM CURRENT USE OF INSULIN (HCC): ICD-10-CM

## 2022-03-28 DIAGNOSIS — M06.9 RHEUMATOID ARTHRITIS, INVOLVING UNSPECIFIED SITE, UNSPECIFIED WHETHER RHEUMATOID FACTOR PRESENT (HCC): ICD-10-CM

## 2022-03-28 DIAGNOSIS — M79.2 NEUROPATHIC PAIN OF LEFT FOOT: Primary | ICD-10-CM

## 2022-03-28 DIAGNOSIS — E66.01 SEVERE OBESITY (BMI 35.0-39.9) WITH COMORBIDITY (HCC): ICD-10-CM

## 2022-03-28 DIAGNOSIS — I20.8 ANGINAL EQUIVALENT (HCC): ICD-10-CM

## 2022-03-28 PROCEDURE — 1101F PT FALLS ASSESS-DOCD LE1/YR: CPT | Performed by: INTERNAL MEDICINE

## 2022-03-28 PROCEDURE — G8427 DOCREV CUR MEDS BY ELIG CLIN: HCPCS | Performed by: INTERNAL MEDICINE

## 2022-03-28 PROCEDURE — G8536 NO DOC ELDER MAL SCRN: HCPCS | Performed by: INTERNAL MEDICINE

## 2022-03-28 PROCEDURE — G8417 CALC BMI ABV UP PARAM F/U: HCPCS | Performed by: INTERNAL MEDICINE

## 2022-03-28 PROCEDURE — G8510 SCR DEP NEG, NO PLAN REQD: HCPCS | Performed by: INTERNAL MEDICINE

## 2022-03-28 PROCEDURE — G8752 SYS BP LESS 140: HCPCS | Performed by: INTERNAL MEDICINE

## 2022-03-28 PROCEDURE — 1090F PRES/ABSN URINE INCON ASSESS: CPT | Performed by: INTERNAL MEDICINE

## 2022-03-28 PROCEDURE — G8754 DIAS BP LESS 90: HCPCS | Performed by: INTERNAL MEDICINE

## 2022-03-28 PROCEDURE — 99214 OFFICE O/P EST MOD 30 MIN: CPT | Performed by: INTERNAL MEDICINE

## 2022-03-28 RX ORDER — DULOXETIN HYDROCHLORIDE 60 MG/1
60 CAPSULE, DELAYED RELEASE ORAL DAILY
Qty: 30 CAPSULE | Refills: 3 | Status: SHIPPED | OUTPATIENT
Start: 2022-03-28 | End: 2022-07-26

## 2022-03-28 RX ORDER — PREGABALIN 50 MG/1
50 CAPSULE ORAL
Qty: 30 CAPSULE | Refills: 3 | Status: SHIPPED | OUTPATIENT
Start: 2022-03-28 | End: 2022-10-17

## 2022-03-28 RX ORDER — ALENDRONATE SODIUM 70 MG/1
TABLET ORAL
COMMUNITY
Start: 2022-02-08

## 2022-03-28 NOTE — ASSESSMENT & PLAN NOTE
monitored by specialist. No acute findings meriting change in the plan.    She follows up with cardiologist.

## 2022-03-28 NOTE — PATIENT INSTRUCTIONS
Diabetic Neuropathy: Care Instructions  Overview     When you have diabetes, your blood sugar level may get too high. Over time, high blood sugar levels can damage nerves. This is called diabetic neuropathy. Nerve damage can cause pain, burning, tingling, and numbness and may leave you feeling weak. The feet are often affected. When you have nerve damage in your feet, you cannot feel your feet and toes as well as normal and may not notice cuts or sores. Even a small injury can lead to a serious infection. It is very important that you follow your doctor's advice on foot care. Sometimes diabetes damages nerves that help the body function. If this happens, your blood pressure, sweating, digestion, and urination might be affected. Your doctor may give you a target range for your blood sugar that is higher or lower than you are used to. Try to keep your blood sugar very close to this target range to prevent more damage. Follow-up care is a key part of your treatment and safety. Be sure to make and go to all appointments, and call your doctor if you are having problems. It's also a good idea to know your test results and keep a list of the medicines you take. How can you care for yourself at home? · Take your medicines exactly as prescribed. Call your doctor if you think you are having a problem with your medicine. · Try to keep blood sugar in your target range. ? Follow your meal plan to know how much carbohydrate you need for meals and snacks. A registered dietitian or diabetes educator can help you plan meals. ? Try to get at least 30 minutes of exercise on most days. ? Check your blood sugar as many times each day as your doctor recommends. · Take and record your blood pressure at home if your doctor tells you to. To take your blood pressure at home:  ? Ask your doctor to check your blood pressure monitor to be sure it is accurate and the cuff fits you.  Also ask your doctor to watch you to make sure that you are using it right. ? Do not use medicine known to raise blood pressure (such as some nasal decongestant sprays) before taking your blood pressure. ? Avoid taking your blood pressure if you have just exercised or are nervous or upset. Rest at least 15 minutes before you take a reading. · Do not smoke. Smoking can increase your chance for a heart attack or stroke. If you need help quitting, talk to your doctor about stop-smoking programs and medicines. These can increase your chances of quitting for good. · Limit alcohol to 2 drinks a day for men and 1 drink a day for women. Too much alcohol can cause health problems. · Eat small meals often, rather than 2 or 3 large meals a day. To care for your feet  · Prevent injury by wearing shoes at all times, even when you are indoors. · Do foot care as part of your daily routine. Wash your feet and then rub lotion on your feet, but not between your toes. Use a handheld mirror or magnifying mirror to inspect your feet for blisters, cuts, cracks, or sores. · Have your toenails trimmed and filed straight across. · Wear shoes and socks that fit well. Soft shoes that have good support and that fit well (such as tennis shoes) are best for your feet. · Check your shoes for any loose objects or rough edges before you put them on. · Ask your doctor to check your feet during each visit. Your doctor may notice a foot problem you have missed. · Get early treatment for any foot problem, even a minor one. When should you call for help? Call your doctor now or seek immediate medical care if:    · You have symptoms of infection, such as:  ? Increased pain, swelling, warmth, or redness. ? Red streaks leading from the area. ? Pus draining from the area. ? A fever.     · You have new or worse numbness, pain, or tingling in any part of your body.    Watch closely for changes in your health, and be sure to contact your doctor if:    · You have a new problem with your feet, such as:  ? A new sore or ulcer. ? A break in the skin that is not healing after several days. ? Bleeding corns or calluses. ? An ingrown toenail.     · You do not get better as expected. Where can you learn more? Go to http://www.gray.com/  Enter V828 in the search box to learn more about \"Diabetic Neuropathy: Care Instructions. \"  Current as of: July 28, 2021               Content Version: 13.2  © 2006-2022 [employer]. Care instructions adapted under license by [employer] (which disclaims liability or warranty for this information). If you have questions about a medical condition or this instruction, always ask your healthcare professional. [address] any warranty or liability for your use of this information.

## 2022-03-28 NOTE — PROGRESS NOTES
Identified pt with two pt identifiers(name and ). Chief Complaint   Patient presents with    Foot Pain     [facility] 3[facility]17[facility]22    Foot Swelling    Diabetes        Health Maintenance Due   Topic    Eye Exam Retinal or Dilated     Shingrix Vaccine Age 50> (1 of 2)    Pneumococcal 65+ years (1 of 1 - PPSV23)    COVID-19 Vaccine (3 - Booster for Moderna series)    Foot Exam Q1     MICROALBUMIN Q1        Wt Readings from Last 3 Encounters:   22 208 lb (94.3 kg)   22 201 lb (91.2 kg)   21 206 lb (93.4 kg)     Temp Readings from Last 3 Encounters:   22 98.5 °F (36.9 °C) (Temporal)   22 98.8 °F (37.1 °C)   21 98.1 °F (36.7 °C) (Oral)     BP Readings from Last 3 Encounters:   22 136/76   22 (!) 156/47   21 114/61     Pulse Readings from Last 3 Encounters:   22 94   22 61   21 81         Learning Assessment:  :     Learning Assessment 2017   PRIMARY LEARNER Patient Patient   HIGHEST LEVEL OF EDUCATION - PRIMARY LEARNER  SOME COLLEGE -   BARRIERS PRIMARY LEARNER NONE -   PRIMARY LANGUAGE ENGLISH ENGLISH   LEARNER PREFERENCE PRIMARY READING LISTENING   ANSWERED BY patient patient   RELATIONSHIP SELF SELF       Depression Screening:  :     3 most recent PHQ Screens 3/28/2022   Little interest or pleasure in doing things Not at all   Feeling down, depressed, irritable, or hopeless Several days   Total Score PHQ 2 1       Fall Risk Assessment:  :     Fall Risk Assessment, last 12 mths 2021   Able to walk? Yes   Fall in past 12 months? 0   Do you feel unsteady? 0   Are you worried about falling 0       Abuse Screening:  :     Abuse Screening Questionnaire 3/28/2022 2021 2020 2019 2017 11/15/2016   Do you ever feel afraid of your partner? N N N N N N   Are you in a relationship with someone who physically or mentally threatens you? N N N N N N   Is it safe for you to go home?  Margarita Arreaga       Coordination of Care Questionnaire:  :     1) Have you been to an emergency room, urgent care clinic since your last visit? yes [facility] ER 3/17/22  Hospitalized since your last visit? no             2) Have you seen or consulted any other health care providers outside of [address] [place] [address] since your last visit? yes  [facility] 2/2022    3) Do you have an Advance Directive on file? yes  Are you interested in receiving information about Advance Directives? no    4. For patients aged 39-70: Has the patient had a colonoscopy / FIT/ Cologuard? NA - based on age      If the patient is female:    11. For patients aged 41-77: Has the patient had a mammogram within the past 2 years? NA - based on age or sex      10. For patients aged 21-65: Has the patient had a pap smear?  NA - based on age or sex

## 2022-03-28 NOTE — PROGRESS NOTES
Chief Complaint   Patient presents with    Foot Pain     [facility] ER 3/17/22    Foot Swelling    Diabetes       Assessment/ Plan:   Diagnoses and all orders for this visit:    1. Neuropathic pain of left foot  -     DULoxetine (CYMBALTA) 60 mg capsule; Take 1 Capsule by mouth daily for 30 days. -     pregabalin (LYRICA) 50 mg capsule; Take 1 Capsule by mouth nightly for 30 days. Max Daily Amount: 50 mg.    2. Rheumatoid arthritis, involving unspecified site, unspecified whether rheumatoid factor present [facility])  Assessment & Plan:   unclear control, continue current plan pending work up below    3. Type 2 diabetes mellitus with diabetic neuropathy, without long-term current use of insulin ([facility] [address]  Assessment & Plan:   well controlled, continue current medications, continue current treatment plan    4. Anginal equivalent ([facility] [address]  Assessment & Plan:  monitored by specialist. No acute findings meriting change in the plan. She follows up with cardiologist.     5. Severe obesity (BMI 35.0-39. 9) with comorbidity ([address]  I have reviewed/discussed the above normal BMI with the patient. I have recommended the following interventions: dietary management education, guidance, and counseling, encourage exercise and monitor weight . I have discussed the diagnosis with the patient and the intended treatment plan as seen in the above orders. The patient has received an after-visit summary and questions were answered concerning future plans. Asked to return should symptoms worsen or not improve with treatment. Any pending labs and studies will be relayed to patient when they become available. Pt verbalizes understanding of plan of care and denies further questions or concerns at this time. Follow-up and Dispositions    · Return in about 4 weeks (around 4/25/2022), or if symptoms worsen or fail to improve.        Subjective:   Denis Horne is a 80 y.o. female who presents with complaining of foot pain and swelling on the left side. This started after she returned coming back from the ER. She also has pain along the whole left side of her body. She feels like something she was given in the ER that caused her pain along the left side. The pain seems to be internal. It is not noted when she bears weight or with passive evaluation of the foot. Pulses are strong and feet are warm to touch. HISTORICAL  PMH, PSH, FHX, SOCHX, ALLERGIES and MES were reviewed and updated today. Review of Systems  Review of Systems   Constitutional: Negative. HENT: Negative. Eyes: Negative. Respiratory: Negative. Cardiovascular: Negative. Gastrointestinal: Negative. Genitourinary: Negative. Musculoskeletal: Positive for joint pain (left foot pain and paresthesia.). Skin: Negative. Neurological: Positive for tingling (bilateral feet) and sensory change. Endo/Heme/Allergies: Negative. Psychiatric/Behavioral: Negative. All other systems reviewed and are negative. Objective:     Vitals:    03/28/22 1440   BP: 136/76   Pulse: 94   Resp: 20   Temp: 98.5 °F (36.9 °C)   TempSrc: Temporal   SpO2: 96%   Weight: 208 lb (94.3 kg)   Height: 5' 3\" (1.6 m)     Diabetic foot exam:      Left Foot:              Visual Exam: normal               Pulse DP: 2+ (normal)              Filament test: normal sensation               Vibratory sensation: Vibratory sensation: normal         Onychomycosis of the toenails are noted. Physical Exam  Vitals and nursing note reviewed. Constitutional:       Appearance: Normal appearance. HENT:      Head: Normocephalic and atraumatic. Mouth/Throat:      Mouth: Mucous membranes are moist.   Eyes:      Extraocular Movements: Extraocular movements intact. Conjunctiva/sclera: Conjunctivae normal.      Pupils: Pupils are equal, round, and reactive to light. Cardiovascular:      Rate and Rhythm: Normal rate and regular rhythm. Pulses: Normal pulses.       Heart sounds: Normal heart sounds. Pulmonary:      Effort: Pulmonary effort is normal.      Breath sounds: Normal breath sounds. Musculoskeletal:      Cervical back: Normal range of motion and neck supple. Neurological:      Mental Status: She is alert. Cranial Nerves: No cranial nerve deficit. Sensory: No sensory deficit. Motor: No weakness. Coordination: Coordination normal.      Gait: Gait normal.      Deep Tendon Reflexes: Reflexes normal.   Psychiatric:         Mood and Affect: Mood normal.         Behavior: Behavior normal.         Thought Content: Thought content normal.         Judgment: Judgment normal.       Dani Tony MD  [facility]   02/21/22 10:06 AM      Patient Instructions          Diabetic Neuropathy: Care Instructions  Overview     When you have diabetes, your blood sugar level may get too high. Over time, high blood sugar levels can damage nerves. This is called diabetic neuropathy. Nerve damage can cause pain, burning, tingling, and numbness and may leave you feeling weak. The feet are often affected. When you have nerve damage in your feet, you cannot feel your feet and toes as well as normal and may not notice cuts or sores. Even a small injury can lead to a serious infection. It is very important that you follow your doctor's advice on foot care. Sometimes diabetes damages nerves that help the body function. If this happens, your blood pressure, sweating, digestion, and urination might be affected. Your doctor may give you a target range for your blood sugar that is higher or lower than you are used to. Try to keep your blood sugar very close to this target range to prevent more damage. Follow-up care is a key part of your treatment and safety. Be sure to make and go to all appointments, and call your doctor if you are having problems. It's also a good idea to know your test results and keep a list of the medicines you take.   How can you care for yourself at home? · Take your medicines exactly as prescribed. Call your doctor if you think you are having a problem with your medicine. · Try to keep blood sugar in your target range. ? Follow your meal plan to know how much carbohydrate you need for meals and snacks. A registered dietitian or diabetes educator can help you plan meals. ? Try to get at least 30 minutes of exercise on most days. ? Check your blood sugar as many times each day as your doctor recommends. · Take and record your blood pressure at home if your doctor tells you to. To take your blood pressure at home:  ? Ask your doctor to check your blood pressure monitor to be sure it is accurate and the cuff fits you. Also ask your doctor to watch you to make sure that you are using it right. ? Do not use medicine known to raise blood pressure (such as some nasal decongestant sprays) before taking your blood pressure. ? Avoid taking your blood pressure if you have just exercised or are nervous or upset. Rest at least 15 minutes before you take a reading. · Do not smoke. Smoking can increase your chance for a heart attack or stroke. If you need help quitting, talk to your doctor about stop-smoking programs and medicines. These can increase your chances of quitting for good. · Limit alcohol to 2 drinks a day for men and 1 drink a day for women. Too much alcohol can cause health problems. · Eat small meals often, rather than 2 or 3 large meals a day. To care for your feet  · Prevent injury by wearing shoes at all times, even when you are indoors. · Do foot care as part of your daily routine. Wash your feet and then rub lotion on your feet, but not between your toes. Use a handheld mirror or magnifying mirror to inspect your feet for blisters, cuts, cracks, or sores. · Have your toenails trimmed and filed straight across. · Wear shoes and socks that fit well.  Soft shoes that have good support and that fit well (such as tennis shoes) are best for your feet. · Check your shoes for any loose objects or rough edges before you put them on. · Ask your doctor to check your feet during each visit. Your doctor may notice a foot problem you have missed. · Get early treatment for any foot problem, even a minor one. When should you call for help? Call your doctor now or seek immediate medical care if:    · You have symptoms of infection, such as:  ? Increased pain, swelling, warmth, or redness. ? Red streaks leading from the area. ? Pus draining from the area. ? A fever.     · You have new or worse numbness, pain, or tingling in any part of your body. Watch closely for changes in your health, and be sure to contact your doctor if:    · You have a new problem with your feet, such as:  ? A new sore or ulcer. ? A break in the skin that is not healing after several days. ? Bleeding corns or calluses. ? An ingrown toenail.     · You do not get better as expected. Where can you learn more? Go to http://www.gray.com/  Enter V828 in the search box to learn more about \"Diabetic Neuropathy: Care Instructions. \"  Current as of: July 28, 2021               Content Version: 13.2  © 2006-2022 [employer]. Care instructions adapted under license by [employer] (which disclaims liability or warranty for this information). If you have questions about a medical condition or this instruction, always ask your healthcare professional. [place] [address] any warranty or liability for your use of this information.

## 2022-04-20 ENCOUNTER — TELEPHONE (OUTPATIENT)
Dept: FAMILY MEDICINE CLINIC | Age: 82
End: 2022-04-20

## 2022-04-20 DIAGNOSIS — I10 ESSENTIAL HYPERTENSION: ICD-10-CM

## 2022-04-20 RX ORDER — FUROSEMIDE 20 MG/1
TABLET ORAL
Qty: 30 TABLET | Refills: 0 | Status: SHIPPED | OUTPATIENT
Start: 2022-04-20 | End: 2022-07-19

## 2022-04-20 NOTE — TELEPHONE ENCOUNTER
----- Message from Jody Hwang sent at 4/20/2022  1:15 PM EDT -----  Subject: Message to Provider    QUESTIONS  Information for Provider? Patient was advised to follow up with PCP   regarding new medications? pregabalin (LYRICA) 50 mg capsule methocarbamoL   (ROBAXIN) 500 mg tablet DULoxetine (CYMBALTA) 60 mg capsule. Patient   stated the medications are working but she believes they are making her   tired all the time. ---------------------------------------------------------------------------  --------------  Timmy BRAGG  What is the best way for the office to contact you? OK to leave message on   voicemail  Preferred Call Back Phone Number? [phone]  ---------------------------------------------------------------------------  --------------  SCRIPT ANSWERS  Relationship to Patient? Other  Representative Name? Bernie Ceja  Is the Representative on the appropriate HIPAA document in Epic?  Yes

## 2022-06-03 DIAGNOSIS — K21.9 GASTROESOPHAGEAL REFLUX DISEASE, UNSPECIFIED WHETHER ESOPHAGITIS PRESENT: ICD-10-CM

## 2022-06-03 RX ORDER — OMEPRAZOLE 20 MG/1
CAPSULE, DELAYED RELEASE ORAL
Qty: 90 CAPSULE | Refills: 1 | Status: SHIPPED | OUTPATIENT
Start: 2022-06-03

## 2022-06-23 RX ORDER — METHOCARBAMOL 500 MG/1
TABLET, FILM COATED ORAL
Qty: 120 TABLET | Refills: 0 | Status: SHIPPED | OUTPATIENT
Start: 2022-06-23 | End: 2022-07-26

## 2022-07-18 DIAGNOSIS — I10 ESSENTIAL HYPERTENSION: ICD-10-CM

## 2022-07-19 RX ORDER — FUROSEMIDE 20 MG/1
TABLET ORAL
Qty: 30 TABLET | Refills: 1 | Status: SHIPPED | OUTPATIENT
Start: 2022-07-19

## 2022-07-25 DIAGNOSIS — M79.2 NEUROPATHIC PAIN OF LEFT FOOT: ICD-10-CM

## 2022-07-26 RX ORDER — DULOXETIN HYDROCHLORIDE 60 MG/1
CAPSULE, DELAYED RELEASE ORAL
Qty: 90 CAPSULE | Refills: 1 | Status: SHIPPED | OUTPATIENT
Start: 2022-07-26

## 2022-07-26 RX ORDER — METHOCARBAMOL 500 MG/1
500 TABLET, FILM COATED ORAL 4 TIMES DAILY
Qty: 120 TABLET | Refills: 5 | Status: SHIPPED | OUTPATIENT
Start: 2022-07-26 | End: 2022-08-25

## 2022-10-15 ENCOUNTER — HOSPITAL ENCOUNTER (EMERGENCY)
Age: 82
Discharge: HOME | End: 2022-10-15
Attending: EMERGENCY MEDICINE

## 2022-10-15 ENCOUNTER — APPOINTMENT (OUTPATIENT)
Dept: CT IMAGING | Age: 82
End: 2022-10-15
Attending: FAMILY MEDICINE

## 2022-10-15 ENCOUNTER — APPOINTMENT (OUTPATIENT)
Dept: GENERAL RADIOLOGY | Age: 82
End: 2022-10-15
Attending: FAMILY MEDICINE

## 2022-10-15 VITALS
RESPIRATION RATE: 13 BRPM | BODY MASS INDEX: 36.86 KG/M2 | HEART RATE: 85 BPM | SYSTOLIC BLOOD PRESSURE: 143 MMHG | WEIGHT: 208 LBS | OXYGEN SATURATION: 94 % | TEMPERATURE: 98.9 F | DIASTOLIC BLOOD PRESSURE: 73 MMHG | HEIGHT: 63 IN

## 2022-10-15 DIAGNOSIS — R06.02 SOB (SHORTNESS OF BREATH): ICD-10-CM

## 2022-10-15 DIAGNOSIS — H93.19 TINNITUS, UNSPECIFIED LATERALITY: ICD-10-CM

## 2022-10-15 DIAGNOSIS — M54.12 CERVICAL RADICULOPATHY: Primary | ICD-10-CM

## 2022-10-15 LAB
ALBUMIN SERPL-MCNC: 3.6 G/DL (ref 3.5–5)
ALBUMIN/GLOB SERPL: 0.8 {RATIO} (ref 1.1–2.2)
ALP SERPL-CCNC: 97 U/L (ref 45–117)
ALT SERPL-CCNC: 17 U/L (ref 12–78)
ANION GAP SERPL CALC-SCNC: 4 MMOL/L (ref 5–15)
AST SERPL-CCNC: 14 U/L (ref 15–37)
BASOPHILS # BLD: 0 K/UL (ref 0–0.1)
BASOPHILS NFR BLD: 1 % (ref 0–1)
BILIRUB SERPL-MCNC: 0.3 MG/DL (ref 0.2–1)
BNP SERPL-MCNC: 44 PG/ML
BUN SERPL-MCNC: 14 MG/DL (ref 6–20)
BUN/CREAT SERPL: 14 (ref 12–20)
CALCIUM SERPL-MCNC: 9.6 MG/DL (ref 8.5–10.1)
CHLORIDE SERPL-SCNC: 110 MMOL/L (ref 97–108)
CO2 SERPL-SCNC: 27 MMOL/L (ref 21–32)
COMMENT, HOLDF: NORMAL
CREAT SERPL-MCNC: 0.98 MG/DL (ref 0.55–1.02)
D DIMER PPP FEU-MCNC: 1.52 MG/L FEU (ref 0–0.65)
DIFFERENTIAL METHOD BLD: ABNORMAL
EOSINOPHIL # BLD: 0.1 K/UL (ref 0–0.4)
EOSINOPHIL NFR BLD: 1 % (ref 0–7)
ERYTHROCYTE [DISTWIDTH] IN BLOOD BY AUTOMATED COUNT: 14.1 % (ref 11.5–14.5)
GLOBULIN SER CALC-MCNC: 4.5 G/DL (ref 2–4)
GLUCOSE SERPL-MCNC: 70 MG/DL (ref 65–100)
HCT VFR BLD AUTO: 36.8 % (ref 35–47)
HGB BLD-MCNC: 11.7 G/DL (ref 11.5–16)
IMM GRANULOCYTES # BLD AUTO: 0 K/UL (ref 0–0.04)
IMM GRANULOCYTES NFR BLD AUTO: 1 % (ref 0–0.5)
LYMPHOCYTES # BLD: 1.5 K/UL (ref 0.8–3.5)
LYMPHOCYTES NFR BLD: 23 % (ref 12–49)
MCH RBC QN AUTO: 28.2 PG (ref 26–34)
MCHC RBC AUTO-ENTMCNC: 31.8 G/DL (ref 30–36.5)
MCV RBC AUTO: 88.7 FL (ref 80–99)
MONOCYTES # BLD: 0.4 K/UL (ref 0–1)
MONOCYTES NFR BLD: 5 % (ref 5–13)
NEUTS SEG # BLD: 4.5 K/UL (ref 1.8–8)
NEUTS SEG NFR BLD: 69 % (ref 32–75)
NRBC # BLD: 0 K/UL (ref 0–0.01)
NRBC BLD-RTO: 0 PER 100 WBC
PLATELET # BLD AUTO: 288 K/UL (ref 150–400)
PMV BLD AUTO: 9.7 FL (ref 8.9–12.9)
POTASSIUM SERPL-SCNC: 3.4 MMOL/L (ref 3.5–5.1)
PROT SERPL-MCNC: 8.1 G/DL (ref 6.4–8.2)
RBC # BLD AUTO: 4.15 M/UL (ref 3.8–5.2)
SAMPLES BEING HELD,HOLD: NORMAL
SODIUM SERPL-SCNC: 141 MMOL/L (ref 136–145)
TROPONIN-HIGH SENSITIVITY: 7 NG/L (ref 0–51)
WBC # BLD AUTO: 6.5 K/UL (ref 3.6–11)

## 2022-10-15 PROCEDURE — 71275 CT ANGIOGRAPHY CHEST: CPT

## 2022-10-15 PROCEDURE — 80053 COMPREHEN METABOLIC PANEL: CPT

## 2022-10-15 PROCEDURE — 85025 COMPLETE CBC W/AUTO DIFF WBC: CPT

## 2022-10-15 PROCEDURE — 99285 EMERGENCY DEPT VISIT HI MDM: CPT

## 2022-10-15 PROCEDURE — 93005 ELECTROCARDIOGRAM TRACING: CPT

## 2022-10-15 PROCEDURE — 83880 ASSAY OF NATRIURETIC PEPTIDE: CPT

## 2022-10-15 PROCEDURE — 74011000636 HC RX REV CODE- 636: Performed by: EMERGENCY MEDICINE

## 2022-10-15 PROCEDURE — 36415 COLL VENOUS BLD VENIPUNCTURE: CPT

## 2022-10-15 PROCEDURE — 84484 ASSAY OF TROPONIN QUANT: CPT

## 2022-10-15 PROCEDURE — 71046 X-RAY EXAM CHEST 2 VIEWS: CPT

## 2022-10-15 PROCEDURE — 70450 CT HEAD/BRAIN W/O DYE: CPT

## 2022-10-15 PROCEDURE — 85379 FIBRIN DEGRADATION QUANT: CPT

## 2022-10-15 RX ADMIN — IOPAMIDOL 100 ML: 755 INJECTION, SOLUTION INTRAVENOUS at 14:30

## 2022-10-15 NOTE — DISCHARGE INSTRUCTIONS
I suspect your arm pain is caused by cervical radiculopathy. You have previously tried Lyrica prior with some symptom relief. I recommend close follow up with your primary care provider to possibly restart this medication.

## 2022-10-15 NOTE — ED NOTES
Patient discharged by provider. D/C instructions given. Patient educated to take all medications as instructed for management at home. Patien verbalized understanding, verbalized no questions. PIV removed, pressure dressing applied. Patient wheeled out of ER without difficulty, NAD.   Patient Vitals for the past 4 hrs:   Temp Pulse Resp BP SpO2   10/15/22 1317 98.9 °F (37.2 °C) 60 18 138/77 98 %

## 2022-10-15 NOTE — ED PROVIDER NOTES
Patient is an 44-year-old female with past medical history of bradycardia, arthritis, diabetes, neuropathy, GERD, hypertension, obesity presenting for evaluation of arm pain, shortness of breath, tinnitus. Reports that her symptoms have been going on for the past few months. Her daughter had been out of town and when hearing up about her symptoms, prompted her to come get evaluated today. Reports that there is a buzzing sound that seems to come in waves inside her head. Denies any associated headache, visual changes, or dizziness. Additionally reports that she is having pain from her neck into her left arm all the way down to her hand. Describes the pain as a burning and throbbing sensation. Reports that it is hard to put her arm through range of motion due to the pain. Also reports intermittent shortness of breath. Denies any associated cough, hemoptysis, lower extremity edema. Unable to identify any aggravating or relieving factors. No associated chest pain. Past Medical History:   Diagnosis Date    Arrhythmia     Bradycardia. Arthritis     Burning with urination     Incontinence. Diabetes ([address]     GERD (gastroesophageal reflux disease)     Glaucoma     High cholesterol     Hypertension     Ill-defined condition     EDEMA, LOWER LIMS, URINARY INCONTINENCE    Obesity, Class II, BMI 35-39.9     Sleep apnea     NO CPAP       Past Surgical History:   Procedure Laterality Date    ENDOSCOPY, COLON, DIAGNOSTIC      2008    HX BREAST BIOPSY Left 1970s    Excision of benign breast cysts. HX CATARACT REMOVAL Bilateral     HX GYN      Laparoscopic resection of ?ovarian cysts. HX HEART CATHETERIZATION  01/06/2017    HX HERNIA REPAIR  11/01/2021    Robotic-assisted laparoscopic repair of incarcerated supraumbilical and umbilical hernias with mesh.     HX HYSTERECTOMY      HX KNEE REPLACEMENT Left 2014    HX KNEE REPLACEMENT Right     HX UROLOGICAL      BOTOX FOR FREQUENT URINATION    MI DRAINAGE OF DEEP RECTAL ABSCESS           Family History:   Problem Relation Age of Onset    Heart Disease Mother     No Known Problems Father     No Known Problems Sister     Cancer Brother         PANCREAS    Heart Disease Sister     Cancer Sister         BREAST    Alcohol abuse Brother     No Known Problems Brother     No Known Problems Brother     No Known Problems Brother     Anesth Problems Neg Hx        Social History     Socioeconomic History    Marital status:      Spouse name: Not on file    Number of children: Not on file    Years of education: Not on file    Highest education level: Not on file   Occupational History    Not on file   Tobacco Use    Smoking status: Former     Packs/day: 0.50     Years: 5.00     Pack years: 2.50     Types: Cigarettes     Quit date: 1966     Years since quittin.3    Smokeless tobacco: Never   Vaping Use    Vaping Use: Never used   Substance and Sexual Activity    Alcohol use: Yes     Alcohol/week: 0.0 standard drinks     Comment: rarely    Drug use: No    Sexual activity: Not Currently   Other Topics Concern     Service No    Blood Transfusions No    Caffeine Concern No    Occupational Exposure No    Hobby Hazards No    Sleep Concern Yes    Stress Concern Yes    Weight Concern Yes    Special Diet No    Back Care No    Exercise Yes    Bike Helmet Yes    Seat Belt Yes    Self-Exams No   Social History Narrative    Not on file     Social Determinants of Health     Financial Resource Strain: Not on file   Food Insecurity: Not on file   Transportation Needs: Not on file   Physical Activity: Not on file   Stress: Not on file   Social Connections: Not on file   Intimate Partner Violence: Not on file   Housing Stability: Not on file         ALLERGIES: Sulfa (sulfonamide antibiotics)    Review of Systems   Constitutional:  Negative for unexpected weight change. HENT:  Positive for tinnitus. Negative for congestion. Eyes:  Negative for visual disturbance. Respiratory:  Positive for shortness of breath. Negative for cough and chest tightness. Cardiovascular:  Negative for chest pain. Gastrointestinal:  Negative for abdominal pain, nausea and vomiting. Endocrine: Negative for polyuria. Genitourinary:  Negative for dysuria and flank pain. Musculoskeletal:  Positive for neck pain. Skin:  Negative for color change. Allergic/Immunologic: Negative for immunocompromised state. Neurological:  Negative for dizziness and headaches. Hematological:  Negative for adenopathy. Psychiatric/Behavioral:  Negative for agitation. Vitals:    10/15/22 1317   BP: 138/77   Pulse: 60   Resp: 18   Temp: 98.9 °F (37.2 °C)   SpO2: 98%   Weight: 94.3 kg (208 lb)   Height: 5' 3\" (1.6 m)            Physical Exam  Vitals and nursing note reviewed. Constitutional:       General: She is not in acute distress. Appearance: She is well-developed. She is obese. HENT:      Head: Atraumatic. Right Ear: Ear canal and external ear normal. A middle ear effusion is present. Left Ear: Tympanic membrane, ear canal and external ear normal.  No middle ear effusion. Neck:      Comments: Mild tenderness to palpation along c spine, no step offs  Cardiovascular:      Rate and Rhythm: Normal rate and regular rhythm. Pulses: Normal pulses. Heart sounds: Normal heart sounds. Pulmonary:      Effort: Pulmonary effort is normal.      Breath sounds: Normal breath sounds. Chest:      Chest wall: No tenderness. Abdominal:      Palpations: Abdomen is soft. Musculoskeletal:         General: Normal range of motion. Cervical back: Normal range of motion and neck supple. Right lower leg: No tenderness. No edema. Left lower leg: No edema. Comments: 5 out of 5  strength in bilateral upper extremities. Range of motion of left upper extremity reproducing of pain. No swelling, erythema, or rash. Skin:     General: Skin is warm and dry. Capillary Refill: Capillary refill takes less than 2 seconds. Neurological:      General: No focal deficit present. Mental Status: She is alert and oriented to person, place, and time. Psychiatric:         Mood and Affect: Mood normal.         Behavior: Behavior normal.        MDM  Number of Diagnoses or Management Options  Cervical radiculopathy  SOB (shortness of breath)  Tinnitus, unspecified laterality  Diagnosis management comments: Patient presenting with complaints of tinnitus, shortness of breath, left arm pain. Suspect that patient is experiencing cervical radiculopathy based on her descriptor and exam.  Differential diagnosis includes cervical radiculopathy, CHF, atypical ACS, PE, tumor. Plan to obtain head CT for evaluation of possible complaints of pulsatile tinnitus. Symptoms have been going on for multiple months. ED EKG interpretation:2:15 PM  Rhythm: normal sinus rhythm; and regular. Rate (approx.): 60; Axis: left; P wave: normal; ST/T wave: no concerning ST elevations or depressions; Other findings: unremarkable. EKG has also been evaluated by attending ED physician. 1500 -head CT negative for any acute intracranial process. I suspect that her tinnitus is likely related to age-related hearing loss. Her CTA is negative for pulmonary embolism. There is no evidence of pleural effusion, pneumonia, or other etiology causing her shortness of breath. Her troponin is at baseline. proBNP not elevated. Remainder of labs are unremarkable. Per  review, she has previously been on Lyrica. Discussed this with patient. She reports that she did have symptom relief of her neuropathic pain with Lyrica, but lost her Medicaid part D insurance. Open enrollment starts today. Advised her to enroll, follow-up with PCP and discuss possibly restarting Lyrica. Discussed my clinical impression(s), any labs and/or radiology results with the patient.  I answered any questions and addressed any concerns. Discussed the importance of following up with their primary care physician and/or specialist(s). Discussed signs or symptoms that would warrant return back to the ER for further evaluation. The patient is agreeable with discharge. Diana Craig NP        Amount and/or Complexity of Data Reviewed  Clinical lab tests: ordered and reviewed  Tests in the radiology section of CPT®: ordered and reviewed  Decide to obtain previous medical records or to obtain history from someone other than the patient: yes  Discuss the patient with other providers: yes (Dr. John Melton, ED Attending)      ED Course as of 10/15/22 1508   Sat Oct 15, 2022   1410 EKG, 12 LEAD, INITIAL  ED EKG interpretation:  Rhythm: normal sinus rhythm; and regular . Rate (approx.): 60; Axis: left axis deviation; P wave: normal; QRS interval: normal ; ST/T wave: normal; Other findings: abnormal ekg.  This EKG was interpreted by Zoe Orlando MD,ED Provider.  [CH]   1450 Troponin-High Sensitivity: 7 [NB]   1450 NT pro-BNP: 44 [NB]   1450 D-dimer(!): 1.52 [NB]   1450 WBC: 6.5 [NB]   1450 HGB: 11.7 [NB]   1450 Creatinine: 0.98 [NB]   1450 CT HEAD WO CONT [NB]   6068 CTA CHEST W OR W WO CONT [NB]      ED Course User Index  [CH] Issac Booth MD  [NB] Maryann Harrell NP       Procedures

## 2022-10-15 NOTE — ED TRIAGE NOTES
To ED c/o intermittent L neck pain that radiates to L hand with buzzing in head for a couple of months. Patient states she sometimes has SOB. Denies SOB currently. Daughter states patient has a history of bradycardia.

## 2022-10-16 DIAGNOSIS — M79.2 NEUROPATHIC PAIN OF LEFT FOOT: ICD-10-CM

## 2022-10-17 LAB
ATRIAL RATE: 60 BPM
CALCULATED P AXIS, ECG09: 42 DEGREES
CALCULATED R AXIS, ECG10: -30 DEGREES
CALCULATED T AXIS, ECG11: 29 DEGREES
DIAGNOSIS, 93000: NORMAL
P-R INTERVAL, ECG05: 170 MS
Q-T INTERVAL, ECG07: 394 MS
QRS DURATION, ECG06: 72 MS
QTC CALCULATION (BEZET), ECG08: 394 MS
VENTRICULAR RATE, ECG03: 60 BPM

## 2022-10-17 RX ORDER — PREGABALIN 50 MG/1
CAPSULE ORAL
Qty: 30 CAPSULE | Refills: 0 | Status: SHIPPED | OUTPATIENT
Start: 2022-10-17

## 2022-10-19 ENCOUNTER — TELEPHONE (OUTPATIENT)
Dept: FAMILY MEDICINE CLINIC | Age: 82
End: 2022-10-19

## 2022-10-19 DIAGNOSIS — L29.9 ITCHING: ICD-10-CM

## 2022-10-19 RX ORDER — HYDROXYZINE HYDROCHLORIDE 10 MG/1
TABLET, FILM COATED ORAL
Qty: 30 TABLET | Refills: 5 | Status: SHIPPED | OUTPATIENT
Start: 2022-10-19

## 2022-10-19 NOTE — TELEPHONE ENCOUNTER
Pharmacy asking for hydroxyzine hcl 25mg tab but not on reorder list with that mg so order in dr box up front ? ?

## 2022-12-05 DIAGNOSIS — M79.2 NEUROPATHIC PAIN OF LEFT FOOT: ICD-10-CM

## 2022-12-05 RX ORDER — PREGABALIN 50 MG/1
CAPSULE ORAL
Qty: 30 CAPSULE | Refills: 0 | Status: SHIPPED | OUTPATIENT
Start: 2022-12-05

## 2022-12-16 ENCOUNTER — TELEPHONE (OUTPATIENT)
Dept: FAMILY MEDICINE CLINIC | Age: 82
End: 2022-12-16

## 2022-12-16 NOTE — TELEPHONE ENCOUNTER
Pt's family sending over [place] form to be filled out and faxed back    Call [employer] with questions [phone]

## 2023-01-09 ENCOUNTER — NURSE TRIAGE (OUTPATIENT)
Dept: OTHER | Facility: CLINIC | Age: 83
End: 2023-01-09

## 2023-01-09 NOTE — TELEPHONE ENCOUNTER
Location of patient: [place]    Received call from Patricia Gann at [facility] with [employer] Subjective: Caller states \"She has been off balance\"     Current Symptoms: Chronic unsteadiness and shortness of breath on and off, none at time of call. Shooting pains from shoulders into both arms. Was seen in ER for these symptoms. No new or worsening symptoms. Onset: several months ago; unchanged    LMP: NA Pregnant: NA    Recommended disposition: Duplicate Contact Calls    Care advice provided, patient verbalizes understanding; denies any other questions or concerns; instructed to call back for any new or worsening symptoms. Patient/Caller agrees with recommended disposition; writer provided warm transfer to BERNARDA Ruano at [facility] for appointment scheduling    Attention Provider: Thank you for allowing me to participate in the care of your patient. The patient was connected to triage in response to information provided to the [facility]. Please do not respond through this encounter as the response is not directed to a shared pool.     Reason for Disposition   Caller has already spoken with the PCP (or office), and has no further questions    Protocols used: No Contact or Duplicate Contact Call-ADULT-[place]

## 2023-01-10 DIAGNOSIS — M62.838 MUSCLE SPASM: ICD-10-CM

## 2023-01-10 DIAGNOSIS — M54.9 UPPER BACK PAIN: ICD-10-CM

## 2023-01-10 RX ORDER — TIZANIDINE 4 MG/1
TABLET ORAL
Qty: 30 TABLET | Refills: 0 | Status: SHIPPED | OUTPATIENT
Start: 2023-01-10 | End: 2023-01-15

## 2023-01-11 ENCOUNTER — OFFICE VISIT (OUTPATIENT)
Dept: FAMILY MEDICINE CLINIC | Age: 83
End: 2023-01-11
Payer: MEDICARE

## 2023-01-11 VITALS
WEIGHT: 211.8 LBS | DIASTOLIC BLOOD PRESSURE: 84 MMHG | HEART RATE: 91 BPM | TEMPERATURE: 97 F | BODY MASS INDEX: 37.53 KG/M2 | SYSTOLIC BLOOD PRESSURE: 134 MMHG | HEIGHT: 63 IN | RESPIRATION RATE: 16 BRPM | OXYGEN SATURATION: 95 %

## 2023-01-11 DIAGNOSIS — R53.81 MALAISE AND FATIGUE: ICD-10-CM

## 2023-01-11 DIAGNOSIS — R26.89 BALANCE PROBLEM: Primary | ICD-10-CM

## 2023-01-11 DIAGNOSIS — R20.2 PARESTHESIA: ICD-10-CM

## 2023-01-11 DIAGNOSIS — R73.09 ELEVATED GLUCOSE: ICD-10-CM

## 2023-01-11 DIAGNOSIS — R40.0 DAYTIME SOMNOLENCE: ICD-10-CM

## 2023-01-11 DIAGNOSIS — E66.01 SEVERE OBESITY (BMI 35.0-39.9) WITH COMORBIDITY (HCC): ICD-10-CM

## 2023-01-11 DIAGNOSIS — E78.00 PURE HYPERCHOLESTEROLEMIA: ICD-10-CM

## 2023-01-11 DIAGNOSIS — R53.83 MALAISE AND FATIGUE: ICD-10-CM

## 2023-01-11 DIAGNOSIS — I20.8 ANGINAL EQUIVALENT (HCC): ICD-10-CM

## 2023-01-11 DIAGNOSIS — R06.83 SNORING: ICD-10-CM

## 2023-01-11 DIAGNOSIS — E11.40 TYPE 2 DIABETES MELLITUS WITH DIABETIC NEUROPATHY, WITHOUT LONG-TERM CURRENT USE OF INSULIN (HCC): ICD-10-CM

## 2023-01-11 PROBLEM — M06.9 ARTHRITIS, RHEUMATOID (HCC): Status: RESOLVED | Noted: 2020-08-21 | Resolved: 2023-01-11

## 2023-01-11 NOTE — PROGRESS NOTES
Identified pt with two pt identifiers(name and ). Chief Complaint   Patient presents with    Hospital Follow Up        Health Maintenance Due   Topic    Pneumococcal 65+ years (1 - PCV)    Eye Exam Retinal or Dilated     Shingles Vaccine (1 of 2)    COVID-19 Vaccine (3 - Booster for Moderna series)    Foot Exam Q1     Medicare Yearly Exam     Flu Vaccine (1)       Wt Readings from Last 3 Encounters:   23 211 lb 12.8 oz (96.1 kg)   10/15/22 208 lb (94.3 kg)   22 208 lb (94.3 kg)     Temp Readings from Last 3 Encounters:   23 97 °F (36.1 °C) (Temporal)   10/15/22 98.9 °F (37.2 °C)   22 98.5 °F (36.9 °C) (Temporal)     BP Readings from Last 3 Encounters:   23 134/84   10/15/22 (!) 143/73   22 136/76     Pulse Readings from Last 3 Encounters:   23 91   10/15/22 85   22 94         Learning Assessment:  :     Learning Assessment 2017   PRIMARY LEARNER Patient Patient   HIGHEST LEVEL OF EDUCATION - PRIMARY LEARNER  SOME COLLEGE -   BARRIERS PRIMARY LEARNER NONE -   PRIMARY LANGUAGE ENGLISH ENGLISH   LEARNER PREFERENCE PRIMARY READING LISTENING   ANSWERED BY patient patient   RELATIONSHIP SELF SELF       Depression Screening:  :     3 most recent PHQ Screens 2023   Little interest or pleasure in doing things Not at all   Feeling down, depressed, irritable, or hopeless Not at all   Total Score PHQ 2 0       Fall Risk Assessment:  :     Fall Risk Assessment, last 12 mths 2023   Able to walk? Yes   Fall in past 12 months? 0   Do you feel unsteady? 1   Are you worried about falling 1   Is TUG test greater than 12 seconds? 0   Is the gait abnormal? 1   Number of falls in past 12 months 0       Abuse Screening:  :     Abuse Screening Questionnaire 2023 3/28/2022 2021 2020 2019 2017 11/15/2016   Do you ever feel afraid of your partner?  N N N N N N N   Are you in a relationship with someone who physically or mentally threatens you? N N N N N N N   Is it safe for you to go home? Y Y Y Y Y Y Y       Coordination of Care Questionnaire:  :     1) Have you been to an emergency room, urgent care clinic since your last visit? Yes   Hospitalized since your last visit? no             2) Have you seen or consulted any other health care providers outside of [address] [place] [address] since your last visit? no  (Include any pap smears or colon screenings in this section.)    3) Do you have an Advance Directive on file? yes  Are you interested in receiving information about Advance Directives? no    Patient is accompanied by daughter I have received verbal consent from Frank Hwang to discuss any/all medical information while they are present in the room. 4.  For patients aged 39-70: Has the patient had a colonoscopy / FIT/ Cologuard? NA - based on age      If the patient is female:    11. For patients aged 41-77: Has the patient had a mammogram within the past 2 years? NA - based on age or sex      10. For patients aged 21-65: Has the patient had a pap smear?  Seth Barnard - based on age or sex

## 2023-01-11 NOTE — PROGRESS NOTES
Chief Complaint   Patient presents with    Hospital Follow Up         Assessment/ Plan:   Diagnoses and all orders for this visit:    1. Balance problem  -     VITAMIN B12; Future  -     METHYLMALONIC ACID; Future    2. Type 2 diabetes mellitus with diabetic neuropathy, without long-term current use of insulin ([facility]  Assessment & Plan:  well controlled, continue current medications  Lab Results   Component Value Date/Time    Hemoglobin A1c 5.9 (H) 06/03/2021 11:23 AM     3. Anginal equivalent [facility])  Assessment & Plan:    Patient reports bradycardia and nausea and SOB as her anginal equivalent. 4. Paresthesia  -     VITAMIN B12; Future  -     METHYLMALONIC ACID; Future    5. Malaise and fatigue  -     TSH 3RD GENERATION; Future  -     T4 (THYROXINE); Future  -     CBC WITH AUTOMATED DIFF; Future    6. Pure hypercholesterolemia  -     METABOLIC PANEL, COMPREHENSIVE; Future  -     LIPID PANEL; Future  -     CBC WITH AUTOMATED DIFF; Future    7. Daytime somnolence  -     SLEEP MEDICINE REFERRAL    8. Snoring  -     SLEEP MEDICINE REFERRAL    9. Severe obesity (BMI 35.0-39. 9) with comorbidity ([facility]   I have reviewed/discussed the above normal BMI with the patient. I have recommended the following interventions: dietary management education, guidance, and counseling, encourage exercise, and monitor weight . Nicol Rodríguez Given the constellation of symptoms, we discussed that she might be helped by a sleep study. She also needs to make a follow up with her cardiologist. BP and other studies were normal in the office today. We do need the information from [address] [place] [address] and requested. Will obtain labs as outlined above and review when they return. I have discussed the diagnosis with the patient and the intended treatment plan as seen in the above orders. The patient has received an after-visit summary and questions were answered concerning future plans. Asked to return should symptoms worsen or not improve with treatment.  Any pending labs and studies will be relayed to patient when they become available. Pt verbalizes understanding of plan of care and denies further questions or concerns at this time. Follow-up and Dispositions    Return if symptoms worsen or fail to improve. Subjective:   Srinivasa Adams is a 80 y.o. female who presents for follow up. She was seen at [facility] on October 2022. This is her first visit back to the office since then. Concerns:  She was seen in the ER on 10/2022. This was at an OSH. She presented with pain and weakness on the L-side with SOB and also nausea. She had a full work up including normal EKG, flat Troponin's and also CTA that showed possible pulmonary hypertension. NO Blood clots. She continues to feel SOB and has still had the weakness and pain on both side. Primarily, she feels unbalanced. She has had some balance issues and feels like she might fall. This has been ongoing for sometime. She apparently also gets a headache every morning. She has not been recently checked for DANIELA. She does snore quite a bit at night per her daughter. HISTORICAL  PMH, PSH, FHX, SOCHX, ALLERGIES and MES were reviewed and updated today. Review of Systems  Review of Systems   Neurological:  Positive for headaches. All other systems reviewed and are negative. Objective:     Vitals:    01/11/23 1538   BP: 134/84   Pulse: 91   Resp: 16   Temp: 97 °F (36.1 °C)   TempSrc: Temporal   SpO2: 95%   Weight: 211 lb 12.8 oz (96.1 kg)   Height: 5' 3\" (1.6 m)       Physical Exam  Vitals and nursing note reviewed. Constitutional:       Appearance: Normal appearance. HENT:      Head: Normocephalic and atraumatic. Mouth/Throat:      Mouth: Mucous membranes are moist.   Eyes:      Extraocular Movements: Extraocular movements intact. Conjunctiva/sclera: Conjunctivae normal.      Pupils: Pupils are equal, round, and reactive to light.    Cardiovascular:      Rate and Rhythm: Normal rate and regular rhythm. Pulses: Normal pulses. Heart sounds: Normal heart sounds. Pulmonary:      Effort: Pulmonary effort is normal.      Breath sounds: Normal breath sounds. Musculoskeletal:      Cervical back: Normal range of motion and neck supple. Neurological:      General: No focal deficit present. Mental Status: She is alert. Mental status is at baseline. Cranial Nerves: No cranial nerve deficit. Sensory: No sensory deficit. Motor: No weakness. Coordination: Coordination normal.      Gait: Gait normal.      Deep Tendon Reflexes: Reflexes normal.   Psychiatric:         Mood and Affect: Mood normal.         Behavior: Behavior normal.         Thought Content:  Thought content normal.         Judgment: Judgment normal.       Miles Gross MD  [facility]   01/11/2023

## 2023-01-11 NOTE — ASSESSMENT & PLAN NOTE
well controlled, continue current medications  Lab Results   Component Value Date/Time    Hemoglobin A1c 5.9 (H) 06/03/2021 11:23 AM

## 2023-01-14 ENCOUNTER — APPOINTMENT (OUTPATIENT)
Dept: CT IMAGING | Age: 83
End: 2023-01-14
Attending: STUDENT IN AN ORGANIZED HEALTH CARE EDUCATION/TRAINING PROGRAM
Payer: MEDICARE

## 2023-01-14 ENCOUNTER — HOSPITAL ENCOUNTER (OUTPATIENT)
Age: 83
Setting detail: OBSERVATION
Discharge: HOME | End: 2023-01-15
Attending: EMERGENCY MEDICINE | Admitting: FAMILY MEDICINE
Payer: MEDICARE

## 2023-01-14 ENCOUNTER — APPOINTMENT (OUTPATIENT)
Dept: CT IMAGING | Age: 83
End: 2023-01-14
Attending: EMERGENCY MEDICINE
Payer: MEDICARE

## 2023-01-14 DIAGNOSIS — I15.9 SECONDARY HYPERTENSION: ICD-10-CM

## 2023-01-14 DIAGNOSIS — R51.9 SEVERE HEADACHE: Primary | ICD-10-CM

## 2023-01-14 DIAGNOSIS — R51.9 ACUTE NONINTRACTABLE HEADACHE, UNSPECIFIED HEADACHE TYPE: ICD-10-CM

## 2023-01-14 DIAGNOSIS — I10 HYPERTENSION, UNSPECIFIED TYPE: ICD-10-CM

## 2023-01-14 DIAGNOSIS — R55 NEAR SYNCOPE: ICD-10-CM

## 2023-01-14 DIAGNOSIS — M54.9 UPPER BACK PAIN: ICD-10-CM

## 2023-01-14 DIAGNOSIS — I16.1 HYPERTENSIVE EMERGENCY: ICD-10-CM

## 2023-01-14 DIAGNOSIS — M62.838 MUSCLE SPASM: ICD-10-CM

## 2023-01-14 LAB
ALBUMIN SERPL-MCNC: 3.7 G/DL (ref 3.5–5)
ALBUMIN/GLOB SERPL: 0.8 (ref 1.1–2.2)
ALP SERPL-CCNC: 112 U/L (ref 45–117)
ALT SERPL-CCNC: 17 U/L (ref 12–78)
ANION GAP SERPL CALC-SCNC: 5 MMOL/L (ref 5–15)
APPEARANCE UR: CLEAR
AST SERPL-CCNC: 11 U/L (ref 15–37)
BASOPHILS # BLD: 0 K/UL (ref 0–0.1)
BASOPHILS NFR BLD: 1 % (ref 0–1)
BILIRUB SERPL-MCNC: 0.3 MG/DL (ref 0.2–1)
BILIRUB UR QL: NEGATIVE
BUN SERPL-MCNC: 12 MG/DL (ref 6–20)
BUN/CREAT SERPL: 12 (ref 12–20)
CALCIUM SERPL-MCNC: 9.5 MG/DL (ref 8.5–10.1)
CHLORIDE SERPL-SCNC: 105 MMOL/L (ref 97–108)
CO2 SERPL-SCNC: 31 MMOL/L (ref 21–32)
COLOR UR: NORMAL
COMMENT, HOLDF: NORMAL
COVID-19 RAPID TEST, COVR: NOT DETECTED
CREAT SERPL-MCNC: 0.97 MG/DL (ref 0.55–1.02)
DIFFERENTIAL METHOD BLD: NORMAL
EOSINOPHIL # BLD: 0.1 K/UL (ref 0–0.4)
EOSINOPHIL NFR BLD: 1 % (ref 0–7)
ERYTHROCYTE [DISTWIDTH] IN BLOOD BY AUTOMATED COUNT: 13.6 % (ref 11.5–14.5)
GLOBULIN SER CALC-MCNC: 4.7 G/DL (ref 2–4)
GLUCOSE SERPL-MCNC: 84 MG/DL (ref 65–100)
GLUCOSE UR STRIP.AUTO-MCNC: NEGATIVE MG/DL
HCT VFR BLD AUTO: 39.8 % (ref 35–47)
HGB BLD-MCNC: 12.5 G/DL (ref 11.5–16)
HGB UR QL STRIP: NEGATIVE
IMM GRANULOCYTES # BLD AUTO: 0 K/UL (ref 0–0.04)
IMM GRANULOCYTES NFR BLD AUTO: 0 % (ref 0–0.5)
KETONES UR QL STRIP.AUTO: NEGATIVE MG/DL
LEUKOCYTE ESTERASE UR QL STRIP.AUTO: NEGATIVE
LYMPHOCYTES # BLD: 1.6 K/UL (ref 0.8–3.5)
LYMPHOCYTES NFR BLD: 25 % (ref 12–49)
MCH RBC QN AUTO: 28.3 PG (ref 26–34)
MCHC RBC AUTO-ENTMCNC: 31.4 G/DL (ref 30–36.5)
MCV RBC AUTO: 90.2 FL (ref 80–99)
MONOCYTES # BLD: 0.5 K/UL (ref 0–1)
MONOCYTES NFR BLD: 7 % (ref 5–13)
NEUTS SEG # BLD: 4.3 K/UL (ref 1.8–8)
NEUTS SEG NFR BLD: 66 % (ref 32–75)
NITRITE UR QL STRIP.AUTO: NEGATIVE
NRBC # BLD: 0 K/UL (ref 0–0.01)
NRBC BLD-RTO: 0 PER 100 WBC
PH UR STRIP: 7.5 (ref 5–8)
PLATELET # BLD AUTO: 288 K/UL (ref 150–400)
PMV BLD AUTO: 9.9 FL (ref 8.9–12.9)
POTASSIUM SERPL-SCNC: 3.7 MMOL/L (ref 3.5–5.1)
PROT SERPL-MCNC: 8.4 G/DL (ref 6.4–8.2)
PROT UR STRIP-MCNC: NEGATIVE MG/DL
RBC # BLD AUTO: 4.41 M/UL (ref 3.8–5.2)
SAMPLES BEING HELD,HOLD: NORMAL
SODIUM SERPL-SCNC: 141 MMOL/L (ref 136–145)
SOURCE, COVRS: NORMAL
SP GR UR REFRACTOMETRY: 1.01 (ref 1–1.03)
TROPONIN-HIGH SENSITIVITY: 6 NG/L (ref 0–51)
UROBILINOGEN UR QL STRIP.AUTO: 0.2 EU/DL (ref 0.2–1)
WBC # BLD AUTO: 6.5 K/UL (ref 3.6–11)

## 2023-01-14 PROCEDURE — 74011000250 HC RX REV CODE- 250: Performed by: EMERGENCY MEDICINE

## 2023-01-14 PROCEDURE — 74011250636 HC RX REV CODE- 250/636: Performed by: EMERGENCY MEDICINE

## 2023-01-14 PROCEDURE — 96374 THER/PROPH/DIAG INJ IV PUSH: CPT

## 2023-01-14 PROCEDURE — 80053 COMPREHEN METABOLIC PANEL: CPT

## 2023-01-14 PROCEDURE — 74011250637 HC RX REV CODE- 250/637: Performed by: STUDENT IN AN ORGANIZED HEALTH CARE EDUCATION/TRAINING PROGRAM

## 2023-01-14 PROCEDURE — 84484 ASSAY OF TROPONIN QUANT: CPT

## 2023-01-14 PROCEDURE — 99285 EMERGENCY DEPT VISIT HI MDM: CPT

## 2023-01-14 PROCEDURE — 96375 TX/PRO/DX INJ NEW DRUG ADDON: CPT

## 2023-01-14 PROCEDURE — 36415 COLL VENOUS BLD VENIPUNCTURE: CPT

## 2023-01-14 PROCEDURE — G0378 HOSPITAL OBSERVATION PER HR: HCPCS

## 2023-01-14 PROCEDURE — 0202U NFCT DS 22 TRGT SARS-COV-2: CPT

## 2023-01-14 PROCEDURE — 81003 URINALYSIS AUTO W/O SCOPE: CPT

## 2023-01-14 PROCEDURE — 93005 ELECTROCARDIOGRAM TRACING: CPT

## 2023-01-14 PROCEDURE — 87635 SARS-COV-2 COVID-19 AMP PRB: CPT

## 2023-01-14 PROCEDURE — 70450 CT HEAD/BRAIN W/O DYE: CPT

## 2023-01-14 PROCEDURE — 85025 COMPLETE CBC W/AUTO DIFF WBC: CPT

## 2023-01-14 RX ORDER — ENALAPRILAT 1.25 MG/ML
1.25 INJECTION INTRAVENOUS
Status: COMPLETED | OUTPATIENT
Start: 2023-01-14 | End: 2023-01-14

## 2023-01-14 RX ORDER — IBUPROFEN 800 MG/1
800 TABLET ORAL ONCE
Status: COMPLETED | OUTPATIENT
Start: 2023-01-14 | End: 2023-01-14

## 2023-01-14 RX ORDER — PANTOPRAZOLE SODIUM 40 MG/1
40 TABLET, DELAYED RELEASE ORAL
Status: DISCONTINUED | OUTPATIENT
Start: 2023-01-15 | End: 2023-01-15 | Stop reason: HOSPADM

## 2023-01-14 RX ORDER — FENTANYL CITRATE 50 UG/ML
50 INJECTION, SOLUTION INTRAMUSCULAR; INTRAVENOUS ONCE
Status: COMPLETED | OUTPATIENT
Start: 2023-01-14 | End: 2023-01-14

## 2023-01-14 RX ORDER — ACETAMINOPHEN 500 MG
1000 TABLET ORAL EVERY 6 HOURS
Status: DISCONTINUED | OUTPATIENT
Start: 2023-01-15 | End: 2023-01-15 | Stop reason: HOSPADM

## 2023-01-14 RX ORDER — HYDROCHLOROTHIAZIDE 25 MG/1
25 TABLET ORAL DAILY
Status: DISCONTINUED | OUTPATIENT
Start: 2023-01-14 | End: 2023-01-15 | Stop reason: HOSPADM

## 2023-01-14 RX ADMIN — FENTANYL CITRATE 50 MCG: 50 INJECTION, SOLUTION INTRAMUSCULAR; INTRAVENOUS at 20:22

## 2023-01-14 RX ADMIN — IBUPROFEN 800 MG: 800 TABLET, FILM COATED ORAL at 22:53

## 2023-01-14 RX ADMIN — ENALAPRILAT 1.25 MG: 2.5 INJECTION INTRAVENOUS at 20:22

## 2023-01-14 NOTE — ED TRIAGE NOTES
Pt arrives to the ER for complaints of hypertension and headache that she noticed a few days ago. Reports that it is getting higher and higher. Denies chest pain and shortness of breath. Pt takes losartan.      PMH of HTN and bradycardia

## 2023-01-14 NOTE — ED PROVIDER NOTES
75-year-old female presents emergency department chief complaint of gradual onset of intermittent headache and noted elevated blood pressure at home. Patient is unable to recall as to whether she notes that high blood pressure first or the headache. Nothing seems to make the headache worse or better. She denies any visual changes but states when her headache developed she feels extremely unsteady on her feet and feels like she will pass out. States her normal blood pressure is in the \"130s\". Patient denies any shortness of breath but does complain of some occasional chest tightness    The history is provided by the patient. Hypertension   Associated symptoms include malaise/fatigue and headaches. Pertinent negatives include no palpitations and no shortness of breath. Past Medical History:   Diagnosis Date    Arrhythmia     Bradycardia. Arthritis     Burning with urination     Incontinence. Diabetes ([address]     GERD (gastroesophageal reflux disease)     Glaucoma     High cholesterol     Hypertension     Ill-defined condition     EDEMA, LOWER LIMS, URINARY INCONTINENCE    Obesity, Class II, BMI 35-39.9     Sleep apnea     NO CPAP       Past Surgical History:   Procedure Laterality Date    ENDOSCOPY, COLON, DIAGNOSTIC      2008    HX BREAST BIOPSY Left 1970s    Excision of benign breast cysts. HX CATARACT REMOVAL Bilateral     HX GYN      Laparoscopic resection of ?ovarian cysts. HX HEART CATHETERIZATION  01/06/2017    HX HERNIA REPAIR  11/01/2021    Robotic-assisted laparoscopic repair of incarcerated supraumbilical and umbilical hernias with mesh.     HX HYSTERECTOMY      HX KNEE REPLACEMENT Left 2014    HX KNEE REPLACEMENT Right     HX UROLOGICAL      BOTOX FOR FREQUENT URINATION    DC DRAINAGE OF DEEP RECTAL ABSCESS           Family History:   Problem Relation Age of Onset    Heart Disease Mother     No Known Problems Father     No Known Problems Sister     Cancer Brother         PANCREAS Heart Disease Sister     Cancer Sister         BREAST    Alcohol abuse Brother     No Known Problems Brother     No Known Problems Brother     No Known Problems Brother     Anesth Problems Neg Hx        Social History     Socioeconomic History    Marital status:      Spouse name: Not on file    Number of children: Not on file    Years of education: Not on file    Highest education level: Not on file   Occupational History    Not on file   Tobacco Use    Smoking status: Former     Packs/day: 0.50     Years: 5.00     Pack years: 2.50     Types: Cigarettes     Quit date: 1966     Years since quittin.5    Smokeless tobacco: Never   Vaping Use    Vaping Use: Never used   Substance and Sexual Activity    Alcohol use: Yes     Alcohol/week: 0.0 standard drinks     Comment: rarely    Drug use: No    Sexual activity: Not Currently   Other Topics Concern     Service No    Blood Transfusions No    Caffeine Concern No    Occupational Exposure No    Hobby Hazards No    Sleep Concern Yes    Stress Concern Yes    Weight Concern Yes    Special Diet No    Back Care No    Exercise Yes    Bike Helmet Yes    Seat Belt Yes    Self-Exams No   Social History Narrative    Not on file     Social Determinants of Health     Financial Resource Strain: Low Risk     Difficulty of Paying Living Expenses: Not hard at all   Food Insecurity: No Food Insecurity    Worried About Running Out of Food in the Last Year: Never true    Ran Out of Food in the Last Year: Never true   Transportation Needs: Not on file   Physical Activity: Not on file   Stress: Not on file   Social Connections: Not on file   Intimate Partner Violence: Not on file   Housing Stability: Not on file         ALLERGIES: Sulfa (sulfonamide antibiotics)    Review of Systems   Constitutional:  Positive for activity change, fatigue and malaise/fatigue. Negative for fever. Eyes:  Negative for visual disturbance. Respiratory:  Negative for shortness of breath. Cardiovascular:  Negative for palpitations and leg swelling. Gastrointestinal:  Negative for abdominal pain. Genitourinary:  Negative for decreased urine volume and dysuria. Musculoskeletal:  Negative for arthralgias. Skin:  Negative for rash. Neurological:  Positive for light-headedness and headaches. All other systems reviewed and are negative. Vitals:    01/14/23 1757   BP: (!) 181/80   Pulse: 62   Resp: 16   Temp: 97.9 °F (36.6 °C)   SpO2: 94%   Weight: 95.7 kg (211 lb)   Height: 5' 3\" (1.6 m)            Physical Exam  Vitals and nursing note reviewed. Constitutional:       Comments: Appears slightly uncomfortable   HENT:      Head: Normocephalic and atraumatic. Nose: Nose normal.   Eyes:      Extraocular Movements: Extraocular movements intact. Pupils: Pupils are equal, round, and reactive to light. Cardiovascular:      Rate and Rhythm: Normal rate and regular rhythm. Pulmonary:      Effort: Pulmonary effort is normal.      Breath sounds: Normal breath sounds. Abdominal:      General: Abdomen is flat. Bowel sounds are normal.   Musculoskeletal:         General: Normal range of motion. Skin:     General: Skin is warm and dry. Neurological:      General: No focal deficit present. Medical Decision Making  Amount and/or Complexity of Data Reviewed  Labs: ordered. Radiology: ordered. Risk  Prescription drug management. Decision regarding hospitalization. EKG: Sinus bradycardia, ventricular rate 56, , QRS 72, QTc of 409, , poor R wave progression. CBC, complete metabolic panel, head CT were negative. We will add a troponin as patient states that she is having intermittent discomfort. Headache and blood pressure mildly improved after Vasotec and pain medicine. Discussed potential of discharging home but patient states she feels extremely uncomfortable with going home.   States that she is having spiking of blood pressure at home and gets symptomatic in terms of feeling uneasy and near syncopal         59-year-old -American female with a history of hypertension diabetes presents to the emergency department with chief complaint of gradual onset of headache over the last several days as well as significantly elevated blood pressure at home. Patient states her normal blood pressure is a systolic of 308Z, she has noted blood pressure approaching 190 and above. She denies chest pain but then states that she has had some vague discomfort over the last several days. Denies any visual changes but states when she has these headaches she feels very unsteady and feels like she may lose consciousness. BC and complete metabolic panel within normal limits head CT was also negative. Troponin is pending. Patient was noted to be as elevated 200/112 with an elevated MAP. She was given Vasotec and fentanyl initially and her blood pressure has come down to 170/68, will continue to monitor. Discussed with patient sending her home however she states she feels extremely uneasy about that. She states when her headache starts she feels near syncopal, similar to feeling that she will pass out. May benefit from observation and monitoring her blood pressure. Incidentally, patient had requested COVID test with the results still being in pending status    8:53 PM  Noted blood pressure is labile, MAP several minutues ago was 129, now MAP 97    Perfect Serve Consult for Admission  8:40 PM    ED Room Number: ER07/07  Patient Name and age:  Joann Treviño 80 y.o.  female  Working Diagnosis:   1. Acute nonintractable headache, unspecified headache type    2. Secondary hypertension    3.  Near syncope        COVID-19 Suspicion:  no  Sepsis present:  no  Reassessment needed: no  Code Status:  Full Code  Readmission: no  Isolation Requirements:  no  Recommended Level of Care:  telemetry  Department:[facility] ED - [phone]  Other: Procedures

## 2023-01-15 ENCOUNTER — APPOINTMENT (OUTPATIENT)
Dept: CT IMAGING | Age: 83
End: 2023-01-15
Attending: PSYCHIATRY & NEUROLOGY
Payer: MEDICARE

## 2023-01-15 ENCOUNTER — APPOINTMENT (OUTPATIENT)
Dept: CT IMAGING | Age: 83
End: 2023-01-15
Attending: STUDENT IN AN ORGANIZED HEALTH CARE EDUCATION/TRAINING PROGRAM
Payer: MEDICARE

## 2023-01-15 VITALS
BODY MASS INDEX: 37.39 KG/M2 | OXYGEN SATURATION: 96 % | HEIGHT: 63 IN | SYSTOLIC BLOOD PRESSURE: 128 MMHG | TEMPERATURE: 98.6 F | DIASTOLIC BLOOD PRESSURE: 77 MMHG | HEART RATE: 67 BPM | RESPIRATION RATE: 18 BRPM | WEIGHT: 211 LBS

## 2023-01-15 LAB
ALBUMIN SERPL-MCNC: 3.9 G/DL (ref 3.6–4.6)
ALBUMIN/GLOB SERPL: 1.2 {RATIO} (ref 1.2–2.2)
ALP SERPL-CCNC: 128 IU/L (ref 44–121)
ALT SERPL-CCNC: 11 IU/L (ref 0–32)
AST SERPL-CCNC: 14 IU/L (ref 0–40)
B PERT DNA SPEC QL NAA+PROBE: NOT DETECTED
BASOPHILS # BLD AUTO: 0 X10E3/UL (ref 0–0.2)
BASOPHILS NFR BLD AUTO: 0 %
BILIRUB SERPL-MCNC: 0.3 MG/DL (ref 0–1.2)
BORDETELLA PARAPERTUSSIS PCR, BORPAR: NOT DETECTED
BUN SERPL-MCNC: 9 MG/DL (ref 8–27)
BUN/CREAT SERPL: 10 (ref 12–28)
C PNEUM DNA SPEC QL NAA+PROBE: NOT DETECTED
CALCIUM SERPL-MCNC: 9.2 MG/DL (ref 8.7–10.3)
CHLORIDE SERPL-SCNC: 103 MMOL/L (ref 96–106)
CHOLEST SERPL-MCNC: 134 MG/DL
CHOLEST SERPL-MCNC: 136 MG/DL (ref 100–199)
CO2 SERPL-SCNC: 21 MMOL/L (ref 20–29)
CREAT SERPL-MCNC: 0.91 MG/DL (ref 0.57–1)
CRP SERPL-MCNC: 0.67 MG/DL (ref 0–0.6)
EGFR: 63 ML/MIN/1.73
EOSINOPHIL # BLD AUTO: 0.1 X10E3/UL (ref 0–0.4)
EOSINOPHIL NFR BLD AUTO: 1 %
ERYTHROCYTE [DISTWIDTH] IN BLOOD BY AUTOMATED COUNT: 13.2 % (ref 11.7–15.4)
ERYTHROCYTE [SEDIMENTATION RATE] IN BLOOD: 39 MM/HR (ref 0–30)
EST. AVERAGE GLUCOSE BLD GHB EST-MCNC: 131 MG/DL
EST. AVERAGE GLUCOSE BLD GHB EST-MCNC: 137 MG/DL
FLUAV SUBTYP SPEC NAA+PROBE: NOT DETECTED
FLUBV RNA SPEC QL NAA+PROBE: NOT DETECTED
GLOBULIN SER CALC-MCNC: 3.3 G/DL (ref 1.5–4.5)
GLUCOSE BLD STRIP.AUTO-MCNC: 101 MG/DL (ref 65–117)
GLUCOSE BLD STRIP.AUTO-MCNC: 130 MG/DL (ref 65–117)
GLUCOSE BLD STRIP.AUTO-MCNC: 132 MG/DL (ref 65–117)
GLUCOSE SERPL-MCNC: 119 MG/DL (ref 70–99)
HADV DNA SPEC QL NAA+PROBE: NOT DETECTED
HBA1C MFR BLD: 6.2 % (ref 4–5.6)
HBA1C MFR BLD: 6.4 % (ref 4.8–5.6)
HCOV 229E RNA SPEC QL NAA+PROBE: NOT DETECTED
HCOV HKU1 RNA SPEC QL NAA+PROBE: NOT DETECTED
HCOV NL63 RNA SPEC QL NAA+PROBE: NOT DETECTED
HCOV OC43 RNA SPEC QL NAA+PROBE: NOT DETECTED
HCT VFR BLD AUTO: 37.6 % (ref 34–46.6)
HDLC SERPL-MCNC: 45 MG/DL
HDLC SERPL-MCNC: 46 MG/DL
HDLC SERPL: 3 (ref 0–5)
HGB BLD-MCNC: 12.2 G/DL (ref 11.1–15.9)
HMPV RNA SPEC QL NAA+PROBE: NOT DETECTED
HPIV1 RNA SPEC QL NAA+PROBE: NOT DETECTED
HPIV2 RNA SPEC QL NAA+PROBE: NOT DETECTED
HPIV3 RNA SPEC QL NAA+PROBE: NOT DETECTED
HPIV4 RNA SPEC QL NAA+PROBE: NOT DETECTED
IMM GRANULOCYTES # BLD AUTO: 0 X10E3/UL (ref 0–0.1)
IMM GRANULOCYTES NFR BLD AUTO: 0 %
IMP & REVIEW OF LAB RESULTS: NORMAL
LDLC SERPL CALC-MCNC: 59 MG/DL (ref 0–100)
LDLC SERPL CALC-MCNC: 75 MG/DL (ref 0–99)
LYMPHOCYTES # BLD AUTO: 1.2 X10E3/UL (ref 0.7–3.1)
LYMPHOCYTES NFR BLD AUTO: 23 %
M PNEUMO DNA SPEC QL NAA+PROBE: NOT DETECTED
MAGNESIUM SERPL-MCNC: 2.2 MG/DL (ref 1.6–2.4)
MAGNESIUM SERPL-MCNC: 2.3 MG/DL (ref 1.6–2.4)
MCH RBC QN AUTO: 28.2 PG (ref 26.6–33)
MCHC RBC AUTO-ENTMCNC: 32.4 G/DL (ref 31.5–35.7)
MCV RBC AUTO: 87 FL (ref 79–97)
METHYLMALONATE SERPL-SCNC: 115 NMOL/L (ref 0–378)
MONOCYTES # BLD AUTO: 0.4 X10E3/UL (ref 0.1–0.9)
MONOCYTES NFR BLD AUTO: 7 %
NEUTROPHILS # BLD AUTO: 3.6 X10E3/UL (ref 1.4–7)
NEUTROPHILS NFR BLD AUTO: 69 %
PLATELET # BLD AUTO: 272 X10E3/UL (ref 150–450)
POTASSIUM SERPL-SCNC: 4.6 MMOL/L (ref 3.5–5.2)
PROT SERPL-MCNC: 7.2 G/DL (ref 6–8.5)
RBC # BLD AUTO: 4.33 X10E6/UL (ref 3.77–5.28)
RSV RNA SPEC QL NAA+PROBE: NOT DETECTED
RV+EV RNA SPEC QL NAA+PROBE: NOT DETECTED
SARS-COV-2 PCR, COVPCR: NOT DETECTED
SERVICE CMNT-IMP: ABNORMAL
SERVICE CMNT-IMP: ABNORMAL
SERVICE CMNT-IMP: NORMAL
SODIUM SERPL-SCNC: 139 MMOL/L (ref 134–144)
T4 SERPL-MCNC: 9.5 UG/DL (ref 4.5–12)
TRIGL SERPL-MCNC: 150 MG/DL (ref ?–150)
TRIGL SERPL-MCNC: 77 MG/DL (ref 0–149)
TROPONIN-HIGH SENSITIVITY: 7 NG/L (ref 0–51)
TSH SERPL DL<=0.005 MIU/L-ACNC: 0.85 UIU/ML (ref 0.45–4.5)
VIT B12 SERPL-MCNC: 1340 PG/ML (ref 232–1245)
VLDLC SERPL CALC-MCNC: 15 MG/DL (ref 5–40)
VLDLC SERPL CALC-MCNC: 30 MG/DL
WBC # BLD AUTO: 5.2 X10E3/UL (ref 3.4–10.8)

## 2023-01-15 PROCEDURE — 74011250637 HC RX REV CODE- 250/637

## 2023-01-15 PROCEDURE — 74011250637 HC RX REV CODE- 250/637: Performed by: STUDENT IN AN ORGANIZED HEALTH CARE EDUCATION/TRAINING PROGRAM

## 2023-01-15 PROCEDURE — 99235 HOSP IP/OBS SAME DATE MOD 70: CPT | Performed by: FAMILY MEDICINE

## 2023-01-15 PROCEDURE — 99223 1ST HOSP IP/OBS HIGH 75: CPT | Performed by: PSYCHIATRY & NEUROLOGY

## 2023-01-15 PROCEDURE — 70496 CT ANGIOGRAPHY HEAD: CPT

## 2023-01-15 PROCEDURE — 97116 GAIT TRAINING THERAPY: CPT

## 2023-01-15 PROCEDURE — 83735 ASSAY OF MAGNESIUM: CPT

## 2023-01-15 PROCEDURE — 36415 COLL VENOUS BLD VENIPUNCTURE: CPT

## 2023-01-15 PROCEDURE — 74011250636 HC RX REV CODE- 250/636: Performed by: STUDENT IN AN ORGANIZED HEALTH CARE EDUCATION/TRAINING PROGRAM

## 2023-01-15 PROCEDURE — 85652 RBC SED RATE AUTOMATED: CPT

## 2023-01-15 PROCEDURE — 74011000250 HC RX REV CODE- 250: Performed by: STUDENT IN AN ORGANIZED HEALTH CARE EDUCATION/TRAINING PROGRAM

## 2023-01-15 PROCEDURE — G0378 HOSPITAL OBSERVATION PER HR: HCPCS

## 2023-01-15 PROCEDURE — 83036 HEMOGLOBIN GLYCOSYLATED A1C: CPT

## 2023-01-15 PROCEDURE — 80061 LIPID PANEL: CPT

## 2023-01-15 PROCEDURE — 96372 THER/PROPH/DIAG INJ SC/IM: CPT

## 2023-01-15 PROCEDURE — 97161 PT EVAL LOW COMPLEX 20 MIN: CPT

## 2023-01-15 PROCEDURE — 74011000636 HC RX REV CODE- 636: Performed by: RADIOLOGY

## 2023-01-15 PROCEDURE — 84484 ASSAY OF TROPONIN QUANT: CPT

## 2023-01-15 PROCEDURE — 74011250637 HC RX REV CODE- 250/637: Performed by: PSYCHIATRY & NEUROLOGY

## 2023-01-15 PROCEDURE — 72125 CT NECK SPINE W/O DYE: CPT

## 2023-01-15 PROCEDURE — 82962 GLUCOSE BLOOD TEST: CPT

## 2023-01-15 PROCEDURE — 74011250637 HC RX REV CODE- 250/637: Performed by: FAMILY MEDICINE

## 2023-01-15 PROCEDURE — 86140 C-REACTIVE PROTEIN: CPT

## 2023-01-15 RX ORDER — ONDANSETRON 2 MG/ML
4 INJECTION INTRAMUSCULAR; INTRAVENOUS
Status: DISCONTINUED | OUTPATIENT
Start: 2023-01-15 | End: 2023-01-15 | Stop reason: HOSPADM

## 2023-01-15 RX ORDER — ATORVASTATIN CALCIUM 20 MG/1
40 TABLET, FILM COATED ORAL
Status: DISCONTINUED | OUTPATIENT
Start: 2023-01-15 | End: 2023-01-15 | Stop reason: HOSPADM

## 2023-01-15 RX ORDER — LOSARTAN POTASSIUM 50 MG/1
50 TABLET ORAL DAILY
Status: DISCONTINUED | OUTPATIENT
Start: 2023-01-15 | End: 2023-01-15 | Stop reason: HOSPADM

## 2023-01-15 RX ORDER — SODIUM CHLORIDE 0.9 % (FLUSH) 0.9 %
5-40 SYRINGE (ML) INJECTION EVERY 8 HOURS
Status: DISCONTINUED | OUTPATIENT
Start: 2023-01-15 | End: 2023-01-15 | Stop reason: HOSPADM

## 2023-01-15 RX ORDER — SODIUM CHLORIDE 0.9 % (FLUSH) 0.9 %
5-40 SYRINGE (ML) INJECTION AS NEEDED
Status: DISCONTINUED | OUTPATIENT
Start: 2023-01-15 | End: 2023-01-15 | Stop reason: HOSPADM

## 2023-01-15 RX ORDER — FLUTICASONE PROPIONATE 50 MCG
2 SPRAY, SUSPENSION (ML) NASAL DAILY
Status: DISCONTINUED | OUTPATIENT
Start: 2023-01-15 | End: 2023-01-15 | Stop reason: HOSPADM

## 2023-01-15 RX ORDER — ENOXAPARIN SODIUM 100 MG/ML
40 INJECTION SUBCUTANEOUS DAILY
Status: DISCONTINUED | OUTPATIENT
Start: 2023-01-15 | End: 2023-01-15 | Stop reason: HOSPADM

## 2023-01-15 RX ORDER — PREGABALIN 50 MG/1
50 CAPSULE ORAL
Status: DISCONTINUED | OUTPATIENT
Start: 2023-01-15 | End: 2023-01-15 | Stop reason: HOSPADM

## 2023-01-15 RX ORDER — ACETAMINOPHEN 325 MG/1
650 TABLET ORAL
Status: DISCONTINUED | OUTPATIENT
Start: 2023-01-15 | End: 2023-01-15 | Stop reason: HOSPADM

## 2023-01-15 RX ORDER — FLUTICASONE PROPIONATE 50 MCG
2 SPRAY, SUSPENSION (ML) NASAL DAILY
Qty: 1 EACH | Refills: 0 | Status: SHIPPED | OUTPATIENT
Start: 2023-01-15

## 2023-01-15 RX ORDER — POLYETHYLENE GLYCOL 3350 17 G/17G
17 POWDER, FOR SOLUTION ORAL DAILY PRN
Status: DISCONTINUED | OUTPATIENT
Start: 2023-01-15 | End: 2023-01-15 | Stop reason: HOSPADM

## 2023-01-15 RX ORDER — DIPHENHYDRAMINE HCL 25 MG
25 CAPSULE ORAL
Status: DISCONTINUED | OUTPATIENT
Start: 2023-01-15 | End: 2023-01-15 | Stop reason: HOSPADM

## 2023-01-15 RX ORDER — TIZANIDINE 2 MG/1
2 TABLET ORAL
Qty: 30 TABLET | Refills: 0 | Status: SHIPPED | OUTPATIENT
Start: 2023-01-15

## 2023-01-15 RX ORDER — DEXTROSE MONOHYDRATE 100 MG/ML
0-250 INJECTION, SOLUTION INTRAVENOUS AS NEEDED
Status: DISCONTINUED | OUTPATIENT
Start: 2023-01-15 | End: 2023-01-15 | Stop reason: HOSPADM

## 2023-01-15 RX ORDER — CETIRIZINE HYDROCHLORIDE 10 MG/1
10 TABLET ORAL DAILY
Qty: 30 TABLET | Refills: 0 | Status: SHIPPED | OUTPATIENT
Start: 2023-01-15 | End: 2023-02-14

## 2023-01-15 RX ORDER — GUAIFENESIN 100 MG/5ML
81 LIQUID (ML) ORAL DAILY
Status: DISCONTINUED | OUTPATIENT
Start: 2023-01-15 | End: 2023-01-15 | Stop reason: HOSPADM

## 2023-01-15 RX ORDER — INSULIN LISPRO 100 [IU]/ML
INJECTION, SOLUTION INTRAVENOUS; SUBCUTANEOUS
Status: DISCONTINUED | OUTPATIENT
Start: 2023-01-15 | End: 2023-01-15 | Stop reason: HOSPADM

## 2023-01-15 RX ORDER — ONDANSETRON 4 MG/1
4 TABLET, ORALLY DISINTEGRATING ORAL
Status: DISCONTINUED | OUTPATIENT
Start: 2023-01-15 | End: 2023-01-15 | Stop reason: HOSPADM

## 2023-01-15 RX ORDER — FLUTICASONE PROPIONATE 50 MCG
2 SPRAY, SUSPENSION (ML) NASAL DAILY
Status: DISCONTINUED | OUTPATIENT
Start: 2023-01-16 | End: 2023-01-15

## 2023-01-15 RX ORDER — DULOXETIN HYDROCHLORIDE 30 MG/1
60 CAPSULE, DELAYED RELEASE ORAL DAILY
Status: DISCONTINUED | OUTPATIENT
Start: 2023-01-15 | End: 2023-01-15 | Stop reason: HOSPADM

## 2023-01-15 RX ORDER — ASCORBIC ACID 500 MG
250 TABLET ORAL DAILY
Status: DISCONTINUED | OUTPATIENT
Start: 2023-01-15 | End: 2023-01-15 | Stop reason: HOSPADM

## 2023-01-15 RX ORDER — LATANOPROST 50 UG/ML
1 SOLUTION/ DROPS OPHTHALMIC
Status: DISCONTINUED | OUTPATIENT
Start: 2023-01-15 | End: 2023-01-15 | Stop reason: HOSPADM

## 2023-01-15 RX ORDER — LANOLIN ALCOHOL/MO/W.PET/CERES
1000 CREAM (GRAM) TOPICAL DAILY
Status: DISCONTINUED | OUTPATIENT
Start: 2023-01-15 | End: 2023-01-15 | Stop reason: HOSPADM

## 2023-01-15 RX ORDER — BUTALBITAL, ACETAMINOPHEN AND CAFFEINE 50; 325; 40 MG/1; MG/1; MG/1
1 TABLET ORAL
Qty: 30 TABLET | Refills: 0 | Status: SHIPPED | OUTPATIENT
Start: 2023-01-15 | End: 2023-02-14

## 2023-01-15 RX ORDER — HYDRALAZINE HYDROCHLORIDE 20 MG/ML
10 INJECTION INTRAMUSCULAR; INTRAVENOUS
Status: DISCONTINUED | OUTPATIENT
Start: 2023-01-15 | End: 2023-01-15 | Stop reason: HOSPADM

## 2023-01-15 RX ORDER — IBUPROFEN 200 MG
4 TABLET ORAL AS NEEDED
Status: DISCONTINUED | OUTPATIENT
Start: 2023-01-15 | End: 2023-01-15 | Stop reason: HOSPADM

## 2023-01-15 RX ORDER — BUTALBITAL, ACETAMINOPHEN AND CAFFEINE 50; 325; 40 MG/1; MG/1; MG/1
1 TABLET ORAL
Status: DISCONTINUED | OUTPATIENT
Start: 2023-01-15 | End: 2023-01-15 | Stop reason: HOSPADM

## 2023-01-15 RX ORDER — TIZANIDINE 2 MG/1
2 TABLET ORAL
Status: DISCONTINUED | OUTPATIENT
Start: 2023-01-15 | End: 2023-01-15 | Stop reason: HOSPADM

## 2023-01-15 RX ORDER — ACETAMINOPHEN 650 MG/1
650 SUPPOSITORY RECTAL
Status: DISCONTINUED | OUTPATIENT
Start: 2023-01-15 | End: 2023-01-15 | Stop reason: HOSPADM

## 2023-01-15 RX ORDER — HYDROXYZINE HYDROCHLORIDE 10 MG/1
10 TABLET, FILM COATED ORAL
Status: DISCONTINUED | OUTPATIENT
Start: 2023-01-15 | End: 2023-01-15 | Stop reason: HOSPADM

## 2023-01-15 RX ORDER — MELATONIN
1000 DAILY
Status: DISCONTINUED | OUTPATIENT
Start: 2023-01-15 | End: 2023-01-15 | Stop reason: HOSPADM

## 2023-01-15 RX ORDER — METHYLPREDNISOLONE 4 MG/1
4 TABLET ORAL
Qty: 1 DOSE PACK | Refills: 0 | Status: SHIPPED | OUTPATIENT
Start: 2023-01-15

## 2023-01-15 RX ADMIN — ACETAMINOPHEN 650 MG: 325 TABLET ORAL at 09:01

## 2023-01-15 RX ADMIN — IOPAMIDOL 100 ML: 755 INJECTION, SOLUTION INTRAVENOUS at 16:53

## 2023-01-15 RX ADMIN — PREGABALIN 50 MG: 50 CAPSULE ORAL at 02:41

## 2023-01-15 RX ADMIN — LATANOPROST 1 DROP: 50 SOLUTION OPHTHALMIC at 02:40

## 2023-01-15 RX ADMIN — FLUTICASONE PROPIONATE 2 SPRAY: 50 SPRAY, METERED NASAL at 11:54

## 2023-01-15 RX ADMIN — Medication 1000 UNITS: at 08:50

## 2023-01-15 RX ADMIN — TIZANIDINE 2 MG: 2 TABLET ORAL at 02:42

## 2023-01-15 RX ADMIN — LOSARTAN POTASSIUM 50 MG: 50 TABLET, FILM COATED ORAL at 08:50

## 2023-01-15 RX ADMIN — ATORVASTATIN CALCIUM 40 MG: 20 TABLET, FILM COATED ORAL at 02:42

## 2023-01-15 RX ADMIN — ACETAMINOPHEN 1000 MG: 500 TABLET, FILM COATED ORAL at 11:53

## 2023-01-15 RX ADMIN — HYDROXYZINE HYDROCHLORIDE 10 MG: 10 TABLET ORAL at 06:17

## 2023-01-15 RX ADMIN — PANTOPRAZOLE SODIUM 40 MG: 40 TABLET, DELAYED RELEASE ORAL at 07:30

## 2023-01-15 RX ADMIN — ENOXAPARIN SODIUM 40 MG: 100 INJECTION SUBCUTANEOUS at 08:50

## 2023-01-15 RX ADMIN — ACETAMINOPHEN 1000 MG: 500 TABLET, FILM COATED ORAL at 06:16

## 2023-01-15 RX ADMIN — SODIUM CHLORIDE, PRESERVATIVE FREE 10 ML: 5 INJECTION INTRAVENOUS at 06:18

## 2023-01-15 RX ADMIN — CYANOCOBALAMIN TAB 500 MCG 1000 MCG: 500 TAB at 08:49

## 2023-01-15 RX ADMIN — DIPHENHYDRAMINE HYDROCHLORIDE 25 MG: 25 CAPSULE ORAL at 02:47

## 2023-01-15 RX ADMIN — OXYCODONE HYDROCHLORIDE AND ACETAMINOPHEN 250 MG: 500 TABLET ORAL at 08:50

## 2023-01-15 RX ADMIN — BUTALBITAL, ACETAMINOPHEN, AND CAFFEINE 1 TABLET: 50; 325; 40 TABLET ORAL at 17:45

## 2023-01-15 RX ADMIN — PANTOPRAZOLE SODIUM 40 MG: 40 TABLET, DELAYED RELEASE ORAL at 06:16

## 2023-01-15 RX ADMIN — ASPIRIN 81 MG: 81 TABLET, CHEWABLE ORAL at 08:49

## 2023-01-15 RX ADMIN — ACETAMINOPHEN 1000 MG: 500 TABLET, FILM COATED ORAL at 00:00

## 2023-01-15 RX ADMIN — SODIUM CHLORIDE, PRESERVATIVE FREE 10 ML: 5 INJECTION INTRAVENOUS at 02:43

## 2023-01-15 RX ADMIN — DULOXETINE HYDROCHLORIDE 60 MG: 30 CAPSULE, DELAYED RELEASE ORAL at 08:50

## 2023-01-15 NOTE — ED NOTES
TRANSFER - OUT REPORT:    Verbal report given to [facility] floor, RN RN(name) on Jaci Orozco  being transferred to 5th Floor(unit) for routine progression of care       Report consisted of patients Situation, Background, Assessment and   Recommendations(SBAR). Information from the following report(s) SBAR, ED Summary, and MAR was reviewed with the receiving nurse. Lines:   Peripheral IV 01/14/23 Right Antecubital (Active)   Site Assessment Clean, dry, & intact 01/14/23 1803   Phlebitis Assessment 0 01/14/23 1803   Infiltration Assessment 0 01/14/23 1803   Dressing Status Clean, dry, & intact 01/14/23 1803   Action Taken Blood drawn 01/14/23 1803        Opportunity for questions and clarification was provided.       Patient transported with:   Registered Nurse Dai Damian RN

## 2023-01-15 NOTE — PROGRESS NOTES
Problem: Falls - Risk of  Goal: *Absence of Falls  Outcome: Progressing Towards Goal  Note: Fall Risk Interventions:                                Problem: Patient Education: Go to Patient Education Activity  Goal: Patient/Family Education  Outcome: Progressing Towards Goal     Problem: Pain  Goal: *Control of Pain  Outcome: Progressing Towards Goal     Problem: Patient Education: Go to Patient Education Activity  Goal: Patient/Family Education  Outcome: Progressing Towards Goal

## 2023-01-15 NOTE — PROGRESS NOTES
CPAP was not placed on patient. Patient stated she does not wear a CPAP at home and has not been diagnosed.

## 2023-01-15 NOTE — DISCHARGE INSTRUCTIONS
HOME DISCHARGE INSTRUCTIONS    Jesika Wilson / [id number] : 1940    Admission date: 2023 Discharge date: 1/15/2023 4:50 PM     Please bring this form with you to show your care provider at your follow-up appointment. Primary care provider:  Naya Rodgers MD      Discharging provider:  Charisma Lui MD  - Family Medicine Resident  Vlad Proctor MD- Family Medicine Attending      You have been admitted to the hospital with the following diagnoses:    ACUTE DIAGNOSES:  Hypertensive emergency [I16.1]    FOLLOW-UP CARE RECOMMENDATIONS:  You were admitted for elevated blood pressure and headaches. Your blood pressure improved during this admission with your home medications. Neurology was consulted and recommended starting Fioricet for headaches and additionally steroid pack help with breakthrough headaches. CTA head/ neck abnormally of the blood vessels in your neck. There is some pulmonary hypertension on imaging, this needs to be address with your PCP. Some inflammatories markers were mildly elevated, it is important to discuss with PCP for rheumatoid arthritis work up outpatient. We also provided information for our Sport Medicine clinic contact information, please call for evaluation and treatment of neck and shoulder pain. Some home medications were discontinued such as Glipizide. Do not take Benadryl for your itchiness and this can increase your risks of falls and dementia. We started Zyrtec for itchiness. Appointments  Future Appointments   Date Time Provider Pierre Baumi   2023  1:20 PM Avtar Horne MD [facility] AMB     Meenakshi Glasgow MD  [facility] [address] [facility]    Schedule an appointment as soon as possible for a visit in 1 month(s)  Call to schedule Hospital Follow Up visit with Neurologist/ Dr Leny Mancilla if you are continuingto have headaches.     Malena Goins MD  [address] [facility]  [phone]    Schedule an appointment as soon as possible for a visit  Sports medicine attending or fellows for neck and shoulder pain    MD [address] [facility]  [phone]            Follow-up tests needed: Repeat inflammatory markers outpatient (Sed rate and CRP)     Pending test results: At the time of your discharge the following test results are still pending: None. Please make sure you review these results with your outpatient follow-up provider(s). DIET/what to eat:  Diabetic Diet    ACTIVITY:  Activity as tolerated    Wound care: None    Equipment needed:  None     Specific symptoms to watch for: change in mentation, falling, weakness, bleeding. What to do if new or unexpected symptoms occur? If you experience any of the above symptoms (or should other concerns or questions arise after discharge) please call your primary care physician. Return to the emergency room if you cannot get hold of your doctor. It is very important that you keep your follow-up appointment(s). Please bring discharge papers, medication list (and/or medication bottles) to your follow-up appointments for review by your outpatient provider(s). Please check the list of medications and be sure it includes every medication (even non-prescription medications) that your provider wants you to take. It is important that you take the medication exactly as they are prescribed. Keep your medication in the bottles provided by the pharmacist and keep a list of the medication names, dosages, and times to be taken in your wallet. Do not take other medications without consulting your doctor.    If you have any questions about your medications or other instructions, please talk to your nurse or care provider before you leave the hospital.     Information obtained by:     I understand that if any problems occur once I am at home I am to contact my physician. These instructions were explained to me and I had the opportunity to ask questions. I understand and acknowledge receipt of the instructions indicated above.                                                                                                                                                Physician's or R.N.'s Signature                                                                  Date/Time                                                                                                                                             Patient or Representative Signature                                                          Date/Time

## 2023-01-15 NOTE — PROGRESS NOTES
PHYSICAL THERAPY EVALUATION/DISCHARGE  Patient: Anita Pearson (89 y.o. female)  Date: 1/15/2023  Primary Diagnosis: Hypertensive emergency [I16.1]       Precautions: droplet plusindependent with ambulation without assistive device as well as up and down stairs and independent with all functional mobility. Reviewed all safety precaution and home exercise program with the patient, verbalized understanding, clear to go home per Physical Therapy perspective. Skilled physical therapy is not indicated at this time. ASSESSMENT  Based on the objective data described below, the patient presents with modified independent with ambulation using a rolling walker and modified independent with all functional mobility. Patient ad alycia in the room with single point cane, no steps to enter the house. Reviewed all safety precaution and home exercise program with the patient, verbalized understanding, clear to go home per Physical Therapy perspective. Skilled physical therapy is not indicated at this time. .    Functional Outcome Measure: The patient scored 100/100 on the barthel index outcome measure . Other factors to consider for discharge: none     Further skilled acute physical therapy is not indicated at this time. PLAN :  Recommendation for discharge: (in order for the patient to meet his/her long term goals)  No skilled physical therapy/ follow up rehabilitation needs identified at this time. This discharge recommendation:  Has been made in collaboration with the attending provider and/or case management    IF patient discharges home will need the following DME: patient owns DME required for discharge       SUBJECTIVE:   Patient stated Anna Bain to go home today.     OBJECTIVE DATA SUMMARY:   HISTORY:    Past Medical History:   Diagnosis Date    Arrhythmia     Bradycardia. Arthritis     Burning with urination     Incontinence.     Diabetes ([address]     GERD (gastroesophageal reflux disease)     Glaucoma High cholesterol     Hypertension     Ill-defined condition     EDEMA, LOWER LIMS, URINARY INCONTINENCE    Obesity, Class II, BMI 35-39.9     Sleep apnea     NO CPAP     Past Surgical History:   Procedure Laterality Date    ENDOSCOPY, COLON, DIAGNOSTIC      2008    HX BREAST BIOPSY Left 1970s    Excision of benign breast cysts. HX CATARACT REMOVAL Bilateral     HX GYN      Laparoscopic resection of ?ovarian cysts. HX HEART CATHETERIZATION  01/06/2017    HX HERNIA REPAIR  11/01/2021    Robotic-assisted laparoscopic repair of incarcerated supraumbilical and umbilical hernias with mesh. HX HYSTERECTOMY      HX KNEE REPLACEMENT Left 2014    HX KNEE REPLACEMENT Right     HX UROLOGICAL      BOTOX FOR FREQUENT URINATION    AL DRAINAGE OF DEEP RECTAL ABSCESS         Prior level of function: modified independent with single point cane  Personal factors and/or comorbidities impacting plan of care:     Home Situation  Home Environment: Private residence  One/Two Story Residence: One story  Living Alone: No  Support Systems: Child(rodrigo), Other Family Member(s)  Patient Expects to be Discharged to[de-identified] Home with family assistance  Current DME Used/Available at Home: Cane, straight    EXAMINATION/PRESENTATION/DECISION MAKING:   Critical Behavior:  Neurologic State: Alert  Orientation Level: Oriented X4  Cognition: Appropriate decision making     Hearing: Auditory  Auditory Impairment: None    Range Of Motion:  AROM: Within functional limits           PROM: Within functional limits           Strength:    Strength:  Within functional limits                    Tone & Sensation:                                  Coordination:  Coordination: Within functional limits  Vision:      Functional Mobility:  Bed Mobility:  Rolling: Independent  Supine to Sit: Independent  Sit to Supine: Independent  Scooting: Independent  Transfers:  Sit to Stand: Modified independent  Stand to Sit: Modified independent  Stand Pivot Transfers: Modified independent     Bed to Chair: Modified independent              Balance:   Sitting: Intact  Standing: Intact; With support  Ambulation/Gait Training:  Distance (ft): 50 Feet (ft) (ad alycia inside droplet plus room)     Ambulation - Level of Assistance: Modified independent     Gait Description (WDL): Within defined limits                                  Therapeutic Exercises:   Educate and instructed patient to continue active range of motion exercise on both legs while up on chair or on bed multiple times. Recommend patient to be up on the chair at least 3 times a day every meal times as tolerated. Functional Measure:  Barthel Index:    Bathin  Bladder: 10  Bowels: 10  Groomin  Dressing: 10  Feeding: 10  Mobility: 5  Stairs: 10  Toilet Use: 10  Transfer (Bed to Chair and Back): 15  Total: 90/100       The Barthel ADL Index: Guidelines  1. The index should be used as a record of what a patient does, not as a record of what a patient could do. 2. The main aim is to establish degree of independence from any help, physical or verbal, however minor and for whatever reason. 3. The need for supervision renders the patient not independent. 4. A patient's performance should be established using the best available evidence. Asking the patient, friends/relatives and nurses are the usual sources, but direct observation and common sense are also important. However direct testing is not needed. 5. Usually the patient's performance over the preceding 24-48 hours is important, but occasionally longer periods will be relevant. 6. Middle categories imply that the patient supplies over 50 per cent of the effort. 7. Use of aids to be independent is allowed. Score Interpretation (from [address] [place] [address])    Independent   60-79 Minimally independent   40-59 Partially dependent   20-39 Very dependent   <20 Totally dependent     -Virgil Galaviz., Barthel, D.W. (1965). Functional evaluation: the Barthel Index.  [address] 142.  -BIANCA Blanc, [address] (1997). The Barthel activities of daily living index: self-reporting versus actual performance in the old (> or = 75 years). Journal [address] 45(7), 14 [facility] HospitalJonathanJonathan, Olga Reagan., Aleksandra Randle. (). Measuring the change in disability after inpatient rehabilitation; comparison of the responsiveness of the Barthel Index and Functional [place] Measure. Journal of Neurology, Neurosurgery, and Psychiatry, 66(4), 994-783. FER Chisholm, TERESITA Gallegos, & Tay Bryant M.A. (2004) Assessment of post-stroke quality of life in cost-effectiveness studies: The usefulness of the Barthel Index and the EuroQoL-5D. Quality of Life Research, 15, 301-93           Physical Therapy Evaluation Charge Determination   History Examination Presentation Decision-Making   LOW Complexity : Zero comorbidities / personal factors that will impact the outcome / POC LOW Complexity : 1-2 Standardized tests and measures addressing body structure, function, activity limitation and / or participation in recreation  LOW Complexity : Stable, uncomplicated  Other outcome measures barthel  LOW       Based on the above components, the patient evaluation is determined to be of the following complexity level: LOW     Pain Ratin/10    Activity Tolerance:   Good      After treatment patient left in no apparent distress:   Sitting in chair, Heels elevated for pressure relief, Call bell within reach, Caregiver / family present, and Side rails x 3    COMMUNICATION/EDUCATION:   The patients plan of care was discussed with: Registered nurse and Physician. Fall prevention education was provided and the patient/caregiver indicated understanding. Thank you for this referral.  Ang Herzog, PT,[facility].    Time Calculation: 28 mins

## 2023-01-15 NOTE — ROUTINE PROCESS
Bedside and Verbal shift change report given to Milka Newsome RN (oncoming nurse) by Lizett Hansen RN (offgoing nurse). Report included the following information SBAR, ED Summary, Intake/Output, MAR, and Recent Results.

## 2023-01-15 NOTE — PROGRESS NOTES
Senior Resident Admission Note     CC: headache    HPI:  Vivienne Lara is a 80 y.o. female w/ a PMHX of HTN, GERD, HLD, symptomatic bradycardia who presents to the ER complaining of headache and high BP's for 3 days and was admitted for hypertensive emergency. Headache frontal, throbbing, sharp, constant. Also c/o bilat shoulder pain radiating down arms for multiple weeks. No upper extremity numbness, weakness, tingling. Has intermittent SOB at baseline when she has symptomatic carlos episodes. F/w Dr Joss Serna and has an appointment with him on 2/1. They are in discussions about pacemaker insertion. Has poorly controlled reflux at baseline. Takes prilosec with no success. Has bilat LE intermittent swelling which she takes a diuretic for. She took one this morning thinking it would help with the blood pressures. Numbness, tingling of LE at baseline d/t neuropathy but nothing new. Feels unsteady on her feet but no recent falls, trauma, syncope, presyncope. For HTN she takes losartan 50 only. Look 1 tab this am and then 0.5 later today when BP's were high. No fever, chills, night sweats. No hearing or vision changes. No confusion. No weakness. No nausea, vomiting, abdominal pain, diarrhea, constipation. No CP, MOTTA, LE edema. No dysuria, frequency, flank pain. See Dr Cody Rodriguez note for ER course as well as exam findings. Patient was examined together. Chart reviewed. Patient seen, examined, and discussed with Dr. Gray Bridges (PGY-1). See H&P for more details. A/P: Admit to Neuro tele. Code status: Full Code. HTN emergency: With headache. CT head neg. Initially BP improved with IV vasotech in ED but have no started to trend back up again. Will start long acting PO med with plan to continue this at discharge and bridge the gap with short acting meds. D/t her symptomatic carlos dihydropyridines and beta blockers and contraindicated and she's already on an ARB so will start HCTZ.  This should help with her LE edema as well. Very low suspicion for temporal arteritis but since she came in with polymyalgia rheumatica-like bilat shoulder pain will get inflamm markers. No temporal artery abnormalities on exam. No vision changes. - Admit for obs  - Neuro checks  - Start HCTZ  - Bridge with hydral PRN  - ESR/CRP    Bilat shoulder pain: Been going on for few weeks. Spurling neg.  - CT neck    I agree with remaining assessment and plan as documented in Dr. Lindsey Emery Full H&P. Pt discussed with Dr. Jaylon Marcus (on-call attending physician).     Luís Ordoñez MD  Family Medicine Resident, PGY-3

## 2023-01-15 NOTE — ED NOTES
Bedside shift change report given to Gilford Kleine (oncoming nurse) by Vinh Price (offgoing nurse). Report included the following information SBAR and ED Summary.

## 2023-01-15 NOTE — ROUTINE PROCESS
TRANSFER - IN REPORT:    Verbal report received from FavianRN(name) on Krishan Potter  being received from ER(unit) for routine progression of care      Report consisted of patients Situation, Background, Assessment and   Recommendations(SBAR). Information from the following report(s) SBAR, Kardex, and ED Summary was reviewed with the receiving nurse. Opportunity for questions and clarification was provided. Assessment completed upon patients arrival to unit and care assumed.

## 2023-01-15 NOTE — PROGRESS NOTES
Bedside and Verbal shift change report given to ELIDA Jiang (oncoming nurse) by Julieth Barry (offgoing nurse). Report included the following information SBAR and Kardex.

## 2023-01-15 NOTE — PROGRESS NOTES
Problem: Falls - Risk of  Goal: *Absence of Falls  Description: Document Juan C Bryan Fall Risk and appropriate interventions in the flowsheet.   Outcome: Progressing Towards Goal  Note: Fall Risk Interventions:                                Problem: Pain  Goal: *Control of Pain  Outcome: Progressing Towards Goal

## 2023-01-15 NOTE — H&P
[facility]   Admission H&P    Date of admission: 1/14/2023    Patient name: Lissy Mcgowan  MRN: [id number]  YOB: 1940  Age: 80 y.o. Primary care provider:  Kendell Galeas MD     Source of Information: patient, medical records    Chief complaint:  Headaches    History of Present Illness  Lissy Mcgowan is a 80 y.o. female with a PMH of HTN, GERD, glaucoma, DMII with neuropathy, and osteoarthritis who presents to the ED complaining of frontal headaches, throbbing, sharp in nature, constant for 3 days. 10/10 pain upon presentation but now moderate pain. Reports elevated BP at home around 599-908 systolic over this time. Took an extra 25mg of losartan yesterday for elevated blood pressures. Takes Losartan 50mg daily. Took 20mg of her prn lasix in the morning of 1/14 due to lower extremity edema. Denies vision changes, hearing changes. Chronically decreased hearing. No new numbness/weakness/tingling. Has been having pain in both arms/shoulders now for weeks. Notes a history of bradycardia with some chronic intermittent difficulty breathing. Follows with Dr. Jose Gupta, Cardiologist. Has an appointment on Feb 1st to see him and has not seen cardiologist in over a year. Feels that her GERD is poorly controlled on prilosec 20mg. Has been nauseous recently. At baseline. Itches all the time. Tries lotion but this doesn't help all that much. Takes hydroxyzine as needed which doesn't help much. Has one spot on her back that specifically itches. Patient was recently seen by PCP on 1/11 complaining of balance problems, headaches, and fatigue. COVID Questions:   Experiencing any of the following symptoms: fever, chills, cough, SOB, diarrhea, URI symptoms. NO  Any Sick contacts fever, cough, diarrhea, SOB, URI symptoms. NO  Traveled out of state or out of country.  NO    In the ED:   Vitals: 181/80, T 97.9, HR 62, RR 16, 94% on rm air  Labs: Trop 6, UA clean, COVID neg, K+ 3.7  Imaging: CT head NAP  EKG: NSR, Non-ischemic  Treatment: Vasotec 1.25mg, Fentanyl 50mcg    Review of Systems  Review of Systems   Constitutional:  Negative for chills and fever. HENT:  Negative for congestion and sore throat. Eyes:  Negative for blurred vision and double vision. Respiratory:  Negative for cough and wheezing. Cardiovascular:  Negative for chest pain and palpitations. Gastrointestinal:  Negative for abdominal pain and vomiting. Genitourinary:  Negative for dysuria and hematuria. Musculoskeletal:  Positive for back pain, myalgias and neck pain. Skin:  Positive for itching. Negative for rash. Neurological:  Positive for headaches. Negative for dizziness, sensory change, speech change and focal weakness. Psychiatric/Behavioral:  Negative for hallucinations and substance abuse. Home Medications   Prior to Admission medications    Medication Sig Start Date End Date Taking? Authorizing Provider   tiZANidine (ZANAFLEX) 4 mg tablet TAKE 1 TO 2 TABLETS BY MOUTH 1 TO 2 HOURS PRIOR TO BEDTIME 1/10/23  Yes Jacquelyn Lainez MD   pregabalin (LYRICA) 50 mg capsule TAKE 1 CAPSULE BY MOUTH ONCE DAILY AT NIGHT .  DO NOT EXCEED 1 CAPSULE PER 24 HOURS 12/5/22  Yes Jacquelyn Lainez MD   hydrOXYzine HCL (ATARAX) 10 mg tablet TAKE 1 TABLET BY MOUTH THREE TIMES DAILY AS NEEDED FOR ITCHING 10/19/22  Yes Jacquelyn Lainez MD   DULoxetine (CYMBALTA) 60 mg capsule Take 1 capsule by mouth once daily for 30 days 7/26/22  Yes Jacquelyn Lainez MD   furosemide (LASIX) 20 mg tablet TAKE 1 TABLET BY MOUTH ONCE DAILY AS NEEDED FOR  PAIN 7/19/22  Yes Jacquelyn Lainez MD   alendronate (FOSAMAX) 70 mg tablet TAKE 1 TABLET BY MOUTH ONCE A WEEK IN THE MORNING BEFORE FIRST MEAL, BEVERAGE, OR MEDICATION OF THE DAY 2/8/22  Yes Provider, Historical   glipiZIDE (GLUCOTROL) 5 mg tablet Take 1 tablet by mouth once daily 3/16/22  Yes Jacquelyn Lainez MD   aspirin 81 mg chewable tablet Take 81 mg by mouth daily. Yes Provider, Historical   diphenhydrAMINE (BENADRYL) 25 mg tablet Take 25 mg by mouth every six (6) hours as needed. 2tabs As needed   Yes Provider, Historical   losartan (COZAAR) 50 mg tablet Take 1 Tab by mouth daily. 5/10/21  Yes Ned Santana MD   mv,calcium,min/iron/folic/vitK (ONE-A-DAY WOMEN'S COMPLETE PO) Take 1 Tab by mouth daily. Yes Provider, Historical   cholecalciferol (VITAMIN D3) (1000 Units /25 mcg) tablet Take 1,000 Units by mouth daily. Yes Provider, Historical   cyanocobalamin 1,000 mcg tablet Take 1,000 mcg by mouth daily. Yes Provider, Historical   ascorbic acid, vitamin C, (VITAMIN C) 250 mg tablet Take 250 mg by mouth daily. Yes Provider, Historical   Calcium Carbonate-Vit D3-Min 600 mg calcium- 400 unit tab Take 1 pill 2 x daily  Patient taking differently: Take 1 Tablet by mouth daily. 10/31/17  Yes Ned Santana MD   acetaminophen (TYLENOL) 650 mg CR tablet Take 650 mg by mouth every six (6) hours as needed for Pain. Yes Provider, Historical   latanoprost (XALATAN) 0.005 % ophthalmic solution Administer 1 Drop to both eyes nightly. Yes Provider, Historical   omeprazole (PRILOSEC) 20 mg capsule Take 1 capsule by mouth once daily  Patient not taking: Reported on 1/14/2023 6/3/22   Ned Santana MD   tiZANidine (ZANAFLEX) 2 mg tablet TAKE 1 TO 2 TABLETS BY MOUTH 1-2 HOURS TO BEDTIME AS NEEDED FOR SPASM  Patient not taking: Reported on 1/14/2023 7/15/21   Ned Santana MD   ketoconazole (NIZORAL) 2 % topical cream Apply  to affected area daily. 1/28/21   Ned Santana MD   Nystop powder APPLY 1 APPLICATION OF POWDER TOPICALLY TO AFFECTED AREA 4 TIMES DAILY UNTIL RASH RESOLVES 12/24/20   Ned Santana MD   Nebulizers [facility]  8/28/20   Provider, Historical   atorvastatin (LIPITOR) 40 mg tablet Take 1 Tab by mouth nightly for 90 days.  11/17/20 3/28/22  Ned Santana MD   lancets misc accu-chek softclix lancets Check blood sugar daily E11.9 10/10/19   Tina Maier MD   glucose blood VI test strips (BLOOD GLUCOSE TEST) strip Accu-chek alexei plus test strips Test blood sugar once daily E11.9 10/10/19   Tina Maier MD   Blood-Glucose Meter monitoring kit Pt with T2DM. Needs meter to check BS 1-2 x daily. E11.9. Please give meter approved by her insurance. Patient not taking: No sig reported 10/4/19   Tina Maier MD   ondansetron [facility] ODT) 8 mg disintegrating tablet Take 1 Tab by mouth every twelve (12) hours as needed for Nausea. Patient not taking: Reported on 1/14/2023 8/30/18   Tina Maier MD   oxybutynin (DITROPAN) 5 mg tablet Take 1 Tab by mouth three (3) times daily for 270 days. Patient taking differently: Take 5 mg by mouth three (3) times daily as needed. 1/9/18 3/28/22  Tina Maier MD       Allergies   Allergies   Allergen Reactions    Sulfa (Sulfonamide Antibiotics) Hives and Itching       Past Medical History:   Diagnosis Date    Arrhythmia     Bradycardia. Arthritis     Burning with urination     Incontinence. Diabetes ([address]     GERD (gastroesophageal reflux disease)     Glaucoma     High cholesterol     Hypertension     Ill-defined condition     EDEMA, LOWER LIMS, URINARY INCONTINENCE    Obesity, Class II, BMI 35-39.9     Sleep apnea     NO CPAP       Past Surgical History:   Procedure Laterality Date    ENDOSCOPY, COLON, DIAGNOSTIC      2008    HX BREAST BIOPSY Left 1970s    Excision of benign breast cysts. HX CATARACT REMOVAL Bilateral     HX GYN      Laparoscopic resection of ?ovarian cysts. HX HEART CATHETERIZATION  01/06/2017    HX HERNIA REPAIR  11/01/2021    Robotic-assisted laparoscopic repair of incarcerated supraumbilical and umbilical hernias with mesh.     HX HYSTERECTOMY      HX KNEE REPLACEMENT Left 2014    HX KNEE REPLACEMENT Right     HX UROLOGICAL      BOTOX FOR FREQUENT URINATION    AL DRAINAGE OF DEEP RECTAL ABSCESS         Family History   Problem Relation Age of Onset    Heart Disease Mother     No Known Problems Father     No Known Problems Sister     Cancer Brother         PANCREAS    Heart Disease Sister     Cancer Sister         BREAST    Alcohol abuse Brother     No Known Problems Brother     No Known Problems Brother     No Known Problems Brother     Anesth Problems Neg Hx        Social History   Patient resides    Independently    X  With family care      Assisted living      SNF      Ambulates    Independently    x  With cane       Assisted walker      Alcohol history     None   X  Social     Chronic     Smoking history    None   X  Former smoker     Current smoker     Social History     Tobacco Use   Smoking Status Former    Packs/day: 0.50    Years: 5.00    Pack years: 2.50    Types: Cigarettes    Quit date: 1966    Years since quittin.5   Smokeless Tobacco Never     Drug history  X  None     Former drug user     Current drug user     Code status  X  Full code     DNR/DNI     Partial    Code status discussed with the patient/caregivers. Physical Exam  Visit Vitals  BP (!) 164/71 (BP 1 Location: Left upper arm, BP Patient Position: At rest)   Pulse (!) 51   Temp 98.6 °F (37 °C)   Resp 18   Ht 5' 3\" (1.6 m)   Wt 211 lb (95.7 kg)   SpO2 99%   BMI 37.38 kg/m²      Physical Exam  Constitutional:       Appearance: She is obese. HENT:      Mouth/Throat:      Mouth: Mucous membranes are moist.      Pharynx: Oropharynx is clear. Eyes:      Extraocular Movements: Extraocular movements intact. Pupils: Pupils are equal, round, and reactive to light. Cardiovascular:      Rate and Rhythm: Regular rhythm. Bradycardia present. Pulmonary:      Effort: Pulmonary effort is normal.      Breath sounds: Normal breath sounds. Abdominal:      General: Abdomen is flat. There is no distension. Palpations: Abdomen is soft. Tenderness: There is no abdominal tenderness. Musculoskeletal:      Cervical back: Normal range of motion and neck supple. Right lower leg: No edema. Left lower leg: No edema. Comments: Tenderness to trapezius and suboccipital palpation. Tenderpoints palpated in trapezius b/l   Skin:     General: Skin is warm and dry. Neurological:      General: No focal deficit present. Mental Status: She is alert and oriented to person, place, and time. Cranial Nerves: No cranial nerve deficit. Sensory: No sensory deficit. Motor: No weakness. Laboratory Data  Recent Results (from the past 24 hour(s))   SAMPLES BEING HELD    Collection Time: 01/14/23  6:01 PM   Result Value Ref Range    SAMPLES BEING HELD  1SST, 1RED, 1BLU, 1GRN     COMMENT        Add-on orders for these samples will be processed based on acceptable specimen integrity and analyte stability, which may vary by analyte. METABOLIC PANEL, COMPREHENSIVE    Collection Time: 01/14/23  6:01 PM   Result Value Ref Range    Sodium 141 136 - 145 mmol/L    Potassium 3.7 3.5 - 5.1 mmol/L    Chloride 105 97 - 108 mmol/L    CO2 31 21 - 32 mmol/L    Anion gap 5 5 - 15 mmol/L    Glucose 84 65 - 100 mg/dL    BUN 12 6 - 20 MG/DL    Creatinine 0.97 0.55 - 1.02 MG/DL    BUN/Creatinine ratio 12 12 - 20      eGFR 58 (L) >60 ml/min/1.73m2    Calcium 9.5 8.5 - 10.1 MG/DL    Bilirubin, total 0.3 0.2 - 1.0 MG/DL    ALT (SGPT) 17 12 - 78 U/L    AST (SGOT) 11 (L) 15 - 37 U/L    Alk.  phosphatase 112 45 - 117 U/L    Protein, total 8.4 (H) 6.4 - 8.2 g/dL    Albumin 3.7 3.5 - 5.0 g/dL    Globulin 4.7 (H) 2.0 - 4.0 g/dL    A-G Ratio 0.8 (L) 1.1 - 2.2     CBC WITH AUTOMATED DIFF    Collection Time: 01/14/23  6:01 PM   Result Value Ref Range    WBC 6.5 3.6 - 11.0 K/uL    RBC 4.41 3.80 - 5.20 M/uL    HGB 12.5 11.5 - 16.0 g/dL    HCT 39.8 35.0 - 47.0 %    MCV 90.2 80.0 - 99.0 FL    MCH 28.3 26.0 - 34.0 PG    MCHC 31.4 30.0 - 36.5 g/dL    RDW 13.6 11.5 - 14.5 %    PLATELET 275 530 - 655 K/uL    MPV 9.9 8.9 - 12.9 FL    NRBC 0.0 0  WBC    ABSOLUTE NRBC 0.00 0.00 - 0.01 K/uL NEUTROPHILS 66 32 - 75 %    LYMPHOCYTES 25 12 - 49 %    MONOCYTES 7 5 - 13 %    EOSINOPHILS 1 0 - 7 %    BASOPHILS 1 0 - 1 %    IMMATURE GRANULOCYTES 0 0.0 - 0.5 %    ABS. NEUTROPHILS 4.3 1.8 - 8.0 K/UL    ABS. LYMPHOCYTES 1.6 0.8 - 3.5 K/UL    ABS. MONOCYTES 0.5 0.0 - 1.0 K/UL    ABS. EOSINOPHILS 0.1 0.0 - 0.4 K/UL    ABS. BASOPHILS 0.0 0.0 - 0.1 K/UL    ABS. IMM. GRANS. 0.0 0.00 - 0.04 K/UL    DF AUTOMATED     TROPONIN-HIGH SENSITIVITY    Collection Time: 01/14/23  6:01 PM   Result Value Ref Range    Troponin-High Sensitivity 6 0 - 51 ng/L   URINALYSIS W/ RFLX MICROSCOPIC    Collection Time: 01/14/23  8:27 PM   Result Value Ref Range    Color YELLOW/STRAW      Appearance CLEAR CLEAR      Specific gravity 1.007 1.003 - 1.030      pH (UA) 7.5 5.0 - 8.0      Protein Negative NEG mg/dL    Glucose Negative NEG mg/dL    Ketone Negative NEG mg/dL    Bilirubin Negative NEG      Blood Negative NEG      Urobilinogen 0.2 0.2 - 1.0 EU/dL    Nitrites Negative NEG      Leukocyte Esterase Negative NEG     COVID-19 RAPID TEST    Collection Time: 01/14/23  9:06 PM   Result Value Ref Range    Specimen source Nasopharyngeal      COVID-19 rapid test Not detected NOTD       Imaging  CT HEAD WO CONT    Result Date: 1/14/2023  No acute intracranial process seen     CT SPINE CERV WO CONT    Result Date: 1/15/2023  No acute abnormality. Diffuse degenerative disc changes with mild foraminal stenosis on the right at C3-4 and on the left at C4-5 and C6-7. Assessment and Plan     Love Brunner is a 80 y.o. female with a PMH of HTN, GERD, glaucoma, DMII with neuropathy, and osteoarthritis who is admitted for hypertensive emergency with severe headache. HTN emergency: with headache POA /80. Frontal headache. Possibly tension-type with some occipital neuralgia component. CT head neg. Initially BP improved with IV vasotech in ED.  Will start long acting PO med with plan to continue this at discharge and bridge the gap with short acting meds. D/t her symptomatic carlos dihydropyridines and beta blockers and contraindicated and she's already on an ARB so will start HCTZ. This should help with her LE edema as well. Very low suspicion for temporal arteritis but since she came in with polymyalgia rheumatica-like bilat shoulder pain will get inflamm markers. No temporal artery abnormalities on exam. No vision changes. - Admit for obs  - Neuro checks  - Continue losartan 50mg daily  - Start HCTZ 25mg daily  - Hydral 10mg q4 PRN for systolic BP greater than 241 or diastolic greater than 466    Bilat shoulder pain: Been going on for few weeks. Trapezius muscles quite tense/tender. Spurling neg. No radicular symptoms  - CT neck  - Mag  - ESR, CRP    Lower extremity Spasms: Chronic  - Magnesium  - Tizanidine 2mg nightly    DM: On home glipizide 5mg only. Last A1c 6/21  Lab Results   Component Value Date/Time    Hemoglobin A1c 5.9 (H) 06/03/2021 11:23 AM   - Drawing A1c  - SSI ACHS  - Hold home glipizide    Itching: Chronic, full body, worse on back. Utilizes lotions for dry skin and prn benadryl and atarax at home. - prn benadryl    HLD:  Lab Results   Component Value Date/Time    LDL, calculated 113 (H) 06/03/2021 11:23 AM    LDL, calculated 99 04/27/2020 12:00 AM   - Drawing lipid panel  - Continue home lipitor 40mg daily    Glaucoma:  - Continue home latanaprost drops    Sinus Bradycardia: Follows with Dr. Callie Cortes. Appt on 2/1. Discussions for pacemaker insertion    GERD: Poorly controlled on prilosec 20mg daily  - Protonix 40mg daily    Vitamin Deficiencies: Labs drawn outpatient on 1/11 to work up fatigue  - Continue vitamin C, Vitamin D, and Vitamin B12 supplementation    Obesity: Body mass index is 37.38 kg/m². - Encourage lifestyle modification and further follow up with PCP outpatient. FEN/GI: Diabetic Diet. Encourage PO fluids  Activity: Ambulate with assistance.   DVT prophylaxis: Lovenox  GI prophylaxis:  Protonix  Disposition: Plan to d/c to TBD. PT/OT/CM consulted.   POC: Adama Scales (Daughter) [phone]    CODE STATUS:  Full Code        Patient will be discussed with Dr. Fartun Eaton (Family Medicine Attending)     Ethan Castaneda Problems  Date Reviewed: 3/28/2022            Codes Class Noted POA    * (Principal) Hypertensive emergency ICD-10-CM: I16.1  ICD-9-CM: 401.9  1/14/2023 Yes        Type 2 diabetes mellitus with diabetic neuropathy ([facility] ICD-10-CM: E11.40  ICD-9-CM: 250.60, 357.2  1/9/2018 Yes        Pure hypercholesterolemia ICD-10-CM: E78.00  ICD-9-CM: 272.0  6/22/2016 Yes        Primary open angle glaucoma ICD-10-CM: H40.1190  ICD-9-CM: 365.11, 365.70  6/22/2016 Yes        GERD (gastroesophageal reflux disease) ICD-10-CM: K21.9  ICD-9-CM: 530.81  3/11/2010 Yes

## 2023-01-15 NOTE — CONSULTS
Neurology Consult - Inpatient      Name:   Kalia Anders  MRN:    [id number]    Date of Admission:  1/14/2023    Date of Consultation:  01/15/23       HISTORY OF PRESENT ILLNESS:     This is a 80 y.o. female with  has a past medical history of Arrhythmia, Arthritis, Burning with urination, Diabetes ([address] GERD (gastroesophageal reflux disease), Glaucoma, High cholesterol, Hypertension, Ill-defined condition, Obesity, Class II, BMI 35-39.9, and Sleep apnea. .    Neurology is asked to see the patient for: headache, elevated inflammatory markers    Admitted last night with complaint of frontal headache and elevated blood pressure 3 days. Hx is from Pt and Daughter at bedside. Pt denies any hx of frequent, severe, or migraine type headaches. Says that she has chronic bilateral shoulder and arm pain but over last 3 days she's had new-onset throbbing, sharp, 10/ 10 headache across her forehead and down into bridge of nose, a/w light-sensitivity, no vomiting or nausea, and during this timeframe, her blood pressures (systolic) had been elevated in the 298-681 systolic range. Denies any similar blood pressure elevations in the past.     Denies any vision change in either eye  Denies any pain in jaws when eating/ chewing      Reviewed last 24-hour labs:  POC glucose 101  CRP 0.67 (mild elevated)  ESR 39 (mild elevated)  A1c 6.2  Total cholesterol 134, triglyceride 150, HDL 45, LDL 59  Magnesium 2.2  Respiratory virus panel multiple PCRs: all negative  COVID-19 rapid test not detected  UA negative for UTI  CBC normal  CMP minimally reduced GFR 58, ALT normal, AST mild low at 11    CT head w/o contast (1-14-23): FINDINGS: The ventricles and sulci are normal in size, shape and configuration. There is no significant white matter disease. There is no intracranial hemorrhage, extra-axial collection, or mass effect. The basilar cisterns are open. No CT evidence of acute infarct.   Impression: no acute intracranial process seen      Complete Review of Systems: reviewed on admission H&P    ====================================     Allergies   Allergen Reactions    Sulfa (Sulfonamide Antibiotics) Hives and Itching       Current Facility-Administered Medications:     tiZANidine (ZANAFLEX) tablet 2 mg, 2 mg, Oral, QHS, Farida Yancey MD, 2 mg at 01/15/23 0242    pregabalin (LYRICA) capsule 50 mg, 50 mg, Oral, QHS, Farida Yancey MD, 50 mg at 01/15/23 0241    aspirin chewable tablet 81 mg, 81 mg, Oral, DAILY, Farida Yancey MD, 81 mg at 01/15/23 0849    atorvastatin (LIPITOR) tablet 40 mg, 40 mg, Oral, QHS, Farida Yancey MD, 40 mg at 01/15/23 0242    cyanocobalamin (VITAMIN B12) tablet 1,000 mcg, 1,000 mcg, Oral, DAILY, Farida Yancey MD, 1,000 mcg at 01/15/23 0849    cholecalciferol (VITAMIN D3) (1000 Units /25 mcg) tablet 1,000 Units, 1,000 Units, Oral, DAILY, Farida Yancey MD, 1,000 Units at 01/15/23 0850    ascorbic acid (vitamin C) (VITAMIN C) tablet 250 mg, 250 mg, Oral, DAILY, Farida Yancey MD, 250 mg at 01/15/23 0850    [Held by provider] diphenhydrAMINE (BENADRYL) capsule 25 mg, 25 mg, Oral, Q6H PRN, Farida Yancey MD, 25 mg at 01/15/23 0247    DULoxetine (CYMBALTA) capsule 60 mg, 60 mg, Oral, DAILY, Farida Yancey MD, 60 mg at 01/15/23 0850    insulin lispro (HUMALOG) injection, , SubCUTAneous, AC&HS, Farida Yancey MD    glucose chewable tablet 16 g, 4 Tablet, Oral, PRN, Farida Yancey MD    glucagon (GLUCAGEN) injection 1 mg, 1 mg, IntraMUSCular, PRN, Farida Yancey MD    dextrose 10% infusion 0-250 mL, 0-250 mL, IntraVENous, PRN, Farida Yancey MD    latanoprost (XALATAN) 0.005 % ophthalmic solution 1 Drop, 1 Drop, Both Eyes, QHS, Farida Yancey MD, 1 Drop at 01/15/23 0240    losartan (COZAAR) tablet 50 mg, 50 mg, Oral, DAILY, Farida Yancey MD, 50 mg at 01/15/23 0850    sodium chloride (NS) flush 5-40 mL, 5-40 mL, IntraVENous, Q8H, Farida Yancey MD, 10 mL at 01/15/23 0618    sodium chloride (NS) flush 5-40 mL, 5-40 mL, IntraVENous, PRN, Keny Potter MD acetaminophen (TYLENOL) tablet 650 mg, 650 mg, Oral, Q6H PRN, 650 mg at 01/15/23 0901 **OR** acetaminophen (TYLENOL) suppository 650 mg, 650 mg, Rectal, Q6H PRN, Farida Yancey MD    polyethylene glycol (MIRALAX) packet 17 g, 17 g, Oral, DAILY PRN, Farida Yancey MD    ondansetron (ZOFRAN ODT) tablet 4 mg, 4 mg, Oral, Q8H PRN **OR** ondansetron (ZOFRAN) injection 4 mg, 4 mg, IntraVENous, Q6H PRN, Farida Yancey MD    enoxaparin (LOVENOX) injection 40 mg, 40 mg, SubCUTAneous, DAILY, Farida Yancey MD, 40 mg at 01/15/23 0850    hydrALAZINE (APRESOLINE) 20 mg/mL injection 10 mg, 10 mg, IntraVENous, Q4H PRN, Farida Blackman MD    hydrOXYzine HCL (ATARAX) tablet 10 mg, 10 mg, Oral, TID PRN, Marquez Dubois DO, 10 mg at 01/15/23 0617    fluticasone propionate (FLONASE) 50 mcg/actuation nasal spray 2 Spray, 2 Spray, Both Nostrils, DAILY, Sarina Jauregui MD, 2 Spray at 01/15/23 1154    acetaminophen (TYLENOL) tablet 1,000 mg, 1,000 mg, Oral, Q6H, Farida Yancey MD, 1,000 mg at 01/15/23 1153    [Held by provider] hydroCHLOROthiazide (HYDRODIURIL) tablet 25 mg, 25 mg, Oral, DAILY, Farida Yancey MD    pantoprazole (PROTONIX) tablet 40 mg, 40 mg, Oral, ACB, Kashmir Yancey MD, 40 mg at 01/15/23 0730      PSHx  has a past surgical history that includes pr i&d dp supralevator pelvirct/retrorct absc; endoscopy, colon, diagnostic; hx heart catheterization (01/06/2017); hx urological; hx cataract removal (Bilateral); hx hysterectomy; hx gyn; hx breast biopsy (Left, 1970s); hx knee replacement (Left, 2014); hx knee replacement (Right); and hx hernia repair (11/01/2021). SocHx  reports that she quit smoking about 56 years ago. She has a 2.50 pack-year smoking history. She has never used smokeless tobacco. She reports current alcohol use. She reports that she does not use drugs. FHx family history includes Alcohol abuse in her brother; Cancer in her brother and sister; Heart Disease in her mother and sister;  No Known Problems in her brother, brother, brother, father, and sister. PHYSICAL EXAM    Patient Vitals for the past 12 hrs:   Temp Pulse Resp BP SpO2   01/15/23 1632 98.6 °F (37 °C) 67 18 128/77 96 %   01/15/23 1444 -- 66 -- -- --   01/15/23 1139 98.4 °F (36.9 °C) (!) 52 18 102/62 96 %   01/15/23 1040 -- 81 -- (!) 122/55 --   01/15/23 0842 97.7 °F (36.5 °C) (!) 56 18 (!) 119/56 95 %   01/15/23 0708 -- (!) 54 -- -- --   01/15/23 0653 98.1 °F (36.7 °C) (!) 57 17 121/76 98 %             General: Head: traumatic. Eyes: conjunctiva clear. Neck: supple. Lungs: not examined. CV: not examined. Extremities: no edema. Skin: No rashes    Neurologic Exam:  Speech/ Language: no aphasia, no dysarthria. Alertness: oriented x 3.  CNs: Smell: not tested. Visual Fields (II): full to confrontation. Pupils (II): equal, round; light reactivity not assessed. Funduscopic: not examined. Extraocular movements (III, IV, VI): conjugate in all directions, no BALJIT. Ptosis (III, VII): none. Facial Sensation (V): intact LT bilateral.  Facial Movements (VII): symmetric at rest and on activation. Hearing (VIII): normal.  Soft palate elevation (IX): not examined. Shoulder shrug (XI): pain with shoulder shrug on both sides. Tongue protrusion (XII): midline    No occipital, suboccipital, or temporal tenderness on either side    Motor:  5/5 in all exts. Sensory: intact LT in all exts. Cerebellar: no resting or postural tremors . Deep Tendon Reflexes: 1+ biceps, trace patellars, bilateral.  Plantar response: not tested. Gait: not tested. Romberg: not tested      Labs/ Radiology     See HPI    Assessment/ Plan       ICD-10-CM ICD-9-CM    1. Severe headache  R51.9 784.0 CTA HEAD NECK W WO CONT      2. Acute nonintractable headache, unspecified headache type  R51.9 784.0       3. Secondary hypertension  I15.9 405.99       4. Near syncope  R55 780.2       5. Hypertension, unspecified type [I10 (ICD-10-CM)]  I10 401.9       6.  Hypertensive emergency [I16.1 (ICD-10-CM)]  I16.1 401.9         80 y.o. female with new-onset severe headaches and HTN emergency x 3 days prior to admission    CT head is normal for age; no acute abnormality. Pt does not endorse temporal headache on either side and no jaw claudication or visual deficit. Agree with [facility] that elevated ESR and CRP may be due to pt's hx of RA. Pt continuing to report intermittent severe headache, says today 8/10, despite BP being under control today. Due to new-onset severe headache, will check CTA Head-Neck today to evaluate for any relevant vascular abnormalities (aneurysm vs dissection). Head pain not alleviated with Tylenol pt is receiving. Entered order for Fioricet one tab every 6 hours prn moderate to severe headache. Also recommend pt be given Rx for Medrol DosePak at time of discharge, to help break ongoing headache cycle. If CTA Head/ Neck does not show any significant abnormalities, patient can be d/c home today from my standpoint. Will follow up tomorrow if remains admitted. Thank you for asking the Neurology Service to evaluate Sohan Roberts.       Signed By: Jennifer Corrales MD     January 15, 2023

## 2023-01-15 NOTE — PROGRESS NOTES
Problem: Falls - Risk of  Goal: *Absence of Falls  Description: Document Tomas Carreon Fall Risk and appropriate interventions in the flowsheet.   1/15/2023 1753 by Lundy Prader, RN  Outcome: Resolved/Met  Note: Fall Risk Interventions:                             1/15/2023 1540 by Lundy Prader, RN  Outcome: Progressing Towards Goal  Note: Fall Risk Interventions:                                Problem: Patient Education: Go to Patient Education Activity  Goal: Patient/Family Education  Outcome: Resolved/Met     Problem: Pain  Goal: *Control of Pain  1/15/2023 1753 by Lundy Prader, RN  Outcome: Resolved/Met  1/15/2023 1540 by Lundy Prader, RN  Outcome: Progressing Towards Goal     Problem: Patient Education: Go to Patient Education Activity  Goal: Patient/Family Education  Outcome: Resolved/Met

## 2023-01-15 NOTE — PROGRESS NOTES
[address] [place] [address]   Office [phone]  Fax [phone]          Subjective / Objective     24 Hour Events: LESLEY    Subjective  Patient reports headache has significantly reduced. Still with upper back and shoulder discomfort. Feeling much better. Is asking about plans for controlling BP when she goes home. She is hoping to leave today. No chest pain, SOB, dizziness. Ate a meal overnight and has been drinking water and ginger ale. Visit Vitals  /73 (BP 1 Location: Left upper arm, BP Patient Position: At rest)   Pulse (!) 52   Temp 98 °F (36.7 °C)   Resp 16   Ht 5' 3\" (1.6 m)   Wt 211 lb (95.7 kg)   SpO2 98%   BMI 37.38 kg/m²     Physical Exam  Constitutional:       Appearance: She is obese. Cardiovascular:      Rate and Rhythm: Regular rhythm. Bradycardia present. Pulmonary:      Effort: Pulmonary effort is normal.      Breath sounds: Normal breath sounds. Abdominal:      General: Abdomen is flat. There is no distension. Palpations: Abdomen is soft. Tenderness: There is no abdominal tenderness. Musculoskeletal:      Cervical back: Normal range of motion and neck supple. Right lower leg: No edema. Left lower leg: No edema. Comments: Tenderness to trapezius and suboccipital palpation. Tenderpoints palpated in trapezius b/l   Skin:     General: Skin is warm and dry. Neurological:      General: No focal deficit present. Mental Status: She is alert and oriented to person, place, and time. I/O:  Date 01/14/23 0700 - 01/15/23 0659 01/15/23 0700 - 01/16/23 0659   Shift 7735-1809 3869-5070 24 Hour Total 0700-1859 1900-0659 24 Hour Total   INTAKE   P.O.  200 200        P. O.  200 200      I. V.(mL/kg/hr)  0 0        I.V.  0 0      Shift Total(mL/kg)  200(2.1) 200(2.1)      OUTPUT   Urine(mL/kg/hr)           Urine Occurrence(s)  1 x 1 x      Emesis/NG output           Emesis Occurrence(s)  0 x 0 x      Stool           Stool Occurrence(s)  0 x 0 x      Shift Total(mL/kg)         NET  200 200      Weight (kg) 95.7 95.7 95.7 95.7 95.7 95.7     CBC:  Recent Labs     01/14/23  1801   WBC 6.5   HGB 12.5   HCT 39.8        Metabolic Panel:  Recent Labs     01/14/23  1801      K 3.7      CO2 31   BUN 12   CREA 0.97   GLU 84   CA 9.5   ALB 3.7   ALT 17         Assessment and Plan     Keith Madrid is a 80 y.o. female with a PMH of HTN, GERD, glaucoma, DMII with neuropathy, and osteoarthritis who is admitted for hypertensive emergency with severe headache. Patient was admitted on 1/14/2023. Headache has improved. HTN Emergency: with headache Improved. POA /80. Frontal headache. Likely multifactorial. Possibly tension-type with some occipital neuralgia component. Tenderness in suboccipital region and trapezius region. CT head neg. Initially BP improved with IV vasotech in ED. Will start long acting PO med with plan to continue this at discharge and bridge the gap with short acting meds. D/t her symptomatic carlos dihydropyridines and beta blockers and contraindicated and she's already on an ARB so will start HCTZ. This should help with her LE edema as well. Very low suspicion for temporal arteritis but since she came in with polymyalgia rheumatica-like bilat shoulder pain will get inflamm markers. No temporal artery abnormalities on exam. No vision changes. - Admitted for obs  - Neuro checks  - Continue losartan 50mg daily  - Originally planned on starting HCTZ but currently holding due to lower BP. Consider adding for BP management upon discharge  - Hydral 10mg q4 PRN for systolic BP greater than 214 or diastolic greater than 884     Bilat shoulder pain: Been going on for few weeks. Trapezius muscles quite tense/tender. Spurling neg. No radicular symptoms. CT neck shows mild foraminal stenosis C3-C7. Patient would benefit from trigger point injections, massage therapy, PT, and regular movement.   - Mag  - ESR, CRP  - Consider outpatient referral to [facility] I[facility] at [facility] for trigger point injections    Lower extremity Spasms: Chronic.   - Magnesium  - Tizanidine 2mg nightly     DM: On home glipizide 5mg only. Last A1c 6/21        Lab Results   Component Value Date/Time     Hemoglobin A1c 5.9 (H) 06/03/2021 11:23 AM   - Drawing A1c  - SSI ACHS  - Hold home glipizide     Itching: Chronic, full body, worse on back. Utilizes lotions for dry skin and prn benadryl and atarax at home. - Continue home prn benadryl, hydroxyzine     HLD:        Lab Results   Component Value Date/Time     LDL, calculated 113 (H) 06/03/2021 11:23 AM     LDL, calculated 99 04/27/2020 12:00 AM   - Drawing lipid panel  - Continue home lipitor 40mg daily     Glaucoma:  - Continue home latanaprost drops     Sinus Bradycardia: Follows with Dr. Miguel Oakes. Appt on 2/1. Discussions for pacemaker insertion     GERD: Poorly controlled on prilosec 20mg daily  - Protonix 40mg daily     Vitamin Deficiencies: Labs drawn outpatient on 1/11 to work up fatigue  - Continue vitamin C, Vitamin D, and Vitamin B12 supplementation     Obesity: Body mass index is 37.38 kg/m². - Encourage lifestyle modification and further follow up with PCP outpatient. FEN/GI: Diabetic Diet. Encourage PO fluids  Activity: Ambulate with assistance. DVT prophylaxis: Lovenox  GI prophylaxis:  Protonix  Disposition: Plan to d/c to TBD. PT/OT/CM consulted.   POC: Karl Robins (Daughter) [phone]     CODE STATUS:  Full Code     Jerome Rodriguez DO  Family Medicine Resident       For Billing    Chief Complaint   Patient presents with    Hypertension       Hospital Problems  Date Reviewed: 3/28/2022            Codes Class Noted POA    * (Principal) Hypertensive emergency ICD-10-CM: I16.1  ICD-9-CM: 401.9  1/14/2023 Yes        Type 2 diabetes mellitus with diabetic neuropathy ([facility] [address] ICD-10-CM: E11.40  ICD-9-CM: 250.60, 357.2  1/9/2018 Yes        Pure hypercholesterolemia ICD-10-CM: E78.00  ICD-9-CM: 272.0  6/22/2016 Yes        Primary open angle glaucoma ICD-10-CM: H40.1190  ICD-9-CM: 365.11, 365.70  6/22/2016 Yes        GERD (gastroesophageal reflux disease) ICD-10-CM: K21.9  ICD-9-CM: 530.81  3/11/2010 Yes

## 2023-01-16 ENCOUNTER — PATIENT OUTREACH (OUTPATIENT)
Dept: CASE MANAGEMENT | Age: 83
End: 2023-01-16

## 2023-01-16 NOTE — PROGRESS NOTES
Care Transitions Initial Call    Call within 2 business days of discharge: Yes     Patient: Heather Adrian Patient : 1940 MRN: [id number]    Last Discharge  Street       Date Complaint Diagnosis Description Type Department Provider    23 Hypertension Severe headache . .. ED to Hosp-Admission (Discharged) (ADMIT) [id number] Celestina Spaulding MD; Babatunde Carey... Was this an external facility discharge? No     Challenges to be reviewed by the provider   Additional needs identified to be addressed with provider: yes  Follow-up tests needed: Repeat inflammatory markers outpatient (Sed rate and CRP)  Component      Latest Ref Rng & Units 1/15/2023           6:21 AM   Sed rate, automated      0 - 30 mm/hr 39 (H)     Component      Latest Ref Rng & Units 1/15/2023           6:21 AM   C-Reactive protein      0.00 - 0.60 mg/dL 0.67 (H)          Method of communication with provider : chart routing    Discussed COVID-19 related testing which was available at this time. Test results were negative. Advance Care Planning:   Does patient have an Advance Directive: yes, reviewed and current. Inpatient Readmission Risk score: No data recorded  Was this a readmission? no   Patient stated reason for the admission: PHI/dtr reports elevated b/p, c/o HA, lightheadedness    Patients top risk factors for readmission: medical condition-HTN, Migraines    Interventions to address risk factors: Scheduled appointment with PCP-(dtr.will schedule), Scheduled appointment with Specialist-23, Obtained and reviewed discharge summary and/or continuity of care documents, and Education of patient/family/caregiver/guardian to support self-management-HTN, Migraines    Care Transition Nurse (CTN) contacted the  PHI/dtr. by telephone to perform post hospital discharge assessment. Verified name and  with PHI/dtr. as identifiers. Provided introduction to self, and explanation of the CTN role.      CTN reviewed discharge instructions, medical action plan and red flags with PHI/dtr. who verbalized understanding. Were discharge instructions available to patient? yes. Reviewed appropriate site of care based on symptoms and resources available to patient including: PCP, Specialist, When to call 911, and [facility] . PHI/dtr. given an opportunity to ask questions and does not have any further questions or concerns at this time. The PHI/dtr. agrees to contact the PCP office for questions related to their healthcare. Medication reconciliation was performed with PHI/dtr., who verbalizes understanding of administration of home medications. Advised obtaining a 90-day supply of all daily and as-needed medications. Referral to Pharm D needed: no   START taking:  butalbital-acetaminophen-caffeine (FIORICET, ESGIC)  cetirizine (ZYRTEC)  fluticasone propionate (FLONASE)  methylPREDNISolone (MEDROL DOSEPACK)    CHANGE how you take:  tiZANidine (ZANAFLEX)    STOP taking:  acetaminophen 650 mg Tber (TYLENOL)  Blood-Glucose Meter monitoring kit  diphenhydrAMINE 25 mg tablet (BENADRYL)  glipiZIDE 5 mg tablet (GLUCOTROL)  hydrOXYzine HCL 10 mg tablet (ATARAX)  omeprazole 20 mg capsule (PRILOSEC)  ondansetron 8 mg disintegrating tablet (ZOFRAN ODT)    Was patient discharged with a pulse oximeter? no    Discussed follow-up appointments. If no appointment was previously scheduled, appointment scheduling offered: yes. Is follow up appointment scheduled within 7 days of discharge? Pending, dtr.will schedule appts . [facility] follow up appointment(s):   Future Appointments   Date Time Provider Pierre Juliana   2/1/2023  1:20 PM Syd Benz MD [facility] AMB       Plan for follow-up call in 5-7 days based on severity of symptoms and risk factors. Plan for next call: self management-HTN and follow up appointment-pcp/specialist  CTN provided contact information for future needs.      Goals Addressed                   This Visit's Progress     Prevent complications post hospitalization. 1/16   Pt.will attend  follow ups with pcp and specialist.   -PCP, Barbara Junior MD (Pt.dtr.will schedule appt). Danial Winkler MD (dtr.will schedule if pt.continue with HA)  -Family Medicine, Aracelis Alvarez MD (Pt.dtr.will schedule appt). -Cardiology, CORNEL Amaral MD 2/1/23 @ 1:20 pm  -CTN provided pt. information [email or web address] [phone]) explain briefly type of service; contact information provided, f/u with pt.in 5-7 days. -MC       B/p: Pt.will monitor b/p daily, keep record of reading to present to pcp at next follow up appt. (No b/p to reports at this time); follow up with pt.in 5-7 days, pt./dtr.has CTN contact information, call with questions or concerns. -MC    (HA/ MIGRAINES)  Teach Back:  Pt.will reports symptoms to pcp;  HA, throbbing or pounding feeling one-sided, they may occur anywhere on the head, neck and face -- or all over, associated with sensitivity to light, noise and/or smells, nausea  that worsens with activity or unrelieved with medication; symptoms such as numbness, confusion, trouble speaking, vertigo (spinning dizziness) and other strokelike neurological symptoms, pt.will go to ED for eval. CTN will follow up with pt.in 5-7 days. -[address]

## 2023-01-17 LAB
ATRIAL RATE: 56 BPM
CALCULATED P AXIS, ECG09: 34 DEGREES
CALCULATED R AXIS, ECG10: -37 DEGREES
CALCULATED T AXIS, ECG11: 34 DEGREES
DIAGNOSIS, 93000: NORMAL
P-R INTERVAL, ECG05: 164 MS
Q-T INTERVAL, ECG07: 424 MS
QRS DURATION, ECG06: 72 MS
QTC CALCULATION (BEZET), ECG08: 409 MS
VENTRICULAR RATE, ECG03: 56 BPM

## 2023-01-19 ENCOUNTER — OFFICE VISIT (OUTPATIENT)
Dept: FAMILY MEDICINE CLINIC | Age: 83
End: 2023-01-19
Payer: MEDICARE

## 2023-01-19 VITALS
HEART RATE: 60 BPM | WEIGHT: 210 LBS | RESPIRATION RATE: 18 BRPM | DIASTOLIC BLOOD PRESSURE: 63 MMHG | HEIGHT: 63 IN | BODY MASS INDEX: 37.21 KG/M2 | SYSTOLIC BLOOD PRESSURE: 139 MMHG | OXYGEN SATURATION: 96 % | TEMPERATURE: 98.4 F

## 2023-01-19 DIAGNOSIS — I27.20 PULMONARY HYPERTENSION (HCC): Primary | ICD-10-CM

## 2023-01-19 DIAGNOSIS — R79.82 ELEVATED C-REACTIVE PROTEIN (CRP): ICD-10-CM

## 2023-01-19 DIAGNOSIS — M54.2 MYOFASCIAL NECK PAIN: ICD-10-CM

## 2023-01-19 DIAGNOSIS — M47.812 NECK ARTHROPATHY: ICD-10-CM

## 2023-01-19 DIAGNOSIS — M54.2 NECK PAIN: ICD-10-CM

## 2023-01-19 DIAGNOSIS — G89.29 CHRONIC PAIN OF BOTH SHOULDERS: ICD-10-CM

## 2023-01-19 DIAGNOSIS — M25.511 CHRONIC PAIN OF BOTH SHOULDERS: ICD-10-CM

## 2023-01-19 DIAGNOSIS — M25.512 CHRONIC PAIN OF BOTH SHOULDERS: ICD-10-CM

## 2023-01-19 RX ORDER — TRIAMCINOLONE ACETONIDE 40 MG/ML
40 INJECTION, SUSPENSION INTRA-ARTICULAR; INTRAMUSCULAR ONCE
Qty: 1 ML | Refills: 0
Start: 2023-01-19 | End: 2023-01-19

## 2023-01-19 RX ORDER — LIDOCAINE HYDROCHLORIDE 10 MG/ML
2 INJECTION INFILTRATION; PERINEURAL ONCE
Qty: 2 ML | Refills: 0
Start: 2023-01-19 | End: 2023-01-19

## 2023-01-19 NOTE — PROGRESS NOTES
Identified Patient with two Patient identifiers (Name and ). Two Patient Identifiers confirmed. Reviewed record in preparation for visit and have obtained necessary documentation. Chief Complaint   Patient presents with    Arm Pain     Patient with bilateral arm pain at least for 1 year - patient recently admitted and recommend to follow up in clinic. Visit Vitals  /63 (BP 1 Location: Right arm, BP Patient Position: Sitting, BP Cuff Size: Large adult)   Pulse 60   Temp 98.4 °F (36.9 °C) (Oral)   Resp 18   Ht 5' 3\" (1.6 m)   Wt 210 lb (95.3 kg)   SpO2 96%   BMI 37.20 kg/m²       1. Have you been to the ER, urgent care clinic since your last visit? Hospitalized since your last visit? Yes, patient recently admitted to [facility], see EMR. 2. Have you seen or consulted any other health care providers outside of the [address] [place] [address] since your last visit? Include any pap smears or colon screening.  No

## 2023-01-20 ENCOUNTER — TELEPHONE (OUTPATIENT)
Dept: FAMILY MEDICINE CLINIC | Age: 83
End: 2023-01-20

## 2023-01-20 NOTE — TELEPHONE ENCOUNTER
Patient's daughter called stating that the home health agency needed the orders for physical therapy to be put in with the complete office notes, because they weren't able to process it on yesterday without the complete office notes. Thanks!

## 2023-01-22 NOTE — DISCHARGE SUMMARY
Discharge / Transfer / Off-Service Note                        [facility]   [address], [place] [address]    Office [phone]  Fax [phone]            Name: Eden Germain MRN: [id number]  Sex: Female   YOB: 1940  Age: 80 y.o. PCP: Alem Mcknight MD     Date of admission: 1/14/2023  Date of discharge/transfer: 1/15/2023    Attending physician at admission:  Dr. Coye Phalen .  Attending physician at discharge/transfer: Dr. Coye Phalen  Resident physician at discharge/transfer: Phil Tejada MD     Consultants during hospitalization  IP CONSULT TO [address] [place] [address]     Admission diagnoses   Hypertensive emergency [I16.1]    Recommended follow-up after discharge    1. PCP-Linden Cornell MD  2. Neurology- Dr. Alfredo Ramos      Things to follow up on with PCP:     - Follow up on headaches, patient needs to see Neurology of headaches are persistent  - Blood pressure control outpatient  - Consider continue holding Benadryl, Atarax and Glipizide (Beer's List)  - Follow up with Sport Medicine for neck and shoulder pain and tenderness.   - Consider referral to Rheumatology outpatient for possible Rheumatoid Arthritis workup.  - Repeat inflammatory markers- Sed rate and CRP which where mildly elevated during admission.   - Pulmonary hypertension on imaging, needs work up     Medication Changes:  Discharge Medication List as of 1/15/2023  5:35 PM        START taking these medications    Details   butalbital-acetaminophen-caffeine (FIORICET, ESGIC) -40 mg per tablet Take 1 Tablet by mouth every six (6) hours as needed for Headache (moderate to severe headache.) for up to 30 days. , Normal, Disp-30 Tablet, R-0      fluticasone propionate (FLONASE) 50 mcg/actuation nasal spray 2 Sprays by Both Nostrils route daily.  Indications: nasal congestion, Normal, Disp-1 Each, R-0      cetirizine (ZYRTEC) 10 mg tablet Take 1 Tablet by mouth daily for 30 days. Indications: hives, Normal, Disp-30 Tablet, R-0      methylPREDNISolone (MEDROL DOSEPACK) 4 mg tablet Take 1 Tablet by mouth Specific Days and Specific Times., Normal, Disp-1 Dose Pack, R-0           CONTINUE these medications which have CHANGED    Details   tiZANidine (ZANAFLEX) 2 mg tablet Take 1 Tablet by mouth nightly as needed for Muscle Spasm(s). , Normal, Disp-30 Tablet, R-0           CONTINUE these medications which have NOT CHANGED    Details   pregabalin (LYRICA) 50 mg capsule TAKE 1 CAPSULE BY MOUTH ONCE DAILY AT NIGHT . DO NOT EXCEED 1 CAPSULE PER 24 HOURS, Normal, Disp-30 Capsule, R-0      DULoxetine (CYMBALTA) 60 mg capsule Take 1 capsule by mouth once daily for 30 days, Normal, Disp-90 Capsule, R-1      furosemide (LASIX) 20 mg tablet TAKE 1 TABLET BY MOUTH ONCE DAILY AS NEEDED FOR  PAIN, Normal, Disp-30 Tablet, R-1      alendronate (FOSAMAX) 70 mg tablet TAKE 1 TABLET BY MOUTH ONCE A WEEK IN THE MORNING BEFORE FIRST MEAL, BEVERAGE, OR MEDICATION OF THE DAY, Historical Med      aspirin 81 mg chewable tablet Take 81 mg by mouth daily. , Historical Med      losartan (COZAAR) 50 mg tablet Take 1 Tab by mouth daily. , Normal, Disp-90 Tab, R-0      mv,calcium,min/iron/folic/vitK (ONE-A-DAY WOMEN'S COMPLETE PO) Take 1 Tab by mouth daily. , Historical Med      ketoconazole (NIZORAL) 2 % topical cream Apply  to affected area daily. , Normal, Disp-30 g, R-3      Nystop powder APPLY 1 APPLICATION OF POWDER TOPICALLY TO AFFECTED AREA 4 TIMES DAILY UNTIL RASH RESOLVES, Normal, Disp-60 g, R-0      Nebulizers (Sidestream) misc Historical Med      atorvastatin (LIPITOR) 40 mg tablet Take 1 Tab by mouth nightly for 90 days. , Normal, Disp-90 Tab, R-1      cholecalciferol (VITAMIN D3) (1000 Units /25 mcg) tablet Take 1,000 Units by mouth daily. , Historical Med      cyanocobalamin 1,000 mcg tablet Take 1,000 mcg by mouth daily. , Historical Med      ascorbic acid, vitamin C, (VITAMIN C) 250 mg tablet Take 250 mg by mouth daily. , Historical Med      lancets misc accu-chek softclix lancets Check blood sugar daily E11.9, Normal, Disp-100 Each, R-1      glucose blood VI test strips (BLOOD GLUCOSE TEST) strip Accu-chek alexei plus test strips Test blood sugar once daily E11.9, Normal, Disp-100 Strip, R-1      oxybutynin (DITROPAN) 5 mg tablet Take 1 Tab by mouth three (3) times daily for 270 days. , Normal, Disp-270 Tab, R-1      Calcium Carbonate-Vit D3-Min 600 mg calcium- 400 unit tab Take 1 pill 2 x daily, Normal, Disp-60 Tab, R-5      latanoprost (XALATAN) 0.005 % ophthalmic solution Administer 1 Drop to both eyes nightly., Historical Med           STOP taking these medications       hydrOXYzine HCL (ATARAX) 10 mg tablet Comments:   Reason for Stopping:         omeprazole (PRILOSEC) 20 mg capsule Comments:   Reason for Stopping:         glipiZIDE (GLUCOTROL) 5 mg tablet Comments:   Reason for Stopping:         diphenhydrAMINE (BENADRYL) 25 mg tablet Comments:   Reason for Stopping:         Blood-Glucose Meter monitoring kit Comments:   Reason for Stopping:         ondansetron (ZOFRAN ODT) 8 mg disintegrating tablet Comments:   Reason for Stopping:         acetaminophen (TYLENOL) 650 mg CR tablet Comments:   Reason for Stopping:                 History of Present Illness    As per admitting provider, Dr. Funes Read:     \"Cee Greenwood is a 80 y.o. female with a PMH of HTN, GERD, glaucoma, DMII with neuropathy, and osteoarthritis who presents to the ED complaining of frontal headaches, throbbing, sharp in nature, constant for 3 days. 10/10 pain upon presentation but now moderate pain. Reports elevated BP at home around 500-824 systolic over this time. Took an extra 25mg of losartan yesterday for elevated blood pressures. Takes Losartan 50mg daily. Took 20mg of her prn lasix in the morning of 1/14 due to lower extremity edema. Denies vision changes, hearing changes. Chronically decreased hearing.      No new numbness/weakness/tingling. Has been having pain in both arms/shoulders now for weeks. Notes a history of bradycardia with some chronic intermittent difficulty breathing. Follows with Dr. Jeffery Tse, Cardiologist. Has an appointment on Feb 1st to see him and has not seen cardiologist in over a year. Feels that her GERD is poorly controlled on prilosec 20mg. Has been nauseous recently. At baseline. Itches all the time. Tries lotion but this doesn't help all that much. Takes hydroxyzine as needed which doesn't help much. Has one spot on her back that specifically itches. Patient was recently seen by PCP on 1/11 complaining of balance problems, headaches, and fatigue. [facility]    Zaida Stauffer is a 80 y.o. female with a PMH of HTN, GERD, glaucoma, DMII with neuropathy, and osteoarthritis who is admitted for hypertensive emergency with severe headache. Patient was admitted on 1/14/2023 and discharged on 01/15/2023. HTN Emergency: Resolved. Imaging and work up were unremarkable. CT head neg.  - Continue home Losartan 50 mg daily. - Continue monitoring outpatient and adjust regimen as needed. Headaches: Concern for  occipital neuralgia and possible tension HA component. Inflammatory markers mildly elevated. Evaluated by Neurology who ordered CTA Head-Neck today to evaluate for any relevant vascular abnormalities (aneurysm vs dissection). Imaging were unrevealing for such findings. - Discharge with Fioricet and Medrol dose pack  - Follow up with Neurology outpatients if HA continue. Bilat shoulder pain:  subacute, trapezius tense/tender. Spurling neg. No radicular symptoms. CT neck shows mild foraminal stenosis C3-C7.   - Referred to [facility] medicine Clinic at [facility] for trigger point injections.   - Continue PT outpatient     Elevated inflammatory markers: Mild elevation, Sed rate 13 and CRP 0.67   - Consider rechecking outpatient and can consider referral to Rheumatology for RA work up. Lower extremity Spasms: Chronic.   - Tizanidine 2mg nightly    Pulmonary Hypertension: found incidentally on imaging, follow up outpatient     DM: Very well controlled, hold off Glipizide (Beer's list), consider Metformin vs diet controlled     Lab Results   Component Value Date/Time    Hemoglobin A1c 6.2 (H) 01/15/2023 06:21 AM        Itching: Chronic, full body, worse on back. Utilizes lotions for dry skin and prn benadryl and atarax at home. Discontinue Benadryl and Hydroxyzine   -Start Zyrtec daily        HLD:Continue home lipitor 40mg daily     Glaucoma:Continue home latanaprost drops     Sinus Bradycardia: Follows with Dr. Jose Gupta. Appt on 2/1. Discussions for pacemaker insertion  - Followed with Cards outpatient      GERD: Continue  prilosec 20mg daily     Vitamin Deficiencies: Labs drawn outpatient on 1/11 to work up fatigue  - Continue vitamin C, Vitamin D, and Vitamin B12 supplementation      Physical exam at discharge:    Visit Vitals  /73 (BP 1 Location: Left upper arm, BP Patient Position: At rest)   Pulse (!) 52   Temp 98 °F (36.7 °C)   Resp 16   Ht 5' 3\" (1.6 m)   Wt 211 lb (95.7 kg)   SpO2 98%   BMI 37.38 kg/m²      Physical Exam  Constitutional:       Appearance: She is obese. Cardiovascular:      Rate and Rhythm: Regular rhythm. Bradycardia present. Pulmonary:      Effort: Pulmonary effort is normal.      Breath sounds: Normal breath sounds. Abdominal:      General: Abdomen is flat. There is no distension. Palpations: Abdomen is soft. Tenderness: There is no abdominal tenderness. Musculoskeletal:      Cervical back: Normal range of motion and neck supple. Right lower leg: No edema. Left lower leg: No edema. Comments: Tenderness to trapezius and suboccipital palpation. Tenderpoints palpated in trapezius b/l   Skin:     General: Skin is warm and dry. Neurological:      General: No focal deficit present.       Mental Status: She is alert and oriented to person, place, and time. Condition at discharge: Stable. Labs  No results for input(s): WBC, HGB, HCT, PLT, HGBEXT, HCTEXT, PLTEXT in the last 72 hours. No results for input(s): NA, K, CL, CO2, BUN, CREA, GLU, CA, MG, PHOS, URICA in the last 72 hours. No results for input(s): ALT, AP, TBIL, TBILI, TP, ALB, GLOB, GGT, AML, LPSE in the last 72 hours. No lab exists for component: SGOT, GPT, AMYP, HLPSE  No results for input(s): PH, PCO2, PO2, TNIPOC, TROIQ, INR, PTP, APTT, FE, TIBC, PSAT, FERR, GLUCPOC, INREXT in the last 72 hours. No lab exists for component: GLPOC    Micro:  Lab Results   Component Value Date/Time    Culture result: NO GROWTH 5 DAYS 08/10/2020 04:06 PM    Culture result: MRSA NOT PRESENT 07/20/2020 08:21 AM    Culture result:  07/20/2020 08:21 AM         Screening of patient nares for MRSA is for surveillance purposes and, if positive, to facilitate isolation considerations in high risk settings. It is not intended for automatic decolonization interventions per se as regimens are not sufficiently effective to warrant routine use. Imaging:  CT HEAD WO CONT    Result Date: 1/14/2023  EXAM: CT HEAD WO CONT INDICATION: headache COMPARISON: 10/15/2022. CONTRAST: None. TECHNIQUE: Unenhanced CT of the head was performed using 5 mm images. Brain and bone windows were generated. Coronal and sagittal reformats. CT dose reduction was achieved through use of a standardized protocol tailored for this examination and automatic exposure control for dose modulation. FINDINGS: The ventricles and sulci are normal in size, shape and configuration. There is no significant white matter disease. There is no intracranial hemorrhage, extra-axial collection, or mass effect. The basilar cisterns are open. No CT evidence of acute infarct. The bone windows demonstrate no abnormalities. The visualized portions of the paranasal sinuses and mastoid air cells are clear.      No acute intracranial process seen     CT SPINE CERV WO CONT    Result Date: 1/15/2023  EXAM:  CT C-spine without contrast INDICATION: Bilateral upper extremity pain COMPARISON: None. TECHNIQUE: Thin section axial noncontrast CT of the cervical spine with coronal and sagittal reformats. CT dose reduction was achieved through use of a standardized protocol tailored for this examination and automatic exposure control for dose modulation. FINDINGS: There is no acute fracture or subluxation. Vertebral body heights are maintained. There is diffuse intervertebral disc space narrowing with small disc osteophyte complexes. There is no spinal canal stenosis. There is mild right foraminal stenosis at C3-4 and mild left foraminal stenosis at C4-5 and C6-7. There is no abnormality in alignment. The paraspinal soft tissues are unremarkable. The visualized lung apices are clear. No acute abnormality. Diffuse degenerative disc changes with mild foraminal stenosis on the right at C3-4 and on the left at C4-5 and C6-7. CTA HEAD NECK W CONT    Result Date: 1/15/2023  CLINICAL HISTORY: New onset severe headache EXAMINATION:  CT ANGIOGRAPHY HEAD AND NECK COMPARISON: CT head 1/14/2023 TECHNIQUE:  Following the uneventful administration of iodinated contrast material, axial CT angiography of the head and neck was performed. Delayed axial images through the head were also obtained. Coronal and sagittal reconstructions were obtained. Manual postprocessing of images was performed. 3-D  Sagittal maximal intensity projection images were obtained. 3-D Coronal maximal intensity projections were obtained. CT dose reduction was achieved through use of a standardized protocol tailored for this examination and automatic exposure control for dose modulation. FINDINGS: Delayed contrast-enhanced head CT: The ventricles are midline without hydrocephalus. There is no acute intra or extra-axial hemorrhage.  Mild bilateral subcortical and periventricular areas of patchy low attenuation is nonspecific but likely related to changes of chronic small vessel ischemic disease. The basal cisterns are clear. The paranasal sinuses are clear. CTA NECK: Great vessels: Patent origins Right subclavian artery: Patent Left subclavian artery: Patent Right common carotid artery: Patent Left common carotid artery: Patent Cervical right internal carotid artery: Patent with no significant stenosis by NASCET criteria. Cervical left internal carotid artery: Patent with mild atherosclerosis causing no significant stenosis by NASCET criteria. Right vertebral artery: Patent Left vertebral artery: Patent CTA HEAD: Right cavernous internal carotid artery: Mild atherosclerosis Left cavernous internal carotid artery: Mild atherosclerosis Anterior cerebral arteries: Mild atherosclerosis Anterior communicating artery: Patent Right middle cerebral artery: Mild atherosclerosis Left middle cerebral artery: Mild atherosclerosis Posterior communicating arteries: Patent Posterior cerebral arteries: Patent Basilar artery: Patent Distal vertebral arteries: Patent Moderate cervical spondylosis Enlarged pulmonary arteries compatible with pulmonary hypertension Measurements use NASCET criteria. 1. No evidence for acute large vessel arterial occlusion. 2. No evidence for intracranial aneurysm. 3. Mild atherosclerosis. 4. Enlarged pulmonary arteries compatible with pulmonary hypertension        Chronic diagnoses   Problem List as of 1/15/2023 Date Reviewed: 3/28/2022            Codes Class Noted - Resolved    * (Principal) Hypertensive emergency ICD-10-CM: I16.1  ICD-9-CM: 401.9  1/14/2023 - Present        S/P laparoscopic hernia repair ICD-10-CM: Z98.890, Z87.19  ICD-9-CM: V45.89  11/16/2021 - Present    Overview Signed 11/16/2021  9:37 AM by Bev Gauthier MD     Robotic-assisted laparoscopic supraumbilical and umbilical herniorrhaphy with mesh.              Hypertension ICD-10-CM: I10  ICD-9-CM: 401.9 8/21/2020 - Present        Osteoarthrosis ICD-10-CM: M19.90  ICD-9-CM: 715.90  8/21/2020 - Present        Primary osteoarthritis of right knee ICD-10-CM: M17.11  ICD-9-CM: 715.16  8/3/2020 - Present        Type 2 diabetes with nephropathy ([facility] ICD-10-CM: E11.21  ICD-9-CM: 250.40, 583.81  1/13/2019 - Present        Anginal equivalent (HCC) ICD-10-CM: I20.8  ICD-9-CM: 413.9  8/30/2018 - Present        Severe obesity (BMI 35.0-39. 9) ICD-10-CM: E66.01  ICD-9-CM: 278.01  6/14/2018 - Present        Type 2 diabetes mellitus with complication, without long-term current use of insulin ([facility].) ICD-10-CM: E11.8  ICD-9-CM: 250.90  1/9/2018 - Present        Type 2 diabetes mellitus with diabetic neuropathy ([facility].) ICD-10-CM: E11.40  ICD-9-CM: 250.60, 357.2  1/9/2018 - Present        Symptomatic bradycardia ICD-10-CM: R00.1  ICD-9-CM: 427.89  1/5/2018 - Present        DANIELA on CPAP ICD-10-CM: G47.33, Z99.89  ICD-9-CM: 327.23, V46.8  7/31/2017 - Present    Overview Signed 7/31/2017  9:39 AM by Gato Sanz MD     Set at 5-8 per patient. Pure hypercholesterolemia ICD-10-CM: E78.00  ICD-9-CM: 272.0  6/22/2016 - Present        Primary open angle glaucoma ICD-10-CM: H40.1190  ICD-9-CM: 365.11, 365.70  6/22/2016 - Present        Osteoporosis ICD-10-CM: M81.0  ICD-9-CM: 733.00  6/22/2016 - Present        Arthritis of left knee ICD-10-CM: M17.12  ICD-9-CM: 716.96  7/14/2014 - Present        GERD (gastroesophageal reflux disease) ICD-10-CM: K21.9  ICD-9-CM: 530.81  3/11/2010 - Present        RESOLVED: Incarcerated ventral hernia ICD-10-CM: K43.6  ICD-9-CM: 552.20  10/20/2021 - 11/16/2021    Overview Signed 10/20/2021  3:12 PM by Hanna Khalil MD     Incarcerated supraumbilical hernia without gangrene or obstruction.              RESOLVED: Arthritis, rheumatoid (HCC) ICD-10-CM: M06.9  ICD-9-CM: 714.0  8/21/2020 - 1/11/2023        RESOLVED: Type 2 diabetes mellitus with diabetic neuropathy ([facility] ICD-10-CM: E11.40  ICD-9-CM: 250.60, 357.2  1/9/2018 - 1/9/2018        RESOLVED: Mood change ICD-10-CM: R45.86  ICD-9-CM: 296.90  1/9/2018 - 1/9/2018        RESOLVED: Ischemic chest pain ([facility] ICD-10-CM: I20.9  ICD-9-CM: 413.9  1/9/2018 - 1/9/2018        RESOLVED: Type 2 diabetes mellitus with nephropathy ([facility].) ICD-10-CM: E11.21  ICD-9-CM: 250.40, 583.81  1/9/2018 - 1/9/2018        RESOLVED: Type 2 diabetes mellitus with nephropathy ([facility].) ICD-10-CM: E11.21  ICD-9-CM: 250.40, 583.81  12/29/2017 - 1/9/2018        RESOLVED: Diabetes ([facility] ICD-10-CM: E11.9  ICD-9-CM: 250.00  6/22/2016 - 6/22/2016        RESOLVED: Urge incontinence ICD-10-CM: N39.41  ICD-9-CM: 788.31  6/22/2016 - 6/22/2016        RESOLVED: Type 2 diabetes mellitus without complication ([facility] MARYSE-42-SL: E11.9  ICD-9-CM: 250.00  6/22/2016 - 1/9/2018            Diet:  Diabetic diet. Activity:  As tolerated     Disposition: Home or Self Care    Discharge instructions to patient/family  Please seek medical attention for any new or worsening symptoms particularly fever, chest pain, shortness of breath, abdominal pain, nausea, vomiting    Follow up plans/appointments  Follow-up Information       Follow up With Specialties Details Why Denise Condon MD Neurology Schedule an appointment as soon as possible for a visit in 1 month(s) Call to schedule Hospital Follow Up visit with Neurologist/ Dr Amos Covington if you are continuingto have headaches.  [place] [address] [facility]  [phone]      Piper Angel MD Family Medicine, Sports Medicine Physician Schedule an appointment as soon as possible for a visit Sports medicine attending or fellows for neck and shoulder pain [address] [facility] [phone]      Melony Keller MD Family Medicine, Pediatric Medicine   [facility] [address]  [address] [facility] [address] [facility]               Celio Taylor MD  Family Medicine Resident       For Billing    Chief Complaint   Patient presents with    Hypertension       Hospital Problems  Date Reviewed: 1/22/2023            Codes Class Noted POA    * (Principal) Hypertensive emergency ICD-10-CM: I16.1  ICD-9-CM: 401.9  1/14/2023 Yes        Type 2 diabetes mellitus with diabetic neuropathy (HCC) ICD-10-CM: E11.40  ICD-9-CM: 250.60, 357.2  1/9/2018 Yes        Pure hypercholesterolemia ICD-10-CM: E78.00  ICD-9-CM: 272.0  6/22/2016 Yes        Primary open angle glaucoma ICD-10-CM: H40.1190  ICD-9-CM: 365.11, 365.70  6/22/2016 Yes        GERD (gastroesophageal reflux disease) ICD-10-CM: K21.9  ICD-9-CM: 530.81  3/11/2010 Yes

## 2023-01-23 ENCOUNTER — HOME HEALTH ADMISSION (OUTPATIENT)
Dept: HOME HEALTH SERVICES | Facility: HOME HEALTH | Age: 83
End: 2023-01-23
Payer: MEDICARE

## 2023-01-23 ENCOUNTER — TELEPHONE (OUTPATIENT)
Dept: SLEEP MEDICINE | Age: 83
End: 2023-01-23

## 2023-01-23 ENCOUNTER — OFFICE VISIT (OUTPATIENT)
Dept: FAMILY MEDICINE CLINIC | Age: 83
End: 2023-01-23
Payer: MEDICARE

## 2023-01-23 VITALS
HEART RATE: 54 BPM | DIASTOLIC BLOOD PRESSURE: 78 MMHG | HEIGHT: 63 IN | BODY MASS INDEX: 36.5 KG/M2 | SYSTOLIC BLOOD PRESSURE: 142 MMHG | WEIGHT: 206 LBS | RESPIRATION RATE: 16 BRPM | TEMPERATURE: 97.2 F | OXYGEN SATURATION: 97 %

## 2023-01-23 DIAGNOSIS — I10 ESSENTIAL (PRIMARY) HYPERTENSION: ICD-10-CM

## 2023-01-23 DIAGNOSIS — Z09 HOSPITAL DISCHARGE FOLLOW-UP: ICD-10-CM

## 2023-01-23 DIAGNOSIS — I10 PRIMARY HYPERTENSION: Primary | ICD-10-CM

## 2023-01-23 DIAGNOSIS — M25.50 MULTIPLE JOINT PAIN: ICD-10-CM

## 2023-01-23 DIAGNOSIS — L29.9 ITCHING: ICD-10-CM

## 2023-01-23 DIAGNOSIS — Z12.31 ENCOUNTER FOR SCREENING MAMMOGRAM FOR MALIGNANT NEOPLASM OF BREAST: ICD-10-CM

## 2023-01-23 LAB — CRP SERPL HS-MCNC: 2.5 MG/L

## 2023-01-23 PROCEDURE — 1090F PRES/ABSN URINE INCON ASSESS: CPT | Performed by: INTERNAL MEDICINE

## 2023-01-23 PROCEDURE — G8536 NO DOC ELDER MAL SCRN: HCPCS | Performed by: INTERNAL MEDICINE

## 2023-01-23 PROCEDURE — 1123F ACP DISCUSS/DSCN MKR DOCD: CPT | Performed by: INTERNAL MEDICINE

## 2023-01-23 PROCEDURE — 1111F DSCHRG MED/CURRENT MED MERGE: CPT | Performed by: INTERNAL MEDICINE

## 2023-01-23 PROCEDURE — G8427 DOCREV CUR MEDS BY ELIG CLIN: HCPCS | Performed by: INTERNAL MEDICINE

## 2023-01-23 PROCEDURE — 99214 OFFICE O/P EST MOD 30 MIN: CPT | Performed by: INTERNAL MEDICINE

## 2023-01-23 PROCEDURE — G8510 SCR DEP NEG, NO PLAN REQD: HCPCS | Performed by: INTERNAL MEDICINE

## 2023-01-23 PROCEDURE — 3077F SYST BP >= 140 MM HG: CPT | Performed by: INTERNAL MEDICINE

## 2023-01-23 PROCEDURE — G8417 CALC BMI ABV UP PARAM F/U: HCPCS | Performed by: INTERNAL MEDICINE

## 2023-01-23 PROCEDURE — 1101F PT FALLS ASSESS-DOCD LE1/YR: CPT | Performed by: INTERNAL MEDICINE

## 2023-01-23 PROCEDURE — 3078F DIAST BP <80 MM HG: CPT | Performed by: INTERNAL MEDICINE

## 2023-01-23 RX ORDER — HYDROXYZINE 25 MG/1
25 TABLET, FILM COATED ORAL
Qty: 30 TABLET | Refills: 0 | Status: SHIPPED | OUTPATIENT
Start: 2023-01-23 | End: 2023-02-22

## 2023-01-23 RX ORDER — LOSARTAN POTASSIUM 100 MG/1
100 TABLET ORAL DAILY
Qty: 90 TABLET | Refills: 0 | Status: SHIPPED | OUTPATIENT
Start: 2023-01-23 | End: 2023-04-23

## 2023-01-23 NOTE — PROGRESS NOTES
Identified pt with two pt identifiers(name and ). Chief Complaint   Patient presents with    Hospital Follow Up     Patient was seen in the ER for elevated blood pressure she was seen at [facility] and kept overnight. Health Maintenance Due   Topic    Pneumococcal 65+ years (1 - PCV)    Eye Exam Retinal or Dilated     Shingles Vaccine (1 of 2)    COVID-19 Vaccine (3 - Booster for Moderna series)    Foot Exam Q1     Medicare Yearly Exam     Flu Vaccine (1)       Wt Readings from Last 3 Encounters:   23 206 lb (93.4 kg)   23 210 lb (95.3 kg)   23 211 lb (95.7 kg)     Temp Readings from Last 3 Encounters:   23 97.2 °F (36.2 °C) (Temporal)   23 98.4 °F (36.9 °C) (Oral)   01/15/23 98.6 °F (37 °C)     BP Readings from Last 3 Encounters:   23 (!) 142/78   23 139/63   01/15/23 128/77     Pulse Readings from Last 3 Encounters:   23 60   01/15/23 67   23 91         Learning Assessment:  :     Learning Assessment 2017   PRIMARY LEARNER Patient Patient   HIGHEST LEVEL OF EDUCATION - PRIMARY LEARNER  SOME COLLEGE -   BARRIERS PRIMARY LEARNER NONE -   PRIMARY LANGUAGE ENGLISH ENGLISH   LEARNER PREFERENCE PRIMARY READING LISTENING   ANSWERED BY patient patient   RELATIONSHIP SELF SELF       Depression Screening:  :     3 most recent PHQ Screens 2023   Little interest or pleasure in doing things Not at all   Feeling down, depressed, irritable, or hopeless Not at all   Total Score PHQ 2 0       Fall Risk Assessment:  :     Fall Risk Assessment, last 12 mths 2023   Able to walk? Yes   Fall in past 12 months? 0   Do you feel unsteady? 1   Are you worried about falling 1   Is TUG test greater than 12 seconds? 0   Is the gait abnormal? 1   Number of falls in past 12 months 0       Abuse Screening:  :     Abuse Screening Questionnaire 2023 2023 3/28/2022 2021 2020 2019 2017   Do you ever feel afraid of your partner?  N N N N N N N   Are you in a relationship with someone who physically or mentally threatens you? N N N N N N N   Is it safe for you to go home? Y Y Y Y Y Y Y       Coordination of Care Questionnaire:  :     1) Have you been to an emergency room, urgent care clinic since your last visit? yes   Hospitalized since your last visit? yes             2) Have you seen or consulted any other health care providers outside of [address] [place] [address] since your last visit? no  (Include any pap smears or colon screenings in this section.)    3) Do you have an Advance Directive on file? yes  Are you interested in receiving information about Advance Directives? no    Patient is accompanied by daughter I have received verbal consent from [employer] to discuss any/all medical information while they are present in the room. 4.  For patients aged 39-70: Has the patient had a colonoscopy / FIT/ Cologuard? NA - based on age      If the patient is female:    11. For patients aged 41-77: Has the patient had a mammogram within the past 2 years? NA - based on age or sex      10. For patients aged 21-65: Has the patient had a pap smear?  NA - based on age or sex all other ROS negative except as per HPI

## 2023-01-23 NOTE — PROGRESS NOTES
Transitional Care Management Progress Note    Patient: Anastacia Castillo  : 1940  PCP: Awais Ferrer MD    Date of office visit: 2023   Date of admission: 23  Date of discharge: 1/15/23  Hospital: [facility]    Call initiated w/i 2 business dates of discharge: Yes   Date of the most recent call to the patient: 2023  8:05 AM      Assessment/Plan:   Diagnoses and all orders for this visit:    1. Primary hypertension  -     losartan (COZAAR) 100 mg tablet; Take 1 Tablet by mouth daily for 90 days.  -     METABOLIC PANEL, COMPREHENSIVE; Future    2. Itching  -     hydrOXYzine HCL (ATARAX) 25 mg tablet; Take 1 Tablet by mouth nightly as needed for Itching for up to 30 days. 3. Multiple joint pain  -     CRP, HIGH SENSITIVITY; Future  -     SED RATE (ESR); Future    4. Essential (primary) hypertension   -     CRP, HIGH SENSITIVITY; Future    5. Hospital discharge follow-up  -     [facility] [address] MEDS RECONCILED W/CURRENT MED LIST    6. Encounter for screening mammogram for malignant neoplasm of breast  -     [facility] MAMMO BI SCREENING INCL CAD; Future    I have discussed the diagnosis with the patient and the intended treatment plan as seen in the above orders. The patient has received an after-visit summary and questions were answered concerning future plans. Asked to return should symptoms worsen or not improve with treatment. Any pending labs and studies will be relayed to patient when they become available. Pt verbalizes understanding of plan of care and denies further questions or concerns at this time. Follow-up and Dispositions    Return in about 4 weeks (around 2023), or if symptoms worsen or fail to improve, for after she see's cardiology. Subjective:   Anastacia Castillo is a 80 y.o. female presenting today for follow-up after hospital discharge. This encounter and supporting documentation was reviewed if available.   Medication reconciliation was performed today.  The main problem requiring admission was HYPERTENSIVE CRISIS. Complications during admission: none    Hospital course  Jonathan Guaman is a 80 y.o. female with a PMH of HTN, GERD, glaucoma, DMII with neuropathy, and osteoarthritis who is admitted for hypertensive emergency with severe headache. Patient was admitted on 1/14/2023 and discharged on 01/15/2023. HTN Emergency: Resolved. Imaging and work up were unremarkable. CT head neg.  - Continue home Losartan 50 mg daily. - Continue monitoring outpatient and adjust regimen as needed. Headaches: Concern for  occipital neuralgia and possible tension HA component. Inflammatory markers mildly elevated. Evaluated by Neurology who ordered CTA Head-Neck today to evaluate for any relevant vascular abnormalities (aneurysm vs dissection). Imaging were unrevealing for such findings. - Discharge with Fioricet and Medrol dose pack  - Follow up with Neurology outpatients if HA continue. Bilat shoulder pain:  subacute, trapezius tense/tender. Spurling neg. No radicular symptoms. CT neck shows mild foraminal stenosis C3-C7.   - Referred to [facility] medicine Clinic at [facility] for trigger point injections. - Continue PT outpatient      Elevated inflammatory markers: Mild elevation, Sed rate 13 and CRP 0.67   - Consider rechecking outpatient and can consider referral to Rheumatology for RA work up. Lower extremity Spasms: Chronic.   - Tizanidine 2mg nightly     Pulmonary Hypertension: found incidentally on imaging, follow up outpatient     DM: Very well controlled, hold off Glipizide (Beer's list), consider Metformin vs diet controlled         Lab Results   Component Value Date/Time     Hemoglobin A1c 6.2 (H) 01/15/2023 06:21 AM      Itching: Chronic, full body, worse on back. Utilizes lotions for dry skin and prn benadryl and atarax at home.   Discontinue Benadryl and Hydroxyzine   -Start Zyrtec daily      HLD:Continue home lipitor 40mg daily Glaucoma:Continue home latanaprost drops     Sinus Bradycardia: Follows with Dr. Kathrin Daley. Appt on 2/1. Discussions for pacemaker insertion  - Followed with Cards outpatient      GERD: Continue  prilosec 20mg daily     Vitamin Deficiencies: Labs drawn outpatient on 1/11 to work up fatigue  - Continue vitamin C, Vitamin D, and Vitamin B12 supplementation     Interval history/Current status: since going home, she is feeling better. Increased her Losartan to 75 mgs daily. However, probably needs 100 mgs. Changes and Discussions: Will stop her Glipizide due to HBA1C in pre-diabetic range. Prevent hypoglycemic event. She can be on either Hydroxyzine or Ondansetron, but cannot be on both due to QTc interval prolongation. She would prefer to have the Hydroxyzine prn itching. Since her visit in the ER/Hospital, she has been seen by ortho last Thursday and had several VINITA shots over her upper back and shoulder. This has relieved a great deal of the pain and tenderness in the shoulders. Persistently bradycardia with HR in the mid-50's. Not symptomatic. Has appointment with cardiologist for evaluation of possible pacemaker. She would also like to get her mammogram due to her older sister had late onset breast cancer. Admitting symptoms have: improved    Medications marked \"taking\" at this time:  Prior to Admission medications    Medication Sig Start Date End Date Taking? Authorizing Provider   hydrOXYzine HCL (ATARAX) 25 mg tablet Take 1 Tablet by mouth nightly as needed for Itching for up to 30 days. 1/23/23 2/22/23 Yes Humphrey Alcaraz MD   losartan (COZAAR) 100 mg tablet Take 1 Tablet by mouth daily for 90 days. 1/23/23 4/23/23 Yes Humphrey Alcaraz MD   cetirizine (ZYRTEC) 10 mg tablet Take 1 Tablet by mouth daily for 30 days. Indications: hives 1/15/23 2/14/23 Yes Arpita Barron MD   tiZANidine (ZANAFLEX) 2 mg tablet Take 1 Tablet by mouth nightly as needed for Muscle Spasm(s).  1/15/23  Yes Saint Solders, MD   methylPREDNISolone (MEDROL DOSEPACK) 4 mg tablet Take 1 Tablet by mouth Specific Days and Specific Times. 1/15/23  Yes Saint Solders, MD   pregabalin (LYRICA) 50 mg capsule TAKE 1 CAPSULE BY MOUTH ONCE DAILY AT NIGHT . DO NOT EXCEED 1 CAPSULE PER 24 HOURS 12/5/22  Yes Shira Ponce MD   DULoxetine (CYMBALTA) 60 mg capsule Take 1 capsule by mouth once daily for 30 days 7/26/22  Yes Shira Ponce MD   furosemide (LASIX) 20 mg tablet TAKE 1 TABLET BY MOUTH ONCE DAILY AS NEEDED FOR  PAIN 7/19/22  Yes Shira Ponce MD   alendronate (FOSAMAX) 70 mg tablet TAKE 1 TABLET BY MOUTH ONCE A WEEK IN THE MORNING BEFORE FIRST MEAL, BEVERAGE, OR MEDICATION OF THE DAY 2/8/22  Yes Provider, Historical   aspirin 81 mg chewable tablet Take 81 mg by mouth daily. Yes Provider, Historical   mv,calcium,min/iron/folic/vitK (ONE-A-DAY WOMEN'S COMPLETE PO) Take 1 Tab by mouth daily. Yes Provider, Historical   ketoconazole (NIZORAL) 2 % topical cream Apply  to affected area daily. 1/28/21  Yes Shira Ponce MD   Nystop powder APPLY 1 APPLICATION OF POWDER TOPICALLY TO AFFECTED AREA 4 TIMES DAILY UNTIL RASH RESOLVES 12/24/20  Yes Shira Ponce MD   Nebulizers (Sidestream) [facility]  8/28/20  Yes Provider, Historical   cholecalciferol (VITAMIN D3) (1000 Units /25 mcg) tablet Take 1,000 Units by mouth daily. Yes Provider, Historical   cyanocobalamin 1,000 mcg tablet Take 1,000 mcg by mouth daily. Yes Provider, Historical   ascorbic acid, vitamin C, (VITAMIN C) 250 mg tablet Take 250 mg by mouth daily.    Yes Provider, Historical   lancets misc accu-chek softclix lancets Check blood sugar daily E11.9 10/10/19  Yes Shira Ponce MD   glucose blood VI test strips (BLOOD GLUCOSE TEST) strip Accu-chek alexei plus test strips Test blood sugar once daily E11.9 10/10/19  Yes Shira Ponce MD   Calcium Carbonate-Vit D3-Min 600 mg calcium- 400 unit tab Take 1 pill 2 x daily  Patient taking differently: Take 1 Tablet by mouth daily. 10/31/17  Yes Humphrey Alcaraz MD   latanoprost (XALATAN) 0.005 % ophthalmic solution Administer 1 Drop to both eyes nightly. Yes Provider, Historical   butalbital-acetaminophen-caffeine (FIORICET, ESGIC) -40 mg per tablet Take 1 Tablet by mouth every six (6) hours as needed for Headache (moderate to severe headache.) for up to 30 days. Patient not taking: Reported on 1/23/2023 1/15/23 2/14/23  Arpita Barron MD   fluticasone propionate (FLONASE) 50 mcg/actuation nasal spray 2 Sprays by Both Nostrils route daily. Indications: nasal congestion  Patient not taking: Reported on 1/23/2023 1/15/23   Arpita Barron MD   atorvastatin (LIPITOR) 40 mg tablet Take 1 Tab by mouth nightly for 90 days. 11/17/20 3/28/22  Humphrey Alcaraz MD   oxybutynin (DITROPAN) 5 mg tablet Take 1 Tab by mouth three (3) times daily for 270 days. Patient taking differently: Take 5 mg by mouth three (3) times daily as needed. 1/9/18 3/28/22  Humphrey Alcaraz MD        ROS:  General ROS: negative  Ophthalmic ROS: negative  Respiratory ROS: no cough, shortness of breath, or wheezing  Cardiovascular ROS: no chest pain or dyspnea on exertion  Musculoskeletal ROS: negative  Neurological ROS: no TIA or stroke symptoms   10 point ROS was negative. HISTORICAL  PMH, PSH, FHX, SOCHX, ALLERGIES and MEDICATIONS were reviewed and updated today.     Objective:   Visit Vitals  BP (!) 142/78 (BP 1 Location: Right upper arm, BP Patient Position: Sitting, BP Cuff Size: Adult)   Pulse (!) 54   Temp 97.2 °F (36.2 °C) (Temporal)   Resp 16   Ht 5' 3\" (1.6 m)   Wt 206 lb (93.4 kg)   SpO2 97%   BMI 36.49 kg/m²        Physical Examination: General appearance - alert, well appearing, and in no distress and overweight  Mental status - alert, oriented to person, place, and time  Chest - clear to auscultation, no wheezes, rales or rhonchi, symmetric air entry  Heart - bradycardia, regular rhythm, normal S1, S2, no murmurs, rubs, clicks or gallops  Back exam - full range of motion, no tenderness, palpable spasm or pain on motion  Neurological - alert, oriented, normal speech, no focal findings or movement disorder noted  Musculoskeletal - no muscular tenderness noted, full range of motion without pain  Extremities - peripheral pulses normal, no pedal edema, no clubbing or cyanosis  Skin - normal coloration and turgor, no rashes, no suspicious skin lesions noted       We discussed the expected course, resolution and complications of the diagnosis(es) in detail. Medication risks, benefits, costs, interactions, and alternatives were discussed as indicated. I advised her to contact the office if her condition worsens, changes or fails to improve as anticipated. She expressed understanding with the diagnosis(es) and plan.      Vincenzo Pickens MD  [facility]  01/23/23

## 2023-01-24 ENCOUNTER — HOME CARE VISIT (OUTPATIENT)
Dept: HOME HEALTH SERVICES | Facility: HOME HEALTH | Age: 83
End: 2023-01-24
Payer: MEDICARE

## 2023-01-24 ENCOUNTER — HOME CARE VISIT (OUTPATIENT)
Dept: SCHEDULING | Facility: HOME HEALTH | Age: 83
End: 2023-01-24
Payer: MEDICARE

## 2023-01-24 ENCOUNTER — TELEPHONE (OUTPATIENT)
Dept: FAMILY MEDICINE CLINIC | Age: 83
End: 2023-01-24

## 2023-01-24 ENCOUNTER — PATIENT OUTREACH (OUTPATIENT)
Dept: CASE MANAGEMENT | Age: 83
End: 2023-01-24

## 2023-01-24 DIAGNOSIS — I10 PRIMARY HYPERTENSION: Primary | ICD-10-CM

## 2023-01-24 PROCEDURE — 400018 HH-NO PAY CLAIM PROCEDURE

## 2023-01-24 PROCEDURE — G0151 HHCP-SERV OF PT,EA 15 MIN: HCPCS

## 2023-01-24 RX ORDER — AMLODIPINE BESYLATE 5 MG/1
5 TABLET ORAL DAILY
Qty: 30 TABLET | Refills: 5 | Status: SHIPPED | OUTPATIENT
Start: 2023-01-24

## 2023-01-24 NOTE — Clinical Note
thank you ,, Dr. Jason Jenkins will sign for MSW  Cher Tolliver, PT  ----- Message -----  From: Hugo Louise MD  Sent: 2023  10:03 PM EST  To: Yeny Herndon PT      For these needs, should this order come from their primary care physician? I saw her in sports medicine clinic. I can order if this is what the patient desire, but her PCP is the one following her for her medical needs. ----- Message -----  From: Nuris Gordon, PT  Sent: 2023   4:00 PM EST  To: MD Louise Clinton,  Mrs. Yaz Cohen was evaluated for PT service and we will follow your recommendations of visits 2w4 then reassess. On admission the following were noted: CAM (deliium): neg, BIMS (recall) 15/15; PHQ 13/27 indicating moderate depression. I would like to obtain an order for MSW to assist her with community resources as she is struggling emotionally and financially with several issues;    Thank you for your referral,  Cher Tolliver, PT

## 2023-01-24 NOTE — PROGRESS NOTES
Care Transitions Follow Up Call    Challenges to be reviewed by the provider   Additional needs identified to be addressed with provider: no         Method of communication with provider : none    Care Transition Nurse (CTN) contacted the patient by telephone to follow up after admission on 23. Verified name and  with patient as identifiers. Addressed changes since last contact: home health care- referral -Dr. Hossein Vaca  to PT 23  medications- 23 PCP, start Hydroxyzine, Losartan  labs- 23 Dr. Favian Kaufman  Follow up appointment completed? yes. Was follow up appointment scheduled within 7 days of discharge? yes. Patients top risk factors for readmission: medical condition-HTN, HA    Interventions to address risk factors: Scheduled appointment with PCP-23 and Scheduled appointment with Specialist-23    [facility] follow up appointment(s):   Future Appointments   Date Time Provider Pierre Andrews   2023  7:00 PM [facility]   2023 To Be Determined [facility]   2023 10:20 AM Shavonne Miller MD [facility] BS AMB   2023 To Be Determined Will Moreno Akil 10   2023  1:20 PM Sharif Cespedes MD [facility] BS AMB   2023  2:30 PM [address] [place] [address] [address] [facility]   2/3/2023 To Be Determined [facility]   2023 To Be Determined [place] [address] [facility]   2/10/2023 To Be Determined [place] [address] [facility]   2023 To Be Determined [place] [address] [facility]   2023 To Be Determined [facility] [address]   2023  8:00 AM [facility]   3/1/2023 10:30 AM Lm Merrill MD PMA BS AMB   3/9/2023  1:00 PM Joellen Ballard MD [facility] BS AMB       CTN provided contact information for future needs. Plan for follow-up call in 5-7 days based on severity of symptoms and risk factors.   Plan for next call: self management-HTN       Goals Addressed                   This Visit's Progress     Prevent complications post hospitalization. 1/16   Pt.will attend  follow ups with pcp and specialist.   -PCPGia MD (Pt.dtr.will schedule appt). Ena Huffman MD (dtr.will schedule if pt.continue with HA)  -Amaya Bah MD (Pt.dtr.will schedule appt). -CardiologyCORNEL MD 2/1/23 @ 1:20 pm  -CTN provided pt. information [facility] [phone]) explain briefly type of service; contact information provided, f/u with pt.in 5-7 days. -MC       B/p: Pt.will monitor b/p daily, keep record of reading to present to pcp at next follow up appt. (No b/p to reports at this time); follow up with pt.in 5-7 days, pt./dtr.has CTN contact information, call with questions or concerns. -MC    (HA/ MIGRAINES)  Teach Back:  Pt.will reports symptoms to pcp;  HA, throbbing or pounding feeling one-sided, they may occur anywhere on the head, neck and face -- or all over, associated with sensitivity to light, noise and/or smells, nausea  that worsens with activity or unrelieved with medication; symptoms such as numbness, confusion, trouble speaking, vertigo (spinning dizziness) and other strokelike neurological symptoms, pt.will go to ED for eval. CTN will follow up with pt.in 5-7 days. -[facility]    1/24  Pt.will attend  follow ups with pcp and specialist.   -PCPGia MD attend appt. on 1/23/23  -Ena Huffman MD (dtr.will schedule if pt.continue with HA)  -Amaya Bah MD seen on 1/19/23  Upcoming appts:  -CORNEL Monahan MD 2/1/23 @ 1:20 pm  -BS Sleep Disorder 1/30/23 @ 10:20 am   -ECHO-Cardiology/[facility] 2/20/23 @ 8 am  -BS HH PT 1/24/23  -[facility] service avail. as needed, CTN  f/u with pt.in 5-7 days. -MC       B/p: Pt.will monitor b/p daily, keep record of reading to present to pcp at next follow up appt.; notify pcp consistent readings >/= 150/90.  Pt.dtr.reports pt.b[facility]p today[facility] received order administer amlodipine 5 mg today and 5 mg in am, pt.reports she is worried about her sister who is very ill, reports which contributes to her elevated b/p. CTN will  follow up with pt.in 5-7 days, pt./dtr.has CTN contact information, call with questions or concerns. -MC    (HA/ MIGRAINES) Pt.reports no c/o HA or s/s listed below,   Teach Back:  Pt.will reports symptoms to pcp;  HA, throbbing or pounding feeling one-sided, they may occur anywhere on the head, neck and face -- or all over, associated with sensitivity to light, noise and/or smells, nausea  that worsens with activity or unrelieved with medication; symptoms such as numbness, confusion, trouble speaking, vertigo (spinning dizziness) and other strokelike neurological symptoms, pt.will go to ED for eval. CTN will follow up with pt.in 5-7 days. -[address]

## 2023-01-24 NOTE — TELEPHONE ENCOUNTER
Good morning. Pt's daughter called and requesting that the new lab orders for C Reactive Protein and SED Rate (ESR) are written for pt to complete at [employer] at [facility], phone number [phone], fax number is [phone]. If you have any questions, pt's daughter contact number is [phone].

## 2023-01-24 NOTE — TELEPHONE ENCOUNTER
[facility] called, Ms. Venkatesh Perea, and they are at patients house and patient has a blood pressure 170/100. She is concerned about patients blood pressure.  I asked if patient has taken Losartan today and patient said that she took it at [address] [place] [address].     Ms. Venkatesh Perea [phone]

## 2023-01-24 NOTE — Clinical Note
,  Mrs. Nataliya Mclean was evaluated for PT service and we will follow your recommendations of visits 2w4 then reassess. On admission the following were noted: CAM (deliium): neg, BIMS (recall) 15/15; PHQ 13/27 indicating moderate depression. I would like to obtain an order for MSW to assist her with community resources as she is struggling emotionally and financially with several issues;    Thank you for your referral,  Storm Moreno, PT

## 2023-01-24 NOTE — Clinical Note
Thank you . Since you are the referring physician I have to go to you first but I will contact  who also saw her recently and will request MSW. Brittany Bronson, PT  ----- Message -----  From: Delroy Bansal MD  Sent: 1/26/2023  10:03 PM EST  To: Dano Caldwell PT      For these needs, should this order come from their primary care physician? I saw her in sports medicine clinic. I can order if this is what the patient desire, but her PCP is the one following her for her medical needs. ----- Message -----  From: Bola Snow PT  Sent: 1/25/2023   4:00 PM EST  To: MD Louise Beyer,  Mrs. Tomeka Damian was evaluated for PT service and we will follow your recommendations of visits 2w4 then reassess. On admission the following were noted: CAM (deliium): neg, BIMS (recall) 15/15; PHQ 13/27 indicating moderate depression. I would like to obtain an order for MSW to assist her with community resources as she is struggling emotionally and financially with several issues;    Thank you for your referral,  Brittany Bronson, PT

## 2023-01-25 VITALS
RESPIRATION RATE: 14 BRPM | SYSTOLIC BLOOD PRESSURE: 170 MMHG | TEMPERATURE: 98.2 F | DIASTOLIC BLOOD PRESSURE: 100 MMHG | OXYGEN SATURATION: 98 % | HEART RATE: 60 BPM

## 2023-01-25 NOTE — HOME HEALTH
Skilled reason for admission/summary of clinical condition: 81 yo female with chronic pain referred by Sports Medicine physician for PT; injections received in MD office in back and yodit shoulders for lumbar spondylosis without myelopathy or radiculopathy and OA; PMH significant for: DM2, HTN, severe obesity, glaucoma, RA, DANIELA, GERD, elevated cholesterol  Diagnosis: chronic pain  Subjective: pt states her shoulder and back pain much improved since injections in MD office  Caregiver: daughter;  Caregiver assists with: ADLs, IADLs, meds, transportation; Caregiver unable to assist with: n/a . Caregiver is available: 24/7; Caregiver is present at this visit and did participate with clinician. Medications reconciled and all medications are available in the home this visit. The following education was provided regarding medications: medication interactions and look alike medications: n/a . Patient/CG able to demonstrate knowledge through teach back with 100 percent accuracy. Medications are effective at this time. MD notified of any discrepancies/medication interactions  n/a . A list of reconciled medications has been given to the patient/caregiver and a copy has been uploaded to media. YES; pt/cg instructed in use of high risk med:  ANTICOAGULANTS - Aspirin  Instructed pt/CG can select appropriate dose ( Medication/dose/frequency). No ASA or NSAIDS unless ordered by MD.  S & S excessive anticoagulation are: Pink/red urine, swelling/pain of joints,  tarry stools, unusual/excessive bruising or bleeding. Reviewed sharps precautions  (No razors, soft toothbrush, etc.). Instructed  that falls, hitting head or bleeding that won't stop requires immediate medical attention. Home health supplies by type and quantity ordered/delivered this visit include: n/a  Patient/caregiver instructed on plan of care and are agreeable to plan of care at this time.     Clinician reviewed orientation to home health booklet with patient/caregiver including agency phone number, agency complaint process, state hotline number, as well as joint commission's quality hotline number. Consent forms signed. YES    Patient at risk for falls   YES   Recommended requesting PT/OT orders due to fall risk   done   Patient response to recommended requesting of PT/OT orders:     Discharge planning discussed with patient and caregiver. Discharge planning as follows: pt to remain at home with assist of caregiver and guidance from MD; Patient/caregiver did verbalize agreement with discharge planning. Clinical Assessment (What this means for the patient overall and need for ongoing skilled care): pt lives with daughter in spacious one story home that is clean and free of clutter; there is a ramp to exit; there are 6 steps down from main floor to den that patient uses occasionally; pt is limited to short distance ambulation in her home with [facility]; she reports severe neuropathic pain in both feet; yodit shoulder and back pain minimal since injections; pt is ind in bed mobility and all household transfers; she has 3 pillow orthopnea; she is a high risk for falls as evidenced by [facility] [employer] of [facility] and [place] 17/28; BP was high during evaluation and  was contacted; pt saw her yesterday; message left; all ex deferred today due to BP and pt instructed to rest the remainder of the day until MD calls back; recommend PT 2w4 for ther ex, HEP, balance training and gait training; rehab potential is good to achieve goals    Written Teaching Material Utilized: fall prevention in admission handbook    Specific plan for next visit: initiate ther ex for shoulders and LEs    Plan of care and admission to home health status called to attending physician.  The following practitioner has agreed to sign the ongoing POC  Devonte Walpole, and was notified of the following: admission to home care and will follow his direct orders of 2w4; he was also notified of the following: CAM: neg, BIMS: 15/15, PHQ 13/27 indicating moderate depression; requested MSW consult    Interdisciplinary communication with: n/a       PCP: Reema Alcaraz  Next scheduled doctor appointment:  March 9, 2023;  Patient/Caregiver instructed to keep follow up appointment because lack of follow through with physician appointments could result in discontinuation of home care services for non-compliance. Patient/Caregiver verbalize knowledge of above through teach back with 100 percent accuracy. Emergency Preparedness: Patient/Caregiver instructed in the following:  Have one gallon of water per person for at least 3 days on hand. Have non-perishable food for at least 3 days that do not need to be cooked. Have flashlights and batteries. Charge your cell phones and any back up lithium batteries for your cell phones. Have 3+ days of back up oxygen in your home. Have a phone in your home that is hard wired and does not require power. Have medication for a week in your home. Make sure you have a caregiver in the home to provide care in case your home health nurse cannot get to your house. Make sure you have all of your paperwork i.e. written emergency preparedness plan, Identification, insurance cards, DME phone number, physician and pharmacy phone number, agency phone number, and your medications in one place for easy access and in a zip lock bag to protect them. Take your Admission Handbook, written emergency preparedness plan, written medication list and folder if you relocate in the event of an emergency, if possible. Call agency if you relocate so we can contact you. Patient/Caregiver verbalize knowledge of above through teach back with 100 percent accuracy.

## 2023-01-26 LAB
ALBUMIN SERPL-MCNC: 4.4 G/DL (ref 3.6–4.6)
ALBUMIN/GLOB SERPL: 1.2 {RATIO} (ref 1.2–2.2)
ALP SERPL-CCNC: 110 IU/L (ref 44–121)
ALT SERPL-CCNC: 13 IU/L (ref 0–32)
AST SERPL-CCNC: 15 IU/L (ref 0–40)
BILIRUB SERPL-MCNC: 0.2 MG/DL (ref 0–1.2)
BUN SERPL-MCNC: 18 MG/DL (ref 8–27)
BUN/CREAT SERPL: 21 (ref 12–28)
CALCIUM SERPL-MCNC: 10 MG/DL (ref 8.7–10.3)
CHLORIDE SERPL-SCNC: 101 MMOL/L (ref 96–106)
CO2 SERPL-SCNC: 26 MMOL/L (ref 20–29)
CREAT SERPL-MCNC: 0.86 MG/DL (ref 0.57–1)
CRP SERPL HS-MCNC: 3.41 MG/L (ref 0–3)
EGFR: 67 ML/MIN/1.73
ERYTHROCYTE [SEDIMENTATION RATE] IN BLOOD BY WESTERGREN METHOD: 11 MM/HR (ref 0–40)
ERYTHROCYTE [SEDIMENTATION RATE] IN BLOOD BY WESTERGREN METHOD: 7 MM/HR (ref 0–40)
GLOBULIN SER CALC-MCNC: 3.6 G/DL (ref 1.5–4.5)
GLUCOSE SERPL-MCNC: 135 MG/DL (ref 70–99)
POTASSIUM SERPL-SCNC: 4.1 MMOL/L (ref 3.5–5.2)
PROT SERPL-MCNC: 8 G/DL (ref 6–8.5)
SODIUM SERPL-SCNC: 141 MMOL/L (ref 134–144)

## 2023-01-26 NOTE — TELEPHONE ENCOUNTER
----- Message from Eris Duque MD sent at 1/26/2023  1:01 PM EST -----  Please let patient know that her labs are stable. Her C-reactive protein is coming down and her sed rate is in the normal range. Follow up with me as discussed. I will see what cardiology has to say as well. Thanks!

## 2023-01-26 NOTE — PROGRESS NOTES
Please let patient know that her labs are stable. Her C-reactive protein is coming down and her sed rate is in the normal range. Follow up with me as discussed. I will see what cardiology has to say as well. Thanks!

## 2023-01-27 ENCOUNTER — HOME CARE VISIT (OUTPATIENT)
Dept: SCHEDULING | Facility: HOME HEALTH | Age: 83
End: 2023-01-27
Payer: MEDICARE

## 2023-01-27 VITALS
DIASTOLIC BLOOD PRESSURE: 62 MMHG | SYSTOLIC BLOOD PRESSURE: 114 MMHG | RESPIRATION RATE: 14 BRPM | HEART RATE: 67 BPM | OXYGEN SATURATION: 98 % | TEMPERATURE: 98.9 F

## 2023-01-27 PROCEDURE — G0151 HHCP-SERV OF PT,EA 15 MIN: HCPCS

## 2023-01-27 NOTE — Clinical Note
Karyna Hawk,  Mrs. Socrates Horton scored 13/27 on the PHQ depression scale indicating moderate depression. She has alot of issues including financial. I am requesting an MSW consult to assist her with community resouces.  prefers that the order come from you. Re: BP Mrs. Socrates Horton has been taking Losartan and Amlodine once a day but took them both in the am at 9:00. Her morning BP today was 84/48; It is now 114/62;  Should she take Losartan in the am and Amlodipine in the pm?  Thank you, Pastor Ames, PT

## 2023-01-27 NOTE — PROGRESS NOTES
Your ESR, which is an inflammatory marker has returned to normal.  This is reassuring. Let's continue with therapy and if you still have should or neck pain, we can reevaluate.

## 2023-01-27 NOTE — Clinical Note
thank you; mwt  ----- Message -----  From: Shira Ponce MD  Sent: 2023   4:35 PM EST  To: Rashad Douglass PT      Timothy Blue,     I agree. Ms. Suzanne Jason should separate her Losartan to the morning and Amlodipine in the pm.   I am happy to get a MSW referral at this time. Currently, I am out of the office and will return on Monday. However, you can have a verbal order from me to start MSW consult. Please let me know what else we can do to help her. Sincerely,   Dr. Janneth Padilla    ----- Message -----  From: Maxx Alaniz PT  Sent: 2023   1:33 PM EST  To: Leah Apgar, MD Georgana Dart,  Mrs. Radha Wang scored 13/27 on the PHQ depression scale indicating moderate depression. She has alot of issues including financial. I am requesting an MSW consult to assist her with community resouces.  prefers that the order come from you. Re: BP Mrs. Radha Wang has been taking Losartan and Amlodine once a day but took them both in the am at 9:00. Her morning BP today was 84/48; It is now 114/62;  Should she take Losartan in the am and Amlodipine in the pm?  Thank you, Charito Taylor, PT

## 2023-01-28 NOTE — HOME HEALTH
Subjective: pt not feeling well today; she is very worried about her sister who is gravely ill and she is also  worried about her electric bill that she cannot pay  Falls since last visit  NO    (if yes complete the Fall Tracking Form and include bsrifallreport):  Caregiver involvement changes: none  Home health supplies by type and quantity ordered/delivered this visit include: n/a    Clinician asked if patient has had any physician contact since last home care visit and patient states: NO  Clinician asked if patient has any new or changed medications and patient states:  YES; Amlodipine started by   If Yes, were medications reconciled?    YES  Was the certifying physician notified of changes in medications? n/a    Clinical assessment (what this visit means for the patient overall and need for ongoing skilled care) and progress or lack of progress towards SPECIFIC goals: pt able to begin HEP for yodit shoulders and lower back; BP improved with new med; MSW order obtained from [facility] for depression management and community resources    Written Teaching Material Utilized: written HEP    Interdisciplinary communication with: Srinivasa Hawley PTA for the purpose of hands off comm    Discharge planning as follows: pt to remain at home with assist of Maria Mahoney and guidance from MD    Specific plan for next visit: continue ther ex for yodit shoulders and low back

## 2023-01-30 ENCOUNTER — OFFICE VISIT (OUTPATIENT)
Dept: SLEEP MEDICINE | Age: 83
End: 2023-01-30
Payer: MEDICARE

## 2023-01-30 VITALS
WEIGHT: 209.9 LBS | OXYGEN SATURATION: 100 % | HEIGHT: 63 IN | HEART RATE: 66 BPM | BODY MASS INDEX: 37.19 KG/M2 | DIASTOLIC BLOOD PRESSURE: 58 MMHG | SYSTOLIC BLOOD PRESSURE: 101 MMHG

## 2023-01-30 DIAGNOSIS — E66.2 HYPOVENTILATION ASSOCIATED WITH OBESITY SYNDROME (HCC): ICD-10-CM

## 2023-01-30 DIAGNOSIS — F32.A ANXIETY AND DEPRESSION: ICD-10-CM

## 2023-01-30 DIAGNOSIS — I10 PRIMARY HYPERTENSION: ICD-10-CM

## 2023-01-30 DIAGNOSIS — Z99.89 OSA ON CPAP: Primary | ICD-10-CM

## 2023-01-30 DIAGNOSIS — F41.9 ANXIETY AND DEPRESSION: ICD-10-CM

## 2023-01-30 DIAGNOSIS — G47.33 OSA ON CPAP: Primary | ICD-10-CM

## 2023-01-30 PROCEDURE — 3078F DIAST BP <80 MM HG: CPT | Performed by: INTERNAL MEDICINE

## 2023-01-30 PROCEDURE — G8427 DOCREV CUR MEDS BY ELIG CLIN: HCPCS | Performed by: INTERNAL MEDICINE

## 2023-01-30 PROCEDURE — G8432 DEP SCR NOT DOC, RNG: HCPCS | Performed by: INTERNAL MEDICINE

## 2023-01-30 PROCEDURE — G8536 NO DOC ELDER MAL SCRN: HCPCS | Performed by: INTERNAL MEDICINE

## 2023-01-30 PROCEDURE — 1090F PRES/ABSN URINE INCON ASSESS: CPT | Performed by: INTERNAL MEDICINE

## 2023-01-30 PROCEDURE — 1101F PT FALLS ASSESS-DOCD LE1/YR: CPT | Performed by: INTERNAL MEDICINE

## 2023-01-30 PROCEDURE — 99204 OFFICE O/P NEW MOD 45 MIN: CPT | Performed by: INTERNAL MEDICINE

## 2023-01-30 PROCEDURE — 1123F ACP DISCUSS/DSCN MKR DOCD: CPT | Performed by: INTERNAL MEDICINE

## 2023-01-30 PROCEDURE — 3074F SYST BP LT 130 MM HG: CPT | Performed by: INTERNAL MEDICINE

## 2023-01-30 PROCEDURE — G8417 CALC BMI ABV UP PARAM F/U: HCPCS | Performed by: INTERNAL MEDICINE

## 2023-01-30 NOTE — PROGRESS NOTES
[address] [place]. [place], [address]  Tel.  [phone]  Fax. [address] [facility] [address]  [place], [address]  Tel.  [phone]  Fax. [phone] [address]  Tel.  [phone]  Fax. [phone]       Rosenda Ceja is a 80y.o. year old female referred by Milton Carlson MD   for evaluation of a sleep disorder. She was accompanied by her daughter Ms. Ottoniel Coppola. ASSESSMENT/PLAN:      ICD-10-CM ICD-9-CM    1. DANIELA on CPAP  G47.33 327.23 POLYSOMNOGRAPHY 1 NIGHT    Z99.89 V46.8       2. Hypoventilation associated with obesity syndrome (HCC)  E66.2 278.03 POLYSOMNOGRAPHY 1 NIGHT      3. Anxiety and depression  F41.9 300.00     F32. A 311       4. Primary hypertension  I10 401.9       5. BMI 37.0-37.9, adult  Z68.37 V85.37           Patient has a history and examination consistent with the diagnosis of sleep apnea. Follow-up and Dispositions    Return for telephone follow-up after testing is completed. * The patient currently has a High Risk for having sleep apnea. STOP-BANG score 6.    * Sleep testing was ordered for initial evaluation. Orders Placed This Encounter    POLYSOMNOGRAPHY 1 NIGHT     Standing Status:   Future     Standing Expiration Date:   4/30/2023     Order Specific Question:   Reason for Exam     Answer:   DANIELA         * She was provided information on sleep apnea including corresponding risk factors and the importance of proper treatment. * Treatment options were reviewed in detail. she would like to proceed with Bi-Level PAP therapy. Patient will be seen in follow-up in 6-8 weeks after PAP setup to gauge treatment response and adherence to therapy. * The patient was counseled regarding proper sleep hygiene, with emphasis on ensuring sufficient total sleep time; safe driving and the benefits of exercise and weight loss. * All of her questions were addressed.     2.  Hypoventilation associated with obesity syndrome (HCC) - Elevated bicarbonate levels on metabolic screen in the absence of any significant cardio-pulmonary problem is suggestive of hypoventilation associated with obesity. EtCO2 levels will be monitored during attended polysomnography, this cannot be done on home sleep apnea testing. 3. Anxiety and Depression -  continue on current regimen, she will continue to monitor her mood and follow up with her care provider for reevaluation/adjustment of medications if warranted. I have reviewed the relationship between mood as it relates to sleep-disordered breathing. 4. Hypertension -  continue on current regimen, she will continue to monitor her BP and follow up with her primary care provider for reevaluation/adjustment of medications if warranted. I have reviewed the relationship between hypertension as it relates to sleep-disordered breathing. 5. Recommended a dedicated weight loss program through appropriate diet and exercise regimen as significant weight reduction has been shown to reduce severity of obstructive sleep apnea. SUBJECTIVE/OBJECTIVE:    Montgomery Severe is an 80 y.o. female referred for evaluation for a sleep disorder. She complains of snoring associated with awakening in the middle of the night because of no specific reason. Patient reports of poor sleep quality which is not restorative, she feels tired and fatigued during the day. Symptoms began several years ago, she was previously diagnosed with DANIELA and prescribed PAP therapy but the device was taken awake due to low adherence, she has not since that time. She reports of difficulties using CPAP was unable to fall asleep with therapy. She usually can fall asleep in 30 minutes. Family or house members note snoring. She denies of symptoms indicative of cataplexy or sleep related hallucinations. She does report of sleep paralysis.     She denies of a history of unusual movements occurring during sleep.    Beau Piña does wake up frequently at night. She is not bothered by waking up too early and left unable to get back to sleep. She actually sleeps about 3 hours at night and wakes up about -2 times during the night. She does not work shifts: Darcie Bloodgood Valentino Nanas indicates she does get too little sleep at night. Her bedtime is 0200. She awakens at 1000. She does take naps. She takes 7 naps a week lasting 1, Hour(s). She has the following observed behaviors: Loud snoring, Twitching of legs or feet; Other (use comments) (vivid dreams). Other remarks:      CO2 20 - 29 mmol/L 31 R      The patient has not undergone diagnostic testing for the current problems. Review of Systems:  Constitutional:  No significant weight loss or weight gain  Eyes:  No blurred vision  CVS:  No significant chest pain  Pulm:  + Significant shortness of breath  GI:  + Significant nausea or vomiting attributed to GERD  :  + Significant nocturia  Musculoskeletal:  No significant joint pain at night  Skin:  + Significant rashes on back has been present for years   Neuro:  No significant dizziness   Psych:  No active mood issues    Sleep Review of Systems: notable for Positive difficulty falling asleep; Positive awakenings at night; Positive perceived regular dreaming; Negative nightmares; Positive  early morning headaches; Positive  memory problems; Negative  concentration issues; Negative caffeine;  Negative alcohol;   Negative history of any automobile or occupational accidents due to daytime drowsiness. [place] Sleepiness Score: 11   and Modified F.O.S.Q. Score Total / 2: 11.5    Visit Vitals  BP (!) 101/58 (BP 1 Location: Left upper arm, BP Patient Position: Sitting)   Pulse 66   Ht 5' 3\" (1.6 m)   Wt 209 lb 14.4 oz (95.2 kg)   SpO2 100%   BMI 37.18 kg/m²    Neck circ.  in \"inches\": 16.5      General:   Alert, oriented, not in acute distress   Eyes:  Anicteric Sclerae; intact EOM's   Nose:  No obvious nasal septum deviation Oropharynx:   Mallampati score 4, thick tongue base, uvula not seen due to low-lying soft palate, narrow tonsilo-pharyngeal pilars, tongue scalloped   Neck:   midline trachea,  no JVD   Chest/Lungs:  symmetrical lung expansion ,clear lung fields on auscultation    CVS:  Normal rate, regular rhythm    Extremities:  No obvious rashes, absent edema    Neuro:  No focal deficits; No obvious tremor    Psych:  Normal eye contact; normal  affect, normal countenance          Pablo Peralta MD, FAASM  Diplomate American Board of Sleep Medicine  Diplomate in Sleep Medicine - ABP    Electronically signed.  01/30/23

## 2023-01-30 NOTE — PATIENT INSTRUCTIONS
217 [facility]., [place]. [place], [address]  Tel.  [phone]  Fax. 100 [facility] 60  [place], [address] [facility]  Tel.  [phone]  Fax. [phone] [address]  Tel.  [phone]  Fax. [phone]     Sleep Apnea: After Your Visit  Your Care Instructions  Sleep apnea occurs when you frequently stop breathing for 10 seconds or longer during sleep. It can be mild to severe, based on the number of times per hour that you stop breathing or have slowed breathing. Blocked or narrowed airways in your nose, mouth, or throat can cause sleep apnea. Your airway can become blocked when your throat muscles and tongue relax during sleep. Sleep apnea is common, occurring in 1 out of 20 individuals. Individuals having any of the following characteristics should be evaluated and treated right away due to high risk and detrimental consequences from untreated sleep apnea:  Obesity  Congestive Heart failure  Atrial Fibrillation  Uncontrolled Hypertension  Type II Diabetes  Night-time Arrhythmias  Stroke  Pulmonary Hypertension  High-risk Driving Populations (pilots, truck drivers, etc.)  Patients Considering Weight-loss Surgery    How do you know you have sleep apnea? You probably have sleep apnea if you answer 'yes' to 3 or more of the following questions:  S - Have you been told that you Snore? T - Are you often Tired during the day? O - Has anyone Observed you stop breathing while sleeping? P- Do you have (or are being treated for) high blood Pressure? B - Are you obese (Body Mass Index > 35)? A - Is your Age 48years old or older? N - Is your Neck size greater than 16 inches? G - Are you male Gender? A sleep physician can prescribe a breathing device that prevents tissues in the throat from blocking your airway. Or your doctor may recommend using a dental device (oral breathing device) to help keep your airway open.  In some cases, surgery may be needed to remove enlarged tissues in the throat. Follow-up care is a key part of your treatment and safety. Be sure to make and go to all appointments, and call your doctor if you are having problems. It's also a good idea to know your test results and keep a list of the medicines you take. How can you care for yourself at home? Lose weight, if needed. It may reduce the number of times you stop breathing or have slowed breathing. Go to bed at the same time every night. Sleep on your side. It may stop mild apnea. If you tend to roll onto your back, sew a pocket in the back of your paCanDiag top. Put a tennis ball into the pocket, and stitch the pocket shut. This will help keep you from sleeping on your back. Avoid alcohol and medicines such as sleeping pills and sedatives before bed. Do not smoke. Smoking can make sleep apnea worse. If you need help quitting, talk to your doctor about stop-smoking programs and medicines. These can increase your chances of quitting for good. Prop up the head of your bed 4 to 6 inches by putting bricks under the legs of the bed. Treat breathing problems, such as a stuffy nose, caused by a cold or allergies. Use a continuous positive airway pressure (CPAP) breathing machine if lifestyle changes do not help your apnea and your doctor recommends it. The machine keeps your airway from closing when you sleep. If CPAP does not help you, ask your doctor whether you should try other breathing machines. A bilevel positive airway pressure machine has two types of air pressureâone for breathing in and one for breathing out. Another device raises or lowers air pressure as needed while you breathe. If your nose feels dry or bleeds when using one of these machines, talk with your doctor about increasing moisture in the air. A humidifier may help.   If your nose is runny or stuffy from using a breathing machine, talk with your doctor about using decongestants or a corticosteroid nasal spray.  When should you call for help? Watch closely for changes in your health, and be sure to contact your doctor if:  You still have sleep apnea even though you have made lifestyle changes. You are thinking of trying a device such as CPAP. You are having problems using a CPAP or similar machine. Where can you learn more? Go to [employer]. Enter F622 in the search box to learn more about \"Sleep Apnea: After Your Visit. \"   © 6799-7010 Healthwise, Incorporated. Care instructions adapted under license by [facility] (which disclaims liability or warranty for this information). This care instruction is for use with your licensed healthcare professional. If you have questions about a medical condition or this instruction, always ask your healthcare professional. Osvaldo Murguia any warranty or liability for your use of this information. PROPER SLEEP HYGIENE    What to avoid  Do not have drinks with caffeine, such as coffee or black tea, for 8 hours before bed. Do not smoke or use other types of tobacco near bedtime. Nicotine is a stimulant and can keep you awake. Avoid drinking alcohol late in the evening, because it can cause you to wake in the middle of the night. Do not eat a big meal close to bedtime. If you are hungry, eat a light snack. Do not drink a lot of water close to bedtime, because the need to urinate may wake you up during the night. Do not read or watch TV in bed. Use the bed only for sleeping and sexual activity. What to try  Go to bed at the same time every night, and wake up at the same time every morning. Do not take naps during the day. Keep your bedroom quiet, dark, and cool. Get regular exercise, but not within 3 to 4 hours of your bedtime. .  Sleep on a comfortable pillow and mattress. If watching the clock makes you anxious, turn it facing away from you so you cannot see the time.   If you worry when you lie down, start a worry book. Well before bedtime, write down your worries, and then set the book and your concerns aside. Try meditation or other relaxation techniques before you go to bed. If you cannot fall asleep, get up and go to another room until you feel sleepy. Do something relaxing. Repeat your bedtime routine before you go to bed again. Make your house quiet and calm about an hour before bedtime. Turn down the lights, turn off the TV, log off the computer, and turn down the volume on music. This can help you relax after a busy day. Drowsy Driving  The [address] [place] [address] Road Traffic Safety Administration cites drowsiness as a causing factor in more than 922,252 police reported crashes annually, resulting in 76,000 injuries and 1,500 deaths. Other surveys suggest 55% of people polled have driven while drowsy in the past year, 23% had fallen asleep but not crashed, 3% crashed, and 2% had and accident due to drowsy driving. Who is at risk? Young Drivers: One study of drowsy driving accidents states that 55% of the drivers were under 25 years. Of those, 75% were male. Shift Workers and Travelers: People who work overnight or travel across time zones frequently are at higher risk of experiencing Circadian Rhythm Disorders. They are trying to work and function when their body is programed to sleep. Sleep Deprived: Lack of sleep has a serious impact on your ability to pay attention or focus on a task. Consistently getting less than the average of 8 hours your body needs creates partial or cumulative sleep deprivation. Untreated Sleep Disorders: Sleep Apnea, Narcolepsy, R.L.S., and other sleep disorders (untreated) prevent a person from getting enough restful sleep. This leads to excessive daytime sleepiness and increases the risk for drowsy driving accidents by up to 7 times. Medications / Alcohol: Even over the counter medications can cause drowsiness.  Medications that impair a drivers attention should have a warning label. Alcohol naturally makes you sleepy and on its own can cause accidents. Combined with excessive drowsiness its effects are amplified. Signs of Drowsy Driving:   * You don't remember driving the last few miles   * You may drift out of your bonnie   * You are unable to focus and your thoughts wander   * You may yawn more often than normal   * You have difficulty keeping your eyes open / nodding off   * Missing traffic signs, speeding, or tailgating  Prevention-   Good sleep hygiene, lifestyle and behavioral choices have the most impact on drowsy driving. There is no substitute for sleep and the average person requires 8 hours nightly. If you find yourself driving drowsy, stop and sleep. Consider the sleep hygiene tips provided during your visit as well. Medication Refill Policy: Refills for all medications require 1 week advance notice. Please have your pharmacy fax a refill request. We are unable to fax, or call in \"controled substance\" medications and you will need to pick these prescriptions up from our office. [employer] Activation    Thank you for requesting access to [employer]. Please follow the instructions below to securely access and download your online medical record. [employer] allows you to send messages to your doctor, view your test results, renew your prescriptions, schedule appointments, and more. How Do I Sign Up? In your internet browser, go to [email or web address]. Click on the First Time User? Click Here link in the Sign In box. You will see the New Member Sign Up page. Enter your [employer] Access Code exactly as it appears below. You will not need to use this code after youve completed the sign-up process. If you do not sign up before the expiration date, you must request a new code. [employer] Access Code:  Activation code not generated  Current [employer] Status: Active (This is the date your [employer] access code will )    Enter the last four digits of your Social Security Number (xxxx) and Date of Birth (mm/dd/yyyy) as indicated and click Submit. You will be taken to the next sign-up page. Create a Master Route ID. This will be your Master Route login ID and cannot be changed, so think of one that is secure and easy to remember. Create a [employer] password. You can change your password at any time. Enter your Password Reset Question and Answer. This can be used at a later time if you forget your password. Enter your e-mail address. You will receive e-mail notification when new information is available in [address] Click Sign Up. You can now view and download portions of your medical record. Click the [employer] link to download a portable copy of your medical information. Additional Information    If you have questions, please call [phone]. Remember, [employer] is NOT to be used for urgent needs. For medical emergencies, dial 911.

## 2023-01-31 ENCOUNTER — HOME CARE VISIT (OUTPATIENT)
Dept: SCHEDULING | Facility: HOME HEALTH | Age: 83
End: 2023-01-31
Payer: MEDICARE

## 2023-01-31 PROCEDURE — G0157 HHC PT ASSISTANT EA 15: HCPCS

## 2023-02-02 ENCOUNTER — HOME CARE VISIT (OUTPATIENT)
Dept: SCHEDULING | Facility: HOME HEALTH | Age: 83
End: 2023-02-02
Payer: MEDICARE

## 2023-02-02 VITALS
OXYGEN SATURATION: 98 % | DIASTOLIC BLOOD PRESSURE: 74 MMHG | HEART RATE: 88 BPM | TEMPERATURE: 97.2 F | WEIGHT: 204 LBS | OXYGEN SATURATION: 97 % | DIASTOLIC BLOOD PRESSURE: 70 MMHG | HEART RATE: 97 BPM | TEMPERATURE: 97.5 F | SYSTOLIC BLOOD PRESSURE: 140 MMHG | SYSTOLIC BLOOD PRESSURE: 140 MMHG | BODY MASS INDEX: 36.14 KG/M2 | RESPIRATION RATE: 17 BRPM | RESPIRATION RATE: 17 BRPM

## 2023-02-02 PROCEDURE — G0155 HHCP-SVS OF CSW,EA 15 MIN: HCPCS

## 2023-02-02 PROCEDURE — G0157 HHC PT ASSISTANT EA 15: HCPCS

## 2023-02-02 NOTE — HOME HEALTH
MARIBELSW completed an initial assessment with the pt and the daughter at the pts home. The pt is an 80year old female with a primary diagnosis of other chronic pain. She states her daughter is able to assist with helping care for her. Pt is concerned as the amount due on her [employer] power bill is very high and she is anxious that her power will be cut off. LMSW educated on energy assistance resources and encouraged family to contact local [facility] program to determine eligibility. Also instructed daughter on completing medical necessity form. Pt tearful as her sister is being placed on hospice and is expected to pass away soon. Pt is hopeful she will be able to go visit with her tomorrow. Provided emotional support and comfort to the pt. Pt and daughter demonstrate understanding of above information with 100% accuracy and were instructed to reach out for any additional needs.

## 2023-02-02 NOTE — HOME HEALTH
Subjective: I am nauseated today. No pain though. I have not had a BM today. Falls since last visit NO(if yes complete the Fall Tracking Form and include bsrifallreport):  Caregiver involvement changes: none  Home health supplies by type and quantity ordered/delivered this visit include: na    Clinician asked if patient has had any physician contact since last home care visit and patient states: NO  Clinician asked if patient has any new or changed medications and patient states:  NO   If Yes, were medications reconciled? N/A reviewed  Was the certifying physician notified of changes in medications? N/A     Clinical assessment (what this visit means for the patient overall and need for ongoing skilled care) and progress or lack of progress towards SPECIFIC goals: Patient with chronic pain which limits safety and amb. Patient with weakness in right LE and side and is at risk for falls. Ther ex for strengthening and gait training to increase safety with HEP in writing intitiated. Written Teaching Material Utilized: HEP for ther ex in supine and sitting. Interdisciplinary communication with: Peterson Malone PT for the purpose of POC collaboration    Discharge planning as follows: Is no longer homebound, Per physician order, Will discharge when the patient has reached their maximum functional potential and maximum safety in their home and When goals are met    Specific plan for next visit: Progress with standing ther ex    Signature deferred due to concerns of Covid 19 in community.

## 2023-02-05 NOTE — HOME HEALTH
Subjective: I am worried about my sister. And my utility bill. I cannot pay it. Falls since last visit NO(if yes complete the Fall Tracking Form and include bsrifallreport):  Caregiver involvement changes: no changes  Home health supplies by type and quantity ordered/delivered this visit include: na    Clinician asked if patient has had any physician contact since last home care visit and patient states: NO  Clinician asked if patient has any new or changed medications and patient states:  NO   If Yes, were medications reconciled? N/A reviewed  Was the certifying physician notified of changes in medications? N/A     Clinical assessment (what this visit means for the patient overall and need for ongoing skilled care) and progress or lack of progress towards SPECIFIC goals: Patient with chronic pain and weakness with increased fall risk. HEP for standing ex initiated today. Patient going to see sister in hospital in [place] as sister on hospice and not expected to live. Gait training deferred. Written Teaching Material Utilized: N/A    Interdisciplinary communication with:  SALINA    Discharge planning as follows: Is no longer homebound, Per physician order, Will discharge when the patient has reached their maximum functional potential and maximum safety in their home and When goals are met    Specific plan for next visit: Progress with HEP and gait and balance for safety. Signature deferred due to concerns of Covid 19 in community.

## 2023-02-06 ENCOUNTER — HOME CARE VISIT (OUTPATIENT)
Dept: HOME HEALTH SERVICES | Facility: HOME HEALTH | Age: 83
End: 2023-02-06
Payer: MEDICARE

## 2023-02-06 NOTE — Clinical Note
Patient's sister passed and her daughter requested week off as they are taking care of arrangements. MD aware.

## 2023-02-08 ENCOUNTER — PATIENT OUTREACH (OUTPATIENT)
Dept: CASE MANAGEMENT | Age: 83
End: 2023-02-08

## 2023-02-08 NOTE — PROGRESS NOTES
Care Transitions Follow Up Call    Challenges to be reviewed by the provider   Additional needs identified to be addressed with provider: no         Method of communication with provider : none    Care Transition Nurse (CTN) contacted the family by telephone to follow up after admission on 23. Verified name and  with family as identifiers. Patients top risk factors for readmission: medical condition-HTN      [facility] follow up appointment(s):   Future Appointments   Date Time Provider Pierre Andrews   2/10/2023 To Be Determined Evia Heimlich [facility]   2023 To Be Determined Evia Heimlich [facility]   2023 To Be Determined Cricket Phillip PT [facility] [address]   2023  8:00 AM [facility]   2023  1:00 PM Brian Akbar MD [facility] BS AMB   2023  9:30 PM BEDROOM [address] [place] [address] SLEEP LAB    3/1/2023 10:30 AM Milton Workman MD PMA BS AMB   3/9/2023  1:00 PM Rosemarie Palomo MD SFFP BS AMB       CTN provided contact information for future needs. Plan for follow-up call in 5-7 days based on severity of symptoms and risk factors. Plan for next call: self management-HTN, migraines       Goals Addressed                   This Visit's Progress     Prevent complications post hospitalization.    Pt.will attend  follow ups with pcp and specialist.   -PCP, Poncho San MD (Pt.dtr.will schedule appt). Kun Hsu MD (dtr.will schedule if pt.continue with HA)  -Family Medicine, Ariel Bolton MD (Pt.dtr.will schedule appt). -Cardiology, SJonathan Maciel MD 23 @ 1:20 pm  -CTN provided pt. information "[employer]"[facility] [email or web address] [phone]) explain briefly type of service; contact information provided, f/u with pt.in 5-7 days. -MC       B/p: Pt.will monitor b/p daily, keep record of reading to present to pcp at next follow up appt.  (No b/p to reports at this time); follow up with pt.in 5-7 days, pt./dtr.has CTN contact information, call with questions or concerns. -MC    (HA/ MIGRAINES)  Teach Back:  Pt.will reports symptoms to pcp;  HA, throbbing or pounding feeling one-sided, they may occur anywhere on the head, neck and face -- or all over, associated with sensitivity to light, noise and/or smells, nausea  that worsens with activity or unrelieved with medication; symptoms such as numbness, confusion, trouble speaking, vertigo (spinning dizziness) and other strokelike neurological symptoms, pt.will go to ED for eval. CTN will follow up with pt.in 5-7 days. -[address]    1/24  Pt.will attend  follow ups with pcp and specialist.   -PCP, Azucena Ortiz MD attend appt. on 1/23/23  -Eduardo Beckwith MD (dtr.will schedule if pt.continue with HA)  -Family Medicine, Abi Estrada MD seen on 1/19/23  Upcoming appts:  -Cardiology, SJonathan Aguilar MD 2/1/23 @ 1:20 pm  -[facility] Sleep Disorder 1/30/23 @ 10:20 am   -ECHO-Cardiology/[facility] 2/20/23 @ 8 am  -[facility] HH PT 1/24/23  -[facility] service avail. as needed, CTN  f/u with pt.in 5-7 days. -MC       B/p: Pt.will monitor b/p daily, keep record of reading to present to pcp at next follow up appt.; notify pcp consistent readings >/= 150/90. Pt.dtr.reports pt.b/p today([facility]100) received order administer amlodipine 5 mg today and 5 mg in am, pt.reports she is worried about her sister who is very ill, reports which contributes to her elevated b/p. CTN will  follow up with pt.in 5-7 days, pt./dtr.has CTN contact information, call with questions or concerns. -MC    (HA/ MIGRAINES) Pt.reports no c/o HA or s/s listed below,   Teach Back:  Pt.will reports symptoms to pcp;  HA, throbbing or pounding feeling one-sided, they may occur anywhere on the head, neck and face -- or all over, associated with sensitivity to light, noise and/or smells, nausea  that worsens with activity or unrelieved with medication; symptoms such as numbness, confusion, trouble speaking, vertigo (spinning dizziness) and other strokelike neurological symptoms, pt.will go to ED for eval. CTN will follow up with pt.in 5-7 days. -    2/8  Pt.dtr.answered reports, pt.just loss her sister and is not up to speaking with with anyone. CTN extended my condolences to pt./family, will follow up with pt.in 1 week. Review chart: pt.attend appt. at [facility] Sleep Disorder 1/30/23. -[address] Wartpeel Counseling:  I discussed with the patient the risks of Wartpeel including but not limited to erythema, scaling, itching, weeping, crusting, and pain.

## 2023-02-09 ENCOUNTER — VIRTUAL VISIT (OUTPATIENT)
Dept: FAMILY MEDICINE CLINIC | Age: 83
End: 2023-02-09
Payer: MEDICARE

## 2023-02-09 ENCOUNTER — TELEPHONE (OUTPATIENT)
Dept: FAMILY MEDICINE CLINIC | Age: 83
End: 2023-02-09

## 2023-02-09 DIAGNOSIS — R35.0 FREQUENT URINATION: ICD-10-CM

## 2023-02-09 DIAGNOSIS — J06.9 URI WITH COUGH AND CONGESTION: Primary | ICD-10-CM

## 2023-02-09 DIAGNOSIS — F43.21 GRIEF REACTION: ICD-10-CM

## 2023-02-09 PROCEDURE — G8536 NO DOC ELDER MAL SCRN: HCPCS | Performed by: INTERNAL MEDICINE

## 2023-02-09 PROCEDURE — 1101F PT FALLS ASSESS-DOCD LE1/YR: CPT | Performed by: INTERNAL MEDICINE

## 2023-02-09 PROCEDURE — 1123F ACP DISCUSS/DSCN MKR DOCD: CPT | Performed by: INTERNAL MEDICINE

## 2023-02-09 PROCEDURE — 99213 OFFICE O/P EST LOW 20 MIN: CPT | Performed by: INTERNAL MEDICINE

## 2023-02-09 PROCEDURE — G8417 CALC BMI ABV UP PARAM F/U: HCPCS | Performed by: INTERNAL MEDICINE

## 2023-02-09 PROCEDURE — G8427 DOCREV CUR MEDS BY ELIG CLIN: HCPCS | Performed by: INTERNAL MEDICINE

## 2023-02-09 PROCEDURE — G8432 DEP SCR NOT DOC, RNG: HCPCS | Performed by: INTERNAL MEDICINE

## 2023-02-09 PROCEDURE — 1090F PRES/ABSN URINE INCON ASSESS: CPT | Performed by: INTERNAL MEDICINE

## 2023-02-09 RX ORDER — OXYBUTYNIN CHLORIDE 5 MG/1
5 TABLET ORAL
Qty: 270 TABLET | Refills: 0 | Status: SHIPPED | OUTPATIENT
Start: 2023-02-09 | End: 2023-05-10

## 2023-02-09 RX ORDER — BENZONATATE 100 MG/1
100 CAPSULE ORAL
Qty: 21 CAPSULE | Refills: 0 | Status: SHIPPED | OUTPATIENT
Start: 2023-02-09 | End: 2023-02-16

## 2023-02-09 RX ORDER — AZITHROMYCIN 250 MG/1
TABLET, FILM COATED ORAL
Qty: 6 TABLET | Refills: 0 | Status: SHIPPED | OUTPATIENT
Start: 2023-02-09 | End: 2023-02-14

## 2023-02-09 RX ORDER — ALPRAZOLAM 0.5 MG/1
0.5 TABLET ORAL
Qty: 10 TABLET | Refills: 0 | Status: SHIPPED | OUTPATIENT
Start: 2023-02-09 | End: 2023-02-14

## 2023-02-09 NOTE — PROGRESS NOTES
Chief Complaint   Patient presents with    URI     Cough, congestin. Anxiety     Due to recent death of her sister. Jaci Orozco, who was evaluated through a synchronous (real-time) audio-video encounter. CONSENT: and/or her healthcare decision maker, is aware that it is a billable service, which includes applicable co-pays, with coverage as determined by her insurance carrier. She provided verbal consent to proceed and patient identification was verified. This visit was conducted pursuant to the emergency declaration under the [address] [place] [address], [address] [place] [address] and the [employer] Act. A caregiver was present when appropriate. Ability to conduct physical exam was limited. The patient was located at home in a state where the provider was licensed to provide care. --Paula Lopez MD on 2/9/2023 at 9:51 AM      Assessment & Plan:   Diagnoses and all orders for this visit:    1. URI with cough and congestion  -     azithromycin (ZITHROMAX) 250 mg tablet; Take 2 tablets today, then take 1 tablet daily  -     benzonatate (Tessalon Perles) 100 mg capsule; Take 1 Capsule by mouth three (3) times daily as needed for Cough for up to 7 days. 2. Frequent urination  -     oxybutynin (DITROPAN) 5 mg tablet; Take 1 Tablet by mouth three (3) times daily as needed for Bladder Spasms for up to 90 days. 3. Grief reaction  -     ALPRAZolam (XANAX) 0.5 mg tablet; Take 1 Tablet by mouth two (2) times daily as needed for Anxiety for up to 5 days. Max Daily Amount: 1 mg. We discussed the expected course, resolution and complications of the diagnosis(es) in detail. Medication risks, benefits, costs, interactions, and alternatives were discussed as indicated. I advised her to contact the office if her condition worsens, changes or fails to improve as anticipated.  She expressed understanding with the diagnosis(es) and plan. Subjective:   83F with multiple medical problems including hypertension, CAD, HLD and T2DM who presents with 2-3 days of worsening cough, URI symptoms and recently lost her last sibling over the weekend. She is grief stricken and feeling unwell. Plans to travel for the  tomorrow and requesting for something to help her nerves. Her daughter will be driving her. She has productive cough, chills, malaise. She has taken the home COVID-19 test x 2 and negative. She is UTD on her flu shots. She denies chest pain. Just the persistent cough and congestion. She also is requesting a refill of the Oxybutynin because the cough is increasing her stress incontinence. HISTORICAL  PMH, PSH, FHX, SOCHX, ALLERGIES and MEDICATIONS were reviewed and updated today. Review of Systems   Constitutional:  Positive for chills and malaise/fatigue. HENT:  Positive for congestion. Respiratory:  Positive for cough and sputum production. Genitourinary:  Positive for urgency. Objective: There were no vitals taken for this visit.      General: alert, cooperative, no distress   Mental  status: normal mood, behavior, speech, dress, motor activity, and thought processes, able to follow commands   HENT: NCAT   Neck: no visualized mass   Resp: no respiratory distress, intermittent productive cough   Neuro: no gross deficits   Skin: no discoloration or lesions of concern on visible areas   Psychiatric: normal affect, consistent with stated mood, no evidence of hallucinations

## 2023-02-09 NOTE — TELEPHONE ENCOUNTER
Pts sister recently passed away and they are going to the  this afternoon and were hoping to get an appt with Dr Anuja Kahn this morning but there was nothing available. She has a cough and congestion and her daughter was hoping Dr Anuja Kahn could call something in for that as well as the pts nerves to calm her down for the . I offered the 1:15pm appt but they'll be at the  by that time.

## 2023-02-10 ENCOUNTER — HOME CARE VISIT (OUTPATIENT)
Dept: HOME HEALTH SERVICES | Facility: HOME HEALTH | Age: 83
End: 2023-02-10
Payer: MEDICARE

## 2023-02-14 ENCOUNTER — HOME CARE VISIT (OUTPATIENT)
Dept: HOME HEALTH SERVICES | Facility: HOME HEALTH | Age: 83
End: 2023-02-14
Payer: MEDICARE

## 2023-02-15 ENCOUNTER — HOME CARE VISIT (OUTPATIENT)
Dept: HOME HEALTH SERVICES | Facility: HOME HEALTH | Age: 83
End: 2023-02-15
Payer: MEDICARE

## 2023-02-15 ENCOUNTER — PATIENT OUTREACH (OUTPATIENT)
Dept: CASE MANAGEMENT | Age: 83
End: 2023-02-15

## 2023-02-15 NOTE — PROGRESS NOTES
Care Transitions Outreach Attempt    Call within 2 business days of discharge: Yes   Attempted to reach patient for transitions of care follow up. Unable to reach patient. Patient: Randal Camargo Patient : 1940 MRN: [id number]    Last Discharge  Street       Date Complaint Diagnosis Description Type Department Provider    23 Hypertension Severe headache . .. ED to Hosp-Admission (Discharged) (ADMIT) [id number] Rosita Murphy MD; Duy Holding... Was this an external facility discharge? No       Noted following upcoming appointments from discharge chart review:   [facility] follow up appointment(s):   Future Appointments   Date Time Provider Pierre Andrews   2023  6:00 PM Deja Bauer [address]   2023  8:00 AM [facility] ECHO MACHINE [address] [facility]   2023  1:00 PM Mary Beth Be MD CAVSF BS AMB   2023  9:30 PM BEDROOM [address] [place] [address] SLEEP LAB    3/1/2023 10:30 AM Carisa Brambila MD PMA BS AMB   3/9/2023  1:00 PM Sana Castrejon MD SFFP BS AMB      Goals Addressed                   This Visit's Progress     Prevent complications post hospitalization.    Pt.will attend  follow ups with pcp and specialist.   -PCP, Kalen Ugarte MD (Pt.dtr.will schedule appt). Kenneth Cox MD (dtr.will schedule if pt.continue with HA)  -Family Medicine, Awais Stack MD (Pt.dtr.will schedule appt). -Cardiology, S. Saritha Rcoha MD 23 @ 1:20 pm  -CTN provided pt. information [employer] [phone]) explain briefly type of service; contact information provided, f/u with pt.in 5-7 days. -MC       B/p: Pt.will monitor b/p daily, keep record of reading to present to pcp at next follow up appt. (No b/p to reports at this time); follow up with pt.in 5-7 days, pt./dtr.has CTN contact information, call with questions or concerns. -    (HA/ MIGRAINES)  Teach Back:  Pt.will reports symptoms to pcp;  HA, throbbing or pounding feeling one-sided, they may occur anywhere on the head, neck and face -- or all over, associated with sensitivity to light, noise and/or smells, nausea  that worsens with activity or unrelieved with medication; symptoms such as numbness, confusion, trouble speaking, vertigo (spinning dizziness) and other strokelike neurological symptoms, pt.will go to ED for eval. CTN will follow up with pt.in 5-7 days. -[facility]    1/24  Pt.will attend  follow ups with pcp and specialist.   -PCP, Farideh Nunn MD attend appt. on 1/23/23  -Susie Mckinnon MD (dtr.will schedule if pt.continue with HA)  -Family Medicine, Brandie Soni MD seen on 1/19/23  Upcoming appts:  -Cardiology, SoJnathan Frye MD 2/1/23 @ 1:20 pm  -BS Sleep Disorder 1/30/23 @ 10:20 am   -ECHO-Cardiology/[facility] 2/20/23 @ 8 am  -BS HH PT 1/24/23  -[facility] service avail. as needed, CTN  f/u with pt.in 5-7 days. -MC       B/p: Pt.will monitor b/p daily, keep record of reading to present to pcp at next follow up appt.; notify pcp consistent readings >/= 150/90. Pt.dtr.reports pt.b[facility]p today[facility]/100) received order administer amlodipine 5 mg today and 5 mg in am, pt.reports she is worried about her sister who is very ill, reports which contributes to her elevated b/p. CTN will  follow up with pt.in 5-7 days, pt./dtr.has CTN contact information, call with questions or concerns. -MC    (HA/ MIGRAINES) Pt.reports no c/o HA or s/s listed below,   Teach Back:  Pt.will reports symptoms to pcp;  HA, throbbing or pounding feeling one-sided, they may occur anywhere on the head, neck and face -- or all over, associated with sensitivity to light, noise and/or smells, nausea  that worsens with activity or unrelieved with medication; symptoms such as numbness, confusion, trouble speaking, vertigo (spinning dizziness) and other strokelike neurological symptoms, pt.will go to ED for eval. CTN will follow up with pt.in 5-7 days. -    2/8  Pt.dtr.answered reports, pt.just loss her sister and is not up to speaking with with anyone. CTN extended my condolences to pt./family, will follow up with pt.in 1 week. Review chart: pt.attend appt. at [facility] Sleep Disorder 1/30/23. -[address]    2/15  review pt.chart:  -PCP, Romie Oro MD (Virtual Visit) 2/9/23; pt.reports cough, congest, anxiety (loss of her sister), 2/9 medication : Zithromax x 7 days, Tessalon Perles TID as needed, Ditropan TID as needed, and xanax as needed. UTR, LVM. -[address]

## 2023-02-16 ENCOUNTER — TELEPHONE (OUTPATIENT)
Dept: FAMILY MEDICINE CLINIC | Age: 83
End: 2023-02-16

## 2023-02-16 ENCOUNTER — HOME CARE VISIT (OUTPATIENT)
Dept: HOME HEALTH SERVICES | Facility: HOME HEALTH | Age: 83
End: 2023-02-16
Payer: MEDICARE

## 2023-02-16 ENCOUNTER — HOME CARE VISIT (OUTPATIENT)
Dept: SCHEDULING | Facility: HOME HEALTH | Age: 83
End: 2023-02-16
Payer: MEDICARE

## 2023-02-16 VITALS
DIASTOLIC BLOOD PRESSURE: 80 MMHG | SYSTOLIC BLOOD PRESSURE: 138 MMHG | RESPIRATION RATE: 14 BRPM | OXYGEN SATURATION: 97 % | HEART RATE: 86 BPM | TEMPERATURE: 97.4 F

## 2023-02-16 DIAGNOSIS — J06.9 URI WITH COUGH AND CONGESTION: ICD-10-CM

## 2023-02-16 PROCEDURE — G0151 HHCP-SERV OF PT,EA 15 MIN: HCPCS

## 2023-02-16 RX ORDER — BENZONATATE 100 MG/1
100 CAPSULE ORAL
Qty: 21 CAPSULE | Refills: 0 | Status: SHIPPED | OUTPATIENT
Start: 2023-02-16 | End: 2023-02-23

## 2023-02-16 RX ORDER — BENZONATATE 100 MG/1
100 CAPSULE ORAL
Qty: 21 CAPSULE | Refills: 0 | Status: CANCELLED | OUTPATIENT
Start: 2023-02-16 | End: 2023-02-23

## 2023-02-16 NOTE — Clinical Note
,  Mrs. Afia Iqbal only had 3 home care sessions before having to go out of town due to a death in her family. She missed 2 weeks of visits. Her yodit shoulder pain is minimal. Her cc is mod to severe pain in right buttock radiating posteriorly to her right knee. I am recommending continuation of PT 2w4. I will write the order if you are in agreement.   Thank you for your referral,  Dannie Zuniga PT

## 2023-02-17 DIAGNOSIS — M79.2 NEUROPATHIC PAIN OF LEFT FOOT: ICD-10-CM

## 2023-02-17 NOTE — HOME HEALTH
Subjective:  pt reports on Wed she had severe sharp stabbing pain in right buttock radiating posteriorly to her knee; not as severe today; she states Zanaflex not helping the pain; she also reports she does not check her BS daily  Falls since last visit   NO   (if yes complete the Fall Tracking Form and include bsrifallreport):  Caregiver involvement changes: none  Home health supplies by type and quantity ordered/delivered this visit include: n/a    Clinician asked if patient has had any physician contact since last home care visit and patient states: NO  Clinician asked if patient has any new or changed medications and patient states:  NO  If Yes, were medications reconciled? N/A  Was the certifying physician notified of changes in medications? N/A    Clinical assessment (what this visit means for the patient overall and need for ongoing skilled care) and progress or lack of progress towards SPECIFIC goals: pt missed 2 weeks of PT due to death of her sister and need to go out of town for  and to be with family;  BP well controlled; shoulder pain minimal; cc is pain right buttock radiating to posterior right knee; fair tolerance to ex due to pain    Written Teaching Material Utilized: written HEP    Interdisciplinary communication with: Juan Macdonald PTA for the purpose of collaboration    Discharge planning as follows: pt to remain at home with assist of caregiver and guidance from MD    Specific plan for next visit: continue ex for LE strengthening, yodit shoulders, and low back    REASSESSMENT:  as above.  pt has only had 3 sessions of PT before having to go out of town due death in the family; will contact MD re: new orders to continue home care

## 2023-02-18 RX ORDER — PREGABALIN 50 MG/1
CAPSULE ORAL
Qty: 30 CAPSULE | Refills: 0 | Status: SHIPPED | OUTPATIENT
Start: 2023-02-18

## 2023-02-19 NOTE — PROGRESS NOTES
SHIFT CHANGE:  3:31 PM Report received from [facility], RN. SBAR, Kardex, Procedure Summary, Intake/Output, MAR, Recent Results, Med Rec Status and Cardiac Rhythm NSR/SB were discussed. SHIFT SUMMARY:  5:20 PM  Patient refusing lidocaine patch at this time. She has visitors in the room and would like to wait  END OF SHIFT REPORT:  7:34 PM  Bedside and Verbal shift change report given to Raji Peng RN (oncoming nurse) by Miles Antony RN (offgoing nurse). Report included the following information SBAR, Kardex, Procedure Summary, Intake/Output, MAR, Recent Results, Med Rec Status and Cardiac Rhythm NSR/SB. MAC

## 2023-02-20 ENCOUNTER — OFFICE VISIT (OUTPATIENT)
Dept: CARDIOLOGY CLINIC | Age: 83
End: 2023-02-20
Payer: MEDICARE

## 2023-02-20 VITALS
SYSTOLIC BLOOD PRESSURE: 120 MMHG | OXYGEN SATURATION: 94 % | DIASTOLIC BLOOD PRESSURE: 70 MMHG | WEIGHT: 201 LBS | HEIGHT: 63 IN | HEART RATE: 92 BPM | BODY MASS INDEX: 35.61 KG/M2

## 2023-02-20 DIAGNOSIS — I10 PRIMARY HYPERTENSION: ICD-10-CM

## 2023-02-20 DIAGNOSIS — E78.5 DYSLIPIDEMIA: ICD-10-CM

## 2023-02-20 DIAGNOSIS — R06.02 SHORTNESS OF BREATH: Primary | ICD-10-CM

## 2023-02-20 DIAGNOSIS — E66.01 SEVERE OBESITY (HCC): ICD-10-CM

## 2023-02-20 PROCEDURE — 99214 OFFICE O/P EST MOD 30 MIN: CPT | Performed by: INTERNAL MEDICINE

## 2023-02-20 PROCEDURE — 1101F PT FALLS ASSESS-DOCD LE1/YR: CPT | Performed by: INTERNAL MEDICINE

## 2023-02-20 PROCEDURE — G8536 NO DOC ELDER MAL SCRN: HCPCS | Performed by: INTERNAL MEDICINE

## 2023-02-20 PROCEDURE — G8427 DOCREV CUR MEDS BY ELIG CLIN: HCPCS | Performed by: INTERNAL MEDICINE

## 2023-02-20 PROCEDURE — 1090F PRES/ABSN URINE INCON ASSESS: CPT | Performed by: INTERNAL MEDICINE

## 2023-02-20 PROCEDURE — 3074F SYST BP LT 130 MM HG: CPT | Performed by: INTERNAL MEDICINE

## 2023-02-20 PROCEDURE — G8417 CALC BMI ABV UP PARAM F/U: HCPCS | Performed by: INTERNAL MEDICINE

## 2023-02-20 PROCEDURE — 3078F DIAST BP <80 MM HG: CPT | Performed by: INTERNAL MEDICINE

## 2023-02-20 PROCEDURE — 1123F ACP DISCUSS/DSCN MKR DOCD: CPT | Performed by: INTERNAL MEDICINE

## 2023-02-20 PROCEDURE — G8432 DEP SCR NOT DOC, RNG: HCPCS | Performed by: INTERNAL MEDICINE

## 2023-02-20 NOTE — PROGRESS NOTES
Santos Schmitz MD    [id number] [facility], [address] [facility]    Office [phone]      Alicja Rosas is a 80 y.o. female is here for f/u visit . Primary care physician:  Keeley Rodriguez MD      Chief complaint:  Chief Complaint   Patient presents with    Hypertension    Slow Heart Rate    Other     DYSPNEA         Dear Dr Fernando Carvajal,    I had the pleasure of seeing Ms. Mcnair in the office today. Assessment:  HTN  HLD  Dyspnea/Fatigue  DM -  HTN  Former smoker. History of exposure to secondhand smoking  1/2023 - [facility] admn- uncontrolled hypertension  DANIELA     Plan:     Last visit 2019  Stres Nuc study - [facility]  ECHO  After recent hospital discharge, has had her blood pressure medications changed and her blood pressures at home are doing well. Lipids-1/15/2023 LDL 59  Aggressive cardiovascular risk factor modification. Follow-up after cardiac work-up/earlier as needed            Lab Results   Component Value Date/Time    Cholesterol, total 134 01/15/2023 06:21 AM    HDL Cholesterol 45 01/15/2023 06:21 AM    LDL, calculated 59 01/15/2023 06:21 AM    VLDL, calculated 30 01/15/2023 06:21 AM    Triglyceride 150 (H) 01/15/2023 06:21 AM    CHOL/HDL Ratio 3.0 01/15/2023 06:21 AM         Patient understands the plan. All questions were answered to the patient's satisfaction. Medication Side Effects and Warnings were discussed with patient: yes  Patient Labs were reviewed and or requested:  yes  Patient Past Records were reviewed and or requested: yes    I appreciate the opportunity to be involved in Ms. Mcnaircare. See note below for details. Please do not hesitate to contact us with questions or concerns. Santos Schmitz MD    Cardiac Testing/ Procedures: A. Cardiac Cath/PCI: 2/7/17 R radial approach  Attempted R brachial vein - unable to get access; Switched to R Femoral  vein    R SCV angiogram  R Brachial vein venogram    L Main: Nml    LAD: Mid 30%;  Med; D1 - MLI    LCflex: Med; MLI; OM1 - small to med; MLI    RCA: Med to large; Dominant; Nml    LVEF: 60%    No significant gradient across aortic valve. PCI: none    RHC findings:    RAPm8= mmHg  RVSP= 42 mmHg  PAPm= 16 mmHg  PCWPm= 9 mmHg    Specimens Removed : None    B.ECHO/CHIP: 1/6/18-normal EF.  11/30/16 Left ventricle: Systolic function was normal. Ejection fraction was  estimated to be 68 % by Lira's biplane technique. There were no  regional wall motion abnormalities. Doppler parameters were consistent  with abnormal left ventricular relaxation (grade 1 diastolic dysfunction). Mitral valve: There was mild regurgitation. Tricuspid valve: There was near moderate regurgitation. Pulmonary artery  systolic pressure: 33 mmHg. C.StressNuclear/Stress ECHO/Stress test: 11/2016 - Nml [facility] nuc stress test    D. Vascular: 12/6/16 - Nml resting NICKOLAS    E. EP:2/6/19-48-hour Holter monitor-minimum   heart rate 42 bpm, maximum heart rate 120 bpm average heart rate 67 bpm.  Rare isolated supraventricular beats, rate is V paced, rate experience, rare isolated ventricular beats   E cardio event monitor 1/2018-heart rate 59-83. No events. F. Miscellaneous:    Subjective:  Andry Hayes is a 80 y.o. female who returns for follow up visit. History of fatigue/dyspnea on exertion. Denies chest pain. Occasional swelling lower extremities  Compliant with blood pressure medications  Blood pressure controlled at home        ROS:  (bold if positive, if negative)           Medications before admission:    Current Outpatient Medications   Medication Sig Dispense    pregabalin (LYRICA) 50 mg capsule TAKE 1 CAPSULE BY MOUTH ONCE DAILY AT NIGHT . DO NOT EXCEED 1 PER 24 HOURS 30 Capsule    benzonatate (Tessalon Perles) 100 mg capsule Take 1 Capsule by mouth three (3) times daily as needed for Cough for up to 7 days.  21 Capsule    oxybutynin (DITROPAN) 5 mg tablet Take 1 Tablet by mouth three (3) times daily as needed for Bladder Spasms for up to 90 days. 270 Tablet    budesonide-formoteroL (SYMBICORT) 160-4.5 mcg/actuation HFAA Take 2 Puffs by inhalation daily as needed for PRN Reason (Other) (SOB). amLODIPine (NORVASC) 5 mg tablet Take 1 Tablet by mouth daily. 30 Tablet    losartan (COZAAR) 100 mg tablet Take 1 Tablet by mouth daily for 90 days. 90 Tablet    fluticasone propionate (FLONASE) 50 mcg/actuation nasal spray 2 Sprays by Both Nostrils route daily. Indications: nasal congestion 1 Each    tiZANidine (ZANAFLEX) 2 mg tablet Take 1 Tablet by mouth nightly as needed for Muscle Spasm(s). 30 Tablet    DULoxetine (CYMBALTA) 60 mg capsule Take 1 capsule by mouth once daily for 30 days 90 Capsule    furosemide (LASIX) 20 mg tablet TAKE 1 TABLET BY MOUTH ONCE DAILY AS NEEDED FOR  PAIN 30 Tablet    alendronate (FOSAMAX) 70 mg tablet TAKE 1 TABLET BY MOUTH ONCE A WEEK IN THE MORNING BEFORE FIRST MEAL, BEVERAGE, OR MEDICATION OF THE DAY     aspirin 81 mg chewable tablet Take 81 mg by mouth daily. mv,calcium,min/iron/folic/vitK (ONE-A-DAY WOMEN'S COMPLETE PO) Take 1 Tab by mouth daily. ketoconazole (NIZORAL) 2 % topical cream Apply  to affected area daily. 30 g    Nystop powder APPLY 1 APPLICATION OF POWDER TOPICALLY TO AFFECTED AREA 4 TIMES DAILY UNTIL RASH RESOLVES 60 g    atorvastatin (LIPITOR) 40 mg tablet Take 1 Tab by mouth nightly for 90 days. 90 Tab    cholecalciferol (VITAMIN D3) (1000 Units /25 mcg) tablet Take 1,000 Units by mouth daily. cyanocobalamin 1,000 mcg tablet Take 1,000 mcg by mouth daily. ascorbic acid, vitamin C, (VITAMIN C) 250 mg tablet Take 250 mg by mouth daily.      lancets misc accu-chek softclix lancets Check blood sugar daily E11.9 100 Each    glucose blood VI test strips (BLOOD GLUCOSE TEST) strip Accu-chek alexei plus test strips Test blood sugar once daily E11.9 100 Strip    Calcium Carbonate-Vit D3-Min 600 mg calcium- 400 unit tab Take 1 pill 2 x daily (Patient taking differently: Take 1 Tablet by mouth daily.) 60 Tab    latanoprost (XALATAN) 0.005 % ophthalmic solution Administer 1 Drop to both eyes nightly. methylPREDNISolone (MEDROL DOSEPACK) 4 mg tablet Take 1 Tablet by mouth Specific Days and Specific Times. (Patient not taking: Reported on 2/20/2023) 1 Dose Pack    Nebulizers (Sidestream) misc  (Patient not taking: Reported on 2/20/2023)      No current facility-administered medications for this visit. Physical Exam:  Visit Vitals  /70 (BP 1 Location: Left upper arm, BP Patient Position: Sitting, BP Cuff Size: Large adult)   Pulse 92   Ht 5' 3\" (1.6 m)   Wt 201 lb (91.2 kg)   SpO2 94%   BMI 35.61 kg/m²            Gen: Well-developed, well-nourished, in no acute distress  Neck: Supple,No JVD, No Carotid Bruit,   Resp: No accessory muscle use, Clear breath sounds, No rales or rhonchi  Card: Regular Rythm,Normal S1, S2, No murmurs, rubs or gallop. No thrills.    Abd:  Soft, BS+,   MSK: No cyanosis  Skin: No rashes    Neuro: moving all four extremities , follows commands appropriately  Psych:  Good insight, oriented to person, place , alert, Nml Affect  LE: No edema    EKG: Reviewed 1/17/23  EKG-sinus rhythm, poor R wave progression, -IMI-age undetermined      LABS:        Lab Results   Component Value Date/Time    WBC 6.5 01/14/2023 06:01 PM    HGB 12.5 01/14/2023 06:01 PM    HCT 39.8 01/14/2023 06:01 PM    PLATELET 080 69/16/2815 06:01 PM    MCV 90.2 01/14/2023 06:01 PM     Lab Results   Component Value Date/Time    Sodium 141 01/25/2023 03:30 PM    Potassium 4.1 01/25/2023 03:30 PM    Chloride 101 01/25/2023 03:30 PM    CO2 26 01/25/2023 03:30 PM    Anion gap 5 01/14/2023 06:01 PM    Glucose 135 (H) 01/25/2023 03:30 PM    BUN 18 01/25/2023 03:30 PM    Creatinine 0.86 01/25/2023 03:30 PM    BUN/Creatinine ratio 21 01/25/2023 03:30 PM    GFR est AA >60 03/17/2022 07:26 PM    GFR est non-AA 55 (L) 03/17/2022 07:26 PM    Calcium 10.0 01/25/2023 03:30 PM       No results found for: APTT  Lab Results   Component Value Date/Time    INR 1.1 07/20/2020 08:21 AM    INR 1.1 01/06/2017 12:14 PM    INR 1.1 06/16/2014 02:25 PM    Prothrombin time 10.9 07/20/2020 08:21 AM    Prothrombin time 10.7 01/06/2017 12:14 PM    Prothrombin time 11.2 06/16/2014 02:25 PM     No components found for: Ulysses Angel MD

## 2023-02-20 NOTE — LETTER
2/20/2023    Patient: Gentry Peñaloza   YOB: 1940   Date of Visit: 2/20/2023     Adela Prado, [address] [facility]    Dear Adela Prado MD,      Thank you for referring Ms. Mary Ochoa to [address] [place] [address] for evaluation. My notes for this consultation are attached. If you have questions, please do not hesitate to call me. I look forward to following your patient along with you.       Sincerely,    Spring Eng MD

## 2023-02-20 NOTE — PROGRESS NOTES
Massimo Rosario is a 80 y.o. female    Vitals:    02/20/23 1308   BP: 120/70   BP 1 Location: Left upper arm   BP Patient Position: Sitting   BP Cuff Size: Large adult   Pulse: 92   Height: 5' 3\" (1.6 m)   Weight: 201 lb (91.2 kg)   SpO2: 94%       Chief Complaint   Patient presents with    Hypertension    Slow Heart Rate    Other     DYSPNEA       Chest pain NO  SOB YES  Dizziness NO  Swelling NO  Recent hospital visit [facility],   Refills NO  COVID VACCINE YES  HAD COVID?  NO

## 2023-02-21 ENCOUNTER — HOME CARE VISIT (OUTPATIENT)
Dept: SCHEDULING | Facility: HOME HEALTH | Age: 83
End: 2023-02-21
Payer: MEDICARE

## 2023-02-21 VITALS
SYSTOLIC BLOOD PRESSURE: 120 MMHG | RESPIRATION RATE: 16 BRPM | OXYGEN SATURATION: 96 % | TEMPERATURE: 97.2 F | HEART RATE: 69 BPM | DIASTOLIC BLOOD PRESSURE: 68 MMHG

## 2023-02-21 PROCEDURE — G0157 HHC PT ASSISTANT EA 15: HCPCS

## 2023-02-22 ENCOUNTER — DOCUMENTATION ONLY (OUTPATIENT)
Dept: SLEEP MEDICINE | Age: 83
End: 2023-02-22

## 2023-02-22 ENCOUNTER — HOSPITAL ENCOUNTER (OUTPATIENT)
Dept: SLEEP MEDICINE | Age: 83
Discharge: HOME | End: 2023-02-22
Payer: MEDICARE

## 2023-02-22 ENCOUNTER — PATIENT OUTREACH (OUTPATIENT)
Dept: CASE MANAGEMENT | Age: 83
End: 2023-02-22

## 2023-02-22 VITALS
SYSTOLIC BLOOD PRESSURE: 121 MMHG | DIASTOLIC BLOOD PRESSURE: 69 MMHG | OXYGEN SATURATION: 96 % | TEMPERATURE: 97.8 F | HEART RATE: 73 BPM

## 2023-02-22 DIAGNOSIS — G47.33 OSA ON CPAP: ICD-10-CM

## 2023-02-22 DIAGNOSIS — E66.2 HYPOVENTILATION ASSOCIATED WITH OBESITY SYNDROME (HCC): ICD-10-CM

## 2023-02-22 DIAGNOSIS — Z99.89 OSA ON CPAP: ICD-10-CM

## 2023-02-22 PROCEDURE — 95810 POLYSOM 6/> YRS 4/> PARAM: CPT | Performed by: INTERNAL MEDICINE

## 2023-02-22 NOTE — PROGRESS NOTES
Patient has graduated from the Transitions of Care Coordination  program on 2/22/23. Patient/family has the ability to self-manage at this time Care management goals have been completed. Patient was not referred to the [facility] team for further management. Goals Addressed                   This Visit's Progress     Prevent complications post hospitalization. 1/16   Pt.will attend  follow ups with pcp and specialist.   -PCP, Paradise Nichols MD (Pt.dtr.will schedule appt). Tuan Maier MD (dtr.will schedule if pt.continue with HA)  -Family Sylvia Mustafa MD (Pt.dtr.will schedule appt). -CardiologyCORNEL MD 2/1/23 @ 1:20 pm  -CTN provided pt. information [employer] [phone]) explain briefly type of service; contact information provided, f/u with pt.in 5-7 days. -MC       B/p: Pt.will monitor b/p daily, keep record of reading to present to pcp at next follow up appt. (No b/p to reports at this time); follow up with pt.in 5-7 days, pt./dtr.has CTN contact information, call with questions or concerns. -MC    (HA/ MIGRAINES)  Teach Back:  Pt.will reports symptoms to pcp;  HA, throbbing or pounding feeling one-sided, they may occur anywhere on the head, neck and face -- or all over, associated with sensitivity to light, noise and/or smells, nausea  that worsens with activity or unrelieved with medication; symptoms such as numbness, confusion, trouble speaking, vertigo (spinning dizziness) and other strokelike neurological symptoms, pt.will go to ED for eval. CTN will follow up with pt.in 5-7 days. -[facility]    1/24  Pt.will attend  follow ups with pcp and specialist.   -PCP, Paradise Nichols MD attend appt. on 1/23/23  -Tuan Maier MD (dtr.will schedule if pt.continue with HA)  -Sylvia Bah MD seen on 1/19/23  Upcoming appts:  -CardiologyCORNEL MD 2/1/23 @ 1:20 pm  -BS Sleep Disorder 1/30/23 @ 10:20 am   -ECHO-Cardiology/[facility] 2/20/23 @ 8 am  -BS  PT 1/24/23  -[facility] service avail. as needed, CTN  f/u with pt.in 5-7 days. -       B/p: Pt.will monitor b/p daily, keep record of reading to present to pcp at next follow up appt.; notify pcp consistent readings >/= 150/90. Pt.dtr.reports pt.b/p today(per [facility] nurse 170/100) received order administer amlodipine 5 mg today and 5 mg in am, pt.reports she is worried about her sister who is very ill, reports which contributes to her elevated b/p. CTN will  follow up with pt.in 5-7 days, pt./dtr.has CTN contact information, call with questions or concerns. -    (HA/ MIGRAINES) Pt.reports no c/o HA or s/s listed below,   Teach Back:  Pt.will reports symptoms to pcp;  HA, throbbing or pounding feeling one-sided, they may occur anywhere on the head, neck and face -- or all over, associated with sensitivity to light, noise and/or smells, nausea  that worsens with activity or unrelieved with medication; symptoms such as numbness, confusion, trouble speaking, vertigo (spinning dizziness) and other strokelike neurological symptoms, pt.will go to ED for eval. CTN will follow up with pt.in 5-7 days. -    2/8  Pt.dtr.answered reports, pt.just loss her sister and is not up to speaking with with anyone. CTN extended my condolences to pt./family, will follow up with pt.in 1 week. Review chart: pt.attend appt. at [facility] Sleep Disorder 1/30/23. -[address]    2/15  review pt.chart:  -PCP, Krystyna Guzman MD (Virtual Visit) 2/9/23; pt.reports cough, congest, anxiety (loss of her sister), 2/9 medication : Zithromax x 7 days, Tessalon Perles TID as needed, Ditropan TID as needed, and xanax as needed. UTR, LVM. -[address]    2/22   Pt. attend appt. with Cardiology, Dr. Hannah Shepard 2/20/23, CTN attempt contact pt., UTR, LVM. -[address]              Patient has Care Transition Nurse's contact information for any further questions, concerns, or needs.   Patients upcoming visits:    Future Appointments   Date Time Provider Pierre Andrews   2/22/2023  9:30 PM BEDROOM [address] SLEEP LAB    2/24/2023  2:30 PM [facility]   2/28/2023 11:00 AM [facility]ho[facility]   3/1/2023 10:30 AM Miladys Marcos MD PMA BS AMB   3/3/2023 11:00 AM [facility]per Couch, [facility]   3/6/2023 To Be Determined [facility]per Couch, [facility]   3/8/2023  1:00 PM NUCLEAR, STFRANCIS CAVSF BS AMB   3/8/2023  3:30 PM ECHOTHREE, STFRANCIS CAVSF BS AMB   3/9/2023  8:30 AM NUCLEAR, STFRANCIS CAVSF BS AMB   3/9/2023  1:00 PM Esther Cam MD [facility] BS AMB   3/10/2023 To Be Determined [facility]   3/13/2023 [facility]   3/16/2023 To Be Determined Gladys Clay PT [facility]   3/16/2023  2:00 PM ECHOCARDIOGRAM ROOM [address]

## 2023-02-23 NOTE — PROGRESS NOTES
[address] [place]. [place], [address]  Tel.  [phone]  Fax. [address] [facility] 60  [place], [address] [facility]  Tel.  [phone]  Fax. [phone] [address] [place]andes   Tel.  [phone]  Fax. [phone]     Sleep Study Technical Notes        PRE-Test:  Maxine Atkins (: 1940) arrived in the lobby. Patient was greeted, temperature checked (97.8 ) and screening questions asked. The patient was taken directly to their assigned room. BP (121/69) and SpO2 (96%) were taken. Procedure was explained to the patient and questions were answered. Pt expressed understanding. Electrodes were applied without incident. The patient was placed in bed and the study was started. Acquisition Notes:  Lights off: 10:04pm  Sleep onset: 10:32pm  Lights on: 5:39am  Respiratory events: Hypopneas and Obstructive Apneas noted  ECG:  NSR  Other comments: The study ordered was a PSG. The hook-up was performed without issue. The Pt was placed in bed and lights were out at 10:04pm. Sleep onset occurred around 10:32pm. During the study stages 1, 2, and REM were observed. The Pt slept on both sides and supine. Mild snoring was heard. The Pt appeared to show signs of sleep-disordered breathing as some hypopneas were noted. Lights were on at 5:39am.    POST Test:  Patient was awakened. Electrodes were removed. The patient was discharged after completing the Post Questionnaire. Patient stated that they were alert and ok to drive. Equipment and room cleaned per infection control policy.

## 2023-02-24 ENCOUNTER — HOME CARE VISIT (OUTPATIENT)
Dept: SCHEDULING | Facility: HOME HEALTH | Age: 83
End: 2023-02-24
Payer: MEDICARE

## 2023-02-24 PROCEDURE — G0157 HHC PT ASSISTANT EA 15: HCPCS

## 2023-02-25 ENCOUNTER — TELEPHONE (OUTPATIENT)
Dept: SLEEP MEDICINE | Age: 83
End: 2023-02-25

## 2023-02-25 NOTE — TELEPHONE ENCOUNTER
Venkatesh Arnold is to be contacted by lead sleep technologist regarding results of Sleep Testing which was indicative of an average AHI of 1.2 per hour with an SpO2 coco of 85% and SpO2 of < 88% being 3.70 minutes. If patient has any further questions he should schedule a visit to discuss. Repeat testing is to be considered if sleep symptoms persist or worsen over time.

## 2023-02-26 VITALS
DIASTOLIC BLOOD PRESSURE: 80 MMHG | OXYGEN SATURATION: 96 % | TEMPERATURE: 98.1 F | RESPIRATION RATE: 16 BRPM | HEART RATE: 70 BPM | SYSTOLIC BLOOD PRESSURE: 140 MMHG

## 2023-02-26 NOTE — HOME HEALTH
Subjective: I had an episode last night  Falls since last visit NO(if yes complete the Fall Tracking Form and include bsrifallreport):  Caregiver involvement changes: none  Home health supplies by type and quantity ordered/delivered this visit include: na    Clinician asked if patient has had any physician contact since last home care visit and patient states: NO  Clinician asked if patient has any new or changed medications and patient states:  NO   If Yes, were medications reconciled? Na reviewed  Was the certifying physician notified of changes in medications? N/A     Clinical assessment (what this visit means for the patient overall and need for ongoing skilled care) and progress or lack of progress towards SPECIFIC goals:Patient and daughter report episode early this am after shower that occured between 1300 and 1400 hours. Patient reported that she felt hot , heavy and tired all of a sudden and could not catch her breath. Her daughter states that she was shaking and broke out into a cold sweat - profuse. Her daughter attempted to take her BP with a wrist cuff and obtained readings of 77/58, 77/59 and 70/59 and HRs of 171, 172 and 174. Daughter states she took hers with same machine and hers was normal. Patient refused 911 call. She used her inhaler and drank some sparkling water and continued with SOB and in distress for approx. 45 minutes per daughter before patient felt like she could breath again. Notified Dr. Aidee Dunne office and spoke to RUSSELL, who states she will get message to nurse as nurse is unavailable. Instructed CG in vitals and manual pulse and in use of pulse ox. Patient with pursed lip breathing. Received callback from [employer] later in day to confirm information and stating she will pass infromation on to MD.     Written Teaching Material Utilized: N/A    Interdisciplinary communication with:  PT for the purpose of POC collaboration    Discharge planning as follows:  Is no longer homebound, Per physician order, Will discharge when the patient has reached their maximum functional potential and maximum safety in their home and When goals are met    Specific plan for next visit: gait training and ther ex    Signature deferred due to concerns of Covid 19 in community.

## 2023-02-28 ENCOUNTER — HOME CARE VISIT (OUTPATIENT)
Dept: SCHEDULING | Facility: HOME HEALTH | Age: 83
End: 2023-02-28
Payer: MEDICARE

## 2023-02-28 PROCEDURE — G0157 HHC PT ASSISTANT EA 15: HCPCS

## 2023-03-01 ENCOUNTER — OFFICE VISIT (OUTPATIENT)
Dept: FAMILY MEDICINE CLINIC | Age: 83
End: 2023-03-01

## 2023-03-01 VITALS
HEART RATE: 82 BPM | OXYGEN SATURATION: 98 % | TEMPERATURE: 97.9 F | SYSTOLIC BLOOD PRESSURE: 124 MMHG | DIASTOLIC BLOOD PRESSURE: 75 MMHG | RESPIRATION RATE: 16 BRPM

## 2023-03-01 VITALS
WEIGHT: 207 LBS | DIASTOLIC BLOOD PRESSURE: 60 MMHG | OXYGEN SATURATION: 95 % | HEART RATE: 58 BPM | TEMPERATURE: 97 F | SYSTOLIC BLOOD PRESSURE: 114 MMHG | RESPIRATION RATE: 16 BRPM | HEIGHT: 63 IN | BODY MASS INDEX: 36.68 KG/M2

## 2023-03-01 DIAGNOSIS — I10 PRIMARY HYPERTENSION: Primary | ICD-10-CM

## 2023-03-01 DIAGNOSIS — E11.8 TYPE 2 DIABETES MELLITUS WITH COMPLICATION, WITHOUT LONG-TERM CURRENT USE OF INSULIN (HCC): ICD-10-CM

## 2023-03-01 RX ORDER — IBUPROFEN 200 MG
CAPSULE ORAL
Qty: 100 STRIP | Refills: 5 | Status: SHIPPED | OUTPATIENT
Start: 2023-03-01

## 2023-03-01 NOTE — PROGRESS NOTES
Identified pt with two pt identifiers(name and ). Chief Complaint   Patient presents with    Follow-up     On sleep study     Peripheral Edema     Patient has some edema in her legs         Health Maintenance Due   Topic    Pneumococcal 65+ years (1 - PCV)    Shingles Vaccine (1 of 2)    Foot Exam Q1     COVID-19 Vaccine (4 - Booster for Moderna series)    Medicare Yearly Exam     Flu Vaccine (1)       Wt Readings from Last 3 Encounters:   23 207 lb (93.9 kg)   23 201 lb (91.2 kg)   23 204 lb (92.5 kg)     Temp Readings from Last 3 Encounters:   23 97 °F (36.1 °C) (Temporal)   23 97.9 °F (36.6 °C) (Temporal)   23 98.1 °F (36.7 °C) (Temporal)     BP Readings from Last 3 Encounters:   23 114/60   23 124/75   23 (!) 140/80     Pulse Readings from Last 3 Encounters:   23 (!) 58   23 82   23 70         Learning Assessment:  :     Learning Assessment 2017   PRIMARY LEARNER Patient Patient   HIGHEST LEVEL OF EDUCATION - PRIMARY LEARNER  SOME COLLEGE -   BARRIERS PRIMARY LEARNER NONE -   PRIMARY LANGUAGE ENGLISH ENGLISH   LEARNER PREFERENCE PRIMARY READING LISTENING   ANSWERED BY patient patient   RELATIONSHIP SELF SELF       Depression Screening:  :     3 most recent PHQ Screens 3/1/2023   Little interest or pleasure in doing things Not at all   Feeling down, depressed, irritable, or hopeless Not at all   Total Score PHQ 2 0       Fall Risk Assessment:  :     Fall Risk Assessment, last 12 mths 2023   Able to walk? Yes   Fall in past 12 months? 0   Do you feel unsteady? 1   Are you worried about falling 1   Is TUG test greater than 12 seconds? 0   Is the gait abnormal? 1   Number of falls in past 12 months 0       Abuse Screening:  :     Abuse Screening Questionnaire 3/1/2023 2023 2023 3/28/2022 2021 2020 2019   Do you ever feel afraid of your partner?  N N N N N N N   Are you in a relationship with someone who physically or mentally threatens you? N N N N N N N   Is it safe for you to go home? Y Y Y Y Y Y Y       Coordination of Care Questionnaire:  :     1) Have you been to an emergency room, urgent care clinic since your last visit? no   Hospitalized since your last visit? no             2) Have you seen or consulted any other health care providers outside of [address] [place] [address] since your last visit? no  (Include any pap smears or colon screenings in this section.)    3) Do you have an Advance Directive on file? yes  Are you interested in receiving information about Advance Directives? no    Patient is accompanied by daughter I have received verbal consent from Oracio Miranda to discuss any/all medical information while they are present in the room. 4.  For patients aged 39-70: Has the patient had a colonoscopy / FIT/ Cologuard? NA - based on age      If the patient is female:    11. For patients aged 41-77: Has the patient had a mammogram within the past 2 years? NA - based on age or sex      10. For patients aged 21-65: Has the patient had a pap smear?  NA - based on age or sex

## 2023-03-03 ENCOUNTER — HOME CARE VISIT (OUTPATIENT)
Dept: SCHEDULING | Facility: HOME HEALTH | Age: 83
End: 2023-03-03
Payer: MEDICARE

## 2023-03-03 PROCEDURE — G0157 HHC PT ASSISTANT EA 15: HCPCS

## 2023-03-05 VITALS
TEMPERATURE: 97.7 F | DIASTOLIC BLOOD PRESSURE: 60 MMHG | HEART RATE: 57 BPM | SYSTOLIC BLOOD PRESSURE: 122 MMHG | RESPIRATION RATE: 16 BRPM | OXYGEN SATURATION: 95 %

## 2023-03-05 NOTE — HOME HEALTH
Subjective: I am alright. A little tired but good  Falls since last visit NO(if yes complete the Fall Tracking Form and include bsrifallreport):  Caregiver involvement changes: none  Home health supplies by type and quantity ordered/delivered this visit include: na    Clinician asked if patient has had any physician contact since last home care visit and patient states: NO  Clinician asked if patient has any new or changed medications and patient states:  NO   If Yes, were medications reconciled? N/A reviewed  Was the certifying physician notified of changes in medications? N/A     Clinical assessment (what this visit means for the patient overall and need for ongoing skilled care) and progress or lack of progress towards SPECIFIC goals: Patient with chronic pain and limited mobility which impairs safety with amb. Patient with good safety today for community amb and improved amb in community with CGA as needed over rough terrain. Written Teaching Material Utilized: N/A    Interdisciplinary communication with:  PT for the purpose of POC collaboration    Discharge planning as follows: Is no longer homebound, Per physician order, Will discharge when the patient has reached their maximum functional potential and maximum safety in their home and When goals are met    Specific plan for next visit: Progressive strengthening    Signature deferred due to concerns of Covid 19 in community.

## 2023-03-05 NOTE — HOME HEALTH
Subjective: I feel pretty good today. Falls since last visit no(if yes complete the Fall Tracking Form and include bsrifallreport):  Caregiver involvement changes: none  Home health supplies by type and quantity ordered/delivered this visit include: na    Clinician asked if patient has had any physician contact since last home care visit and patient states: NO  Clinician asked if patient has any new or changed medications and patient states:  NO   If Yes, were medications reconciled? N/A reviewed  Was the certifying physician notified of changes in medications? N/A     Clinical assessment (what this visit means for the patient overall and need for ongoing skilled care) and progress or lack of progress towards SPECIFIC goals: Patient with weakness and pain which limit safety and mobility and increase fall risk. Patient with HEP in place and able to demonstrate good safety with current program with adjustments made to decrease pain with ther ex. Written Teaching Material Utilized: written HEP with adjustements in reps to decrease pain complaints    Interdisciplinary communication with: N/A     Discharge planning as follows: Is no longer homebound, Per physician order, Will discharge when the patient has reached their maximum functional potential and maximum safety in their home and When goals are met    Specific plan for next visit: gait training and balance    Signature deferred due to concerns of Covid 19 in community.

## 2023-03-06 ENCOUNTER — HOME CARE VISIT (OUTPATIENT)
Dept: HOME HEALTH SERVICES | Facility: HOME HEALTH | Age: 83
End: 2023-03-06
Payer: MEDICARE

## 2023-03-06 NOTE — PROGRESS NOTES
Chief Complaint   Patient presents with    Follow-up     On sleep study     Peripheral Edema     Patient has some edema in her legs        Assessment/ Plan:   Diagnoses and all orders for this visit:    1. Primary hypertension   Better controlled today. Mild bilateral leg edema, better. Low sodium diet, keep feet elevated, compression stockings. 2. Type 2 diabetes mellitus with complication, without long-term current use of insulin ([facility])  -     glucose blood VI test strips (blood glucose test) strip; Accu-chek alexei plus test strips Test blood sugar once daily E11.9    I have discussed the diagnosis with the patient and the intended treatment plan as seen in the above orders. The patient has received an after-visit summary and questions were answered concerning future plans. Asked to return should symptoms worsen or not improve with treatment. Any pending labs and studies will be relayed to patient when they become available. Pt verbalizes understanding of plan of care and denies further questions or concerns at this time. Follow-up and Dispositions    Return if symptoms worsen or fail to improve. Subjective:   Zaida Stauffer is a 80 y.o. female who presents today for follow up. She is feeling better, but overall has been fatigued. BP is well controlled. She also slated to complete her sleep study and this is showing DANIELA. She denies chest pain or SOB. HISTORICAL  PMH, PSH, FHX, SOCHX, ALLERGIES and MES were reviewed and updated today. Review of Systems  Review of Systems   Constitutional:  Positive for malaise/fatigue. Respiratory:  Positive for shortness of breath. Objective:     Vitals:    03/01/23 1031   BP: 114/60   Pulse: (!) 58   Resp: 16   Temp: 97 °F (36.1 °C)   TempSrc: Temporal   SpO2: 95%   Weight: 207 lb (93.9 kg)   Height: 5' 3\" (1.6 m)       Physical Exam  Vitals and nursing note reviewed. Constitutional:       Appearance: Normal appearance.    HENT: Head: Normocephalic and atraumatic. Mouth/Throat:      Mouth: Mucous membranes are moist.   Eyes:      Extraocular Movements: Extraocular movements intact. Conjunctiva/sclera: Conjunctivae normal.      Pupils: Pupils are equal, round, and reactive to light. Cardiovascular:      Rate and Rhythm: Normal rate and regular rhythm. Pulses: Normal pulses. Heart sounds: Normal heart sounds. Pulmonary:      Effort: Pulmonary effort is normal.      Breath sounds: Normal breath sounds. Musculoskeletal:         General: Swelling (trace bipedal edema) present. Cervical back: Normal range of motion and neck supple. Neurological:      General: No focal deficit present. Mental Status: She is alert. Mental status is at baseline. Cranial Nerves: No cranial nerve deficit. Sensory: No sensory deficit. Motor: No weakness. Coordination: Coordination normal.      Gait: Gait normal.      Deep Tendon Reflexes: Reflexes normal.   Psychiatric:         Mood and Affect: Mood normal.         Behavior: Behavior normal.         Thought Content:  Thought content normal.         Judgment: Judgment normal.     Kimberly Kinney MD  [facility]   03/01/2023

## 2023-03-08 ENCOUNTER — ANCILLARY PROCEDURE (OUTPATIENT)
Dept: CARDIOLOGY CLINIC | Age: 83
End: 2023-03-08

## 2023-03-08 VITALS
DIASTOLIC BLOOD PRESSURE: 60 MMHG | HEIGHT: 63 IN | BODY MASS INDEX: 36.68 KG/M2 | SYSTOLIC BLOOD PRESSURE: 122 MMHG | WEIGHT: 207 LBS

## 2023-03-08 VITALS — HEIGHT: 63 IN | BODY MASS INDEX: 36.68 KG/M2 | WEIGHT: 207 LBS

## 2023-03-08 DIAGNOSIS — I10 PRIMARY HYPERTENSION: ICD-10-CM

## 2023-03-08 DIAGNOSIS — R53.83 FATIGUE, UNSPECIFIED TYPE: ICD-10-CM

## 2023-03-08 DIAGNOSIS — R00.1 BRADYCARDIA: ICD-10-CM

## 2023-03-08 DIAGNOSIS — R06.02 SHORTNESS OF BREATH: ICD-10-CM

## 2023-03-08 DIAGNOSIS — E66.01 SEVERE OBESITY (HCC): ICD-10-CM

## 2023-03-08 DIAGNOSIS — E78.5 DYSLIPIDEMIA: ICD-10-CM

## 2023-03-08 RX ORDER — TETRAKIS(2-METHOXYISOBUTYLISOCYANIDE)COPPER(I) TETRAFLUOROBORATE 1 MG/ML
25.9 INJECTION, POWDER, LYOPHILIZED, FOR SOLUTION INTRAVENOUS ONCE
Status: COMPLETED | OUTPATIENT
Start: 2023-03-08 | End: 2023-03-08

## 2023-03-08 RX ADMIN — TETRAKIS(2-METHOXYISOBUTYLISOCYANIDE)COPPER(I) TETRAFLUOROBORATE 25.9 MILLICURIE: 1 INJECTION, POWDER, LYOPHILIZED, FOR SOLUTION INTRAVENOUS at 12:57

## 2023-03-09 ENCOUNTER — OFFICE VISIT (OUTPATIENT)
Dept: FAMILY MEDICINE CLINIC | Age: 83
End: 2023-03-09
Payer: MEDICARE

## 2023-03-09 ENCOUNTER — HOME CARE VISIT (OUTPATIENT)
Dept: HOME HEALTH SERVICES | Facility: HOME HEALTH | Age: 83
End: 2023-03-09
Payer: MEDICARE

## 2023-03-09 ENCOUNTER — APPOINTMENT (OUTPATIENT)
Dept: CARDIOLOGY CLINIC | Age: 83
End: 2023-03-09

## 2023-03-09 VITALS
OXYGEN SATURATION: 95 % | TEMPERATURE: 98.6 F | WEIGHT: 206 LBS | HEIGHT: 63 IN | BODY MASS INDEX: 36.5 KG/M2 | DIASTOLIC BLOOD PRESSURE: 69 MMHG | RESPIRATION RATE: 20 BRPM | HEART RATE: 78 BPM | SYSTOLIC BLOOD PRESSURE: 112 MMHG

## 2023-03-09 DIAGNOSIS — M25.551 RIGHT HIP PAIN: Primary | ICD-10-CM

## 2023-03-09 DIAGNOSIS — M19.019 AC JOINT ARTHROPATHY: ICD-10-CM

## 2023-03-09 DIAGNOSIS — M79.604 LEG PAIN, POSTERIOR, RIGHT: ICD-10-CM

## 2023-03-09 DIAGNOSIS — M19.012 GLENOHUMERAL ARTHRITIS, LEFT: ICD-10-CM

## 2023-03-09 DIAGNOSIS — M79.18 MYOFASCIAL MUSCLE PAIN: ICD-10-CM

## 2023-03-09 LAB
NUC STRESS EJECTION FRACTION: 80 %
STRESS BASELINE DIAS BP: 80 MMHG
STRESS BASELINE HR: 83 BPM
STRESS BASELINE SYS BP: 140 MMHG
STRESS ESTIMATED WORKLOAD: 0 METS
STRESS EXERCISE DUR MIN: 0 MIN
STRESS EXERCISE DUR SEC: 0 SEC
STRESS O2 SAT PEAK: 97 %
STRESS O2 SAT REST: 98 %
STRESS PEAK DIAS BP: 84 MMHG
STRESS PEAK SYS BP: 146 MMHG
STRESS PERCENT HR ACHIEVED: 77 %
STRESS POST PEAK HR: 105 BPM
STRESS RATE PRESSURE PRODUCT: NORMAL BPM*MMHG
STRESS TARGET HR: 137 BPM
TID: 1.34

## 2023-03-09 PROCEDURE — G8427 DOCREV CUR MEDS BY ELIG CLIN: HCPCS | Performed by: FAMILY MEDICINE

## 2023-03-09 PROCEDURE — 1101F PT FALLS ASSESS-DOCD LE1/YR: CPT | Performed by: FAMILY MEDICINE

## 2023-03-09 PROCEDURE — 1123F ACP DISCUSS/DSCN MKR DOCD: CPT | Performed by: FAMILY MEDICINE

## 2023-03-09 PROCEDURE — 76942 ECHO GUIDE FOR BIOPSY: CPT | Performed by: FAMILY MEDICINE

## 2023-03-09 PROCEDURE — 99214 OFFICE O/P EST MOD 30 MIN: CPT | Performed by: FAMILY MEDICINE

## 2023-03-09 PROCEDURE — G8417 CALC BMI ABV UP PARAM F/U: HCPCS | Performed by: FAMILY MEDICINE

## 2023-03-09 PROCEDURE — G8510 SCR DEP NEG, NO PLAN REQD: HCPCS | Performed by: FAMILY MEDICINE

## 2023-03-09 PROCEDURE — G8536 NO DOC ELDER MAL SCRN: HCPCS | Performed by: FAMILY MEDICINE

## 2023-03-09 PROCEDURE — 3074F SYST BP LT 130 MM HG: CPT | Performed by: FAMILY MEDICINE

## 2023-03-09 PROCEDURE — 3078F DIAST BP <80 MM HG: CPT | Performed by: FAMILY MEDICINE

## 2023-03-09 PROCEDURE — 20553 NJX 1/MLT TRIGGER POINTS 3/>: CPT | Performed by: FAMILY MEDICINE

## 2023-03-09 PROCEDURE — 20611 DRAIN/INJ JOINT/BURSA W/US: CPT | Performed by: FAMILY MEDICINE

## 2023-03-09 PROCEDURE — 1090F PRES/ABSN URINE INCON ASSESS: CPT | Performed by: FAMILY MEDICINE

## 2023-03-09 RX ORDER — TETRAKIS(2-METHOXYISOBUTYLISOCYANIDE)COPPER(I) TETRAFLUOROBORATE 1 MG/ML
24.8 INJECTION, POWDER, LYOPHILIZED, FOR SOLUTION INTRAVENOUS ONCE
Status: COMPLETED | OUTPATIENT
Start: 2023-03-09 | End: 2023-03-09

## 2023-03-09 RX ORDER — TRIAMCINOLONE ACETONIDE 40 MG/ML
40 INJECTION, SUSPENSION INTRA-ARTICULAR; INTRAMUSCULAR ONCE
Qty: 1 ML | Refills: 0
Start: 2023-03-09 | End: 2023-03-09

## 2023-03-09 RX ORDER — LIDOCAINE HYDROCHLORIDE 10 MG/ML
2 INJECTION INFILTRATION; PERINEURAL ONCE
Qty: 2 ML | Refills: 0
Start: 2023-03-09 | End: 2023-03-09

## 2023-03-09 RX ADMIN — TETRAKIS(2-METHOXYISOBUTYLISOCYANIDE)COPPER(I) TETRAFLUOROBORATE 24.8 MILLICURIE: 1 INJECTION, POWDER, LYOPHILIZED, FOR SOLUTION INTRAVENOUS at 08:13

## 2023-03-09 NOTE — PROGRESS NOTES
History of Present Illness     Chief Complaint   Patient presents with    Arm Pain     Follow up on shoulder pain, patient is having left arm pain from the shoulder to the elbow. Currently pain is 7/10. Hip Pain     Right side x 1 month, no known trauma to the area. Feels like hip is catching and locks up. Currently pain is 10/10. Patient Identification  Liss Putnam is a 80 y.o. female follow-up for bilateral neck and shoulder pain and hospitalization follow-up. Was seen in this clinic on 1/19/23 and received b/l SAB and TPI in clinic and was given a referral for home health PT in addition to follow-up ESR and CRP labs, both of which improved. Her stress test was yesterday and she continues to follow with her specialists and primary providers. Today reports the right shoulder has improved after injection, but her left shoulder is quite painful. It is painful around the shoulder joint and radiates down triceps to level of elbow. Started after last visit here. Pain in shoulder joint, ACJ and down triceps. The left shoulder was exacerbated after having to hold her shoulder in flexion during her stress test yesterday. Additionally notes acute on chronic right hip pain that started a month ago. Denies injury. Feels crampy with walking worse with moving a lot mostly in buttock and down lateral leg, though not beyond knee. No falls, but sometimes cramps enough will feel like will fall so grabs on to something. Worse than prior episodes. Tylenol, Lyrica, Cymbalta is not helpful. Uses cane baseline, holds in right hand. Past Medical History:   Diagnosis Date    Arrhythmia     Bradycardia. Arthritis     Burning with urination     Incontinence.     Diabetes ([facility] [address]     GERD (gastroesophageal reflux disease)     Glaucoma     High cholesterol     Hypertension     Ill-defined condition     EDEMA, LOWER LIMS, URINARY INCONTINENCE    Obesity, Class II, BMI 35-39.9     Sleep apnea     NO CPAP     Family History   Problem Relation Age of Onset    Heart Disease Mother     No Known Problems Father     No Known Problems Sister     Cancer Brother         PANCREAS    Heart Disease Sister     Cancer Sister         BREAST    Alcohol abuse Brother     No Known Problems Brother     No Known Problems Brother     No Known Problems Brother     Anesth Problems Neg Hx      Current Outpatient Medications   Medication Sig Dispense Refill    triamcinolone acetonide (Kenalog) 40 mg/mL injection 1 mL by IntraBURSal route once for 1 dose. 1 mL 0    lidocaine (XYLOCAINE) 10 mg/mL (1 %) injection 2 mL by IntraBURSal route once for 1 dose. 2 mL 0    glucose blood VI test strips (blood glucose test) strip Accu-chek alexei plus test strips Test blood sugar once daily E11.9 100 Strip 5    cetirizine (ZYRTEC) 10 mg tablet TAKE 1 TABLET BY MOUTH ONCE DAILY FOR HIVES FOR 30 DAYS 30 Tablet 5    tiZANidine (ZANAFLEX) 2 mg tablet TAKE 1 TABLET BY MOUTH NIGHTLY AS NEEDED FOR MUSCLE SPASM 30 Tablet 1    pregabalin (LYRICA) 50 mg capsule TAKE 1 CAPSULE BY MOUTH ONCE DAILY AT NIGHT . DO NOT EXCEED 1 PER 24 HOURS 30 Capsule 0    oxybutynin (DITROPAN) 5 mg tablet Take 1 Tablet by mouth three (3) times daily as needed for Bladder Spasms for up to 90 days. 270 Tablet 0    budesonide-formoteroL (SYMBICORT) 160-4.5 mcg/actuation HFAA Take 2 Puffs by inhalation daily as needed for PRN Reason (Other) (SOB). amLODIPine (NORVASC) 5 mg tablet Take 1 Tablet by mouth daily. 30 Tablet 5    losartan (COZAAR) 100 mg tablet Take 1 Tablet by mouth daily for 90 days. 90 Tablet 0    fluticasone propionate (FLONASE) 50 mcg/actuation nasal spray 2 Sprays by Both Nostrils route daily.  Indications: nasal congestion 1 Each 0    DULoxetine (CYMBALTA) 60 mg capsule Take 1 capsule by mouth once daily for 30 days 90 Capsule 1    furosemide (LASIX) 20 mg tablet TAKE 1 TABLET BY MOUTH ONCE DAILY AS NEEDED FOR  PAIN 30 Tablet 1    alendronate (FOSAMAX) 70 mg tablet TAKE 1 TABLET BY MOUTH ONCE A WEEK IN THE MORNING BEFORE FIRST MEAL, BEVERAGE, OR MEDICATION OF THE DAY      aspirin 81 mg chewable tablet Take 81 mg by mouth daily. mv,calcium,min/iron/folic/vitK (ONE-A-DAY WOMEN'S COMPLETE PO) Take 1 Tab by mouth daily. ketoconazole (NIZORAL) 2 % topical cream Apply  to affected area daily. 30 g 3    Nystop powder APPLY 1 APPLICATION OF POWDER TOPICALLY TO AFFECTED AREA 4 TIMES DAILY UNTIL RASH RESOLVES 60 g 0    cholecalciferol (VITAMIN D3) (1000 Units /25 mcg) tablet Take 1,000 Units by mouth daily. cyanocobalamin 1,000 mcg tablet Take 1,000 mcg by mouth daily. ascorbic acid, vitamin C, (VITAMIN C) 250 mg tablet Take 250 mg by mouth daily. lancets misc accu-chek softclix lancets Check blood sugar daily E11.9 100 Each 1    Calcium Carbonate-Vit D3-Min 600 mg calcium- 400 unit tab Take 1 pill 2 x daily (Patient taking differently: Take 1 Tablet by mouth daily.) 60 Tab 5    latanoprost (XALATAN) 0.005 % ophthalmic solution Administer 1 Drop to both eyes nightly. atorvastatin (LIPITOR) 40 mg tablet Take 1 Tab by mouth nightly for 90 days. 90 Tab 1     Allergies   Allergen Reactions    Sulfa (Sulfonamide Antibiotics) Hives and Itching     Physical Exam     Visit Vitals  /69 (BP 1 Location: Right arm, BP Patient Position: Sitting, BP Cuff Size: Large adult)   Pulse 78   Temp 98.6 °F (37 °C) (Oral)   Resp 20   Ht 5' 3\" (1.6 m)   Wt 206 lb (93.4 kg)   SpO2 95%   BMI 36.49 kg/m²     GEN: No apparent distress. Responds to all questions appropriately. Lungs: No labored respirations. Talking in complete sentences without difficulty. MSK: Neck    Sensation  C5-T1: Intact    Motor Strength:  Shoulder Abduction(C5): Left 4+/5 (strong with short effort, otherwise pain limited);  Right 5/5  Wrist Extension(C6): Left 5/5 Right 5/5  Wrist Flexion(C7): Left 4+/5 Right 5/5  Finger (C8): Left 5/5 Right 5/5  Finger Spread(T1): Left 5/5 Right 5/5    Shoulder: Left (compared to right)     Deformity: None    ROM:  Forward Flexion: Active: 180 Passive: 180  ER at 90 degrees abduction: Active: 90 Passive:90  IR at 90 degrees abduction: Active: 90 Passive:90  Abduction: Active: 180 Passive: 180    Palpation:  AC tenderness: Tender  SC tenderness: None  Clavicle tenderness: None  Biceps tenderness: Tender    Strength:  Empty Can(Supraspinatus): Left:4+/5 (painful) Right:5/5  External rotation(Infraspinatus): Left:5/5 Right: 5/5  Internal Rotation (Subscapularis): Left:4+/5 (painful) Right: 5/5    Rotator Cuff Exam:  Neers sign: Positive  Hall sign: Positive  Painful Arc: Positive     Biceps/Labrum/AC Exam: NT d/t discomfort     Neuro/Vascular:  Pulses intact, no edema, and neurologically intact  Skin: No obvious rash or skin breakdown     Tender to palpation along biceps femoris and the gluteal region  [facility]  OFFICE PROCEDURE PROGRESS NOTE        Chart reviewed for the following:   Montserrat Coy MD, have reviewed the History, Physical and updated the Allergic reactions for [address] performed immediately prior to start of procedure:   Montserrat Coy MD, have performed the following reviews on Malena Koehler prior to the start of the procedure:            * Patient was identified by name and date of birth   * Agreement on procedure being performed was verified  * Risks and Benefits explained to the patient  * Procedure site verified and marked as necessary  * Patient was positioned for comfort  * Consent was signed and verified     Time: 2:30pm    Date of procedure: 3/9/2023    Procedure performed by:  Kyrie Mcgee MD    Provider assisted by: Anna Dumas DO and Charlie Rojo MD    Patient assisted by: daughter    Ultrasound Guided Trigger Point Injections     Used ultrasound guidance along biceps femoris and glut max to avoid the sciatic nerve and surrounding vasculature.     Preparation - Cleaned and prepped with chlorhexidine swab x3. Anesthesia - Ethyl chloride spray used to anesthitise the skin prior to injection. Description of procedure - 5 trigger points were identified in the right hamstring and each site was injected with 0.5 ml of 1% Lidocaine and 0.5ml of D5 as a  (50:50) mixture. Patient tolerated the procedure well and there were no complications. Patient reports 70% pain relief following the injections. Ultrasound Guided Left AC and [address] Joint Injection    Treatment options discussed with patient at length, including risks, benefits, alternatives, and the nature of any potential procedures for the problem. Using the Ducksboard system, I performed a MSK US guided aspiration and/or injection of the above target via an in-plane approach after Chlorhexidine prep and needle localization with the curved probe using a 22G needle injecting 40 mg Kenalog and 3 ml Lidocaine 1% w/o Epi into the [address] joint and a linear probe and 25G needle injecting 10 mg Kenalog and 1 ml Lidocaine 1% w/o Epi. Pt reported 70 percent relief of symptoms after injection. The injection was performed for diagnostic and prognostic purposes. Ultrasound Performed and Interpreted by:  Noris Kemp MD, Stephanie Constantino [facility]      After Care Instructions: The patient is asked to continue to rest the joint for a few more days before resuming regular activities. It may be more painful for the first 1-2 days. Watch for fever, or increased swelling or persistent pain in the joint. Call or return to clinic prn if such symptoms occur or there is failure to improve as anticipated. No results found for any visits on 03/09/23. Assessment:      ICD-10-CM ICD-9-CM    1.  Right hip pain  M25.551 719.45 XR HIP RT W OR WO PELV 2-3 VWS      REFERRAL TO HOME HEALTH      REFERRAL TO PHYSICAL THERAPY      2. AC joint arthropathy  M19.019 719.91 AMB POC US DRAIN/INJECTION SMALL JOINT/BURSA      TRIAMCINOLONE ACETONIDE INJ      triamcinolone acetonide (Kenalog) 40 mg/mL injection      lidocaine (XYLOCAINE) 10 mg/mL (1 %) injection      REFERRAL TO HOME HEALTH      REFERRAL TO PHYSICAL THERAPY      3. Glenohumeral arthritis, left  M19.012 716.91 AMB POS US DRAIN/INJECT LARGE JOINT/BURSA      TRIAMCINOLONE ACETONIDE INJ      triamcinolone acetonide (Kenalog) 40 mg/mL injection      lidocaine (XYLOCAINE) 10 mg/mL (1 %) injection      REFERRAL TO HOME HEALTH      REFERRAL TO PHYSICAL THERAPY      4. Myofascial muscle pain  M79.18 729.1 AMB POC US, SONO GUIDE NEEDLE      INJECT TRIGGER POINTS, > 3      REFERRAL TO HOME HEALTH      REFERRAL TO PHYSICAL THERAPY      5. Leg pain, posterior, right  M79.604 729.5 AMB POC US, SONO GUIDE NEEDLE      INJECT TRIGGER POINTS, > 3      REFERRAL TO HOME HEALTH      REFERRAL TO PHYSICAL THERAPY            Plan:  -Improved with injection, but shoulder pain likely IA in origin. Help off on [address] joint injection today and get XR and start PT. Patient prefers [facility] PT, though I recommend going somewhere in person, but will see how she does with [facility] and if no improvement or worsening, will refer to PT. Daughter prefers [facility] as she goes there.    -Personally reviewed x-ray. Mild to moderate arthritis noted. After Care Instructions: The patient is asked to continue to rest the joint for a few more days before resuming regular activities. It may be more painful for the first 1-2 days. Watch for fever, or increased swelling or persistent pain in the joint. Call or return to clinic prn if such symptoms occur or there is failure to improve as anticipated. Follow-up and Dispositions    Return in about 2 months (around 5/9/2023) for Hamstring and shoulder pain if needed.

## 2023-03-09 NOTE — PROGRESS NOTES
Identified Patient with two Patient identifiers (Name and ). Two Patient Identifiers confirmed. Reviewed record in preparation for visit and have obtained necessary documentation. Chief Complaint   Patient presents with    Arm Pain     Follow up on shoulder pain, patient is having left arm pain from the shoulder to the elbow. Currently pain is 7/10. Hip Pain     Right side x 1 month, no known trauma to the area. Feels like hip is catching and locks up. Currently pain is 10/10. Visit Vitals  /69 (BP 1 Location: Right arm, BP Patient Position: Sitting, BP Cuff Size: Large adult)   Pulse 78   Temp 98.6 °F (37 °C) (Oral)   Resp 20   Ht 5' 3\" (1.6 m)   Wt 206 lb (93.4 kg)   SpO2 95%   BMI 36.49 kg/m²       1. Have you been to the ER, urgent care clinic since your last visit? Hospitalized since your last visit? No    2. Have you seen or consulted any other health care providers outside of the [address] [place] [address] since your last visit? Include any pap smears or colon screening.  No

## 2023-03-12 LAB
ECHO AO ASC DIAM: 3 CM
ECHO AO ASCENDING AORTA INDEX: 1.53 CM/M2
ECHO AO ROOT DIAM: 2.8 CM
ECHO AO ROOT INDEX: 1.43 CM/M2
ECHO AV PEAK GRADIENT: 14 MMHG
ECHO AV PEAK VELOCITY: 1.9 M/S
ECHO AV VELOCITY RATIO: 0.79
ECHO EST RA PRESSURE: 3 MMHG
ECHO LA DIAMETER INDEX: 1.28 CM/M2
ECHO LA DIAMETER: 2.5 CM
ECHO LA TO AORTIC ROOT RATIO: 0.89
ECHO LA VOL 2C: 30 ML (ref 22–52)
ECHO LA VOL 4C: 58 ML (ref 22–52)
ECHO LA VOL BP: 46 ML (ref 22–52)
ECHO LA VOL/BSA BIPLANE: 23 ML/M2 (ref 16–34)
ECHO LA VOLUME AREA LENGTH: 48 ML
ECHO LA VOLUME INDEX A2C: 15 ML/M2 (ref 16–34)
ECHO LA VOLUME INDEX A4C: 30 ML/M2 (ref 16–34)
ECHO LA VOLUME INDEX AREA LENGTH: 24 ML/M2 (ref 16–34)
ECHO LV E' LATERAL VELOCITY: 13 CM/S
ECHO LV E' SEPTAL VELOCITY: 9 CM/S
ECHO LV EDV A2C: 41 ML
ECHO LV EDV A4C: 41 ML
ECHO LV EDV BP: 42 ML (ref 56–104)
ECHO LV EDV INDEX A4C: 21 ML/M2
ECHO LV EDV INDEX BP: 21 ML/M2
ECHO LV EDV NDEX A2C: 21 ML/M2
ECHO LV EJECTION FRACTION A2C: 47 %
ECHO LV EJECTION FRACTION A4C: 71 %
ECHO LV EJECTION FRACTION BIPLANE: 61 % (ref 55–100)
ECHO LV ESV A2C: 22 ML
ECHO LV ESV A4C: 12 ML
ECHO LV ESV BP: 16 ML (ref 19–49)
ECHO LV ESV INDEX A2C: 11 ML/M2
ECHO LV ESV INDEX A4C: 6 ML/M2
ECHO LV ESV INDEX BP: 8 ML/M2
ECHO LV FRACTIONAL SHORTENING: 30 % (ref 28–44)
ECHO LV INTERNAL DIMENSION DIASTOLE INDEX: 1.89 CM/M2
ECHO LV INTERNAL DIMENSION DIASTOLIC: 3.7 CM (ref 3.9–5.3)
ECHO LV INTERNAL DIMENSION SYSTOLIC INDEX: 1.33 CM/M2
ECHO LV INTERNAL DIMENSION SYSTOLIC: 2.6 CM
ECHO LV IVSD: 1.3 CM (ref 0.6–0.9)
ECHO LV MASS 2D: 156.7 G (ref 67–162)
ECHO LV MASS INDEX 2D: 80 G/M2 (ref 43–95)
ECHO LV POSTERIOR WALL DIASTOLIC: 1.2 CM (ref 0.6–0.9)
ECHO LV RELATIVE WALL THICKNESS RATIO: 0.65
ECHO LVOT PEAK GRADIENT: 9 MMHG
ECHO LVOT PEAK VELOCITY: 1.5 M/S
ECHO MV A VELOCITY: 1.09 M/S
ECHO MV AREA PHT: 3.2 CM2
ECHO MV E DECELERATION TIME (DT): 237.9 MS
ECHO MV E VELOCITY: 0.71 M/S
ECHO MV E/A RATIO: 0.65
ECHO MV E/E' LATERAL: 5.46
ECHO MV E/E' RATIO (AVERAGED): 6.68
ECHO MV E/E' SEPTAL: 7.89
ECHO MV PRESSURE HALF TIME (PHT): 69 MS
ECHO RA AREA 4C: 18.1 CM2
ECHO RA END SYSTOLIC VOLUME APICAL 4 CHAMBER INDEX BSA: 27 ML/M2
ECHO RA VOLUME AREA LENGTH APICAL 4 CHAMBER: 56 ML
ECHO RA VOLUME: 52 ML
ECHO RIGHT VENTRICULAR SYSTOLIC PRESSURE (RVSP): 28 MMHG
ECHO RV FREE WALL PEAK S': 14 CM/S
ECHO RV TAPSE: 2 CM (ref 1.7–?)
ECHO TV REGURGITANT MAX VELOCITY: 2.51 M/S
ECHO TV REGURGITANT PEAK GRADIENT: 25 MMHG

## 2023-03-14 ENCOUNTER — HOME CARE VISIT (OUTPATIENT)
Dept: SCHEDULING | Facility: HOME HEALTH | Age: 83
End: 2023-03-14
Payer: MEDICARE

## 2023-03-14 PROCEDURE — G0157 HHC PT ASSISTANT EA 15: HCPCS

## 2023-03-15 ENCOUNTER — HOME CARE VISIT (OUTPATIENT)
Dept: HOME HEALTH SERVICES | Facility: HOME HEALTH | Age: 83
End: 2023-03-15
Payer: MEDICARE

## 2023-03-15 ENCOUNTER — DOCUMENTATION ONLY (OUTPATIENT)
Dept: CARDIOLOGY CLINIC | Age: 83
End: 2023-03-15

## 2023-03-15 VITALS
HEART RATE: 95 BPM | TEMPERATURE: 97.5 F | RESPIRATION RATE: 18 BRPM | OXYGEN SATURATION: 97 % | DIASTOLIC BLOOD PRESSURE: 66 MMHG | SYSTOLIC BLOOD PRESSURE: 130 MMHG

## 2023-03-15 DIAGNOSIS — R53.83 FATIGUE, UNSPECIFIED TYPE: ICD-10-CM

## 2023-03-15 DIAGNOSIS — R06.02 SHORTNESS OF BREATH: Primary | ICD-10-CM

## 2023-03-15 DIAGNOSIS — Z01.818 PREOP TESTING: ICD-10-CM

## 2023-03-15 PROBLEM — R06.09 DYSPNEA ON MINIMAL EXERTION: Status: ACTIVE | Noted: 2023-03-15

## 2023-03-15 NOTE — PROGRESS NOTES
Spoke with patient's daughter. Will schedule [facility]/LHC once schedule for April is available. Understanding expressed.

## 2023-03-15 NOTE — PROGRESS NOTES
D/w pt/daughter by tel  Pt continues to have dyspnea with minimal exertion. Followed by pulm - has been told that she does not have DANIELA    03/08/23    NUCLEAR CARDIAC STRESS TEST 03/15/2023 3/15/2023    Interpretation Summary    Nuclear Findings: Evidence of transient ischemic dilation (TID). TID ratio is 1.34. Nuclear Findings: LV perfusion is normal.    Nuclear Findings: The study is negative for myocardial ischemia. ECG: Resting ECG demonstrates normal sinus rhythm. ECG: Stress ECG was negative for ischemia. Stress Test: A pharmacological stress test was performed using lexiscan. Hemodynamics are adequate for diagnosis. Blood pressure demonstrated a normal response and heart rate demonstrated a normal response to stress. The patient's heart rate recovery was normal. The patient reported no symptoms during the stress test.    Post-stress ejection fraction is 80%. Perfusion Conclusion: There is evidence of transient ischemic dilation (TID). TID ratio is 1.34. Signed by: Jassi Moran MD on 3/15/2023  1:47 PM  03/08/23    ECHO ADULT COMPLETE 03/12/2023 3/12/2023    Interpretation Summary    Left Ventricle: Normal left ventricular systolic function with a visually estimated EF of 55 - 60%. Left ventricle size is normal. Mild septal thickening. Mild posterior thickening. Normal wall motion. Abnormal diastolic function. Mitral Valve: Mild regurgitation. Tricuspid Valve: Mild regurgitation. Signed by: Jassi Moran MD on 3/12/2023 10:41 PM    Discussed above results  TID noted on stress nuc without any perfusion defect. Since pt continues to have symptoms - will proceed with LHC/RHC    Anushka Chapman MD, [facility]

## 2023-03-15 NOTE — HOME HEALTH
Subjective: Patient received multiple injections for pain management with good results and decreased pain reported. Falls since last visit NO(if yes complete the Fall Tracking Form and include bsrifallreport):  Caregiver involvement changes: none  Home health supplies by type and quantity ordered/delivered this visit include: na    Clinician asked if patient has had any physician contact since last home care visit and patient states: yes  Clinician asked if patient has any new or changed medications and patient states:  NO   If Yes, were medications reconciled? Na reviewed  Was the certifying physician notified of changes in medications? N/A     Clinical assessment (what this visit means for the patient overall and need for ongoing skilled care) and progress or lack of progress towards SPECIFIC goals: Patient with chronic pain with recent injections which relieved pain. Patient with improved gait training in home and HEP in place for strengthening and stretching for pain management and to increase safety. Written Teaching Material Utilized: N/A    Interdisciplinary communication with:    PT for the purpose of POC collaboration    Discharge planning as follows: Per physician order, When patient is no longer able to participate or progresses to SNF/Hospice, Will discharge when the patient has reached their maximum functional potential and maximum safety in their home and When goals are met    Specific plan for next visit: PT to follow    Signature deferred due to concerns of Covid 19 in community.

## 2023-03-16 ENCOUNTER — HOME CARE VISIT (OUTPATIENT)
Dept: HOME HEALTH SERVICES | Facility: HOME HEALTH | Age: 83
End: 2023-03-16
Payer: MEDICARE

## 2023-03-17 ENCOUNTER — HOME CARE VISIT (OUTPATIENT)
Dept: SCHEDULING | Facility: HOME HEALTH | Age: 83
End: 2023-03-17
Payer: MEDICARE

## 2023-03-17 VITALS
OXYGEN SATURATION: 97 % | HEART RATE: 80 BPM | TEMPERATURE: 97.5 F | RESPIRATION RATE: 14 BRPM | SYSTOLIC BLOOD PRESSURE: 128 MMHG | DIASTOLIC BLOOD PRESSURE: 82 MMHG

## 2023-03-17 PROCEDURE — G0151 HHCP-SERV OF PT,EA 15 MIN: HCPCS

## 2023-03-19 NOTE — HOME HEALTH
Subjective: pt denies complaints; states she has had no pain since injections from  3/09/23  Falls since last visit  NO  if yes complete the Fall Tracking Form and include bsrifallreport):  Caregiver involvement changes: none  Home health supplies by type and quantity ordered/delivered this visit include: n/a    Clinician asked if patient has had any physician contact since last home care visit and patient states: NO  Clinician asked if patient has any new or changed medications and patient states:  NO  If Yes, were medications reconciled?   N/A  Was the certifying physician notified of changes in medications?  n/a    Clinical assessment (what this visit means for the patient overall and need for ongoing skilled care) and progress or lack of progress towards SPECIFIC goals: pt ready for DC from PT; all goals met    Written Teaching Material Utilized: written/pictoral HEP    Interdisciplinary communication with: Shearon Burkitt, PTA for the purpose of collaboration    Discharge planning as follows: pt to remain at home with assist of caregiver and guidance from MD    Specific plan for next visit: 2 [facility]

## 2023-03-25 DIAGNOSIS — M79.2 NEUROPATHIC PAIN OF LEFT FOOT: ICD-10-CM

## 2023-03-25 RX ORDER — PREGABALIN 50 MG/1
CAPSULE ORAL
Qty: 30 CAPSULE | Refills: 0 | Status: SHIPPED | OUTPATIENT
Start: 2023-03-25

## 2023-03-27 ENCOUNTER — TELEPHONE (OUTPATIENT)
Dept: CARDIOLOGY CLINIC | Age: 83
End: 2023-03-27

## 2023-03-27 NOTE — TELEPHONE ENCOUNTER
Spoke with Pt of /[facility] schd. For 4/14/23 at 9:15am arrive at 7:45am Pt aware that they need a  NPO from [facility] the night before. Check in at the second floor Outpt. Reg. Desk. Pt is to have Labs done on 3/27/23-4/10/23.    Medications:  Hold Lasix day of procedure   VO by /nurse Camacho PUCKETT

## 2023-03-28 NOTE — TELEPHONE ENCOUNTER
Pts daughter will call back on the 10th when Dr. Tommy Mckeon returns to discuss medication change and water therapy

## 2023-03-28 NOTE — TELEPHONE ENCOUNTER
----- Message from Jose Gomez sent at 3/28/2023  1:06 PM EDT -----  Subject: Message to Provider    QUESTIONS  Information for Provider? Patient is wanting water therapy added to her   referral. Please call patients daughter, Kvng Gil back to discuss. ---------------------------------------------------------------------------  --------------  Alla Andersen [facility]  [phone]; OK to leave message on [employer],OK to respond with electronic   message via [employer] portal (only for patients who have registered [employer]   account)  ---------------------------------------------------------------------------  --------------  SCRIPT ANSWERS  Relationship to Patient? Other  Representative Name? Kvng Jeffery  Is the representative on the Communication Release of Information (CAYDEN)   form in Epic?  Yes

## 2023-04-04 LAB
ALBUMIN SERPL-MCNC: 4.2 G/DL (ref 3.6–4.6)
ALBUMIN/GLOB SERPL: 1.4 {RATIO} (ref 1.2–2.2)
ALP SERPL-CCNC: 104 IU/L (ref 44–121)
ALT SERPL-CCNC: 18 IU/L (ref 0–32)
AST SERPL-CCNC: 20 IU/L (ref 0–40)
BILIRUB SERPL-MCNC: 0.3 MG/DL (ref 0–1.2)
BUN SERPL-MCNC: 11 MG/DL (ref 8–27)
BUN/CREAT SERPL: 12 (ref 12–28)
CALCIUM SERPL-MCNC: 9.2 MG/DL (ref 8.7–10.3)
CHLORIDE SERPL-SCNC: 101 MMOL/L (ref 96–106)
CO2 SERPL-SCNC: 25 MMOL/L (ref 20–29)
CREAT SERPL-MCNC: 0.93 MG/DL (ref 0.57–1)
EGFRCR SERPLBLD CKD-EPI 2021: 61 ML/MIN/1.73
ERYTHROCYTE [DISTWIDTH] IN BLOOD BY AUTOMATED COUNT: 15.7 % (ref 11.7–15.4)
GLOBULIN SER CALC-MCNC: 2.9 G/DL (ref 1.5–4.5)
GLUCOSE SERPL-MCNC: 109 MG/DL (ref 70–99)
HCT VFR BLD AUTO: 34.4 % (ref 34–46.6)
HGB BLD-MCNC: 11.2 G/DL (ref 11.1–15.9)
MCH RBC QN AUTO: 28.7 PG (ref 26.6–33)
MCHC RBC AUTO-ENTMCNC: 32.6 G/DL (ref 31.5–35.7)
MCV RBC AUTO: 88 FL (ref 79–97)
PLATELET # BLD AUTO: 220 X10E3/UL (ref 150–450)
POTASSIUM SERPL-SCNC: 3.8 MMOL/L (ref 3.5–5.2)
PROT SERPL-MCNC: 7.1 G/DL (ref 6–8.5)
RBC # BLD AUTO: 3.9 X10E6/UL (ref 3.77–5.28)
SODIUM SERPL-SCNC: 141 MMOL/L (ref 134–144)
WBC # BLD AUTO: 9.1 X10E3/UL (ref 3.4–10.8)

## 2023-04-04 RX ORDER — SODIUM CHLORIDE 9 MG/ML
75 INJECTION, SOLUTION INTRAVENOUS CONTINUOUS
Start: 2023-04-14

## 2023-04-04 RX ORDER — SODIUM CHLORIDE 0.9 % (FLUSH) 0.9 %
5-40 SYRINGE (ML) INJECTION EVERY 8 HOURS
Start: 2023-04-14

## 2023-04-04 RX ORDER — SODIUM CHLORIDE 0.9 % (FLUSH) 0.9 %
5-40 SYRINGE (ML) INJECTION AS NEEDED
Start: 2023-04-14

## 2023-04-05 ENCOUNTER — HOSPITAL ENCOUNTER (OUTPATIENT)
Dept: MAMMOGRAPHY | Age: 83
End: 2023-04-05
Attending: INTERNAL MEDICINE
Payer: MEDICARE

## 2023-04-05 PROCEDURE — 77067 SCR MAMMO BI INCL CAD: CPT

## 2023-04-14 ENCOUNTER — HOSPITAL ENCOUNTER (OUTPATIENT)
Age: 83
Setting detail: OUTPATIENT SURGERY
Discharge: HOME | End: 2023-04-14
Attending: INTERNAL MEDICINE | Admitting: INTERNAL MEDICINE
Payer: MEDICARE

## 2023-04-14 VITALS
SYSTOLIC BLOOD PRESSURE: 137 MMHG | HEART RATE: 77 BPM | WEIGHT: 216 LBS | BODY MASS INDEX: 38.27 KG/M2 | HEIGHT: 63 IN | OXYGEN SATURATION: 92 % | RESPIRATION RATE: 16 BRPM | TEMPERATURE: 98 F | DIASTOLIC BLOOD PRESSURE: 66 MMHG

## 2023-04-14 DIAGNOSIS — R06.09 DYSPNEA ON EXERTION: ICD-10-CM

## 2023-04-14 LAB
GLUCOSE BLD STRIP.AUTO-MCNC: 128 MG/DL (ref 65–117)
SERVICE CMNT-IMP: ABNORMAL

## 2023-04-14 PROCEDURE — 74011250636 HC RX REV CODE- 250/636: Performed by: INTERNAL MEDICINE

## 2023-04-14 PROCEDURE — 82810 BLOOD GASES O2 SAT ONLY: CPT | Performed by: INTERNAL MEDICINE

## 2023-04-14 PROCEDURE — 77030041064 HC CATH DX ANGI DXTRTY MEDT -B: Performed by: INTERNAL MEDICINE

## 2023-04-14 PROCEDURE — 99152 MOD SED SAME PHYS/QHP 5/>YRS: CPT | Performed by: INTERNAL MEDICINE

## 2023-04-14 PROCEDURE — 2709999900 HC NON-CHARGEABLE SUPPLY: Performed by: INTERNAL MEDICINE

## 2023-04-14 PROCEDURE — 82962 GLUCOSE BLOOD TEST: CPT

## 2023-04-14 PROCEDURE — C1769 GUIDE WIRE: HCPCS | Performed by: INTERNAL MEDICINE

## 2023-04-14 PROCEDURE — 77030019569 HC BND COMPR RAD TERU -B: Performed by: INTERNAL MEDICINE

## 2023-04-14 PROCEDURE — C1894 INTRO/SHEATH, NON-LASER: HCPCS | Performed by: INTERNAL MEDICINE

## 2023-04-14 PROCEDURE — 77030029065 HC DRSG HEMO QCLOT ZMED -B

## 2023-04-14 PROCEDURE — 74011000636 HC RX REV CODE- 636: Performed by: INTERNAL MEDICINE

## 2023-04-14 PROCEDURE — 93460 R&L HRT ART/VENTRICLE ANGIO: CPT | Performed by: INTERNAL MEDICINE

## 2023-04-14 PROCEDURE — 93458 L HRT ARTERY/VENTRICLE ANGIO: CPT | Performed by: INTERNAL MEDICINE

## 2023-04-14 PROCEDURE — 74011000250 HC RX REV CODE- 250: Performed by: INTERNAL MEDICINE

## 2023-04-14 PROCEDURE — 99153 MOD SED SAME PHYS/QHP EA: CPT | Performed by: INTERNAL MEDICINE

## 2023-04-14 RX ORDER — HEPARIN SODIUM 200 [USP'U]/100ML
INJECTION, SOLUTION INTRAVENOUS
Status: COMPLETED | OUTPATIENT
Start: 2023-04-14 | End: 2023-04-14

## 2023-04-14 RX ORDER — FUROSEMIDE 20 MG/1
20 TABLET ORAL DAILY
Qty: 30 TABLET | Refills: 3 | Status: SHIPPED | OUTPATIENT
Start: 2023-04-14

## 2023-04-14 RX ORDER — LIDOCAINE HYDROCHLORIDE 10 MG/ML
INJECTION INFILTRATION; PERINEURAL AS NEEDED
Status: DISCONTINUED | OUTPATIENT
Start: 2023-04-14 | End: 2023-04-14 | Stop reason: HOSPADM

## 2023-04-14 RX ORDER — SODIUM CHLORIDE 9 MG/ML
75 INJECTION, SOLUTION INTRAVENOUS CONTINUOUS
Status: DISCONTINUED | OUTPATIENT
Start: 2023-04-14 | End: 2023-04-14 | Stop reason: HOSPADM

## 2023-04-14 RX ORDER — MIDAZOLAM HYDROCHLORIDE 1 MG/ML
INJECTION, SOLUTION INTRAMUSCULAR; INTRAVENOUS AS NEEDED
Status: DISCONTINUED | OUTPATIENT
Start: 2023-04-14 | End: 2023-04-14 | Stop reason: HOSPADM

## 2023-04-14 RX ORDER — SODIUM CHLORIDE 0.9 % (FLUSH) 0.9 %
5-40 SYRINGE (ML) INJECTION EVERY 8 HOURS
Status: DISCONTINUED | OUTPATIENT
Start: 2023-04-14 | End: 2023-04-14 | Stop reason: HOSPADM

## 2023-04-14 RX ORDER — VERAPAMIL HYDROCHLORIDE 2.5 MG/ML
INJECTION, SOLUTION INTRAVENOUS AS NEEDED
Status: DISCONTINUED | OUTPATIENT
Start: 2023-04-14 | End: 2023-04-14 | Stop reason: HOSPADM

## 2023-04-14 RX ORDER — HEPARIN SODIUM 1000 [USP'U]/ML
INJECTION, SOLUTION INTRAVENOUS; SUBCUTANEOUS AS NEEDED
Status: DISCONTINUED | OUTPATIENT
Start: 2023-04-14 | End: 2023-04-14 | Stop reason: HOSPADM

## 2023-04-14 RX ORDER — SODIUM CHLORIDE 0.9 % (FLUSH) 0.9 %
5-40 SYRINGE (ML) INJECTION AS NEEDED
Status: DISCONTINUED | OUTPATIENT
Start: 2023-04-14 | End: 2023-04-14 | Stop reason: HOSPADM

## 2023-04-14 RX ORDER — FENTANYL CITRATE 50 UG/ML
INJECTION, SOLUTION INTRAMUSCULAR; INTRAVENOUS AS NEEDED
Status: DISCONTINUED | OUTPATIENT
Start: 2023-04-14 | End: 2023-04-14 | Stop reason: HOSPADM

## 2023-04-14 NOTE — PROCEDURES
BRIEF PROCEDURE NOTE    Date of Procedure: 4/14/2023   Preoperative Diagnosis:  Edema/Dyspnea  Postoperative Diagnosis: No sig CAD  Procedure: Left heart cath, LV angiography, coronary angiography  Interventional Cardiologist: Parminder Lovett MD  Assistant : none  Anesthesia: local + IV sedation  I administered moderate sedation throughout this procedure. An independent trained observer pushed medications at my direction, and monitored the patients level of consciousness and physiological status throughout. Estimated Blood Loss: Minimal    Access: R Radial Access - 6 F sheath    R Brachial - IV exchanged for 6 F glide sheath     Venogram performed    Catheters: RCA : JR4                    LCA : Nohemi Glassing    Findings:     L Main: Med; MLI    LAD: Tortuous; Med; MLI; Distal - small; MLI; D1 -  MLI    LCflex: Med to large; MLI; OM1/OM2 - MLI    RCA: Dominant; Large; MLI; PDA - small to med; MLI    LVEDP: 14 mm Hg     LVEF: Not assessed    No significant gradient across aortic valve. PCI:none        RHC findings:    RAPm= 6      mmHg  RVSP= 31    mmHg  PAPm=  18      mmHg  PCWPm= 8   mmHg  CO=  5.34        l/min  CI= 2.67    Specimens Removed : None    Devices implanted : None    Complications: None    Closure Device:  R Radial - TR band; R Brachial - manual        See full cath note.     Complications: none    Parminder Lovett MD

## 2023-04-14 NOTE — ROUTINE PROCESS
7:48 AM  Patient arrived. ID and allergies verified verbally with patient. Pt voices understanding of procedure to be performed. Consent obtained. Pt prepped for procedure. Pt denies contrast allergy. Patient denies taking any blood thinners. 8:20 AM  TRANSFER - OUT REPORT:    Verbal report given to Danielle(name) on Mabel Friday  being transferred to cath lab(unit) for ordered procedure       Report consisted of patients Situation, Background, Assessment and   Recommendations(SBAR). Information from the following report(s) SBAR was reviewed with the receiving nurse. Lines:   Peripheral IV 04/14/23 Anterior;Proximal;Right Forearm (Active)   Site Assessment Clean, dry, & intact 04/14/23 0817   Phlebitis Assessment 0 04/14/23 0817   Dressing Status Clean, dry, & intact 04/14/23 0817   Dressing Type Transparent 04/14/23 0817   Hub Color/Line Status Pink 04/14/23 0817   Alcohol Cap Used No 04/14/23 0817       Peripheral IV 04/14/23 Anterior;Left;Proximal Forearm (Active)        Opportunity for questions and clarification was provided. Patient transported with:   Registered Nurse      9:20 AM  TRANSFER - IN REPORT:    Verbal report received from Ladi(name) on Mabel Friday  being received from cath lab(unit) for routine post - op      Report consisted of patients Situation, Background, Assessment and   Recommendations(SBAR). Information from the following report(s) SBAR and Procedure Summary was reviewed with the receiving nurse. Opportunity for questions and clarification was provided. Assessment completed upon patients arrival to unit and care assumed. 10:20 AM  Discharge instructions and prescriptions reviewed with  Patient and daughter. Opportunity provided for questions. Patient and daughter verbalized understanding. Signed copy of discharge placed in the front of patient's chart.  Post sedation medications instructions reviewed and given to patient and family/friend. 10:30 AM  2 ml air released from TR Band. No bleeding or hematoma noted. Radial and Ulnar pulse on right wrist palpable. Pt tolerated well. Will continue to monitor. 10:35 AM  3 ml air released from TR Band. No bleeding or hematoma noted. Radial and Ulnar pulse on right wrist palpable. Pt tolerated well. Will continue to monitor. 10:40 AM  3 ml air released from TR Band. No bleeding or hematoma noted. Radial and Ulnar pulse on right wrist palpable. Pt tolerated well. Will continue to monitor. 10:44 AM  Air release completed. TR Band removed from right wrist. No bleeding or  Hematoma. Dressing applied. Wrist immobilizer in place. Radial and ulnar pulse remain palpable on affected extremity. Pt tolerated well. Instructions given to pt regarding movement and activity restrictions. Pt voiced understanding. 11:40 PM   Patient ambulated with min assistant. IV and tele removed. 11:59 AM  Patient escorted via wheelchair to entrance. Daughter driving. Patient discharged into care of Daughter.

## 2023-04-14 NOTE — H&P
H and P as per office note 2/22/23 ( as below)    Pt personally seen and examined. Chart reviewed. Agree with advanced NP's history, exam and  A/P with changes/additons. There were no vitals taken for this visit. Alert/Not in acute distress  Neck - no JVD  CVS - S1 S2 +; Reg; 2/6 sys murmur+  RS : CTAB;   Abd: Soft/BS+  LE - no edema    A/P :    Dyspnea - Stressnuc study 3/8/23 - Abnormal TID - LHC/RHC      Discussed with patient/nursing/family    Conner Melvin MD, Inna Stock MD     [address] [facility], [address] [facility]     Office [phone]        Zaida Stauffer is a 80 y.o. female is here for f/u visit . Primary care physician:  Kimberlyn Palacios MD        Chief complaint:       Chief Complaint   Patient presents with    Hypertension    Slow Heart Rate    Other       DYSPNEA            Dear Dr Maximino Ann,     I had the pleasure of seeing Ms. Mcnair in the office today. Assessment:  HTN  HLD  Dyspnea/Fatigue  DM -  HTN  Former smoker. History of exposure to secondhand smoking  1/2023 - [facility] admn- uncontrolled hypertension  DANIELA     Plan:      Last visit 2019  Stres Nuc study - [place]  ECHO  After recent hospital discharge, has had her blood pressure medications changed and her blood pressures at home are doing well. Lipids-1/15/2023 LDL 59  Aggressive cardiovascular risk factor modification. Follow-up after cardiac work-up/earlier as needed                       Lab Results   Component Value Date/Time     Cholesterol, total 134 01/15/2023 06:21 AM     HDL Cholesterol 45 01/15/2023 06:21 AM     LDL, calculated 59 01/15/2023 06:21 AM     VLDL, calculated 30 01/15/2023 06:21 AM     Triglyceride 150 (H) 01/15/2023 06:21 AM     CHOL/HDL Ratio 3.0 01/15/2023 06:21 AM            Patient understands the plan. All questions were answered to the patient's satisfaction.      Medication Side Effects and Warnings were discussed with patient: yes  Patient Labs were reviewed and or requested:  yes  Patient Past Records were reviewed and or requested: yes     I appreciate the opportunity to be involved in Ms. Pichardo. See note below for details. Please do not hesitate to contact us with questions or concerns. Yin Matt MD     Cardiac Testing/ Procedures: A. Cardiac Cath/PCI: 2/7/17 R radial approach  Attempted R brachial vein - unable to get access; Switched to R Femoral  vein    R SCV angiogram  R Brachial vein venogram    L Main: Nml    LAD: Mid 30%; Med; D1 - MLI    LCflex: Med; MLI; OM1 - small to med; MLI    RCA: Med to large; Dominant; Nml    LVEF: 60%    No significant gradient across aortic valve. PCI: none    RHC findings:    RAPm8= mmHg  RVSP= 42 mmHg  PAPm= 16 mmHg  PCWPm= 9 mmHg    Specimens Removed : None     B.ECHO/CHIP: 1/6/18-normal EF.  11/30/16 Left ventricle: Systolic function was normal. Ejection fraction was  estimated to be 68 % by Lira's biplane technique. There were no  regional wall motion abnormalities. Doppler parameters were consistent  with abnormal left ventricular relaxation (grade 1 diastolic dysfunction). Mitral valve: There was mild regurgitation. Tricuspid valve: There was near moderate regurgitation. Pulmonary artery  systolic pressure: 33 mmHg. C.StressNuclear/Stress ECHO/Stress test: 11/2016 - Nml [place] nuc stress test     D. Vascular: 12/6/16 - RUST resting NICKOLAS     E. EP:2/6/19-48-hour Holter monitor-minimum   heart rate 42 bpm, maximum heart rate 120 bpm average heart rate 67 bpm.  Rare isolated supraventricular beats, rate is V paced, rate experience, rare isolated ventricular beats   E cardio event monitor 1/2018-heart rate 59-83. No events. F. Miscellaneous:     Subjective:  Tiny Larson is a 80 y.o. female who returns for follow up visit. History of fatigue/dyspnea on exertion. Denies chest pain.   Occasional swelling lower extremities  Compliant with blood pressure medications  Blood pressure controlled at home           ROS:  (bold if positive, if negative)              Medications before admission:           Current Outpatient Medications   Medication Sig Dispense    pregabalin (LYRICA) 50 mg capsule TAKE 1 CAPSULE BY MOUTH ONCE DAILY AT NIGHT . DO NOT EXCEED 1 PER 24 HOURS 30 Capsule    benzonatate (Tessalon Perles) 100 mg capsule Take 1 Capsule by mouth three (3) times daily as needed for Cough for up to 7 days. 21 Capsule    oxybutynin (DITROPAN) 5 mg tablet Take 1 Tablet by mouth three (3) times daily as needed for Bladder Spasms for up to 90 days. 270 Tablet    budesonide-formoteroL (SYMBICORT) 160-4.5 mcg/actuation HFAA Take 2 Puffs by inhalation daily as needed for PRN Reason (Other) (SOB). amLODIPine (NORVASC) 5 mg tablet Take 1 Tablet by mouth daily. 30 Tablet    losartan (COZAAR) 100 mg tablet Take 1 Tablet by mouth daily for 90 days. 90 Tablet    fluticasone propionate (FLONASE) 50 mcg/actuation nasal spray 2 Sprays by Both Nostrils route daily. Indications: nasal congestion 1 Each    tiZANidine (ZANAFLEX) 2 mg tablet Take 1 Tablet by mouth nightly as needed for Muscle Spasm(s). 30 Tablet    DULoxetine (CYMBALTA) 60 mg capsule Take 1 capsule by mouth once daily for 30 days 90 Capsule    furosemide (LASIX) 20 mg tablet TAKE 1 TABLET BY MOUTH ONCE DAILY AS NEEDED FOR  PAIN 30 Tablet    alendronate (FOSAMAX) 70 mg tablet TAKE 1 TABLET BY MOUTH ONCE A WEEK IN THE MORNING BEFORE FIRST MEAL, BEVERAGE, OR MEDICATION OF THE DAY      aspirin 81 mg chewable tablet Take 81 mg by mouth daily. mv,calcium,min/iron/folic/vitK (ONE-A-DAY WOMEN'S COMPLETE PO) Take 1 Tab by mouth daily. ketoconazole (NIZORAL) 2 % topical cream Apply  to affected area daily. 30 g    Nystop powder APPLY 1 APPLICATION OF POWDER TOPICALLY TO AFFECTED AREA 4 TIMES DAILY UNTIL RASH RESOLVES 60 g    atorvastatin (LIPITOR) 40 mg tablet Take 1 Tab by mouth nightly for 90 days.  90 Tab cholecalciferol (VITAMIN D3) (1000 Units /25 mcg) tablet Take 1,000 Units by mouth daily. cyanocobalamin 1,000 mcg tablet Take 1,000 mcg by mouth daily. ascorbic acid, vitamin C, (VITAMIN C) 250 mg tablet Take 250 mg by mouth daily. lancets misc accu-chek softclix lancets Check blood sugar daily E11.9 100 Each    glucose blood VI test strips (BLOOD GLUCOSE TEST) strip Accu-chek alexei plus test strips Test blood sugar once daily E11.9 100 Strip    Calcium Carbonate-Vit D3-Min 600 mg calcium- 400 unit tab Take 1 pill 2 x daily (Patient taking differently: Take 1 Tablet by mouth daily.) 60 Tab    latanoprost (XALATAN) 0.005 % ophthalmic solution Administer 1 Drop to both eyes nightly. methylPREDNISolone (MEDROL DOSEPACK) 4 mg tablet Take 1 Tablet by mouth Specific Days and Specific Times. (Patient not taking: Reported on 2/20/2023) 1 Dose Pack    Nebulizers (Sidestream) misc  (Patient not taking: Reported on 2/20/2023)        No current facility-administered medications for this visit. Physical Exam:  Visit Vitals  /70 (BP 1 Location: Left upper arm, BP Patient Position: Sitting, BP Cuff Size: Large adult)   Pulse 92   Ht 5' 3\" (1.6 m)   Wt 201 lb (91.2 kg)   SpO2 94%   BMI 35.61 kg/m²               Gen:    Well-developed, well-nourished, in no acute distress  Neck:  Supple,No JVD, No Carotid Bruit,   Resp:  No accessory muscle use, Clear breath sounds, No rales or rhonchi  Card:   Regular Rythm,Normal S1, S2, No murmurs, rubs or gallop. No thrills.    Abd:  Soft, BS+,   MSK:   No cyanosis  Skin:   No rashes    Neuro: moving all four extremities , follows commands appropriately  Psych:  Good insight, oriented to person, place , alert, Nml Affect  LE: No edema     EKG: Reviewed 1/17/23  EKG-sinus rhythm, poor R wave progression, -IMI-age undetermined        LABS:                 Lab Results   Component Value Date/Time     WBC 6.5 01/14/2023 06:01 PM     HGB 12.5 01/14/2023 06:01 PM HCT 39.8 01/14/2023 06:01 PM     PLATELET 511 46/02/0204 06:01 PM     MCV 90.2 01/14/2023 06:01 PM            Lab Results   Component Value Date/Time     Sodium 141 01/25/2023 03:30 PM     Potassium 4.1 01/25/2023 03:30 PM     Chloride 101 01/25/2023 03:30 PM     CO2 26 01/25/2023 03:30 PM     Anion gap 5 01/14/2023 06:01 PM     Glucose 135 (H) 01/25/2023 03:30 PM     BUN 18 01/25/2023 03:30 PM     Creatinine 0.86 01/25/2023 03:30 PM     BUN/Creatinine ratio 21 01/25/2023 03:30 PM     GFR est AA >60 03/17/2022 07:26 PM     GFR est non-AA 55 (L) 03/17/2022 07:26 PM     Calcium 10.0 01/25/2023 03:30 PM         No results found for: APTT        Lab Results   Component Value Date/Time     INR 1.1 07/20/2020 08:21 AM     INR 1.1 01/06/2017 12:14 PM     INR 1.1 06/16/2014 02:25 PM     Prothrombin time 10.9 07/20/2020 08:21 AM     Prothrombin time 10.7 01/06/2017 12:14 PM     Prothrombin time 11.2 06/16/2014 02:25 PM      No components found for: Clif Tavera MD

## 2023-04-14 NOTE — DISCHARGE INSTRUCTIONS
Transradial Cardiac Catheterization/Angiography Discharge Instructions    It is normal to feel tired the first couple days. Take it easy and follow the physicians instructions. Wear wrist splint for first 24 hours to keep the wrist stable. No repetitive flexing of wrist.       CHECK THE CATHETER INSERTION SITE DAILY:  Remove the wrist dressing 24 hours after the procedure. You may shower 24 hours after the procedure. Wash with soap and water and pat dry. Gentle cleaning of the site with soap and water is sufficient, cover with a dry clean dressing or bandage. Do not apply creams or powders to the area. No soaking the wrist for 3 days. Leave the puncture site open to air after 24 hours post-procedure. CALL THE PHYSICIANS:   If you have signs of infection, such as:            - A fever  If the site becomes red, swollen or feels warm to the touch  If there is drainage or if there is unusual pain at the radial site. MONITOR FOR BLEEDING:    If there is any minor oozing, you may apply a band-aid and remove after 12 hours. If the bleeding continues or if there is a lot of bleeding hold pressure with the middle finger against the puncture site and the thumb against the back of the wrist,call 911 to be transported to the hospital.  DO NOT DRIVE YOURSELF, [employer]. ACTIVITY:   For the first 24 hours do not manipulate the wrist.  No lifting, pushing or pulling over 3-5 pounds with the affected wrist for 7 daysand no straining the insertion site. Do not life grocery bags or the garbage can, do not run the vacuum  or  for 7 days. Start with short walks as in the hospital and gradually increase as tolerated each day. It is recommended to walk 30 minutes 5-7 days per week. Follow your physicians instructions on activity. Avoid walking outside in extremes of heat or cold. Walk inside when it is cold and windy or hot and humid.      Things to keep in mind:  No driving for at least 24 hours, or as designated by your physician. Limit the number of times you go up and down the stairs  Take rests and pace yourself with activity. Be careful and do not strain with bowel movements. Diet:  Drink plenty of fluids for the next 24 - 48 hours to help your body flush out the dye. If you have kidney, heart, or liver disease and have to limit fluids, talk with your doctor before you increase the amount of fluids you drink. Keep eating a heart-healthy, low-fat diet that has lots of fruits, vegetables, and whole grains.

## 2023-04-19 ENCOUNTER — NURSE TRIAGE (OUTPATIENT)
Dept: OTHER | Facility: CLINIC | Age: 83
End: 2023-04-19

## 2023-04-19 ENCOUNTER — APPOINTMENT (OUTPATIENT)
Dept: ULTRASOUND IMAGING | Age: 83
End: 2023-04-19
Attending: EMERGENCY MEDICINE
Payer: MEDICARE

## 2023-04-19 ENCOUNTER — HOSPITAL ENCOUNTER (EMERGENCY)
Age: 83
Discharge: HOME | End: 2023-04-19
Attending: EMERGENCY MEDICINE
Payer: MEDICARE

## 2023-04-19 VITALS
SYSTOLIC BLOOD PRESSURE: 116 MMHG | RESPIRATION RATE: 17 BRPM | OXYGEN SATURATION: 99 % | DIASTOLIC BLOOD PRESSURE: 62 MMHG | HEART RATE: 97 BPM | HEIGHT: 63 IN | WEIGHT: 210 LBS | BODY MASS INDEX: 37.21 KG/M2 | TEMPERATURE: 97.8 F

## 2023-04-19 DIAGNOSIS — L03.119 CELLULITIS OF LOWER EXTREMITY, UNSPECIFIED LATERALITY: Primary | ICD-10-CM

## 2023-04-19 DIAGNOSIS — R79.89 ELEVATED D-DIMER: ICD-10-CM

## 2023-04-19 LAB
ALBUMIN SERPL-MCNC: 4 G/DL (ref 3.5–5.2)
ALBUMIN/GLOB SERPL: 1.2 (ref 1.1–2.2)
ALP SERPL-CCNC: 111 U/L (ref 35–104)
ALT SERPL-CCNC: 19 U/L (ref 10–35)
ANION GAP SERPL CALC-SCNC: 8 MMOL/L (ref 5–15)
AST SERPL-CCNC: 19 U/L (ref 10–35)
BASOPHILS # BLD: 0 K/UL (ref 0–1)
BASOPHILS NFR BLD: 0 % (ref 0–1)
BILIRUB SERPL-MCNC: 0.3 MG/DL (ref 0.2–1)
BNP SERPL-MCNC: <36 PG/ML
BUN SERPL-MCNC: 12 MG/DL (ref 8–23)
BUN/CREAT SERPL: 14 (ref 12–20)
CALCIUM SERPL-MCNC: 9.3 MG/DL (ref 8.8–10.2)
CHLORIDE SERPL-SCNC: 103 MMOL/L (ref 98–107)
CO2 SERPL-SCNC: 30 MMOL/L (ref 22–29)
COMMENT, HOLDF: NORMAL
CREAT SERPL-MCNC: 0.86 MG/DL (ref 0.5–0.9)
D DIMER PPP FEU-MCNC: 0.72 UG/ML(FEU)
DIFFERENTIAL METHOD BLD: ABNORMAL
EOSINOPHIL # BLD: 0.1 K/UL (ref 0–0.4)
EOSINOPHIL NFR BLD: 1 %
ERYTHROCYTE [DISTWIDTH] IN BLOOD BY AUTOMATED COUNT: 16.3 % (ref 11.5–14.5)
GLOBULIN SER CALC-MCNC: 3.4 G/DL (ref 2–4)
GLUCOSE SERPL-MCNC: 166 MG/DL (ref 65–100)
HCT VFR BLD AUTO: 33.5 % (ref 35–47)
HGB BLD-MCNC: 10.6 G/DL (ref 11.5–16)
IMM GRANULOCYTES # BLD AUTO: 0 K/UL (ref 0–0.04)
IMM GRANULOCYTES NFR BLD AUTO: 1 % (ref 0–0.5)
LYMPHOCYTES # BLD: 1.9 K/UL (ref 0.8–3.5)
LYMPHOCYTES NFR BLD: 25 % (ref 12–49)
MCH RBC QN AUTO: 29 PG (ref 26–34)
MCHC RBC AUTO-ENTMCNC: 31.6 G/DL (ref 30–36.5)
MCV RBC AUTO: 91.5 FL (ref 80–99)
MONOCYTES # BLD: 0.5 K/UL (ref 0–1)
MONOCYTES NFR BLD: 7 % (ref 5–13)
NEUTS SEG # BLD: 5 K/UL (ref 1.8–8)
NEUTS SEG NFR BLD: 66 % (ref 32–75)
NRBC # BLD: 0 K/UL (ref 0–0.01)
NRBC BLD-RTO: 0 PER 100 WBC
PLATELET # BLD AUTO: 267 K/UL (ref 150–400)
PMV BLD AUTO: 9.7 FL (ref 8.9–12.9)
POTASSIUM SERPL-SCNC: 4.1 MMOL/L (ref 3.5–5.1)
PROT SERPL-MCNC: 7.4 G/DL (ref 6.4–8.3)
RBC # BLD AUTO: 3.66 M/UL (ref 3.8–5.2)
SAMPLES BEING HELD,HOLD: NORMAL
SODIUM SERPL-SCNC: 141 MMOL/L (ref 136–145)
URATE SERPL-MCNC: 6.5 MG/DL (ref 2.4–5.7)
WBC # BLD AUTO: 7.6 K/UL (ref 3.6–11)

## 2023-04-19 PROCEDURE — 80053 COMPREHEN METABOLIC PANEL: CPT

## 2023-04-19 PROCEDURE — 93970 EXTREMITY STUDY: CPT

## 2023-04-19 PROCEDURE — 96374 THER/PROPH/DIAG INJ IV PUSH: CPT

## 2023-04-19 PROCEDURE — 74011250636 HC RX REV CODE- 250/636: Performed by: EMERGENCY MEDICINE

## 2023-04-19 PROCEDURE — 84550 ASSAY OF BLOOD/URIC ACID: CPT

## 2023-04-19 PROCEDURE — 74011250637 HC RX REV CODE- 250/637: Performed by: EMERGENCY MEDICINE

## 2023-04-19 PROCEDURE — 85379 FIBRIN DEGRADATION QUANT: CPT

## 2023-04-19 PROCEDURE — 96375 TX/PRO/DX INJ NEW DRUG ADDON: CPT

## 2023-04-19 PROCEDURE — 85025 COMPLETE CBC W/AUTO DIFF WBC: CPT

## 2023-04-19 PROCEDURE — 99284 EMERGENCY DEPT VISIT MOD MDM: CPT

## 2023-04-19 PROCEDURE — 96376 TX/PRO/DX INJ SAME DRUG ADON: CPT

## 2023-04-19 PROCEDURE — 36415 COLL VENOUS BLD VENIPUNCTURE: CPT

## 2023-04-19 PROCEDURE — 83880 ASSAY OF NATRIURETIC PEPTIDE: CPT

## 2023-04-19 RX ORDER — ONDANSETRON 2 MG/ML
4 INJECTION INTRAMUSCULAR; INTRAVENOUS ONCE
Status: COMPLETED | OUTPATIENT
Start: 2023-04-19 | End: 2023-04-19

## 2023-04-19 RX ORDER — MORPHINE SULFATE 4 MG/ML
4 INJECTION INTRAVENOUS ONCE
Status: COMPLETED | OUTPATIENT
Start: 2023-04-19 | End: 2023-04-19

## 2023-04-19 RX ORDER — CEPHALEXIN 500 MG/1
500 CAPSULE ORAL 3 TIMES DAILY
Qty: 21 CAPSULE | Refills: 0 | Status: SHIPPED | OUTPATIENT
Start: 2023-04-19 | End: 2023-04-26

## 2023-04-19 RX ORDER — CEPHALEXIN 250 MG/1
500 CAPSULE ORAL
Status: COMPLETED | OUTPATIENT
Start: 2023-04-19 | End: 2023-04-19

## 2023-04-19 RX ADMIN — CEPHALEXIN 500 MG: 250 CAPSULE ORAL at 18:48

## 2023-04-19 RX ADMIN — MORPHINE SULFATE 4 MG: 4 INJECTION, SOLUTION INTRAMUSCULAR; INTRAVENOUS at 18:48

## 2023-04-19 RX ADMIN — MORPHINE SULFATE 4 MG: 4 INJECTION, SOLUTION INTRAMUSCULAR; INTRAVENOUS at 16:32

## 2023-04-19 RX ADMIN — ONDANSETRON 4 MG: 2 INJECTION INTRAMUSCULAR; INTRAVENOUS at 16:30

## 2023-04-19 NOTE — ED NOTES
Pt given discharge instructions, patient education, 1 prescription, and follow up information. Pt verbalizes understanding. All questions answered. Pt discharged to home in private vehicle, ambulatory. Pt A&Ox4, RA, pain controlled.

## 2023-04-19 NOTE — TELEPHONE ENCOUNTER
[facility] [place] [facility]    Received call from [address] Bruce Araujo at [facility] with Red Flag Complaint. Subjective: Caller states \"Heart cath done last Friday. Provider told her to stop amlodipine. Patient is having \"     Current Symptoms: bilateral ankle swelling that goes up to her knees. Painful to touch. Onset: 3 weeks ago; gradual    Associated Symptoms: NA    Pain Severity: 10/10; pain and sharp; constant    Temperature: caller denies by unknown method    What has been tried: tracy hose    LMP: NA Pregnant: NA    Recommended disposition: See in Office Today    Care advice provided, patient verbalizes understanding; denies any other questions or concerns; instructed to call back for any new or worsening symptoms. Patient/Caller agrees with recommended disposition; writer provided warm transfer to [id number] at [facility] for appointment scheduling    Attention Provider: Thank you for allowing me to participate in the care of your patient. The patient was connected to triage in response to information provided to the ECC. Please do not respond through this encounter as the response is not directed to a shared pool. Reason for Disposition   Swelling of both ankles (i.e., pedal edema)   MODERATE swelling of both ankles (e.g., swelling extends up to the knees) AND new-onset or worsening    Protocols used:  Ankle Swelling-ADULT-OH, Leg Swelling and Edema-ADULT-OH

## 2023-04-19 NOTE — ED PROVIDER NOTES
45-year-old female with bilateral lower extremity numbness and pain. Patient believes that she might have peripheral neuropathy secondary to her diabetes. She has no history of trauma or DVT. Pain is severe and impairs her ability to ambulate safely. Pain is gone on for several months and she is currently on Neurontin and Lyrica for this pain. The history is provided by the patient and a relative. Leg Pain   This is a chronic problem. The current episode started more than 1 week ago. The problem occurs constantly. The problem has not changed since onset. The pain is present in the left ankle, right ankle, right lower leg and left lower leg. The quality of the pain is described as aching. The pain is moderate. Pertinent negatives include no back pain. The symptoms are aggravated by standing, movement and palpation. She has tried arthritis medications for the symptoms. There has been no history of extremity trauma. Past Medical History:   Diagnosis Date    Arrhythmia     Bradycardia. Arthritis     Burning with urination     Incontinence. Diabetes ([address]     GERD (gastroesophageal reflux disease)     Glaucoma     High cholesterol     Hypertension     Ill-defined condition     EDEMA, LOWER LIMS, URINARY INCONTINENCE    Obesity, Class II, BMI 35-39.9     Sleep apnea     NO CPAP       Past Surgical History:   Procedure Laterality Date    ENDOSCOPY, COLON, DIAGNOSTIC      2008    HX BREAST BIOPSY Left 1970s    Excision of benign breast cysts. HX CATARACT REMOVAL Bilateral     HX GYN      Laparoscopic resection of ?ovarian cysts. HX HEART CATHETERIZATION  01/06/2017    HX HERNIA REPAIR  11/01/2021    Robotic-assisted laparoscopic repair of incarcerated supraumbilical and umbilical hernias with mesh.     HX HYSTERECTOMY      HX KNEE REPLACEMENT Left 2014    HX KNEE REPLACEMENT Right     HX UROLOGICAL      BOTOX FOR FREQUENT URINATION    AL I&D DP SUPRALEVATOR PELVIRCT/RETRORCT ABSC           Family History:   Problem Relation Age of Onset    Heart Disease Mother     Breast Cancer Sister         [de-identified]    Heart Disease Sister     Cancer Sister         BREAST    No Known Problems Father     Cancer Brother         PANCREAS    Alcohol abuse Brother     No Known Problems Brother     No Known Problems Brother     No Known Problems Brother     Anesth Problems Neg Hx        Social History     Socioeconomic History    Marital status:      Spouse name: Not on file    Number of children: Not on file    Years of education: Not on file    Highest education level: Not on file   Occupational History    Not on file   Tobacco Use    Smoking status: Former     Packs/day: 0.50     Years: 5.00     Pack years: 2.50     Types: Cigarettes     Quit date: 1966     Years since quittin.8    Smokeless tobacco: Never   Vaping Use    Vaping Use: Never used   Substance and Sexual Activity    Alcohol use:  Yes     Alcohol/week: 0.0 standard drinks     Comment: rarely    Drug use: No    Sexual activity: Not Currently   Other Topics Concern     Service No    Blood Transfusions No    Caffeine Concern No    Occupational Exposure No    Hobby Hazards No    Sleep Concern Yes    Stress Concern Yes    Weight Concern Yes    Special Diet No    Back Care No    Exercise Yes    Bike Helmet Yes    Seat Belt Yes    Self-Exams No   Social History Narrative    Not on file     Social Determinants of Health     Financial Resource Strain: Low Risk     Difficulty of Paying Living Expenses: Not hard at all   Food Insecurity: No Food Insecurity    Worried About Running Out of Food in the Last Year: Never true    Ran Out of Food in the Last Year: Never true   Transportation Needs: Not on file   Physical Activity: Not on file   Stress: Not on file   Social Connections: Not on file   Intimate Partner Violence: Not on file   Housing Stability: Not on file         ALLERGIES: Sulfa (sulfonamide antibiotics)    Review of Systems   Constitutional: Negative for chills and fever. HENT:  Negative for congestion, rhinorrhea, sneezing and sore throat. Respiratory:  Negative for shortness of breath. Cardiovascular:  Negative for chest pain. Gastrointestinal:  Negative for abdominal pain, nausea and vomiting. Musculoskeletal:  Negative for back pain, myalgias and neck stiffness. Skin:  Negative for rash. Neurological:  Negative for dizziness, weakness and headaches. All other systems reviewed and are negative. Vitals:    04/19/23 1507 04/19/23 1510   BP:  116/62   Pulse: (!) 105 97   Resp: 16 17   Temp: 97.8 °F (36.6 °C) 97.8 °F (36.6 °C)   SpO2:  99%   Weight:  95.3 kg (210 lb)   Height:  5' 3\" (1.6 m)            Physical Exam  Vitals and nursing note reviewed. Constitutional:       Appearance: Normal appearance. She is well-developed. HENT:      Head: Normocephalic and atraumatic. Eyes:      Conjunctiva/sclera: Conjunctivae normal.   Cardiovascular:      Rate and Rhythm: Normal rate and regular rhythm. Pulses: Normal pulses. Heart sounds: Normal heart sounds, S1 normal and S2 normal.   Pulmonary:      Effort: Pulmonary effort is normal. No respiratory distress. Breath sounds: Normal breath sounds. No wheezing. Abdominal:      General: Bowel sounds are normal. There is no distension. Palpations: Abdomen is soft. Tenderness: There is no abdominal tenderness. There is no rebound. Musculoskeletal:         General: Normal range of motion. Cervical back: Full passive range of motion without pain, normal range of motion and neck supple. Skin:     General: Skin is warm and dry. Findings: No rash. Neurological:      Mental Status: She is alert and oriented to person, place, and time. Psychiatric:         Speech: Speech normal.         Behavior: Behavior normal.         Thought Content:  Thought content normal.         Judgment: Judgment normal.        Medical Decision Making  26-year-old female with leg pain with differential of DVT versus gout versus peripheral neuropathy. D-dimer slightly elevated but ultrasound of the legs is negative. Patient is discharged home with diagnosis of peripheral neuropathy. She is given some pain medicine in the ER that helped her and she was suitable for discharge to home. Amount and/or Complexity of Data Reviewed  Labs: ordered. Risk  Prescription drug management. Recent Results (from the past 24 hour(s))   CBC WITH AUTOMATED DIFF    Collection Time: 04/19/23  3:25 PM   Result Value Ref Range    WBC 7.6 3.6 - 11.0 K/uL    RBC 3.66 (L) 3.80 - 5.20 M/uL    HGB 10.6 (L) 11.5 - 16.0 g/dL    HCT 33.5 (L) 35.0 - 47.0 %    MCV 91.5 80.0 - 99.0 FL    MCH 29.0 26.0 - 34.0 PG    MCHC 31.6 30.0 - 36.5 g/dL    RDW 16.3 (H) 11.5 - 14.5 %    PLATELET 669 558 - 949 K/uL    MPV 9.7 8.9 - 12.9 FL    NRBC 0.0 0  WBC    ABSOLUTE NRBC 0.00 0.00 - 0.01 K/uL    NEUTROPHILS 66 32 - 75 %    LYMPHOCYTES 25 12 - 49 %    MONOCYTES 7 5 - 13 %    EOSINOPHILS 1 (L) 7 %    BASOPHILS 0 0 - 1 %    IMMATURE GRANULOCYTES 1 (H) 0 - 0.5 %    ABS. NEUTROPHILS 5.0 1.8 - 8.0 K/UL    ABS. LYMPHOCYTES 1.9 0.8 - 3.5 K/UL    ABS. MONOCYTES 0.5 0.0 - 1.0 K/UL    ABS. EOSINOPHILS 0.1 0.0 - 0.4 K/UL    ABS. BASOPHILS 0.0 0 - 1 K/UL    ABS. IMM. GRANS. 0.0 0.00 - 0.04 K/UL    DF AUTOMATED     METABOLIC PANEL, COMPREHENSIVE    Collection Time: 04/19/23  3:25 PM   Result Value Ref Range    Sodium 141 136 - 145 mmol/L    Potassium 4.1 3.5 - 5.1 mmol/L    Chloride 103 98 - 107 mmol/L    CO2 30 (H) 22 - 29 mmol/L    Anion gap 8 5 - 15 mmol/L    Glucose 166 (H) 65 - 100 mg/dL    BUN 12 8 - 23 MG/DL    Creatinine 0.86 0.50 - 0.90 MG/DL    BUN/Creatinine ratio 14 12 - 20      eGFR >60 >60 ml/min/1.73m2    Calcium 9.3 8.8 - 10.2 MG/DL    Bilirubin, total 0.3 0.2 - 1.0 MG/DL    ALT (SGPT) 19 10 - 35 U/L    AST (SGOT) 19 10 - 35 U/L    Alk.  phosphatase 111 (H) 35 - 104 U/L    Protein, total 7.4 6.4 - 8.3 g/dL Albumin 4.0 3.5 - 5.2 g/dL    Globulin 3.4 2.0 - 4.0 g/dL    A-G Ratio 1.2 1.1 - 2.2     NT-PRO BNP    Collection Time: 04/19/23  3:25 PM   Result Value Ref Range    NT pro-BNP <36 <451 PG/ML   D DIMER    Collection Time: 04/19/23  3:25 PM   Result Value Ref Range    D DIMER 0.72 (H) <0.50 ug/ml(FEU)   URIC ACID    Collection Time: 04/19/23  3:25 PM   Result Value Ref Range    Uric acid 6.5 (H) 2.4 - 5.7 MG/DL   SAMPLES BEING HELD    Collection Time: 04/19/23  3:25 PM   Result Value Ref Range    SAMPLES BEING HELD 1 GOLD     COMMENT        Add-on orders for these samples will be processed based on acceptable specimen integrity and analyte stability, which may vary by analyte. DUPLEX LOWER EXT VENOUS BILAT   Final Result   :     No deep venous thrombosis identified.           Procedures

## 2023-04-19 NOTE — ED NOTES
Pt repositioned in bed for comfort. Daughter remains at bedside. No other requests or concerns at this time.

## 2023-04-19 NOTE — ED TRIAGE NOTES
Pt had recent heart cath 4/14. Pt c/o yodit leg and feet swelling for \"weeks\" Recently taken off Amlodipine by PCP for swelling.

## 2023-04-21 DIAGNOSIS — M79.2 NEUROPATHIC PAIN OF LEFT FOOT: Primary | ICD-10-CM

## 2023-04-21 RX ORDER — PREGABALIN 75 MG/1
75 CAPSULE ORAL 2 TIMES DAILY
Qty: 20 CAPSULE | Refills: 0 | Status: SHIPPED | OUTPATIENT
Start: 2023-04-21 | End: 2023-05-01

## 2023-04-24 DIAGNOSIS — L29.9 ITCHING: ICD-10-CM

## 2023-04-24 DIAGNOSIS — I10 PRIMARY HYPERTENSION: ICD-10-CM

## 2023-04-24 RX ORDER — LOSARTAN POTASSIUM 100 MG/1
TABLET ORAL
Qty: 90 TABLET | Refills: 0 | Status: SHIPPED | OUTPATIENT
Start: 2023-04-24

## 2023-04-24 RX ORDER — HYDROXYZINE 25 MG/1
TABLET, FILM COATED ORAL
Qty: 30 TABLET | Refills: 0 | Status: SHIPPED | OUTPATIENT
Start: 2023-04-24

## 2023-05-31 DIAGNOSIS — M19.019 PRIMARY OSTEOARTHRITIS OF SHOULDER, UNSPECIFIED LATERALITY: ICD-10-CM

## 2023-05-31 DIAGNOSIS — M79.604 PAIN IN RIGHT LEG: ICD-10-CM

## 2023-05-31 DIAGNOSIS — M19.012 PRIMARY OSTEOARTHRITIS, LEFT SHOULDER: Primary | ICD-10-CM

## 2023-06-01 ENCOUNTER — HOSPITAL ENCOUNTER (EMERGENCY)
Facility: HOSPITAL | Age: 83
Discharge: HOME | End: 2023-06-01
Attending: EMERGENCY MEDICINE
Payer: MEDICARE

## 2023-06-01 ENCOUNTER — APPOINTMENT (OUTPATIENT)
Facility: HOSPITAL | Age: 83
End: 2023-06-01
Payer: MEDICARE

## 2023-06-01 VITALS
TEMPERATURE: 98.4 F | WEIGHT: 215 LBS | SYSTOLIC BLOOD PRESSURE: 149 MMHG | OXYGEN SATURATION: 98 % | HEART RATE: 92 BPM | BODY MASS INDEX: 38.09 KG/M2 | RESPIRATION RATE: 18 BRPM | DIASTOLIC BLOOD PRESSURE: 90 MMHG

## 2023-06-01 DIAGNOSIS — R60.9 PERIPHERAL EDEMA: Primary | ICD-10-CM

## 2023-06-01 DIAGNOSIS — M79.89 LEG SWELLING: ICD-10-CM

## 2023-06-01 LAB
ALBUMIN SERPL-MCNC: 3.7 G/DL (ref 3.5–5)
ALBUMIN/GLOB SERPL: 0.8 (ref 1.1–2.2)
ALP SERPL-CCNC: 108 U/L (ref 45–117)
ALT SERPL-CCNC: 23 U/L (ref 12–78)
ANION GAP SERPL CALC-SCNC: 5 MMOL/L (ref 5–15)
AST SERPL-CCNC: 18 U/L (ref 15–37)
BASOPHILS # BLD: 0 K/UL (ref 0–0.1)
BASOPHILS NFR BLD: 1 % (ref 0–1)
BILIRUB SERPL-MCNC: 0.4 MG/DL (ref 0.2–1)
BUN SERPL-MCNC: 10 MG/DL (ref 6–20)
BUN/CREAT SERPL: 10 (ref 12–20)
CALCIUM SERPL-MCNC: 9.7 MG/DL (ref 8.5–10.1)
CHLORIDE SERPL-SCNC: 105 MMOL/L (ref 97–108)
CO2 SERPL-SCNC: 30 MMOL/L (ref 21–32)
COMMENT:: NORMAL
CREAT SERPL-MCNC: 0.99 MG/DL (ref 0.55–1.02)
DIFFERENTIAL METHOD BLD: ABNORMAL
EOSINOPHIL # BLD: 0.1 K/UL (ref 0–0.4)
EOSINOPHIL NFR BLD: 1 % (ref 0–7)
ERYTHROCYTE [DISTWIDTH] IN BLOOD BY AUTOMATED COUNT: 13.6 % (ref 11.5–14.5)
GLOBULIN SER CALC-MCNC: 4.4 G/DL (ref 2–4)
GLUCOSE SERPL-MCNC: 125 MG/DL (ref 65–100)
HCT VFR BLD AUTO: 36.2 % (ref 35–47)
HGB BLD-MCNC: 11.3 G/DL (ref 11.5–16)
IMM GRANULOCYTES # BLD AUTO: 0 K/UL (ref 0–0.04)
IMM GRANULOCYTES NFR BLD AUTO: 1 % (ref 0–0.5)
LYMPHOCYTES # BLD: 1.3 K/UL (ref 0.8–3.5)
LYMPHOCYTES NFR BLD: 24 % (ref 12–49)
MCH RBC QN AUTO: 28.8 PG (ref 26–34)
MCHC RBC AUTO-ENTMCNC: 31.2 G/DL (ref 30–36.5)
MCV RBC AUTO: 92.1 FL (ref 80–99)
MONOCYTES # BLD: 0.4 K/UL (ref 0–1)
MONOCYTES NFR BLD: 7 % (ref 5–13)
NEUTS SEG # BLD: 3.8 K/UL (ref 1.8–8)
NEUTS SEG NFR BLD: 66 % (ref 32–75)
NRBC # BLD: 0 K/UL (ref 0–0.01)
NRBC BLD-RTO: 0 PER 100 WBC
NT PRO BNP: 52 PG/ML
PLATELET # BLD AUTO: 268 K/UL (ref 150–400)
PMV BLD AUTO: 9.4 FL (ref 8.9–12.9)
POTASSIUM SERPL-SCNC: 3.7 MMOL/L (ref 3.5–5.1)
PROT SERPL-MCNC: 8.1 G/DL (ref 6.4–8.2)
RBC # BLD AUTO: 3.93 M/UL (ref 3.8–5.2)
SODIUM SERPL-SCNC: 140 MMOL/L (ref 136–145)
SPECIMEN HOLD: NORMAL
WBC # BLD AUTO: 5.6 K/UL (ref 3.6–11)

## 2023-06-01 PROCEDURE — 6370000000 HC RX 637 (ALT 250 FOR IP): Performed by: EMERGENCY MEDICINE

## 2023-06-01 PROCEDURE — 83880 ASSAY OF NATRIURETIC PEPTIDE: CPT

## 2023-06-01 PROCEDURE — 80053 COMPREHEN METABOLIC PANEL: CPT

## 2023-06-01 PROCEDURE — 93970 EXTREMITY STUDY: CPT

## 2023-06-01 PROCEDURE — 85025 COMPLETE CBC W/AUTO DIFF WBC: CPT

## 2023-06-01 PROCEDURE — 36415 COLL VENOUS BLD VENIPUNCTURE: CPT

## 2023-06-01 PROCEDURE — 99284 EMERGENCY DEPT VISIT MOD MDM: CPT

## 2023-06-01 RX ORDER — HYDROCODONE BITARTRATE AND ACETAMINOPHEN 5; 325 MG/1; MG/1
1 TABLET ORAL
Status: COMPLETED | OUTPATIENT
Start: 2023-06-01 | End: 2023-06-01

## 2023-06-01 RX ORDER — HYDROXYZINE HYDROCHLORIDE 25 MG/1
TABLET, FILM COATED ORAL
Qty: 30 TABLET | Refills: 0 | Status: SHIPPED | OUTPATIENT
Start: 2023-06-01 | End: 2023-07-20

## 2023-06-01 RX ORDER — DULOXETIN HYDROCHLORIDE 60 MG/1
CAPSULE, DELAYED RELEASE ORAL
Qty: 90 CAPSULE | Refills: 1 | Status: SHIPPED | OUTPATIENT
Start: 2023-06-01

## 2023-06-01 RX ORDER — PREGABALIN 75 MG/1
75 CAPSULE ORAL 2 TIMES DAILY
Qty: 60 CAPSULE | Refills: 0 | Status: SHIPPED | OUTPATIENT
Start: 2023-06-01 | End: 2023-07-01

## 2023-06-01 RX ADMIN — HYDROCODONE BITARTRATE AND ACETAMINOPHEN 1 TABLET: 5; 325 TABLET ORAL at 19:28

## 2023-06-01 ASSESSMENT — PAIN DESCRIPTION - DESCRIPTORS: DESCRIPTORS: ACHING

## 2023-06-01 ASSESSMENT — ENCOUNTER SYMPTOMS
SORE THROAT: 0
ABDOMINAL PAIN: 0
SHORTNESS OF BREATH: 0
COLOR CHANGE: 0
DIARRHEA: 0
COUGH: 0
VOMITING: 0
BACK PAIN: 0

## 2023-06-01 ASSESSMENT — PAIN DESCRIPTION - LOCATION: LOCATION: LEG

## 2023-06-01 ASSESSMENT — PAIN DESCRIPTION - FREQUENCY: FREQUENCY: CONTINUOUS

## 2023-06-01 ASSESSMENT — PAIN DESCRIPTION - ORIENTATION: ORIENTATION: LEFT;RIGHT

## 2023-06-01 ASSESSMENT — PAIN DESCRIPTION - PAIN TYPE: TYPE: ACUTE PAIN

## 2023-06-01 ASSESSMENT — PAIN SCALES - GENERAL: PAINLEVEL_OUTOF10: 10

## 2023-06-01 ASSESSMENT — PAIN - FUNCTIONAL ASSESSMENT: PAIN_FUNCTIONAL_ASSESSMENT: 0-10

## 2023-06-01 NOTE — ED TRIAGE NOTES
Patient staets she is having severe pain and swelling in both legs, but worse on the right. She states she has been seen at several places but no one is giving her any answers about what is wrong.

## 2023-06-01 NOTE — ED PROVIDER NOTES
[facility] [place] EMERGENCY [address]      Pt Name: Ry Wright  MRN: [id number]  [place] 1940  Date of evaluation: 6/1/2023  Provider: Amado Malcolm [facility]       Chief Complaint   Patient presents with    Leg Swelling    Leg Pain         HISTORY OF PRESENT ILLNESS   (Location/Symptom, Timing/Onset, Context/Setting, Quality, Duration, Modifying Factors, Severity)  Note limiting factors. Ry Wright is a 80 y.o. female with past medical history as listed below who presents to the emergency department for evaluation of bilateral leg swelling with onset 3 months ago. States that the symptoms are present on both legs, but are typically worse on the right side. She states that she has been seen multiple times for this problem and has been told it is likely her neuropathy. She has been advised to elevate her legs, use compression socks, and has been started on 20 mg of Lasix daily which she states does not help her. She had a cardiac cath recently which she was told was normal, she spoke with her cardiologist who advised her to continue Lasix. She denies known heart, liver, kidney problems. Denies chest pain or shortness of breath. Nursing Notes were reviewed. REVIEW OF SYSTEMS    (2-9 systems for level 4, 10 or more for level 5)     Review of Systems   Constitutional:  Negative for fever. HENT:  Negative for congestion and sore throat. Eyes:  Negative for visual disturbance. Respiratory:  Negative for cough and shortness of breath. Cardiovascular:  Positive for leg swelling. Negative for chest pain. Gastrointestinal:  Negative for abdominal pain, diarrhea and vomiting. Genitourinary:  Negative for dysuria. Musculoskeletal:  Negative for back pain and neck pain. Skin:  Negative for color change. Neurological:  Negative for dizziness and headaches. Psychiatric/Behavioral:  Negative for confusion.       Except as noted above the

## 2023-06-02 NOTE — ED NOTES
Provider discussed discharge instructions with patient. Patient voiced understanding. Pt left ED ambulatory with discharge instructions in hand.       [address], [place]  35/10/59 8610

## 2023-06-30 ENCOUNTER — TRANSCRIBE ORDERS (OUTPATIENT)
Facility: HOSPITAL | Age: 83
End: 2023-06-30

## 2023-06-30 DIAGNOSIS — M25.551 RIGHT HIP PAIN: Primary | ICD-10-CM

## 2023-07-11 ENCOUNTER — HOSPITAL ENCOUNTER (OUTPATIENT)
Facility: HOSPITAL | Age: 83
Discharge: HOME | End: 2023-07-14
Attending: ORTHOPAEDIC SURGERY
Payer: MEDICARE

## 2023-07-11 DIAGNOSIS — M25.551 RIGHT HIP PAIN: ICD-10-CM

## 2023-07-11 PROCEDURE — 73721 MRI JNT OF LWR EXTRE W/O DYE: CPT

## 2023-07-20 RX ORDER — HYDROXYZINE HYDROCHLORIDE 25 MG/1
TABLET, FILM COATED ORAL
Qty: 30 TABLET | Refills: 0 | Status: SHIPPED | OUTPATIENT
Start: 2023-07-20

## 2023-07-20 RX ORDER — LOSARTAN POTASSIUM 100 MG/1
TABLET ORAL
Qty: 90 TABLET | Refills: 0 | Status: SHIPPED | OUTPATIENT
Start: 2023-07-20

## 2023-08-16 ENCOUNTER — NURSE TRIAGE (OUTPATIENT)
Dept: OTHER | Facility: CLINIC | Age: 83
End: 2023-08-16

## 2023-08-16 NOTE — TELEPHONE ENCOUNTER
Location of patient: [facility] call from [place] at [facility] with [facility]. Subjective: Caller states \"She is really weak, not doing to well\"     Current Symptoms: cruise on sunday, On Monday- dry cough, body aches, weakness, sore throat, positive Covid test. Nasal congestion Symptoms still consistent. HA. Denies n/v/d, ear pain or SOB. Pt eating and drinking fine. Onset: Monday     Associated Symptoms: reduced activity, increased sleepiness, fatigue    Pain Severity: 7/10; aching; constant    Temperature: chills, last temp was 97.8    What has been tried: OTC medications-no relief     LMP: NA Pregnant: NA    Recommended disposition: See in Office Today or Tomorrow    Care advice provided, patient verbalizes understanding; denies any other questions or concerns; instructed to call back for any new or worsening symptoms. Patient/Caller agrees with recommended disposition; writer provided warm transfer to [facility] at [facility] for appointment scheduling    Attention Provider: Thank you for allowing me to participate in the care of your patient. The patient was connected to triage in response to information provided to the ECC/PSC. Please do not respond through this encounter as the response is not directed to a shared pool.         Reason for Disposition   Patient wants to be seen    Protocols used: Common Cold-ADULT-[place]

## 2023-08-17 ENCOUNTER — APPOINTMENT (OUTPATIENT)
Facility: HOSPITAL | Age: 83
End: 2023-08-17
Payer: MEDICARE

## 2023-08-17 ENCOUNTER — NURSE TRIAGE (OUTPATIENT)
Dept: OTHER | Facility: CLINIC | Age: 83
End: 2023-08-17

## 2023-08-17 ENCOUNTER — TELEPHONE (OUTPATIENT)
Age: 83
End: 2023-08-17

## 2023-08-17 ENCOUNTER — HOSPITAL ENCOUNTER (EMERGENCY)
Facility: HOSPITAL | Age: 83
Discharge: HOME | End: 2023-08-17
Attending: EMERGENCY MEDICINE
Payer: MEDICARE

## 2023-08-17 VITALS
RESPIRATION RATE: 16 BRPM | TEMPERATURE: 98.8 F | DIASTOLIC BLOOD PRESSURE: 74 MMHG | BODY MASS INDEX: 36.14 KG/M2 | SYSTOLIC BLOOD PRESSURE: 132 MMHG | HEIGHT: 63 IN | OXYGEN SATURATION: 96 % | WEIGHT: 204 LBS | HEART RATE: 73 BPM

## 2023-08-17 DIAGNOSIS — R00.1 BRADYCARDIA: ICD-10-CM

## 2023-08-17 DIAGNOSIS — U07.1 COVID-19 VIRUS INFECTION: Primary | ICD-10-CM

## 2023-08-17 LAB
ANION GAP SERPL CALC-SCNC: 5 MMOL/L (ref 5–15)
BASOPHILS # BLD: 0 K/UL (ref 0–0.1)
BASOPHILS NFR BLD: 1 % (ref 0–1)
BUN SERPL-MCNC: 15 MG/DL (ref 6–20)
BUN/CREAT SERPL: 15 (ref 12–20)
CALCIUM SERPL-MCNC: 9.5 MG/DL (ref 8.5–10.1)
CHLORIDE SERPL-SCNC: 107 MMOL/L (ref 97–108)
CO2 SERPL-SCNC: 29 MMOL/L (ref 21–32)
CREAT SERPL-MCNC: 0.98 MG/DL (ref 0.55–1.02)
DIFFERENTIAL METHOD BLD: ABNORMAL
EKG ATRIAL RATE: 57 BPM
EKG DIAGNOSIS: NORMAL
EKG P AXIS: 12 DEGREES
EKG P-R INTERVAL: 148 MS
EKG Q-T INTERVAL: 424 MS
EKG QRS DURATION: 70 MS
EKG QTC CALCULATION (BAZETT): 412 MS
EKG R AXIS: -38 DEGREES
EKG T AXIS: 35 DEGREES
EKG VENTRICULAR RATE: 57 BPM
EOSINOPHIL # BLD: 0.1 K/UL (ref 0–0.4)
EOSINOPHIL NFR BLD: 1 % (ref 0–7)
ERYTHROCYTE [DISTWIDTH] IN BLOOD BY AUTOMATED COUNT: 14.7 % (ref 11.5–14.5)
GLUCOSE SERPL-MCNC: 136 MG/DL (ref 65–100)
HCT VFR BLD AUTO: 39.7 % (ref 35–47)
HGB BLD-MCNC: 12.5 G/DL (ref 11.5–16)
IMM GRANULOCYTES # BLD AUTO: 0.1 K/UL (ref 0–0.04)
IMM GRANULOCYTES NFR BLD AUTO: 1 % (ref 0–0.5)
LYMPHOCYTES # BLD: 1.7 K/UL (ref 0.8–3.5)
LYMPHOCYTES NFR BLD: 26 % (ref 12–49)
MCH RBC QN AUTO: 27.8 PG (ref 26–34)
MCHC RBC AUTO-ENTMCNC: 31.5 G/DL (ref 30–36.5)
MCV RBC AUTO: 88.4 FL (ref 80–99)
MONOCYTES # BLD: 0.6 K/UL (ref 0–1)
MONOCYTES NFR BLD: 10 % (ref 5–13)
NEUTS SEG # BLD: 3.9 K/UL (ref 1.8–8)
NEUTS SEG NFR BLD: 61 % (ref 32–75)
NRBC # BLD: 0 K/UL (ref 0–0.01)
NRBC BLD-RTO: 0 PER 100 WBC
PLATELET # BLD AUTO: 212 K/UL (ref 150–400)
PMV BLD AUTO: 10.4 FL (ref 8.9–12.9)
POTASSIUM SERPL-SCNC: 3.9 MMOL/L (ref 3.5–5.1)
RBC # BLD AUTO: 4.49 M/UL (ref 3.8–5.2)
SODIUM SERPL-SCNC: 141 MMOL/L (ref 136–145)
TROPONIN I SERPL HS-MCNC: 6 NG/L (ref 0–51)
WBC # BLD AUTO: 6.4 K/UL (ref 3.6–11)

## 2023-08-17 PROCEDURE — 36415 COLL VENOUS BLD VENIPUNCTURE: CPT

## 2023-08-17 PROCEDURE — 2580000003 HC RX 258: Performed by: EMERGENCY MEDICINE

## 2023-08-17 PROCEDURE — 71046 X-RAY EXAM CHEST 2 VIEWS: CPT

## 2023-08-17 PROCEDURE — 80048 BASIC METABOLIC PNL TOTAL CA: CPT

## 2023-08-17 PROCEDURE — 6360000002 HC RX W HCPCS: Performed by: EMERGENCY MEDICINE

## 2023-08-17 PROCEDURE — 96374 THER/PROPH/DIAG INJ IV PUSH: CPT

## 2023-08-17 PROCEDURE — 84484 ASSAY OF TROPONIN QUANT: CPT

## 2023-08-17 PROCEDURE — 6370000000 HC RX 637 (ALT 250 FOR IP): Performed by: EMERGENCY MEDICINE

## 2023-08-17 PROCEDURE — 85025 COMPLETE CBC W/AUTO DIFF WBC: CPT

## 2023-08-17 PROCEDURE — 96361 HYDRATE IV INFUSION ADD-ON: CPT

## 2023-08-17 PROCEDURE — 93005 ELECTROCARDIOGRAM TRACING: CPT | Performed by: EMERGENCY MEDICINE

## 2023-08-17 PROCEDURE — 99285 EMERGENCY DEPT VISIT HI MDM: CPT

## 2023-08-17 RX ORDER — 0.9 % SODIUM CHLORIDE 0.9 %
1000 INTRAVENOUS SOLUTION INTRAVENOUS ONCE
Status: COMPLETED | OUTPATIENT
Start: 2023-08-17 | End: 2023-08-17

## 2023-08-17 RX ORDER — ACETAMINOPHEN 500 MG
1000 TABLET ORAL
Status: COMPLETED | OUTPATIENT
Start: 2023-08-17 | End: 2023-08-17

## 2023-08-17 RX ORDER — KETOROLAC TROMETHAMINE 15 MG/ML
15 INJECTION, SOLUTION INTRAMUSCULAR; INTRAVENOUS
Status: COMPLETED | OUTPATIENT
Start: 2023-08-17 | End: 2023-08-17

## 2023-08-17 RX ADMIN — KETOROLAC TROMETHAMINE 15 MG: 15 INJECTION, SOLUTION INTRAMUSCULAR; INTRAVENOUS at 11:47

## 2023-08-17 RX ADMIN — ACETAMINOPHEN 1000 MG: 500 TABLET ORAL at 11:47

## 2023-08-17 RX ADMIN — SODIUM CHLORIDE 1000 ML: 9 INJECTION, SOLUTION INTRAVENOUS at 11:47

## 2023-08-17 ASSESSMENT — PAIN - FUNCTIONAL ASSESSMENT: PAIN_FUNCTIONAL_ASSESSMENT: 0-10

## 2023-08-17 ASSESSMENT — ENCOUNTER SYMPTOMS
ABDOMINAL DISTENTION: 0
NAUSEA: 0
DIARRHEA: 0
VOMITING: 0
ANAL BLEEDING: 0
ABDOMINAL PAIN: 0
COUGH: 1
SHORTNESS OF BREATH: 1
BLOOD IN STOOL: 0

## 2023-08-17 ASSESSMENT — PAIN SCALES - GENERAL: PAINLEVEL_OUTOF10: 10

## 2023-08-17 ASSESSMENT — PAIN DESCRIPTION - LOCATION: LOCATION: GENERALIZED

## 2023-08-17 ASSESSMENT — PAIN DESCRIPTION - DESCRIPTORS: DESCRIPTORS: ACHING

## 2023-08-17 ASSESSMENT — PAIN DESCRIPTION - PAIN TYPE: TYPE: ACUTE PAIN

## 2023-08-17 NOTE — TELEPHONE ENCOUNTER
Spoke to daughter and she just wanted to let you know she is in the hospital. So this is just [place].  Ty

## 2023-08-17 NOTE — ED TRIAGE NOTES
Patient reports she was on a cruise last week- started with [place] on Monday and tested positive for Covid-19 on Tuesday-    Presents today with ongoing body aches, cough and chills. Reports she did take OTC medication this morning but is unsure what one.

## 2023-08-17 NOTE — ED NOTES
Patient does not appear to be in any acute distress/shows no evidence of clinical instability at this time. Provider has reviewed discharge instructions with the patient/family. The patient/family verbalized understanding instructions as well as need for follow up for any further symptoms. Discharge papers given, education provided, and any questions answered. Patient discharged by provider.        Adriane Cotto RN  08/17/23 9358

## 2023-08-17 NOTE — TELEPHONE ENCOUNTER
----- Message from Fany Juanjo sent at 8/17/2023  8:33 AM EDT -----  Subject: Message to Provider    QUESTIONS  Information for Provider? Patients daughter calling to let office know she   is taking the patient to [facility] as her breathing is getting   worse. All the covid symptoms are worsening, body aches, breathing,   headaches, sweats and chills, Pulse ox is fluctuating. Patient is going to   hospital right now and will be there within half an hour. ---------------------------------------------------------------------------  --------------  Erika VALLADARES  [phone]; OK to leave message on voicemail  ---------------------------------------------------------------------------  --------------  SCRIPT ANSWERS  Relationship to Patient? Other/Third Party  Representative Name? Sharath Arciniega  Is the representative on the Communication Release of Information (STAR)   form in Epic?  Yes

## 2023-08-17 NOTE — ED PROVIDER NOTES
appointment as soon as possible for a visit in 3 days        DISCHARGE MEDICATIONS:  Discharge Medication List as of 8/17/2023  1:35 PM        START taking these medications    Details   nirmatrelvir/ritonavir 300/100 (PAXLOVID) 20 x 150 MG & 10 x 100MG TBPK Take 3 tablets by mouth in the morning and 3 tablets in the evening. Take 3 tablets (two 150 mg nirmatrelvir and one 100 mg ritonavir tablets) by mouth every 12 hours for 5 days. ., Disp-30 tablet, R-0Print               Alexis Miner MD (electronically signed)  Emergency Attending Physician        Alexis Miner MD  08/18/23 8231

## 2023-08-17 NOTE — TELEPHONE ENCOUNTER
----- Message from Shlomo Valencia sent at 8/17/2023  9:43 AM EDT -----  Subject: Message to Provider    QUESTIONS  Information for Provider? Patient is calling because she is in the   hospital was having shortens of breath and COVID was getting worse.   ---------------------------------------------------------------------------  --------------  Taran Arriola INFO  [phone]; OK to leave message on voicemail  ---------------------------------------------------------------------------  --------------  SCRIPT ANSWERS  Relationship to Patient?  Self

## 2023-08-18 DIAGNOSIS — M19.019 PRIMARY OSTEOARTHRITIS OF SHOULDER, UNSPECIFIED LATERALITY: ICD-10-CM

## 2023-08-18 DIAGNOSIS — M79.604 PAIN IN RIGHT LEG: ICD-10-CM

## 2023-08-18 DIAGNOSIS — M19.012 PRIMARY OSTEOARTHRITIS, LEFT SHOULDER: ICD-10-CM

## 2023-08-18 RX ORDER — PREGABALIN 75 MG/1
CAPSULE ORAL
Qty: 60 CAPSULE | Refills: 0 | Status: SHIPPED | OUTPATIENT
Start: 2023-08-18 | End: 2023-09-17

## 2023-08-18 RX ORDER — TIZANIDINE 2 MG/1
TABLET ORAL
Qty: 30 TABLET | Refills: 0 | Status: SHIPPED | OUTPATIENT
Start: 2023-08-18

## 2023-08-24 ENCOUNTER — HOSPITAL ENCOUNTER (EMERGENCY)
Facility: HOSPITAL | Age: 83
Discharge: HOME | End: 2023-08-24
Attending: EMERGENCY MEDICINE
Payer: MEDICARE

## 2023-08-24 ENCOUNTER — TELEPHONE (OUTPATIENT)
Facility: CLINIC | Age: 83
End: 2023-08-24

## 2023-08-24 VITALS
HEART RATE: 68 BPM | OXYGEN SATURATION: 97 % | DIASTOLIC BLOOD PRESSURE: 72 MMHG | SYSTOLIC BLOOD PRESSURE: 147 MMHG | RESPIRATION RATE: 20 BRPM

## 2023-08-24 DIAGNOSIS — R11.2 NAUSEA AND VOMITING, UNSPECIFIED VOMITING TYPE: Primary | ICD-10-CM

## 2023-08-24 DIAGNOSIS — E86.0 DEHYDRATION: ICD-10-CM

## 2023-08-24 LAB
ALBUMIN SERPL-MCNC: 4.2 G/DL (ref 3.5–5.2)
ALBUMIN/GLOB SERPL: 1.1 (ref 1.1–2.2)
ALP SERPL-CCNC: 80 U/L (ref 35–104)
ALT SERPL-CCNC: 28 U/L (ref 10–35)
AMYLASE SERPL-CCNC: 72 U/L (ref 28–100)
ANION GAP SERPL CALC-SCNC: 14 MMOL/L (ref 5–15)
AST SERPL-CCNC: 24 U/L (ref 10–35)
BASOPHILS # BLD: 0 K/UL (ref 0–1)
BASOPHILS NFR BLD: 0 % (ref 0–1)
BILIRUB SERPL-MCNC: 0.4 MG/DL (ref 0.2–1)
BUN SERPL-MCNC: 20 MG/DL (ref 8–23)
BUN/CREAT SERPL: 20 (ref 12–20)
CALCIUM SERPL-MCNC: 10.3 MG/DL (ref 8.8–10.2)
CHLORIDE SERPL-SCNC: 99 MMOL/L (ref 98–107)
CO2 SERPL-SCNC: 30 MMOL/L (ref 22–29)
COMMENT:: NORMAL
CREAT SERPL-MCNC: 0.99 MG/DL (ref 0.5–0.9)
DIFFERENTIAL METHOD BLD: ABNORMAL
EOSINOPHIL # BLD: 0.1 K/UL (ref 0–0.4)
EOSINOPHIL NFR BLD: 1 %
ERYTHROCYTE [DISTWIDTH] IN BLOOD BY AUTOMATED COUNT: 14.7 % (ref 11.5–14.5)
GLOBULIN SER CALC-MCNC: 3.8 G/DL (ref 2–4)
GLUCOSE SERPL-MCNC: 134 MG/DL (ref 65–100)
HCT VFR BLD AUTO: 40.8 % (ref 35–47)
HGB BLD-MCNC: 12.9 G/DL (ref 11.5–16)
IMM GRANULOCYTES # BLD AUTO: 0.1 K/UL (ref 0–0.04)
IMM GRANULOCYTES NFR BLD AUTO: 2 % (ref 0–0.5)
LIPASE SERPL-CCNC: 27 U/L (ref 13–60)
LYMPHOCYTES # BLD: 2.2 K/UL (ref 0.8–3.5)
LYMPHOCYTES NFR BLD: 23 % (ref 12–49)
MAGNESIUM SERPL-MCNC: 2.3 MG/DL (ref 1.6–2.4)
MCH RBC QN AUTO: 28 PG (ref 26–34)
MCHC RBC AUTO-ENTMCNC: 31.6 G/DL (ref 30–36.5)
MCV RBC AUTO: 88.5 FL (ref 80–99)
MONOCYTES # BLD: 0.8 K/UL (ref 0–1)
MONOCYTES NFR BLD: 8 % (ref 5–13)
NEUTS SEG # BLD: 6.4 K/UL (ref 1.8–8)
NEUTS SEG NFR BLD: 66 % (ref 32–75)
NRBC # BLD: 0 K/UL (ref 0–0.01)
NRBC BLD-RTO: 0 PER 100 WBC
PHOSPHATE SERPL-MCNC: 3.7 MG/DL (ref 2.5–4.5)
PLATELET # BLD AUTO: 271 K/UL (ref 150–400)
PMV BLD AUTO: 9.2 FL (ref 8.9–12.9)
POTASSIUM SERPL-SCNC: 4.7 MMOL/L (ref 3.5–5.1)
PROT SERPL-MCNC: 8 G/DL (ref 6.4–8.3)
RBC # BLD AUTO: 4.61 M/UL (ref 3.8–5.2)
SODIUM SERPL-SCNC: 143 MMOL/L (ref 136–145)
SPECIMEN HOLD: NORMAL
WBC # BLD AUTO: 9.6 K/UL (ref 3.6–11)

## 2023-08-24 PROCEDURE — 83735 ASSAY OF MAGNESIUM: CPT

## 2023-08-24 PROCEDURE — 85025 COMPLETE CBC W/AUTO DIFF WBC: CPT

## 2023-08-24 PROCEDURE — 96374 THER/PROPH/DIAG INJ IV PUSH: CPT

## 2023-08-24 PROCEDURE — 6360000002 HC RX W HCPCS: Performed by: EMERGENCY MEDICINE

## 2023-08-24 PROCEDURE — 83690 ASSAY OF LIPASE: CPT

## 2023-08-24 PROCEDURE — 2580000003 HC RX 258: Performed by: EMERGENCY MEDICINE

## 2023-08-24 PROCEDURE — 84100 ASSAY OF PHOSPHORUS: CPT

## 2023-08-24 PROCEDURE — 36415 COLL VENOUS BLD VENIPUNCTURE: CPT

## 2023-08-24 PROCEDURE — 80053 COMPREHEN METABOLIC PANEL: CPT

## 2023-08-24 PROCEDURE — 82150 ASSAY OF AMYLASE: CPT

## 2023-08-24 PROCEDURE — 99284 EMERGENCY DEPT VISIT MOD MDM: CPT

## 2023-08-24 PROCEDURE — 96361 HYDRATE IV INFUSION ADD-ON: CPT

## 2023-08-24 RX ORDER — ONDANSETRON 2 MG/ML
8 INJECTION INTRAMUSCULAR; INTRAVENOUS ONCE
Status: COMPLETED | OUTPATIENT
Start: 2023-08-24 | End: 2023-08-24

## 2023-08-24 RX ORDER — 0.9 % SODIUM CHLORIDE 0.9 %
1000 INTRAVENOUS SOLUTION INTRAVENOUS ONCE
Status: COMPLETED | OUTPATIENT
Start: 2023-08-24 | End: 2023-08-24

## 2023-08-24 RX ORDER — ONDANSETRON 4 MG/1
8 TABLET, ORALLY DISINTEGRATING ORAL EVERY 8 HOURS PRN
Qty: 21 TABLET | Refills: 0 | Status: SHIPPED | OUTPATIENT
Start: 2023-08-24

## 2023-08-24 RX ADMIN — SODIUM CHLORIDE 1000 ML: 9 INJECTION, SOLUTION INTRAVENOUS at 21:36

## 2023-08-24 RX ADMIN — ONDANSETRON 8 MG: 2 INJECTION INTRAMUSCULAR; INTRAVENOUS at 21:35

## 2023-08-24 ASSESSMENT — PAIN SCALES - GENERAL: PAINLEVEL_OUTOF10: 4

## 2023-08-24 ASSESSMENT — PAIN - FUNCTIONAL ASSESSMENT: PAIN_FUNCTIONAL_ASSESSMENT: 0-10

## 2023-08-24 ASSESSMENT — PAIN DESCRIPTION - DESCRIPTORS: DESCRIPTORS: ACHING

## 2023-08-24 NOTE — TELEPHONE ENCOUNTER
Patient family called due to concerns of dehydration. Patient was diagnosed with COVID last week and completed 5 day course of Paxlovid with all necessary precautions. Respiratory status and viral syndromes are calming down and patient is saturating well. However, GI symptoms have not yet resolved, and nausea with nonbloody emesis has been occurring more frequently over past day or two. Notably patient has been trying to eat more and drink more, but isn't able to keep anything down. HR going from 94 to 114, though opal toherwise feels okay. Normally has some leg edema but none present today. Does not have home BP cuff. Not taking diuretics. Plan:  Opal seems to be overall doing better from most organ system standpoints with COVID diagnosis. However, I am concerned about dehydration given regular tachycardia, lack of any baseline leg edema, and frequency of emesis. She may be moving towards needing IV fluids and further assessment, but I believe on reason for increased emesis is the larger volume of oral intake patient is attempting. Recommended sipping fluids and avoiding heavy volumes, and doing small volume continuously, and eat only bland soft food items in small volumes. Hydration is most important. If not improving as the night progresses, or any signs of decompensation, then should present to ER for IV hydration and evaluation of tachycardia.

## 2023-08-25 NOTE — ED PROVIDER NOTES
[facility] EMERGENCY DEPT  EMERGENCY DEPARTMENT ENCOUNTER      Pt Name: Madelin Ball  MRN: [id number]  [address] [place] 1940  Date of evaluation: 8/24/2023  Provider: Maliha Watson MD    [address]       Chief Complaint   Patient presents with    Emesis         HISTORY OF PRESENT ILLNESS   (Location/Symptom, Timing/Onset, Context/Setting, Quality, Duration, Modifying Factors, Severity)  Note limiting factors. This is an 80-year-old female with a past medical history significant for symptomatic bradycardia, osteoarthritis, type 2 diabetes, obstructive sleep apnea, hypercholesterolemia, osteoporosis, GERD, hypertension who presents to the ER accompanied by daughter with a complaint of persistent nausea and vomiting for 2 days with decreased appetite activity, general malaise, lethargy. Daughter states that she was given ODT Zofran that she attempted to give to her mother without any relief of stomach discomfort. She spoke to her PCP and was instructed to come to the ER for further evaluation. The patient denies any headache, neck and back pain, chest pain, shortness of breath, abdominal pain, diarrhea, constipation, dysuria, dizziness, extremity weakness or numbness, sick contact, skin rash or recent travel, unusual food sources, previous history of the same. Review of External Medical Records:     Nursing Notes were reviewed. REVIEW OF SYSTEMS    (2-9 systems for level 4, 10 or more for level 5)     Review of Systems   All other systems reviewed and are negative. Except as noted above the remainder of the review of systems was reviewed and negative. PAST MEDICAL HISTORY     Past Medical History:   Diagnosis Date    Arrhythmia     Bradycardia. Arthritis     Burning with urination     Incontinence.     Diabetes ([address])     GERD (gastroesophageal reflux disease)     Glaucoma     High cholesterol     Hypertension     Ill-defined condition     EDEMA, LOWER LIMS, URINARY INCONTINENCE

## 2023-08-25 NOTE — ED TRIAGE NOTES
Pt had covid for a week and was put on antivirals that finished on Saturday. Pt started  vomiting yesterday and feeling weak.

## 2023-08-25 NOTE — ED NOTES
I have reviewed discharge instructions with the patient. Opportunity for questions and clarification was provided. The patient verbalized understanding. Patient discharged out of the ED via ambulation with no difficulty and in stable condition.      Christian Christianson RN  08/24/23 9820

## 2023-09-08 ENCOUNTER — NURSE TRIAGE (OUTPATIENT)
Dept: OTHER | Facility: CLINIC | Age: 83
End: 2023-09-08

## 2023-09-08 NOTE — TELEPHONE ENCOUNTER
Location of patient: [facility]    Received call from [address] [facility] at [facility] with Red Flag Complaint. Subjective: Caller states \"had covid \"     Current Symptoms: had covid symptoms is much better  but was to follow up , but is having hearing loss for months  and is having  right hip pain did have an injection but not better was told has arthritis may need hip replacement hx htn , diabetes     Onset: months     Associated Symptoms: NA    Pain Severity: 9-10/10    Temperature:        What has been tried: tylenol , tramadol     LMP: NA Pregnant: NA    Recommended disposition: See PCP within 3 Days    Care advice provided, patient verbalizes understanding; denies any other questions or concerns; instructed to call back for any new or worsening symptoms. Patient/Caller agrees with recommended disposition; writer provided warm transfer to [facility] at [facility] for appointment scheduling    Attention Provider: Thank you for allowing me to participate in the care of your patient. The patient was connected to triage in response to information provided to the ECC/PSC. Please do not respond through this encounter as the response is not directed to a shared pool.        Reason for Disposition   [1] MODERATE pain (e.g., interferes with normal activities, limping) AND [2] present > 3 days    Protocols used: Hip Pain-ADULT-

## 2023-09-11 ENCOUNTER — OFFICE VISIT (OUTPATIENT)
Age: 83
End: 2023-09-11
Payer: MEDICARE

## 2023-09-11 VITALS
HEIGHT: 63 IN | BODY MASS INDEX: 37.63 KG/M2 | TEMPERATURE: 97.3 F | HEART RATE: 75 BPM | RESPIRATION RATE: 16 BRPM | WEIGHT: 212.4 LBS | DIASTOLIC BLOOD PRESSURE: 60 MMHG | OXYGEN SATURATION: 98 % | SYSTOLIC BLOOD PRESSURE: 128 MMHG

## 2023-09-11 DIAGNOSIS — Z87.448 HISTORY OF BLADDER SUSPENSION PROCEDURE: ICD-10-CM

## 2023-09-11 DIAGNOSIS — R30.0 DYSURIA: ICD-10-CM

## 2023-09-11 DIAGNOSIS — M25.551 PAIN OF RIGHT HIP: ICD-10-CM

## 2023-09-11 DIAGNOSIS — Z98.890 HISTORY OF BLADDER SUSPENSION PROCEDURE: ICD-10-CM

## 2023-09-11 DIAGNOSIS — H65.93 FLUID LEVEL BEHIND TYMPANIC MEMBRANE OF BOTH EARS: Primary | ICD-10-CM

## 2023-09-11 PROBLEM — F33.9 MAJOR DEPRESSIVE DISORDER, RECURRENT, UNSPECIFIED (HCC): Status: ACTIVE | Noted: 2023-09-11

## 2023-09-11 PROBLEM — F33.0 MAJOR DEPRESSIVE DISORDER, RECURRENT, MILD (HCC): Status: ACTIVE | Noted: 2023-09-11

## 2023-09-11 PROBLEM — F33.1 MAJOR DEPRESSIVE DISORDER, RECURRENT, MODERATE (HCC): Status: ACTIVE | Noted: 2023-09-11

## 2023-09-11 LAB
BILIRUBIN, URINE, POC: NORMAL
BLOOD URINE, POC: NEGATIVE
GLUCOSE URINE, POC: NEGATIVE
KETONES, URINE, POC: NORMAL
LEUKOCYTE ESTERASE, URINE, POC: NEGATIVE
NITRITE, URINE, POC: POSITIVE
PH, URINE, POC: 5 (ref 4.6–8)
PROTEIN,URINE, POC: NORMAL
SPECIFIC GRAVITY, URINE, POC: 1.02 (ref 1–1.03)
URINALYSIS CLARITY, POC: NORMAL
URINALYSIS COLOR, POC: NORMAL
UROBILINOGEN, POC: NORMAL

## 2023-09-11 PROCEDURE — G8427 DOCREV CUR MEDS BY ELIG CLIN: HCPCS | Performed by: FAMILY MEDICINE

## 2023-09-11 PROCEDURE — 81001 URINALYSIS AUTO W/SCOPE: CPT | Performed by: FAMILY MEDICINE

## 2023-09-11 PROCEDURE — 99213 OFFICE O/P EST LOW 20 MIN: CPT | Performed by: FAMILY MEDICINE

## 2023-09-11 PROCEDURE — 3074F SYST BP LT 130 MM HG: CPT | Performed by: FAMILY MEDICINE

## 2023-09-11 PROCEDURE — G8417 CALC BMI ABV UP PARAM F/U: HCPCS | Performed by: FAMILY MEDICINE

## 2023-09-11 PROCEDURE — 1090F PRES/ABSN URINE INCON ASSESS: CPT | Performed by: FAMILY MEDICINE

## 2023-09-11 PROCEDURE — 1123F ACP DISCUSS/DSCN MKR DOCD: CPT | Performed by: FAMILY MEDICINE

## 2023-09-11 PROCEDURE — 3078F DIAST BP <80 MM HG: CPT | Performed by: FAMILY MEDICINE

## 2023-09-11 PROCEDURE — PBSHW AMB POC URINALYSIS DIP STICK AUTO W/ MICRO: Performed by: FAMILY MEDICINE

## 2023-09-11 PROCEDURE — G8399 PT W/DXA RESULTS DOCUMENT: HCPCS | Performed by: FAMILY MEDICINE

## 2023-09-11 PROCEDURE — 4004F PT TOBACCO SCREEN RCVD TLK: CPT | Performed by: FAMILY MEDICINE

## 2023-09-11 RX ORDER — HYDROXYZINE HYDROCHLORIDE 25 MG/1
TABLET, FILM COATED ORAL
COMMUNITY
Start: 2023-04-24

## 2023-09-11 RX ORDER — CHLORHEXIDINE GLUCONATE 0.12 MG/ML
RINSE ORAL
COMMUNITY
Start: 2023-06-27

## 2023-09-11 RX ORDER — TRAMADOL HYDROCHLORIDE 50 MG/1
TABLET ORAL
COMMUNITY
Start: 2023-08-04

## 2023-09-11 RX ORDER — OMEPRAZOLE 20 MG/1
20 CAPSULE, DELAYED RELEASE ORAL DAILY
COMMUNITY
Start: 2020-06-13

## 2023-09-11 RX ORDER — PREDNISONE 10 MG/1
10 TABLET ORAL 2 TIMES DAILY
Qty: 10 TABLET | Refills: 0 | Status: SHIPPED | OUTPATIENT
Start: 2023-09-11 | End: 2023-09-16

## 2023-09-11 RX ORDER — ACETAMINOPHEN 500 MG
500 TABLET ORAL
Qty: 360 TABLET | Refills: 1 | Status: SHIPPED | OUTPATIENT
Start: 2023-09-11

## 2023-09-11 SDOH — ECONOMIC STABILITY: FOOD INSECURITY: WITHIN THE PAST 12 MONTHS, YOU WORRIED THAT YOUR FOOD WOULD RUN OUT BEFORE YOU GOT MONEY TO BUY MORE.: NEVER TRUE

## 2023-09-11 SDOH — ECONOMIC STABILITY: HOUSING INSECURITY
IN THE LAST 12 MONTHS, WAS THERE A TIME WHEN YOU DID NOT HAVE A STEADY PLACE TO SLEEP OR SLEPT IN A SHELTER (INCLUDING NOW)?: NO

## 2023-09-11 SDOH — ECONOMIC STABILITY: FOOD INSECURITY: WITHIN THE PAST 12 MONTHS, THE FOOD YOU BOUGHT JUST DIDN'T LAST AND YOU DIDN'T HAVE MONEY TO GET MORE.: NEVER TRUE

## 2023-09-11 SDOH — ECONOMIC STABILITY: INCOME INSECURITY: HOW HARD IS IT FOR YOU TO PAY FOR THE VERY BASICS LIKE FOOD, HOUSING, MEDICAL CARE, AND HEATING?: NOT HARD AT ALL

## 2023-09-11 NOTE — PROGRESS NOTES
Kayce Bradley (:  1940) is a 80 y.o. female,Established patient, here for evaluation of the following chief complaint(s):  Post-COVID Symptoms (Patient has had some issues with her hearing in both ears since having covid a month ago) and Hip Pain (Right side hip pain for almost years, x rays have been completed )         ASSESSMENT/PLAN:  1. Fluid level behind tympanic membrane of both ears  -     [facility] [address], Gen Setting, AuD, Audiology, cheerapp  2. History of bladder suspension procedure  -     mirabegron (MYRBETRIQ) 50 MG TB24; Take 50 mg by mouth daily, Disp-30 tablet, R-0Normal  3. Dysuria  -     mirabegron (MYRBETRIQ) 50 MG TB24; Take 50 mg by mouth daily, Disp-30 tablet, R-0Normal  -     AMB POC URINALYSIS DIP STICK AUTO W/ MICRO  -     Urinalysis with Reflex to Culture; Future  4. Pain of right hip  -     acetaminophen (TYLENOL) 500 MG tablet; Take 1 tablet by mouth every 6-8 hours as needed for Pain, Disp-360 tablet, R-1Normal  -     predniSONE (DELTASONE) 10 MG tablet; Take 1 tablet by mouth 2 times daily for 5 days, Disp-10 tablet, R-0Normal      No follow-ups on file. Subjective   SUBJECTIVE/OBJECTIVE:  Patient complains of on going hearing loss, which was worsened after her recent COVID infection. She states it feels more muffled. She also complains of on going hip pain, she has imaging that shows she has tears and arthritis in the joint. She has been evaluated for surgery, tramadol isn't helping at all. She received a shot, which helped. She has urinary symptoms, that are on and off, but never seem to go away, she has a hx of bladder surgery, was on oxybutynin, but has stopped its use. We discuss that her hearing symptoms are most likely age related and recently worsened because of COVID causing an increase in middle ear effusion. Hip Pain   Associated symptoms include an inability to bear weight and muscle weakness.  Pertinent negatives include no loss of motion,

## 2023-09-12 LAB
APPEARANCE UR: ABNORMAL
BACTERIA URNS QL MICRO: ABNORMAL /HPF
BILIRUB UR QL CFM: NEGATIVE
COLOR UR: ABNORMAL
EPITH CASTS URNS QL MICRO: ABNORMAL /LPF
GLUCOSE UR STRIP.AUTO-MCNC: NEGATIVE MG/DL
HGB UR QL STRIP: NEGATIVE
KETONES UR QL STRIP.AUTO: ABNORMAL MG/DL
LEUKOCYTE ESTERASE UR QL STRIP.AUTO: ABNORMAL
NITRITE UR QL STRIP.AUTO: POSITIVE
PH UR STRIP: 5 (ref 5–8)
PROT UR STRIP-MCNC: 30 MG/DL
RBC #/AREA URNS HPF: ABNORMAL /HPF (ref 0–5)
SP GR UR REFRACTOMETRY: >1.03
URINE CULTURE IF INDICATED: ABNORMAL
UROBILINOGEN UR QL STRIP.AUTO: 0.2 EU/DL (ref 0.2–1)
WBC URNS QL MICRO: ABNORMAL /HPF (ref 0–4)

## 2023-09-13 ENCOUNTER — TELEPHONE (OUTPATIENT)
Age: 83
End: 2023-09-13

## 2023-09-13 DIAGNOSIS — R30.0 DYSURIA: Primary | ICD-10-CM

## 2023-09-13 RX ORDER — LEVOFLOXACIN 500 MG/1
500 TABLET, FILM COATED ORAL
Qty: 3 TABLET | Refills: 0 | Status: SHIPPED | OUTPATIENT
Start: 2023-09-13 | End: 2023-09-18

## 2023-09-13 NOTE — TELEPHONE ENCOUNTER
9/13/2023  10:37 AM    1. Dysuria  Please stop taking prednisone  Start levoquin every 2 days  - levoFLOXacin (LEVAQUIN) 500 MG tablet; Take 1 tablet by mouth every 48 hours for 3 doses  Dispense: 3 tablet; Refill: 0    Escobar Alarcon MD   No future appointments.
134

## 2023-09-14 LAB
BACTERIA SPEC CULT: ABNORMAL
CC UR VC: ABNORMAL
SERVICE CMNT-IMP: ABNORMAL

## 2023-09-21 ENCOUNTER — HOSPITAL ENCOUNTER (OUTPATIENT)
Facility: HOSPITAL | Age: 83
Discharge: HOME | End: 2023-09-21
Attending: ORTHOPAEDIC SURGERY
Payer: MEDICARE

## 2023-09-21 DIAGNOSIS — M25.551 RIGHT HIP PAIN: ICD-10-CM

## 2023-09-21 PROCEDURE — 73721 MRI JNT OF LWR EXTRE W/O DYE: CPT

## 2023-10-05 ENCOUNTER — NURSE ONLY (OUTPATIENT)
Age: 83
End: 2023-10-05
Payer: MEDICARE

## 2023-10-05 ENCOUNTER — OFFICE VISIT (OUTPATIENT)
Age: 83
End: 2023-10-05
Payer: MEDICARE

## 2023-10-05 VITALS
TEMPERATURE: 97 F | DIASTOLIC BLOOD PRESSURE: 72 MMHG | HEART RATE: 95 BPM | HEIGHT: 66 IN | SYSTOLIC BLOOD PRESSURE: 118 MMHG | WEIGHT: 208 LBS | RESPIRATION RATE: 16 BRPM | OXYGEN SATURATION: 98 % | BODY MASS INDEX: 33.43 KG/M2

## 2023-10-05 DIAGNOSIS — Z01.818 PRE-OP TESTING: ICD-10-CM

## 2023-10-05 DIAGNOSIS — M25.551 RIGHT HIP PAIN: ICD-10-CM

## 2023-10-05 DIAGNOSIS — Z00.00 MEDICARE ANNUAL WELLNESS VISIT, SUBSEQUENT: Primary | ICD-10-CM

## 2023-10-05 DIAGNOSIS — Z01.818 PRE-OP EXAM: ICD-10-CM

## 2023-10-05 DIAGNOSIS — R35.0 URINARY FREQUENCY: ICD-10-CM

## 2023-10-05 PROBLEM — F33.9 MAJOR DEPRESSIVE DISORDER, RECURRENT, UNSPECIFIED (HCC): Status: RESOLVED | Noted: 2023-09-11 | Resolved: 2023-10-05

## 2023-10-05 PROBLEM — F33.1 MAJOR DEPRESSIVE DISORDER, RECURRENT, MODERATE (HCC): Status: RESOLVED | Noted: 2023-09-11 | Resolved: 2023-10-05

## 2023-10-05 PROBLEM — F33.0 MAJOR DEPRESSIVE DISORDER, RECURRENT, MILD (HCC): Status: RESOLVED | Noted: 2023-09-11 | Resolved: 2023-10-05

## 2023-10-05 PROCEDURE — 1123F ACP DISCUSS/DSCN MKR DOCD: CPT | Performed by: INTERNAL MEDICINE

## 2023-10-05 PROCEDURE — 99213 OFFICE O/P EST LOW 20 MIN: CPT | Performed by: INTERNAL MEDICINE

## 2023-10-05 PROCEDURE — G8399 PT W/DXA RESULTS DOCUMENT: HCPCS | Performed by: INTERNAL MEDICINE

## 2023-10-05 PROCEDURE — 1036F TOBACCO NON-USER: CPT | Performed by: INTERNAL MEDICINE

## 2023-10-05 PROCEDURE — G8428 CUR MEDS NOT DOCUMENT: HCPCS | Performed by: INTERNAL MEDICINE

## 2023-10-05 PROCEDURE — 1090F PRES/ABSN URINE INCON ASSESS: CPT | Performed by: INTERNAL MEDICINE

## 2023-10-05 PROCEDURE — G0439 PPPS, SUBSEQ VISIT: HCPCS | Performed by: INTERNAL MEDICINE

## 2023-10-05 PROCEDURE — 93005 ELECTROCARDIOGRAM TRACING: CPT | Performed by: INTERNAL MEDICINE

## 2023-10-05 PROCEDURE — G8417 CALC BMI ABV UP PARAM F/U: HCPCS | Performed by: INTERNAL MEDICINE

## 2023-10-05 PROCEDURE — 3078F DIAST BP <80 MM HG: CPT | Performed by: INTERNAL MEDICINE

## 2023-10-05 PROCEDURE — 3074F SYST BP LT 130 MM HG: CPT | Performed by: INTERNAL MEDICINE

## 2023-10-05 PROCEDURE — 93010 ELECTROCARDIOGRAM REPORT: CPT | Performed by: INTERNAL MEDICINE

## 2023-10-05 PROCEDURE — G8484 FLU IMMUNIZE NO ADMIN: HCPCS | Performed by: INTERNAL MEDICINE

## 2023-10-05 ASSESSMENT — PATIENT HEALTH QUESTIONNAIRE - PHQ9
2. FEELING DOWN, DEPRESSED OR HOPELESS: 0
SUM OF ALL RESPONSES TO PHQ QUESTIONS 1-9: 0
9. THOUGHTS THAT YOU WOULD BE BETTER OFF DEAD, OR OF HURTING YOURSELF: 0
4. FEELING TIRED OR HAVING LITTLE ENERGY: 0
SUM OF ALL RESPONSES TO PHQ9 QUESTIONS 1 & 2: 0
5. POOR APPETITE OR OVEREATING: 0
6. FEELING BAD ABOUT YOURSELF - OR THAT YOU ARE A FAILURE OR HAVE LET YOURSELF OR YOUR FAMILY DOWN: 0
7. TROUBLE CONCENTRATING ON THINGS, SUCH AS READING THE NEWSPAPER OR WATCHING TELEVISION: 0
10. IF YOU CHECKED OFF ANY PROBLEMS, HOW DIFFICULT HAVE THESE PROBLEMS MADE IT FOR YOU TO DO YOUR WORK, TAKE CARE OF THINGS AT HOME, OR GET ALONG WITH OTHER PEOPLE: 0
SUM OF ALL RESPONSES TO PHQ QUESTIONS 1-9: 0
3. TROUBLE FALLING OR STAYING ASLEEP: 0
SUM OF ALL RESPONSES TO PHQ QUESTIONS 1-9: 0
SUM OF ALL RESPONSES TO PHQ QUESTIONS 1-9: 0
1. LITTLE INTEREST OR PLEASURE IN DOING THINGS: 0
8. MOVING OR SPEAKING SO SLOWLY THAT OTHER PEOPLE COULD HAVE NOTICED. OR THE OPPOSITE, BEING SO FIGETY OR RESTLESS THAT YOU HAVE BEEN MOVING AROUND A LOT MORE THAN USUAL: 0

## 2023-10-05 ASSESSMENT — LIFESTYLE VARIABLES
HOW MANY STANDARD DRINKS CONTAINING ALCOHOL DO YOU HAVE ON A TYPICAL DAY: PATIENT DOES NOT DRINK
HOW OFTEN DO YOU HAVE A DRINK CONTAINING ALCOHOL: NEVER

## 2023-10-05 NOTE — PATIENT INSTRUCTIONS
[facility]  [address] [place] [address]  Phone: [phone]  Fax: [phone]    Darlene Judd MD        January 30, 2020     [address] [place] [id number]    Patient: Serafin Mata  MR Number: <[id number]>  YOB: 1985  Date of Visit: 1/30/2020    Dear Dr. Fabio Herron:    Thank you for the request for consultation for Serafin aMta to me for the evaluation of moles. Below are the relevant portions of my assessment and plan of care. If you have questions, please do not hesitate to call me. I look forward to following Daria Locke along with you.     Sincerely,        Darlene Judd MD
online. You need 3093-9456 mg of calcium and 0038-4936 IU of vitamin D per day. It is possible to meet your calcium requirement with diet alone, but a vitamin D supplement is usually necessary to meet this goal.  When exposed to the sun, use a sunscreen that protects against both UVA and UVB radiation with an SPF of 30 or greater. Reapply every 2 to 3 hours or after sweating, drying off with a towel, or swimming. Always wear a seat belt when traveling in a car. Always wear a helmet when riding a bicycle or motorcycle.

## 2023-10-06 ENCOUNTER — TELEPHONE (OUTPATIENT)
Age: 83
End: 2023-10-06

## 2023-10-06 LAB
ALBUMIN SERPL-MCNC: 3.8 G/DL (ref 3.5–5)
ALBUMIN/GLOB SERPL: 1 (ref 1.1–2.2)
ALP SERPL-CCNC: 98 U/L (ref 45–117)
ALT SERPL-CCNC: 26 U/L (ref 12–78)
ANION GAP SERPL CALC-SCNC: 7 MMOL/L (ref 5–15)
APPEARANCE UR: CLEAR
AST SERPL-CCNC: 18 U/L (ref 15–37)
BACTERIA URNS QL MICRO: ABNORMAL /HPF
BILIRUB SERPL-MCNC: 0.5 MG/DL (ref 0.2–1)
BILIRUB UR QL: NEGATIVE
BUN SERPL-MCNC: 21 MG/DL (ref 6–20)
BUN/CREAT SERPL: 20 (ref 12–20)
CALCIUM SERPL-MCNC: 10.5 MG/DL (ref 8.5–10.1)
CHLORIDE SERPL-SCNC: 104 MMOL/L (ref 97–108)
CO2 SERPL-SCNC: 27 MMOL/L (ref 21–32)
COLOR UR: ABNORMAL
CREAT SERPL-MCNC: 1.05 MG/DL (ref 0.55–1.02)
EPITH CASTS URNS QL MICRO: ABNORMAL /LPF
EST. AVERAGE GLUCOSE BLD GHB EST-MCNC: 157 MG/DL
GLOBULIN SER CALC-MCNC: 3.9 G/DL (ref 2–4)
GLUCOSE SERPL-MCNC: 125 MG/DL (ref 65–100)
GLUCOSE UR STRIP.AUTO-MCNC: NEGATIVE MG/DL
HBA1C MFR BLD: 7.1 % (ref 4–5.6)
HGB UR QL STRIP: NEGATIVE
HYALINE CASTS URNS QL MICRO: ABNORMAL /LPF (ref 0–5)
KETONES UR QL STRIP.AUTO: NEGATIVE MG/DL
LEUKOCYTE ESTERASE UR QL STRIP.AUTO: ABNORMAL
NITRITE UR QL STRIP.AUTO: NEGATIVE
PH UR STRIP: 5 (ref 5–8)
POTASSIUM SERPL-SCNC: 4.6 MMOL/L (ref 3.5–5.1)
PROT SERPL-MCNC: 7.7 G/DL (ref 6.4–8.2)
PROT UR STRIP-MCNC: NEGATIVE MG/DL
RBC #/AREA URNS HPF: ABNORMAL /HPF (ref 0–5)
SODIUM SERPL-SCNC: 138 MMOL/L (ref 136–145)
SP GR UR REFRACTOMETRY: 1.02 (ref 1–1.03)
URINE CULTURE IF INDICATED: ABNORMAL
UROBILINOGEN UR QL STRIP.AUTO: 0.2 EU/DL (ref 0.2–1)
WBC URNS QL MICRO: ABNORMAL /HPF (ref 0–4)

## 2023-10-06 NOTE — TELEPHONE ENCOUNTER
----- Message from Reji Gonzalez MD sent at 10/6/2023  8:08 AM EDT -----  So far, labs suggest possible UTI. Awaiting her urine culture. Her CMP suggest that she needs to drink more water. When her other labs return, I will be in contact with her and complete her pre-op form. Thanks!

## 2023-10-07 LAB
APTT PPP: 23.6 SEC (ref 22.1–31)
BACTERIA SPEC CULT: NORMAL
INR PPP: 1.1 (ref 0.9–1.1)
PROTHROMBIN TIME: 11.2 SEC (ref 9–11.1)
SERVICE CMNT-IMP: NORMAL
THERAPEUTIC RANGE: NORMAL SECS (ref 58–77)

## 2023-10-11 ENCOUNTER — TELEPHONE (OUTPATIENT)
Age: 83
End: 2023-10-11

## 2023-10-11 NOTE — TELEPHONE ENCOUNTER
----- Message from Omar Dsouza MD sent at 10/9/2023 11:39 AM EDT -----  Please let patient know that her labs sowed she is in the diabetic range again for hemoglobin A1C. Her HBA1C is 7.1. This will not prevent her from having the surgery, but we do need to decide on medications/lifestyle modification after her surgery. I do not want her back on the Metformin so close to surgery. I will make sure that her surgeon gets the full report and all the other labs. I also received her form. Thanks!

## 2023-10-11 NOTE — TELEPHONE ENCOUNTER
Labs reviewed with daughter and they verbalized understanding   She would like to know if you want her taking the glipizide?

## 2023-10-23 ENCOUNTER — TELEPHONE (OUTPATIENT)
Age: 83
End: 2023-10-23

## 2023-10-23 ENCOUNTER — HOSPITAL ENCOUNTER (OUTPATIENT)
Facility: HOSPITAL | Age: 83
Discharge: HOME | End: 2023-10-26
Payer: MEDICARE

## 2023-10-23 VITALS
BODY MASS INDEX: 36.86 KG/M2 | SYSTOLIC BLOOD PRESSURE: 110 MMHG | HEART RATE: 83 BPM | DIASTOLIC BLOOD PRESSURE: 71 MMHG | TEMPERATURE: 97.9 F | HEIGHT: 63 IN | WEIGHT: 208 LBS

## 2023-10-23 LAB
ANION GAP SERPL CALC-SCNC: 7 MMOL/L (ref 5–15)
BUN SERPL-MCNC: 18 MG/DL (ref 6–20)
BUN/CREAT SERPL: 11 (ref 12–20)
CALCIUM SERPL-MCNC: 9.3 MG/DL (ref 8.5–10.1)
CHLORIDE SERPL-SCNC: 107 MMOL/L (ref 97–108)
CO2 SERPL-SCNC: 26 MMOL/L (ref 21–32)
CREAT SERPL-MCNC: 1.57 MG/DL (ref 0.55–1.02)
ERYTHROCYTE [DISTWIDTH] IN BLOOD BY AUTOMATED COUNT: 15.9 % (ref 11.5–14.5)
GLUCOSE SERPL-MCNC: 100 MG/DL (ref 65–100)
HCT VFR BLD AUTO: 34.3 % (ref 35–47)
HGB BLD-MCNC: 10.7 G/DL (ref 11.5–16)
MCH RBC QN AUTO: 28.4 PG (ref 26–34)
MCHC RBC AUTO-ENTMCNC: 31.2 G/DL (ref 30–36.5)
MCV RBC AUTO: 91 FL (ref 80–99)
NRBC # BLD: 0 K/UL (ref 0–0.01)
NRBC BLD-RTO: 0 PER 100 WBC
PLATELET # BLD AUTO: 295 K/UL (ref 150–400)
PMV BLD AUTO: 10.5 FL (ref 8.9–12.9)
POTASSIUM SERPL-SCNC: 4.6 MMOL/L (ref 3.5–5.1)
RBC # BLD AUTO: 3.77 M/UL (ref 3.8–5.2)
SODIUM SERPL-SCNC: 140 MMOL/L (ref 136–145)
WBC # BLD AUTO: 6 K/UL (ref 3.6–11)

## 2023-10-23 PROCEDURE — 86850 RBC ANTIBODY SCREEN: CPT

## 2023-10-23 PROCEDURE — 85027 COMPLETE CBC AUTOMATED: CPT

## 2023-10-23 PROCEDURE — 86920 COMPATIBILITY TEST SPIN: CPT

## 2023-10-23 PROCEDURE — 86870 RBC ANTIBODY IDENTIFICATION: CPT

## 2023-10-23 PROCEDURE — 86922 COMPATIBILITY TEST ANTIGLOB: CPT

## 2023-10-23 PROCEDURE — 36415 COLL VENOUS BLD VENIPUNCTURE: CPT

## 2023-10-23 PROCEDURE — 86901 BLOOD TYPING SEROLOGIC RH(D): CPT

## 2023-10-23 PROCEDURE — 86921 COMPATIBILITY TEST INCUBATE: CPT

## 2023-10-23 PROCEDURE — 86900 BLOOD TYPING SEROLOGIC ABO: CPT

## 2023-10-23 PROCEDURE — APPNB30 APP NON BILLABLE TIME 0-30 MINS: Performed by: NURSE PRACTITIONER

## 2023-10-23 PROCEDURE — 86902 BLOOD TYPE ANTIGEN DONOR EA: CPT

## 2023-10-23 PROCEDURE — 80048 BASIC METABOLIC PNL TOTAL CA: CPT

## 2023-10-23 RX ORDER — MONTELUKAST SODIUM 10 MG/1
10 TABLET ORAL NIGHTLY
COMMUNITY

## 2023-10-23 RX ORDER — BUDESONIDE AND FORMOTEROL FUMARATE DIHYDRATE 160; 4.5 UG/1; UG/1
2 AEROSOL RESPIRATORY (INHALATION) 2 TIMES DAILY
COMMUNITY

## 2023-10-23 ASSESSMENT — PAIN SCALES - GENERAL: PAINLEVEL_OUTOF10: 7

## 2023-10-23 ASSESSMENT — PAIN DESCRIPTION - DESCRIPTORS: DESCRIPTORS: ACHING

## 2023-10-23 ASSESSMENT — PAIN DESCRIPTION - LOCATION: LOCATION: HIP

## 2023-10-23 ASSESSMENT — PAIN DESCRIPTION - ORIENTATION: ORIENTATION: RIGHT

## 2023-10-23 NOTE — PERIOP NOTE
PATIENT GIVEN SURGICAL SITE INFECTION FAQ HANDOUT AND HAND WASHING TIP SHEET. PREOP INSTRUCTIONS REVIEWED AND PATIENT VERBALIZES UNDERSTANDING OF INSTRUCTIONS. PATIENT HAS BEEN GIVEN THE OPPORTUNITY TO ASK ADDITIONAL QUESTIONS. PATIENT ARRIVED IN W/C, CAN STAND TO PIVOT OR TRANSFER WITH MIN ASSIST. PATIENT AND HER DAUGHTER HAVE VIEWED ONLINE PREOP JOINT REPLACEMENT CLASS. PATIENT AND DAUGHTER INFORMED THEY SHOULD TRY AND GET WALKER (DRIVE) [address] BEFORE ARRIVAL TO HOSPITAL.
Testing staff can be reached at [phone]. Special instructions: AND ANY INSTRUCTIONS FROM DR. RODNEY BANG OFFICE     Eating and Drinking Before Surgery    You may eat a regular dinner at the usual time on the day before your surgery. Do NOT eat any solid foods after midnight. You may drink clear liquids only from 12 midnight until 1 hour prior to your arrival time at the hospital on the day of your surgery. Do NOT drink alcohol. Clear liquids include:  Water  Fruit juices without pulp( i.e. apple juice)  Carbonated beverages  Black coffee (no cream/milk)  Tea (no cream/milk)  Gatorade  You may drink up to 12-16 ounces at one time every 4 hours between the hours of midnight and 1 hour before your arrival time at the hospital. Example- if your arrival time at the hospital is 6am, you may drink 12-16 ounces of clear liquids no later than 5am.  If you have any questions, please contact your surgeon's office. Current Outpatient Medications   Medication Sig    montelukast (SINGULAIR) 10 MG tablet Take 1 tablet by mouth nightly    albuterol sulfate (PROAIR RESPICLICK) 124 (90 Base) MCG/ACT aerosol powder inhalation Inhale 2 puffs into the lungs every 4 hours as needed for Wheezing or Shortness of Breath    budesonide-formoterol (SYMBICORT) 160-4.5 MCG/ACT AERO Inhale 2 puffs into the lungs 2 times daily    chlorhexidine (PERIDEX) 0.12 % solution RINSE 15 ML BY MOUTH FOR 30 SECONDS IN THE MORNING AND EVENING AFTER TOOTH BRUSHING.  EXPECTORATE AFTER RINSING, DO NOT SWALLOW (Patient not taking: Reported on 10/5/2023)    hydrOXYzine HCl (ATARAX) 25 MG tablet TAKE 1 TABLET BY MOUTH ONCE DAILY NIGHTLY AS NEEDED FOR ITCHING    omeprazole (PRILOSEC) 20 MG delayed release capsule Take 1 capsule by mouth daily (Patient not taking: Reported on 10/23/2023)    traMADol (ULTRAM) 50 MG tablet  (Patient not taking: Reported on 10/5/2023)    acetaminophen (TYLENOL) 500 MG tablet Take 1 tablet by mouth every 6-8 hours as

## 2023-10-23 NOTE — TELEPHONE ENCOUNTER
Pts daughter called back and said they already received a call to discuss her lab results on 10/11 and was given the all clear for surgery and was told a note would also be sent to the surgeon performing the surgery to let him know pt needs to stay in the hospital a few days after her surgery bc of her medical history.

## 2023-10-23 NOTE — TELEPHONE ENCOUNTER
They are wondering why the lab results are taking so long. Pt is going to hospital today for pre admission and wants to make sure she doesn't need to do anything extra with lab results and what not. I told pts daughter we would fax pre op form to Dr. Gretchen Ny as soon as we get lab results.

## 2023-10-24 LAB
BACTERIA SPEC CULT: NORMAL
BACTERIA SPEC CULT: NORMAL
SERVICE CMNT-IMP: NORMAL

## 2023-10-27 NOTE — H&P
accurate in patients with extremes of muscle mass, extra-renal metabolism of creatinine, excessive creatine ingestion, or following therapy that affects renal tubular secretion. Calcium 10/23/2023 9.3  8.5 - 10.1 MG/DL Final    WBC 10/23/2023 6.0  3.6 - 11.0 K/uL Final    RBC 10/23/2023 3.77 (L)  3.80 - 5.20 M/uL Final    Hemoglobin 10/23/2023 10.7 (L)  11.5 - 16.0 g/dL Final    Hematocrit 10/23/2023 34.3 (L)  35.0 - 47.0 % Final    MCV 10/23/2023 91.0  80.0 - 99.0 FL Final    MCH 10/23/2023 28.4  26.0 - 34.0 PG Final    MCHC 10/23/2023 31.2  30.0 - 36.5 g/dL Final    RDW 10/23/2023 15.9 (H)  11.5 - 14.5 % Final    Platelets 76/70/0795 295  150 - 400 K/uL Final    MPV 10/23/2023 10.5  8.9 - 12.9 FL Final    Nucleated RBCs 10/23/2023 0.0  0  WBC Final    nRBC 10/23/2023 0.00  0.00 - 0.01 K/uL Final    Crossmatch expiration date 10/23/2023 11/02/2023,2359    Final    ABO/Rh 10/23/2023 AB POSITIVE    Final    Antibody Screen 10/23/2023 POS    Final    Antibody Ident 10/23/2023 NO ADDITIONAL ANTIBODIES DETECTED    Final    Blood Bank Comment 10/23/2023 Previously identified anti M    Final    Special Requests 10/23/2023 NO SPECIAL REQUESTS    Final    Culture 10/23/2023 MRSA NOT PRESENT    Final    Culture 10/23/2023     Final                    Value:Screening of patient nares for MRSA is for surveillance purposes and, if positive, to facilitate isolation considerations in high risk settings. It is not intended for automatic decolonization interventions per se as regimens are not sufficiently effective to warrant routine use.       Nurse Only on 10/05/2023   Component Date Value Ref Range Status     Color, UA 10/05/2023 YELLOW/STRAW    Final     Color Reference Range: Straw, Yellow or Dark Yellow    Appearance 10/05/2023 CLEAR  CLEAR   Final    Specific Gravity, UA 10/05/2023 1.018  1.003 - 1.030   Final    pH, Urine 10/05/2023 5.0  5.0 - 8.0   Final    Protein, UA 10/05/2023 Negative  Negative mg/dL Final

## 2023-10-27 NOTE — DISCHARGE INSTRUCTIONS
Patient developed SVT and hypotension during the postoperative period during this hospitalization . She is started on low dose metoprolol for HR control,home losartan and as needed furosemide are held. Blood pressure has remained stable 117/62 today 11/3 on the low dose metoprolol. Continue to hold the losartan and furosemide. She has history of bradycardia and if HR drops below 60,  stop the metoprolol  and restart the losartan if her BP is >140/90              Dr. Sisi Lopez Total Hip Replacement Postoperative Instructions    Follow-Up Appointment:  Follow up in the office 2 weeks after surgery. If this appointment is not already scheduled, please call our office at [phone]. Activity:  Unless you have been specifically instructed otherwise, you can advance your activity as tolerated. Ambulate every hour. Use your incentive spirometer every half hour when resting. Perform your exercises as often as possible, at least 3 times a day. Avoid extremes of motion and vigorous activities. Hip Precautions: Avoid planting your operative foot and rotating (turning) at the hip. Also, keep your toes pointing relatively straightforward without excessive inward or outward turn. Avoid bringing your knee past your belly button. Avoid low seats, recliners, and bleachers for the first 6-8 weeks  You may bear weight fully on your operative extremity with a walker and transition to a cane as soon as you feel comfortable and strong enough. That being said, advance your activity in a stepwise and cautious manner. You will likely feel better than your replaced joint is ready for. Refrain from any high-level activities until we see you back at your follow up appointment. This includes golfing, exercising, and other more intense activities. Prevent any falls. Clear your living environment of rugs or floor objects that may be tripping hazards. Use an assistive device as needed for balance and support.   Application of the ice

## 2023-10-28 LAB
ABO + RH BLD: NORMAL
ANTIGENS PRESENT RBC DONR: NORMAL
ANTIGENS PRESENT RBC DONR: NORMAL
BLD PROD TYP BPU: NORMAL
BLD PROD TYP BPU: NORMAL
BLOOD BANK CMNT PATIENT-IMP: NORMAL
BLOOD BANK DISPENSE STATUS: NORMAL
BLOOD BANK DISPENSE STATUS: NORMAL
BLOOD GROUP ANTIBODIES SERPL: NORMAL
BLOOD GROUP ANTIBODIES SERPL: NORMAL
BPU ID: NORMAL
BPU ID: NORMAL
CROSSMATCH RESULT: NORMAL
CROSSMATCH RESULT: NORMAL
SPECIMEN EXP DATE BLD: NORMAL
UNIT DIVISION: 0
UNIT DIVISION: 0

## 2023-10-30 ENCOUNTER — HOSPITAL ENCOUNTER (INPATIENT)
Facility: HOSPITAL | Age: 83
LOS: 3 days | Discharge: NURSING, REHAB OR LONG-TERM CARE | DRG: 470 | End: 2023-11-03
Attending: ORTHOPAEDIC SURGERY | Admitting: ORTHOPAEDIC SURGERY
Payer: MEDICARE

## 2023-10-30 ENCOUNTER — ANESTHESIA EVENT (OUTPATIENT)
Facility: HOSPITAL | Age: 83
DRG: 470 | End: 2023-10-30
Payer: MEDICARE

## 2023-10-30 ENCOUNTER — APPOINTMENT (OUTPATIENT)
Facility: HOSPITAL | Age: 83
DRG: 470 | End: 2023-10-30
Attending: ORTHOPAEDIC SURGERY
Payer: MEDICARE

## 2023-10-30 ENCOUNTER — ANESTHESIA (OUTPATIENT)
Facility: HOSPITAL | Age: 83
DRG: 470 | End: 2023-10-30
Payer: MEDICARE

## 2023-10-30 DIAGNOSIS — Z96.641 S/P TOTAL RIGHT HIP ARTHROPLASTY: ICD-10-CM

## 2023-10-30 DIAGNOSIS — R06.09 DYSPNEA ON MINIMAL EXERTION: ICD-10-CM

## 2023-10-30 DIAGNOSIS — M16.11 PRIMARY OSTEOARTHRITIS OF RIGHT HIP: Primary | ICD-10-CM

## 2023-10-30 LAB
GLUCOSE BLD STRIP.AUTO-MCNC: 114 MG/DL (ref 65–117)
GLUCOSE BLD STRIP.AUTO-MCNC: 121 MG/DL (ref 65–117)
GLUCOSE BLD STRIP.AUTO-MCNC: 183 MG/DL (ref 65–117)
SERVICE CMNT-IMP: ABNORMAL
SERVICE CMNT-IMP: ABNORMAL
SERVICE CMNT-IMP: NORMAL

## 2023-10-30 PROCEDURE — 2580000003 HC RX 258: Performed by: NURSE ANESTHETIST, CERTIFIED REGISTERED

## 2023-10-30 PROCEDURE — 0SR904A REPLACEMENT OF RIGHT HIP JOINT WITH CERAMIC ON POLYETHYLENE SYNTHETIC SUBSTITUTE, UNCEMENTED, OPEN APPROACH: ICD-10-PCS | Performed by: ORTHOPAEDIC SURGERY

## 2023-10-30 PROCEDURE — 6370000000 HC RX 637 (ALT 250 FOR IP): Performed by: ORTHOPAEDIC SURGERY

## 2023-10-30 PROCEDURE — 6360000002 HC RX W HCPCS: Performed by: ORTHOPAEDIC SURGERY

## 2023-10-30 PROCEDURE — 3700000001 HC ADD 15 MINUTES (ANESTHESIA): Performed by: ORTHOPAEDIC SURGERY

## 2023-10-30 PROCEDURE — 2709999900 HC NON-CHARGEABLE SUPPLY: Performed by: ORTHOPAEDIC SURGERY

## 2023-10-30 PROCEDURE — 3600000015 HC SURGERY LEVEL 5 ADDTL 15MIN: Performed by: ORTHOPAEDIC SURGERY

## 2023-10-30 PROCEDURE — 2580000003 HC RX 258: Performed by: ANESTHESIOLOGY

## 2023-10-30 PROCEDURE — 7100000001 HC PACU RECOVERY - ADDTL 15 MIN: Performed by: ORTHOPAEDIC SURGERY

## 2023-10-30 PROCEDURE — 6370000000 HC RX 637 (ALT 250 FOR IP): Performed by: ANESTHESIOLOGY

## 2023-10-30 PROCEDURE — 3600000005 HC SURGERY LEVEL 5 BASE: Performed by: ORTHOPAEDIC SURGERY

## 2023-10-30 PROCEDURE — 94640 AIRWAY INHALATION TREATMENT: CPT

## 2023-10-30 PROCEDURE — C9290 INJ, BUPIVACAINE LIPOSOME: HCPCS | Performed by: ORTHOPAEDIC SURGERY

## 2023-10-30 PROCEDURE — 3700000000 HC ANESTHESIA ATTENDED CARE: Performed by: ORTHOPAEDIC SURGERY

## 2023-10-30 PROCEDURE — 6360000002 HC RX W HCPCS

## 2023-10-30 PROCEDURE — 6360000002 HC RX W HCPCS: Performed by: ANESTHESIOLOGY

## 2023-10-30 PROCEDURE — 7100000000 HC PACU RECOVERY - FIRST 15 MIN: Performed by: ORTHOPAEDIC SURGERY

## 2023-10-30 PROCEDURE — 6360000002 HC RX W HCPCS: Performed by: NURSE ANESTHETIST, CERTIFIED REGISTERED

## 2023-10-30 PROCEDURE — 2580000003 HC RX 258: Performed by: ORTHOPAEDIC SURGERY

## 2023-10-30 PROCEDURE — 72170 X-RAY EXAM OF PELVIS: CPT

## 2023-10-30 PROCEDURE — 2500000003 HC RX 250 WO HCPCS: Performed by: NURSE ANESTHETIST, CERTIFIED REGISTERED

## 2023-10-30 PROCEDURE — 82962 GLUCOSE BLOOD TEST: CPT

## 2023-10-30 PROCEDURE — C1776 JOINT DEVICE (IMPLANTABLE): HCPCS | Performed by: ORTHOPAEDIC SURGERY

## 2023-10-30 DEVICE — PINNACLE CANCELLOUS BONE SCREW 6.5MM X 20MM
Type: IMPLANTABLE DEVICE | Site: HIP | Status: FUNCTIONAL
Brand: PINNACLE

## 2023-10-30 DEVICE — BIOLOX DELTA CERAMIC FEMORAL HEAD +8.5 36MM DIA 12/14 TAPER
Type: IMPLANTABLE DEVICE | Site: HIP | Status: FUNCTIONAL
Brand: BIOLOX DELTA

## 2023-10-30 DEVICE — PINNACLE CANCELLOUS BONE SCREW 6.5MM X 25MM
Type: IMPLANTABLE DEVICE | Site: HIP | Status: FUNCTIONAL
Brand: PINNACLE

## 2023-10-30 DEVICE — EMPHASYS POLYETHYLENE LINER AOX NEUTRAL 52MM 36MM
Type: IMPLANTABLE DEVICE | Site: HIP | Status: FUNCTIONAL
Brand: EMPHASYS

## 2023-10-30 DEVICE — EMPHASYS ACETABULAR SHELL THREE-HOLE 52MM CEMENTLESS
Type: IMPLANTABLE DEVICE | Site: HIP | Status: FUNCTIONAL
Brand: EMPHASYS

## 2023-10-30 DEVICE — ACTIS DUOFIX HIP PROSTHESIS (FEMORAL STEM 12/14 TAPER CEMENTLESS SIZE 5 STD COLLAR)  CE
Type: IMPLANTABLE DEVICE | Site: HIP | Status: FUNCTIONAL
Brand: ACTIS

## 2023-10-30 RX ORDER — VITAMIN B COMPLEX
1000 TABLET ORAL DAILY
Status: DISCONTINUED | OUTPATIENT
Start: 2023-10-30 | End: 2023-11-03 | Stop reason: HOSPADM

## 2023-10-30 RX ORDER — ACETAMINOPHEN 325 MG/1
650 TABLET ORAL EVERY 6 HOURS
Status: DISCONTINUED | OUTPATIENT
Start: 2023-10-31 | End: 2023-11-03 | Stop reason: HOSPADM

## 2023-10-30 RX ORDER — HYDRALAZINE HYDROCHLORIDE 20 MG/ML
10 INJECTION INTRAMUSCULAR; INTRAVENOUS
Status: DISCONTINUED | OUTPATIENT
Start: 2023-10-30 | End: 2023-10-30 | Stop reason: HOSPADM

## 2023-10-30 RX ORDER — LORAZEPAM 2 MG/ML
1 INJECTION INTRAMUSCULAR ONCE
Status: COMPLETED | OUTPATIENT
Start: 2023-10-30 | End: 2023-10-30

## 2023-10-30 RX ORDER — ONDANSETRON 2 MG/ML
4 INJECTION INTRAMUSCULAR; INTRAVENOUS
Status: COMPLETED | OUTPATIENT
Start: 2023-10-30 | End: 2023-10-30

## 2023-10-30 RX ORDER — PHENYLEPHRINE HCL IN 0.9% NACL 0.4MG/10ML
SYRINGE (ML) INTRAVENOUS PRN
Status: DISCONTINUED | OUTPATIENT
Start: 2023-10-30 | End: 2023-10-30 | Stop reason: SDUPTHER

## 2023-10-30 RX ORDER — TRANEXAMIC ACID 100 MG/ML
INJECTION, SOLUTION INTRAVENOUS PRN
Status: DISCONTINUED | OUTPATIENT
Start: 2023-10-30 | End: 2023-10-30 | Stop reason: SDUPTHER

## 2023-10-30 RX ORDER — FAMOTIDINE 20 MG/1
20 TABLET, FILM COATED ORAL EVERY EVENING
Status: DISCONTINUED | OUTPATIENT
Start: 2023-10-30 | End: 2023-11-03 | Stop reason: HOSPADM

## 2023-10-30 RX ORDER — HYDROXYZINE HYDROCHLORIDE 10 MG/1
10 TABLET, FILM COATED ORAL EVERY 8 HOURS PRN
Status: DISCONTINUED | OUTPATIENT
Start: 2023-10-30 | End: 2023-10-30 | Stop reason: SDUPTHER

## 2023-10-30 RX ORDER — MIDAZOLAM HYDROCHLORIDE 2 MG/2ML
2 INJECTION, SOLUTION INTRAMUSCULAR; INTRAVENOUS
Status: DISCONTINUED | OUTPATIENT
Start: 2023-10-30 | End: 2023-10-30 | Stop reason: HOSPADM

## 2023-10-30 RX ORDER — SODIUM CHLORIDE 0.9 % (FLUSH) 0.9 %
5-40 SYRINGE (ML) INJECTION EVERY 12 HOURS SCHEDULED
Status: DISCONTINUED | OUTPATIENT
Start: 2023-10-30 | End: 2023-10-30 | Stop reason: HOSPADM

## 2023-10-30 RX ORDER — SODIUM CHLORIDE 0.9 % (FLUSH) 0.9 %
5-40 SYRINGE (ML) INJECTION PRN
Status: DISCONTINUED | OUTPATIENT
Start: 2023-10-30 | End: 2023-11-03 | Stop reason: HOSPADM

## 2023-10-30 RX ORDER — BUPIVACAINE HYDROCHLORIDE 5 MG/ML
INJECTION, SOLUTION EPIDURAL; INTRACAUDAL PRN
Status: DISCONTINUED | OUTPATIENT
Start: 2023-10-30 | End: 2023-10-30 | Stop reason: HOSPADM

## 2023-10-30 RX ORDER — ONDANSETRON 4 MG/1
4 TABLET, ORALLY DISINTEGRATING ORAL EVERY 8 HOURS PRN
Status: DISCONTINUED | OUTPATIENT
Start: 2023-10-30 | End: 2023-11-03 | Stop reason: HOSPADM

## 2023-10-30 RX ORDER — KETOROLAC TROMETHAMINE 30 MG/ML
15 INJECTION, SOLUTION INTRAMUSCULAR; INTRAVENOUS EVERY 6 HOURS
Status: DISCONTINUED | OUTPATIENT
Start: 2023-10-31 | End: 2023-10-30

## 2023-10-30 RX ORDER — SODIUM CHLORIDE 0.9 % (FLUSH) 0.9 %
5-40 SYRINGE (ML) INJECTION PRN
Status: DISCONTINUED | OUTPATIENT
Start: 2023-10-30 | End: 2023-10-30 | Stop reason: HOSPADM

## 2023-10-30 RX ORDER — SODIUM CHLORIDE 0.9 % (FLUSH) 0.9 %
5-40 SYRINGE (ML) INJECTION EVERY 12 HOURS SCHEDULED
Status: DISCONTINUED | OUTPATIENT
Start: 2023-10-30 | End: 2023-11-03 | Stop reason: HOSPADM

## 2023-10-30 RX ORDER — ASPIRIN 81 MG/1
81 TABLET ORAL 2 TIMES DAILY
Status: DISCONTINUED | OUTPATIENT
Start: 2023-10-30 | End: 2023-11-03 | Stop reason: HOSPADM

## 2023-10-30 RX ORDER — DULOXETIN HYDROCHLORIDE 30 MG/1
60 CAPSULE, DELAYED RELEASE ORAL DAILY
Status: DISCONTINUED | OUTPATIENT
Start: 2023-10-30 | End: 2023-11-03 | Stop reason: HOSPADM

## 2023-10-30 RX ORDER — BISACODYL 10 MG
10 SUPPOSITORY, RECTAL RECTAL DAILY PRN
Status: DISCONTINUED | OUTPATIENT
Start: 2023-10-30 | End: 2023-11-03 | Stop reason: HOSPADM

## 2023-10-30 RX ORDER — ACETAMINOPHEN 325 MG/1
650 TABLET ORAL EVERY 6 HOURS
Status: DISCONTINUED | OUTPATIENT
Start: 2023-10-30 | End: 2023-10-30

## 2023-10-30 RX ORDER — SODIUM CHLORIDE 9 MG/ML
INJECTION, SOLUTION INTRAVENOUS PRN
Status: DISCONTINUED | OUTPATIENT
Start: 2023-10-30 | End: 2023-11-03 | Stop reason: HOSPADM

## 2023-10-30 RX ORDER — LIDOCAINE HYDROCHLORIDE 10 MG/ML
1 INJECTION, SOLUTION EPIDURAL; INFILTRATION; INTRACAUDAL; PERINEURAL
Status: DISCONTINUED | OUTPATIENT
Start: 2023-10-30 | End: 2023-10-30 | Stop reason: HOSPADM

## 2023-10-30 RX ORDER — PROCHLORPERAZINE EDISYLATE 5 MG/ML
5 INJECTION INTRAMUSCULAR; INTRAVENOUS
Status: DISCONTINUED | OUTPATIENT
Start: 2023-10-30 | End: 2023-10-30 | Stop reason: HOSPADM

## 2023-10-30 RX ORDER — HYDROMORPHONE HYDROCHLORIDE 1 MG/ML
0.5 INJECTION, SOLUTION INTRAMUSCULAR; INTRAVENOUS; SUBCUTANEOUS EVERY 5 MIN PRN
Status: COMPLETED | OUTPATIENT
Start: 2023-10-30 | End: 2023-10-30

## 2023-10-30 RX ORDER — OXYCODONE HYDROCHLORIDE 5 MG/1
5 TABLET ORAL
Status: DISCONTINUED | OUTPATIENT
Start: 2023-10-30 | End: 2023-10-30 | Stop reason: HOSPADM

## 2023-10-30 RX ORDER — KETOROLAC TROMETHAMINE 30 MG/ML
15 INJECTION, SOLUTION INTRAMUSCULAR; INTRAVENOUS EVERY 6 HOURS
Status: DISCONTINUED | OUTPATIENT
Start: 2023-10-30 | End: 2023-10-30

## 2023-10-30 RX ORDER — OXYCODONE HYDROCHLORIDE 5 MG/1
2.5 TABLET ORAL
Status: DISCONTINUED | OUTPATIENT
Start: 2023-10-30 | End: 2023-10-31

## 2023-10-30 RX ORDER — 0.9 % SODIUM CHLORIDE 0.9 %
500 INTRAVENOUS SOLUTION INTRAVENOUS ONCE
Status: COMPLETED | OUTPATIENT
Start: 2023-10-30 | End: 2023-10-31

## 2023-10-30 RX ORDER — EPHEDRINE SULFATE 50 MG/ML
INJECTION INTRAVENOUS PRN
Status: DISCONTINUED | OUTPATIENT
Start: 2023-10-30 | End: 2023-10-30 | Stop reason: SDUPTHER

## 2023-10-30 RX ORDER — LORAZEPAM 2 MG/ML
INJECTION INTRAMUSCULAR
Status: COMPLETED
Start: 2023-10-30 | End: 2023-10-30

## 2023-10-30 RX ORDER — FENTANYL CITRATE 50 UG/ML
INJECTION, SOLUTION INTRAMUSCULAR; INTRAVENOUS PRN
Status: DISCONTINUED | OUTPATIENT
Start: 2023-10-30 | End: 2023-10-30 | Stop reason: SDUPTHER

## 2023-10-30 RX ORDER — LIDOCAINE HYDROCHLORIDE 20 MG/ML
INJECTION, SOLUTION EPIDURAL; INFILTRATION; INTRACAUDAL; PERINEURAL PRN
Status: DISCONTINUED | OUTPATIENT
Start: 2023-10-30 | End: 2023-10-30 | Stop reason: SDUPTHER

## 2023-10-30 RX ORDER — ACETAMINOPHEN 500 MG
1000 TABLET ORAL ONCE
Status: DISCONTINUED | OUTPATIENT
Start: 2023-10-30 | End: 2023-11-01 | Stop reason: ALTCHOICE

## 2023-10-30 RX ORDER — POLYETHYLENE GLYCOL 3350 17 G/17G
17 POWDER, FOR SOLUTION ORAL DAILY
Status: DISCONTINUED | OUTPATIENT
Start: 2023-10-30 | End: 2023-11-03 | Stop reason: HOSPADM

## 2023-10-30 RX ORDER — ONDANSETRON 2 MG/ML
4 INJECTION INTRAMUSCULAR; INTRAVENOUS EVERY 6 HOURS PRN
Status: DISCONTINUED | OUTPATIENT
Start: 2023-10-30 | End: 2023-11-03 | Stop reason: HOSPADM

## 2023-10-30 RX ORDER — KETOROLAC TROMETHAMINE 30 MG/ML
15 INJECTION, SOLUTION INTRAMUSCULAR; INTRAVENOUS EVERY 6 HOURS
Status: COMPLETED | OUTPATIENT
Start: 2023-10-30 | End: 2023-10-31

## 2023-10-30 RX ORDER — SODIUM CHLORIDE 9 MG/ML
INJECTION, SOLUTION INTRAVENOUS PRN
Status: DISCONTINUED | OUTPATIENT
Start: 2023-10-30 | End: 2023-10-30 | Stop reason: HOSPADM

## 2023-10-30 RX ORDER — ASCORBIC ACID 500 MG
250 TABLET ORAL DAILY
Status: DISCONTINUED | OUTPATIENT
Start: 2023-10-30 | End: 2023-11-03 | Stop reason: HOSPADM

## 2023-10-30 RX ORDER — SODIUM CHLORIDE, SODIUM LACTATE, POTASSIUM CHLORIDE, CALCIUM CHLORIDE 600; 310; 30; 20 MG/100ML; MG/100ML; MG/100ML; MG/100ML
INJECTION, SOLUTION INTRAVENOUS CONTINUOUS
Status: DISCONTINUED | OUTPATIENT
Start: 2023-10-30 | End: 2023-10-30 | Stop reason: HOSPADM

## 2023-10-30 RX ORDER — OXYCODONE HYDROCHLORIDE 5 MG/1
5 TABLET ORAL
Status: DISCONTINUED | OUTPATIENT
Start: 2023-10-30 | End: 2023-10-31

## 2023-10-30 RX ORDER — FENTANYL CITRATE 50 UG/ML
100 INJECTION, SOLUTION INTRAMUSCULAR; INTRAVENOUS
Status: DISCONTINUED | OUTPATIENT
Start: 2023-10-30 | End: 2023-10-30 | Stop reason: HOSPADM

## 2023-10-30 RX ORDER — FENTANYL CITRATE 50 UG/ML
25 INJECTION, SOLUTION INTRAMUSCULAR; INTRAVENOUS EVERY 5 MIN PRN
Status: COMPLETED | OUTPATIENT
Start: 2023-10-30 | End: 2023-10-30

## 2023-10-30 RX ORDER — LATANOPROST 50 UG/ML
1 SOLUTION/ DROPS OPHTHALMIC NIGHTLY
Status: DISCONTINUED | OUTPATIENT
Start: 2023-10-30 | End: 2023-11-03 | Stop reason: HOSPADM

## 2023-10-30 RX ORDER — HYDROXYZINE HYDROCHLORIDE 25 MG/1
25 TABLET, FILM COATED ORAL 3 TIMES DAILY PRN
Status: DISCONTINUED | OUTPATIENT
Start: 2023-10-30 | End: 2023-11-03 | Stop reason: HOSPADM

## 2023-10-30 RX ORDER — TROSPIUM CHLORIDE 20 MG/1
20 TABLET, FILM COATED ORAL
Status: DISCONTINUED | OUTPATIENT
Start: 2023-10-30 | End: 2023-11-03 | Stop reason: HOSPADM

## 2023-10-30 RX ORDER — SODIUM CHLORIDE 9 MG/ML
INJECTION, SOLUTION INTRAVENOUS CONTINUOUS PRN
Status: DISCONTINUED | OUTPATIENT
Start: 2023-10-30 | End: 2023-10-30 | Stop reason: SDUPTHER

## 2023-10-30 RX ORDER — MONTELUKAST SODIUM 10 MG/1
10 TABLET ORAL NIGHTLY
Status: DISCONTINUED | OUTPATIENT
Start: 2023-10-30 | End: 2023-11-03 | Stop reason: HOSPADM

## 2023-10-30 RX ORDER — FENTANYL CITRATE 50 UG/ML
INJECTION, SOLUTION INTRAMUSCULAR; INTRAVENOUS
Status: COMPLETED
Start: 2023-10-30 | End: 2023-10-30

## 2023-10-30 RX ORDER — ALBUTEROL SULFATE 2.5 MG/3ML
2.5 SOLUTION RESPIRATORY (INHALATION) EVERY 6 HOURS PRN
Status: DISCONTINUED | OUTPATIENT
Start: 2023-10-30 | End: 2023-11-03 | Stop reason: HOSPADM

## 2023-10-30 RX ORDER — PREGABALIN 75 MG/1
75 CAPSULE ORAL ONCE
Status: COMPLETED | OUTPATIENT
Start: 2023-10-30 | End: 2023-10-30

## 2023-10-30 RX ORDER — HYDROMORPHONE HYDROCHLORIDE 1 MG/ML
0.5 INJECTION, SOLUTION INTRAMUSCULAR; INTRAVENOUS; SUBCUTANEOUS
Status: DISCONTINUED | OUTPATIENT
Start: 2023-10-30 | End: 2023-11-03 | Stop reason: HOSPADM

## 2023-10-30 RX ORDER — SODIUM CHLORIDE 9 MG/ML
INJECTION, SOLUTION INTRAVENOUS CONTINUOUS
Status: DISPENSED | OUTPATIENT
Start: 2023-10-30 | End: 2023-10-31

## 2023-10-30 RX ORDER — ACETAMINOPHEN 500 MG
1000 TABLET ORAL ONCE
Status: COMPLETED | OUTPATIENT
Start: 2023-10-30 | End: 2023-10-30

## 2023-10-30 RX ORDER — FLUTICASONE PROPIONATE 50 MCG
2 SPRAY, SUSPENSION (ML) NASAL DAILY
Status: DISCONTINUED | OUTPATIENT
Start: 2023-10-31 | End: 2023-11-03 | Stop reason: HOSPADM

## 2023-10-30 RX ORDER — LOSARTAN POTASSIUM 50 MG/1
100 TABLET ORAL DAILY
Status: DISCONTINUED | OUTPATIENT
Start: 2023-10-31 | End: 2023-11-03 | Stop reason: HOSPADM

## 2023-10-30 RX ORDER — FUROSEMIDE 20 MG/1
20 TABLET ORAL DAILY
Status: DISCONTINUED | OUTPATIENT
Start: 2023-10-31 | End: 2023-11-03 | Stop reason: HOSPADM

## 2023-10-30 RX ORDER — ATORVASTATIN CALCIUM 40 MG/1
40 TABLET, FILM COATED ORAL NIGHTLY
Status: DISCONTINUED | OUTPATIENT
Start: 2023-10-30 | End: 2023-11-03 | Stop reason: HOSPADM

## 2023-10-30 RX ORDER — SENNA AND DOCUSATE SODIUM 50; 8.6 MG/1; MG/1
1 TABLET, FILM COATED ORAL 2 TIMES DAILY
Status: DISCONTINUED | OUTPATIENT
Start: 2023-10-30 | End: 2023-11-03 | Stop reason: HOSPADM

## 2023-10-30 RX ORDER — MIDAZOLAM HYDROCHLORIDE 1 MG/ML
INJECTION INTRAMUSCULAR; INTRAVENOUS PRN
Status: DISCONTINUED | OUTPATIENT
Start: 2023-10-30 | End: 2023-10-30 | Stop reason: SDUPTHER

## 2023-10-30 RX ADMIN — MONTELUKAST 10 MG: 10 TABLET, FILM COATED ORAL at 21:36

## 2023-10-30 RX ADMIN — HYDROMORPHONE HYDROCHLORIDE 0.5 MG: 1 INJECTION, SOLUTION INTRAMUSCULAR; INTRAVENOUS; SUBCUTANEOUS at 16:08

## 2023-10-30 RX ADMIN — SODIUM CHLORIDE 500 ML: 9 INJECTION, SOLUTION INTRAVENOUS at 23:30

## 2023-10-30 RX ADMIN — LORAZEPAM 1 MG: 2 INJECTION INTRAMUSCULAR; INTRAVENOUS at 16:15

## 2023-10-30 RX ADMIN — ATORVASTATIN CALCIUM 40 MG: 40 TABLET, FILM COATED ORAL at 21:21

## 2023-10-30 RX ADMIN — ACETAMINOPHEN 1000 MG: 500 TABLET ORAL at 12:31

## 2023-10-30 RX ADMIN — Medication 160 MCG: at 13:47

## 2023-10-30 RX ADMIN — TRANEXAMIC ACID 1000 MG: 100 INJECTION, SOLUTION INTRAVENOUS at 13:37

## 2023-10-30 RX ADMIN — ASPIRIN 81 MG: 81 TABLET, COATED ORAL at 21:20

## 2023-10-30 RX ADMIN — PROPOFOL 50 MG: 10 INJECTION, EMULSION INTRAVENOUS at 13:09

## 2023-10-30 RX ADMIN — Medication 200 MCG: at 14:19

## 2023-10-30 RX ADMIN — SODIUM CHLORIDE: 9 INJECTION, SOLUTION INTRAVENOUS at 15:15

## 2023-10-30 RX ADMIN — HYDROMORPHONE HYDROCHLORIDE 0.5 MG: 1 INJECTION, SOLUTION INTRAMUSCULAR; INTRAVENOUS; SUBCUTANEOUS at 15:57

## 2023-10-30 RX ADMIN — LIDOCAINE HYDROCHLORIDE 100 MG: 20 INJECTION, SOLUTION EPIDURAL; INFILTRATION; INTRACAUDAL; PERINEURAL at 13:09

## 2023-10-30 RX ADMIN — Medication 120 MCG: at 13:53

## 2023-10-30 RX ADMIN — PROPOFOL 30 MG: 10 INJECTION, EMULSION INTRAVENOUS at 13:50

## 2023-10-30 RX ADMIN — LORAZEPAM 1 MG: 2 INJECTION INTRAMUSCULAR at 16:15

## 2023-10-30 RX ADMIN — FENTANYL CITRATE 25 MCG: 50 INJECTION INTRAMUSCULAR; INTRAVENOUS at 16:06

## 2023-10-30 RX ADMIN — WATER 2000 MG: 1 INJECTION INTRAMUSCULAR; INTRAVENOUS; SUBCUTANEOUS at 21:20

## 2023-10-30 RX ADMIN — FENTANYL CITRATE 25 MCG: 50 INJECTION INTRAMUSCULAR; INTRAVENOUS at 16:00

## 2023-10-30 RX ADMIN — PROPOFOL 50 MCG/KG/MIN: 10 INJECTION, EMULSION INTRAVENOUS at 13:06

## 2023-10-30 RX ADMIN — FENTANYL CITRATE 25 MCG: 50 INJECTION INTRAMUSCULAR; INTRAVENOUS at 15:47

## 2023-10-30 RX ADMIN — WATER 2000 MG: 1 INJECTION INTRAMUSCULAR; INTRAVENOUS; SUBCUTANEOUS at 13:30

## 2023-10-30 RX ADMIN — SODIUM CHLORIDE: 9 INJECTION, SOLUTION INTRAVENOUS at 23:57

## 2023-10-30 RX ADMIN — OXYCODONE 5 MG: 5 TABLET ORAL at 20:50

## 2023-10-30 RX ADMIN — EPHEDRINE SULFATE 25 MG: 50 INJECTION INTRAVENOUS at 13:28

## 2023-10-30 RX ADMIN — FENTANYL CITRATE 25 MCG: 50 INJECTION INTRAMUSCULAR; INTRAVENOUS at 15:52

## 2023-10-30 RX ADMIN — PHENYLEPHRINE HYDROCHLORIDE 40 MCG/MIN: 10 INJECTION INTRAVENOUS at 14:19

## 2023-10-30 RX ADMIN — MIDAZOLAM 2 MG: 1 INJECTION INTRAMUSCULAR; INTRAVENOUS at 13:02

## 2023-10-30 RX ADMIN — MEPIVACAINE HYDROCHLORIDE 60 MG: 15 INJECTION, SOLUTION EPIDURAL; INFILTRATION at 13:20

## 2023-10-30 RX ADMIN — Medication 120 MCG: at 14:05

## 2023-10-30 RX ADMIN — FENTANYL CITRATE 50 MCG: 50 INJECTION, SOLUTION INTRAMUSCULAR; INTRAVENOUS at 13:09

## 2023-10-30 RX ADMIN — FENTANYL CITRATE 50 MCG: 50 INJECTION, SOLUTION INTRAMUSCULAR; INTRAVENOUS at 13:05

## 2023-10-30 RX ADMIN — PREGABALIN 75 MG: 75 CAPSULE ORAL at 12:31

## 2023-10-30 RX ADMIN — ALBUTEROL SULFATE 2.5 MG: 2.5 SOLUTION RESPIRATORY (INHALATION) at 21:04

## 2023-10-30 RX ADMIN — SODIUM CHLORIDE, POTASSIUM CHLORIDE, SODIUM LACTATE AND CALCIUM CHLORIDE: 600; 310; 30; 20 INJECTION, SOLUTION INTRAVENOUS at 12:12

## 2023-10-30 RX ADMIN — SODIUM CHLORIDE, PRESERVATIVE FREE 10 ML: 5 INJECTION INTRAVENOUS at 21:26

## 2023-10-30 RX ADMIN — KETOROLAC TROMETHAMINE 15 MG: 30 INJECTION, SOLUTION INTRAMUSCULAR; INTRAVENOUS at 22:00

## 2023-10-30 RX ADMIN — ONDANSETRON 4 MG: 2 INJECTION INTRAMUSCULAR; INTRAVENOUS at 15:52

## 2023-10-30 RX ADMIN — SENNOSIDES AND DOCUSATE SODIUM 1 TABLET: 8.6; 5 TABLET ORAL at 21:20

## 2023-10-30 ASSESSMENT — PAIN DESCRIPTION - DESCRIPTORS
DESCRIPTORS: ACHING
DESCRIPTORS: ACHING
DESCRIPTORS: CRAMPING;BURNING
DESCRIPTORS: ACHING
DESCRIPTORS: ACHING

## 2023-10-30 ASSESSMENT — PAIN DESCRIPTION - PAIN TYPE
TYPE: SURGICAL PAIN

## 2023-10-30 ASSESSMENT — PAIN SCALES - GENERAL
PAINLEVEL_OUTOF10: 7
PAINLEVEL_OUTOF10: 9
PAINLEVEL_OUTOF10: 7
PAINLEVEL_OUTOF10: 10
PAINLEVEL_OUTOF10: 1
PAINLEVEL_OUTOF10: 8
PAINLEVEL_OUTOF10: 4
PAINLEVEL_OUTOF10: 6

## 2023-10-30 ASSESSMENT — PAIN DESCRIPTION - ONSET: ONSET: ON-GOING

## 2023-10-30 ASSESSMENT — PAIN DESCRIPTION - ORIENTATION
ORIENTATION: RIGHT

## 2023-10-30 ASSESSMENT — PAIN - FUNCTIONAL ASSESSMENT
PAIN_FUNCTIONAL_ASSESSMENT: 0-10
PAIN_FUNCTIONAL_ASSESSMENT: PREVENTS OR INTERFERES SOME ACTIVE ACTIVITIES AND ADLS
PAIN_FUNCTIONAL_ASSESSMENT: PREVENTS OR INTERFERES SOME ACTIVE ACTIVITIES AND ADLS
PAIN_FUNCTIONAL_ASSESSMENT: PREVENTS OR INTERFERES WITH MANY ACTIVE NOT PASSIVE ACTIVITIES

## 2023-10-30 ASSESSMENT — PAIN DESCRIPTION - LOCATION
LOCATION: BACK;HIP
LOCATION: HIP

## 2023-10-30 ASSESSMENT — ENCOUNTER SYMPTOMS: SHORTNESS OF BREATH: 1

## 2023-10-30 NOTE — OP NOTE
Operative Report    Patient Name: Artie Villanueva    Patient YOB: 1940    Patient's Hospital MRN: [id number]    Date of Procedure: 10/30/23    Surgical Location: [facility]    Pre-operative Diagnosis: right hip ostearthritis    Post-operative Diagnosis: right hip osteoarthritis    Procedure: right total hip arthroplasty by posterior approach    Surgeon: Alex Clements MD    Assistant/Staff: Circulator: Reese Villafana RN; Мария Whitt RN  Surgical Assistant: Pavan Parkinson  Relief Circulator: Tom Aguilar RN  Relief Scrub: Frannie Willoughby RN; Anum Bateman RN  Scrub Person First: Nikos Osuna  Second Assistant: Otilio ORELLANA    Anesthesia: Spinal    Preoperative IV Antibiotics: Ancef 2g    Estimated Blood Loss: 350 mL    Implants: Depuy Emphasys Acetabular component size 52 mm, Polyethylene insert, Femoral head ceramic Biolox delta size 36 + 8mm, Actis Femoral component Standard Offset size 5     Findings: femoral head and acetabular osteoarthritis and cartilage loss    Specimens: None    Complications: None    Disposition: PACU - hemodynamically stable. Condition: stable      Indications for Procedure: The patient presented to my office on an outpatient basis complaining of right hip pain. Evaluation revealed osteoarthritis and the patient failed non operative management. We discussed nonsurgical and surgical management options and chose to undergo total hip arthroplasty by posterior approach. We discussed the risks and benefits, including leg length discrepancy, infection, blood loss, blood clot, continued pain, anesthetic complications including death. The patient elected to proceed forth with surgery. Procedure in Detail:  The patient was met in the preoperative holding area and the appropriate surgical site was marked. All questions were answered. The patient was brought into the operating room given Spinal anesthesia.  The patient was placed

## 2023-10-30 NOTE — INTERVAL H&P NOTE
Update History & Physical    The patient's History and Physical of October 27, 2023 was reviewed with the patient and I examined the patient. There was no change. The surgical site was confirmed by the patient and me. Plan: The risks, benefits, expected outcome, and alternative to the recommended procedure have been discussed with the patient. Patient understands and wants to proceed with the procedure.      Electronically signed by Jeff George MD on 10/30/2023 at 11:21 AM

## 2023-10-30 NOTE — ANESTHESIA PRE PROCEDURE
Department of Anesthesiology  Preprocedure Note       Name:  Cosme Devlin   Age:  80 y.o.  :  1940                                          MRN:  [id number]         Date:  10/30/2023      Surgeon: Mansoor Cuenca): Jeff George MD    Procedure: Procedure(s):  RIGHT TOTAL HIP  ARTHROPLASTY, POSTERIOR APPROACH    Medications prior to admission:   Prior to Admission medications    Medication Sig Start Date End Date Taking? Authorizing Provider   montelukast (SINGULAIR) 10 MG tablet Take 1 tablet by mouth nightly    Renata Rocha MD   albuterol sulfate (PROAIR RESPICLICK) 292 (90 Base) MCG/ACT aerosol powder inhalation Inhale 2 puffs into the lungs every 4 hours as needed for Wheezing or Shortness of Breath    Renata Rocha MD   budesonide-formoterol (SYMBICORT) 160-4.5 MCG/ACT AERO Inhale 2 puffs into the lungs 2 times daily    Renata Rocha MD   chlorhexidine (PERIDEX) 0.12 % solution RINSE 15 ML BY MOUTH FOR 30 SECONDS IN THE MORNING AND EVENING AFTER TOOTH BRUSHING.  EXPECTORATE AFTER RINSING, DO NOT SWALLOW  Patient not taking: Reported on 10/5/2023 6/27/23   Renata Rocha MD   hydrOXYzine HCl (ATARAX) 25 MG tablet TAKE 1 TABLET BY MOUTH ONCE DAILY NIGHTLY AS NEEDED FOR ITCHING 23   Renata Rocha MD   omeprazole (PRILOSEC) 20 MG delayed release capsule Take 1 capsule by mouth daily  Patient not taking: Reported on 10/23/2023 6/13/20   Renata Rocha MD   traMADol Tayla Norwich) 50 MG tablet  23   Renata Rocha MD   acetaminophen (TYLENOL) 500 MG tablet Take 1 tablet by mouth every 6-8 hours as needed for Pain 23   Catracho Nunez MD   mirabegron (MYRBETRIQ) 50 MG TB24 Take 50 mg by mouth daily 23   Catracho Nunez MD   ondansetron (ZOFRAN-ODT) 4 MG disintegrating tablet Take 2 tablets by mouth every 8 hours as needed for Nausea or Vomiting 23   Hao Reynolds MD   pregabalin (LYRICA) 75 MG capsule TAKE 1 CAPSULE BY MOUTH TWICE DAILY

## 2023-10-30 NOTE — FLOWSHEET NOTE
10/30/23 1351   Family Communication    Relationship to Patient Daughter    Phone Number Ian Zaman, [phone]   Family/Significant Other Update Called   Delivery Origin Nurse   Message Disposition Family present - message delivered   Update Given Yes   Family Communication   Family Update Message Procedure started;Surgeon working

## 2023-10-30 NOTE — ANESTHESIA PROCEDURE NOTES
Spinal Block    End time: 10/30/2023 1:20 PM  Reason for block: primary anesthetic  Staffing  Performed: anesthesiologist   Performed by: CAMMY Lozano - CRNA  Authorized by: Maria Ruano, DO    Spinal Block  Patient position: sitting  Prep: Betadine and site prepped and draped  Patient monitoring: continuous pulse ox, continuous capnometry, frequent blood pressure checks and oxygen  Approach: midline  Location: L2/L3  Provider prep: mask, sterile gloves and sterile gown  Local infiltration: lidocaine  Needle  Needle type: Pencan   Needle gauge: 24 G  Needle length: 3.5 in  Assessment  Hemodynamics: stable  Preanesthetic Checklist  Completed: patient identified, IV checked, site marked, risks and benefits discussed, surgical/procedural consents, equipment checked, pre-op evaluation, timeout performed, anesthesia consent given, oxygen available, monitors applied/VS acknowledged, fire risk safety assessment completed and verbalized and blood product R/B/A discussed and consented

## 2023-10-31 ENCOUNTER — APPOINTMENT (OUTPATIENT)
Facility: HOSPITAL | Age: 83
DRG: 470 | End: 2023-10-31
Attending: ORTHOPAEDIC SURGERY
Payer: MEDICARE

## 2023-10-31 LAB
ANION GAP SERPL CALC-SCNC: 5 MMOL/L (ref 5–15)
BUN SERPL-MCNC: 10 MG/DL (ref 6–20)
BUN/CREAT SERPL: 10 (ref 12–20)
CALCIUM SERPL-MCNC: 7.8 MG/DL (ref 8.5–10.1)
CHLORIDE SERPL-SCNC: 112 MMOL/L (ref 97–108)
CO2 SERPL-SCNC: 23 MMOL/L (ref 21–32)
COMMENT:: NORMAL
CREAT SERPL-MCNC: 1.05 MG/DL (ref 0.55–1.02)
ECHO AO ROOT DIAM: 2.8 CM
ECHO AO ROOT INDEX: 1.41 CM/M2
ECHO AV AREA PEAK VELOCITY: 2.2 CM2
ECHO AV AREA/BSA PEAK VELOCITY: 1.1 CM2/M2
ECHO AV PEAK GRADIENT: 14 MMHG
ECHO AV PEAK VELOCITY: 1.9 M/S
ECHO AV VELOCITY RATIO: 0.74
ECHO BSA: 2.05 M2
ECHO EST RA PRESSURE: 8 MMHG
ECHO LA DIAMETER INDEX: 2.07 CM/M2
ECHO LA DIAMETER: 4.1 CM
ECHO LA TO AORTIC ROOT RATIO: 1.46
ECHO LA VOL 2C: 69 ML (ref 22–52)
ECHO LA VOL 2C: 72 ML (ref 22–52)
ECHO LA VOL 4C: 64 ML (ref 22–52)
ECHO LA VOL 4C: 70 ML (ref 22–52)
ECHO LA VOLUME AREA LENGTH: 71 ML
ECHO LA VOLUME INDEX AREA LENGTH: 36 ML/M2 (ref 16–34)
ECHO LV E' LATERAL VELOCITY: 14 CM/S
ECHO LV E' SEPTAL VELOCITY: 11 CM/S
ECHO LV FRACTIONAL SHORTENING: 37 % (ref 28–44)
ECHO LV INTERNAL DIMENSION DIASTOLE INDEX: 2.32 CM/M2
ECHO LV INTERNAL DIMENSION DIASTOLIC: 4.6 CM (ref 3.9–5.3)
ECHO LV INTERNAL DIMENSION SYSTOLIC INDEX: 1.46 CM/M2
ECHO LV INTERNAL DIMENSION SYSTOLIC: 2.9 CM
ECHO LV IVSD: 1.2 CM (ref 0.6–0.9)
ECHO LV MASS 2D: 205 G (ref 67–162)
ECHO LV MASS INDEX 2D: 103.5 G/M2 (ref 43–95)
ECHO LV POSTERIOR WALL DIASTOLIC: 1.2 CM (ref 0.6–0.9)
ECHO LV RELATIVE WALL THICKNESS RATIO: 0.52
ECHO LVOT AREA: 3.1 CM2
ECHO LVOT DIAM: 2 CM
ECHO LVOT PEAK GRADIENT: 8 MMHG
ECHO LVOT PEAK VELOCITY: 1.4 M/S
ECHO MV A VELOCITY: 1.4 M/S
ECHO MV AREA PHT: 3.9 CM2
ECHO MV E DECELERATION TIME (DT): 194.8 MS
ECHO MV E VELOCITY: 1.27 M/S
ECHO MV E/A RATIO: 0.91
ECHO MV E/E' LATERAL: 9.07
ECHO MV E/E' RATIO (AVERAGED): 10.31
ECHO MV E/E' SEPTAL: 11.55
ECHO MV PRESSURE HALF TIME (PHT): 56.5 MS
ECHO MV REGURGITANT PEAK GRADIENT: 12 MMHG
ECHO MV REGURGITANT PEAK VELOCITY: 1.7 M/S
ECHO PV MAX VELOCITY: 1.1 M/S
ECHO PV PEAK GRADIENT: 5 MMHG
ECHO RIGHT VENTRICULAR SYSTOLIC PRESSURE (RVSP): 23 MMHG
ECHO RV FREE WALL PEAK S': 12 CM/S
ECHO TV REGURGITANT MAX VELOCITY: 1.93 M/S
ECHO TV REGURGITANT PEAK GRADIENT: 15 MMHG
EKG ATRIAL RATE: 153 BPM
EKG ATRIAL RATE: 92 BPM
EKG DIAGNOSIS: NORMAL
EKG DIAGNOSIS: NORMAL
EKG P AXIS: 54 DEGREES
EKG P-R INTERVAL: 164 MS
EKG Q-T INTERVAL: 266 MS
EKG Q-T INTERVAL: 344 MS
EKG QRS DURATION: 68 MS
EKG QRS DURATION: 72 MS
EKG QTC CALCULATION (BAZETT): 425 MS
EKG QTC CALCULATION (BAZETT): 434 MS
EKG R AXIS: -28 DEGREES
EKG R AXIS: -37 DEGREES
EKG T AXIS: 24 DEGREES
EKG T AXIS: 89 DEGREES
EKG VENTRICULAR RATE: 160 BPM
EKG VENTRICULAR RATE: 92 BPM
GLUCOSE BLD STRIP.AUTO-MCNC: 114 MG/DL (ref 65–117)
GLUCOSE BLD STRIP.AUTO-MCNC: 124 MG/DL (ref 65–117)
GLUCOSE BLD STRIP.AUTO-MCNC: 162 MG/DL (ref 65–117)
GLUCOSE BLD STRIP.AUTO-MCNC: 171 MG/DL (ref 65–117)
GLUCOSE BLD STRIP.AUTO-MCNC: 177 MG/DL (ref 65–117)
GLUCOSE BLD STRIP.AUTO-MCNC: 187 MG/DL (ref 65–117)
GLUCOSE BLD STRIP.AUTO-MCNC: 194 MG/DL (ref 65–117)
GLUCOSE SERPL-MCNC: 154 MG/DL (ref 65–100)
HCT VFR BLD AUTO: 23.6 % (ref 35–47)
HCT VFR BLD AUTO: 25.3 % (ref 35–47)
HGB BLD-MCNC: 7.5 G/DL (ref 11.5–16)
HGB BLD-MCNC: 7.9 G/DL (ref 11.5–16)
MAGNESIUM SERPL-MCNC: 1.8 MG/DL (ref 1.6–2.4)
POTASSIUM SERPL-SCNC: 4.5 MMOL/L (ref 3.5–5.1)
SERVICE CMNT-IMP: ABNORMAL
SERVICE CMNT-IMP: NORMAL
SODIUM SERPL-SCNC: 140 MMOL/L (ref 136–145)
SPECIMEN HOLD: NORMAL
TSH SERPL DL<=0.05 MIU/L-ACNC: 0.96 UIU/ML (ref 0.36–3.74)

## 2023-10-31 PROCEDURE — 93306 TTE W/DOPPLER COMPLETE: CPT

## 2023-10-31 PROCEDURE — 2580000003 HC RX 258: Performed by: ORTHOPAEDIC SURGERY

## 2023-10-31 PROCEDURE — 2060000000 HC ICU INTERMEDIATE R&B

## 2023-10-31 PROCEDURE — 97535 SELF CARE MNGMENT TRAINING: CPT

## 2023-10-31 PROCEDURE — 51702 INSERT TEMP BLADDER CATH: CPT

## 2023-10-31 PROCEDURE — 6360000002 HC RX W HCPCS: Performed by: ORTHOPAEDIC SURGERY

## 2023-10-31 PROCEDURE — 99223 1ST HOSP IP/OBS HIGH 75: CPT | Performed by: SPECIALIST

## 2023-10-31 PROCEDURE — 6370000000 HC RX 637 (ALT 250 FOR IP): Performed by: ORTHOPAEDIC SURGERY

## 2023-10-31 PROCEDURE — P9047 ALBUMIN (HUMAN), 25%, 50ML: HCPCS | Performed by: STUDENT IN AN ORGANIZED HEALTH CARE EDUCATION/TRAINING PROGRAM

## 2023-10-31 PROCEDURE — 84443 ASSAY THYROID STIM HORMONE: CPT

## 2023-10-31 PROCEDURE — 85018 HEMOGLOBIN: CPT

## 2023-10-31 PROCEDURE — P9047 ALBUMIN (HUMAN), 25%, 50ML: HCPCS | Performed by: NURSE PRACTITIONER

## 2023-10-31 PROCEDURE — 6360000002 HC RX W HCPCS: Performed by: NURSE PRACTITIONER

## 2023-10-31 PROCEDURE — 83735 ASSAY OF MAGNESIUM: CPT

## 2023-10-31 PROCEDURE — 2580000003 HC RX 258: Performed by: STUDENT IN AN ORGANIZED HEALTH CARE EDUCATION/TRAINING PROGRAM

## 2023-10-31 PROCEDURE — 2700000000 HC OXYGEN THERAPY PER DAY

## 2023-10-31 PROCEDURE — 36415 COLL VENOUS BLD VENIPUNCTURE: CPT

## 2023-10-31 PROCEDURE — 97165 OT EVAL LOW COMPLEX 30 MIN: CPT

## 2023-10-31 PROCEDURE — A4216 STERILE WATER/SALINE, 10 ML: HCPCS | Performed by: ORTHOPAEDIC SURGERY

## 2023-10-31 PROCEDURE — 97116 GAIT TRAINING THERAPY: CPT

## 2023-10-31 PROCEDURE — 93005 ELECTROCARDIOGRAM TRACING: CPT | Performed by: ORTHOPAEDIC SURGERY

## 2023-10-31 PROCEDURE — 2500000003 HC RX 250 WO HCPCS: Performed by: ORTHOPAEDIC SURGERY

## 2023-10-31 PROCEDURE — 82962 GLUCOSE BLOOD TEST: CPT

## 2023-10-31 PROCEDURE — 97161 PT EVAL LOW COMPLEX 20 MIN: CPT

## 2023-10-31 PROCEDURE — 97530 THERAPEUTIC ACTIVITIES: CPT

## 2023-10-31 PROCEDURE — 2580000003 HC RX 258: Performed by: NURSE PRACTITIONER

## 2023-10-31 PROCEDURE — 85014 HEMATOCRIT: CPT

## 2023-10-31 PROCEDURE — 94760 N-INVAS EAR/PLS OXIMETRY 1: CPT

## 2023-10-31 PROCEDURE — 80048 BASIC METABOLIC PNL TOTAL CA: CPT

## 2023-10-31 PROCEDURE — 6360000002 HC RX W HCPCS: Performed by: STUDENT IN AN ORGANIZED HEALTH CARE EDUCATION/TRAINING PROGRAM

## 2023-10-31 RX ORDER — INSULIN LISPRO 100 [IU]/ML
0-4 INJECTION, SOLUTION INTRAVENOUS; SUBCUTANEOUS
Status: DISCONTINUED | OUTPATIENT
Start: 2023-10-31 | End: 2023-11-03 | Stop reason: HOSPADM

## 2023-10-31 RX ORDER — ALBUMIN (HUMAN) 12.5 G/50ML
25 SOLUTION INTRAVENOUS ONCE
Status: COMPLETED | OUTPATIENT
Start: 2023-10-31 | End: 2023-10-31

## 2023-10-31 RX ORDER — MAGNESIUM SULFATE 1 G/100ML
1000 INJECTION INTRAVENOUS ONCE
Status: COMPLETED | OUTPATIENT
Start: 2023-10-31 | End: 2023-10-31

## 2023-10-31 RX ORDER — DEXTROSE MONOHYDRATE 100 MG/ML
INJECTION, SOLUTION INTRAVENOUS CONTINUOUS PRN
Status: DISCONTINUED | OUTPATIENT
Start: 2023-10-31 | End: 2023-11-01 | Stop reason: SDUPTHER

## 2023-10-31 RX ORDER — OXYCODONE HYDROCHLORIDE 5 MG/1
10 TABLET ORAL
Status: DISCONTINUED | OUTPATIENT
Start: 2023-10-31 | End: 2023-11-03 | Stop reason: HOSPADM

## 2023-10-31 RX ORDER — 0.9 % SODIUM CHLORIDE 0.9 %
500 INTRAVENOUS SOLUTION INTRAVENOUS ONCE
Status: COMPLETED | OUTPATIENT
Start: 2023-10-31 | End: 2023-10-31

## 2023-10-31 RX ORDER — ADENOSINE 3 MG/ML
6 INJECTION, SOLUTION INTRAVENOUS ONCE
Status: COMPLETED | OUTPATIENT
Start: 2023-10-31 | End: 2023-10-31

## 2023-10-31 RX ORDER — DEXTROSE MONOHYDRATE 100 MG/ML
INJECTION, SOLUTION INTRAVENOUS CONTINUOUS PRN
Status: DISCONTINUED | OUTPATIENT
Start: 2023-10-31 | End: 2023-11-03 | Stop reason: HOSPADM

## 2023-10-31 RX ORDER — OXYCODONE HYDROCHLORIDE 5 MG/1
5 TABLET ORAL
Status: DISCONTINUED | OUTPATIENT
Start: 2023-10-31 | End: 2023-11-03 | Stop reason: HOSPADM

## 2023-10-31 RX ORDER — INSULIN LISPRO 100 [IU]/ML
0-4 INJECTION, SOLUTION INTRAVENOUS; SUBCUTANEOUS NIGHTLY
Status: DISCONTINUED | OUTPATIENT
Start: 2023-10-31 | End: 2023-11-03 | Stop reason: HOSPADM

## 2023-10-31 RX ADMIN — ACETAMINOPHEN 650 MG: 325 TABLET ORAL at 20:08

## 2023-10-31 RX ADMIN — LATANOPROST 1 DROP: 50 SOLUTION OPHTHALMIC at 23:11

## 2023-10-31 RX ADMIN — SODIUM CHLORIDE, PRESERVATIVE FREE 10 ML: 5 INJECTION INTRAVENOUS at 08:34

## 2023-10-31 RX ADMIN — TROSPIUM CHLORIDE 20 MG: 20 TABLET, FILM COATED ORAL at 06:40

## 2023-10-31 RX ADMIN — SODIUM CHLORIDE 500 ML: 9 INJECTION, SOLUTION INTRAVENOUS at 18:25

## 2023-10-31 RX ADMIN — KETOROLAC TROMETHAMINE 15 MG: 30 INJECTION, SOLUTION INTRAMUSCULAR; INTRAVENOUS at 13:45

## 2023-10-31 RX ADMIN — AMIODARONE HYDROCHLORIDE 1 MG/MIN: 50 INJECTION, SOLUTION INTRAVENOUS at 18:26

## 2023-10-31 RX ADMIN — HYDROMORPHONE HYDROCHLORIDE 0.5 MG: 1 INJECTION, SOLUTION INTRAMUSCULAR; INTRAVENOUS; SUBCUTANEOUS at 23:09

## 2023-10-31 RX ADMIN — SODIUM CHLORIDE, PRESERVATIVE FREE 10 ML: 5 INJECTION INTRAVENOUS at 20:09

## 2023-10-31 RX ADMIN — OXYCODONE HYDROCHLORIDE AND ACETAMINOPHEN 250 MG: 500 TABLET ORAL at 08:33

## 2023-10-31 RX ADMIN — MAGNESIUM SULFATE HEPTAHYDRATE 1000 MG: 1 INJECTION, SOLUTION INTRAVENOUS at 20:13

## 2023-10-31 RX ADMIN — OXYCODONE 10 MG: 5 TABLET ORAL at 16:21

## 2023-10-31 RX ADMIN — ASPIRIN 81 MG: 81 TABLET, COATED ORAL at 08:33

## 2023-10-31 RX ADMIN — SENNOSIDES AND DOCUSATE SODIUM 1 TABLET: 8.6; 5 TABLET ORAL at 08:33

## 2023-10-31 RX ADMIN — ALBUMIN (HUMAN) 25 G: 0.25 INJECTION, SOLUTION INTRAVENOUS at 07:53

## 2023-10-31 RX ADMIN — FAMOTIDINE 20 MG: 10 INJECTION, SOLUTION INTRAVENOUS at 18:25

## 2023-10-31 RX ADMIN — DULOXETINE HYDROCHLORIDE 60 MG: 60 CAPSULE, DELAYED RELEASE ORAL at 08:33

## 2023-10-31 RX ADMIN — ADENOSINE 6 MG: 3 INJECTION, SOLUTION INTRAVENOUS at 07:18

## 2023-10-31 RX ADMIN — SODIUM CHLORIDE 500 ML: 9 INJECTION, SOLUTION INTRAVENOUS at 08:33

## 2023-10-31 RX ADMIN — OXYCODONE 2.5 MG: 5 TABLET ORAL at 06:46

## 2023-10-31 RX ADMIN — POLYETHYLENE GLYCOL 3350 17 G: 17 POWDER, FOR SOLUTION ORAL at 08:33

## 2023-10-31 RX ADMIN — Medication 1000 UNITS: at 08:33

## 2023-10-31 RX ADMIN — MONTELUKAST 10 MG: 10 TABLET, FILM COATED ORAL at 20:07

## 2023-10-31 RX ADMIN — TROSPIUM CHLORIDE 20 MG: 20 TABLET, FILM COATED ORAL at 14:38

## 2023-10-31 RX ADMIN — OXYCODONE 5 MG: 5 TABLET ORAL at 10:49

## 2023-10-31 RX ADMIN — HYDROMORPHONE HYDROCHLORIDE 0.5 MG: 1 INJECTION, SOLUTION INTRAMUSCULAR; INTRAVENOUS; SUBCUTANEOUS at 20:08

## 2023-10-31 RX ADMIN — ALBUMIN (HUMAN) 25 G: 0.25 INJECTION, SOLUTION INTRAVENOUS at 01:19

## 2023-10-31 RX ADMIN — WATER 2000 MG: 1 INJECTION INTRAMUSCULAR; INTRAVENOUS; SUBCUTANEOUS at 05:03

## 2023-10-31 RX ADMIN — KETOROLAC TROMETHAMINE 15 MG: 30 INJECTION, SOLUTION INTRAMUSCULAR; INTRAVENOUS at 08:34

## 2023-10-31 RX ADMIN — SODIUM CHLORIDE: 9 INJECTION, SOLUTION INTRAVENOUS at 06:37

## 2023-10-31 RX ADMIN — ASPIRIN 81 MG: 81 TABLET, COATED ORAL at 20:07

## 2023-10-31 RX ADMIN — ATORVASTATIN CALCIUM 40 MG: 40 TABLET, FILM COATED ORAL at 20:07

## 2023-10-31 RX ADMIN — SENNOSIDES AND DOCUSATE SODIUM 1 TABLET: 8.6; 5 TABLET ORAL at 20:07

## 2023-10-31 RX ADMIN — KETOROLAC TROMETHAMINE 15 MG: 30 INJECTION, SOLUTION INTRAMUSCULAR; INTRAVENOUS at 02:32

## 2023-10-31 ASSESSMENT — PAIN SCALES - GENERAL
PAINLEVEL_OUTOF10: 7
PAINLEVEL_OUTOF10: 5
PAINLEVEL_OUTOF10: 0
PAINLEVEL_OUTOF10: 5
PAINLEVEL_OUTOF10: 4
PAINLEVEL_OUTOF10: 3
PAINLEVEL_OUTOF10: 7
PAINLEVEL_OUTOF10: 8

## 2023-10-31 ASSESSMENT — PAIN DESCRIPTION - LOCATION
LOCATION: HIP
LOCATION: HEAD
LOCATION: HIP

## 2023-10-31 ASSESSMENT — PAIN DESCRIPTION - DESCRIPTORS
DESCRIPTORS: ACHING
DESCRIPTORS: TIGHTNESS;THROBBING;ACHING
DESCRIPTORS: ACHING

## 2023-10-31 ASSESSMENT — PAIN DESCRIPTION - ORIENTATION
ORIENTATION: ANTERIOR
ORIENTATION: RIGHT

## 2023-10-31 NOTE — SIGNIFICANT EVENT
9974 Rapid Response  Rapid response room 577 called at 0702. RRT responding. Pt had an acute rhythm change, HR noted 150-170's. Pt denies symptoms including CP, SOB, dizziness, palpitations. Pt reports only cardiac history is bradycardia. EKG confirms SVT. Dr. Amalia Vance at bedside, reviewing chart for appropriate intervention. Verbal orders for 6 mg adenosine if carotid massage and bearing down are both unsuccessful. Pt connected to [employer] x 3, including defib pads. Pt attempted to bear down without change. Dr. Amalia Vance performed carotid massage without change. Dr. Amalia Vance updated pt on adenosine administration and its side effects. Rolo, night shift RRT placed new 20g PIV in R AC. Only other access is a 20g PIV in L hand. At 0718, 6 mg adensosine IV push administered by Darrel Sears RN. Pause noted and pt returned to NSR. Repeat EKG completed. VSS throughout procedure. Checked in with primary RN prior to leaving. Opportunity for questions and concerns provided. Sepsis Screening  Are two or more SIRS criteria present? No    Is the patient's history suggestive of a new infection? No        1532 Rapid Response  Rapid response room 577 called at 1532. This RN responding from a previous rapid/stroke, arrived at 1540. Dr. Amalia Vance and Reinaldo Ayala, cardiology NP at bedside stating pt had converted back into SVT but was responsive to valsalva maneuvers and did not need adenosine this time. Sepsis Screening  Are two or more SIRS criteria present? No    Is the patient's history suggestive of a new infection?  No

## 2023-10-31 NOTE — CARE COORDINATION
Care Management Initial Assessment       RUR: Observation   Readmission? No  1st IM letter given? No  1st  letter given: No       10/31/23 1524   Service Assessment   Patient Orientation Alert and Oriented;Person;Place;Situation;Self   Cognition Alert   History Provided By Patient; Child/Family   Primary Caregiver Family   Support Systems Children;Family Members   Patient's Healthcare Decision Maker is: Legal Next of [facility]   PCP Verified by CM Yes  Ligia Patricio in [place])   Last Visit to PCP Within last 3 months   Prior Functional Level Independent in ADLs/IADLs   Current Functional Level Assistance with the following:;Dressing; Mobility   Can patient return to prior living arrangement Yes   Ability to make needs known: Good   Family able to assist with home care needs: Yes   Would you like for me to discuss the discharge plan with any other family members/significant others, and if so, who? Yes   Financial Resources Medicare   Social/Functional History   Lives With Family   Type of [address] [place] [address] Dr One level   [address] entrance   [address] [facility] bars in [address] Yes   Mode of Transportation Car     CM met w patient and her daughter/ER contact Malissa Gomes ([phone]) to introduce myself as the CM, verify the [facility] and review transition of care. Patient was alert and oriented to all spheres w logical thought process but slept throughout most of the assessment so her daughter provided background. Patient lives w her daughter and granddaughter in a 1 level home w a ramped entrance. She has a handicap accessible bathroom and daughter helps w lower body dressing. She uses a cane for mobility and also has a rollator, elevated toilet seat and bedside commode.  Daughter reports concerns with patient discharging home if her care

## 2023-10-31 NOTE — SIGNIFICANT EVENT
Rapid Response Team    Rapid Response room 577 called at this time. Rapid Response Team Nurse responding. A rapid response was called on this patient for Hypotension      Response    Rapid Response Team at the bedside for evaluation. Juany Aleman NP responding. Herminia Gant RN is the primary nurse during this episode. Ongoing vital signs monitored throughout critical time. Situation    Upon RRT arrival the Primary RN advises that the patient had a hypotensive episode to 64/44. Patient was then placed in trendelenburg position prior to RRT arrival.      On exam the patient is awake and alert, oriented x4, denying any lightheadedness or dizziness. Surgical dressing assessed over right hip. Dressing observed to be clean, dry and intact without any signs of active bleeding or hematoma formation. The patient states that she feels okay at this time. Due to repeat blood pressure measurement of 70s/40s, a 500mL NS bolus was initiated per the existing orders in the [facility]. The patient has clear lung sounds bilaterally and no signs of peripheral edema or JVD noted. Patient taken out of trendelenburg position and placed in semi-nino's position with fluid administration. Orders received to draw AM labs after midnight, and place patient on telemetry monitoring. Blood pressure observed to improve with fluid administration to 90s/50s. The patient was left in the care of their primary nursing team.    Checked in with primary RN prior to leaving. Opportunity for questions and concerns provided. Sepsis Screening  Are two or more SIRS criteria present?  No

## 2023-11-01 ENCOUNTER — APPOINTMENT (OUTPATIENT)
Facility: HOSPITAL | Age: 83
DRG: 470 | End: 2023-11-01
Attending: ORTHOPAEDIC SURGERY
Payer: MEDICARE

## 2023-11-01 LAB
ANION GAP SERPL CALC-SCNC: 5 MMOL/L (ref 5–15)
BUN SERPL-MCNC: 6 MG/DL (ref 6–20)
BUN/CREAT SERPL: 8 (ref 12–20)
CALCIUM SERPL-MCNC: 7.7 MG/DL (ref 8.5–10.1)
CHLORIDE SERPL-SCNC: 111 MMOL/L (ref 97–108)
CO2 SERPL-SCNC: 22 MMOL/L (ref 21–32)
CREAT SERPL-MCNC: 0.79 MG/DL (ref 0.55–1.02)
D DIMER PPP FEU-MCNC: 1.28 MG/L FEU (ref 0–0.65)
ERYTHROCYTE [DISTWIDTH] IN BLOOD BY AUTOMATED COUNT: 15.8 % (ref 11.5–14.5)
GLUCOSE BLD STRIP.AUTO-MCNC: 110 MG/DL (ref 65–117)
GLUCOSE BLD STRIP.AUTO-MCNC: 135 MG/DL (ref 65–117)
GLUCOSE BLD STRIP.AUTO-MCNC: 157 MG/DL (ref 65–117)
GLUCOSE BLD STRIP.AUTO-MCNC: 189 MG/DL (ref 65–117)
GLUCOSE SERPL-MCNC: 138 MG/DL (ref 65–100)
HCT VFR BLD AUTO: 23.6 % (ref 35–47)
HGB BLD-MCNC: 7.2 G/DL (ref 11.5–16)
MAGNESIUM SERPL-MCNC: 2.2 MG/DL (ref 1.6–2.4)
MCH RBC QN AUTO: 28.8 PG (ref 26–34)
MCHC RBC AUTO-ENTMCNC: 30.5 G/DL (ref 30–36.5)
MCV RBC AUTO: 94.4 FL (ref 80–99)
NRBC # BLD: 0 K/UL (ref 0–0.01)
NRBC BLD-RTO: 0 PER 100 WBC
PLATELET # BLD AUTO: 183 K/UL (ref 150–400)
PMV BLD AUTO: 10.1 FL (ref 8.9–12.9)
POTASSIUM SERPL-SCNC: 4.1 MMOL/L (ref 3.5–5.1)
RBC # BLD AUTO: 2.5 M/UL (ref 3.8–5.2)
SERVICE CMNT-IMP: ABNORMAL
SERVICE CMNT-IMP: NORMAL
SODIUM SERPL-SCNC: 138 MMOL/L (ref 136–145)
WBC # BLD AUTO: 10.8 K/UL (ref 3.6–11)

## 2023-11-01 PROCEDURE — 85379 FIBRIN DEGRADATION QUANT: CPT

## 2023-11-01 PROCEDURE — 97530 THERAPEUTIC ACTIVITIES: CPT

## 2023-11-01 PROCEDURE — 6360000002 HC RX W HCPCS: Performed by: ORTHOPAEDIC SURGERY

## 2023-11-01 PROCEDURE — 36415 COLL VENOUS BLD VENIPUNCTURE: CPT

## 2023-11-01 PROCEDURE — 83735 ASSAY OF MAGNESIUM: CPT

## 2023-11-01 PROCEDURE — 99233 SBSQ HOSP IP/OBS HIGH 50: CPT | Performed by: SPECIALIST

## 2023-11-01 PROCEDURE — 94640 AIRWAY INHALATION TREATMENT: CPT

## 2023-11-01 PROCEDURE — 6360000004 HC RX CONTRAST MEDICATION: Performed by: RADIOLOGY

## 2023-11-01 PROCEDURE — 6370000000 HC RX 637 (ALT 250 FOR IP): Performed by: ORTHOPAEDIC SURGERY

## 2023-11-01 PROCEDURE — 82962 GLUCOSE BLOOD TEST: CPT

## 2023-11-01 PROCEDURE — 71275 CT ANGIOGRAPHY CHEST: CPT

## 2023-11-01 PROCEDURE — 6370000000 HC RX 637 (ALT 250 FOR IP): Performed by: NURSE PRACTITIONER

## 2023-11-01 PROCEDURE — 2060000000 HC ICU INTERMEDIATE R&B

## 2023-11-01 PROCEDURE — 2580000003 HC RX 258: Performed by: ORTHOPAEDIC SURGERY

## 2023-11-01 PROCEDURE — 97535 SELF CARE MNGMENT TRAINING: CPT

## 2023-11-01 PROCEDURE — 80048 BASIC METABOLIC PNL TOTAL CA: CPT

## 2023-11-01 PROCEDURE — 2580000003 HC RX 258: Performed by: NURSE PRACTITIONER

## 2023-11-01 PROCEDURE — 85027 COMPLETE CBC AUTOMATED: CPT

## 2023-11-01 PROCEDURE — 6360000002 HC RX W HCPCS: Performed by: NURSE PRACTITIONER

## 2023-11-01 RX ADMIN — AMIODARONE HYDROCHLORIDE 0.5 MG/MIN: 50 INJECTION, SOLUTION INTRAVENOUS at 05:14

## 2023-11-01 RX ADMIN — HYDROMORPHONE HYDROCHLORIDE 0.5 MG: 1 INJECTION, SOLUTION INTRAMUSCULAR; INTRAVENOUS; SUBCUTANEOUS at 03:40

## 2023-11-01 RX ADMIN — ARFORMOTEROL TARTRATE: 15 SOLUTION RESPIRATORY (INHALATION) at 22:10

## 2023-11-01 RX ADMIN — TROSPIUM CHLORIDE 20 MG: 20 TABLET, FILM COATED ORAL at 16:07

## 2023-11-01 RX ADMIN — LATANOPROST 1 DROP: 50 SOLUTION OPHTHALMIC at 22:02

## 2023-11-01 RX ADMIN — ATORVASTATIN CALCIUM 40 MG: 40 TABLET, FILM COATED ORAL at 22:02

## 2023-11-01 RX ADMIN — HYDROMORPHONE HYDROCHLORIDE 0.5 MG: 1 INJECTION, SOLUTION INTRAMUSCULAR; INTRAVENOUS; SUBCUTANEOUS at 22:02

## 2023-11-01 RX ADMIN — HYDROXYZINE HYDROCHLORIDE 25 MG: 25 TABLET, FILM COATED ORAL at 22:02

## 2023-11-01 RX ADMIN — ACETAMINOPHEN 650 MG: 325 TABLET ORAL at 22:02

## 2023-11-01 RX ADMIN — METOPROLOL TARTRATE 12.5 MG: 25 TABLET, FILM COATED ORAL at 11:27

## 2023-11-01 RX ADMIN — SODIUM CHLORIDE, PRESERVATIVE FREE 10 ML: 5 INJECTION INTRAVENOUS at 22:03

## 2023-11-01 RX ADMIN — OXYCODONE HYDROCHLORIDE AND ACETAMINOPHEN 250 MG: 500 TABLET ORAL at 09:01

## 2023-11-01 RX ADMIN — ACETAMINOPHEN 650 MG: 325 TABLET ORAL at 09:02

## 2023-11-01 RX ADMIN — HYDROMORPHONE HYDROCHLORIDE 0.5 MG: 1 INJECTION, SOLUTION INTRAMUSCULAR; INTRAVENOUS; SUBCUTANEOUS at 13:32

## 2023-11-01 RX ADMIN — ASPIRIN 81 MG: 81 TABLET, COATED ORAL at 09:02

## 2023-11-01 RX ADMIN — ACETAMINOPHEN 650 MG: 325 TABLET ORAL at 03:40

## 2023-11-01 RX ADMIN — SENNOSIDES AND DOCUSATE SODIUM 1 TABLET: 8.6; 5 TABLET ORAL at 22:02

## 2023-11-01 RX ADMIN — FLUTICASONE PROPIONATE 2 SPRAY: 50 SPRAY, METERED NASAL at 09:24

## 2023-11-01 RX ADMIN — SODIUM CHLORIDE, PRESERVATIVE FREE 10 ML: 5 INJECTION INTRAVENOUS at 09:13

## 2023-11-01 RX ADMIN — ASPIRIN 81 MG: 81 TABLET, COATED ORAL at 22:02

## 2023-11-01 RX ADMIN — HYDROMORPHONE HYDROCHLORIDE 0.5 MG: 1 INJECTION, SOLUTION INTRAMUSCULAR; INTRAVENOUS; SUBCUTANEOUS at 17:08

## 2023-11-01 RX ADMIN — METOPROLOL TARTRATE 12.5 MG: 25 TABLET, FILM COATED ORAL at 22:02

## 2023-11-01 RX ADMIN — SENNOSIDES AND DOCUSATE SODIUM 1 TABLET: 8.6; 5 TABLET ORAL at 09:01

## 2023-11-01 RX ADMIN — TROSPIUM CHLORIDE 20 MG: 20 TABLET, FILM COATED ORAL at 06:00

## 2023-11-01 RX ADMIN — IOPAMIDOL 80 ML: 755 INJECTION, SOLUTION INTRAVENOUS at 12:33

## 2023-11-01 RX ADMIN — ACETAMINOPHEN 650 MG: 325 TABLET ORAL at 16:07

## 2023-11-01 RX ADMIN — MONTELUKAST 10 MG: 10 TABLET, FILM COATED ORAL at 22:02

## 2023-11-01 RX ADMIN — Medication 1000 UNITS: at 09:01

## 2023-11-01 RX ADMIN — FAMOTIDINE 20 MG: 20 TABLET ORAL at 18:44

## 2023-11-01 RX ADMIN — ARFORMOTEROL TARTRATE: 15 SOLUTION RESPIRATORY (INHALATION) at 07:25

## 2023-11-01 RX ADMIN — DULOXETINE HYDROCHLORIDE 60 MG: 60 CAPSULE, DELAYED RELEASE ORAL at 09:01

## 2023-11-01 RX ADMIN — HYDROMORPHONE HYDROCHLORIDE 0.5 MG: 1 INJECTION, SOLUTION INTRAMUSCULAR; INTRAVENOUS; SUBCUTANEOUS at 09:23

## 2023-11-01 RX ADMIN — POLYETHYLENE GLYCOL 3350 17 G: 17 POWDER, FOR SOLUTION ORAL at 09:01

## 2023-11-01 ASSESSMENT — PAIN DESCRIPTION - ORIENTATION
ORIENTATION: RIGHT

## 2023-11-01 ASSESSMENT — PAIN DESCRIPTION - LOCATION
LOCATION: HIP
LOCATION: HIP
LOCATION: LEG
LOCATION: HIP
LOCATION: HIP
LOCATION: LEG

## 2023-11-01 ASSESSMENT — PAIN SCALES - GENERAL
PAINLEVEL_OUTOF10: 9
PAINLEVEL_OUTOF10: 7
PAINLEVEL_OUTOF10: 8

## 2023-11-01 ASSESSMENT — PAIN DESCRIPTION - DESCRIPTORS
DESCRIPTORS: DISCOMFORT
DESCRIPTORS: ACHING
DESCRIPTORS: DISCOMFORT
DESCRIPTORS: ACHING

## 2023-11-01 NOTE — CARE COORDINATION
Transition of Care Plan:    SNF choices pending. Insurance auth to be initiated with [employer] by CM through [employer]. Transport TBD. RUR:16%  Prior Level of Functioning: Independent   Disposition: SNF plan pending   If SNF or IPR: Date FOC offered: 11/1   Date FOC received: Pending  Accepting facility: Pending SNF choices  Date authorization started with reference number: Pending   Follow up appointments: Per attending's recommendations. DME needed: Owns a cane, Rolator, bsc, elevated toilet seat and has ramp access to home. Transportation at discharge: TBD  IM/IMM Medicare/ letter given: 11/1   Caregiver Contact: Daughter: Ashleigh Perera: [phone]   Discharge Caregiver contacted prior to discharge? Y   Care Conference needed? Not at this time   Barriers to discharge: Insurance auth with [employer].     Nino Cunha RN/SABRINA  [phone]

## 2023-11-02 LAB
ANION GAP SERPL CALC-SCNC: 4 MMOL/L (ref 5–15)
APPEARANCE UR: CLEAR
BACTERIA URNS QL MICRO: NEGATIVE /HPF
BILIRUB UR QL: NEGATIVE
BUN SERPL-MCNC: 6 MG/DL (ref 6–20)
BUN/CREAT SERPL: 9 (ref 12–20)
CALCIUM SERPL-MCNC: 8.1 MG/DL (ref 8.5–10.1)
CHLORIDE SERPL-SCNC: 110 MMOL/L (ref 97–108)
CO2 SERPL-SCNC: 23 MMOL/L (ref 21–32)
COLOR UR: NORMAL
CREAT SERPL-MCNC: 0.68 MG/DL (ref 0.55–1.02)
EPITH CASTS URNS QL MICRO: NORMAL /LPF
ERYTHROCYTE [DISTWIDTH] IN BLOOD BY AUTOMATED COUNT: 15.7 % (ref 11.5–14.5)
GLUCOSE BLD STRIP.AUTO-MCNC: 149 MG/DL (ref 65–117)
GLUCOSE BLD STRIP.AUTO-MCNC: 150 MG/DL (ref 65–117)
GLUCOSE BLD STRIP.AUTO-MCNC: 151 MG/DL (ref 65–117)
GLUCOSE BLD STRIP.AUTO-MCNC: 177 MG/DL (ref 65–117)
GLUCOSE SERPL-MCNC: 121 MG/DL (ref 65–100)
GLUCOSE UR STRIP.AUTO-MCNC: NEGATIVE MG/DL
HCT VFR BLD AUTO: 23.5 % (ref 35–47)
HGB BLD-MCNC: 7.4 G/DL (ref 11.5–16)
HGB UR QL STRIP: NEGATIVE
KETONES UR QL STRIP.AUTO: NEGATIVE MG/DL
LEUKOCYTE ESTERASE UR QL STRIP.AUTO: NEGATIVE
MAGNESIUM SERPL-MCNC: 2.1 MG/DL (ref 1.6–2.4)
MCH RBC QN AUTO: 28.5 PG (ref 26–34)
MCHC RBC AUTO-ENTMCNC: 31.5 G/DL (ref 30–36.5)
MCV RBC AUTO: 90.4 FL (ref 80–99)
NITRITE UR QL STRIP.AUTO: NEGATIVE
NRBC # BLD: 0 K/UL (ref 0–0.01)
NRBC BLD-RTO: 0 PER 100 WBC
PH UR STRIP: 5 (ref 5–8)
PHOSPHATE SERPL-MCNC: 2 MG/DL (ref 2.6–4.7)
PLATELET # BLD AUTO: 209 K/UL (ref 150–400)
PMV BLD AUTO: 9.9 FL (ref 8.9–12.9)
POTASSIUM SERPL-SCNC: 3.6 MMOL/L (ref 3.5–5.1)
PROT UR STRIP-MCNC: NEGATIVE MG/DL
RBC # BLD AUTO: 2.6 M/UL (ref 3.8–5.2)
RBC #/AREA URNS HPF: NORMAL /HPF (ref 0–5)
SERVICE CMNT-IMP: ABNORMAL
SODIUM SERPL-SCNC: 137 MMOL/L (ref 136–145)
SP GR UR REFRACTOMETRY: 1.01 (ref 1–1.03)
URINE CULTURE IF INDICATED: NORMAL
UROBILINOGEN UR QL STRIP.AUTO: 0.2 EU/DL (ref 0.2–1)
WBC # BLD AUTO: 11 K/UL (ref 3.6–11)
WBC URNS QL MICRO: NORMAL /HPF (ref 0–4)

## 2023-11-02 PROCEDURE — 97530 THERAPEUTIC ACTIVITIES: CPT

## 2023-11-02 PROCEDURE — 6370000000 HC RX 637 (ALT 250 FOR IP): Performed by: NURSE PRACTITIONER

## 2023-11-02 PROCEDURE — 97535 SELF CARE MNGMENT TRAINING: CPT

## 2023-11-02 PROCEDURE — 84100 ASSAY OF PHOSPHORUS: CPT

## 2023-11-02 PROCEDURE — 97110 THERAPEUTIC EXERCISES: CPT

## 2023-11-02 PROCEDURE — 6360000002 HC RX W HCPCS: Performed by: SPECIALIST

## 2023-11-02 PROCEDURE — 80048 BASIC METABOLIC PNL TOTAL CA: CPT

## 2023-11-02 PROCEDURE — 83735 ASSAY OF MAGNESIUM: CPT

## 2023-11-02 PROCEDURE — 82962 GLUCOSE BLOOD TEST: CPT

## 2023-11-02 PROCEDURE — 6360000002 HC RX W HCPCS: Performed by: ORTHOPAEDIC SURGERY

## 2023-11-02 PROCEDURE — 2580000003 HC RX 258: Performed by: ORTHOPAEDIC SURGERY

## 2023-11-02 PROCEDURE — 36415 COLL VENOUS BLD VENIPUNCTURE: CPT

## 2023-11-02 PROCEDURE — 94640 AIRWAY INHALATION TREATMENT: CPT

## 2023-11-02 PROCEDURE — 85027 COMPLETE CBC AUTOMATED: CPT

## 2023-11-02 PROCEDURE — 2060000000 HC ICU INTERMEDIATE R&B

## 2023-11-02 PROCEDURE — 87086 URINE CULTURE/COLONY COUNT: CPT

## 2023-11-02 PROCEDURE — 81001 URINALYSIS AUTO W/SCOPE: CPT

## 2023-11-02 PROCEDURE — 6370000000 HC RX 637 (ALT 250 FOR IP): Performed by: ORTHOPAEDIC SURGERY

## 2023-11-02 RX ORDER — POTASSIUM CHLORIDE 750 MG/1
20 TABLET, FILM COATED, EXTENDED RELEASE ORAL ONCE
Status: COMPLETED | OUTPATIENT
Start: 2023-11-02 | End: 2023-11-02

## 2023-11-02 RX ORDER — POTASSIUM CHLORIDE 20 MEQ/1
20 TABLET, EXTENDED RELEASE ORAL ONCE
Status: DISCONTINUED | OUTPATIENT
Start: 2023-11-02 | End: 2023-11-02 | Stop reason: CLARIF

## 2023-11-02 RX ADMIN — OXYCODONE HYDROCHLORIDE AND ACETAMINOPHEN 250 MG: 500 TABLET ORAL at 08:55

## 2023-11-02 RX ADMIN — METOPROLOL TARTRATE 12.5 MG: 25 TABLET, FILM COATED ORAL at 08:54

## 2023-11-02 RX ADMIN — DULOXETINE HYDROCHLORIDE 60 MG: 60 CAPSULE, DELAYED RELEASE ORAL at 08:55

## 2023-11-02 RX ADMIN — ATORVASTATIN CALCIUM 40 MG: 40 TABLET, FILM COATED ORAL at 20:43

## 2023-11-02 RX ADMIN — ACETAMINOPHEN 650 MG: 325 TABLET ORAL at 15:28

## 2023-11-02 RX ADMIN — ACETAMINOPHEN 650 MG: 325 TABLET ORAL at 08:54

## 2023-11-02 RX ADMIN — Medication 1000 UNITS: at 08:55

## 2023-11-02 RX ADMIN — HYDROMORPHONE HYDROCHLORIDE 0.5 MG: 1 INJECTION, SOLUTION INTRAMUSCULAR; INTRAVENOUS; SUBCUTANEOUS at 04:20

## 2023-11-02 RX ADMIN — SENNOSIDES AND DOCUSATE SODIUM 1 TABLET: 8.6; 5 TABLET ORAL at 08:55

## 2023-11-02 RX ADMIN — SODIUM CHLORIDE, PRESERVATIVE FREE 10 ML: 5 INJECTION INTRAVENOUS at 20:44

## 2023-11-02 RX ADMIN — FLUTICASONE PROPIONATE 2 SPRAY: 50 SPRAY, METERED NASAL at 09:01

## 2023-11-02 RX ADMIN — ASPIRIN 81 MG: 81 TABLET, COATED ORAL at 20:43

## 2023-11-02 RX ADMIN — LATANOPROST 1 DROP: 50 SOLUTION OPHTHALMIC at 20:43

## 2023-11-02 RX ADMIN — POLYETHYLENE GLYCOL 3350 17 G: 17 POWDER, FOR SOLUTION ORAL at 08:55

## 2023-11-02 RX ADMIN — HYDROXYZINE HYDROCHLORIDE 25 MG: 25 TABLET, FILM COATED ORAL at 20:42

## 2023-11-02 RX ADMIN — HYDROMORPHONE HYDROCHLORIDE 0.5 MG: 1 INJECTION, SOLUTION INTRAMUSCULAR; INTRAVENOUS; SUBCUTANEOUS at 18:22

## 2023-11-02 RX ADMIN — ARFORMOTEROL TARTRATE: 15 SOLUTION RESPIRATORY (INHALATION) at 08:18

## 2023-11-02 RX ADMIN — IRON SUCROSE 200 MG: 20 INJECTION, SOLUTION INTRAVENOUS at 16:46

## 2023-11-02 RX ADMIN — TROSPIUM CHLORIDE 20 MG: 20 TABLET, FILM COATED ORAL at 06:47

## 2023-11-02 RX ADMIN — TROSPIUM CHLORIDE 20 MG: 20 TABLET, FILM COATED ORAL at 15:28

## 2023-11-02 RX ADMIN — METOPROLOL TARTRATE 12.5 MG: 25 TABLET, FILM COATED ORAL at 20:42

## 2023-11-02 RX ADMIN — SENNOSIDES AND DOCUSATE SODIUM 1 TABLET: 8.6; 5 TABLET ORAL at 20:43

## 2023-11-02 RX ADMIN — SODIUM CHLORIDE, PRESERVATIVE FREE 10 ML: 5 INJECTION INTRAVENOUS at 09:01

## 2023-11-02 RX ADMIN — OXYCODONE HYDROCHLORIDE 5 MG: 5 TABLET ORAL at 20:46

## 2023-11-02 RX ADMIN — POTASSIUM CHLORIDE 20 MEQ: 750 TABLET, EXTENDED RELEASE ORAL at 08:54

## 2023-11-02 RX ADMIN — ACETAMINOPHEN 650 MG: 325 TABLET ORAL at 04:21

## 2023-11-02 RX ADMIN — MONTELUKAST 10 MG: 10 TABLET, FILM COATED ORAL at 20:42

## 2023-11-02 RX ADMIN — ASPIRIN 81 MG: 81 TABLET, COATED ORAL at 08:55

## 2023-11-02 RX ADMIN — ACETAMINOPHEN 650 MG: 325 TABLET ORAL at 20:42

## 2023-11-02 RX ADMIN — HYDROMORPHONE HYDROCHLORIDE 0.5 MG: 1 INJECTION, SOLUTION INTRAMUSCULAR; INTRAVENOUS; SUBCUTANEOUS at 12:27

## 2023-11-02 RX ADMIN — FAMOTIDINE 20 MG: 20 TABLET ORAL at 18:23

## 2023-11-02 ASSESSMENT — PAIN DESCRIPTION - DESCRIPTORS
DESCRIPTORS: DISCOMFORT
DESCRIPTORS: ACHING

## 2023-11-02 ASSESSMENT — PAIN DESCRIPTION - ORIENTATION
ORIENTATION: RIGHT
ORIENTATION: RIGHT

## 2023-11-02 ASSESSMENT — PAIN DESCRIPTION - LOCATION
LOCATION: HIP

## 2023-11-02 ASSESSMENT — PAIN SCALES - GENERAL
PAINLEVEL_OUTOF10: 8
PAINLEVEL_OUTOF10: 7
PAINLEVEL_OUTOF10: 8

## 2023-11-02 NOTE — CARE COORDINATION
Transition of Care Plan:    Insurance auth initiated today 11/2 with [facility] for Gi[facility]. Ref# [id number]. SNF referrals pending with:  1st choice: [facility]  2nd choice: [facility]  3rd choice: [facility] TBD. RUR:18%  Prior Level of Functioning: Independent   Disposition: SNF plan pending   If SNF or IPR: Date FOC offered: 11/1   Date [address] received: 11/2  Accepting facility: SNF referrals pending  Date authorization started with reference number:Auth started with Mohit Orellana 11/2 Ref# [id number]  Follow up appointments: Per attending's recommendations. DME needed: Owns a cane, Rolator, bsc, elevated toilet seat and has ramp access to home. Transportation at discharge: TBD  IM/IMM Medicare/ letter given: 11/1   Caregiver Contact: Daughter: Lloyd Bi: [phone]   Discharge Caregiver contacted prior to discharge? Y   Care Conference needed? Not at this time   Barriers to discharge: Insurance auth with [employer].      Valeri Campoverde RN/CRM  [phone]

## 2023-11-03 VITALS
SYSTOLIC BLOOD PRESSURE: 112 MMHG | TEMPERATURE: 97.8 F | BODY MASS INDEX: 35.15 KG/M2 | DIASTOLIC BLOOD PRESSURE: 51 MMHG | RESPIRATION RATE: 20 BRPM | HEART RATE: 80 BPM | WEIGHT: 205.91 LBS | HEIGHT: 64 IN | OXYGEN SATURATION: 97 %

## 2023-11-03 LAB
ABO + RH BLD: NORMAL
ANION GAP SERPL CALC-SCNC: 6 MMOL/L (ref 5–15)
ANTIGENS PRESENT RBC DONR: NORMAL
ANTIGENS PRESENT RBC DONR: NORMAL
BACTERIA SPEC CULT: NORMAL
BLD PROD TYP BPU: NORMAL
BLD PROD TYP BPU: NORMAL
BLOOD BANK CMNT PATIENT-IMP: NORMAL
BLOOD BANK DISPENSE STATUS: NORMAL
BLOOD BANK DISPENSE STATUS: NORMAL
BLOOD GROUP ANTIBODIES SERPL: NORMAL
BLOOD GROUP ANTIBODIES SERPL: NORMAL
BPU ID: NORMAL
BPU ID: NORMAL
BUN SERPL-MCNC: 5 MG/DL (ref 6–20)
BUN/CREAT SERPL: 8 (ref 12–20)
CALCIUM SERPL-MCNC: 8.4 MG/DL (ref 8.5–10.1)
CHLORIDE SERPL-SCNC: 110 MMOL/L (ref 97–108)
CO2 SERPL-SCNC: 24 MMOL/L (ref 21–32)
CREAT SERPL-MCNC: 0.61 MG/DL (ref 0.55–1.02)
CROSSMATCH RESULT: NORMAL
CROSSMATCH RESULT: NORMAL
ERYTHROCYTE [DISTWIDTH] IN BLOOD BY AUTOMATED COUNT: 15.4 % (ref 11.5–14.5)
EST. AVERAGE GLUCOSE BLD GHB EST-MCNC: 131 MG/DL
GLUCOSE BLD STRIP.AUTO-MCNC: 126 MG/DL (ref 65–117)
GLUCOSE BLD STRIP.AUTO-MCNC: 135 MG/DL (ref 65–117)
GLUCOSE BLD STRIP.AUTO-MCNC: 145 MG/DL (ref 65–117)
GLUCOSE SERPL-MCNC: 124 MG/DL (ref 65–100)
HBA1C MFR BLD: 6.2 % (ref 4–5.6)
HCT VFR BLD AUTO: 24.9 % (ref 35–47)
HGB BLD-MCNC: 7.8 G/DL (ref 11.5–16)
MAGNESIUM SERPL-MCNC: 2 MG/DL (ref 1.6–2.4)
MCH RBC QN AUTO: 28.9 PG (ref 26–34)
MCHC RBC AUTO-ENTMCNC: 31.3 G/DL (ref 30–36.5)
MCV RBC AUTO: 92.2 FL (ref 80–99)
NRBC # BLD: 0.02 K/UL (ref 0–0.01)
NRBC BLD-RTO: 0.2 PER 100 WBC
PHOSPHATE SERPL-MCNC: 2.7 MG/DL (ref 2.6–4.7)
PLATELET # BLD AUTO: 247 K/UL (ref 150–400)
PMV BLD AUTO: 9.9 FL (ref 8.9–12.9)
POTASSIUM SERPL-SCNC: 3.8 MMOL/L (ref 3.5–5.1)
RBC # BLD AUTO: 2.7 M/UL (ref 3.8–5.2)
SERVICE CMNT-IMP: ABNORMAL
SERVICE CMNT-IMP: NORMAL
SODIUM SERPL-SCNC: 140 MMOL/L (ref 136–145)
SPECIMEN EXP DATE BLD: NORMAL
UNIT DIVISION: 0
UNIT DIVISION: 0
WBC # BLD AUTO: 11.2 K/UL (ref 3.6–11)

## 2023-11-03 PROCEDURE — 6370000000 HC RX 637 (ALT 250 FOR IP): Performed by: ORTHOPAEDIC SURGERY

## 2023-11-03 PROCEDURE — 83735 ASSAY OF MAGNESIUM: CPT

## 2023-11-03 PROCEDURE — 36415 COLL VENOUS BLD VENIPUNCTURE: CPT

## 2023-11-03 PROCEDURE — 84100 ASSAY OF PHOSPHORUS: CPT

## 2023-11-03 PROCEDURE — 97116 GAIT TRAINING THERAPY: CPT

## 2023-11-03 PROCEDURE — 80048 BASIC METABOLIC PNL TOTAL CA: CPT

## 2023-11-03 PROCEDURE — 83036 HEMOGLOBIN GLYCOSYLATED A1C: CPT

## 2023-11-03 PROCEDURE — 85027 COMPLETE CBC AUTOMATED: CPT

## 2023-11-03 PROCEDURE — 82962 GLUCOSE BLOOD TEST: CPT

## 2023-11-03 PROCEDURE — 97530 THERAPEUTIC ACTIVITIES: CPT

## 2023-11-03 PROCEDURE — 2580000003 HC RX 258: Performed by: ORTHOPAEDIC SURGERY

## 2023-11-03 PROCEDURE — 97110 THERAPEUTIC EXERCISES: CPT

## 2023-11-03 PROCEDURE — 6360000002 HC RX W HCPCS: Performed by: ORTHOPAEDIC SURGERY

## 2023-11-03 PROCEDURE — 97535 SELF CARE MNGMENT TRAINING: CPT

## 2023-11-03 RX ORDER — ASPIRIN 81 MG/1
81 TABLET ORAL 2 TIMES DAILY
Qty: 60 TABLET | Refills: 0 | Status: SHIPPED | OUTPATIENT
Start: 2023-11-03 | End: 2023-12-03

## 2023-11-03 RX ORDER — LOSARTAN POTASSIUM 100 MG/1
TABLET ORAL
Qty: 90 TABLET | Refills: 0 | Status: SHIPPED
Start: 2023-11-03 | End: 2023-11-03 | Stop reason: HOSPADM

## 2023-11-03 RX ORDER — FERROUS SULFATE 325(65) MG
325 TABLET ORAL DAILY
Qty: 30 TABLET | Refills: 0 | Status: SHIPPED | OUTPATIENT
Start: 2023-11-04 | End: 2023-12-04

## 2023-11-03 RX ORDER — OXYCODONE HYDROCHLORIDE 5 MG/1
5 TABLET ORAL
Qty: 30 TABLET | Refills: 0 | Status: SHIPPED | OUTPATIENT
Start: 2023-11-03 | End: 2023-11-10

## 2023-11-03 RX ORDER — FERROUS SULFATE 325(65) MG
325 TABLET ORAL DAILY
Status: DISCONTINUED | OUTPATIENT
Start: 2023-11-04 | End: 2023-11-03 | Stop reason: HOSPADM

## 2023-11-03 RX ADMIN — ACETAMINOPHEN 650 MG: 325 TABLET ORAL at 09:46

## 2023-11-03 RX ADMIN — POLYETHYLENE GLYCOL 3350 17 G: 17 POWDER, FOR SOLUTION ORAL at 09:47

## 2023-11-03 RX ADMIN — ASPIRIN 81 MG: 81 TABLET, COATED ORAL at 09:47

## 2023-11-03 RX ADMIN — OXYCODONE HYDROCHLORIDE 5 MG: 5 TABLET ORAL at 03:41

## 2023-11-03 RX ADMIN — ACETAMINOPHEN 650 MG: 325 TABLET ORAL at 15:28

## 2023-11-03 RX ADMIN — HYDROMORPHONE HYDROCHLORIDE 0.5 MG: 1 INJECTION, SOLUTION INTRAMUSCULAR; INTRAVENOUS; SUBCUTANEOUS at 13:03

## 2023-11-03 RX ADMIN — ACETAMINOPHEN 650 MG: 325 TABLET ORAL at 03:38

## 2023-11-03 RX ADMIN — ARFORMOTEROL TARTRATE: 15 SOLUTION RESPIRATORY (INHALATION) at 09:52

## 2023-11-03 RX ADMIN — DULOXETINE HYDROCHLORIDE 60 MG: 60 CAPSULE, DELAYED RELEASE ORAL at 09:47

## 2023-11-03 RX ADMIN — TROSPIUM CHLORIDE 20 MG: 20 TABLET, FILM COATED ORAL at 06:59

## 2023-11-03 RX ADMIN — SODIUM CHLORIDE, PRESERVATIVE FREE 10 ML: 5 INJECTION INTRAVENOUS at 09:48

## 2023-11-03 RX ADMIN — Medication 1000 UNITS: at 09:47

## 2023-11-03 RX ADMIN — SENNOSIDES AND DOCUSATE SODIUM 1 TABLET: 8.6; 5 TABLET ORAL at 09:47

## 2023-11-03 RX ADMIN — FLUTICASONE PROPIONATE 2 SPRAY: 50 SPRAY, METERED NASAL at 09:48

## 2023-11-03 RX ADMIN — TROSPIUM CHLORIDE 20 MG: 20 TABLET, FILM COATED ORAL at 15:29

## 2023-11-03 RX ADMIN — OXYCODONE HYDROCHLORIDE AND ACETAMINOPHEN 250 MG: 500 TABLET ORAL at 09:47

## 2023-11-03 RX ADMIN — HYDROMORPHONE HYDROCHLORIDE 0.5 MG: 1 INJECTION, SOLUTION INTRAMUSCULAR; INTRAVENOUS; SUBCUTANEOUS at 16:53

## 2023-11-03 ASSESSMENT — PAIN DESCRIPTION - ORIENTATION
ORIENTATION: RIGHT

## 2023-11-03 ASSESSMENT — PAIN DESCRIPTION - LOCATION
LOCATION: HIP

## 2023-11-03 ASSESSMENT — PAIN SCALES - GENERAL
PAINLEVEL_OUTOF10: 7
PAINLEVEL_OUTOF10: 6
PAINLEVEL_OUTOF10: 7
PAINLEVEL_OUTOF10: 0
PAINLEVEL_OUTOF10: 6
PAINLEVEL_OUTOF10: 5
PAINLEVEL_OUTOF10: 6

## 2023-11-03 ASSESSMENT — PAIN DESCRIPTION - DESCRIPTORS
DESCRIPTORS: ACHING

## 2023-11-03 NOTE — ADT AUTH CERT
UPDATED PROGRESS NOTES    Claudio Neville MD  Physician  Internal Medicine  Progress Notes     Signed  Date of Service:  2023  7:42 PM     [address] Adult  Hospitalist Group                                                                                                                                                  Hospitalist Consult Note  Claudio Neville MD  Office Phone: [phone]         Date of Service:  2023  NAME:  Artie Villanueva  :  1940  MRN:  [id number]        Admission Summary:   Artie Villanueva is a 80 y.o. female with a past medical history of arthritis,  bradycardia, depression, DM, GERD, glaucoma, HLD, HTN, neuropathy and urine incontinence who is currently admitted to room 577 with right hip osteoarthritis s/p right total hip arthroplasty (10/30/23). Rapid response called for hypotension, SBP 60-70's. Seen and examined patient at bedside. She is awake, alert and oriented, denies any dizziness or syncope. States that she feels \"just fine\". Having slight right hip pain. Interval history / Subjective:   : Patient seen and examined this afternoon, at the bedside. Patient is in sinus rhythm on the monitor, she denies any chest pain, shortness of breath, or heart palpitations. She is currently on metoprolol and IV amiodarone, plan to wean off amiodarone today. Cautious use of beta-blocker as patient has had bradycardia in the past.  EF shows a preserved EF of 60 to 65% and no wall motion abnormalities. 10/31:  Overnight, rapid response was called for hypotension, with SBP 60s-70s. Patient was asymptomatic. She was given fluid resuscitation with IV albumin with improvement in her blood pressure.      This morning, RRT was
bilaterally   CVS: S1 S2 heard, Capillary refill less than 2 seconds  Abd: soft/ non tender, non distended, BS physiological,   Ext: Right thigh swollen from recent hip surgery. Pitting peripheral edema  Neuro/Psych: pleasant mood and affect, CN 2-12 grossly intact, sensory grossly within normal limit  Skin: warm      Data Review:    Review and/or order of clinical lab test  Review and/or order of tests in the radiology section of [facility]  Review and/or order of tests in the medicine section of [facility]        I have personally and independently reviewed all pertinent labs, diagnostic studies, imaging, and have provided independent interpretation of the same. Labs:           Recent Labs     11/02/23 0351 11/03/23 0343   WBC 11.0 11.2*   HGB 7.4* 7.8*   HCT 23.5* 24.9*    247               Recent Labs     11/01/23 0342 11/02/23 0351 11/03/23 0343    137 140   K 4.1 3.6 3.8   * 110* 110*   CO2 22 23 24   BUN 6 6 5*   MG 2.2 2.1 2.0   PHOS  --  2.0* 2.7         No results for input(s): \"ALT\", \"TP\", \"ALB\", \"GLOB\", \"GGT\", \"AML\" in the last 72 hours. Invalid input(s): \"SGOT\", \"GPT\", \"AP\", \"TBIL\", \"TBILI\", \"AMYP\", \"LPSE\", \"HLPSE\"  No results for input(s): \"INR\", \"APTT\" in the last 72 hours. Invalid input(s): \"PTP\"   No results for input(s): \"TIBC\", \"FERR\" in the last 72 hours. Invalid input(s): \"FE\", \"PSAT\"   No results found for: \"FOL\", \"RBCF\"   No results for input(s): \"PH\", \"PCO2\", \"PO2\" in the last 72 hours. No results for input(s): \"CPK\" in the last 72 hours. Invalid input(s): \"CPKMB\", \"CKNDX\", \"TROIQ\"        Lab Results   Component Value Date/Time     CHOL 134 01/15/2023 06:21 AM     HDL 45 01/15/2023 06:21 AM      No results found for: \"GLUCPOC\"        Notes reviewed from all clinical/nonclinical/nursing services involved in patient's clinical care. Care coordination discussions were held with appropriate clinical/nonclinical/ nursing providers based on care coordination needs.
Oral, Daily, Magali Campo MD, 60 mg at 10/31/23 3785    [Held by provider] furosemide (LASIX) tablet 20 mg, 20 mg, Oral, Daily, Magali Campo MD    fluticasone (FLONASE) 50 MCG/ACT nasal spray 2 spray, 2 spray, Nasal, Daily, Magali Campo MD    hydrOXYzine HCl (ATARAX) tablet 25 mg, 25 mg, Oral, TID PRN, Magali Campo MD    latanoprost (XALATAN) 0.005 % ophthalmic solution 1 drop, 1 drop, Both Eyes, Nightly, Magali Campo MD, 1 drop at 10/31/23 2311    [Held by provider] losartan (COZAAR) tablet 100 mg, 100 mg, Oral, Daily, Magali Campo MD    montelukast (SINGULAIR) tablet 10 mg, 10 mg, Oral, Nightly, Magali Campo MD, 10 mg at 10/31/23 2007    trospium (SANCTURA) tablet 20 mg, 20 mg, Oral, BID AC, Magali Campo MD, 20 mg at 11/01/23 0600    Vitamin D (CHOLECALCIFEROL) tablet 1,000 Units, 1,000 Units, Oral, Daily, Magali Campo MD, 1,000 Units at 10/31/23 5170    sodium chloride flush 0.9 % injection 5-40 mL, 5-40 mL, IntraVENous, 2 times per day, Magali Campo MD, 10 mL at 10/31/23 2009    sodium chloride flush 0.9 % injection 5-40 mL, 5-40 mL, IntraVENous, PRN, Magali Campo MD    0.9 % sodium chloride infusion, , IntraVENous, PRN, Magali Campo MD    HYDROmorphone HCl PF (DILAUDID) injection 0.5 mg, 0.5 mg, IntraVENous, Q3H PRN, Magali Campo MD, 0.5 mg at 11/01/23 0340    polyethylene glycol (GLYCOLAX) packet 17 g, 17 g, Oral, Daily, Magali Campo MD, 17 g at 10/31/23 2463    sennosides-docusate sodium (SENOKOT-S) 8.6-50 MG tablet 1 tablet, 1 tablet, Oral, BID, Magali Campo MD, 1 tablet at 10/31/23 2007    bisacodyl (DULCOLAX) suppository 10 mg, 10 mg, Rectal, Daily PRN, Magali Campo MD    magnesium hydroxide (MILK OF MAGNESIA) 400 MG/5ML suspension 30 mL, 30 mL, Oral, Daily PRN, Magali Campo MD    ondansetron (ZOFRAN-ODT) disintegrating tablet 4 mg, 4 mg, Oral, Q8H PRN **OR** ondansetron (ZOFRAN) injection 4 mg, 4 mg, IntraVENous, Q6H PRN, Magali Campo,

## 2023-11-03 NOTE — CARE COORDINATION
Transition of Care Plan:    Insurance auth granted with [facility] [place] for [facility]. Yenni Torre ([phone]) has a bed available now and over the weekend. Auth ID pending. Ref# [id number]. Auth good for 5 days starting 11/3 through 11/7. Daughter requesting to speak to attending prior to discharge. CM perfect served Ortho MD with request. [facility] vs Family to transport. RUR:18%  Prior Level of Functioning: Independent   Disposition: SNF plan pending   If SNF or IPR: Date FOC offered: 11/1   Date [address] received: 11/2  Accepting facility: SNF referrals pending  Date authorization started with reference number:Auth started with [facility] 11/2 Ref# [id number]  Follow up appointments: Per attending's recommendations. DME needed: Owns a cane, Rolator, bsc, elevated toilet seat and has ramp access to home. Transportation at discharge: TBD  IM/IMM Medicare/ letter given: 11/1   Caregiver Contact: Daughter: Rumalda Habermann: [phone]   Discharge Caregiver contacted prior to discharge? Y   Care Conference needed? Not at this time   Barriers to discharge: Insurance auth with [place].      Paula Dickinson RN/CRM  [phone]:

## 2023-11-03 NOTE — CARE COORDINATION
Transition of Care Plan to SNF/Rehab    Communication to Patient/Family:  Met with patient and family and they are agreeable to the transition plan. The Plan for Transition of Care is related to the following treatment goals: The patient is discharging to [facility] SNF today with [facility] transport at 5:30pm. Insurance auth granted with [facility] reference #[id number]. No auth ID generated. Auth good for 5 days from 11/3-11-7. RN to call report to: [phone] for room: [address] [place] [address]. Daughter: Darrick Lozano agrees with plan and requests stretcher transportation. CM relayed that insurance payment for stretcher transport not guaranteed for services. The Patient and/or patient representative was provided with a choice of provider and agrees  with the discharge plan. Yes [x] No []    A Freedom of choice list was provided with basic dialogue that supports the patient's individualized plan of care/goals and shares the quality data associated with the providers. Yes [x] No []    SNF/Rehab Transition:  Patient has been accepted to [facility] SNF/Rehab and meets criteria for admission. Patient will transported by [facility] stretcher and expected to leave at 5:30pm.    Communication to SNF/Rehab:  Bedside RN, Corby Malcolm, has been notified to update the transition plan to the facility and call report (phone number). Discharge information has been updated on the AVS. And communicated to facility via [employer], or CC link. Nursing Please include all hard scripts for controlled substances, med rec and dc summary, and AVS in packet. Reviewed and confirmed with facility, Inessa Merchant can manage the patient care needs for the following:     Josy Bird with (X) only those applicable:  Medication:  [x]Medications are available at the facility  []IV Antibiotics    []Controlled Substance - hard copies available sent.   []Weekly Labs    Equipment:  []CPAP/BiPAP  []Wound Vacuum  []Ricks or Urinary

## 2023-11-08 ENCOUNTER — TELEPHONE (OUTPATIENT)
Age: 83
End: 2023-11-08

## 2023-11-08 NOTE — TELEPHONE ENCOUNTER
Enrolled with [employer] - Ordered and being shipped to patient's home address on file. ETA within 7-14 Business Days. Mailed to pts home address. FW: Inpatient Notes  Received: ERIC Lawrence   14 day monitor per Dr. Alexander Bell for SVT. The patient is currently in [facility] and  doesn't know how long she will be there. The daughter says you can send to home address on file if you prefer and she will take it to her. Please either let them or me know which address is preferred. Thanks. Previous Messages       ----- Message -----   From: Luis Capps MD   Sent: 11/3/2023   2:49 PM EST   To: Katherine Perez MD; *   Subject: Inpatient Notes                                   Patient given appoint with me post hospital but is Dr. Cast Drafts patient. Please cancel the below appointment and reschedule with Dr. Alexander Bell sometime in the next month or so. She has postop severe anemia with SVT. She should be seen probably in a month or so. Please also send her a 14-day Holter via mail.   It can be enclosed back to Dr. Alexander Bell she is changed from losartan to metoprolol and will need follow-up on her BP meds in office       Future Appointments   12/14/2023 11:20 AM   Constantine Miller MD [facility]

## 2023-11-21 DIAGNOSIS — M79.604 PAIN IN RIGHT LEG: ICD-10-CM

## 2023-11-21 DIAGNOSIS — M19.019 PRIMARY OSTEOARTHRITIS OF SHOULDER, UNSPECIFIED LATERALITY: ICD-10-CM

## 2023-11-21 DIAGNOSIS — M19.012 PRIMARY OSTEOARTHRITIS, LEFT SHOULDER: ICD-10-CM

## 2023-11-21 RX ORDER — PREGABALIN 75 MG/1
75 CAPSULE ORAL NIGHTLY
Qty: 90 CAPSULE | Refills: 0 | OUTPATIENT
Start: 2023-11-21 | End: 2024-02-19

## 2023-11-21 RX ORDER — MELOXICAM 15 MG/1
15 TABLET ORAL DAILY PRN
Qty: 30 TABLET | Refills: 0 | OUTPATIENT
Start: 2023-11-21

## 2023-11-21 RX ORDER — LOSARTAN POTASSIUM 100 MG/1
100 TABLET ORAL DAILY
Qty: 90 TABLET | Refills: 0 | OUTPATIENT
Start: 2023-11-21

## 2023-11-21 RX ORDER — NITROFURANTOIN 25; 75 MG/1; MG/1
100 CAPSULE ORAL 2 TIMES DAILY
COMMUNITY
Start: 2023-11-16 | End: 2023-11-29

## 2023-11-21 RX ORDER — OXYCODONE HYDROCHLORIDE 5 MG/1
5 TABLET ORAL EVERY 4 HOURS PRN
COMMUNITY
Start: 2023-11-15

## 2023-11-21 NOTE — TELEPHONE ENCOUNTER
Losartan 100mg Tab  Meloxicam 15mg Tab  Metoprolol Tart 25mg Tab  Pregabalin 75mg Cap    [facility] Mail Delivery - Dylan Trammell [phone] - F [phone]

## 2023-11-22 ENCOUNTER — TELEPHONE (OUTPATIENT)
Age: 83
End: 2023-11-22

## 2023-11-22 NOTE — TELEPHONE ENCOUNTER
Patient needs post op hospital discharge visit to reconcile her medications before I can order any of these. Thanks!

## 2023-11-24 ENCOUNTER — TELEPHONE (OUTPATIENT)
Age: 83
End: 2023-11-24

## 2023-11-28 DIAGNOSIS — Z98.890 HISTORY OF BLADDER SUSPENSION PROCEDURE: ICD-10-CM

## 2023-11-28 DIAGNOSIS — Z87.448 HISTORY OF BLADDER SUSPENSION PROCEDURE: ICD-10-CM

## 2023-11-28 DIAGNOSIS — R30.0 DYSURIA: ICD-10-CM

## 2023-11-28 RX ORDER — MIRABEGRON 50 MG/1
50 TABLET, FILM COATED, EXTENDED RELEASE ORAL DAILY
Qty: 30 TABLET | Refills: 0 | Status: SHIPPED | OUTPATIENT
Start: 2023-11-28

## 2023-11-29 ENCOUNTER — TELEMEDICINE (OUTPATIENT)
Age: 83
End: 2023-11-29
Payer: MEDICARE

## 2023-11-29 DIAGNOSIS — Z09 HOSPITAL DISCHARGE FOLLOW-UP: ICD-10-CM

## 2023-11-29 DIAGNOSIS — M79.604 PAIN IN RIGHT LEG: ICD-10-CM

## 2023-11-29 DIAGNOSIS — Z79.899 MEDICATION MANAGEMENT: ICD-10-CM

## 2023-11-29 DIAGNOSIS — M25.551 RIGHT HIP PAIN: Primary | ICD-10-CM

## 2023-11-29 DIAGNOSIS — D64.9 ANEMIA, UNSPECIFIED TYPE: ICD-10-CM

## 2023-11-29 DIAGNOSIS — Z96.641 HISTORY OF RIGHT HIP REPLACEMENT: ICD-10-CM

## 2023-11-29 DIAGNOSIS — E11.40 TYPE 2 DIABETES MELLITUS WITH DIABETIC NEUROPATHY, WITHOUT LONG-TERM CURRENT USE OF INSULIN (HCC): Primary | ICD-10-CM

## 2023-11-29 DIAGNOSIS — M19.019 PRIMARY OSTEOARTHRITIS OF SHOULDER, UNSPECIFIED LATERALITY: ICD-10-CM

## 2023-11-29 DIAGNOSIS — M19.012 PRIMARY OSTEOARTHRITIS, LEFT SHOULDER: ICD-10-CM

## 2023-11-29 PROCEDURE — G8417 CALC BMI ABV UP PARAM F/U: HCPCS | Performed by: INTERNAL MEDICINE

## 2023-11-29 PROCEDURE — G8399 PT W/DXA RESULTS DOCUMENT: HCPCS | Performed by: INTERNAL MEDICINE

## 2023-11-29 PROCEDURE — G8484 FLU IMMUNIZE NO ADMIN: HCPCS | Performed by: INTERNAL MEDICINE

## 2023-11-29 PROCEDURE — 99214 OFFICE O/P EST MOD 30 MIN: CPT | Performed by: INTERNAL MEDICINE

## 2023-11-29 PROCEDURE — 1036F TOBACCO NON-USER: CPT | Performed by: INTERNAL MEDICINE

## 2023-11-29 PROCEDURE — 1123F ACP DISCUSS/DSCN MKR DOCD: CPT | Performed by: INTERNAL MEDICINE

## 2023-11-29 PROCEDURE — 1111F DSCHRG MED/CURRENT MED MERGE: CPT | Performed by: INTERNAL MEDICINE

## 2023-11-29 PROCEDURE — 1090F PRES/ABSN URINE INCON ASSESS: CPT | Performed by: INTERNAL MEDICINE

## 2023-11-29 PROCEDURE — G8427 DOCREV CUR MEDS BY ELIG CLIN: HCPCS | Performed by: INTERNAL MEDICINE

## 2023-11-29 RX ORDER — BLOOD GLUCOSE CONTROL HIGH,LOW
EACH MISCELLANEOUS
Qty: 1 EACH | Refills: 0 | Status: SHIPPED | OUTPATIENT
Start: 2023-11-29

## 2023-11-29 RX ORDER — LANCETS
EACH MISCELLANEOUS
Qty: 100 EACH | Refills: 3 | Status: SHIPPED | OUTPATIENT
Start: 2023-11-29

## 2023-11-29 RX ORDER — LATANOPROST 50 UG/ML
1 SOLUTION/ DROPS OPHTHALMIC NIGHTLY
Qty: 2.5 ML | Refills: 3 | Status: SHIPPED | OUTPATIENT
Start: 2023-11-29

## 2023-11-29 RX ORDER — NYSTATIN 100000 [USP'U]/G
POWDER TOPICAL
Qty: 30 G | Refills: 3 | Status: SHIPPED | OUTPATIENT
Start: 2023-11-29

## 2023-11-29 RX ORDER — TIZANIDINE 2 MG/1
TABLET ORAL
Qty: 30 TABLET | Refills: 1 | Status: SHIPPED | OUTPATIENT
Start: 2023-11-29

## 2023-11-29 ASSESSMENT — PATIENT HEALTH QUESTIONNAIRE - PHQ9
SUM OF ALL RESPONSES TO PHQ QUESTIONS 1-9: 0
1. LITTLE INTEREST OR PLEASURE IN DOING THINGS: 0
SUM OF ALL RESPONSES TO PHQ9 QUESTIONS 1 & 2: 0
SUM OF ALL RESPONSES TO PHQ QUESTIONS 1-9: 0
2. FEELING DOWN, DEPRESSED OR HOPELESS: 0
SUM OF ALL RESPONSES TO PHQ QUESTIONS 1-9: 0
SUM OF ALL RESPONSES TO PHQ QUESTIONS 1-9: 0

## 2023-11-29 NOTE — TELEPHONE ENCOUNTER
New rx request for  -ACCU-CHECK GUIDE METER KIT  -ACCU-CHECK GUIDE TEST STRIPS (50)  -HYDROXYZINE HCL 25 MG TAB  -LATANOPROST 0.005% OPHT SOL 2.5 ML    Please send to [address] [place] [address] [place] [phone] - F [phone]

## 2023-12-03 ENCOUNTER — CLINICAL DOCUMENTATION (OUTPATIENT)
Age: 83
End: 2023-12-03

## 2023-12-15 ENCOUNTER — TELEPHONE (OUTPATIENT)
Age: 83
End: 2023-12-15

## 2023-12-15 NOTE — TELEPHONE ENCOUNTER
Daughter called to ask if they can do a virtual visit. Pt has a heart monitor and they want results.      Fela # [phone]

## 2023-12-18 NOTE — TELEPHONE ENCOUNTER
Spoke with patient's daughter and reviewed monitor findings.  Also moved appointment up to 1/8/23 to discuss further as requested.

## 2024-01-04 ENCOUNTER — TELEPHONE (OUTPATIENT)
Age: 84
End: 2024-01-04

## 2024-01-04 DIAGNOSIS — M19.012 PRIMARY OSTEOARTHRITIS, LEFT SHOULDER: ICD-10-CM

## 2024-01-04 DIAGNOSIS — R30.0 DYSURIA: ICD-10-CM

## 2024-01-04 DIAGNOSIS — Z98.890 HISTORY OF BLADDER SUSPENSION PROCEDURE: ICD-10-CM

## 2024-01-04 DIAGNOSIS — M79.604 PAIN IN RIGHT LEG: ICD-10-CM

## 2024-01-04 DIAGNOSIS — Z87.448 HISTORY OF BLADDER SUSPENSION PROCEDURE: ICD-10-CM

## 2024-01-04 DIAGNOSIS — M19.019 PRIMARY OSTEOARTHRITIS OF SHOULDER, UNSPECIFIED LATERALITY: ICD-10-CM

## 2024-01-04 RX ORDER — MIRABEGRON 50 MG/1
50 TABLET, FILM COATED, EXTENDED RELEASE ORAL DAILY
Qty: 90 TABLET | Refills: 1 | Status: SHIPPED | OUTPATIENT
Start: 2024-01-04

## 2024-01-04 RX ORDER — TIZANIDINE 2 MG/1
TABLET ORAL
Qty: 30 TABLET | Refills: 5 | Status: SHIPPED | OUTPATIENT
Start: 2024-01-04

## 2024-01-04 RX ORDER — LOSARTAN POTASSIUM 100 MG/1
TABLET ORAL
Qty: 90 TABLET | Refills: 0 | Status: SHIPPED | OUTPATIENT
Start: 2024-01-04

## 2024-01-04 RX ORDER — HYDROXYZINE HYDROCHLORIDE 25 MG/1
TABLET, FILM COATED ORAL
Qty: 30 TABLET | Refills: 0 | Status: SHIPPED | OUTPATIENT
Start: 2024-01-04 | End: 2024-02-06

## 2024-01-04 RX ORDER — PREGABALIN 75 MG/1
75 CAPSULE ORAL 2 TIMES DAILY
Qty: 60 CAPSULE | Refills: 0 | Status: SHIPPED | OUTPATIENT
Start: 2024-01-04 | End: 2024-02-03

## 2024-01-04 NOTE — TELEPHONE ENCOUNTER
Spoke with Fela and discussed virtual visits not available at this time. We discussed  option.  We discussed upcoming appointment that was previously scheduled for Feb and will continue with this appt.

## 2024-01-04 NOTE — TELEPHONE ENCOUNTER
Pt was scheduled to come in to discuss her cardiac monitor results, pts daughter (Fela) stated that the pt has spoken to someone concerning her results. If the appt is need Fela would like to make a VV.    Fela # [phone]

## 2024-01-12 ENCOUNTER — TELEPHONE (OUTPATIENT)
Age: 84
End: 2024-01-12

## 2024-01-12 RX ORDER — MONTELUKAST SODIUM 10 MG/1
TABLET ORAL
Qty: 90 TABLET | Refills: 0 | Status: SHIPPED | OUTPATIENT
Start: 2024-01-12

## 2024-01-25 ENCOUNTER — PATIENT MESSAGE (OUTPATIENT)
Age: 84
End: 2024-01-25

## 2024-01-25 DIAGNOSIS — I10 ESSENTIAL (PRIMARY) HYPERTENSION: ICD-10-CM

## 2024-01-25 DIAGNOSIS — I47.10 SVT (SUPRAVENTRICULAR TACHYCARDIA): Primary | ICD-10-CM

## 2024-01-26 RX ORDER — LATANOPROST 50 UG/ML
1 SOLUTION/ DROPS OPHTHALMIC NIGHTLY
Qty: 2.5 ML | Refills: 3 | Status: SHIPPED | OUTPATIENT
Start: 2024-01-26

## 2024-01-30 ENCOUNTER — TELEPHONE (OUTPATIENT)
Age: 84
End: 2024-01-30

## 2024-02-06 RX ORDER — HYDROXYZINE HYDROCHLORIDE 25 MG/1
TABLET, FILM COATED ORAL
Qty: 30 TABLET | Refills: 0 | Status: SHIPPED | OUTPATIENT
Start: 2024-02-06

## 2024-03-04 RX ORDER — HYDROXYZINE HYDROCHLORIDE 25 MG/1
TABLET, FILM COATED ORAL
Qty: 30 TABLET | Refills: 0 | Status: SHIPPED | OUTPATIENT
Start: 2024-03-04

## 2024-03-18 ENCOUNTER — OFFICE VISIT (OUTPATIENT)
Age: 84
End: 2024-03-18
Payer: MEDICARE

## 2024-03-18 VITALS
HEART RATE: 76 BPM | DIASTOLIC BLOOD PRESSURE: 56 MMHG | SYSTOLIC BLOOD PRESSURE: 116 MMHG | BODY MASS INDEX: 35 KG/M2 | WEIGHT: 205 LBS | OXYGEN SATURATION: 98 % | HEIGHT: 64 IN

## 2024-03-18 DIAGNOSIS — E11.8 TYPE 2 DIABETES MELLITUS WITH COMPLICATION, WITHOUT LONG-TERM CURRENT USE OF INSULIN (HCC): ICD-10-CM

## 2024-03-18 DIAGNOSIS — I10 ESSENTIAL (PRIMARY) HYPERTENSION: Primary | ICD-10-CM

## 2024-03-18 DIAGNOSIS — E66.01 MORBID (SEVERE) OBESITY DUE TO EXCESS CALORIES (HCC): ICD-10-CM

## 2024-03-18 DIAGNOSIS — G47.33 OSA ON CPAP: ICD-10-CM

## 2024-03-18 DIAGNOSIS — I47.10 SVT (SUPRAVENTRICULAR TACHYCARDIA): ICD-10-CM

## 2024-03-18 DIAGNOSIS — I27.20 PULMONARY HYPERTENSION, UNSPECIFIED (HCC): ICD-10-CM

## 2024-03-18 PROCEDURE — 1090F PRES/ABSN URINE INCON ASSESS: CPT | Performed by: INTERNAL MEDICINE

## 2024-03-18 PROCEDURE — G8399 PT W/DXA RESULTS DOCUMENT: HCPCS | Performed by: INTERNAL MEDICINE

## 2024-03-18 PROCEDURE — G8417 CALC BMI ABV UP PARAM F/U: HCPCS | Performed by: INTERNAL MEDICINE

## 2024-03-18 PROCEDURE — 3078F DIAST BP <80 MM HG: CPT | Performed by: INTERNAL MEDICINE

## 2024-03-18 PROCEDURE — G8484 FLU IMMUNIZE NO ADMIN: HCPCS | Performed by: INTERNAL MEDICINE

## 2024-03-18 PROCEDURE — 3074F SYST BP LT 130 MM HG: CPT | Performed by: INTERNAL MEDICINE

## 2024-03-18 PROCEDURE — G8428 CUR MEDS NOT DOCUMENT: HCPCS | Performed by: INTERNAL MEDICINE

## 2024-03-18 PROCEDURE — 1123F ACP DISCUSS/DSCN MKR DOCD: CPT | Performed by: INTERNAL MEDICINE

## 2024-03-18 PROCEDURE — 1036F TOBACCO NON-USER: CPT | Performed by: INTERNAL MEDICINE

## 2024-03-18 PROCEDURE — 99214 OFFICE O/P EST MOD 30 MIN: CPT | Performed by: INTERNAL MEDICINE

## 2024-03-18 RX ORDER — ATORVASTATIN CALCIUM 40 MG/1
40 TABLET, FILM COATED ORAL NIGHTLY
Qty: 90 TABLET | Refills: 3 | Status: SHIPPED | OUTPATIENT
Start: 2024-03-18

## 2024-03-18 NOTE — PROGRESS NOTES
Chief Complaint   Patient presents with    Hypertension    Other     DYSPNEA, JOSS     Vitals:    03/18/24 1335   BP: (!) 116/56   Site: Left Upper Arm   Position: Sitting   Cuff Size: Medium Adult   Pulse: 76   SpO2: 98%   Weight: 93 kg (205 lb)   Height: 1.626 m (5' 4\")      BP (!) 116/56 (Site: Left Upper Arm, Position: Sitting, Cuff Size: Medium Adult)   Pulse 76   Ht 1.626 m (5' 4\")   Wt 93 kg (205 lb)   SpO2 98%   BMI 35.19 kg/m²          
NEEDED FOR ITCHING    ketoconazole (NIZORAL) 2 % cream Topical, DAILY    Lancets MISC accu-chek softclix lancets Check blood sugar daily E11.9    latanoprost (XALATAN) 0.005 % ophthalmic solution 1 drop, Both Eyes, NIGHTLY    losartan (COZAAR) 100 MG tablet Take 1 tablet by mouth once daily    metoprolol tartrate (LOPRESSOR) 12.5 mg, Oral, 2 TIMES DAILY    montelukast (SINGULAIR) 10 MG tablet Take 1 tablet by mouth once daily for 90 days    Myrbetriq 50 mg, Oral, DAILY    nystatin 835019 UNIT/GM powder APPLY 1 APPLICATION OF POWDER TOPICALLY TO AFFECTED AREA 4 TIMES DAILY UNTIL RASH RESOLVES    ondansetron (ZOFRAN-ODT) 8 mg, Oral, EVERY 8 HOURS PRN    pregabalin (LYRICA) 75 mg, Oral, 2 TIMES DAILY    tiZANidine (ZANAFLEX) 2 MG tablet TAKE 1 TABLET BY MOUTH NIGHTLY AS NEEDED FOR MUSCLE SPASM    vitamin D (CHOLECALCIFEROL) 1,000 Units, Oral, DAILY                   Physical Exam:   Visit Vitals     Blood pressure (!) 116/56, pulse 76, height 1.626 m (5' 4\"), weight 93 kg (205 lb), SpO2 98 %.                   Gen: Well-developed, well-nourished, in no acute distress   Neck: Supple,No JVD, No Carotid Bruit,    Resp: No accessory muscle use, Clear breath sounds, No rales or rhonchi   Card: Regular Rythm,Normal S1, S2, No murmurs, rubs or gallop. No thrills.    Abd:  Soft, BS+,    MSK: No cyanosis   Skin: No rashes     Neuro: moving all four extremities , follows commands appropriately; tremors left upper extremity   Psych:  Good insight, oriented to person, place , alert, Nml Affect   LE: No edema      EKG:       LABS:                       Valentino Avila MD

## 2024-03-21 ENCOUNTER — TELEMEDICINE (OUTPATIENT)
Age: 84
End: 2024-03-21
Payer: MEDICARE

## 2024-03-21 DIAGNOSIS — R53.81 MALAISE AND FATIGUE: ICD-10-CM

## 2024-03-21 DIAGNOSIS — E55.9 VITAMIN D DEFICIENCY: ICD-10-CM

## 2024-03-21 DIAGNOSIS — E78.2 MIXED HYPERLIPIDEMIA: ICD-10-CM

## 2024-03-21 DIAGNOSIS — R25.1 TREMORS OF NERVOUS SYSTEM: ICD-10-CM

## 2024-03-21 DIAGNOSIS — E11.40 TYPE 2 DIABETES MELLITUS WITH DIABETIC NEUROPATHY, WITHOUT LONG-TERM CURRENT USE OF INSULIN (HCC): ICD-10-CM

## 2024-03-21 DIAGNOSIS — R53.83 MALAISE AND FATIGUE: ICD-10-CM

## 2024-03-21 DIAGNOSIS — Z00.00 MEDICARE ANNUAL WELLNESS VISIT, SUBSEQUENT: Primary | ICD-10-CM

## 2024-03-21 DIAGNOSIS — M79.604 PAIN IN RIGHT LEG: ICD-10-CM

## 2024-03-21 DIAGNOSIS — M19.019 PRIMARY OSTEOARTHRITIS OF SHOULDER, UNSPECIFIED LATERALITY: ICD-10-CM

## 2024-03-21 DIAGNOSIS — I10 PRIMARY HYPERTENSION: ICD-10-CM

## 2024-03-21 DIAGNOSIS — M19.012 PRIMARY OSTEOARTHRITIS, LEFT SHOULDER: ICD-10-CM

## 2024-03-21 PROCEDURE — 1036F TOBACCO NON-USER: CPT | Performed by: INTERNAL MEDICINE

## 2024-03-21 PROCEDURE — G8399 PT W/DXA RESULTS DOCUMENT: HCPCS | Performed by: INTERNAL MEDICINE

## 2024-03-21 PROCEDURE — 99214 OFFICE O/P EST MOD 30 MIN: CPT | Performed by: INTERNAL MEDICINE

## 2024-03-21 PROCEDURE — 1123F ACP DISCUSS/DSCN MKR DOCD: CPT | Performed by: INTERNAL MEDICINE

## 2024-03-21 PROCEDURE — G8417 CALC BMI ABV UP PARAM F/U: HCPCS | Performed by: INTERNAL MEDICINE

## 2024-03-21 PROCEDURE — 1090F PRES/ABSN URINE INCON ASSESS: CPT | Performed by: INTERNAL MEDICINE

## 2024-03-21 PROCEDURE — G8427 DOCREV CUR MEDS BY ELIG CLIN: HCPCS | Performed by: INTERNAL MEDICINE

## 2024-03-21 PROCEDURE — G8484 FLU IMMUNIZE NO ADMIN: HCPCS | Performed by: INTERNAL MEDICINE

## 2024-03-21 PROCEDURE — G0439 PPPS, SUBSEQ VISIT: HCPCS | Performed by: INTERNAL MEDICINE

## 2024-03-21 RX ORDER — PREGABALIN 75 MG/1
75 CAPSULE ORAL NIGHTLY
Qty: 30 CAPSULE | Refills: 0
Start: 2024-03-21 | End: 2024-04-20

## 2024-03-21 RX ORDER — LOSARTAN POTASSIUM 100 MG/1
50 TABLET ORAL DAILY
Qty: 90 TABLET | Refills: 0
Start: 2024-03-21 | End: 2024-09-17

## 2024-03-21 ASSESSMENT — PATIENT HEALTH QUESTIONNAIRE - PHQ9
3. TROUBLE FALLING OR STAYING ASLEEP: NOT AT ALL
1. LITTLE INTEREST OR PLEASURE IN DOING THINGS: NOT AT ALL
SUM OF ALL RESPONSES TO PHQ QUESTIONS 1-9: 9
4. FEELING TIRED OR HAVING LITTLE ENERGY: NEARLY EVERY DAY
2. FEELING DOWN, DEPRESSED OR HOPELESS: NOT AT ALL
6. FEELING BAD ABOUT YOURSELF - OR THAT YOU ARE A FAILURE OR HAVE LET YOURSELF OR YOUR FAMILY DOWN: NOT AT ALL
7. TROUBLE CONCENTRATING ON THINGS, SUCH AS READING THE NEWSPAPER OR WATCHING TELEVISION: NEARLY EVERY DAY
9. THOUGHTS THAT YOU WOULD BE BETTER OFF DEAD, OR OF HURTING YOURSELF: NOT AT ALL
SUM OF ALL RESPONSES TO PHQ9 QUESTIONS 1 & 2: 0
SUM OF ALL RESPONSES TO PHQ QUESTIONS 1-9: 9
SUM OF ALL RESPONSES TO PHQ QUESTIONS 1-9: 9
10. IF YOU CHECKED OFF ANY PROBLEMS, HOW DIFFICULT HAVE THESE PROBLEMS MADE IT FOR YOU TO DO YOUR WORK, TAKE CARE OF THINGS AT HOME, OR GET ALONG WITH OTHER PEOPLE: NOT DIFFICULT AT ALL
SUM OF ALL RESPONSES TO PHQ QUESTIONS 1-9: 9
5. POOR APPETITE OR OVEREATING: NEARLY EVERY DAY
8. MOVING OR SPEAKING SO SLOWLY THAT OTHER PEOPLE COULD HAVE NOTICED. OR THE OPPOSITE, BEING SO FIGETY OR RESTLESS THAT YOU HAVE BEEN MOVING AROUND A LOT MORE THAN USUAL: NOT AT ALL

## 2024-03-21 ASSESSMENT — LIFESTYLE VARIABLES
HOW OFTEN DO YOU HAVE A DRINK CONTAINING ALCOHOL: 2-4 TIMES A MONTH
HOW MANY STANDARD DRINKS CONTAINING ALCOHOL DO YOU HAVE ON A TYPICAL DAY: 1 OR 2

## 2024-03-21 NOTE — PROGRESS NOTES
Room: Virtual Visit     Identified pt with two pt identifiers(name and ). Reviewed record in preparation for visit and have obtained necessary documentation. All patient medications has been reviewed.  Chief Complaint   Patient presents with    Medicare AWV    Hand Problem     Patient c/o hand tremors x 1 year and is getting worse.    Dizziness     Patient c/o dizziness x 1 week. Daughter states that her mother starts to sweat and her heart races when she is feeling dizzy.             Health Maintenance Due   Topic    Pneumococcal 65+ years Vaccine (1 of 2 - PCV)    DTaP/Tdap/Td vaccine (1 - Tdap)    Shingles vaccine (1 of 2)    Respiratory Syncytial Virus (RSV) Pregnant or age 60 yrs+ (1 - 1-dose 60+ series)    Flu vaccine (1)    COVID-19 Vaccine ( -  season)    Annual Wellness Visit (Medicare Advantage)     Lipids      Health Maintenance Review: Patient reminded of \"due or due soon\" health maintenance. I have asked the patient to contact his/her primary care provider (PCP) for follow-up on his/her health maintenance.    There are no vitals reported for this visit.      1. \"Have you been to the ER, urgent care clinic since your last visit?  Hospitalized since your last visit?\" No    2. \"Have you seen or consulted any other health care providers outside of the [facility] since your last visit?\" [facility]     3. For patients aged 45-75: Has the patient had a colonoscopy / FIT/ Cologuard? NA - based on age      If the patient is female:    4. For patients aged 40-74: Has the patient had a mammogram within the past 2 years? NA - based on age or sex      5. For patients aged 21-65: Has the patient had a pap smear? NA - based on age or sex        
Take 1 tablet by mouth once daily Yes Alessia Rangel MD   tiZANidine (ZANAFLEX) 2 MG tablet TAKE 1 TABLET BY MOUTH NIGHTLY AS NEEDED FOR MUSCLE SPASM  Patient taking differently: TAKE 1 TABLET BY MOUTH NIGHTLY Yes Alessia Rangel MD   pregabalin (LYRICA) 75 MG capsule Take 1 capsule by mouth 2 times daily for 30 days. Max Daily Amount: 150 mg  Patient taking differently: Take 1 capsule by mouth at bedtime. Taking 1 tablet at night. Yes Alessia Rangel MD   Accu-Chek Softclix Lancets MISC Use to check blood sugar once daily Yes Alessia Rangel MD   Blood Glucose Calibration (ACCU-CHEK ANTONIO) SOLN Use with glucose meter Yes Alessia Rangel MD   nystatin 394811 UNIT/GM powder APPLY 1 APPLICATION OF POWDER TOPICALLY TO AFFECTED AREA 4 TIMES DAILY UNTIL RASH RESOLVES Yes Alessia Rangel MD   aspirin 81 MG EC tablet Take 1 tablet by mouth 2 times daily  Patient taking differently: Take 1 tablet by mouth daily Yes John Frias MD   albuterol sulfate (PROAIR RESPICLICK) 108 (90 Base) MCG/ACT aerosol powder inhalation Inhale 2 puffs into the lungs every 4 hours as needed for Wheezing or Shortness of Breath Yes Provider, MD Renata   budesonide-formoterol (SYMBICORT) 160-4.5 MCG/ACT AERO Inhale 2 puffs into the lungs 2 times daily Yes Provider, MD Renata   ondansetron (ZOFRAN-ODT) 4 MG disintegrating tablet Take 2 tablets by mouth every 8 hours as needed for Nausea or Vomiting Yes Pablo Gonzalez MD   DULoxetine (CYMBALTA) 60 MG extended release capsule Take 1 capsule by mouth once daily Yes Alessia Rangel MD   Lancets MISC accu-chek softclix lancets Check blood sugar daily E11.9 Yes Automatic Reconciliation, Ar   alendronate (FOSAMAX) 70 MG tablet TAKE 1 TABLET BY MOUTH ONCE A WEEK IN THE MORNING BEFORE FIRST MEAL, BEVERAGE, OR MEDICATION OF THE DAY Yes Automatic Reconciliation, Ar   ascorbic acid (VITAMIN C) 250 MG tablet Take 1 tablet by mouth daily Yes Automatic Reconciliation, Ar

## 2024-03-21 NOTE — PATIENT INSTRUCTIONS
liability for your use of this information.      Personalized Preventive Plan for Laura Lu - 3/21/2024  Medicare offers a range of preventive health benefits. Some of the tests and screenings are paid in full while other may be subject to a deductible, co-insurance, and/or copay.    Some of these benefits include a comprehensive review of your medical history including lifestyle, illnesses that may run in your family, and various assessments and screenings as appropriate.    After reviewing your medical record and screening and assessments performed today your provider may have ordered immunizations, labs, imaging, and/or referrals for you.  A list of these orders (if applicable) as well as your Preventive Care list are included within your After Visit Summary for your review.    Other Preventive Recommendations:    A preventive eye exam performed by an eye specialist is recommended every 1-2 years to screen for glaucoma; cataracts, macular degeneration, and other eye disorders.  A preventive dental visit is recommended every 6 months.  Try to get at least 150 minutes of exercise per week or 10,000 steps per day on a pedometer .  Order or download the FREE \"Exercise & Physical Activity: Your Everyday Guide\" from [employer]. Call 1-562.681.1134 or search [employer] online.  You need 5606-6825 mg of calcium and 6018-4221 IU of vitamin D per day. It is possible to meet your calcium requirement with diet alone, but a vitamin D supplement is usually necessary to meet this goal.  When exposed to the sun, use a sunscreen that protects against both UVA and UVB radiation with an SPF of 30 or greater. Reapply every 2 to 3 hours or after sweating, drying off with a towel, or swimming.  Always wear a seat belt when traveling in a car. Always wear a helmet when riding a bicycle or motorcycle.

## 2024-04-01 DIAGNOSIS — R53.83 MALAISE AND FATIGUE: ICD-10-CM

## 2024-04-01 DIAGNOSIS — R25.1 TREMORS OF NERVOUS SYSTEM: ICD-10-CM

## 2024-04-01 DIAGNOSIS — E55.9 VITAMIN D DEFICIENCY: ICD-10-CM

## 2024-04-01 DIAGNOSIS — E11.40 TYPE 2 DIABETES MELLITUS WITH DIABETIC NEUROPATHY, WITHOUT LONG-TERM CURRENT USE OF INSULIN (HCC): ICD-10-CM

## 2024-04-01 DIAGNOSIS — E78.2 MIXED HYPERLIPIDEMIA: ICD-10-CM

## 2024-04-01 DIAGNOSIS — R53.81 MALAISE AND FATIGUE: ICD-10-CM

## 2024-04-01 RX ORDER — HYDROXYZINE HYDROCHLORIDE 25 MG/1
25 TABLET, FILM COATED ORAL NIGHTLY
Qty: 30 TABLET | Refills: 0 | Status: SHIPPED | OUTPATIENT
Start: 2024-04-01

## 2024-04-02 LAB
25(OH)D3 SERPL-MCNC: 36.3 NG/ML (ref 30–100)
ALBUMIN SERPL-MCNC: 3.3 G/DL (ref 3.5–5)
ALBUMIN/GLOB SERPL: 0.8 (ref 1.1–2.2)
ALP SERPL-CCNC: 119 U/L (ref 45–117)
ALT SERPL-CCNC: 19 U/L (ref 12–78)
AMORPH CRY URNS QL MICRO: ABNORMAL
ANION GAP SERPL CALC-SCNC: 3 MMOL/L (ref 5–15)
APPEARANCE UR: ABNORMAL
AST SERPL-CCNC: 12 U/L (ref 15–37)
BACTERIA URNS QL MICRO: ABNORMAL /HPF
BASOPHILS # BLD: 0 K/UL (ref 0–0.1)
BASOPHILS NFR BLD: 1 % (ref 0–1)
BILIRUB SERPL-MCNC: 0.5 MG/DL (ref 0.2–1)
BILIRUB UR QL: NEGATIVE
BUN SERPL-MCNC: 20 MG/DL (ref 6–20)
BUN/CREAT SERPL: 15 (ref 12–20)
CALCIUM SERPL-MCNC: 9.9 MG/DL (ref 8.5–10.1)
CHLORIDE SERPL-SCNC: 105 MMOL/L (ref 97–108)
CHOLEST SERPL-MCNC: 144 MG/DL
CO2 SERPL-SCNC: 29 MMOL/L (ref 21–32)
COLOR UR: ABNORMAL
CREAT SERPL-MCNC: 1.32 MG/DL (ref 0.55–1.02)
DIFFERENTIAL METHOD BLD: ABNORMAL
EOSINOPHIL # BLD: 0.1 K/UL (ref 0–0.4)
EOSINOPHIL NFR BLD: 2 % (ref 0–7)
EPITH CASTS URNS QL MICRO: ABNORMAL /LPF
ERYTHROCYTE [DISTWIDTH] IN BLOOD BY AUTOMATED COUNT: 15.3 % (ref 11.5–14.5)
EST. AVERAGE GLUCOSE BLD GHB EST-MCNC: 160 MG/DL
GLOBULIN SER CALC-MCNC: 4.1 G/DL (ref 2–4)
GLUCOSE SERPL-MCNC: 105 MG/DL (ref 65–100)
GLUCOSE UR STRIP.AUTO-MCNC: NEGATIVE MG/DL
HBA1C MFR BLD: 7.2 % (ref 4–5.6)
HCT VFR BLD AUTO: 35.8 % (ref 35–47)
HDLC SERPL-MCNC: 51 MG/DL
HDLC SERPL: 2.8 (ref 0–5)
HGB BLD-MCNC: 11.2 G/DL (ref 11.5–16)
HGB UR QL STRIP: NEGATIVE
IMM GRANULOCYTES # BLD AUTO: 0.1 K/UL (ref 0–0.04)
IMM GRANULOCYTES NFR BLD AUTO: 1 % (ref 0–0.5)
KETONES UR QL STRIP.AUTO: NEGATIVE MG/DL
LDLC SERPL CALC-MCNC: 74 MG/DL (ref 0–100)
LEUKOCYTE ESTERASE UR QL STRIP.AUTO: ABNORMAL
LYMPHOCYTES # BLD: 1.7 K/UL (ref 0.8–3.5)
LYMPHOCYTES NFR BLD: 20 % (ref 12–49)
MAGNESIUM SERPL-MCNC: 2.3 MG/DL (ref 1.6–2.4)
MCH RBC QN AUTO: 28 PG (ref 26–34)
MCHC RBC AUTO-ENTMCNC: 31.3 G/DL (ref 30–36.5)
MCV RBC AUTO: 89.5 FL (ref 80–99)
MONOCYTES # BLD: 0.6 K/UL (ref 0–1)
MONOCYTES NFR BLD: 7 % (ref 5–13)
NEUTS SEG # BLD: 5.9 K/UL (ref 1.8–8)
NEUTS SEG NFR BLD: 69 % (ref 32–75)
NITRITE UR QL STRIP.AUTO: NEGATIVE
NRBC # BLD: 0 K/UL (ref 0–0.01)
NRBC BLD-RTO: 0 PER 100 WBC
PH UR STRIP: 5.5 (ref 5–8)
PLATELET # BLD AUTO: 262 K/UL (ref 150–400)
PMV BLD AUTO: 10.8 FL (ref 8.9–12.9)
POTASSIUM SERPL-SCNC: 4.7 MMOL/L (ref 3.5–5.1)
PROT SERPL-MCNC: 7.4 G/DL (ref 6.4–8.2)
PROT UR STRIP-MCNC: ABNORMAL MG/DL
RBC # BLD AUTO: 4 M/UL (ref 3.8–5.2)
RBC #/AREA URNS HPF: ABNORMAL /HPF (ref 0–5)
SODIUM SERPL-SCNC: 137 MMOL/L (ref 136–145)
SP GR UR REFRACTOMETRY: 1.02 (ref 1–1.03)
T4 FREE SERPL-MCNC: 1.3 NG/DL (ref 0.8–1.5)
TRIGL SERPL-MCNC: 95 MG/DL
TSH SERPL DL<=0.05 MIU/L-ACNC: 4.02 UIU/ML (ref 0.36–3.74)
URINE CULTURE IF INDICATED: ABNORMAL
UROBILINOGEN UR QL STRIP.AUTO: 1 EU/DL (ref 0.2–1)
VLDLC SERPL CALC-MCNC: 19 MG/DL
WBC # BLD AUTO: 8.4 K/UL (ref 3.6–11)
WBC URNS QL MICRO: ABNORMAL /HPF (ref 0–4)

## 2024-04-05 ENCOUNTER — TELEPHONE (OUTPATIENT)
Age: 84
End: 2024-04-05

## 2024-04-05 NOTE — TELEPHONE ENCOUNTER
----- Message from Alessia Rangel MD sent at 4/5/2024  5:57 AM EDT -----  Please ask patient to schedule either an in person or virtual visit with me to discuss her labs.   Quite a few things to discuss and possible change in medications.   Thanks

## 2024-04-24 ENCOUNTER — TELEMEDICINE (OUTPATIENT)
Age: 84
End: 2024-04-24
Payer: MEDICARE

## 2024-04-24 DIAGNOSIS — E03.9 ACQUIRED HYPOTHYROIDISM: ICD-10-CM

## 2024-04-24 DIAGNOSIS — N18.30 TYPE 2 DIABETES MELLITUS WITH STAGE 3 CHRONIC KIDNEY DISEASE, WITHOUT LONG-TERM CURRENT USE OF INSULIN, UNSPECIFIED WHETHER STAGE 3A OR 3B CKD (HCC): Primary | ICD-10-CM

## 2024-04-24 DIAGNOSIS — E11.22 TYPE 2 DIABETES MELLITUS WITH STAGE 3 CHRONIC KIDNEY DISEASE, WITHOUT LONG-TERM CURRENT USE OF INSULIN, UNSPECIFIED WHETHER STAGE 3A OR 3B CKD (HCC): Primary | ICD-10-CM

## 2024-04-24 PROCEDURE — G8417 CALC BMI ABV UP PARAM F/U: HCPCS | Performed by: INTERNAL MEDICINE

## 2024-04-24 PROCEDURE — 1090F PRES/ABSN URINE INCON ASSESS: CPT | Performed by: INTERNAL MEDICINE

## 2024-04-24 PROCEDURE — 99213 OFFICE O/P EST LOW 20 MIN: CPT | Performed by: INTERNAL MEDICINE

## 2024-04-24 PROCEDURE — 1123F ACP DISCUSS/DSCN MKR DOCD: CPT | Performed by: INTERNAL MEDICINE

## 2024-04-24 PROCEDURE — G8399 PT W/DXA RESULTS DOCUMENT: HCPCS | Performed by: INTERNAL MEDICINE

## 2024-04-24 PROCEDURE — 3051F HG A1C>EQUAL 7.0%<8.0%: CPT | Performed by: INTERNAL MEDICINE

## 2024-04-24 PROCEDURE — 1036F TOBACCO NON-USER: CPT | Performed by: INTERNAL MEDICINE

## 2024-04-24 PROCEDURE — G8427 DOCREV CUR MEDS BY ELIG CLIN: HCPCS | Performed by: INTERNAL MEDICINE

## 2024-04-24 ASSESSMENT — PATIENT HEALTH QUESTIONNAIRE - PHQ9
7. TROUBLE CONCENTRATING ON THINGS, SUCH AS READING THE NEWSPAPER OR WATCHING TELEVISION: NOT AT ALL
4. FEELING TIRED OR HAVING LITTLE ENERGY: NOT AT ALL
5. POOR APPETITE OR OVEREATING: NOT AT ALL
1. LITTLE INTEREST OR PLEASURE IN DOING THINGS: NOT AT ALL
SUM OF ALL RESPONSES TO PHQ QUESTIONS 1-9: 0
SUM OF ALL RESPONSES TO PHQ QUESTIONS 1-9: 0
3. TROUBLE FALLING OR STAYING ASLEEP: NOT AT ALL
8. MOVING OR SPEAKING SO SLOWLY THAT OTHER PEOPLE COULD HAVE NOTICED. OR THE OPPOSITE, BEING SO FIGETY OR RESTLESS THAT YOU HAVE BEEN MOVING AROUND A LOT MORE THAN USUAL: NOT AT ALL
2. FEELING DOWN, DEPRESSED OR HOPELESS: NOT AT ALL
9. THOUGHTS THAT YOU WOULD BE BETTER OFF DEAD, OR OF HURTING YOURSELF: NOT AT ALL
10. IF YOU CHECKED OFF ANY PROBLEMS, HOW DIFFICULT HAVE THESE PROBLEMS MADE IT FOR YOU TO DO YOUR WORK, TAKE CARE OF THINGS AT HOME, OR GET ALONG WITH OTHER PEOPLE: NOT DIFFICULT AT ALL
SUM OF ALL RESPONSES TO PHQ QUESTIONS 1-9: 0
SUM OF ALL RESPONSES TO PHQ QUESTIONS 1-9: 0
6. FEELING BAD ABOUT YOURSELF - OR THAT YOU ARE A FAILURE OR HAVE LET YOURSELF OR YOUR FAMILY DOWN: NOT AT ALL
SUM OF ALL RESPONSES TO PHQ9 QUESTIONS 1 & 2: 0

## 2024-04-24 ASSESSMENT — ENCOUNTER SYMPTOMS
GASTROINTESTINAL NEGATIVE: 1
EYES NEGATIVE: 1
ALLERGIC/IMMUNOLOGIC NEGATIVE: 1
RESPIRATORY NEGATIVE: 1

## 2024-04-24 NOTE — PROGRESS NOTES
Identified pt with two pt identifiers(name and ).    Chief Complaint   Patient presents with    Discuss Labs        Health Maintenance Due   Topic    Pneumococcal 65+ years Vaccine (1 of 2 - PCV)    DTaP/Tdap/Td vaccine (1 - Tdap)    Shingles vaccine (1 of 2)    Respiratory Syncytial Virus (RSV) Pregnant or age 60 yrs+ (1 - 1-dose 60+ series)    COVID-19 Vaccine ( season)       Wt Readings from Last 3 Encounters:   24 93 kg (205 lb)   10/31/23 93.4 kg (205 lb 14.6 oz)   10/23/23 94.3 kg (208 lb)     Temp Readings from Last 3 Encounters:   23 97.8 °F (36.6 °C) (Oral)   10/23/23 97.9 °F (36.6 °C) (Oral)   10/05/23 97 °F (36.1 °C) (Temporal)     BP Readings from Last 3 Encounters:   24 (!) 116/56   23 (!) 112/51   10/23/23 110/71     Pulse Readings from Last 3 Encounters:   24 76   23 80   10/23/23 83           Depression Screening:  :         2024     2:28 PM 3/21/2024    10:40 AM 2023     9:51 AM 10/5/2023     2:09 PM 2023    10:00 AM 3/9/2023     1:11 PM 3/1/2023    10:00 AM   PHQ-9 Questionaire   Little interest or pleasure in doing things 0 0 0 0 0 0 0   Feeling down, depressed, or hopeless 0 0 0 0 0 0 0   Trouble falling or staying asleep, or sleeping too much 0 0  0      Feeling tired or having little energy 0 3  0      Poor appetite or overeating 0 3  0      Feeling bad about yourself - or that you are a failure or have let yourself or your family down 0 0  0      Trouble concentrating on things, such as reading the newspaper or watching television 0 3  0      Moving or speaking so slowly that other people could have noticed. Or the opposite - being so fidgety or restless that you have been moving around a lot more than usual 0 0  0      Thoughts that you would be better off dead, or of hurting yourself in some way 0 0  0      PHQ-9 Total Score 0 9 0 0 0 0 0   If you checked off any problems, how difficult have these problems made it for you to

## 2024-04-24 NOTE — PROGRESS NOTES
2024    TELEHEALTH EVALUATION -- Audio/Visual    CC:  Chief Complaint   Patient presents with    Discuss Labs     ASSESSMENT/PLAN:  1. Type 2 diabetes mellitus with stage 3 chronic kidney disease, without long-term current use of insulin, unspecified whether stage 3a or 3b CKD (HCC)  -     Basic Metabolic Panel; Future  2. Acquired hypothyroidism  -     TSH + Free T4 Panel; Future    Return if symptoms worsen or fail to improve.    Laura Lu, was evaluated through a synchronous (real-time) audio-video encounter. The patient (or guardian if applicable) is aware that this is a billable service, which includes applicable co-pays. This Virtual Visit was conducted with patient's (and/or legal guardian's) consent. Patient identification was verified, and a caregiver was present when appropriate.   The patient was located at Home: [address] [place] [phone]  Provider was located at Facility (Appt Dept): [address] [place] [id number]  Confirm you are appropriately licensed, registered, or certified to deliver care in the [facility] where the patient is located as indicated above. If you are not or unsure, please re-schedule the visit: Yes, I confirm.     Total time spent on this encounter: Not billed by time    --Alessia Rangel MD on 2024 at 2:33 PM    An electronic signature was used to authenticate this note.    HPI:  Laura Lu (:  1940) has requested an audio/video evaluation for the following concern(s):  84F with  has a past medical history of Arrhythmia, Arthritis, Burning with urination, Depression, Diabetes (HCC), GERD (gastroesophageal reflux disease), Glaucoma, Hearing loss, High cholesterol, Hypertension, Ill-defined condition, Neuropathy, Obesity, Class II, BMI 35-39.9, Osteoarthritis, Type 2 diabetes mellitus without complication (HCC), and Urinary incontinence.    She recently had labs drawn and here to discuss some findings of concern.

## 2024-04-26 DIAGNOSIS — I10 PRIMARY HYPERTENSION: ICD-10-CM

## 2024-04-28 RX ORDER — LOSARTAN POTASSIUM 100 MG/1
TABLET ORAL
Qty: 90 TABLET | Refills: 0 | Status: SHIPPED | OUTPATIENT
Start: 2024-04-28

## 2024-04-30 DIAGNOSIS — M19.012 PRIMARY OSTEOARTHRITIS, LEFT SHOULDER: ICD-10-CM

## 2024-04-30 DIAGNOSIS — M79.604 PAIN IN RIGHT LEG: ICD-10-CM

## 2024-04-30 DIAGNOSIS — M19.019 PRIMARY OSTEOARTHRITIS OF SHOULDER, UNSPECIFIED LATERALITY: ICD-10-CM

## 2024-05-01 RX ORDER — HYDROXYZINE HYDROCHLORIDE 25 MG/1
25 TABLET, FILM COATED ORAL NIGHTLY
Qty: 30 TABLET | Refills: 0 | Status: SHIPPED | OUTPATIENT
Start: 2024-05-01

## 2024-05-01 RX ORDER — PREGABALIN 75 MG/1
CAPSULE ORAL
Qty: 60 CAPSULE | Refills: 0 | Status: SHIPPED | OUTPATIENT
Start: 2024-05-01 | End: 2024-05-31

## 2024-05-23 LAB
BUN SERPL-MCNC: 11 MG/DL (ref 8–27)
BUN/CREAT SERPL: 14 (ref 12–28)
CALCIUM SERPL-MCNC: 9.4 MG/DL (ref 8.7–10.3)
CHLORIDE SERPL-SCNC: 102 MMOL/L (ref 96–106)
CO2 SERPL-SCNC: 25 MMOL/L (ref 20–29)
CREAT SERPL-MCNC: 0.81 MG/DL (ref 0.57–1)
EGFRCR SERPLBLD CKD-EPI 2021: 72 ML/MIN/1.73
GLUCOSE SERPL-MCNC: 117 MG/DL (ref 70–99)
POTASSIUM SERPL-SCNC: 4.1 MMOL/L (ref 3.5–5.2)
SODIUM SERPL-SCNC: 141 MMOL/L (ref 134–144)
T4 FREE SERPL-MCNC: 1.2 NG/DL (ref 0.82–1.77)
TSH SERPL DL<=0.005 MIU/L-ACNC: 1.57 UIU/ML (ref 0.45–4.5)

## 2024-05-24 ENCOUNTER — TELEPHONE (OUTPATIENT)
Age: 84
End: 2024-05-24

## 2024-05-24 NOTE — TELEPHONE ENCOUNTER
----- Message from Alessia Rangel MD sent at 5/24/2024  2:30 AM EDT -----  Please let patient know that her labs are stable. No worrisome findings. Recommend follow up in 3-4 months to repeat her HBA1C and diabetic status. I request that she be seen in person for that visit. Thanks!

## 2024-06-04 ENCOUNTER — OFFICE VISIT (OUTPATIENT)
Age: 84
End: 2024-06-04
Payer: MEDICARE

## 2024-06-04 VITALS
HEIGHT: 64 IN | HEART RATE: 58 BPM | RESPIRATION RATE: 18 BRPM | OXYGEN SATURATION: 96 % | DIASTOLIC BLOOD PRESSURE: 68 MMHG | BODY MASS INDEX: 33.8 KG/M2 | SYSTOLIC BLOOD PRESSURE: 120 MMHG | TEMPERATURE: 96.8 F | WEIGHT: 198 LBS

## 2024-06-04 DIAGNOSIS — G25.0 BENIGN ESSENTIAL TREMOR: Primary | ICD-10-CM

## 2024-06-04 DIAGNOSIS — G24.9 DYSKINESIA: ICD-10-CM

## 2024-06-04 DIAGNOSIS — E11.42 DIABETIC POLYNEUROPATHY ASSOCIATED WITH TYPE 2 DIABETES MELLITUS (HCC): ICD-10-CM

## 2024-06-04 PROCEDURE — 3074F SYST BP LT 130 MM HG: CPT | Performed by: PSYCHIATRY & NEUROLOGY

## 2024-06-04 PROCEDURE — 1090F PRES/ABSN URINE INCON ASSESS: CPT | Performed by: PSYCHIATRY & NEUROLOGY

## 2024-06-04 PROCEDURE — 1123F ACP DISCUSS/DSCN MKR DOCD: CPT | Performed by: PSYCHIATRY & NEUROLOGY

## 2024-06-04 PROCEDURE — G8417 CALC BMI ABV UP PARAM F/U: HCPCS | Performed by: PSYCHIATRY & NEUROLOGY

## 2024-06-04 PROCEDURE — 3051F HG A1C>EQUAL 7.0%<8.0%: CPT | Performed by: PSYCHIATRY & NEUROLOGY

## 2024-06-04 PROCEDURE — 1036F TOBACCO NON-USER: CPT | Performed by: PSYCHIATRY & NEUROLOGY

## 2024-06-04 PROCEDURE — 99204 OFFICE O/P NEW MOD 45 MIN: CPT | Performed by: PSYCHIATRY & NEUROLOGY

## 2024-06-04 PROCEDURE — G8399 PT W/DXA RESULTS DOCUMENT: HCPCS | Performed by: PSYCHIATRY & NEUROLOGY

## 2024-06-04 PROCEDURE — G8427 DOCREV CUR MEDS BY ELIG CLIN: HCPCS | Performed by: PSYCHIATRY & NEUROLOGY

## 2024-06-04 PROCEDURE — 3078F DIAST BP <80 MM HG: CPT | Performed by: PSYCHIATRY & NEUROLOGY

## 2024-06-04 RX ORDER — PREGABALIN 75 MG/1
75 CAPSULE ORAL DAILY
COMMUNITY

## 2024-06-04 RX ORDER — ASPIRIN 81 MG/1
81 TABLET, CHEWABLE ORAL DAILY
COMMUNITY

## 2024-06-04 NOTE — PROGRESS NOTES
NEUROLOGY CLINIC NOTE    Patient ID:  Laura Lu  [id number]  84 y.o.  1940    Date of Consultation:  June 4, 2024    Referring Physician: No ref. provider found     Reason for Consultation:  tremors, unsteady gai    Chief Complaint   Patient presents with    Tremors     Patient accompanied by daughter and states patient has tremors in both hands for about 2 years but seems to have gotten worse over the last 1 1/2 yrs.    Gait Problem     Daughter states patient has had unsteady gait for about 2 years but seems to be getting worse.       History of Present Illness:     Patient Active Problem List    Diagnosis Date Noted    Type 2 diabetes mellitus with chronic kidney disease 04/24/2024    Pulmonary hypertension, unspecified (Formerly [facility]) 03/18/2024    Primary osteoarthritis of right hip 10/30/2023    Dyspnea on minimal exertion 03/15/2023    Hypertensive emergency 01/14/2023    S/P laparoscopic hernia repair 11/16/2021    Osteoarthrosis 08/21/2020    Hypertension 08/21/2020    Primary osteoarthritis of right knee 08/03/2020    Type 2 diabetes with nephropathy ([facility]) 01/13/2019    Anginal equivalent ([facility]) 08/30/2018    Type 2 diabetes mellitus with complication, without long-term current use of insulin (Formerly [facility]) 01/09/2018    Type 2 diabetes mellitus with diabetic neuropathy ([facility]) 01/09/2018    Symptomatic bradycardia 01/05/2018    JOSS on CPAP 07/31/2017    Pure hypercholesterolemia 06/22/2016    Osteoporosis 06/22/2016    Primary open angle glaucoma 06/22/2016    Arthritis of left knee 07/14/2014    GERD (gastroesophageal reflux disease) 03/11/2010     Past Medical History:   Diagnosis Date    Arrhythmia     Bradycardia.    Arthritis     Burning with urination     Incontinence.    Depression     Diabetes (Former[facility])     GERD (gastroesophageal reflux disease)     Glaucoma     Hearing loss     High cholesterol     Hypertension     Ill-defined condition     EDEMA, LOWER LIMS, URINARY INCONTINENCE    Neuropathy     Obesity,

## 2024-07-10 ENCOUNTER — HOSPITAL ENCOUNTER (EMERGENCY)
Facility: HOSPITAL | Age: 84
Discharge: HOME | End: 2024-07-10
Attending: EMERGENCY MEDICINE
Payer: MEDICARE

## 2024-07-10 VITALS
HEART RATE: 58 BPM | SYSTOLIC BLOOD PRESSURE: 141 MMHG | BODY MASS INDEX: 35.61 KG/M2 | TEMPERATURE: 99 F | DIASTOLIC BLOOD PRESSURE: 90 MMHG | HEIGHT: 63 IN | RESPIRATION RATE: 18 BRPM | OXYGEN SATURATION: 95 % | WEIGHT: 201 LBS

## 2024-07-10 DIAGNOSIS — R19.7 DIARRHEA, UNSPECIFIED TYPE: ICD-10-CM

## 2024-07-10 DIAGNOSIS — R30.0 DYSURIA: Primary | ICD-10-CM

## 2024-07-10 DIAGNOSIS — J34.89 SINUS PRESSURE: ICD-10-CM

## 2024-07-10 LAB
ALBUMIN SERPL-MCNC: 4 G/DL (ref 3.5–5.2)
ALBUMIN/GLOB SERPL: 1 (ref 1.1–2.2)
ALP SERPL-CCNC: 114 U/L (ref 35–104)
ALT SERPL-CCNC: 12 U/L (ref 10–35)
ANION GAP SERPL CALC-SCNC: 10 MMOL/L (ref 5–15)
APPEARANCE UR: CLEAR
AST SERPL-CCNC: 20 U/L (ref 10–35)
BACTERIA URNS QL MICRO: NEGATIVE /HPF
BASOPHILS # BLD: 0.1 K/UL (ref 0–1)
BASOPHILS NFR BLD: 1 % (ref 0–1)
BILIRUB SERPL-MCNC: 0.2 MG/DL (ref 0.2–1)
BILIRUB UR QL: NEGATIVE
BUN SERPL-MCNC: 13 MG/DL (ref 8–23)
BUN/CREAT SERPL: 13 (ref 12–20)
CALCIUM SERPL-MCNC: 10.3 MG/DL (ref 8.8–10.2)
CHLORIDE SERPL-SCNC: 103 MMOL/L (ref 98–107)
CO2 SERPL-SCNC: 30 MMOL/L (ref 22–29)
COLOR UR: NORMAL
CREAT SERPL-MCNC: 1.02 MG/DL (ref 0.5–0.9)
DIFFERENTIAL METHOD BLD: ABNORMAL
EOSINOPHIL # BLD: 0.1 K/UL (ref 0–0.4)
EOSINOPHIL NFR BLD: 1 %
EPITH CASTS URNS QL MICRO: NORMAL /LPF
ERYTHROCYTE [DISTWIDTH] IN BLOOD BY AUTOMATED COUNT: 13.7 % (ref 11.5–14.5)
GLOBULIN SER CALC-MCNC: 4 G/DL (ref 2–4)
GLUCOSE SERPL-MCNC: 103 MG/DL (ref 65–100)
GLUCOSE UR STRIP.AUTO-MCNC: NEGATIVE MG/DL
HCT VFR BLD AUTO: 35.7 % (ref 35–47)
HGB BLD-MCNC: 11.4 G/DL (ref 11.5–16)
HGB UR QL STRIP: NEGATIVE
IMM GRANULOCYTES # BLD AUTO: 0.1 K/UL (ref 0–0.04)
IMM GRANULOCYTES NFR BLD AUTO: 1 % (ref 0–0.5)
KETONES UR QL STRIP.AUTO: NEGATIVE MG/DL
LEUKOCYTE ESTERASE UR QL STRIP.AUTO: NEGATIVE
LYMPHOCYTES # BLD: 2 K/UL (ref 0.8–3.5)
LYMPHOCYTES NFR BLD: 22 % (ref 12–49)
MCH RBC QN AUTO: 28.8 PG (ref 26–34)
MCHC RBC AUTO-ENTMCNC: 31.9 G/DL (ref 30–36.5)
MCV RBC AUTO: 90.2 FL (ref 80–99)
MONOCYTES # BLD: 0.5 K/UL (ref 0–1)
MONOCYTES NFR BLD: 6 % (ref 5–13)
NEUTS SEG # BLD: 6.4 K/UL (ref 1.8–8)
NEUTS SEG NFR BLD: 69 % (ref 32–75)
NITRITE UR QL STRIP.AUTO: NEGATIVE
NRBC # BLD: 0 K/UL (ref 0–0.01)
NRBC BLD-RTO: 0 PER 100 WBC
PH UR STRIP: 6 (ref 5–8)
PLATELET # BLD AUTO: 273 K/UL (ref 150–400)
PMV BLD AUTO: 9.9 FL (ref 8.9–12.9)
POTASSIUM SERPL-SCNC: 3.9 MMOL/L (ref 3.5–5.1)
PROT SERPL-MCNC: 8 G/DL (ref 6.4–8.3)
PROT UR STRIP-MCNC: NEGATIVE MG/DL
RBC # BLD AUTO: 3.96 M/UL (ref 3.8–5.2)
RBC #/AREA URNS HPF: NORMAL /HPF
SARS-COV-2 RNA RESP QL NAA+PROBE: NOT DETECTED
SODIUM SERPL-SCNC: 143 MMOL/L (ref 136–145)
SOURCE: NORMAL
SP GR UR REFRACTOMETRY: 1.01 (ref 1–1.03)
UROBILINOGEN UR QL STRIP.AUTO: 0.2 EU/DL (ref 0.2–1)
WBC # BLD AUTO: 9.1 K/UL (ref 3.6–11)
WBC URNS QL MICRO: NORMAL /HPF (ref 0–4)

## 2024-07-10 PROCEDURE — 87086 URINE CULTURE/COLONY COUNT: CPT

## 2024-07-10 PROCEDURE — 81001 URINALYSIS AUTO W/SCOPE: CPT

## 2024-07-10 PROCEDURE — 96374 THER/PROPH/DIAG INJ IV PUSH: CPT

## 2024-07-10 PROCEDURE — 99284 EMERGENCY DEPT VISIT MOD MDM: CPT

## 2024-07-10 PROCEDURE — 96361 HYDRATE IV INFUSION ADD-ON: CPT

## 2024-07-10 PROCEDURE — 80053 COMPREHEN METABOLIC PANEL: CPT

## 2024-07-10 PROCEDURE — 6360000002 HC RX W HCPCS: Performed by: EMERGENCY MEDICINE

## 2024-07-10 PROCEDURE — 87635 SARS-COV-2 COVID-19 AMP PRB: CPT

## 2024-07-10 PROCEDURE — 36415 COLL VENOUS BLD VENIPUNCTURE: CPT

## 2024-07-10 PROCEDURE — 85025 COMPLETE CBC W/AUTO DIFF WBC: CPT

## 2024-07-10 PROCEDURE — 2580000003 HC RX 258: Performed by: EMERGENCY MEDICINE

## 2024-07-10 PROCEDURE — 6370000000 HC RX 637 (ALT 250 FOR IP): Performed by: EMERGENCY MEDICINE

## 2024-07-10 RX ORDER — KETOROLAC TROMETHAMINE 30 MG/ML
15 INJECTION, SOLUTION INTRAMUSCULAR; INTRAVENOUS ONCE
Status: COMPLETED | OUTPATIENT
Start: 2024-07-10 | End: 2024-07-10

## 2024-07-10 RX ORDER — 0.9 % SODIUM CHLORIDE 0.9 %
1000 INTRAVENOUS SOLUTION INTRAVENOUS ONCE
Status: COMPLETED | OUTPATIENT
Start: 2024-07-10 | End: 2024-07-10

## 2024-07-10 RX ORDER — ACETAMINOPHEN 500 MG
1000 TABLET ORAL
Status: COMPLETED | OUTPATIENT
Start: 2024-07-10 | End: 2024-07-10

## 2024-07-10 RX ORDER — OXYMETAZOLINE HYDROCHLORIDE 0.05 G/100ML
2 SPRAY NASAL 2 TIMES DAILY
Qty: 2 ML | Refills: 0 | Status: SHIPPED | OUTPATIENT
Start: 2024-07-10 | End: 2024-07-15

## 2024-07-10 RX ORDER — PREDNISONE 5 MG/1
TABLET ORAL
Qty: 1 EACH | Refills: 0 | Status: SHIPPED | OUTPATIENT
Start: 2024-07-10

## 2024-07-10 RX ADMIN — KETOROLAC TROMETHAMINE 15 MG: 30 INJECTION, SOLUTION INTRAMUSCULAR at 22:25

## 2024-07-10 RX ADMIN — ACETAMINOPHEN 1000 MG: 500 TABLET ORAL at 19:28

## 2024-07-10 RX ADMIN — SODIUM CHLORIDE 1000 ML: 9 INJECTION, SOLUTION INTRAVENOUS at 19:35

## 2024-07-10 ASSESSMENT — PAIN DESCRIPTION - DESCRIPTORS
DESCRIPTORS: ACHING
DESCRIPTORS: ACHING

## 2024-07-10 ASSESSMENT — PAIN DESCRIPTION - PAIN TYPE: TYPE: ACUTE PAIN

## 2024-07-10 ASSESSMENT — LIFESTYLE VARIABLES
HOW OFTEN DO YOU HAVE A DRINK CONTAINING ALCOHOL: NEVER
HOW MANY STANDARD DRINKS CONTAINING ALCOHOL DO YOU HAVE ON A TYPICAL DAY: PATIENT DOES NOT DRINK

## 2024-07-10 ASSESSMENT — PAIN SCALES - GENERAL
PAINLEVEL_OUTOF10: 9
PAINLEVEL_OUTOF10: 7

## 2024-07-10 ASSESSMENT — ENCOUNTER SYMPTOMS
COUGH: 1
VOMITING: 0
SHORTNESS OF BREATH: 0
WHEEZING: 0
NAUSEA: 0
SINUS PRESSURE: 1
ABDOMINAL PAIN: 0

## 2024-07-10 ASSESSMENT — PAIN - FUNCTIONAL ASSESSMENT: PAIN_FUNCTIONAL_ASSESSMENT: 0-10

## 2024-07-10 ASSESSMENT — PAIN DESCRIPTION - LOCATION
LOCATION: HEAD
LOCATION: HEAD

## 2024-07-10 ASSESSMENT — PAIN DESCRIPTION - ORIENTATION
ORIENTATION: ANTERIOR
ORIENTATION: ANTERIOR

## 2024-07-10 ASSESSMENT — PAIN DESCRIPTION - FREQUENCY: FREQUENCY: CONTINUOUS

## 2024-07-10 NOTE — ED NOTES
Pt ambulatory to the bathroom, collection hat placed in toilet for pt, daughter with her, instructions on clean catch

## 2024-07-10 NOTE — ED TRIAGE NOTES
Pt ambulatory into ER with steady gait using personal cane with cc of dysuria x 1 week. Pt also reports diarrhea, HA, and sinus pain x 1 week. Pt accompanied by Daughter. Daughter reports recent negative covid test for patient.    Pt denies use of OTC medication PTA.

## 2024-07-10 NOTE — ED PROVIDER NOTES
Breast Cancer Sister         80's    Cancer Brother         PANCREAS    Anesth Problems Neg Hx           SOCIAL HISTORY       Social History     Socioeconomic History    Marital status:      Spouse name: None    Number of children: None    Years of education: None    Highest education level: None   Tobacco Use    Smoking status: Never    Smokeless tobacco: Never   Vaping Use    Vaping Use: Never used   Substance and Sexual Activity    Alcohol use: Yes     Alcohol/week: 1.0 standard drink of alcohol     Types: 1 Glasses of wine per week     Comment: occasional    Drug use: No     Comment: CBD Gummies    Sexual activity: Not Currently     Partners: Male     Social Determinants of Health     Financial Resource Strain: Low Risk  (9/11/2023)    Overall Financial Resource Strain (CARDIA)     Difficulty of Paying Living Expenses: Not hard at all   Transportation Needs: Unknown (9/11/2023)    PRAPARE - Transportation     Lack of Transportation (Non-Medical): No   Physical Activity: Sufficiently Active (3/21/2024)    Exercise Vital Sign     Days of Exercise per Week: 5 days     Minutes of Exercise per Session: 30 min   Housing Stability: Unknown (9/11/2023)    Housing Stability Vital Sign     Unstable Housing in the Last Year: No       SCREENINGS         [place] Coma Scale  Eye Opening: Spontaneous  Best Verbal Response: Oriented  Best Motor Response: Obeys commands  [place] Coma Scale Score: 15                     CIWA Assessment  BP: (!) 141/90  Pulse: 58                 PHYSICAL EXAM       ED Triage Vitals [07/10/24 1854]   BP Temp Temp Source Pulse Respirations SpO2 Height Weight - Scale   (!) 149/68 99 °F (37.2 °C) Oral 58 18 98 % 1.6 m (5' 3\") 91.2 kg (201 lb)       Physical Exam  Vitals and nursing note reviewed.   Constitutional:       Appearance: Normal appearance. She is obese.   HENT:      Head: Normocephalic and atraumatic.      Nose: Nose normal.      Mouth/Throat:      Mouth: Mucous membranes are moist.

## 2024-07-12 LAB
BACTERIA SPEC CULT: NORMAL
SERVICE CMNT-IMP: NORMAL

## 2024-07-23 ENCOUNTER — TELEPHONE (OUTPATIENT)
Age: 84
End: 2024-07-23

## 2024-07-23 NOTE — TELEPHONE ENCOUNTER
Per daughter patient is having a procedure done on 8-1-2024 and she stated a cardia clearance will be faxed over some time today .  Daughter wants to know if patient needs to be seen because she does not want to change the date of surgery.      Thanks

## 2024-07-24 DIAGNOSIS — M79.604 PAIN IN RIGHT LEG: ICD-10-CM

## 2024-07-24 DIAGNOSIS — I10 PRIMARY HYPERTENSION: ICD-10-CM

## 2024-07-24 DIAGNOSIS — M19.019 PRIMARY OSTEOARTHRITIS OF SHOULDER, UNSPECIFIED LATERALITY: ICD-10-CM

## 2024-07-24 DIAGNOSIS — M19.012 PRIMARY OSTEOARTHRITIS, LEFT SHOULDER: ICD-10-CM

## 2024-07-24 RX ORDER — PREGABALIN 75 MG/1
CAPSULE ORAL
Qty: 60 CAPSULE | Refills: 0 | Status: SHIPPED | OUTPATIENT
Start: 2024-07-24 | End: 2024-08-23

## 2024-07-24 RX ORDER — LOSARTAN POTASSIUM 100 MG/1
100 TABLET ORAL DAILY
Qty: 90 TABLET | Refills: 0 | Status: SHIPPED | OUTPATIENT
Start: 2024-07-24

## 2024-07-24 RX ORDER — HYDROXYZINE HYDROCHLORIDE 25 MG/1
25 TABLET, FILM COATED ORAL NIGHTLY
Qty: 30 TABLET | Refills: 0 | Status: SHIPPED | OUTPATIENT
Start: 2024-07-24

## 2024-07-24 NOTE — TELEPHONE ENCOUNTER
Spoke with patient's daughter.  Have not received paperwork yet.  Has upcoming bladder botox procedure scheduled for 8/1.  She will ask them to fax clearance request to office.

## 2024-08-25 NOTE — PROGRESS NOTES
Valentino Avila MD      [address],[facility],[place] [address]      Office [phone]         Laura Lu is a 83 y.o.  female is here for f/u visit .      Primary care physician:   Alessia Rangel MD         Chief complaint:     As in EMR           Dear Dr Rangel,      I had the pleasure of seeing Ms. Lu in the office today.         Assessment:   HTN   HLD   Dyspnea/Fatigue   DM -   HTN   Former smoker.  History of exposure to secondhand smoking   1/2023 - [facility] admn- uncontrolled hypertension   JOSS  Sinus bradycardia-on low-dose metoprolol       Plan:      Systolic blood pressures in the 100s to 110s.  Will decrease Cozaar 50 mg to 25 mg daily.  Monitor blood pressure.  If blood pressure starts trending can increase it back to 50 mg daily.  Hx of tremors L hand - advised to see PCP/neurologist   Lipids-4/1/2024- LDL 74  \"Knot-like sensation\"-throat/difficulty swallowing-advised to see PCP/may need GI eval-recent history of pill getting stuck while swallowing   Aggressive cardiovascular risk factor modification.  Follow-up 6 months/earlier as needed     Patient understands the plan. All questions were answered to the patient's satisfaction.      Medication Side Effects and Warnings were discussed with patient: yes   Patient Labs were reviewed and or requested:  yes   Patient Past Records were reviewed and or requested: yes      I appreciate the opportunity to be involved in care. See note below for details. Please do not hesitate to contact us with questions  or concerns.      Valentino Avila MD      Cardiac Testing/ Procedures:      A.Cardiac Cath/PCI: 4/14/23  Access: R Radial Access - 6 F sheath    R Brachial - IV exchanged for 6 F glide sheath     Venogram performed    Catheters: RCA : JR4                    LCA : JL4    Findings:     L Main: Med; MLI    LAD: Tortuous; Med; MLI; Distal - small; MLI; D1 -  MLI    LCflex: Med to large; MLI; OM1/OM2  montelukast (SINGULAIR) 10 MG tablet Take 1 tablet by mouth once daily for 90 days    Myrbetriq 50 mg, Oral, DAILY    nystatin 772855 UNIT/GM powder APPLY 1 APPLICATION OF POWDER TOPICALLY TO AFFECTED AREA 4 TIMES DAILY UNTIL RASH RESOLVES    ondansetron (ZOFRAN-ODT) 8 mg, Oral, EVERY 8 HOURS PRN    Potassium 99 mg, Oral, DAILY    pregabalin (LYRICA) 75 MG capsule TAKE 1 CAPSULE BY MOUTH TWICE DAILY FOR 30 DAYS MAX  DAILY  AMOUNT  150  MG    tiZANidine (ZANAFLEX) 2 MG tablet TAKE 1 TABLET BY MOUTH NIGHTLY AS NEEDED FOR MUSCLE SPASM    vitamin D (CHOLECALCIFEROL) 1,000 Units, Oral, DAILY                   Physical Exam:   Visit Vitals     Blood pressure 110/66, pulse 60, resp. rate 16, height 1.626 m (5' 4\"), weight 99.8 kg (220 lb), SpO2 95%.                   Gen: Well-developed, well-nourished, in no acute distress   Neck: Supple,No JVD, No Carotid Bruit,    Resp: No accessory muscle use, Clear breath sounds, No rales or rhonchi   Card: Regular Rythm,Normal S1, S2, No murmurs, rubs or gallop. No thrills.    Abd:  Soft, BS+,    MSK: No cyanosis   Skin: No rashes     Neuro: moving all four extremities , follows commands appropriately; tremors left upper extremity   Psych:  Good insight, oriented to person, place , alert, Nml Affect   LE: No edema      EKG: Sinus bradycardia, nonspecific ST-T changes, RI WP, left axis deviation      LABS:                       Valentino Avila MD

## 2024-08-29 ENCOUNTER — OFFICE VISIT (OUTPATIENT)
Age: 84
End: 2024-08-29
Payer: MEDICARE

## 2024-08-29 VITALS
BODY MASS INDEX: 37.56 KG/M2 | HEIGHT: 64 IN | OXYGEN SATURATION: 95 % | HEART RATE: 60 BPM | RESPIRATION RATE: 16 BRPM | DIASTOLIC BLOOD PRESSURE: 66 MMHG | SYSTOLIC BLOOD PRESSURE: 110 MMHG | WEIGHT: 220 LBS

## 2024-08-29 DIAGNOSIS — G47.33 OSA ON CPAP: ICD-10-CM

## 2024-08-29 DIAGNOSIS — I10 ESSENTIAL (PRIMARY) HYPERTENSION: ICD-10-CM

## 2024-08-29 DIAGNOSIS — I10 PRIMARY HYPERTENSION: Primary | ICD-10-CM

## 2024-08-29 DIAGNOSIS — R06.09 OTHER FORMS OF DYSPNEA: ICD-10-CM

## 2024-08-29 DIAGNOSIS — R53.83 OTHER FATIGUE: ICD-10-CM

## 2024-08-29 PROCEDURE — 99214 OFFICE O/P EST MOD 30 MIN: CPT | Performed by: INTERNAL MEDICINE

## 2024-08-29 PROCEDURE — 93010 ELECTROCARDIOGRAM REPORT: CPT | Performed by: INTERNAL MEDICINE

## 2024-08-29 PROCEDURE — 93005 ELECTROCARDIOGRAM TRACING: CPT | Performed by: INTERNAL MEDICINE

## 2024-08-29 PROCEDURE — G8417 CALC BMI ABV UP PARAM F/U: HCPCS | Performed by: INTERNAL MEDICINE

## 2024-08-29 PROCEDURE — 3074F SYST BP LT 130 MM HG: CPT | Performed by: INTERNAL MEDICINE

## 2024-08-29 PROCEDURE — G8399 PT W/DXA RESULTS DOCUMENT: HCPCS | Performed by: INTERNAL MEDICINE

## 2024-08-29 PROCEDURE — 1123F ACP DISCUSS/DSCN MKR DOCD: CPT | Performed by: INTERNAL MEDICINE

## 2024-08-29 PROCEDURE — 1036F TOBACCO NON-USER: CPT | Performed by: INTERNAL MEDICINE

## 2024-08-29 PROCEDURE — 3078F DIAST BP <80 MM HG: CPT | Performed by: INTERNAL MEDICINE

## 2024-08-29 PROCEDURE — 1090F PRES/ABSN URINE INCON ASSESS: CPT | Performed by: INTERNAL MEDICINE

## 2024-08-29 PROCEDURE — G8427 DOCREV CUR MEDS BY ELIG CLIN: HCPCS | Performed by: INTERNAL MEDICINE

## 2024-08-29 RX ORDER — LOSARTAN POTASSIUM 25 MG/1
25 TABLET ORAL DAILY
Qty: 90 TABLET | Refills: 3 | Status: SHIPPED | OUTPATIENT
Start: 2024-08-29

## 2024-08-29 NOTE — PROGRESS NOTES
Chief Complaint   Patient presents with    Hyperlipidemia    Hypertension    Dizziness    Other     Dyspnea/fatigue         Chest Pain  No        Last Lipids   Lab Results   Component Value Date    CHOL 144 04/01/2024    TRIG 95 04/01/2024    HDL 51 04/01/2024    VLDL 19 04/01/2024    CHOLHDLRATIO 2.8 04/01/2024        Refills    /66 (Site: Right Upper Arm, Position: Sitting)   Pulse 60   Resp 16   Ht 1.626 m (5' 4\")   Wt 99.8 kg (220 lb)   SpO2 95%   BMI 37.76 kg/m²       Have you been to the ER, urgent care clinic since your last visit? No  Hospitalized since your last visit? NO    2. Have you seen or consulted with any other health care providers outside of the [facility] since your last visit?  No

## 2024-09-06 ENCOUNTER — APPOINTMENT (OUTPATIENT)
Facility: HOSPITAL | Age: 84
End: 2024-09-06
Payer: MEDICARE

## 2024-09-06 ENCOUNTER — TELEPHONE (OUTPATIENT)
Age: 84
End: 2024-09-06

## 2024-09-06 ENCOUNTER — HOSPITAL ENCOUNTER (EMERGENCY)
Facility: HOSPITAL | Age: 84
Discharge: HOME | End: 2024-09-07
Attending: EMERGENCY MEDICINE
Payer: MEDICARE

## 2024-09-06 DIAGNOSIS — R53.1 GENERALIZED WEAKNESS: ICD-10-CM

## 2024-09-06 DIAGNOSIS — R11.2 NAUSEA AND VOMITING, UNSPECIFIED VOMITING TYPE: Primary | ICD-10-CM

## 2024-09-06 DIAGNOSIS — U07.1 COVID-19 VIRUS INFECTION: ICD-10-CM

## 2024-09-06 DIAGNOSIS — E86.0 DEHYDRATION: ICD-10-CM

## 2024-09-06 LAB
ALBUMIN SERPL-MCNC: 3.7 G/DL (ref 3.5–5.2)
ALBUMIN/GLOB SERPL: 1.1 (ref 1.1–2.2)
ALP SERPL-CCNC: 105 U/L (ref 35–104)
ALT SERPL-CCNC: 41 U/L (ref 10–35)
ANION GAP SERPL CALC-SCNC: 9 MMOL/L (ref 2–12)
AST SERPL-CCNC: 26 U/L (ref 10–35)
BASOPHILS # BLD: 0 K/UL (ref 0–1)
BASOPHILS NFR BLD: 0 % (ref 0–1)
BILIRUB SERPL-MCNC: 0.3 MG/DL (ref 0.2–1)
BUN SERPL-MCNC: 20 MG/DL (ref 8–23)
BUN/CREAT SERPL: 19 (ref 12–20)
CALCIUM SERPL-MCNC: 9.4 MG/DL (ref 8.8–10.2)
CHLORIDE SERPL-SCNC: 103 MMOL/L (ref 98–107)
CK SERPL-CCNC: 67 U/L (ref 20–180)
CO2 SERPL-SCNC: 31 MMOL/L (ref 22–29)
CREAT SERPL-MCNC: 1.07 MG/DL (ref 0.5–0.9)
CRP SERPL-MCNC: 1.26 MG/DL
DIFFERENTIAL METHOD BLD: ABNORMAL
EOSINOPHIL # BLD: 0.1 K/UL (ref 0–0.4)
EOSINOPHIL NFR BLD: 1 %
ERYTHROCYTE [DISTWIDTH] IN BLOOD BY AUTOMATED COUNT: 14.9 % (ref 11.5–14.5)
FLUAV RNA SPEC QL NAA+PROBE: NOT DETECTED
FLUBV RNA SPEC QL NAA+PROBE: NOT DETECTED
GLOBULIN SER CALC-MCNC: 3.3 G/DL (ref 2–4)
GLUCOSE SERPL-MCNC: 140 MG/DL (ref 65–100)
HCT VFR BLD AUTO: 35.8 % (ref 35–47)
HGB BLD-MCNC: 11.3 G/DL (ref 11.5–16)
IMM GRANULOCYTES # BLD AUTO: 0.1 K/UL (ref 0–0.04)
IMM GRANULOCYTES NFR BLD AUTO: 1 % (ref 0–0.5)
LIPASE SERPL-CCNC: 56 U/L (ref 13–60)
LYMPHOCYTES # BLD: 1.2 K/UL (ref 0.8–3.5)
LYMPHOCYTES NFR BLD: 9 % (ref 12–49)
MAGNESIUM SERPL-MCNC: 2.2 MG/DL (ref 1.6–2.4)
MCH RBC QN AUTO: 28.3 PG (ref 26–34)
MCHC RBC AUTO-ENTMCNC: 31.6 G/DL (ref 30–36.5)
MCV RBC AUTO: 89.5 FL (ref 80–99)
MONOCYTES # BLD: 0.8 K/UL (ref 0–1)
MONOCYTES NFR BLD: 6 % (ref 5–13)
NEUTS SEG # BLD: 10.6 K/UL (ref 1.8–8)
NEUTS SEG NFR BLD: 83 % (ref 32–75)
NRBC # BLD: 0 K/UL (ref 0–0.01)
NRBC BLD-RTO: 0 PER 100 WBC
NT PRO BNP: 130 PG/ML
PHOSPHATE SERPL-MCNC: 3.9 MG/DL (ref 2.5–4.5)
PLATELET # BLD AUTO: 254 K/UL (ref 150–400)
PMV BLD AUTO: 9.1 FL (ref 8.9–12.9)
POTASSIUM SERPL-SCNC: 4.9 MMOL/L (ref 3.5–5.1)
PROT SERPL-MCNC: 7 G/DL (ref 6.4–8.3)
RBC # BLD AUTO: 4 M/UL (ref 3.8–5.2)
SARS-COV-2 RNA RESP QL NAA+PROBE: DETECTED
SODIUM SERPL-SCNC: 143 MMOL/L (ref 136–145)
SOURCE: ABNORMAL
TROPONIN T SERPL HS-MCNC: 11.2 NG/L (ref 0–14)
WBC # BLD AUTO: 12.8 K/UL (ref 3.6–11)

## 2024-09-06 PROCEDURE — 96361 HYDRATE IV INFUSION ADD-ON: CPT

## 2024-09-06 PROCEDURE — 84100 ASSAY OF PHOSPHORUS: CPT

## 2024-09-06 PROCEDURE — 99285 EMERGENCY DEPT VISIT HI MDM: CPT

## 2024-09-06 PROCEDURE — 93005 ELECTROCARDIOGRAM TRACING: CPT | Performed by: EMERGENCY MEDICINE

## 2024-09-06 PROCEDURE — 83735 ASSAY OF MAGNESIUM: CPT

## 2024-09-06 PROCEDURE — 83880 ASSAY OF NATRIURETIC PEPTIDE: CPT

## 2024-09-06 PROCEDURE — 80053 COMPREHEN METABOLIC PANEL: CPT

## 2024-09-06 PROCEDURE — 2580000003 HC RX 258: Performed by: EMERGENCY MEDICINE

## 2024-09-06 PROCEDURE — 6360000002 HC RX W HCPCS: Performed by: EMERGENCY MEDICINE

## 2024-09-06 PROCEDURE — 87636 SARSCOV2 & INF A&B AMP PRB: CPT

## 2024-09-06 PROCEDURE — 85025 COMPLETE CBC W/AUTO DIFF WBC: CPT

## 2024-09-06 PROCEDURE — 84145 PROCALCITONIN (PCT): CPT

## 2024-09-06 PROCEDURE — 71045 X-RAY EXAM CHEST 1 VIEW: CPT

## 2024-09-06 PROCEDURE — 96374 THER/PROPH/DIAG INJ IV PUSH: CPT

## 2024-09-06 PROCEDURE — 84484 ASSAY OF TROPONIN QUANT: CPT

## 2024-09-06 PROCEDURE — 74176 CT ABD & PELVIS W/O CONTRAST: CPT

## 2024-09-06 PROCEDURE — 83690 ASSAY OF LIPASE: CPT

## 2024-09-06 PROCEDURE — 86140 C-REACTIVE PROTEIN: CPT

## 2024-09-06 PROCEDURE — 36415 COLL VENOUS BLD VENIPUNCTURE: CPT

## 2024-09-06 PROCEDURE — 87040 BLOOD CULTURE FOR BACTERIA: CPT

## 2024-09-06 PROCEDURE — 82550 ASSAY OF CK (CPK): CPT

## 2024-09-06 PROCEDURE — 6370000000 HC RX 637 (ALT 250 FOR IP): Performed by: EMERGENCY MEDICINE

## 2024-09-06 RX ORDER — CODEINE PHOSPHATE AND GUAIFENESIN 10; 100 MG/5ML; MG/5ML
5 SOLUTION ORAL
Status: COMPLETED | OUTPATIENT
Start: 2024-09-06 | End: 2024-09-06

## 2024-09-06 RX ORDER — CODEINE PHOSPHATE AND GUAIFENESIN 10; 100 MG/5ML; MG/5ML
10 SOLUTION ORAL
Status: DISCONTINUED | OUTPATIENT
Start: 2024-09-06 | End: 2024-09-06

## 2024-09-06 RX ORDER — GUAIFENESIN/DEXTROMETHORPHAN 100-10MG/5
5 SYRUP ORAL 3 TIMES DAILY PRN
Qty: 120 ML | Refills: 0 | Status: SHIPPED | OUTPATIENT
Start: 2024-09-06 | End: 2024-09-16

## 2024-09-06 RX ORDER — ONDANSETRON 2 MG/ML
8 INJECTION INTRAMUSCULAR; INTRAVENOUS ONCE
Status: COMPLETED | OUTPATIENT
Start: 2024-09-06 | End: 2024-09-06

## 2024-09-06 RX ORDER — ONDANSETRON 4 MG/1
4 TABLET, ORALLY DISINTEGRATING ORAL 3 TIMES DAILY PRN
Qty: 21 TABLET | Refills: 0 | Status: SHIPPED | OUTPATIENT
Start: 2024-09-06

## 2024-09-06 RX ORDER — 0.9 % SODIUM CHLORIDE 0.9 %
500 INTRAVENOUS SOLUTION INTRAVENOUS ONCE
Status: COMPLETED | OUTPATIENT
Start: 2024-09-06 | End: 2024-09-06

## 2024-09-06 RX ADMIN — GUAIFENESIN AND CODEINE PHOSPHATE 5 ML: 100; 10 SOLUTION ORAL at 23:39

## 2024-09-06 RX ADMIN — ONDANSETRON 8 MG: 2 INJECTION INTRAMUSCULAR; INTRAVENOUS at 21:47

## 2024-09-06 RX ADMIN — SODIUM CHLORIDE 500 ML: 9 INJECTION, SOLUTION INTRAVENOUS at 21:45

## 2024-09-06 ASSESSMENT — PAIN - FUNCTIONAL ASSESSMENT: PAIN_FUNCTIONAL_ASSESSMENT: 0-10

## 2024-09-06 ASSESSMENT — PAIN SCALES - GENERAL: PAINLEVEL_OUTOF10: 6

## 2024-09-06 NOTE — TELEPHONE ENCOUNTER
Patient's daughter scheduled an office visit for 9/10/2024 11:30 AM with Dr Alonso, for patient to be evaluated.

## 2024-09-06 NOTE — TELEPHONE ENCOUNTER
Patient has been vomiting every day for weeks because she feels like she has something stuck in her throat. Her cardiologist recommended that her PCP order an upper endoscopy. Patient would like to have this done at [facility] as soon as possible.     Fela   [phone]

## 2024-09-07 VITALS
SYSTOLIC BLOOD PRESSURE: 136 MMHG | BODY MASS INDEX: 36.7 KG/M2 | HEART RATE: 56 BPM | DIASTOLIC BLOOD PRESSURE: 58 MMHG | WEIGHT: 215 LBS | HEIGHT: 64 IN | TEMPERATURE: 99.4 F | RESPIRATION RATE: 14 BRPM | OXYGEN SATURATION: 95 %

## 2024-09-07 LAB — PROCALCITONIN SERPL-MCNC: <0.05 NG/ML

## 2024-09-07 NOTE — ED PROVIDER NOTES
[facility] EM[facility]GENC[facility] DEPT  EMERGENCY DEPARTMENT ENCOUNTER      Pt Name: Laura Lu  MRN: [id number]  Birthdate 1940  Date of evaluation: 9/6/2024  Provider: Pablo Gonzalez MD    CHIEF COMPLAINT       Chief Complaint   Patient presents with    Cough    Emesis    Nausea         HISTORY OF PRESENT ILLNESS   (Location/Symptom, Timing/Onset, Context/Setting, Quality, Duration, Modifying Factors, Severity)  Note limiting factors.   84-year-old female with a past medical history significant for arrhythmia, arthritis, presenting with urination incontinence, depression, diabetes, GERD, morbid obesity, osteoarthritis, urine loss, glaucoma, CHF, lower leg edema, neuropathy, who presents to the ER today by family who states that for the past week, the patient has had transient episode of nausea and vomiting with a persistent cough, congestion and feeling of something stuck in her throat.  Mother also states that cardiology had lowered her blood pressure medication however it at home, the blood pressure was 93/55.  The patient's complaint dry malaise, no eating and drinking as usual.  Increased leg swelling that has subsided thus far.  Patient also admits to low-grade temperature.  Patient denies any headache, neck and back pain, sore throat, congestion, abdominal pain, sick contacts or unusual food sources.            Review of External Medical Records:     Nursing Notes were reviewed.    REVIEW OF SYSTEMS    (2-9 systems for level 4, 10 or more for level 5)     Review of Systems   All other systems reviewed and are negative.      Except as noted above the remainder of the review of systems was reviewed and negative.       PAST MEDICAL HISTORY     Past Medical History:   Diagnosis Date    Arrhythmia     Bradycardia.    Arthritis     Burning with urination     Incontinence.    Depression     Diabetes (HCC)     GERD (gastroesophageal reflux disease)     Glaucoma     Hearing loss     High cholesterol     Hypertension      (XALATAN) 0.005 % ophthalmic solution Place 1 drop into both eyes nightly, Disp-2.5 mL, R-3Normal      montelukast (SINGULAIR) 10 MG tablet Take 1 tablet by mouth once daily for 90 days, Disp-90 tablet, R-0Normal      MYRBETRIQ 50 MG TB24 Take 1 tablet by mouth once daily, Disp-90 tablet, R-1Normal      tiZANidine (ZANAFLEX) 2 MG tablet TAKE 1 TABLET BY MOUTH NIGHTLY AS NEEDED FOR MUSCLE SPASM, Disp-30 tablet, R-5Normal      !! Accu-Chek Softclix Lancets MISC Disp-100 each, R-3, NormalUse to check blood sugar once daily      Blood Glucose Calibration (ACCU-CHEK ANTONIO) SOLN Disp-1 each, R-0, NormalUse with glucose meter      nystatin [id number] UNIT/GM powder APPLY 1 APPLICATION OF POWDER TOPICALLY TO AFFECTED AREA 4 TIMES DAILY UNTIL RASH RESOLVES, Disp-30 g, R-3, Normal      albuterol sulfate (PROAIR RESPICLICK) 108 (90 Base) MCG/ACT aerosol powder inhalation Inhale 2 puffs into the lungs every 4 hours as needed for Wheezing or Shortness of BreathHistorical Med      budesonide-formoterol (SYMBICORT) 160-4.5 MCG/ACT AERO Inhale 2 puffs into the lungs 2 times dailyHistorical Med      !! ondansetron (ZOFRAN-ODT) 4 MG disintegrating tablet Take 2 tablets by mouth every 8 hours as needed for Nausea or Vomiting, Disp-21 tablet, R-0Print      !! Lancets MISC Historical Med      alendronate (FOSAMAX) 70 MG tablet TAKE 1 TABLET BY MOUTH ONCE A WEEK IN THE MORNING BEFORE FIRST MEAL, BEVERAGE, OR MEDICATION OF THE DAYHistorical Med      ascorbic acid (VITAMIN C) 250 MG tablet Take 1 tablet by mouth dailyHistorical Med      cetirizine (ZYRTEC) 10 MG tablet TAKE 1 TABLET BY MOUTH ONCE DAILY FOR HIVES FOR 30 DAYSHistorical Med      vitamin D (CHOLECALCIFEROL) 25 MCG (1000 UT) TABS tablet Take 1 tablet by mouth dailyHistorical Med      cyanocobalamin 1000 MCG tablet Take 1 tablet by mouth dailyHistorical Med      fluticasone (FLONASE) 50 MCG/ACT nasal spray 2 sprays by Nasal route as neededHistorical Med      furosemide (LASIX) 20 MG

## 2024-09-07 NOTE — ED TRIAGE NOTES
Pt into ED via wheelchair with cc cough, swelling in legs and feet and sore throat. Family reports pt has had s/s a week. Pt with N/V posttussis. Denies CP, SOB or dizziness at this time. Reports BP meds lowered dt energy level.    Carlos HERNANDEZ at bedside during triage for exam

## 2024-09-08 LAB
BACTERIA SPEC CULT: NORMAL
EKG ATRIAL RATE: 69 BPM
EKG DIAGNOSIS: NORMAL
EKG P AXIS: 71 DEGREES
EKG P-R INTERVAL: 168 MS
EKG Q-T INTERVAL: 374 MS
EKG QRS DURATION: 70 MS
EKG QTC CALCULATION (BAZETT): 400 MS
EKG R AXIS: -36 DEGREES
EKG T AXIS: 37 DEGREES
EKG VENTRICULAR RATE: 69 BPM
SERVICE CMNT-IMP: NORMAL

## 2024-09-08 PROCEDURE — 93010 ELECTROCARDIOGRAM REPORT: CPT | Performed by: SPECIALIST

## 2024-09-09 LAB
EKG ATRIAL RATE: 75 BPM
EKG DIAGNOSIS: NORMAL
EKG P AXIS: 59 DEGREES
EKG P-R INTERVAL: 168 MS
EKG Q-T INTERVAL: 354 MS
EKG QRS DURATION: 70 MS
EKG QTC CALCULATION (BAZETT): 395 MS
EKG R AXIS: -30 DEGREES
EKG T AXIS: 40 DEGREES
EKG VENTRICULAR RATE: 75 BPM

## 2024-09-09 PROCEDURE — 93010 ELECTROCARDIOGRAM REPORT: CPT | Performed by: SPECIALIST

## 2024-09-12 DIAGNOSIS — M19.012 PRIMARY OSTEOARTHRITIS, LEFT SHOULDER: ICD-10-CM

## 2024-09-12 DIAGNOSIS — R30.0 DYSURIA: ICD-10-CM

## 2024-09-12 DIAGNOSIS — Z87.448 HISTORY OF BLADDER SUSPENSION PROCEDURE: ICD-10-CM

## 2024-09-12 DIAGNOSIS — M19.019 PRIMARY OSTEOARTHRITIS OF SHOULDER, UNSPECIFIED LATERALITY: ICD-10-CM

## 2024-09-12 DIAGNOSIS — M79.604 PAIN IN RIGHT LEG: ICD-10-CM

## 2024-09-12 DIAGNOSIS — Z98.890 HISTORY OF BLADDER SUSPENSION PROCEDURE: ICD-10-CM

## 2024-09-12 LAB
BACTERIA SPEC CULT: NORMAL
SERVICE CMNT-IMP: NORMAL

## 2024-09-13 DIAGNOSIS — J45.40 MODERATE PERSISTENT REACTIVE AIRWAY DISEASE WITHOUT COMPLICATION: Primary | ICD-10-CM

## 2024-09-13 RX ORDER — NYSTATIN 100000 [USP'U]/G
POWDER TOPICAL
Qty: 30 G | Refills: 3 | Status: SHIPPED | OUTPATIENT
Start: 2024-09-13

## 2024-09-13 RX ORDER — MONTELUKAST SODIUM 10 MG/1
10 TABLET ORAL DAILY
Qty: 90 TABLET | Refills: 0 | Status: SHIPPED | OUTPATIENT
Start: 2024-09-13

## 2024-09-13 RX ORDER — PREGABALIN 75 MG/1
75 CAPSULE ORAL 2 TIMES DAILY
Qty: 180 CAPSULE | Refills: 0 | Status: SHIPPED | OUTPATIENT
Start: 2024-09-13 | End: 2024-12-12

## 2024-09-13 RX ORDER — HYDROXYZINE HYDROCHLORIDE 25 MG/1
25 TABLET, FILM COATED ORAL NIGHTLY
Qty: 30 TABLET | Refills: 0 | Status: SHIPPED | OUTPATIENT
Start: 2024-09-13

## 2024-09-13 RX ORDER — MIRABEGRON 50 MG/1
50 TABLET, EXTENDED RELEASE ORAL DAILY
Qty: 90 TABLET | Refills: 0 | Status: SHIPPED | OUTPATIENT
Start: 2024-09-13

## 2024-09-27 NOTE — PROGRESS NOTES
Primary Cardiologist:   MD Stephanie Navarro APRN - NP     [address],[facility],[place] [address]      Office [phone]         Laura Lu is a 84 y.o.   female is here for f/u visit .      Primary care physician:   Alessia Rangel MD         Chief complaint:     As in EMR           Dear Dr Rangel,      I had the pleasure of seeing Ms. Lu in the office today.         Assessment:   HTN   HLD   Dyspnea/Fatigue   DM -   HTN   Former smoker.  History of exposure to secondhand smoking   1/2023 - [facility] admn- uncontrolled hypertension   JOSS- not on CPAP  Sinus bradycardia-stable on low-dose metoprolol    Chronic Edema     Plan:    -Sleep medicine referral   Echo  Systolic blood pressures in the 100s to 110s.  Cont Cozaar 25 mg daily.  Monitor blood pressure.     Occasional HANSA- takes Lasix 20mg PRN  Discussed compression socks    Hx of tremors L hand - advised to see PCP/neurologist   Lipids-4/1/2024- LDL 74     Aggressive cardiovascular risk factor modification.  Follow-up in Feb /earlier as needed     Patient understands the plan. All questions were answered to the patient's satisfaction.      Medication Side Effects and Warnings were discussed with patient: yes   Patient Labs were reviewed and or requested:  yes   Patient Past Records were reviewed and or requested: yes      I appreciate the opportunity to be involved in care. See note below for details. Please do not hesitate to contact us with questions  or concerns.     Stephanie Abdullahi, CAMMY - NP       Cardiac Testing/ Procedures:      A.Cardiac Cath/PCI: 4/14/23  Access: R Radial Access - 6 F sheath    R Brachial - IV exchanged for 6 F glide sheath     Venogram performed    Catheters: RCA : JR4                    LCA : JL4    Findings:     L Main: Med; MLI    LAD: Tortuous; Med; MLI; Distal - small; MLI; D1 -  MLI    LCflex: Med to large; MLI; OM1/OM2 - MLI    RCA: Dominant; Large;

## 2024-10-04 ENCOUNTER — OFFICE VISIT (OUTPATIENT)
Age: 84
End: 2024-10-04
Payer: MEDICARE

## 2024-10-04 VITALS
HEIGHT: 64 IN | RESPIRATION RATE: 16 BRPM | BODY MASS INDEX: 33.63 KG/M2 | OXYGEN SATURATION: 97 % | WEIGHT: 197 LBS | DIASTOLIC BLOOD PRESSURE: 64 MMHG | HEART RATE: 80 BPM | SYSTOLIC BLOOD PRESSURE: 130 MMHG

## 2024-10-04 DIAGNOSIS — R06.09 DOE (DYSPNEA ON EXERTION): ICD-10-CM

## 2024-10-04 DIAGNOSIS — R60.9 EDEMA, UNSPECIFIED TYPE: ICD-10-CM

## 2024-10-04 DIAGNOSIS — Z87.891 FORMER SMOKER: ICD-10-CM

## 2024-10-04 DIAGNOSIS — E66.01 MORBID (SEVERE) OBESITY DUE TO EXCESS CALORIES: ICD-10-CM

## 2024-10-04 DIAGNOSIS — E11.8 TYPE 2 DIABETES MELLITUS WITH COMPLICATION, WITHOUT LONG-TERM CURRENT USE OF INSULIN (HCC): ICD-10-CM

## 2024-10-04 DIAGNOSIS — R53.83 OTHER FATIGUE: ICD-10-CM

## 2024-10-04 DIAGNOSIS — R00.1 BRADYCARDIA, UNSPECIFIED: ICD-10-CM

## 2024-10-04 DIAGNOSIS — I10 BENIGN HYPERTENSION: Primary | ICD-10-CM

## 2024-10-04 DIAGNOSIS — G47.33 OSA (OBSTRUCTIVE SLEEP APNEA): ICD-10-CM

## 2024-10-04 DIAGNOSIS — E78.2 MIXED HYPERLIPIDEMIA: ICD-10-CM

## 2024-10-04 PROCEDURE — 3078F DIAST BP <80 MM HG: CPT

## 2024-10-04 PROCEDURE — 3051F HG A1C>EQUAL 7.0%<8.0%: CPT

## 2024-10-04 PROCEDURE — 3075F SYST BP GE 130 - 139MM HG: CPT

## 2024-10-04 PROCEDURE — G8484 FLU IMMUNIZE NO ADMIN: HCPCS

## 2024-10-04 PROCEDURE — 1123F ACP DISCUSS/DSCN MKR DOCD: CPT

## 2024-10-04 PROCEDURE — 99214 OFFICE O/P EST MOD 30 MIN: CPT

## 2024-10-04 PROCEDURE — G8427 DOCREV CUR MEDS BY ELIG CLIN: HCPCS

## 2024-10-04 PROCEDURE — G8417 CALC BMI ABV UP PARAM F/U: HCPCS

## 2024-10-04 PROCEDURE — 1036F TOBACCO NON-USER: CPT

## 2024-10-04 PROCEDURE — G8399 PT W/DXA RESULTS DOCUMENT: HCPCS

## 2024-10-04 PROCEDURE — 1090F PRES/ABSN URINE INCON ASSESS: CPT

## 2024-10-04 NOTE — PROGRESS NOTES
had concerns including Other (Edema legs).    Vitals:    10/04/24 0909   BP: 130/64   Site: Left Upper Arm   Position: Sitting   Pulse: 80   Resp: 16   SpO2: 97%   Weight: 89.4 kg (197 lb)   Height: 1.626 m (5' 4\")        Chest pain No    Refills No        1. Have you been to the ER, urgent care clinic since your last visit? yes      Hospitalized since your last visit? No       Where?    [facility]    Date?  9/6-9/7

## 2024-11-04 ENCOUNTER — ANCILLARY PROCEDURE (OUTPATIENT)
Age: 84
End: 2024-11-04
Payer: MEDICARE

## 2024-11-04 VITALS
HEART RATE: 80 BPM | BODY MASS INDEX: 33.63 KG/M2 | DIASTOLIC BLOOD PRESSURE: 78 MMHG | WEIGHT: 197 LBS | SYSTOLIC BLOOD PRESSURE: 142 MMHG | HEIGHT: 64 IN

## 2024-11-04 DIAGNOSIS — R06.09 DOE (DYSPNEA ON EXERTION): ICD-10-CM

## 2024-11-04 DIAGNOSIS — R60.9 EDEMA, UNSPECIFIED TYPE: ICD-10-CM

## 2024-11-04 DIAGNOSIS — R53.83 OTHER FATIGUE: ICD-10-CM

## 2024-11-04 DIAGNOSIS — I10 BENIGN HYPERTENSION: ICD-10-CM

## 2024-11-04 DIAGNOSIS — E11.8 TYPE 2 DIABETES MELLITUS WITH COMPLICATION, WITHOUT LONG-TERM CURRENT USE OF INSULIN (HCC): ICD-10-CM

## 2024-11-04 DIAGNOSIS — Z87.891 FORMER SMOKER: ICD-10-CM

## 2024-11-04 PROCEDURE — 93306 TTE W/DOPPLER COMPLETE: CPT | Performed by: INTERNAL MEDICINE

## 2024-11-05 LAB
ECHO AO ASC DIAM: 2.9 CM
ECHO AO ASCENDING AORTA INDEX: 1.49 CM/M2
ECHO AO ROOT DIAM: 3 CM
ECHO AO ROOT INDEX: 1.55 CM/M2
ECHO AV AREA PEAK VELOCITY: 2.1 CM2
ECHO AV AREA VTI: 2.6 CM2
ECHO AV AREA/BSA PEAK VELOCITY: 1.1 CM2/M2
ECHO AV AREA/BSA VTI: 1.3 CM2/M2
ECHO AV MEAN GRADIENT: 6 MMHG
ECHO AV MEAN VELOCITY: 1.1 M/S
ECHO AV PEAK GRADIENT: 10 MMHG
ECHO AV PEAK VELOCITY: 1.6 M/S
ECHO AV VELOCITY RATIO: 0.69
ECHO AV VTI: 29.7 CM
ECHO BSA: 2.01 M2
ECHO EST RA PRESSURE: 3 MMHG
ECHO LA DIAMETER INDEX: 2.01 CM/M2
ECHO LA DIAMETER: 3.9 CM
ECHO LA TO AORTIC ROOT RATIO: 1.3
ECHO LA VOL A-L A2C: 36 ML (ref 22–52)
ECHO LA VOL A-L A4C: 36 ML (ref 22–52)
ECHO LA VOL BP: 38 ML (ref 22–52)
ECHO LA VOL MOD A2C: 36 ML (ref 22–52)
ECHO LA VOL MOD A4C: 35 ML (ref 22–52)
ECHO LA VOL/BSA BIPLANE: 20 ML/M2 (ref 16–34)
ECHO LA VOLUME AREA LENGTH: 39 ML
ECHO LA VOLUME INDEX A-L A2C: 19 ML/M2 (ref 16–34)
ECHO LA VOLUME INDEX A-L A4C: 19 ML/M2 (ref 16–34)
ECHO LA VOLUME INDEX AREA LENGTH: 20 ML/M2 (ref 16–34)
ECHO LA VOLUME INDEX MOD A2C: 19 ML/M2 (ref 16–34)
ECHO LA VOLUME INDEX MOD A4C: 18 ML/M2 (ref 16–34)
ECHO LV E' LATERAL VELOCITY: 12 CM/S
ECHO LV E' SEPTAL VELOCITY: 8 CM/S
ECHO LV EDV A2C: 38 ML
ECHO LV EDV A4C: 51 ML
ECHO LV EDV BP: 47 ML (ref 56–104)
ECHO LV EDV INDEX A4C: 26 ML/M2
ECHO LV EDV INDEX BP: 24 ML/M2
ECHO LV EDV NDEX A2C: 20 ML/M2
ECHO LV EJECTION FRACTION A2C: 57 %
ECHO LV EJECTION FRACTION A4C: 62 %
ECHO LV EJECTION FRACTION BIPLANE: 62 % (ref 55–100)
ECHO LV ESV A2C: 16 ML
ECHO LV ESV A4C: 19 ML
ECHO LV ESV BP: 18 ML (ref 19–49)
ECHO LV ESV INDEX A2C: 8 ML/M2
ECHO LV ESV INDEX A4C: 10 ML/M2
ECHO LV ESV INDEX BP: 9 ML/M2
ECHO LV FRACTIONAL SHORTENING: 34 % (ref 28–44)
ECHO LV INTERNAL DIMENSION DIASTOLE INDEX: 2.42 CM/M2
ECHO LV INTERNAL DIMENSION DIASTOLIC: 4.7 CM (ref 3.9–5.3)
ECHO LV INTERNAL DIMENSION SYSTOLIC INDEX: 1.6 CM/M2
ECHO LV INTERNAL DIMENSION SYSTOLIC: 3.1 CM
ECHO LV IVSD: 0.9 CM (ref 0.6–0.9)
ECHO LV MASS 2D: 142.7 G (ref 67–162)
ECHO LV MASS INDEX 2D: 73.6 G/M2 (ref 43–95)
ECHO LV POSTERIOR WALL DIASTOLIC: 0.9 CM (ref 0.6–0.9)
ECHO LV RELATIVE WALL THICKNESS RATIO: 0.38
ECHO LVOT AREA: 3.1 CM2
ECHO LVOT AV VTI INDEX: 0.85
ECHO LVOT DIAM: 2 CM
ECHO LVOT MEAN GRADIENT: 2 MMHG
ECHO LVOT PEAK GRADIENT: 5 MMHG
ECHO LVOT PEAK VELOCITY: 1.1 M/S
ECHO LVOT STROKE VOLUME INDEX: 40.8 ML/M2
ECHO LVOT SV: 79.1 ML
ECHO LVOT VTI: 25.2 CM
ECHO MV A VELOCITY: 1.04 M/S
ECHO MV AREA PHT: 3.7 CM2
ECHO MV E DECELERATION TIME (DT): 205.9 MS
ECHO MV E VELOCITY: 0.78 M/S
ECHO MV E/A RATIO: 0.75
ECHO MV E/E' LATERAL: 6.5
ECHO MV E/E' RATIO (AVERAGED): 8.13
ECHO MV E/E' SEPTAL: 9.75
ECHO MV PRESSURE HALF TIME (PHT): 59.7 MS
ECHO MV REGURGITANT PEAK GRADIENT: 108 MMHG
ECHO MV REGURGITANT PEAK VELOCITY: 5.2 M/S
ECHO PV MAX VELOCITY: 1.1 M/S
ECHO PV PEAK GRADIENT: 5 MMHG
ECHO RIGHT VENTRICULAR SYSTOLIC PRESSURE (RVSP): 39 MMHG
ECHO RV INTERNAL DIMENSION: 3.5 CM
ECHO RV TAPSE: 1.5 CM (ref 1.7–?)
ECHO RVOT PEAK GRADIENT: 1 MMHG
ECHO RVOT PEAK VELOCITY: 0.6 M/S
ECHO TV REGURGITANT MAX VELOCITY: 3 M/S
ECHO TV REGURGITANT PEAK GRADIENT: 36 MMHG

## 2024-11-05 PROCEDURE — 93306 TTE W/DOPPLER COMPLETE: CPT | Performed by: INTERNAL MEDICINE

## 2024-11-06 NOTE — RESULT ENCOUNTER NOTE
Dear Ms. Tabitha,  Good News!  Your test results are stable.   Please continue the current treatment plan.  We can discuss more at your scheduled follow up if you have questions.  Best Regards,  CAMMY Almonte NP

## 2024-11-26 RX ORDER — HYDROXYZINE HYDROCHLORIDE 25 MG/1
25 TABLET, FILM COATED ORAL NIGHTLY
Qty: 30 TABLET | Refills: 0 | Status: SHIPPED | OUTPATIENT
Start: 2024-11-26

## 2024-12-05 ENCOUNTER — OFFICE VISIT (OUTPATIENT)
Age: 84
End: 2024-12-05
Payer: MEDICARE

## 2024-12-05 ENCOUNTER — LAB (OUTPATIENT)
Age: 84
End: 2024-12-05
Payer: MEDICARE

## 2024-12-05 VITALS
TEMPERATURE: 98 F | OXYGEN SATURATION: 96 % | HEIGHT: 63 IN | SYSTOLIC BLOOD PRESSURE: 122 MMHG | HEART RATE: 81 BPM | BODY MASS INDEX: 34.73 KG/M2 | WEIGHT: 196 LBS | DIASTOLIC BLOOD PRESSURE: 74 MMHG | RESPIRATION RATE: 16 BRPM

## 2024-12-05 DIAGNOSIS — R21 RASH OF BACK: ICD-10-CM

## 2024-12-05 DIAGNOSIS — E78.2 MIXED HYPERLIPIDEMIA: ICD-10-CM

## 2024-12-05 DIAGNOSIS — Z23 ENCOUNTER FOR IMMUNIZATION: ICD-10-CM

## 2024-12-05 DIAGNOSIS — M19.011 LOCALIZED OSTEOARTHRITIS OF SHOULDER REGIONS, BILATERAL: Primary | ICD-10-CM

## 2024-12-05 DIAGNOSIS — E55.9 VITAMIN D DEFICIENCY: ICD-10-CM

## 2024-12-05 DIAGNOSIS — M19.012 LOCALIZED OSTEOARTHRITIS OF SHOULDER REGIONS, BILATERAL: Primary | ICD-10-CM

## 2024-12-05 DIAGNOSIS — E11.22 TYPE 2 DIABETES MELLITUS WITH STAGE 3 CHRONIC KIDNEY DISEASE, WITHOUT LONG-TERM CURRENT USE OF INSULIN, UNSPECIFIED WHETHER STAGE 3A OR 3B CKD (HCC): ICD-10-CM

## 2024-12-05 DIAGNOSIS — N18.30 TYPE 2 DIABETES MELLITUS WITH STAGE 3 CHRONIC KIDNEY DISEASE, WITHOUT LONG-TERM CURRENT USE OF INSULIN, UNSPECIFIED WHETHER STAGE 3A OR 3B CKD (HCC): ICD-10-CM

## 2024-12-05 DIAGNOSIS — E03.9 ACQUIRED HYPOTHYROIDISM: ICD-10-CM

## 2024-12-05 PROBLEM — I20.89 ANGINAL EQUIVALENT (HCC): Status: RESOLVED | Noted: 2018-08-30 | Resolved: 2024-12-05

## 2024-12-05 PROCEDURE — PBSHW INFLUENZA, FLUAD TRIVALENT, (AGE 65 Y+), IM, PRESERVATIVE FREE, 0.5ML: Performed by: INTERNAL MEDICINE

## 2024-12-05 PROCEDURE — G8417 CALC BMI ABV UP PARAM F/U: HCPCS | Performed by: INTERNAL MEDICINE

## 2024-12-05 PROCEDURE — 90653 IIV ADJUVANT VACCINE IM: CPT | Performed by: INTERNAL MEDICINE

## 2024-12-05 PROCEDURE — 90677 PCV20 VACCINE IM: CPT | Performed by: INTERNAL MEDICINE

## 2024-12-05 PROCEDURE — 99214 OFFICE O/P EST MOD 30 MIN: CPT | Performed by: INTERNAL MEDICINE

## 2024-12-05 PROCEDURE — 1090F PRES/ABSN URINE INCON ASSESS: CPT | Performed by: INTERNAL MEDICINE

## 2024-12-05 PROCEDURE — 1036F TOBACCO NON-USER: CPT | Performed by: INTERNAL MEDICINE

## 2024-12-05 PROCEDURE — 3078F DIAST BP <80 MM HG: CPT | Performed by: INTERNAL MEDICINE

## 2024-12-05 PROCEDURE — 1160F RVW MEDS BY RX/DR IN RCRD: CPT | Performed by: INTERNAL MEDICINE

## 2024-12-05 PROCEDURE — 3074F SYST BP LT 130 MM HG: CPT | Performed by: INTERNAL MEDICINE

## 2024-12-05 PROCEDURE — G8482 FLU IMMUNIZE ORDER/ADMIN: HCPCS | Performed by: INTERNAL MEDICINE

## 2024-12-05 PROCEDURE — 1123F ACP DISCUSS/DSCN MKR DOCD: CPT | Performed by: INTERNAL MEDICINE

## 2024-12-05 PROCEDURE — PBSHW PNEUMOCOCCAL, PCV20, PREVNAR 20, (AGE 6W+), IM, PF: Performed by: INTERNAL MEDICINE

## 2024-12-05 PROCEDURE — G8399 PT W/DXA RESULTS DOCUMENT: HCPCS | Performed by: INTERNAL MEDICINE

## 2024-12-05 PROCEDURE — 3051F HG A1C>EQUAL 7.0%<8.0%: CPT | Performed by: INTERNAL MEDICINE

## 2024-12-05 PROCEDURE — 1159F MED LIST DOCD IN RCRD: CPT | Performed by: INTERNAL MEDICINE

## 2024-12-05 PROCEDURE — G8427 DOCREV CUR MEDS BY ELIG CLIN: HCPCS | Performed by: INTERNAL MEDICINE

## 2024-12-05 RX ORDER — NITROFURANTOIN 25; 75 MG/1; MG/1
100 CAPSULE ORAL 2 TIMES DAILY
COMMUNITY
Start: 2024-07-19

## 2024-12-05 RX ORDER — METHYLPREDNISOLONE 4 MG/1
TABLET ORAL
Qty: 1 KIT | Refills: 0 | Status: SHIPPED | OUTPATIENT
Start: 2024-12-05 | End: 2024-12-11

## 2024-12-05 RX ORDER — TRAMADOL HYDROCHLORIDE 50 MG/1
TABLET ORAL
COMMUNITY

## 2024-12-05 RX ORDER — KETOCONAZOLE 20 MG/G
CREAM TOPICAL
Qty: 60 G | Refills: 1 | Status: SHIPPED | OUTPATIENT
Start: 2024-12-05

## 2024-12-05 RX ORDER — TRAMADOL HYDROCHLORIDE 50 MG/1
50 TABLET ORAL 2 TIMES DAILY PRN
Qty: 28 TABLET | Refills: 0 | Status: SHIPPED | OUTPATIENT
Start: 2024-12-05 | End: 2024-12-19

## 2024-12-05 SDOH — ECONOMIC STABILITY: FOOD INSECURITY: WITHIN THE PAST 12 MONTHS, YOU WORRIED THAT YOUR FOOD WOULD RUN OUT BEFORE YOU GOT MONEY TO BUY MORE.: NEVER TRUE

## 2024-12-05 SDOH — ECONOMIC STABILITY: FOOD INSECURITY: WITHIN THE PAST 12 MONTHS, THE FOOD YOU BOUGHT JUST DIDN'T LAST AND YOU DIDN'T HAVE MONEY TO GET MORE.: NEVER TRUE

## 2024-12-05 SDOH — ECONOMIC STABILITY: INCOME INSECURITY: HOW HARD IS IT FOR YOU TO PAY FOR THE VERY BASICS LIKE FOOD, HOUSING, MEDICAL CARE, AND HEATING?: HARD

## 2024-12-05 ASSESSMENT — PATIENT HEALTH QUESTIONNAIRE - PHQ9
1. LITTLE INTEREST OR PLEASURE IN DOING THINGS: NOT AT ALL
5. POOR APPETITE OR OVEREATING: NOT AT ALL
SUM OF ALL RESPONSES TO PHQ9 QUESTIONS 1 & 2: 0
SUM OF ALL RESPONSES TO PHQ QUESTIONS 1-9: 0
7. TROUBLE CONCENTRATING ON THINGS, SUCH AS READING THE NEWSPAPER OR WATCHING TELEVISION: NOT AT ALL
9. THOUGHTS THAT YOU WOULD BE BETTER OFF DEAD, OR OF HURTING YOURSELF: NOT AT ALL
4. FEELING TIRED OR HAVING LITTLE ENERGY: NOT AT ALL
6. FEELING BAD ABOUT YOURSELF - OR THAT YOU ARE A FAILURE OR HAVE LET YOURSELF OR YOUR FAMILY DOWN: NOT AT ALL
SUM OF ALL RESPONSES TO PHQ QUESTIONS 1-9: 0
10. IF YOU CHECKED OFF ANY PROBLEMS, HOW DIFFICULT HAVE THESE PROBLEMS MADE IT FOR YOU TO DO YOUR WORK, TAKE CARE OF THINGS AT HOME, OR GET ALONG WITH OTHER PEOPLE: NOT DIFFICULT AT ALL
SUM OF ALL RESPONSES TO PHQ QUESTIONS 1-9: 0
2. FEELING DOWN, DEPRESSED OR HOPELESS: NOT AT ALL
SUM OF ALL RESPONSES TO PHQ QUESTIONS 1-9: 0
8. MOVING OR SPEAKING SO SLOWLY THAT OTHER PEOPLE COULD HAVE NOTICED. OR THE OPPOSITE, BEING SO FIGETY OR RESTLESS THAT YOU HAVE BEEN MOVING AROUND A LOT MORE THAN USUAL: NOT AT ALL
3. TROUBLE FALLING OR STAYING ASLEEP: NOT AT ALL

## 2024-12-05 ASSESSMENT — ENCOUNTER SYMPTOMS
RESPIRATORY NEGATIVE: 1
GASTROINTESTINAL NEGATIVE: 1
ALLERGIC/IMMUNOLOGIC NEGATIVE: 1
EYES NEGATIVE: 1
BACK PAIN: 1

## 2024-12-05 NOTE — PATIENT INSTRUCTIONS
for those seeking resources. These sheets are located on the [facility] home page.  Phone Number: 902.668.9774     [employer] [place] [employer] Homeless - National Call Center  What they offer: Free 24/7 access to trained counselors for local resources and assistance  Website: https://www.[place].gov[place]hom[place]/nationalcallcenter.asp  Phone Number: 679.768.2113 (text messaging accepted)  Medicaid [employer] ([employer])  What they offer: Housing assistance resource for renters, homeowners, and those who are homeless.  Contact Information: Members should contact the member services number on the back of their insurance card to connect with a “ and/or care coordinator” to receive assistance.    [facility] and [facility] (Located within [facility])  What they offer: Located within [facility] provides homes for low-income families, seniors, and the disabled.  Website: https://www.rrha.com/  Phone Number: 187.824.7912  Address: [address] [place] [address].[place] Department of Housing and Urban Development ([facility])  What they offer: Public housing assistance  Website: https://www.hud.gov/program_offices/public_indian_housing/pha/contacts  Additional Information: Select your state from the website to find location specific contact information.    [place] [address]  What they offer: Statewide information and referral for other housing needs.  Website: https://www.[facility].[employer]unch/consumer/index.php  Phone Number: Dial 2-1-1 or 699-666-9473          [facility]  What they offer: Education resources for homebuyers, homeowners, and renters.    Website: https://www.[employer]/individuals-families[place]iahou[employer]Scale.com  What they offer: Free resource to help find a home that fits your needs and budget. Property providers can list apartments or homes for rent at any time.  Website: https://www.[employer]/  Phone Number: 717.617.4480 Monday - Friday 9 am - 8 pm

## 2024-12-06 LAB
25(OH)D3 SERPL-MCNC: 34.4 NG/ML (ref 30–100)
ALBUMIN SERPL-MCNC: 3.5 G/DL (ref 3.5–5)
ALBUMIN/GLOB SERPL: 0.9 (ref 1.1–2.2)
ALP SERPL-CCNC: 98 U/L (ref 45–117)
ALT SERPL-CCNC: 17 U/L (ref 12–78)
ANION GAP SERPL CALC-SCNC: 3 MMOL/L (ref 2–12)
AST SERPL-CCNC: 13 U/L (ref 15–37)
BASOPHILS # BLD: 0.1 K/UL (ref 0–0.1)
BASOPHILS NFR BLD: 1 % (ref 0–1)
BILIRUB SERPL-MCNC: 0.4 MG/DL (ref 0.2–1)
BUN SERPL-MCNC: 8 MG/DL (ref 6–20)
BUN/CREAT SERPL: 9 (ref 12–20)
CALCIUM SERPL-MCNC: 10 MG/DL (ref 8.5–10.1)
CHLORIDE SERPL-SCNC: 106 MMOL/L (ref 97–108)
CHOLEST SERPL-MCNC: 154 MG/DL
CO2 SERPL-SCNC: 29 MMOL/L (ref 21–32)
CREAT SERPL-MCNC: 0.9 MG/DL (ref 0.55–1.02)
CREAT UR-MCNC: 110 MG/DL
DIFFERENTIAL METHOD BLD: ABNORMAL
EOSINOPHIL # BLD: 0.1 K/UL (ref 0–0.4)
EOSINOPHIL NFR BLD: 1 % (ref 0–7)
ERYTHROCYTE [DISTWIDTH] IN BLOOD BY AUTOMATED COUNT: 14.6 % (ref 11.5–14.5)
EST. AVERAGE GLUCOSE BLD GHB EST-MCNC: 128 MG/DL
GLOBULIN SER CALC-MCNC: 3.9 G/DL (ref 2–4)
GLUCOSE SERPL-MCNC: 136 MG/DL (ref 65–100)
HBA1C MFR BLD: 6.1 % (ref 4–5.6)
HCT VFR BLD AUTO: 36.8 % (ref 35–47)
HDLC SERPL-MCNC: 55 MG/DL
HDLC SERPL: 2.8 (ref 0–5)
HGB BLD-MCNC: 11.5 G/DL (ref 11.5–16)
IMM GRANULOCYTES # BLD AUTO: 0 K/UL (ref 0–0.04)
IMM GRANULOCYTES NFR BLD AUTO: 0 % (ref 0–0.5)
LDLC SERPL CALC-MCNC: 77 MG/DL (ref 0–100)
LYMPHOCYTES # BLD: 1.8 K/UL (ref 0.8–3.5)
LYMPHOCYTES NFR BLD: 25 % (ref 12–49)
MCH RBC QN AUTO: 28.7 PG (ref 26–34)
MCHC RBC AUTO-ENTMCNC: 31.3 G/DL (ref 30–36.5)
MCV RBC AUTO: 91.8 FL (ref 80–99)
MICROALBUMIN UR-MCNC: 1.05 MG/DL
MICROALBUMIN/CREAT UR-RTO: 10 MG/G (ref 0–30)
MONOCYTES # BLD: 0.5 K/UL (ref 0–1)
MONOCYTES NFR BLD: 7 % (ref 5–13)
NEUTS SEG # BLD: 4.7 K/UL (ref 1.8–8)
NEUTS SEG NFR BLD: 66 % (ref 32–75)
NRBC # BLD: 0 K/UL (ref 0–0.01)
NRBC BLD-RTO: 0 PER 100 WBC
PLATELET # BLD AUTO: 171 K/UL (ref 150–400)
PMV BLD AUTO: 11.5 FL (ref 8.9–12.9)
POTASSIUM SERPL-SCNC: 4.4 MMOL/L (ref 3.5–5.1)
PROT SERPL-MCNC: 7.4 G/DL (ref 6.4–8.2)
RBC # BLD AUTO: 4.01 M/UL (ref 3.8–5.2)
SODIUM SERPL-SCNC: 138 MMOL/L (ref 136–145)
SPECIMEN HOLD: NORMAL
T4 FREE SERPL-MCNC: 1.1 NG/DL (ref 0.8–1.5)
TRIGL SERPL-MCNC: 110 MG/DL
TSH SERPL DL<=0.05 MIU/L-ACNC: 0.54 UIU/ML (ref 0.36–3.74)
VLDLC SERPL CALC-MCNC: 22 MG/DL
WBC # BLD AUTO: 7 K/UL (ref 3.6–11)

## 2024-12-07 NOTE — PROGRESS NOTES
History of Present Illness     Chief Complaint   Patient presents with    Arm Pain     Patient with bilateral arm pain at least for 1 year - patient recently admitted and recommend to follow up in clinic. Patient Identification  Louisa Fleming is a 80 y.o. female complains of bilateral neck and shoulder pain. Admitted 1/14/23 for this. Pain worse at night. HA better and BP better. Elevated inflammatory markers during admission and pulmonary HTN noted on CTA. Hx of DANIELA, states she has an appt with sleep to adjust her CPAP and also has follow up scheduled with Cardiology. Neck and shoulder pain have been over 1 year. Past Medical History:   Diagnosis Date    Arrhythmia     Bradycardia. Arthritis     Burning with urination     Incontinence. Diabetes ([address]     GERD (gastroesophageal reflux disease)     Glaucoma     High cholesterol     Hypertension     Ill-defined condition     EDEMA, LOWER LIMS, URINARY INCONTINENCE    Obesity, Class II, BMI 35-39.9     Sleep apnea     NO CPAP     Family History   Problem Relation Age of Onset    Heart Disease Mother     No Known Problems Father     No Known Problems Sister     Cancer Brother         PANCREAS    Heart Disease Sister     Cancer Sister         BREAST    Alcohol abuse Brother     No Known Problems Brother     No Known Problems Brother     No Known Problems Brother     Anesth Problems Neg Hx      Current Outpatient Medications   Medication Sig Dispense Refill    butalbital-acetaminophen-caffeine (FIORICET, ESGIC) -40 mg per tablet Take 1 Tablet by mouth every six (6) hours as needed for Headache (moderate to severe headache.) for up to 30 days. 30 Tablet 0    fluticasone propionate (FLONASE) 50 mcg/actuation nasal spray 2 Sprays by Both Nostrils route daily. Indications: nasal congestion 1 Each 0    cetirizine (ZYRTEC) 10 mg tablet Take 1 Tablet by mouth daily for 30 days.  Indications: hives 30 Tablet 0    tiZANidine (ZANAFLEX) 2 mg tablet Take 1 Tablet by mouth nightly as needed for Muscle Spasm(s). 30 Tablet 0    methylPREDNISolone (MEDROL DOSEPACK) 4 mg tablet Take 1 Tablet by mouth Specific Days and Specific Times. 1 Dose Pack 0    pregabalin (LYRICA) 50 mg capsule TAKE 1 CAPSULE BY MOUTH ONCE DAILY AT NIGHT . DO NOT EXCEED 1 CAPSULE PER 24 HOURS 30 Capsule 0    DULoxetine (CYMBALTA) 60 mg capsule Take 1 capsule by mouth once daily for 30 days 90 Capsule 1    furosemide (LASIX) 20 mg tablet TAKE 1 TABLET BY MOUTH ONCE DAILY AS NEEDED FOR  PAIN 30 Tablet 1    alendronate (FOSAMAX) 70 mg tablet TAKE 1 TABLET BY MOUTH ONCE A WEEK IN THE MORNING BEFORE FIRST MEAL, BEVERAGE, OR MEDICATION OF THE DAY      aspirin 81 mg chewable tablet Take 81 mg by mouth daily. losartan (COZAAR) 50 mg tablet Take 1 Tab by mouth daily. 90 Tab 0    mv,calcium,min/iron/folic/vitK (ONE-A-DAY WOMEN'S COMPLETE PO) Take 1 Tab by mouth daily. ketoconazole (NIZORAL) 2 % topical cream Apply  to affected area daily. 30 g 3    Nystop powder APPLY 1 APPLICATION OF POWDER TOPICALLY TO AFFECTED AREA 4 TIMES DAILY UNTIL RASH RESOLVES 60 g 0    Nebulizers (Sidestream) misc       cholecalciferol (VITAMIN D3) (1000 Units /25 mcg) tablet Take 1,000 Units by mouth daily. cyanocobalamin 1,000 mcg tablet Take 1,000 mcg by mouth daily. ascorbic acid, vitamin C, (VITAMIN C) 250 mg tablet Take 250 mg by mouth daily. lancets misc accu-chek softclix lancets Check blood sugar daily E11.9 100 Each 1    glucose blood VI test strips (BLOOD GLUCOSE TEST) strip Accu-chek alexei plus test strips Test blood sugar once daily E11.9 100 Strip 1    Calcium Carbonate-Vit D3-Min 600 mg calcium- 400 unit tab Take 1 pill 2 x daily (Patient taking differently: Take 1 Tablet by mouth daily.) 60 Tab 5    latanoprost (XALATAN) 0.005 % ophthalmic solution Administer 1 Drop to both eyes nightly. atorvastatin (LIPITOR) 40 mg tablet Take 1 Tab by mouth nightly for 90 days.  90 Tab 1 oxybutynin (DITROPAN) 5 mg tablet Take 1 Tab by mouth three (3) times daily for 270 days. (Patient taking differently: Take 5 mg by mouth three (3) times daily as needed.) 270 Tab 1     Allergies   Allergen Reactions    Sulfa (Sulfonamide Antibiotics) Hives and Itching         Physical Exam     Visit Vitals  /63 (BP 1 Location: Right arm, BP Patient Position: Sitting, BP Cuff Size: Large adult)   Pulse 60   Temp 98.4 °F (36.9 °C) (Oral)   Resp 18   Ht 5' 3\" (1.6 m)   Wt 210 lb (95.3 kg)   SpO2 96%   BMI 37.20 kg/m²       GEN: Well appearing. No apparent distress. Responds to all questions appropriately. Lungs: No labored respirations. Talking in complete sentences without difficulty.     MSK: Neck    ROM:  Flexion: 45  Extension: 50  Rotation to Left: 60  Rotation to Right: 60  Lateral Bending to Right: 40  Lateral Bending to Left: 40    Palpation:  C2-T1: No tenderness  Paracervical Left: No tenderness  Paracervical Right: No tenderness    Sensation  C5-T1: Intact    Motor Strength:  Shoulder Abduction(C5): Left 5/5 Right 5/5  Wrist Extension(C6): Left 5/5 Right 5/5  Wrist Flexion(C7): Left 5/5 Right 5/5  Finger (C8): Left 5/5 Right 5/5  Finger Spread(T1): Left 5/5 Right 5/5    Reflex:  Biceps(C5): +2 bilaterally  Brachioradialis(C6): +2 bilaterally  Triceps(C7): +2 bilaterally    Spurlings: Negative    Shoulder: Left and Right    Deformity: None    ROM:  Forward Flexion: Active: 180 Passive: 180  ER at 90 degrees abduction: Active: 90 Passive:90  IR at 90 degrees abduction: Active: 90 Passive:90  Abduction: Active: 180 Passive: 180    Palpation:  AC tenderness: None  SC tenderness: None  Clavicle tenderness: None  Biceps tenderness: None      Strength:  Empty Can(Supraspinatus): Left:5/5 Right:5/5  External rotation(Infraspinatus): Left:5/5 Right: 5/5  Lift off(Subscapularis): Left:5/5 Right: 5/5    Rotator Cuff Exam:  Neers sign: Positive  Hall sign: Positive  Painful Arc: Negative  Lift-off sign / Belly Press: Negative  Biceps/Labrum/AC Exam:  Yergasons Test: Negative  Speeds Test: Negative  OGeronimos Sign: Negative    Neuro/Vascular:  Pulses intact, no edema, and neurologically intact  Skin: No obvious rash or skin breakdown         [facility]  OFFICE PROCEDURE PROGRESS NOTE        Chart reviewed for the following:   Myke ARSHAD MD, have reviewed the History, Physical and updated the Allergic reactions for @patient    TIME OUT performed immediately prior to start of procedure:   Myke ARSHAD MD, have performed the following reviews on Romero Akbar   prior to the start of the procedure:            * Patient was identified by name and date of birth   * Agreement on procedure being performed was verified  * Risks and Benefits explained to the patient  * Procedure site verified and marked as necessary  * Patient was positioned for comfort  * Consent was signed and verified     Time: 3:00pm    Date of procedure: 1/19/2023    Procedure performed by:  Myke Riley MD    Provider assisted by: Gregoria Bill LPN    Patient assisted by: self    How tolerated by patient: tolerated the procedure well with no complications    Post Procedural Pain Scale: 0 - No Hurt      Landmarked Injection of the  Left and Right Subacromial Space:  After verbal consent was obtained, risks and benefits of the procedure were discussed with the patient and alternatives discussed. Time out performed, cross checking patient ID and procedure. Confirmed that the patient does not have history of prior adverse reactions, active infections, or relevant allergies. There was no effusion, erythema, or warmth, and the skin was clear. The skin was cleaned using chlorohexadine x 2. Topical anesthesia was achieved with ethyl chloride. A 25 gauge needle was inserted into the subacromial space via a lateral approach. The site was injected with a mixture of 40mg of Kenalog and  2ml 1% Lidocaine.  The injection was completed without complication, and a bandage was applied. Patient felt better after injection. Trigger Point Injections     Preparation - Cleaned and prepped with chlorhexidine swab x3. Anesthesia - Ethyl chloride spray used to anesthitise the skin prior to injection. Description of procedure - 6 trigger points were identified in the bilateral trap and each site was injected with 0.5 ml of 1% Lidocaine and 0.5ml of D5 as a  (50:50) mixture. Patient tolerated the procedure well and there were no complications. Patient reports pain relief following the injections. No results found for any visits on 01/19/23. Assessment:    ICD-10-CM ICD-9-CM    1. Pulmonary hypertension (HCC)  I27.20 416.8 ECHO ADULT COMPLETE      2. Elevated C-reactive protein (CRP)  R79.82 790.95 SED RATE (ESR)      C REACTIVE PROTEIN, QT      3. Chronic pain of both shoulders  M25.511 719.41 XR SHOULDER LT AP/LAT MIN 2 V    G89.29 338.29 XR SHOULDER RT AP/LAT MIN 2 V    M25.512  REFERRAL TO HOME HEALTH      TRIAMCINOLONE ACETONIDE INJ      triamcinolone acetonide (Kenalog) 40 mg/mL injection      lidocaine (XYLOCAINE) 10 mg/mL (1 %) injection      FL ARTHROCENTESIS ASPIR&/INJ MAJOR JT/BURSA W/O US      4. Myofascial neck pain  M54.2 723.1 INJECT TRIGGER POINTS, > 3      5. Neck arthropathy  M47.812 716.98 REFERRAL TO HOME HEALTH      6. Neck pain  M54.2 723.1 SED RATE (ESR)      C REACTIVE PROTEIN, QT          Plan:  -Hospital imaging and labs reviewed. ECHO ordered to assess Pulmonary HTN. Recommend getting DANIELA under control. Has appt with Cardiology and encouraged to touch base with them with regards to this. -Repeat labs. If still high, consider further imaging  -Improved with injection, but shoulder pain likely IA in origin. Help off on [facility] joint injection today and get XR and start PT.   Patient prefers [facility] PT, though I recommend going somewhere in person, but will see how she does with [facility] and if no improvement or worsening, will refer to PT. Daughter prefers [facility] as she goes there. After Care Instructions: The patient is asked to continue to rest the joint for a few more days before resuming regular activities. It may be more painful for the first 1-2 days. Watch for fever, or increased swelling or persistent pain in the joint. Call or return to clinic prn if such symptoms occur or there is failure to improve as anticipated. Follow-up and Dispositions    Return in about 6 weeks (around 3/2/2023) for Shoulder and neck pain. No

## 2024-12-12 ENCOUNTER — TELEPHONE (OUTPATIENT)
Age: 84
End: 2024-12-12

## 2024-12-12 NOTE — TELEPHONE ENCOUNTER
----- Message from Dr. Alessia Rangel MD sent at 12/12/2024 10:37 AM EST -----  Please let patient know that her labs are stable.   HBA1C is still in the prediabetic range.   Will continue to manage with lifestyle and diet.   No need for any additional medications at this time.

## 2024-12-24 DIAGNOSIS — M19.011 PRIMARY OSTEOARTHRITIS OF BOTH SHOULDERS: Primary | ICD-10-CM

## 2024-12-24 DIAGNOSIS — M19.012 PRIMARY OSTEOARTHRITIS OF BOTH SHOULDERS: Primary | ICD-10-CM

## 2024-12-24 RX ORDER — TRAMADOL HYDROCHLORIDE 50 MG/1
TABLET ORAL
Status: CANCELLED | OUTPATIENT
Start: 2024-12-24

## 2024-12-24 RX ORDER — TRAMADOL HYDROCHLORIDE 50 MG/1
50 TABLET ORAL 2 TIMES DAILY
Qty: 60 TABLET | Refills: 0 | Status: SHIPPED | OUTPATIENT
Start: 2024-12-24 | End: 2025-01-23

## 2024-12-24 RX ORDER — IBUPROFEN 200 MG
200 TABLET ORAL
Qty: 30 TABLET | Refills: 0 | Status: SHIPPED | OUTPATIENT
Start: 2024-12-24

## 2024-12-24 RX ORDER — ACETAMINOPHEN 500 MG
1000 TABLET ORAL 3 TIMES DAILY
Qty: 540 TABLET | Refills: 1 | Status: SHIPPED | OUTPATIENT
Start: 2024-12-24

## 2024-12-24 NOTE — TELEPHONE ENCOUNTER
traMADol (ULTRAM) 50 MG tablet    [facility] [address] [facility] [address] [place] [address] -  [phone] -  [phone]     Patient is completely out of this medication and is request a refill. She is aware that PCP is out of office. Can available provider send in script? Please let daughter know.   [phone]

## 2025-01-09 ENCOUNTER — TELEPHONE (OUTPATIENT)
Age: 85
End: 2025-01-09

## 2025-01-09 DIAGNOSIS — M19.011 PRIMARY OSTEOARTHRITIS OF BOTH SHOULDERS: Primary | ICD-10-CM

## 2025-01-09 DIAGNOSIS — M19.012 PRIMARY OSTEOARTHRITIS OF BOTH SHOULDERS: Primary | ICD-10-CM

## 2025-01-09 NOTE — TELEPHONE ENCOUNTER
Daughter called stating that patient needs a referral to Dr. John Frias at [facility] phone #[phone]. She is seeing him for pain in her shoulders-rotator cuffs. Patient has an appointment with him 1/14 at 1:20 pm. Please advise.

## 2025-01-10 NOTE — TELEPHONE ENCOUNTER
Called and let daughter know that the referral was placed and it has been faxed over to Dr. Frias's office.

## 2025-01-16 ENCOUNTER — TELEPHONE (OUTPATIENT)
Age: 85
End: 2025-01-16

## 2025-01-16 PROBLEM — E11.21 TYPE 2 DIABETES WITH NEPHROPATHY (HCC): Status: RESOLVED | Noted: 2019-01-13 | Resolved: 2025-01-16

## 2025-01-16 PROBLEM — E11.8 TYPE 2 DIABETES MELLITUS WITH COMPLICATION, WITHOUT LONG-TERM CURRENT USE OF INSULIN (HCC): Status: RESOLVED | Noted: 2018-01-09 | Resolved: 2025-01-16

## 2025-01-16 PROBLEM — E11.40 TYPE 2 DIABETES MELLITUS WITH DIABETIC NEUROPATHY (HCC): Status: RESOLVED | Noted: 2018-01-09 | Resolved: 2025-01-16

## 2025-01-16 PROBLEM — E11.22 TYPE 2 DIABETES MELLITUS WITH CHRONIC KIDNEY DISEASE (HCC): Status: RESOLVED | Noted: 2024-04-24 | Resolved: 2025-01-16

## 2025-01-16 NOTE — TELEPHONE ENCOUNTER
Fela called stating she missed a call. I relayed Dr. Rangel's message that CKD was an error. She did want me to relay that Dr. Frias is sending the patient to get MRI's for her shoulders. Fela will keep us up to date.    Thank you!

## 2025-01-16 NOTE — TELEPHONE ENCOUNTER
Daughter called because she was looking at patient's MyChart. She sees that it says patient has Type 2 diabetes mellitus with stage 3 chronic kidney disease, without long-term current use of insulin, unspecified whether stage 3a or 3b CKD (HCC). She says that her mother and herself have never heard the CKD stage 3 diagnosis. Daughter would like a call to discuss this.     Thank you!

## 2025-01-17 ENCOUNTER — TRANSCRIBE ORDERS (OUTPATIENT)
Facility: HOSPITAL | Age: 85
End: 2025-01-17

## 2025-01-17 DIAGNOSIS — M19.012 ARTHRITIS OF LEFT SHOULDER REGION: ICD-10-CM

## 2025-01-17 DIAGNOSIS — M19.011 ARTHRITIS OF RIGHT SHOULDER REGION: Primary | ICD-10-CM

## 2025-01-22 ENCOUNTER — HOSPITAL ENCOUNTER (OUTPATIENT)
Facility: HOSPITAL | Age: 85
Discharge: HOME | End: 2025-01-25
Attending: ORTHOPAEDIC SURGERY
Payer: MEDICARE

## 2025-01-22 DIAGNOSIS — R53.83 OTHER FATIGUE: ICD-10-CM

## 2025-01-22 DIAGNOSIS — G47.33 OSA ON CPAP: ICD-10-CM

## 2025-01-22 DIAGNOSIS — R06.09 OTHER FORMS OF DYSPNEA: ICD-10-CM

## 2025-01-22 DIAGNOSIS — E66.01 MORBID (SEVERE) OBESITY DUE TO EXCESS CALORIES: ICD-10-CM

## 2025-01-22 DIAGNOSIS — E11.8 TYPE 2 DIABETES MELLITUS WITH COMPLICATION, WITHOUT LONG-TERM CURRENT USE OF INSULIN (HCC): ICD-10-CM

## 2025-01-22 DIAGNOSIS — M19.012 ARTHRITIS OF LEFT SHOULDER REGION: ICD-10-CM

## 2025-01-22 DIAGNOSIS — R21 RASH OF BACK: ICD-10-CM

## 2025-01-22 DIAGNOSIS — R30.0 DYSURIA: ICD-10-CM

## 2025-01-22 DIAGNOSIS — Z98.890 HISTORY OF BLADDER SUSPENSION PROCEDURE: ICD-10-CM

## 2025-01-22 DIAGNOSIS — J45.40 MODERATE PERSISTENT REACTIVE AIRWAY DISEASE WITHOUT COMPLICATION: ICD-10-CM

## 2025-01-22 DIAGNOSIS — I27.20 PULMONARY HYPERTENSION, UNSPECIFIED (HCC): ICD-10-CM

## 2025-01-22 DIAGNOSIS — M79.604 PAIN IN RIGHT LEG: ICD-10-CM

## 2025-01-22 DIAGNOSIS — M19.012 PRIMARY OSTEOARTHRITIS, LEFT SHOULDER: ICD-10-CM

## 2025-01-22 DIAGNOSIS — M19.019 PRIMARY OSTEOARTHRITIS OF SHOULDER, UNSPECIFIED LATERALITY: ICD-10-CM

## 2025-01-22 DIAGNOSIS — I10 PRIMARY HYPERTENSION: ICD-10-CM

## 2025-01-22 DIAGNOSIS — I47.10 SVT (SUPRAVENTRICULAR TACHYCARDIA) (HCC): ICD-10-CM

## 2025-01-22 DIAGNOSIS — M19.011 ARTHRITIS OF RIGHT SHOULDER REGION: ICD-10-CM

## 2025-01-22 DIAGNOSIS — M19.011 PRIMARY OSTEOARTHRITIS OF BOTH SHOULDERS: ICD-10-CM

## 2025-01-22 DIAGNOSIS — M19.012 PRIMARY OSTEOARTHRITIS OF BOTH SHOULDERS: ICD-10-CM

## 2025-01-22 DIAGNOSIS — I10 ESSENTIAL (PRIMARY) HYPERTENSION: ICD-10-CM

## 2025-01-22 DIAGNOSIS — Z87.448 HISTORY OF BLADDER SUSPENSION PROCEDURE: ICD-10-CM

## 2025-01-22 PROCEDURE — 73221 MRI JOINT UPR EXTREM W/O DYE: CPT

## 2025-01-22 RX ORDER — LOSARTAN POTASSIUM 25 MG/1
25 TABLET ORAL DAILY
Qty: 90 TABLET | Refills: 0 | Status: SHIPPED | OUTPATIENT
Start: 2025-01-22

## 2025-01-22 RX ORDER — ATORVASTATIN CALCIUM 40 MG/1
40 TABLET, FILM COATED ORAL NIGHTLY
Qty: 90 TABLET | Refills: 0 | Status: SHIPPED | OUTPATIENT
Start: 2025-01-22

## 2025-01-22 RX ORDER — METOPROLOL TARTRATE 25 MG/1
12.5 TABLET, FILM COATED ORAL 2 TIMES DAILY
Qty: 90 TABLET | Refills: 0 | Status: SHIPPED | OUTPATIENT
Start: 2025-01-22

## 2025-01-23 DIAGNOSIS — Z87.448 HISTORY OF BLADDER SUSPENSION PROCEDURE: ICD-10-CM

## 2025-01-23 DIAGNOSIS — Z98.890 HISTORY OF BLADDER SUSPENSION PROCEDURE: ICD-10-CM

## 2025-01-23 DIAGNOSIS — R30.0 DYSURIA: ICD-10-CM

## 2025-01-23 RX ORDER — MONTELUKAST SODIUM 10 MG/1
10 TABLET ORAL DAILY
Qty: 90 TABLET | Refills: 0 | Status: SHIPPED | OUTPATIENT
Start: 2025-01-23

## 2025-01-23 RX ORDER — TRAMADOL HYDROCHLORIDE 50 MG/1
50 TABLET ORAL 2 TIMES DAILY
Qty: 60 TABLET | Refills: 0 | OUTPATIENT
Start: 2025-01-23 | End: 2025-02-22

## 2025-01-23 RX ORDER — MIRABEGRON 50 MG/1
50 TABLET, FILM COATED, EXTENDED RELEASE ORAL DAILY
Qty: 90 TABLET | Refills: 0 | Status: SHIPPED | OUTPATIENT
Start: 2025-01-23

## 2025-01-23 RX ORDER — TIZANIDINE 2 MG/1
TABLET ORAL
Qty: 30 TABLET | Refills: 5 | Status: SHIPPED | OUTPATIENT
Start: 2025-01-23

## 2025-01-23 RX ORDER — NYSTATIN 100000 [USP'U]/G
POWDER TOPICAL
Qty: 30 G | Refills: 3 | Status: SHIPPED | OUTPATIENT
Start: 2025-01-23

## 2025-01-23 RX ORDER — PREGABALIN 75 MG/1
75 CAPSULE ORAL 2 TIMES DAILY
Qty: 180 CAPSULE | Refills: 0 | Status: SHIPPED | OUTPATIENT
Start: 2025-01-23 | End: 2025-04-23

## 2025-01-23 RX ORDER — HYDROXYZINE HYDROCHLORIDE 25 MG/1
25 TABLET, FILM COATED ORAL NIGHTLY
Qty: 30 TABLET | Refills: 0 | Status: SHIPPED | OUTPATIENT
Start: 2025-01-23

## 2025-01-23 RX ORDER — KETOCONAZOLE 20 MG/G
CREAM TOPICAL
Qty: 60 G | Refills: 1 | Status: SHIPPED | OUTPATIENT
Start: 2025-01-23

## 2025-01-24 DIAGNOSIS — M75.121 NONTRAUMATIC COMPLETE TEAR OF RIGHT ROTATOR CUFF: Primary | ICD-10-CM

## 2025-01-24 DIAGNOSIS — M25.511 SEVERE SHOULDER PAIN, RIGHT: ICD-10-CM

## 2025-01-24 RX ORDER — TIZANIDINE 2 MG/1
TABLET ORAL
Qty: 30 TABLET | Refills: 0 | OUTPATIENT
Start: 2025-01-24

## 2025-01-24 RX ORDER — TRAMADOL HYDROCHLORIDE 50 MG/1
50 TABLET ORAL EVERY 12 HOURS PRN
Qty: 60 TABLET | Refills: 0 | Status: SHIPPED | OUTPATIENT
Start: 2025-01-24 | End: 2025-02-23

## 2025-01-24 RX ORDER — HYDROXYZINE HYDROCHLORIDE 25 MG/1
25 TABLET, FILM COATED ORAL NIGHTLY
Qty: 30 TABLET | Refills: 0 | OUTPATIENT
Start: 2025-01-24

## 2025-01-24 RX ORDER — MIRABEGRON 50 MG/1
50 TABLET, FILM COATED, EXTENDED RELEASE ORAL DAILY
Qty: 90 TABLET | Refills: 0 | OUTPATIENT
Start: 2025-01-24

## 2025-01-29 ENCOUNTER — TELEPHONE (OUTPATIENT)
Age: 85
End: 2025-01-29

## 2025-01-29 NOTE — TELEPHONE ENCOUNTER
Patient is calling because she needs cardiac clearance to have right shoulder rotator cuff surgery on 3/10/25.Patient is wondering if she needs to come in to be seen soon for clearance.    Patient said that the  office will send a clearance fax over also.    Patient is trying to get a sooner appointment than 2/28/25.Please assist.    Dr.Gregory Frias   [facility]  [phone] office  [phone] office     [phone] patient

## 2025-01-30 ENCOUNTER — TELEPHONE (OUTPATIENT)
Age: 85
End: 2025-01-30

## 2025-01-30 NOTE — TELEPHONE ENCOUNTER
Pt has surgery on 3/10/2025. Can you schedule pt for pre op within 30 days.  
Scheduled pt for a morning appt on 2/19.  
Statement Selected

## 2025-01-30 NOTE — TELEPHONE ENCOUNTER
Spoke with patient's daughter.  Notified clearance note has been sent to ortho office.  Reminded to keep upcoming appointment with Dr Avila as scheduled.  Understanding expressed.

## 2025-02-18 ENCOUNTER — TELEPHONE (OUTPATIENT)
Age: 85
End: 2025-02-18

## 2025-02-18 NOTE — TELEPHONE ENCOUNTER
Dr. Rangel does not have any available appointments for Monday, February 24th. Spoke with patient's daughter and scheduled with another provider.     2/24/2025  9:00 AM  PRE OP  Arun Pan MD

## 2025-02-18 NOTE — TELEPHONE ENCOUNTER
----- Message from Isabela GALVAN sent at 2/17/2025  4:55 PM EST -----  Regarding: FW: ECC Appointment Request    ----- Message -----  From: Roxane Gaona  Sent: 2/17/2025   4:50 PM EST  To: [facility] Clinical Staff  Subject: ECC Appointment Request                          E[facility] Appointment Request    Patient needs appointment for E[facility] Appointment Type: Pre-Op Visit.    Patient Requested Dates(s): Monday 24,2025  Patient Requested Time: Earlier appointment.  Provider Name: Alessia Rangel MD    Reason for Appointment Request: Established Patient - No appointments available during search  --------------------------------------------------------------------------------------------------------------------------    Relationship to Patient: Other Deb Loya     Call Back Information: OK to leave message on voicemail  Preferred Call Back Number: Phone [phone]       Notes: Patient wants to reschedule the appointment ,the appointment is on 2- and wants to reschedule next Monday due to bad weather

## 2025-02-24 ENCOUNTER — OFFICE VISIT (OUTPATIENT)
Age: 85
End: 2025-02-24
Payer: MEDICARE

## 2025-02-24 VITALS
RESPIRATION RATE: 16 BRPM | WEIGHT: 182 LBS | OXYGEN SATURATION: 99 % | HEART RATE: 81 BPM | HEIGHT: 63 IN | DIASTOLIC BLOOD PRESSURE: 64 MMHG | SYSTOLIC BLOOD PRESSURE: 110 MMHG | TEMPERATURE: 97.8 F | BODY MASS INDEX: 32.25 KG/M2

## 2025-02-24 DIAGNOSIS — M19.011 PRIMARY OSTEOARTHRITIS OF RIGHT SHOULDER: Primary | ICD-10-CM

## 2025-02-24 DIAGNOSIS — N18.30 TYPE 2 DIABETES MELLITUS WITH STAGE 3 CHRONIC KIDNEY DISEASE, WITHOUT LONG-TERM CURRENT USE OF INSULIN, UNSPECIFIED WHETHER STAGE 3A OR 3B CKD (HCC): ICD-10-CM

## 2025-02-24 DIAGNOSIS — E11.22 TYPE 2 DIABETES MELLITUS WITH STAGE 3 CHRONIC KIDNEY DISEASE, WITHOUT LONG-TERM CURRENT USE OF INSULIN, UNSPECIFIED WHETHER STAGE 3A OR 3B CKD (HCC): ICD-10-CM

## 2025-02-24 DIAGNOSIS — E66.811 CLASS 1 OBESITY DUE TO EXCESS CALORIES WITHOUT SERIOUS COMORBIDITY WITH BODY MASS INDEX (BMI) OF 32.0 TO 32.9 IN ADULT: ICD-10-CM

## 2025-02-24 DIAGNOSIS — M75.121 NONTRAUMATIC COMPLETE TEAR OF RIGHT ROTATOR CUFF: ICD-10-CM

## 2025-02-24 DIAGNOSIS — E66.09 CLASS 1 OBESITY DUE TO EXCESS CALORIES WITHOUT SERIOUS COMORBIDITY WITH BODY MASS INDEX (BMI) OF 32.0 TO 32.9 IN ADULT: ICD-10-CM

## 2025-02-24 DIAGNOSIS — Z01.818 PRE-OP EXAM: ICD-10-CM

## 2025-02-24 PROCEDURE — 3074F SYST BP LT 130 MM HG: CPT | Performed by: FAMILY MEDICINE

## 2025-02-24 PROCEDURE — 99214 OFFICE O/P EST MOD 30 MIN: CPT | Performed by: FAMILY MEDICINE

## 2025-02-24 PROCEDURE — 1123F ACP DISCUSS/DSCN MKR DOCD: CPT | Performed by: FAMILY MEDICINE

## 2025-02-24 PROCEDURE — 3078F DIAST BP <80 MM HG: CPT | Performed by: FAMILY MEDICINE

## 2025-02-24 SDOH — ECONOMIC STABILITY: FOOD INSECURITY: WITHIN THE PAST 12 MONTHS, YOU WORRIED THAT YOUR FOOD WOULD RUN OUT BEFORE YOU GOT MONEY TO BUY MORE.: NEVER TRUE

## 2025-02-24 SDOH — ECONOMIC STABILITY: FOOD INSECURITY: WITHIN THE PAST 12 MONTHS, THE FOOD YOU BOUGHT JUST DIDN'T LAST AND YOU DIDN'T HAVE MONEY TO GET MORE.: NEVER TRUE

## 2025-02-24 ASSESSMENT — PATIENT HEALTH QUESTIONNAIRE - PHQ9
1. LITTLE INTEREST OR PLEASURE IN DOING THINGS: NOT AT ALL
SUM OF ALL RESPONSES TO PHQ QUESTIONS 1-9: 0
2. FEELING DOWN, DEPRESSED OR HOPELESS: NOT AT ALL
SUM OF ALL RESPONSES TO PHQ9 QUESTIONS 1 & 2: 0
SUM OF ALL RESPONSES TO PHQ QUESTIONS 1-9: 0

## 2025-02-24 NOTE — PROGRESS NOTES
Identified pt with two pt identifiers(name and )    Chief Complaint   Patient presents with    Pre-op Exam     For right shoulder.         Health Maintenance Due   Topic    DTaP/Tdap/Td vaccine (1 - Tdap)    Shingles vaccine (1 of 2)    Respiratory Syncytial Virus (RSV) Pregnant or age 60 yrs+ (1 - 1-dose 75+ series)    COVID-19 Vaccine ( -  season)       Wt Readings from Last 3 Encounters:   25 82.6 kg (182 lb)   24 88.9 kg (196 lb)   24 89.4 kg (197 lb)     Temp Readings from Last 3 Encounters:   25 97.8 °F (36.6 °C) (Temporal)   24 98 °F (36.7 °C) (Temporal)   24 99.4 °F (37.4 °C) (Oral)     BP Readings from Last 3 Encounters:   25 110/64   24 122/74   24 (!) 142/78     Pulse Readings from Last 3 Encounters:   25 81   24 81   24 80           Depression Screening:  :         2025     9:21 AM 2024    10:37 AM 2024     2:28 PM 3/21/2024    10:40 AM 2023     9:51 AM 10/5/2023     2:09 PM 2023    10:00 AM   PHQ-9 Questionaire   Little interest or pleasure in doing things 0 0 0 0 0 0 0   Feeling down, depressed, or hopeless 0 0 0 0 0 0 0   Trouble falling or staying asleep, or sleeping too much  0 0 0  0    Feeling tired or having little energy  0 0 3  0    Poor appetite or overeating  0 0 3  0    Feeling bad about yourself - or that you are a failure or have let yourself or your family down  0 0 0  0    Trouble concentrating on things, such as reading the newspaper or watching television  0 0 3  0    Moving or speaking so slowly that other people could have noticed. Or the opposite - being so fidgety or restless that you have been moving around a lot more than usual  0 0 0  0    Thoughts that you would be better off dead, or of hurting yourself in some way  0 0 0  0    PHQ-9 Total Score 0 0 0 9 0 0 0   If you checked off any problems, how difficult have these problems made it for you to do your work, take care

## 2025-02-24 NOTE — PROGRESS NOTES
[facility]  [address] [place] [address]  Phone: [phone]  Fax: [phone]        Chief Complaint   Patient presents with    Pre-op Exam     For right shoulder.      She is a 85 y.o. female who presents for pre-operative clearance. Usual patient of Dr. Rangel.  New to me.    Surgery - right reverse TSA.  Date - 3/10/28  Location - [facility]  Surgeon - Dr. Frias  No history of issues with anesthesia.   No bleeding problems.  Takes daily ASA, but has not been taking this for the past month as she has been out.    Reports that she gets shortness of breath when she climbs stairs.  This has been a chronic issue for her.  She has been following with cardiology HTN, HLD, DOWNS.  Last saw this group in 10/2024.    Has appt with her cardiologist on 2/28 to discuss clearance.    Prior to Visit Medications    Medication Sig Taking? Authorizing Provider   mirabegron (MYRBETRIQ) 50 MG TB24 Take 50 mg by mouth daily Yes Alessia Rangel MD   albuterol sulfate (PROAIR RESPICLICK) 108 (90 Base) MCG/ACT aerosol powder inhalation Inhale 2 puffs into the lungs every 4 hours as needed for Wheezing or Shortness of Breath Yes Alessia Rangel MD   hydrOXYzine HCl (ATARAX) 25 MG tablet Take 1 tablet by mouth at bedtime Yes Alessia Rangel MD   ketoconazole (NIZORAL) 2 % cream Apply topically 2 x daily as needed. Yes Alessia Rangel MD   metoprolol tartrate (LOPRESSOR) 25 MG tablet Take 0.5 tablets by mouth 2 times daily Yes Valentino Avila MD   atorvastatin (LIPITOR) 40 MG tablet Take 1 tablet by mouth nightly Yes Valentino Avila MD   losartan (COZAAR) 25 MG tablet Take 1 tablet by mouth daily Yes Valentino Avila MD   ibuprofen (ADVIL) 200 MG tablet Take 1 tablet by mouth nightly Yes Radha Alonso MD   acetaminophen (TYLENOL) 500 MG tablet Take 2 tablets by mouth 3 times daily Yes Radha Alonso MD   aspirin 81 MG chewable tablet Take 1 tablet by mouth daily Yes

## 2025-02-27 ENCOUNTER — HOSPITAL ENCOUNTER (OUTPATIENT)
Facility: HOSPITAL | Age: 85
Discharge: HOME | End: 2025-02-27
Payer: MEDICARE

## 2025-02-27 VITALS
BODY MASS INDEX: 32.5 KG/M2 | HEART RATE: 51 BPM | DIASTOLIC BLOOD PRESSURE: 67 MMHG | RESPIRATION RATE: 18 BRPM | TEMPERATURE: 97.5 F | SYSTOLIC BLOOD PRESSURE: 102 MMHG | HEIGHT: 63 IN | WEIGHT: 183.4 LBS

## 2025-02-27 LAB
ANION GAP SERPL CALC-SCNC: 8 MMOL/L (ref 2–12)
APPEARANCE UR: ABNORMAL
BACTERIA URNS QL MICRO: ABNORMAL /HPF
BILIRUB UR QL: NEGATIVE
BUN SERPL-MCNC: 35 MG/DL (ref 6–20)
BUN/CREAT SERPL: 14 (ref 12–20)
CALCIUM SERPL-MCNC: 10 MG/DL (ref 8.5–10.1)
CHLORIDE SERPL-SCNC: 103 MMOL/L (ref 97–108)
CO2 SERPL-SCNC: 26 MMOL/L (ref 21–32)
COLOR UR: ABNORMAL
CREAT SERPL-MCNC: 2.55 MG/DL (ref 0.55–1.02)
EPITH CASTS URNS QL MICRO: ABNORMAL /LPF
ERYTHROCYTE [DISTWIDTH] IN BLOOD BY AUTOMATED COUNT: 14.7 % (ref 11.5–14.5)
EST. AVERAGE GLUCOSE BLD GHB EST-MCNC: 143 MG/DL
GLUCOSE SERPL-MCNC: 103 MG/DL (ref 65–100)
GLUCOSE UR STRIP.AUTO-MCNC: NEGATIVE MG/DL
HBA1C MFR BLD: 6.6 % (ref 4–5.6)
HCT VFR BLD AUTO: 35.7 % (ref 35–47)
HGB BLD-MCNC: 11.1 G/DL (ref 11.5–16)
HGB UR QL STRIP: ABNORMAL
HYALINE CASTS URNS QL MICRO: ABNORMAL /LPF (ref 0–5)
INR PPP: 1.1 (ref 0.9–1.1)
KETONES UR QL STRIP.AUTO: ABNORMAL MG/DL
LEUKOCYTE ESTERASE UR QL STRIP.AUTO: ABNORMAL
MCH RBC QN AUTO: 28 PG (ref 26–34)
MCHC RBC AUTO-ENTMCNC: 31.1 G/DL (ref 30–36.5)
MCV RBC AUTO: 90.2 FL (ref 80–99)
NITRITE UR QL STRIP.AUTO: NEGATIVE
NRBC # BLD: 0 K/UL (ref 0–0.01)
NRBC BLD-RTO: 0 PER 100 WBC
PH UR STRIP: 5 (ref 5–8)
PLATELET # BLD AUTO: 291 K/UL (ref 150–400)
PMV BLD AUTO: 10.6 FL (ref 8.9–12.9)
POTASSIUM SERPL-SCNC: 4.8 MMOL/L (ref 3.5–5.1)
PROT UR STRIP-MCNC: NEGATIVE MG/DL
PROTHROMBIN TIME: 11.2 SEC (ref 9.2–11.2)
RBC # BLD AUTO: 3.96 M/UL (ref 3.8–5.2)
RBC #/AREA URNS HPF: ABNORMAL /HPF (ref 0–5)
SODIUM SERPL-SCNC: 137 MMOL/L (ref 136–145)
SP GR UR REFRACTOMETRY: 1.01 (ref 1–1.03)
URINE CULTURE IF INDICATED: ABNORMAL
UROBILINOGEN UR QL STRIP.AUTO: 0.2 EU/DL (ref 0.2–1)
WBC # BLD AUTO: 8.2 K/UL (ref 3.6–11)
WBC URNS QL MICRO: ABNORMAL /HPF (ref 0–4)

## 2025-02-27 PROCEDURE — 85610 PROTHROMBIN TIME: CPT

## 2025-02-27 PROCEDURE — 86850 RBC ANTIBODY SCREEN: CPT

## 2025-02-27 PROCEDURE — 83036 HEMOGLOBIN GLYCOSYLATED A1C: CPT

## 2025-02-27 PROCEDURE — 86902 BLOOD TYPE ANTIGEN DONOR EA: CPT

## 2025-02-27 PROCEDURE — 87086 URINE CULTURE/COLONY COUNT: CPT

## 2025-02-27 PROCEDURE — 85027 COMPLETE CBC AUTOMATED: CPT

## 2025-02-27 PROCEDURE — 81001 URINALYSIS AUTO W/SCOPE: CPT

## 2025-02-27 PROCEDURE — 86900 BLOOD TYPING SEROLOGIC ABO: CPT

## 2025-02-27 PROCEDURE — 86920 COMPATIBILITY TEST SPIN: CPT

## 2025-02-27 PROCEDURE — 86901 BLOOD TYPING SEROLOGIC RH(D): CPT

## 2025-02-27 PROCEDURE — 86921 COMPATIBILITY TEST INCUBATE: CPT

## 2025-02-27 PROCEDURE — 80048 BASIC METABOLIC PNL TOTAL CA: CPT

## 2025-02-27 PROCEDURE — 36415 COLL VENOUS BLD VENIPUNCTURE: CPT

## 2025-02-27 PROCEDURE — 86922 COMPATIBILITY TEST ANTIGLOB: CPT

## 2025-02-27 PROCEDURE — 86870 RBC ANTIBODY IDENTIFICATION: CPT

## 2025-02-27 NOTE — PERIOP NOTE
[address] [address]   MAIN OR                     [phone]    MAIN PRE OP             [phone]                                                                                AMBULATORY PRE OP          [phone]  PRE-ADMISSION TESTING    [phone]     Surgery Date:  3/10/25       Is surgery arrival time given by surgeon?  YES  NO    If “NO”, [place] staff will call you between 4 and 7pm the day before your surgery with your arrival time. (If your surgery is on a Monday, we will call you the Friday before.)    Call [phone] after 7pm Monday-Friday if you did not receive this call.    INSTRUCTIONS BEFORE YOUR SURGERY   When You  Arrive Arrive at [facility] Patient Access on 1st floor the day of your surgery.  Have your insurance card, photo ID,living will/advanced directive/POA (if applicable),  and any copayment (if needed)   Food   and   Drink NO solid food after midnight the night before surgery. You can drink clear liquids from midnight until ONE hour prior to your arrival at the hospital on the day of your surgery. Clear liquids include:  Water  Apple juice (no sediment)  Carbonated beverages  Black coffee(no cream/milk)  Tea(no cream/milk)  Gatorade    No alcohol (beer, wine, liquor) or marijuana (smoking) 24 hours, edibles (3 days). Stop smoking cigarettes 14 days before surgery (helps w/healing and breathing).   Medications to   TAKE   Morning of Surgery MEDICATIONS TO TAKE THE MORNING OF SURGERY WITH A SIP OF WATER: SYMBICORT,  FLONASE,  METOPROLOL,  MIRABEGRON    You may take these medications, IF NEEDED, the morning of surgery: ALBUTEROL INHALER - BRING WITH YOU ON THE DAY OF SURGERY    Ask your surgeon/prescribing doctor for instructions on taking or stopping these medications prior to surgery: NONE   Medications to STOP  before surgery Non-Steroidal anti-inflammatory Drugs (NSAID's): for example,  ointment near your eyes. If it gets into your eyes, rinse them with cool water.  If you need to use nasal spray, clean the tip of the bottle with alcohol before use and do not use both at the same time.  If you are scheduled for COVID testing during the 5 days, do NOT apply morning dose until after the COVID test has been performed.        Follow all instructions so your surgery won’t be cancelled.  Please, be on time.                    If a situation occurs and you are delayed the day of surgery, call [phone]/ [phone].    If your physical condition changes (like a fever, cold, flu, etc.) call your surgeon as soon as possible.    Home medication(s) reviewed and verified via during PAT appointment/call.    The patient was contacted in person.     She verbalized understanding of all instructions does not need reinforcement.

## 2025-02-28 ENCOUNTER — TELEPHONE (OUTPATIENT)
Age: 85
End: 2025-02-28

## 2025-02-28 ENCOUNTER — OFFICE VISIT (OUTPATIENT)
Age: 85
End: 2025-02-28
Payer: MEDICARE

## 2025-02-28 VITALS
BODY MASS INDEX: 31.07 KG/M2 | DIASTOLIC BLOOD PRESSURE: 76 MMHG | SYSTOLIC BLOOD PRESSURE: 120 MMHG | WEIGHT: 182 LBS | OXYGEN SATURATION: 98 % | HEART RATE: 52 BPM | HEIGHT: 64 IN

## 2025-02-28 DIAGNOSIS — G47.33 OSA ON CPAP: ICD-10-CM

## 2025-02-28 DIAGNOSIS — I27.20 PULMONARY HYPERTENSION, UNSPECIFIED (HCC): ICD-10-CM

## 2025-02-28 DIAGNOSIS — Z01.810 PREOPERATIVE CARDIOVASCULAR EXAMINATION: ICD-10-CM

## 2025-02-28 DIAGNOSIS — E66.01 SEVERE OBESITY: ICD-10-CM

## 2025-02-28 DIAGNOSIS — R00.1 SINUS BRADYCARDIA: ICD-10-CM

## 2025-02-28 DIAGNOSIS — I10 ESSENTIAL (PRIMARY) HYPERTENSION: Primary | ICD-10-CM

## 2025-02-28 LAB
ABO + RH BLD: NORMAL
BACTERIA SPEC CULT: NORMAL
BLOOD BANK CMNT PATIENT-IMP: NORMAL
BLOOD GROUP ANTIBODIES SERPL: NORMAL
BLOOD GROUP ANTIBODIES SERPL: NORMAL
CC UR VC: NORMAL
SERVICE CMNT-IMP: NORMAL
SERVICE CMNT-IMP: NORMAL
SPECIMEN EXP DATE BLD: NORMAL

## 2025-02-28 PROCEDURE — 1160F RVW MEDS BY RX/DR IN RCRD: CPT | Performed by: INTERNAL MEDICINE

## 2025-02-28 PROCEDURE — 93010 ELECTROCARDIOGRAM REPORT: CPT | Performed by: INTERNAL MEDICINE

## 2025-02-28 PROCEDURE — 3074F SYST BP LT 130 MM HG: CPT | Performed by: INTERNAL MEDICINE

## 2025-02-28 PROCEDURE — 99214 OFFICE O/P EST MOD 30 MIN: CPT | Performed by: INTERNAL MEDICINE

## 2025-02-28 PROCEDURE — 1126F AMNT PAIN NOTED NONE PRSNT: CPT | Performed by: INTERNAL MEDICINE

## 2025-02-28 PROCEDURE — 1159F MED LIST DOCD IN RCRD: CPT | Performed by: INTERNAL MEDICINE

## 2025-02-28 PROCEDURE — 1123F ACP DISCUSS/DSCN MKR DOCD: CPT | Performed by: INTERNAL MEDICINE

## 2025-02-28 PROCEDURE — 3078F DIAST BP <80 MM HG: CPT | Performed by: INTERNAL MEDICINE

## 2025-02-28 PROCEDURE — 93005 ELECTROCARDIOGRAM TRACING: CPT | Performed by: INTERNAL MEDICINE

## 2025-02-28 NOTE — PERIOP NOTE
SONIDO at Dr. Quintana office regarding the following abnormal lab results:    BUN-35  CREATININE-2.55  GFR-18

## 2025-02-28 NOTE — PROGRESS NOTES
Chief Complaint   Patient presents with    Hypertension    HLD    Sleep Apnea    DM    CHRONIC EDEMA      Vitals:    02/28/25 1449   BP: 120/76   Site: Left Upper Arm   Position: Sitting   Pulse: 52   SpO2: 98%   Weight: 82.6 kg (182 lb)   Height: 1.626 m (5' 4\")       Chest pain NO     ER, urgent care, or hospitalized outside of [facility] since your last visit?  NO     Refills NO   
arrhythmia    2/6/19-48-hour Holter monitor-minimum    heart rate 42 bpm, maximum heart rate 120 bpm average heart rate 67 bpm.  Rare isolated supraventricular beats, rate is V paced, rate experience, rare isolated ventricular beats    E cardio event monitor 1/2018-heart rate 59-83.  No events.      F. Miscellaneous:      Subjective:   Laura Lu is a 83 y.o.  female who returns for follow up visit.      History of fatigue/dyspnea on exertion-chronic.  No significant change.  Denies chest pain.   Compliant with blood pressure medications   Blood pressure controlled at home        ROS:   (bold if positive,  if negative)              Medications before admission:    Current Outpatient Medications   Medication Instructions    Accu-Chek Softclix Lancets MISC Use to check blood sugar once daily    acetaminophen (TYLENOL) 1,000 mg, Oral, 3 TIMES DAILY    albuterol sulfate (PROAIR RESPICLICK) 108 (90 Base) MCG/ACT aerosol powder inhalation 2 puffs, Inhalation, EVERY 4 HOURS PRN    ascorbic acid (VITAMIN C) 250 mg, Oral, DAILY    aspirin 81 mg, Oral, DAILY    atorvastatin (LIPITOR) 40 mg, Oral, NIGHTLY    Blood Glucose Calibration (ACCU-CHEK ANTONIO) SOLN Use with glucose meter    budesonide-formoterol (SYMBICORT) 160-4.5 MCG/ACT AERO 2 puffs, Inhalation, 2 TIMES DAILY    cetirizine (ZYRTEC) 10 MG tablet TAKE 1 TABLET BY MOUTH ONCE DAILY FOR HIVES FOR 30 DAYS    cyanocobalamin 1,000 mcg, Oral, DAILY    fluticasone (FLONASE) 50 MCG/ACT nasal spray 2 sprays, Nasal, AS NEEDED    furosemide (LASIX) 20 MG tablet Take 1 tablet by mouth daily as needed For increase in swelling    hydrOXYzine HCl (ATARAX) 25 mg, Oral, Nightly    ketoconazole (NIZORAL) 2 % cream Apply topically 2 x daily as needed.    Lancets MISC accu-chek softclix lancets Check blood sugar daily E11.9    latanoprost (XALATAN) 0.005 % ophthalmic solution 1 drop, Both Eyes, NIGHTLY    losartan (COZAAR) 25 mg, Oral, DAILY    metoprolol tartrate (LOPRESSOR) 12.5

## 2025-02-28 NOTE — TELEPHONE ENCOUNTER
Pts daughter called and would like an update on pts lab results bc she saw in [facility] that the urinalysis was Abnormal. Daughter would like to get pt on antibiotics asap if she has a UTI since she will be having a major surgery in 10 days.    Pts daughter would like to know if she needs to call the surgeon and let them know of the lab results or if we will fax it to them.     Pts daughter also had questions about A1C bc she was taken off the diabetes medication.     Please advise.

## 2025-03-06 NOTE — DISCHARGE INSTRUCTIONS
Dr. Frias's Reverse total shoulder replacement Postoperative Instructions    Follow-Up Appointment:  Follow up in the office 2 weeks after surgery.  If this appointment is not already scheduled, please call our office at [phone], extension [phone].  Activity:  The operative extremity is Non-weightbearing. You should NOT use this arm for any pushing, pulling, reaching, lifting or assistance getting to or maintaining a standing position.  You can use the operative-sided hand. You can feed yourself. You should not carry objects more than 2 pounds.   Ambulate every hour. Use the incentive spirometer every half hour when resting.  A sling will be used for the first 2 weeks when sleeping or ambulating. You can transition out of the sling as you can tolerate.  Remove the sling three times daily to do range of motion exercises of the elbow and wrist. It is ok to do pendulum exercises to the shoulder.  Do Not externally rotate the shoulder past straight forward or active inward-rotation towards the belly for 6 weeks after surgery.   Application of the ice packs will prevent and treat inflammation and reduce pain and swelling.  You should ice the incisional area at least 3 times a day, 20-30 minutes at a time.  Prevent any falls. Clear your living environment of rugs or floor objects that may be tripping hazards.  Dressings / Wound Care:  Keep dressing in place until the follow-up visit.   Dermabond (skin glue) may be used to help close the incision, which appears as a thin, transparent covering over the incision. Do not pick or pull at this covering, this will fall off naturally within 2-3 weeks.   Do not apply antibiotic ointment, creams or lotions to your incision.  Once your waterproof dressing has been removed, you may change bandages daily if you like or leave them open to air.  These can be purchased at your local pharmacy.  Most incisions are closed with absorbable sutures, but if not, the sutures or staples

## 2025-03-10 ENCOUNTER — ANESTHESIA EVENT (OUTPATIENT)
Facility: HOSPITAL | Age: 85
End: 2025-03-10
Payer: MEDICARE

## 2025-03-10 ENCOUNTER — HOSPITAL ENCOUNTER (OUTPATIENT)
Facility: HOSPITAL | Age: 85
LOS: 1 days | Discharge: HOME | End: 2025-03-11
Attending: ORTHOPAEDIC SURGERY | Admitting: ORTHOPAEDIC SURGERY
Payer: MEDICARE

## 2025-03-10 ENCOUNTER — APPOINTMENT (OUTPATIENT)
Facility: HOSPITAL | Age: 85
End: 2025-03-10
Attending: ORTHOPAEDIC SURGERY
Payer: MEDICARE

## 2025-03-10 ENCOUNTER — ANESTHESIA (OUTPATIENT)
Facility: HOSPITAL | Age: 85
End: 2025-03-10
Payer: MEDICARE

## 2025-03-10 DIAGNOSIS — Z96.612 S/P REVERSE TOTAL SHOULDER ARTHROPLASTY, LEFT: Primary | ICD-10-CM

## 2025-03-10 DIAGNOSIS — M19.019 PRIMARY OSTEOARTHRITIS OF SHOULDER, UNSPECIFIED LATERALITY: ICD-10-CM

## 2025-03-10 DIAGNOSIS — M79.604 PAIN IN RIGHT LEG: ICD-10-CM

## 2025-03-10 DIAGNOSIS — M19.012 PRIMARY OSTEOARTHRITIS, LEFT SHOULDER: ICD-10-CM

## 2025-03-10 PROBLEM — M12.811 ROTATOR CUFF TEAR ARTHROPATHY OF RIGHT SHOULDER: Status: ACTIVE | Noted: 2025-03-10

## 2025-03-10 PROBLEM — M19.011 OSTEOARTHRITIS OF RIGHT SHOULDER, UNSPECIFIED OSTEOARTHRITIS TYPE: Status: ACTIVE | Noted: 2025-03-10

## 2025-03-10 PROBLEM — M19.011 LOCALIZED PRIMARY OSTEOARTHRITIS OF RIGHT SHOULDER REGION: Status: ACTIVE | Noted: 2025-03-10

## 2025-03-10 PROBLEM — M75.101 ROTATOR CUFF TEAR ARTHROPATHY OF RIGHT SHOULDER: Status: ACTIVE | Noted: 2025-03-10

## 2025-03-10 LAB
GLUCOSE BLD STRIP.AUTO-MCNC: 100 MG/DL (ref 65–117)
GLUCOSE BLD STRIP.AUTO-MCNC: 190 MG/DL (ref 65–117)
GLUCOSE BLD STRIP.AUTO-MCNC: 97 MG/DL (ref 65–117)
SERVICE CMNT-IMP: ABNORMAL
SERVICE CMNT-IMP: NORMAL
SERVICE CMNT-IMP: NORMAL

## 2025-03-10 PROCEDURE — 6360000002 HC RX W HCPCS: Performed by: PHYSICIAN ASSISTANT

## 2025-03-10 PROCEDURE — 2580000003 HC RX 258: Performed by: ANESTHESIOLOGY

## 2025-03-10 PROCEDURE — 6360000002 HC RX W HCPCS: Performed by: ORTHOPAEDIC SURGERY

## 2025-03-10 PROCEDURE — 73030 X-RAY EXAM OF SHOULDER: CPT

## 2025-03-10 PROCEDURE — 2580000003 HC RX 258: Performed by: PHYSICIAN ASSISTANT

## 2025-03-10 PROCEDURE — 7100000001 HC PACU RECOVERY - ADDTL 15 MIN: Performed by: ORTHOPAEDIC SURGERY

## 2025-03-10 PROCEDURE — 6370000000 HC RX 637 (ALT 250 FOR IP): Performed by: ORTHOPAEDIC SURGERY

## 2025-03-10 PROCEDURE — 2720000010 HC SURG SUPPLY STERILE: Performed by: ORTHOPAEDIC SURGERY

## 2025-03-10 PROCEDURE — 2709999900 HC NON-CHARGEABLE SUPPLY: Performed by: ORTHOPAEDIC SURGERY

## 2025-03-10 PROCEDURE — 6360000002 HC RX W HCPCS: Performed by: ANESTHESIOLOGY

## 2025-03-10 PROCEDURE — 6360000002 HC RX W HCPCS: Performed by: NURSE ANESTHETIST, CERTIFIED REGISTERED

## 2025-03-10 PROCEDURE — 3700000000 HC ANESTHESIA ATTENDED CARE: Performed by: ORTHOPAEDIC SURGERY

## 2025-03-10 PROCEDURE — 3600000015 HC SURGERY LEVEL 5 ADDTL 15MIN: Performed by: ORTHOPAEDIC SURGERY

## 2025-03-10 PROCEDURE — 64415 NJX AA&/STRD BRCH PLXS IMG: CPT | Performed by: ANESTHESIOLOGY

## 2025-03-10 PROCEDURE — 82962 GLUCOSE BLOOD TEST: CPT

## 2025-03-10 PROCEDURE — 2500000003 HC RX 250 WO HCPCS: Performed by: PHYSICIAN ASSISTANT

## 2025-03-10 PROCEDURE — 3600000005 HC SURGERY LEVEL 5 BASE: Performed by: ORTHOPAEDIC SURGERY

## 2025-03-10 PROCEDURE — 1100000000 HC RM PRIVATE

## 2025-03-10 PROCEDURE — C1776 JOINT DEVICE (IMPLANTABLE): HCPCS | Performed by: ORTHOPAEDIC SURGERY

## 2025-03-10 PROCEDURE — 6370000000 HC RX 637 (ALT 250 FOR IP): Performed by: PHYSICIAN ASSISTANT

## 2025-03-10 PROCEDURE — 2580000003 HC RX 258: Performed by: NURSE ANESTHETIST, CERTIFIED REGISTERED

## 2025-03-10 PROCEDURE — 3700000001 HC ADD 15 MINUTES (ANESTHESIA): Performed by: ORTHOPAEDIC SURGERY

## 2025-03-10 PROCEDURE — 7100000000 HC PACU RECOVERY - FIRST 15 MIN: Performed by: ORTHOPAEDIC SURGERY

## 2025-03-10 PROCEDURE — 2500000003 HC RX 250 WO HCPCS: Performed by: ORTHOPAEDIC SURGERY

## 2025-03-10 PROCEDURE — 2500000003 HC RX 250 WO HCPCS: Performed by: NURSE ANESTHETIST, CERTIFIED REGISTERED

## 2025-03-10 DEVICE — IMPLANTABLE DEVICE
Type: IMPLANTABLE DEVICE | Site: SHOULDER | Status: FUNCTIONAL
Brand: TORNIER PERFORM® REVERSED GLENOID

## 2025-03-10 DEVICE — SCREW BONE L18MM DIA5MM TI ST FULL THRD PERIPH FOR GLEN: Type: IMPLANTABLE DEVICE | Site: SHOULDER | Status: FUNCTIONAL

## 2025-03-10 DEVICE — SCREW BONE L14MM DIA5MM TI ST FULL THRD PERIPH FOR GLEN: Type: IMPLANTABLE DEVICE | Site: SHOULDER | Status: FUNCTIONAL

## 2025-03-10 DEVICE — IMPLANTABLE DEVICE: Type: IMPLANTABLE DEVICE | Site: SHOULDER | Status: FUNCTIONAL

## 2025-03-10 DEVICE — IMPLANTABLE DEVICE
Type: IMPLANTABLE DEVICE | Site: SHOULDER | Status: FUNCTIONAL
Brand: TORNIER PERFORM® REVERSED AUGMENTED GLENOID

## 2025-03-10 RX ORDER — CELECOXIB 200 MG/1
400 CAPSULE ORAL ONCE
Status: COMPLETED | OUTPATIENT
Start: 2025-03-10 | End: 2025-03-10

## 2025-03-10 RX ORDER — SUCCINYLCHOLINE/SOD CL,ISO/PF 200MG/10ML
SYRINGE (ML) INTRAVENOUS
Status: DISCONTINUED | OUTPATIENT
Start: 2025-03-10 | End: 2025-03-10 | Stop reason: SDUPTHER

## 2025-03-10 RX ORDER — MONTELUKAST SODIUM 10 MG/1
10 TABLET ORAL NIGHTLY
Status: DISCONTINUED | OUTPATIENT
Start: 2025-03-10 | End: 2025-03-11 | Stop reason: HOSPADM

## 2025-03-10 RX ORDER — PROCHLORPERAZINE EDISYLATE 5 MG/ML
5 INJECTION INTRAMUSCULAR; INTRAVENOUS
Status: DISCONTINUED | OUTPATIENT
Start: 2025-03-10 | End: 2025-03-10 | Stop reason: HOSPADM

## 2025-03-10 RX ORDER — DEXAMETHASONE SODIUM PHOSPHATE 4 MG/ML
INJECTION, SOLUTION INTRA-ARTICULAR; INTRALESIONAL; INTRAMUSCULAR; INTRAVENOUS; SOFT TISSUE
Status: DISCONTINUED | OUTPATIENT
Start: 2025-03-10 | End: 2025-03-10 | Stop reason: SDUPTHER

## 2025-03-10 RX ORDER — ALBUTEROL SULFATE 0.83 MG/ML
2.5 SOLUTION RESPIRATORY (INHALATION) EVERY 6 HOURS PRN
Status: DISCONTINUED | OUTPATIENT
Start: 2025-03-10 | End: 2025-03-11 | Stop reason: HOSPADM

## 2025-03-10 RX ORDER — 0.9 % SODIUM CHLORIDE 0.9 %
500 INTRAVENOUS SOLUTION INTRAVENOUS ONCE
Status: DISCONTINUED | OUTPATIENT
Start: 2025-03-10 | End: 2025-03-11 | Stop reason: HOSPADM

## 2025-03-10 RX ORDER — SODIUM CHLORIDE 0.9 % (FLUSH) 0.9 %
5-40 SYRINGE (ML) INJECTION PRN
Status: DISCONTINUED | OUTPATIENT
Start: 2025-03-10 | End: 2025-03-11 | Stop reason: HOSPADM

## 2025-03-10 RX ORDER — TROSPIUM CHLORIDE 20 MG/1
20 TABLET, FILM COATED ORAL NIGHTLY
Status: DISCONTINUED | OUTPATIENT
Start: 2025-03-11 | End: 2025-03-11 | Stop reason: HOSPADM

## 2025-03-10 RX ORDER — HYDROXYZINE HYDROCHLORIDE 50 MG/1
25 TABLET, FILM COATED ORAL NIGHTLY
Status: DISCONTINUED | OUTPATIENT
Start: 2025-03-10 | End: 2025-03-11 | Stop reason: HOSPADM

## 2025-03-10 RX ORDER — SODIUM CHLORIDE 0.9 % (FLUSH) 0.9 %
5-40 SYRINGE (ML) INJECTION EVERY 12 HOURS SCHEDULED
Status: DISCONTINUED | OUTPATIENT
Start: 2025-03-10 | End: 2025-03-11 | Stop reason: HOSPADM

## 2025-03-10 RX ORDER — ASPIRIN 81 MG/1
81 TABLET, CHEWABLE ORAL DAILY
Status: DISCONTINUED | OUTPATIENT
Start: 2025-03-10 | End: 2025-03-11 | Stop reason: HOSPADM

## 2025-03-10 RX ORDER — PREGABALIN 75 MG/1
75 CAPSULE ORAL ONCE
Status: COMPLETED | OUTPATIENT
Start: 2025-03-10 | End: 2025-03-10

## 2025-03-10 RX ORDER — SENNA AND DOCUSATE SODIUM 50; 8.6 MG/1; MG/1
1 TABLET, FILM COATED ORAL 2 TIMES DAILY
Status: DISCONTINUED | OUTPATIENT
Start: 2025-03-10 | End: 2025-03-11 | Stop reason: HOSPADM

## 2025-03-10 RX ORDER — SODIUM CHLORIDE 0.9 % (FLUSH) 0.9 %
5-40 SYRINGE (ML) INJECTION PRN
Status: DISCONTINUED | OUTPATIENT
Start: 2025-03-10 | End: 2025-03-10 | Stop reason: HOSPADM

## 2025-03-10 RX ORDER — LIDOCAINE HYDROCHLORIDE 10 MG/ML
1 INJECTION, SOLUTION EPIDURAL; INFILTRATION; INTRACAUDAL; PERINEURAL
Status: DISCONTINUED | OUTPATIENT
Start: 2025-03-10 | End: 2025-03-10 | Stop reason: HOSPADM

## 2025-03-10 RX ORDER — LIDOCAINE HYDROCHLORIDE 20 MG/ML
INJECTION, SOLUTION EPIDURAL; INFILTRATION; INTRACAUDAL; PERINEURAL
Status: DISCONTINUED | OUTPATIENT
Start: 2025-03-10 | End: 2025-03-10 | Stop reason: SDUPTHER

## 2025-03-10 RX ORDER — OXYCODONE HYDROCHLORIDE 5 MG/1
10 TABLET ORAL EVERY 4 HOURS PRN
Status: DISCONTINUED | OUTPATIENT
Start: 2025-03-10 | End: 2025-03-11

## 2025-03-10 RX ORDER — SODIUM CHLORIDE, SODIUM LACTATE, POTASSIUM CHLORIDE, CALCIUM CHLORIDE 600; 310; 30; 20 MG/100ML; MG/100ML; MG/100ML; MG/100ML
INJECTION, SOLUTION INTRAVENOUS CONTINUOUS
Status: DISCONTINUED | OUTPATIENT
Start: 2025-03-10 | End: 2025-03-10 | Stop reason: HOSPADM

## 2025-03-10 RX ORDER — ATORVASTATIN CALCIUM 40 MG/1
40 TABLET, FILM COATED ORAL NIGHTLY
Status: DISCONTINUED | OUTPATIENT
Start: 2025-03-10 | End: 2025-03-11 | Stop reason: HOSPADM

## 2025-03-10 RX ORDER — ONDANSETRON 2 MG/ML
4 INJECTION INTRAMUSCULAR; INTRAVENOUS EVERY 6 HOURS PRN
Status: DISCONTINUED | OUTPATIENT
Start: 2025-03-10 | End: 2025-03-11 | Stop reason: HOSPADM

## 2025-03-10 RX ORDER — FLUTICASONE PROPIONATE 50 MCG
2 SPRAY, SUSPENSION (ML) NASAL AS NEEDED
Status: DISCONTINUED | OUTPATIENT
Start: 2025-03-10 | End: 2025-03-11 | Stop reason: HOSPADM

## 2025-03-10 RX ORDER — LOSARTAN POTASSIUM 50 MG/1
25 TABLET ORAL DAILY
Status: DISCONTINUED | OUTPATIENT
Start: 2025-03-11 | End: 2025-03-11 | Stop reason: HOSPADM

## 2025-03-10 RX ORDER — METHOCARBAMOL 500 MG/1
500 TABLET, FILM COATED ORAL 4 TIMES DAILY PRN
Status: DISCONTINUED | OUTPATIENT
Start: 2025-03-10 | End: 2025-03-11 | Stop reason: HOSPADM

## 2025-03-10 RX ORDER — SODIUM CHLORIDE 0.9 % (FLUSH) 0.9 %
5-40 SYRINGE (ML) INJECTION EVERY 12 HOURS SCHEDULED
Status: DISCONTINUED | OUTPATIENT
Start: 2025-03-10 | End: 2025-03-10 | Stop reason: HOSPADM

## 2025-03-10 RX ORDER — EPHEDRINE SULFATE 50 MG/ML
INJECTION INTRAVENOUS
Status: DISCONTINUED | OUTPATIENT
Start: 2025-03-10 | End: 2025-03-10 | Stop reason: SDUPTHER

## 2025-03-10 RX ORDER — NALOXONE HYDROCHLORIDE 0.4 MG/ML
INJECTION, SOLUTION INTRAMUSCULAR; INTRAVENOUS; SUBCUTANEOUS PRN
Status: DISCONTINUED | OUTPATIENT
Start: 2025-03-10 | End: 2025-03-10 | Stop reason: HOSPADM

## 2025-03-10 RX ORDER — SODIUM CHLORIDE 9 MG/ML
INJECTION, SOLUTION INTRAVENOUS CONTINUOUS
Status: DISPENSED | OUTPATIENT
Start: 2025-03-10 | End: 2025-03-11

## 2025-03-10 RX ORDER — VANCOMYCIN HYDROCHLORIDE 1 G/20ML
INJECTION, POWDER, LYOPHILIZED, FOR SOLUTION INTRAVENOUS PRN
Status: DISCONTINUED | OUTPATIENT
Start: 2025-03-10 | End: 2025-03-10 | Stop reason: HOSPADM

## 2025-03-10 RX ORDER — ONDANSETRON 2 MG/ML
INJECTION INTRAMUSCULAR; INTRAVENOUS
Status: DISCONTINUED | OUTPATIENT
Start: 2025-03-10 | End: 2025-03-10 | Stop reason: SDUPTHER

## 2025-03-10 RX ORDER — HYDROMORPHONE HYDROCHLORIDE 1 MG/ML
0.5 INJECTION, SOLUTION INTRAMUSCULAR; INTRAVENOUS; SUBCUTANEOUS EVERY 5 MIN PRN
Status: DISCONTINUED | OUTPATIENT
Start: 2025-03-10 | End: 2025-03-10 | Stop reason: HOSPADM

## 2025-03-10 RX ORDER — FENTANYL CITRATE 50 UG/ML
25 INJECTION, SOLUTION INTRAMUSCULAR; INTRAVENOUS EVERY 5 MIN PRN
Status: DISCONTINUED | OUTPATIENT
Start: 2025-03-10 | End: 2025-03-10 | Stop reason: HOSPADM

## 2025-03-10 RX ORDER — ONDANSETRON 2 MG/ML
4 INJECTION INTRAMUSCULAR; INTRAVENOUS
Status: DISCONTINUED | OUTPATIENT
Start: 2025-03-10 | End: 2025-03-10 | Stop reason: HOSPADM

## 2025-03-10 RX ORDER — POLYETHYLENE GLYCOL 3350 17 G/17G
17 POWDER, FOR SOLUTION ORAL DAILY
Status: DISCONTINUED | OUTPATIENT
Start: 2025-03-10 | End: 2025-03-11 | Stop reason: HOSPADM

## 2025-03-10 RX ORDER — HYDRALAZINE HYDROCHLORIDE 20 MG/ML
10 INJECTION INTRAMUSCULAR; INTRAVENOUS
Status: DISCONTINUED | OUTPATIENT
Start: 2025-03-10 | End: 2025-03-10 | Stop reason: HOSPADM

## 2025-03-10 RX ORDER — FENTANYL CITRATE 50 UG/ML
100 INJECTION, SOLUTION INTRAMUSCULAR; INTRAVENOUS
Status: COMPLETED | OUTPATIENT
Start: 2025-03-10 | End: 2025-03-10

## 2025-03-10 RX ORDER — BISACODYL 10 MG
10 SUPPOSITORY, RECTAL RECTAL DAILY PRN
Status: DISCONTINUED | OUTPATIENT
Start: 2025-03-10 | End: 2025-03-11 | Stop reason: HOSPADM

## 2025-03-10 RX ORDER — ACETAMINOPHEN 500 MG
1000 TABLET ORAL ONCE
Status: COMPLETED | OUTPATIENT
Start: 2025-03-10 | End: 2025-03-10

## 2025-03-10 RX ORDER — OXYCODONE HYDROCHLORIDE 5 MG/1
5 TABLET ORAL EVERY 4 HOURS PRN
Status: DISCONTINUED | OUTPATIENT
Start: 2025-03-10 | End: 2025-03-11

## 2025-03-10 RX ORDER — ACETAMINOPHEN 500 MG
1000 TABLET ORAL ONCE
Status: DISCONTINUED | OUTPATIENT
Start: 2025-03-10 | End: 2025-03-10 | Stop reason: SDUPTHER

## 2025-03-10 RX ORDER — CETIRIZINE HYDROCHLORIDE 10 MG/1
5 TABLET ORAL DAILY PRN
Status: DISCONTINUED | OUTPATIENT
Start: 2025-03-11 | End: 2025-03-11 | Stop reason: HOSPADM

## 2025-03-10 RX ORDER — FUROSEMIDE 40 MG/1
20 TABLET ORAL DAILY PRN
Status: DISCONTINUED | OUTPATIENT
Start: 2025-03-10 | End: 2025-03-11 | Stop reason: HOSPADM

## 2025-03-10 RX ORDER — TIZANIDINE 2 MG/1
4 TABLET ORAL NIGHTLY
Status: DISCONTINUED | OUTPATIENT
Start: 2025-03-10 | End: 2025-03-11

## 2025-03-10 RX ORDER — SODIUM CHLORIDE 9 MG/ML
INJECTION, SOLUTION INTRAVENOUS PRN
Status: DISCONTINUED | OUTPATIENT
Start: 2025-03-10 | End: 2025-03-10 | Stop reason: HOSPADM

## 2025-03-10 RX ORDER — HYDROXYZINE HYDROCHLORIDE 10 MG/1
10 TABLET, FILM COATED ORAL EVERY 8 HOURS PRN
Status: DISCONTINUED | OUTPATIENT
Start: 2025-03-10 | End: 2025-03-11 | Stop reason: HOSPADM

## 2025-03-10 RX ORDER — SODIUM CHLORIDE 9 MG/ML
INJECTION, SOLUTION INTRAVENOUS PRN
Status: DISCONTINUED | OUTPATIENT
Start: 2025-03-10 | End: 2025-03-11 | Stop reason: HOSPADM

## 2025-03-10 RX ORDER — PREGABALIN 75 MG/1
75 CAPSULE ORAL
Status: DISCONTINUED | OUTPATIENT
Start: 2025-03-11 | End: 2025-03-11 | Stop reason: HOSPADM

## 2025-03-10 RX ORDER — BUPIVACAINE HYDROCHLORIDE 2.5 MG/ML
INJECTION, SOLUTION EPIDURAL; INFILTRATION; INTRACAUDAL
Status: COMPLETED | OUTPATIENT
Start: 2025-03-10 | End: 2025-03-10

## 2025-03-10 RX ORDER — FAMOTIDINE 20 MG/1
20 TABLET, FILM COATED ORAL 2 TIMES DAILY
Status: DISCONTINUED | OUTPATIENT
Start: 2025-03-10 | End: 2025-03-11

## 2025-03-10 RX ORDER — ACETAMINOPHEN 325 MG/1
650 TABLET ORAL EVERY 6 HOURS
Status: DISCONTINUED | OUTPATIENT
Start: 2025-03-10 | End: 2025-03-11 | Stop reason: HOSPADM

## 2025-03-10 RX ORDER — OXYCODONE HYDROCHLORIDE 5 MG/1
5 TABLET ORAL
Status: DISCONTINUED | OUTPATIENT
Start: 2025-03-10 | End: 2025-03-10 | Stop reason: HOSPADM

## 2025-03-10 RX ORDER — METOPROLOL TARTRATE 25 MG/1
12.5 TABLET, FILM COATED ORAL 2 TIMES DAILY
Status: DISCONTINUED | OUTPATIENT
Start: 2025-03-11 | End: 2025-03-11 | Stop reason: HOSPADM

## 2025-03-10 RX ORDER — ROCURONIUM BROMIDE 10 MG/ML
INJECTION, SOLUTION INTRAVENOUS
Status: DISCONTINUED | OUTPATIENT
Start: 2025-03-10 | End: 2025-03-10 | Stop reason: SDUPTHER

## 2025-03-10 RX ORDER — MIDAZOLAM HYDROCHLORIDE 2 MG/2ML
2 INJECTION, SOLUTION INTRAMUSCULAR; INTRAVENOUS PRN
Status: DISCONTINUED | OUTPATIENT
Start: 2025-03-10 | End: 2025-03-10 | Stop reason: HOSPADM

## 2025-03-10 RX ADMIN — BUPIVACAINE 13 ML: 13.3 INJECTION, SUSPENSION, LIPOSOMAL INFILTRATION at 10:37

## 2025-03-10 RX ADMIN — PROPOFOL 25 MCG/KG/MIN: 10 INJECTION, EMULSION INTRAVENOUS at 11:08

## 2025-03-10 RX ADMIN — PREGABALIN 75 MG: 75 CAPSULE ORAL at 10:00

## 2025-03-10 RX ADMIN — TIZANIDINE 4 MG: 2 TABLET ORAL at 22:37

## 2025-03-10 RX ADMIN — ONDANSETRON 4 MG: 2 INJECTION INTRAMUSCULAR; INTRAVENOUS at 12:14

## 2025-03-10 RX ADMIN — OXYCODONE HYDROCHLORIDE 5 MG: 5 TABLET ORAL at 20:14

## 2025-03-10 RX ADMIN — CELECOXIB 400 MG: 200 CAPSULE ORAL at 10:00

## 2025-03-10 RX ADMIN — PROPOFOL 125 MG: 10 INJECTION, EMULSION INTRAVENOUS at 11:01

## 2025-03-10 RX ADMIN — WATER 2000 MG: 1 INJECTION INTRAMUSCULAR; INTRAVENOUS; SUBCUTANEOUS at 22:36

## 2025-03-10 RX ADMIN — SODIUM CHLORIDE, PRESERVATIVE FREE 10 ML: 5 INJECTION INTRAVENOUS at 22:38

## 2025-03-10 RX ADMIN — ACETAMINOPHEN 650 MG: 325 TABLET ORAL at 18:48

## 2025-03-10 RX ADMIN — PHENYLEPHRINE HYDROCHLORIDE 15 MCG/MIN: 10 INJECTION INTRAVENOUS at 11:08

## 2025-03-10 RX ADMIN — SODIUM CHLORIDE, SODIUM LACTATE, POTASSIUM CHLORIDE, AND CALCIUM CHLORIDE: .6; .31; .03; .02 INJECTION, SOLUTION INTRAVENOUS at 10:19

## 2025-03-10 RX ADMIN — SODIUM CHLORIDE: 9 INJECTION, SOLUTION INTRAVENOUS at 13:20

## 2025-03-10 RX ADMIN — Medication 140 MG: at 11:02

## 2025-03-10 RX ADMIN — ATORVASTATIN CALCIUM 40 MG: 40 TABLET, FILM COATED ORAL at 22:37

## 2025-03-10 RX ADMIN — DEXAMETHASONE SODIUM PHOSPHATE 8 MG: 4 INJECTION, SOLUTION INTRAMUSCULAR; INTRAVENOUS at 11:15

## 2025-03-10 RX ADMIN — ROCURONIUM BROMIDE 10 MG: 10 SOLUTION INTRAVENOUS at 11:01

## 2025-03-10 RX ADMIN — FENTANYL CITRATE 50 MCG: 50 INJECTION INTRAMUSCULAR; INTRAVENOUS at 10:32

## 2025-03-10 RX ADMIN — MIDAZOLAM HYDROCHLORIDE 2 MG: 1 INJECTION, SOLUTION INTRAMUSCULAR; INTRAVENOUS at 10:32

## 2025-03-10 RX ADMIN — SODIUM CHLORIDE: 9 INJECTION, SOLUTION INTRAVENOUS at 16:28

## 2025-03-10 RX ADMIN — WATER 2000 MG: 1 INJECTION INTRAMUSCULAR; INTRAVENOUS; SUBCUTANEOUS at 11:23

## 2025-03-10 RX ADMIN — EPHEDRINE SULFATE 10 MG: 50 INJECTION INTRAVENOUS at 12:28

## 2025-03-10 RX ADMIN — BUPIVACAINE HYDROCHLORIDE 12 ML: 2.5 INJECTION, SOLUTION EPIDURAL; INFILTRATION; INTRACAUDAL; PERINEURAL at 10:37

## 2025-03-10 RX ADMIN — SENNOSIDES AND DOCUSATE SODIUM 1 TABLET: 50; 8.6 TABLET ORAL at 22:37

## 2025-03-10 RX ADMIN — FAMOTIDINE 20 MG: 20 TABLET, FILM COATED ORAL at 22:37

## 2025-03-10 RX ADMIN — MONTELUKAST 10 MG: 10 TABLET, FILM COATED ORAL at 22:37

## 2025-03-10 RX ADMIN — POLYETHYLENE GLYCOL 3350 17 G: 17 POWDER, FOR SOLUTION ORAL at 18:48

## 2025-03-10 RX ADMIN — ACETAMINOPHEN 1000 MG: 500 TABLET ORAL at 10:00

## 2025-03-10 RX ADMIN — EPHEDRINE SULFATE 10 MG: 50 INJECTION INTRAVENOUS at 11:01

## 2025-03-10 RX ADMIN — HYDROXYZINE HYDROCHLORIDE 25 MG: 50 TABLET ORAL at 22:36

## 2025-03-10 RX ADMIN — ASPIRIN 81 MG CHEWABLE TABLET 81 MG: 81 TABLET CHEWABLE at 18:48

## 2025-03-10 RX ADMIN — LIDOCAINE HYDROCHLORIDE 60 MG: 20 INJECTION, SOLUTION EPIDURAL; INFILTRATION; INTRACAUDAL; PERINEURAL at 11:01

## 2025-03-10 ASSESSMENT — PAIN SCALES - WONG BAKER: WONGBAKER_NUMERICALRESPONSE: HURTS A LITTLE BIT

## 2025-03-10 ASSESSMENT — PAIN - FUNCTIONAL ASSESSMENT
PAIN_FUNCTIONAL_ASSESSMENT: 0-10
PAIN_FUNCTIONAL_ASSESSMENT: NONE - DENIES PAIN

## 2025-03-10 ASSESSMENT — PAIN SCALES - GENERAL
PAINLEVEL_OUTOF10: 2
PAINLEVEL_OUTOF10: 2
PAINLEVEL_OUTOF10: 0

## 2025-03-10 ASSESSMENT — ENCOUNTER SYMPTOMS: SHORTNESS OF BREATH: 1

## 2025-03-10 NOTE — ANESTHESIA PRE PROCEDURE
Department of Anesthesiology  Preprocedure Note       Name:  Laura Lu   Age:  85 y.o.  :  1940                                          MRN:  [id number]         Date:  3/10/2025      Surgeon: Surgeon(s):  John Frias MD    Procedure: Procedure(s):  RIGHT REVERSE TOTAL SHOULDER ARTHROPLASTY  WITH BICEPS TENODESIS (GENERAL WITH INTERSCALENE BLOCK WITH EXPAREL)    Medications prior to admission:   Prior to Admission medications    Medication Sig Start Date End Date Taking? Authorizing Provider   pregabalin (LYRICA) 75 MG capsule Take 1 capsule by mouth 2 times daily for 90 days. Max Daily Amount: 150 mg  Patient taking differently: Take 1 capsule by mouth nightly. 25 Yes Alessia Rangel MD   tiZANidine (ZANAFLEX) 2 MG tablet TAKE 1 TABLET BY MOUTH NIGHTLY AS NEEDED FOR MUSCLE SPASM 25  Yes Alessia Rangel MD   montelukast (SINGULAIR) 10 MG tablet Take 1 tablet by mouth daily  Patient taking differently: Take 1 tablet by mouth nightly 25  Yes Alessia Rangel MD   mirabegron (MYRBETRIQ) 50 MG TB24 Take 50 mg by mouth daily 25  Yes Alessia Rangel MD   albuterol sulfate (PROAIR RESPICLICK) 108 (90 Base) MCG/ACT aerosol powder inhalation Inhale 2 puffs into the lungs every 4 hours as needed for Wheezing or Shortness of Breath 25  Yes Alessia Rangel MD   hydrOXYzine HCl (ATARAX) 25 MG tablet Take 1 tablet by mouth at bedtime 25  Yes Alessia Rangel MD   metoprolol tartrate (LOPRESSOR) 25 MG tablet Take 0.5 tablets by mouth 2 times daily 25  Yes Valentino Avila MD   atorvastatin (LIPITOR) 40 MG tablet Take 1 tablet by mouth nightly 25  Yes Valentino Avila MD   losartan (COZAAR) 25 MG tablet Take 1 tablet by mouth daily 25  Yes Valentino Avila MD   acetaminophen (TYLENOL) 500 MG tablet Take 2 tablets by mouth 3 times daily 24  Yes Radha Alonso MD   Potassium 99 MG TABS Take 1 tablet by mouth daily   Yes Provider,

## 2025-03-10 NOTE — OP NOTE
Operative Report    Patient Name: Laura Lu    Patient YOB: 1940    [facility] MRN: [id number]    Date of Procedure: 03/10/25    Surgical Location: [facility]    Pre-operative Diagnosis: right shoulder osteoarthritis, right shoulder cuff tear arthropathy    Post-operative Diagnosis: right shoulder osteoarthritis, right shoulder cuff tear arthropathy    Procedure: right reverse total shoulder arthroplasty, right shoulder open biceps tenodesis    Surgeon: John Frias MD    Assistants: Pablo Walker PA-C    Anesthesia: General    Preoperative IV Antibiotics: Ancef 2g    Findings: osteoarthritis, rotator cuff tear    Estimated Blood Loss: 150 mL    Implants: Tornier Perform Reversed +3mm glenoid baseplate, 36+3mm lateralized glenosphere, Tornier Perform Humeral size 2 short stem, Reversed polyethylene insert +3mm, central post and peripheral glenoid screws    Specimens: None    Complications: None    Disposition: hemodynamically stable to PACU    Condition: stable      Indication for Procedure:  Laura Lu   presented to my office on an outpatient basis was found to have right rotator cuff tear arthropathy with osteoarthritis. We discussed nonoperative and operative treatment options and elected to proceed with reverse shoulder arthroplasty.  Preoperative MRI scan showed neutral version with posterior superior wear of the glenoid.     Procedure in Detail:  The patient was met in the preoperative holding area and the right shoulder was marked. A consent form was signed to proceed forth with surgery. The patient was given a preoperative scalene block. The patient was brought into the operating room, given general anesthesia and placed in the beach chair position. The right shoulder was prepped and draped in the usual sterile fashion. A multidisciplinary time-out was performed. Prophylactic antibiotics and tranexamic acid were dosed. Pablo Walker PA-C, was of vital

## 2025-03-10 NOTE — PERIOP NOTE
1039: RT shoulder interscalene nerve block done by Dr Mitchell using a mixture of 20cc exparel with 0.25% bupivicaine, with pt monitored, O2 @ 2l/m/nc and IV sedation given. Pt tolerated procedure fairly well. VSS post block: 109/54-48-13. SpO2=99% on 2l/m/nc. Groggy but arouses to verbal stimuli. Sbrady per monitor. See anesthesia note.   
PAT APPT 2/27/25 AT 1100  
Surgical waiting called to tell family patient is well; waiting for a room assignment.  
TRANSFER - OUT REPORT:    Verbal report given to RN on Laura Lu  being transferred to [facility] for routine post-op       Report consisted of patient’s Situation, Background, Assessment and   Recommendations(SBAR).     Time Pre op antibiotic given:1123  Anesthesia Stop time: 1253    Information from the following report(s) SBAR, OR Summary, Intake/Output, and MAR was reviewed with the receiving nurse.    Opportunity for questions and clarification was provided.     Is the patient on 02? No       L/Min x       Other x    Is the patient on a monitor? No    Is the nurse transporting with the patient? No    At transfer, are there Patient Belongings with the patient?  If Yes, please note/list:    Surgical Waiting Area notified of patient's transfer from PACU? Yes      
No

## 2025-03-10 NOTE — FLOWSHEET NOTE
03/10/25 1134   Family Communication    Relationship to Patient Daughter    Phone Number Amina [phone]   Family/Significant Other Update Called   Delivery Origin Nurse   Message Disposition Family present - message delivered   Update Given Yes   Family Communication   Family Update Message Procedure started

## 2025-03-10 NOTE — ANESTHESIA PROCEDURE NOTES
Peripheral Block    Patient location during procedure: pre-op  Reason for block: procedure for pain, post-op pain management and at surgeon's request  Start time: 3/10/2025 10:30 AM  End time: 3/10/2025 10:40 AM  Staffing  Performed: anesthesiologist   Anesthesiologist: Kali Mitchell Jr., MD  Performed by: Kali Mitchell Jr., MD  Authorized by: Kali Mitchell Jr., MD    Preanesthetic Checklist  Completed: patient identified, IV checked, site marked, risks and benefits discussed, surgical/procedural consents, equipment checked, pre-op evaluation, timeout performed, anesthesia consent given, oxygen available, monitors applied/VS acknowledged and fire risk safety assessment completed and verbalized  Peripheral Block   Patient position: supine  Prep: ChloraPrep  Provider prep: mask and sterile gloves  Patient monitoring: cardiac monitor, continuous pulse ox, frequent blood pressure checks, IV access and oxygen  Block type: Brachial plexus  Interscalene  Laterality: right  Injection technique: single-shot  Guidance: ultrasound guided  Local infiltration: ropivacaine  Infiltration strength: 0.5 %  Local infiltration: ropivacaine    Needle   Needle type: insulated echogenic nerve stimulator needle   Needle gauge: 21 G  Needle localization: anatomical landmarks and ultrasound guidance  Needle length: 10 cm  Assessment   Injection assessment: negative aspiration for heme, no paresthesia on injection, local visualized surrounding nerve on ultrasound and no intravascular symptoms  Paresthesia pain: none  Slow fractionated injection: yes  Hemodynamics: stable  Outcomes: uncomplicated and patient tolerated procedure well    Medications Administered  BUPivacaine (MARCAINE) PF injection 0.25% - Perineural   12 mL - 3/10/2025 10:37:00 AM  BUPivacaine liposome (EXPAREL) injection 1.3% - Perineural   13 mL - 3/10/2025 10:37:00 AM

## 2025-03-10 NOTE — ANESTHESIA POSTPROCEDURE EVALUATION
Department of Anesthesiology  Postprocedure Note    Patient: Laura Lu  MRN: [id number]  YOB: 1940  Date of evaluation: 3/10/2025    Procedure Summary       Date: 03/10/25 Room / Location: [facility] MAIN OR [address] [place] [address] MAIN OR    Anesthesia Start: 1054 Anesthesia Stop: 1253    Procedure: RIGHT REVERSE TOTAL SHOULDER ARTHROPLASTY  WITH BICEPS TENODESIS (GENERAL WITH INTERSCALENE BLOCK WITH EXPAREL) (Right: Shoulder) Diagnosis:       Rotator cuff tear arthropathy of right shoulder      Traumatic complete tear of right rotator cuff, initial encounter      (Rotator cuff tear arthropathy of right shoulder [M75.101, M12.811])      (Traumatic complete tear of right rotator cuff, initial encounter [S46.011A])    Providers: John Frias MD Responsible Provider: Kali Mitchell Jr., MD    Anesthesia Type: general ASA Status: 2            Anesthesia Type: No value filed.    Odette Phase I: Odette Score: 7    Odette Phase II:      Anesthesia Post Evaluation    Patient location during evaluation: PACU  Patient participation: complete - patient participated  Level of consciousness: awake  Airway patency: patent  Nausea & Vomiting: no nausea  Cardiovascular status: blood pressure returned to baseline and hemodynamically stable  Respiratory status: acceptable  Hydration status: stable  Multimodal analgesia pain management approach    No notable events documented.

## 2025-03-10 NOTE — H&P
CARE TEAM:  Patient Care Team:  Alessia Rangel MD as PCP - General (Internal Medicine)    Note: This chart was prepared using voice-recognition software and may contain unintended word substitution errors. An addendum for corrections can be made upon request.     ASSESSMENT  Diagnosis Plan   1. Rotator cuff arthropathy of right shoulder     2. Traumatic complete tear of right rotator cuff, initial encounter     3. Osteoarthritis of right glenohumeral joint       Patient Active Problem List   Diagnosis   Hypertension   Diabetes mellitus   Osteoarthritis   Osteoporosis   Arthritis, rheumatoid     PLAN  Treatment Plan:   -We discussed continued non-operative management options but the patient has failed conservative managements and would like to proceed forth with surgical treatment.  -We discussed Reverse total shoulder arthroplasty with open biceps tenodesis ([id number]). We discussed that this is a major surgery and the risks, benefits, complications and convalescence regarding shoulder replacement surgery. We discussed stiffness, bleeding, infection, continued pain, instability, neurovascular injury, thromboembolic disease and anesthetic complications. We talked about postoperative activity restrictions. The patient agreed to proceed with surgery.  -With recent MRI with neutralize closure, we do not need to pursue a preoperative CT scan.  -The patient was given preoperative clearance instructions. she was referred to Pre-Admission Testing.   - Planning Right ReverseTotal shoulder arthroplasty at [facility] on 3/10/25     No orders of the defined types were placed in this encounter.    Return for Post-Op Check.    HISTORY OF PRESENT ILLNESS  Chief Complaint: Follow-up of the Right Shoulder and left shoulder  Age: 85 y.o. Sex: female   Visit date not found  Hand-dominance: right    History of present illness: Ms. Lu presents today for bilateral shoulder pain. She is doing

## 2025-03-10 NOTE — INTERVAL H&P NOTE
Update History & Physical    The patient's History and Physical of March 10, 2025 was reviewed with the patient and I examined the patient. There was no change. The surgical site was confirmed by the patient and me.     Plan: The risks, benefits, expected outcome, and alternative to the recommended procedure have been discussed with the patient. Patient understands and wants to proceed with the procedure.     Electronically signed by John Frias MD on 3/10/2025 at 9:53 AM

## 2025-03-11 VITALS
OXYGEN SATURATION: 97 % | TEMPERATURE: 98.1 F | BODY MASS INDEX: 31.09 KG/M2 | SYSTOLIC BLOOD PRESSURE: 105 MMHG | DIASTOLIC BLOOD PRESSURE: 48 MMHG | HEART RATE: 64 BPM | WEIGHT: 182.1 LBS | HEIGHT: 64 IN | RESPIRATION RATE: 18 BRPM

## 2025-03-11 PROBLEM — M19.011 LOCALIZED OSTEOARTHROSIS OF RIGHT SHOULDER REGION: Status: ACTIVE | Noted: 2025-03-11

## 2025-03-11 LAB
ANION GAP SERPL CALC-SCNC: 7 MMOL/L (ref 2–12)
BUN SERPL-MCNC: 11 MG/DL (ref 6–20)
BUN/CREAT SERPL: 12 (ref 12–20)
CALCIUM SERPL-MCNC: 9 MG/DL (ref 8.5–10.1)
CHLORIDE SERPL-SCNC: 108 MMOL/L (ref 97–108)
CO2 SERPL-SCNC: 23 MMOL/L (ref 21–32)
CREAT SERPL-MCNC: 0.95 MG/DL (ref 0.55–1.02)
GLUCOSE SERPL-MCNC: 202 MG/DL (ref 65–100)
HCT VFR BLD AUTO: 29.6 % (ref 35–47)
HGB BLD-MCNC: 9.6 G/DL (ref 11.5–16)
POTASSIUM SERPL-SCNC: 4.1 MMOL/L (ref 3.5–5.1)
SODIUM SERPL-SCNC: 138 MMOL/L (ref 136–145)

## 2025-03-11 PROCEDURE — APPNB60 APP NON BILLABLE TIME 46-60 MINS: Performed by: NURSE PRACTITIONER

## 2025-03-11 PROCEDURE — 97161 PT EVAL LOW COMPLEX 20 MIN: CPT | Performed by: PHYSICAL THERAPIST

## 2025-03-11 PROCEDURE — 97110 THERAPEUTIC EXERCISES: CPT

## 2025-03-11 PROCEDURE — 2500000003 HC RX 250 WO HCPCS: Performed by: PHYSICIAN ASSISTANT

## 2025-03-11 PROCEDURE — 2580000003 HC RX 258: Performed by: PHYSICIAN ASSISTANT

## 2025-03-11 PROCEDURE — 85014 HEMATOCRIT: CPT

## 2025-03-11 PROCEDURE — 97165 OT EVAL LOW COMPLEX 30 MIN: CPT

## 2025-03-11 PROCEDURE — 97116 GAIT TRAINING THERAPY: CPT | Performed by: PHYSICAL THERAPIST

## 2025-03-11 PROCEDURE — 97530 THERAPEUTIC ACTIVITIES: CPT | Performed by: PHYSICAL THERAPIST

## 2025-03-11 PROCEDURE — 97535 SELF CARE MNGMENT TRAINING: CPT

## 2025-03-11 PROCEDURE — 80048 BASIC METABOLIC PNL TOTAL CA: CPT

## 2025-03-11 PROCEDURE — 6360000002 HC RX W HCPCS: Performed by: PHYSICIAN ASSISTANT

## 2025-03-11 PROCEDURE — 6370000000 HC RX 637 (ALT 250 FOR IP): Performed by: PHYSICIAN ASSISTANT

## 2025-03-11 PROCEDURE — 94640 AIRWAY INHALATION TREATMENT: CPT

## 2025-03-11 PROCEDURE — 6370000000 HC RX 637 (ALT 250 FOR IP): Performed by: NURSE PRACTITIONER

## 2025-03-11 PROCEDURE — 85018 HEMOGLOBIN: CPT

## 2025-03-11 PROCEDURE — 2700000000 HC OXYGEN THERAPY PER DAY

## 2025-03-11 RX ORDER — OXYCODONE HYDROCHLORIDE 5 MG/1
5 TABLET ORAL EVERY 4 HOURS PRN
Refills: 0 | Status: DISCONTINUED | OUTPATIENT
Start: 2025-03-11 | End: 2025-03-11 | Stop reason: HOSPADM

## 2025-03-11 RX ORDER — OXYCODONE HYDROCHLORIDE 5 MG/1
2.5 TABLET ORAL EVERY 4 HOURS PRN
Refills: 0 | Status: DISCONTINUED | OUTPATIENT
Start: 2025-03-11 | End: 2025-03-11 | Stop reason: HOSPADM

## 2025-03-11 RX ORDER — OXYCODONE HYDROCHLORIDE 5 MG/1
2.5-5 TABLET ORAL EVERY 4 HOURS PRN
Qty: 30 TABLET | Refills: 0 | Status: SHIPPED | OUTPATIENT
Start: 2025-03-11 | End: 2025-03-16

## 2025-03-11 RX ORDER — SENNA AND DOCUSATE SODIUM 50; 8.6 MG/1; MG/1
1 TABLET, FILM COATED ORAL 2 TIMES DAILY
Qty: 60 TABLET | Refills: 0 | Status: SHIPPED | OUTPATIENT
Start: 2025-03-11

## 2025-03-11 RX ORDER — FAMOTIDINE 20 MG/1
20 TABLET, FILM COATED ORAL DAILY
Status: DISCONTINUED | OUTPATIENT
Start: 2025-03-11 | End: 2025-03-11 | Stop reason: HOSPADM

## 2025-03-11 RX ORDER — TIZANIDINE 2 MG/1
4 TABLET ORAL NIGHTLY PRN
Status: DISCONTINUED | OUTPATIENT
Start: 2025-03-11 | End: 2025-03-11 | Stop reason: HOSPADM

## 2025-03-11 RX ORDER — PREGABALIN 75 MG/1
75 CAPSULE ORAL
Qty: 90 CAPSULE | Refills: 0 | Status: SHIPPED
Start: 2025-03-11 | End: 2025-06-09

## 2025-03-11 RX ADMIN — ACETAMINOPHEN 650 MG: 325 TABLET ORAL at 07:07

## 2025-03-11 RX ADMIN — POLYETHYLENE GLYCOL 3350 17 G: 17 POWDER, FOR SOLUTION ORAL at 10:01

## 2025-03-11 RX ADMIN — ACETAMINOPHEN 650 MG: 325 TABLET ORAL at 01:05

## 2025-03-11 RX ADMIN — ACETAMINOPHEN 650 MG: 325 TABLET ORAL at 13:10

## 2025-03-11 RX ADMIN — WATER 2000 MG: 1 INJECTION INTRAMUSCULAR; INTRAVENOUS; SUBCUTANEOUS at 04:43

## 2025-03-11 RX ADMIN — SENNOSIDES AND DOCUSATE SODIUM 1 TABLET: 50; 8.6 TABLET ORAL at 10:01

## 2025-03-11 RX ADMIN — SODIUM CHLORIDE: 9 INJECTION, SOLUTION INTRAVENOUS at 10:01

## 2025-03-11 RX ADMIN — SODIUM CHLORIDE, PRESERVATIVE FREE 10 ML: 5 INJECTION INTRAVENOUS at 10:02

## 2025-03-11 RX ADMIN — FAMOTIDINE 20 MG: 20 TABLET, FILM COATED ORAL at 10:01

## 2025-03-11 RX ADMIN — ARFORMOTEROL TARTRATE: 15 SOLUTION RESPIRATORY (INHALATION) at 07:51

## 2025-03-11 RX ADMIN — ASPIRIN 81 MG CHEWABLE TABLET 81 MG: 81 TABLET CHEWABLE at 10:01

## 2025-03-11 NOTE — DISCHARGE SUMMARY
Ortho Discharge Summary    Patient ID:  Laura Lu  [id number]  female  85 y.o.  1940    Admit date: 3/10/2025    Discharge date: 3/11/2025    Admitting Physician: John Frias MD     Consulting Physician(s):   Treatment Team:   John Frias MD Tanner, Gregory, MD Koirala, Pratichaya, Gricelda Matute PT Baldwin, Abigail, OT Traynham, Tammy, RN    Date of Surgery:   3/10/2025     Preoperative Diagnosis:  Rotator cuff tear arthropathy of right shoulder [M75.101, M12.811]  Traumatic complete tear of right rotator cuff, initial encounter [S46.011A]    Postoperative Diagnosis:   * No post-op diagnosis entered *    Procedure(s):   RIGHT REVERSE TOTAL SHOULDER ARTHROPLASTY  WITH BICEPS TENODESIS (GENERAL WITH INTERSCALENE BLOCK WITH EXPAREL)     Anesthesia Type:   General     Surgeon: John Frias MD                            HPI:  Pt is a 85 y.o. female who has a history of Rotator cuff tear arthropathy of right shoulder [M75.101, M12.811]  Traumatic complete tear of right rotator cuff, initial encounter [S46.011A]  with pain and limitations of activities of daily living who presents at this time for a RIGHT REVERSE TOTAL SHOULDER ARTHROPLASTY  WITH BICEPS TENODESIS (GENERAL WITH INTERSCALENE BLOCK WITH EXPAREL) following the failure of conservative management.    PMH:   Past Medical History:   Diagnosis Date    Adverse reaction to anesthetic agent     PT HAD TACHYCARDIA POST HIP REPLACEMENT, ADMITTED TO CARDIAC UNIT    Arrhythmia     Bradycardia.    Arthritis     Burning with urination     Incontinence.    Depression     GERD (gastroesophageal reflux disease)     Glaucoma     Hearing loss     High cholesterol     Hypertension     Ill-defined condition     EDEMA, LOWER LIMS, URINARY INCONTINENCE    Neuropathy     Obesity, Class II, BMI 35-39.9     Osteoarthritis     Sleep apnea     NO CPAP    Type 2 diabetes mellitus without complication (HCC)     Urinary incontinence        Body mass index

## 2025-03-11 NOTE — PLAN OF CARE
Problem: Chronic Conditions and Co-morbidities  Goal: Patient's chronic conditions and co-morbidity symptoms are monitored and maintained or improved  Outcome: Progressing     Problem: Pain  Goal: Verbalizes/displays adequate comfort level or baseline comfort level  Outcome: Progressing     Problem: Safety - Adult  Goal: Free from fall injury  Outcome: Progressing     Problem: Discharge Planning  Goal: Discharge to home or other facility with appropriate resources  Outcome: Progressing     
  Problem: Chronic Conditions and Co-morbidities  Goal: Patient's chronic conditions and co-morbidity symptoms are monitored and maintained or improved  Outcome: Progressing     Problem: Pain  Goal: Verbalizes/displays adequate comfort level or baseline comfort level  Outcome: Progressing     Problem: Safety - Adult  Goal: Free from fall injury  Outcome: Progressing     Problem: Discharge Planning  Goal: Discharge to home or other facility with appropriate resources  Outcome: Progressing     
outside the home)  Prior Level of Assist for Transfers: Independent  Active : Yes    Cognitive/Behavioral Status:  Orientation  Orientation Level: Oriented X4      Vision/Perceptual:    Vision - Basic Assessment  Prior Vision: Wears glasses only for reading                 Strength:    Strength: Generally decreased, functional    Tone & Sensation:   Tone: Normal  Sensation: Intact    Coordination:  Coordination: Within functional limits    Range Of Motion:  AROM: Generally decreased, functional  PROM: Generally decreased, functional    Functional Mobility:  Bed Mobility:     Bed Mobility Training  Bed Mobility Training: Yes  Rolling: Contact guard assistance  Supine to Sit: Contact guard assistance  Scooting: Contact guard assistance  Transfers:     Transfer Training  Transfer Training: Yes  Sit to Stand: Contact guard assistance  Stand to Sit: Contact guard assistance  Balance:               Balance  Sitting: Intact  Standing: Impaired  Standing - Static: Constant support;Good  Standing - Dynamic: Constant support;Fair  Ambulation/Gait Training:                       Gait  Gait Training: Yes  Overall Level of Assistance: Contact guard assistance  Distance (ft): 30 Feet  Assistive Device: Gait belt;Cane, straight  Base of Support: Widened  Speed/Amelia: Slow;Pace decreased (< 100 feet/min)  Step Length: Left shortened;Right shortened  Gait Abnormalities: Antalgic;Decreased step clearance;Shuffling gait;Path deviations                                 Pain Ratin/10   Pain Intervention(s):       Activity Tolerance:   Fair  and requires rest breaks    After treatment:   Patient left in no apparent distress sitting up in chair, Call bell within reach, and Bed/ chair alarm activated    COMMUNICATION/EDUCATION:   The patient's plan of care was discussed with: physical therapist, occupational therapist, and registered nurse         Thank you for this referral.  Gricelda Arevalo, PT, DPT  Minutes: 36        
Evaluation Charge Determination   History Examination Decision-Making   LOW Complexity : Brief history review  LOW Complexity: 1-3 Performance deficits relating to physical, cognitive, or psychosocial skills that result in activity limitations and/or participation restrictions LOW Complexity: No comorbidities that affect functional and  no verbal  or physical assist needed to complete eval tasks   Based on the above components, the patient evaluation is determined to be of the following complexity level: Low

## 2025-03-11 NOTE — PROGRESS NOTES
Occupational Therapy    Evaluation completed. Patient clear for discharge home with HH and family support. Full note to follow.     Madison Peng OTR/L

## 2025-03-11 NOTE — PROGRESS NOTES
Ortho NP Note    POD# 1  s/p RIGHT REVERSE TOTAL SHOULDER ARTHROPLASTY  WITH BICEPS TENODESIS (GENERAL WITH INTERSCALENE BLOCK WITH EXPAREL)   Pt seen with JANI Bishop. No visitors.    Pt resting in bed comfortably.   Reports no postop pain since surgery, aware to ask for prn oxycodone   She has not been out of bed since surgery   Tolerating regular diet. No nausea.   Postop plan reviewed - She is right handed and ambulates with cane in right hand, states she is unsteady. Reports her dtr and sister plan to stay with her and assist at discharge.     VSS Afebrile.    Visit Vitals  /69   Pulse 63   Temp 98.5 °F (36.9 °C) (Oral)   Resp 16   Ht 1.626 m (5' 4\")   Wt 82.6 kg (182 lb 1.6 oz)   SpO2 98%   BMI 31.26 kg/m²       Voiding status: voiding/purwick         Labs    Lab Results   Component Value Date/Time    HGB 9.6 03/11/2025 02:12 AM      Lab Results   Component Value Date/Time    INR 1.1 02/27/2025 11:37 AM      Lab Results   Component Value Date/Time     03/11/2025 02:12 AM    K 4.1 03/11/2025 02:12 AM     03/11/2025 02:12 AM    CO2 23 03/11/2025 02:12 AM    BUN 11 03/11/2025 02:12 AM     Recent Glucose Results:   Glucose   Date Value Ref Range Status   03/11/2025 202 (H) 65 - 100 mg/dL Final   02/27/2025 103 (H) 65 - 100 mg/dL Final   12/05/2024 136 (H) 65 - 100 mg/dL Final   05/22/2024 117 (H) 70 - 99 mg/dL Final           Body mass index is 31.26 kg/m². : A BMI > 30 is classified as obesity and > 40 is classified as morbid obesity.       Dressing c.d.i  Cryotherapy in place over incision  Calves soft and supple; No pain with passive stretch  Sensation and motor intact. Moving all fingers, skin warm/pink, +radial pulse   SCDs for mechanical DVT proph while in bed     PLAN:  1) PT/OT - NWB in sling. Okay to remove sling for hygiene/dressing, and therapeutic exercises (elbow, wrist ROM and pendulums). No external rotation past neutral   2) Pain control - scheduled tylenol, and prn  oxycodone and

## 2025-03-12 RX ORDER — HYDROXYZINE HYDROCHLORIDE 25 MG/1
25 TABLET, FILM COATED ORAL NIGHTLY
Qty: 30 TABLET | Refills: 0 | Status: SHIPPED | OUTPATIENT
Start: 2025-03-12

## 2025-03-14 ENCOUNTER — OFFICE VISIT (OUTPATIENT)
Age: 85
End: 2025-03-14
Payer: MEDICARE

## 2025-03-14 VITALS
HEART RATE: 94 BPM | DIASTOLIC BLOOD PRESSURE: 72 MMHG | WEIGHT: 191 LBS | OXYGEN SATURATION: 91 % | SYSTOLIC BLOOD PRESSURE: 126 MMHG | HEIGHT: 64 IN | BODY MASS INDEX: 32.61 KG/M2

## 2025-03-14 DIAGNOSIS — R00.1 SINUS BRADYCARDIA: ICD-10-CM

## 2025-03-14 DIAGNOSIS — I10 ESSENTIAL (PRIMARY) HYPERTENSION: Primary | ICD-10-CM

## 2025-03-14 PROCEDURE — 99213 OFFICE O/P EST LOW 20 MIN: CPT | Performed by: INTERNAL MEDICINE

## 2025-03-14 PROCEDURE — 1126F AMNT PAIN NOTED NONE PRSNT: CPT | Performed by: INTERNAL MEDICINE

## 2025-03-14 PROCEDURE — 3074F SYST BP LT 130 MM HG: CPT | Performed by: INTERNAL MEDICINE

## 2025-03-14 PROCEDURE — 1123F ACP DISCUSS/DSCN MKR DOCD: CPT | Performed by: INTERNAL MEDICINE

## 2025-03-14 PROCEDURE — 1160F RVW MEDS BY RX/DR IN RCRD: CPT | Performed by: INTERNAL MEDICINE

## 2025-03-14 PROCEDURE — 1159F MED LIST DOCD IN RCRD: CPT | Performed by: INTERNAL MEDICINE

## 2025-03-14 PROCEDURE — 3078F DIAST BP <80 MM HG: CPT | Performed by: INTERNAL MEDICINE

## 2025-03-14 RX ORDER — CYCLOBENZAPRINE HCL 10 MG
10 TABLET ORAL PRN
COMMUNITY

## 2025-03-14 RX ORDER — TRAMADOL HYDROCHLORIDE 50 MG/1
50 TABLET ORAL EVERY 8 HOURS PRN
COMMUNITY

## 2025-03-14 NOTE — PROGRESS NOTES
Chief Complaint   Patient presents with    Shortness of Breath     Vitals:    03/14/25 1152   BP: 126/72   BP Site: Left Upper Arm   Patient Position: Sitting   Pulse: 94   SpO2: 91%   Weight: 86.6 kg (191 lb)   Height: 1.626 m (5' 4\")         Chest pain: DENIED     Recent hospital stays: DENIED     Refills: DENIED     Patient has not been taking Metoprolol since surgery on 3/10/25.  States has noticed a decrease in SOB.

## 2025-03-14 NOTE — PROGRESS NOTES
Valentino Avila MD      [address],[facility],[place] [address]      Office [phone]         Laura Lu is a 83 y.o.  female is here for f/u visit .      Primary care physician:   Alessia Rangel MD         Chief complaint:     As in EMR           Dear Dr Rangel,      I had the pleasure of seeing Ms. Lu in the office today.         Assessment:   HTN   HLD  History of chronic dyspnea/Fatigue   DM -   HTN   Former smoker.  History of exposure to secondhand smoking   JOSS  Sinus bradycardia-was on low-dose metoprolol  S/p recent Shoulder surgery       Plan:      Pt's HR was in 40's during perioperartive period per daughter. Metoprolol has been Dc'd  HR now in 80's. Also has pain R shoulder. If persistently elevated can restart Metoprolol at 12.mg bid /can also use prn too  Lipid Panel 12/5/24 , , HDL 55, LDL 77    Aggressive cardiovascular risk factor modification.  Follow-up with ANP-3 months /earlier as needed     Patient understands the plan. All questions were answered to the patient's satisfaction.      Medication Side Effects and Warnings were discussed with patient: yes   Patient Labs were reviewed and or requested:  yes   Patient Past Records were reviewed and or requested: yes      I appreciate the opportunity to be involved in care. See note below for details. Please do not hesitate to contact us with questions  or concerns.      Valentino Avila MD      Cardiac Testing/ Procedures:      A.Cardiac Cath/PCI: 4/14/23  Access: R Radial Access - 6 F sheath    R Brachial - IV exchanged for 6 F glide sheath     Venogram performed    Catheters: RCA : JR4                    LCA : JL4    Findings:     L Main: Med; MLI    LAD: Tortuous; Med; MLI; Distal - small; MLI; D1 -  MLI    LCflex: Med to large; MLI; OM1/OM2 - MLI    RCA: Dominant; Large; MLI; PDA - small to med; MLI    LVEDP: 14 mm Hg     LVEF: Not assessed    No significant gradient

## 2025-04-04 ENCOUNTER — APPOINTMENT (OUTPATIENT)
Facility: HOSPITAL | Age: 85
DRG: 206 | End: 2025-04-04
Payer: MEDICARE

## 2025-04-04 ENCOUNTER — HOSPITAL ENCOUNTER (INPATIENT)
Facility: HOSPITAL | Age: 85
LOS: 2 days | Discharge: HOME HEALTH | DRG: 206 | End: 2025-04-06
Attending: EMERGENCY MEDICINE | Admitting: FAMILY MEDICINE
Payer: MEDICARE

## 2025-04-04 ENCOUNTER — TELEPHONE (OUTPATIENT)
Age: 85
End: 2025-04-04

## 2025-04-04 DIAGNOSIS — R79.89 ELEVATED TROPONIN: ICD-10-CM

## 2025-04-04 DIAGNOSIS — J45.40 MODERATE PERSISTENT REACTIVE AIRWAY DISEASE WITHOUT COMPLICATION: ICD-10-CM

## 2025-04-04 DIAGNOSIS — M19.011 PRIMARY OSTEOARTHRITIS OF BOTH SHOULDERS: ICD-10-CM

## 2025-04-04 DIAGNOSIS — R06.02 SHORTNESS OF BREATH: Primary | ICD-10-CM

## 2025-04-04 DIAGNOSIS — M19.012 PRIMARY OSTEOARTHRITIS OF BOTH SHOULDERS: ICD-10-CM

## 2025-04-04 PROBLEM — I16.1 HYPERTENSIVE EMERGENCY: Status: RESOLVED | Noted: 2023-01-14 | Resolved: 2025-04-04

## 2025-04-04 PROBLEM — R06.09 DYSPNEA ON EXERTION: Status: ACTIVE | Noted: 2025-04-04

## 2025-04-04 LAB
ALBUMIN SERPL-MCNC: 3.7 G/DL (ref 3.5–5.2)
ALBUMIN/GLOB SERPL: 0.9 (ref 1.1–2.2)
ALP SERPL-CCNC: 119 U/L (ref 35–104)
ALT SERPL-CCNC: 10 U/L (ref 10–35)
ANION GAP SERPL CALC-SCNC: 12 MMOL/L (ref 2–12)
AST SERPL-CCNC: 22 U/L (ref 10–35)
BASOPHILS # BLD: 0.03 K/UL (ref 0–0.1)
BASOPHILS NFR BLD: 0.3 % (ref 0–1)
BILIRUB SERPL-MCNC: 0.5 MG/DL (ref 0.2–1)
BUN SERPL-MCNC: 13 MG/DL (ref 8–23)
BUN/CREAT SERPL: 13 (ref 12–20)
CALCIUM SERPL-MCNC: 9.8 MG/DL (ref 8.8–10.2)
CHLORIDE SERPL-SCNC: 105 MMOL/L (ref 98–107)
CO2 SERPL-SCNC: 28 MMOL/L (ref 22–29)
CREAT SERPL-MCNC: 0.97 MG/DL (ref 0.5–0.9)
DIFFERENTIAL METHOD BLD: ABNORMAL
EKG ATRIAL RATE: 95 BPM
EKG DIAGNOSIS: NORMAL
EKG P AXIS: 36 DEGREES
EKG P-R INTERVAL: 184 MS
EKG Q-T INTERVAL: 332 MS
EKG QRS DURATION: 76 MS
EKG QTC CALCULATION (BAZETT): 417 MS
EKG R AXIS: -43 DEGREES
EKG T AXIS: 42 DEGREES
EKG VENTRICULAR RATE: 95 BPM
EOSINOPHIL # BLD: 0.37 K/UL (ref 0–0.4)
EOSINOPHIL NFR BLD: 3.6 % (ref 0–7)
ERYTHROCYTE [DISTWIDTH] IN BLOOD BY AUTOMATED COUNT: 14.4 % (ref 11.5–14.5)
GLOBULIN SER CALC-MCNC: 4.1 G/DL (ref 2–4)
GLUCOSE SERPL-MCNC: 99 MG/DL (ref 65–100)
HCT VFR BLD AUTO: 30.8 % (ref 35–47)
HGB BLD-MCNC: 9.9 G/DL (ref 11.5–16)
IMM GRANULOCYTES # BLD AUTO: 0.06 K/UL (ref 0–0.04)
IMM GRANULOCYTES NFR BLD AUTO: 0.6 % (ref 0–0.5)
LYMPHOCYTES # BLD: 1.37 K/UL (ref 0.8–3.5)
LYMPHOCYTES NFR BLD: 13.2 % (ref 12–49)
MCH RBC QN AUTO: 28.1 PG (ref 26–34)
MCHC RBC AUTO-ENTMCNC: 32.1 G/DL (ref 30–36.5)
MCV RBC AUTO: 87.5 FL (ref 80–99)
MONOCYTES # BLD: 0.44 K/UL (ref 0–1)
MONOCYTES NFR BLD: 4.3 % (ref 5–13)
NEUTS SEG # BLD: 8.07 K/UL (ref 1.8–8)
NEUTS SEG NFR BLD: 78 % (ref 32–75)
NRBC # BLD: 0 K/UL (ref 0–0.01)
NRBC BLD-RTO: 0 PER 100 WBC
NT PRO BNP: 92 PG/ML
PLATELET # BLD AUTO: 347 K/UL (ref 150–400)
PMV BLD AUTO: 9.4 FL (ref 8.9–12.9)
POTASSIUM SERPL-SCNC: 4.4 MMOL/L (ref 3.5–5.1)
PROT SERPL-MCNC: 7.8 G/DL (ref 6.4–8.3)
RBC # BLD AUTO: 3.52 M/UL (ref 3.8–5.2)
SODIUM SERPL-SCNC: 145 MMOL/L (ref 136–145)
TROPONIN T SERPL HS-MCNC: 30.8 NG/L (ref 0–14)
TROPONIN T SERPL HS-MCNC: 31.9 NG/L (ref 0–14)
WBC # BLD AUTO: 10.3 K/UL (ref 3.6–11)

## 2025-04-04 PROCEDURE — 93005 ELECTROCARDIOGRAM TRACING: CPT | Performed by: PHYSICIAN ASSISTANT

## 2025-04-04 PROCEDURE — 36415 COLL VENOUS BLD VENIPUNCTURE: CPT

## 2025-04-04 PROCEDURE — 99285 EMERGENCY DEPT VISIT HI MDM: CPT

## 2025-04-04 PROCEDURE — 71045 X-RAY EXAM CHEST 1 VIEW: CPT

## 2025-04-04 PROCEDURE — 2500000003 HC RX 250 WO HCPCS

## 2025-04-04 PROCEDURE — 94640 AIRWAY INHALATION TREATMENT: CPT

## 2025-04-04 PROCEDURE — 84484 ASSAY OF TROPONIN QUANT: CPT

## 2025-04-04 PROCEDURE — 93010 ELECTROCARDIOGRAM REPORT: CPT | Performed by: INTERNAL MEDICINE

## 2025-04-04 PROCEDURE — 6360000004 HC RX CONTRAST MEDICATION: Performed by: EMERGENCY MEDICINE

## 2025-04-04 PROCEDURE — 94761 N-INVAS EAR/PLS OXIMETRY MLT: CPT

## 2025-04-04 PROCEDURE — 6360000002 HC RX W HCPCS

## 2025-04-04 PROCEDURE — 80053 COMPREHEN METABOLIC PANEL: CPT

## 2025-04-04 PROCEDURE — 2060000000 HC ICU INTERMEDIATE R&B

## 2025-04-04 PROCEDURE — 83880 ASSAY OF NATRIURETIC PEPTIDE: CPT

## 2025-04-04 PROCEDURE — 6370000000 HC RX 637 (ALT 250 FOR IP): Performed by: PHYSICIAN ASSISTANT

## 2025-04-04 PROCEDURE — 6370000000 HC RX 637 (ALT 250 FOR IP)

## 2025-04-04 PROCEDURE — 71275 CT ANGIOGRAPHY CHEST: CPT

## 2025-04-04 PROCEDURE — 85025 COMPLETE CBC W/AUTO DIFF WBC: CPT

## 2025-04-04 RX ORDER — IPRATROPIUM BROMIDE AND ALBUTEROL SULFATE 2.5; .5 MG/3ML; MG/3ML
1 SOLUTION RESPIRATORY (INHALATION)
Status: COMPLETED | OUTPATIENT
Start: 2025-04-04 | End: 2025-04-04

## 2025-04-04 RX ORDER — ASPIRIN 81 MG/1
81 TABLET, CHEWABLE ORAL DAILY
Status: DISCONTINUED | OUTPATIENT
Start: 2025-04-05 | End: 2025-04-06 | Stop reason: HOSPADM

## 2025-04-04 RX ORDER — POLYETHYLENE GLYCOL 3350 17 G/17G
17 POWDER, FOR SOLUTION ORAL DAILY
Status: DISCONTINUED | OUTPATIENT
Start: 2025-04-05 | End: 2025-04-05

## 2025-04-04 RX ORDER — POLYETHYLENE GLYCOL 3350 17 G/17G
17 POWDER, FOR SOLUTION ORAL DAILY PRN
Status: DISCONTINUED | OUTPATIENT
Start: 2025-04-04 | End: 2025-04-04

## 2025-04-04 RX ORDER — IOPAMIDOL 755 MG/ML
100 INJECTION, SOLUTION INTRAVASCULAR ONCE
Status: COMPLETED | OUTPATIENT
Start: 2025-04-04 | End: 2025-04-04

## 2025-04-04 RX ORDER — ASPIRIN 81 MG/1
81 TABLET, CHEWABLE ORAL DAILY
Status: DISCONTINUED | OUTPATIENT
Start: 2025-04-04 | End: 2025-04-04

## 2025-04-04 RX ORDER — SODIUM CHLORIDE 0.9 % (FLUSH) 0.9 %
5-40 SYRINGE (ML) INJECTION PRN
Status: DISCONTINUED | OUTPATIENT
Start: 2025-04-04 | End: 2025-04-06 | Stop reason: HOSPADM

## 2025-04-04 RX ORDER — SODIUM CHLORIDE 9 MG/ML
INJECTION, SOLUTION INTRAVENOUS PRN
Status: DISCONTINUED | OUTPATIENT
Start: 2025-04-04 | End: 2025-04-06 | Stop reason: HOSPADM

## 2025-04-04 RX ORDER — ALBUTEROL SULFATE 0.83 MG/ML
2.5 SOLUTION RESPIRATORY (INHALATION) EVERY 4 HOURS PRN
Status: DISCONTINUED | OUTPATIENT
Start: 2025-04-04 | End: 2025-04-06 | Stop reason: HOSPADM

## 2025-04-04 RX ORDER — ACETAMINOPHEN 325 MG/1
650 TABLET ORAL EVERY 6 HOURS PRN
Status: DISCONTINUED | OUTPATIENT
Start: 2025-04-04 | End: 2025-04-06 | Stop reason: HOSPADM

## 2025-04-04 RX ORDER — ONDANSETRON 4 MG/1
4 TABLET, ORALLY DISINTEGRATING ORAL EVERY 8 HOURS PRN
Status: DISCONTINUED | OUTPATIENT
Start: 2025-04-04 | End: 2025-04-06 | Stop reason: HOSPADM

## 2025-04-04 RX ORDER — SODIUM CHLORIDE 0.9 % (FLUSH) 0.9 %
5-40 SYRINGE (ML) INJECTION EVERY 12 HOURS SCHEDULED
Status: DISCONTINUED | OUTPATIENT
Start: 2025-04-04 | End: 2025-04-06 | Stop reason: HOSPADM

## 2025-04-04 RX ORDER — LOSARTAN POTASSIUM 25 MG/1
25 TABLET ORAL DAILY
Status: DISCONTINUED | OUTPATIENT
Start: 2025-04-05 | End: 2025-04-06 | Stop reason: HOSPADM

## 2025-04-04 RX ORDER — HYDROXYZINE HYDROCHLORIDE 25 MG/1
25 TABLET, FILM COATED ORAL NIGHTLY
Status: DISCONTINUED | OUTPATIENT
Start: 2025-04-04 | End: 2025-04-06 | Stop reason: HOSPADM

## 2025-04-04 RX ORDER — ACETAMINOPHEN 650 MG/1
650 SUPPOSITORY RECTAL EVERY 6 HOURS PRN
Status: DISCONTINUED | OUTPATIENT
Start: 2025-04-04 | End: 2025-04-06 | Stop reason: HOSPADM

## 2025-04-04 RX ORDER — TROSPIUM CHLORIDE 20 MG/1
20 TABLET, FILM COATED ORAL DAILY
Status: DISCONTINUED | OUTPATIENT
Start: 2025-04-05 | End: 2025-04-06 | Stop reason: HOSPADM

## 2025-04-04 RX ORDER — ONDANSETRON 2 MG/ML
4 INJECTION INTRAMUSCULAR; INTRAVENOUS EVERY 6 HOURS PRN
Status: DISCONTINUED | OUTPATIENT
Start: 2025-04-04 | End: 2025-04-06 | Stop reason: HOSPADM

## 2025-04-04 RX ORDER — LATANOPROST 50 UG/ML
1 SOLUTION/ DROPS OPHTHALMIC NIGHTLY
Status: DISCONTINUED | OUTPATIENT
Start: 2025-04-04 | End: 2025-04-06 | Stop reason: HOSPADM

## 2025-04-04 RX ORDER — ASCORBIC ACID 500 MG
250 TABLET ORAL DAILY
Status: DISCONTINUED | OUTPATIENT
Start: 2025-04-05 | End: 2025-04-06 | Stop reason: HOSPADM

## 2025-04-04 RX ORDER — VITAMIN B COMPLEX
1000 TABLET ORAL DAILY
Status: DISCONTINUED | OUTPATIENT
Start: 2025-04-05 | End: 2025-04-06 | Stop reason: HOSPADM

## 2025-04-04 RX ORDER — DEXTROSE MONOHYDRATE 100 MG/ML
INJECTION, SOLUTION INTRAVENOUS CONTINUOUS PRN
Status: DISCONTINUED | OUTPATIENT
Start: 2025-04-04 | End: 2025-04-06 | Stop reason: HOSPADM

## 2025-04-04 RX ORDER — MULTIVITAMIN WITH IRON
1000 TABLET ORAL DAILY
Status: DISCONTINUED | OUTPATIENT
Start: 2025-04-05 | End: 2025-04-06 | Stop reason: HOSPADM

## 2025-04-04 RX ORDER — ATORVASTATIN CALCIUM 20 MG/1
40 TABLET, FILM COATED ORAL NIGHTLY
Status: DISCONTINUED | OUTPATIENT
Start: 2025-04-04 | End: 2025-04-06 | Stop reason: HOSPADM

## 2025-04-04 RX ORDER — ENOXAPARIN SODIUM 100 MG/ML
40 INJECTION SUBCUTANEOUS DAILY
Status: DISCONTINUED | OUTPATIENT
Start: 2025-04-04 | End: 2025-04-06 | Stop reason: HOSPADM

## 2025-04-04 RX ORDER — INSULIN LISPRO 100 [IU]/ML
0-4 INJECTION, SOLUTION INTRAVENOUS; SUBCUTANEOUS
Status: DISCONTINUED | OUTPATIENT
Start: 2025-04-04 | End: 2025-04-06 | Stop reason: HOSPADM

## 2025-04-04 RX ORDER — OXYCODONE HYDROCHLORIDE 5 MG/1
5 TABLET ORAL EVERY 6 HOURS PRN
COMMUNITY
Start: 2025-03-22

## 2025-04-04 RX ORDER — OXYCODONE HYDROCHLORIDE 5 MG/1
5 TABLET ORAL EVERY 6 HOURS PRN
Refills: 0 | Status: DISCONTINUED | OUTPATIENT
Start: 2025-04-04 | End: 2025-04-05

## 2025-04-04 RX ADMIN — ARFORMOTEROL TARTRATE: 15 SOLUTION RESPIRATORY (INHALATION) at 23:40

## 2025-04-04 RX ADMIN — Medication 3 MG: at 22:24

## 2025-04-04 RX ADMIN — IPRATROPIUM BROMIDE AND ALBUTEROL SULFATE 1 DOSE: .5; 3 SOLUTION RESPIRATORY (INHALATION) at 15:33

## 2025-04-04 RX ADMIN — OXYCODONE 5 MG: 5 TABLET ORAL at 22:24

## 2025-04-04 RX ADMIN — ACETAMINOPHEN 650 MG: 325 TABLET ORAL at 22:22

## 2025-04-04 RX ADMIN — ATORVASTATIN CALCIUM 40 MG: 20 TABLET, FILM COATED ORAL at 22:24

## 2025-04-04 RX ADMIN — IOPAMIDOL 100 ML: 755 INJECTION, SOLUTION INTRAVENOUS at 16:28

## 2025-04-04 RX ADMIN — HYDROXYZINE HYDROCHLORIDE 25 MG: 25 TABLET, FILM COATED ORAL at 22:24

## 2025-04-04 RX ADMIN — ASPIRIN 81 MG: 81 TABLET, CHEWABLE ORAL at 17:53

## 2025-04-04 RX ADMIN — SODIUM CHLORIDE, PRESERVATIVE FREE 10 ML: 5 INJECTION INTRAVENOUS at 22:24

## 2025-04-04 ASSESSMENT — PAIN - FUNCTIONAL ASSESSMENT: PAIN_FUNCTIONAL_ASSESSMENT: ACTIVITIES ARE NOT PREVENTED

## 2025-04-04 ASSESSMENT — PAIN SCALES - GENERAL
PAINLEVEL_OUTOF10: 3
PAINLEVEL_OUTOF10: 7
PAINLEVEL_OUTOF10: 2

## 2025-04-04 ASSESSMENT — PAIN DESCRIPTION - ORIENTATION: ORIENTATION: RIGHT

## 2025-04-04 ASSESSMENT — HEART SCORE: ECG: NON-SPECIFC REPOLARIZATION DISTURBANCE/LBTB/PM

## 2025-04-04 ASSESSMENT — PAIN DESCRIPTION - LOCATION: LOCATION: SHOULDER

## 2025-04-04 ASSESSMENT — PAIN DESCRIPTION - DESCRIPTORS: DESCRIPTORS: ACHING;SORE

## 2025-04-04 NOTE — ED PROVIDER NOTES
[facility] EMERGENC[facility] DEPARTMENT  EMERGENCY DEPARTMENT ENCOUNTER      Pt Name: Lauar Lu  MRN: [id number]  Birthdate 1940  Date of evaluation: 4/4/2025  Provider: Robert Winslow PA-C    CHIEF COMPLAINT       Chief Complaint   Patient presents with    Shortness of Breath         HISTORY OF PRESENT ILLNESS   (Location/Symptom, Timing/Onset, Context/Setting, Quality, Duration, Modifying Factors, Severity)  Note limiting factors.   85-year-old F with history of hypertension, diabetes, cardiac arrhythmia, presenting to emergency department for shortness of breath.  Dyspnea on exertion during physical therapy.  Chronic shortness of breath has been worse since right shoulder surgery on March 10.  Patient follow-up with cardiology.  Daughter reports gradually worsening shortness of breath.  Reached out to cardiology who advised patient to come to emergency department for further evaluation.  Denies chest pain, leg swelling, history of blood clots, fever, cough.            Review of External Medical Records:     Nursing Notes were reviewed.    REVIEW OF SYSTEMS    (2-9 systems for level 4, 10 or more for level 5)     Review of Systems    Except as noted above the remainder of the review of systems was reviewed and negative.       PAST MEDICAL HISTORY     Past Medical History:   Diagnosis Date    Adverse reaction to anesthetic agent     PT HAD TACHYCARDIA POST HIP REPLACEMENT, ADMITTED TO CARDIAC UNIT    Arrhythmia     Bradycardia.    Arthritis     Burning with urination     Incontinence.    Depression     GERD (gastroesophageal reflux disease)     Glaucoma     Hearing loss     High cholesterol     Hypertension     Ill-defined condition     EDEMA, LOWER LIMS, URINARY INCONTINENCE    Neuropathy     Obesity, Class II, BMI 35-39.9     Osteoarthritis     Sleep apnea     NO CPAP    Type 2 diabetes mellitus without complication (HCC)     Urinary incontinence          SURGICAL HISTORY       Past Surgical History:

## 2025-04-04 NOTE — TELEPHONE ENCOUNTER
Spoke with patient's daughter.  Concerned about patients's worsening breathing status. Negative for weight gain and swelling.  Stated patient had rotator cuff replacement on 3/10 and was concerned about her breathing at discharge.  Was on nebulizer treatments prior to discharge.  Difficulty completing PT d/t SOB.  Pt has Lasix PRN for swelling, but was not aware if she can use it for SOB.  LOV 3/14/25    Will go to Urgent Care for evaluation today.    Please advise.

## 2025-04-04 NOTE — ED NOTES
TRANSFER - OUT REPORT:    Verbal report given to JOJO Sanderson on Laura Lu  being transferred to [place] [address] for routine progression of patient care       Report consisted of patient's Situation, Background, Assessment and   Recommendations(SBAR).     Information from the following report(s) ED SBAR, MAR, and Cardiac Rhythm NSR  was reviewed with the receiving nurse.    [facility] Assessment:    Presents to emergency department  because of falls (Syncope, seizure, or loss of consciousness): No  Age > 70: Yes  Altered Mental Status, Intoxication with alcohol or substance confusion (Disorientation, impaired judgment, poor safety awaremess, or inability to follow instructions): No  Impaired Mobility: Ambulates or transfers with assistive devices or assistance; Unable to ambulate or transer.: No             Lines:   Peripheral IV 04/04/25 Left Antecubital (Active)   Site Assessment Clean, dry & intact 04/04/25 1613   Line Status Blood return noted 04/04/25 1613   Phlebitis Assessment No symptoms 04/04/25 1613   Infiltration Assessment 0 04/04/25 1613        Opportunity for questions and clarification was provided.      Patient transported with:  Monitor

## 2025-04-04 NOTE — ED TRIAGE NOTES
Per family, patient has experienced increased dyspnea on exertion since 3/10 when had rotator cuff surgery and changed metoprolol dosing. Reports that today was participating in PT and having severe dyspnea. Denies fever, productive cough, edema. Reports uses PRN lasix, but has not recently taken.     Last saw cardiologist on 3/14.

## 2025-04-04 NOTE — ED NOTES
Nursing Supervisor Charley Roa contacted regarding giving report to the floor. Charley Roa approved [facility] to leave w/ patient prior to report being given.

## 2025-04-04 NOTE — ED NOTES
2 attempts to call report to the floor on patient. This RN told \"charge nurse will call back.\" This RN informed  transport was here to  patient.

## 2025-04-04 NOTE — H&P
[address] [place] [address]   Office [phone]  Fax [phone]       Admission H&P     Name: Laura Lu MRN: [id number]  Sex: Female   YOB: 1940  Age: 85 y.o.  PCP: Alessia Rangel MD     Source of Information: patient, medical records    Chief complaint: dyspnea    History of Present Illness  Laura Lu is a 85 y.o. female with known history of sinus bradycardia, hx of chronic dyspnea/fatigue, HTN, T2DM, CKD II, HLD, R rotator cuff tear s/p repair 3/10/2025 who presents to the ER complaining of dyspnea.      Reports chronic dyspnea on exertion and fatigue with subjective gradual worsening after right rotator cuff tear surgery on March 10.  Endorses \"feeling winded\" even on walking but denied any chest pain.  Reports that today, did not even start physical therapy due to dyspnea on exertion.  Unable to lay flat at night however patient confirms that she does not prefer laying down flat and is not short of breath if she does so.  Reports that she takes Lasix as needed when she have bilateral leg swelling but she has not taken it for a few weeks now.  Patient denies any fever, nausea, vomiting, abdominal pain, dysuria.      In the ER:      Vitals:  Patient Vitals for the past 8 hrs:   Temp Pulse Resp BP SpO2   04/04/25 2020 98.6 °F (37 °C) 87 15 132/68 98 %   04/04/25 1918 98.4 °F (36.9 °C) -- -- -- --   04/04/25 1915 -- 87 15 (!) 145/70 100 %   04/04/25 1849 -- 83 15 (!) 136/58 97 %   04/04/25 1819 -- 86 12 135/65 94 %   04/04/25 1804 -- 87 15 (!) 123/51 97 %   04/04/25 1749 -- 84 20 (!) 121/53 96 %   04/04/25 1421 99.1 °F (37.3 °C) 96 18 135/80 97 %       Labs:   Recent Labs     04/04/25  1442   WBC 10.3   HGB 9.9*   HCT 30.8*        Recent Labs     04/04/25  1442      K 4.4      CO2 28   BUN 13     WBC 10.3, Hgb 9.9, Na 145, K 4.4, Cr 0.97, proBNP 92, trop T 31.9>30.8    Imaging:   CXR: Left basilar subsegmental atelectasis  CTA chest: No PE

## 2025-04-05 LAB
ANION GAP SERPL CALC-SCNC: 5 MMOL/L (ref 2–12)
APPEARANCE UR: CLEAR
BACTERIA URNS QL MICRO: NEGATIVE /HPF
BASOPHILS # BLD: 0.03 K/UL (ref 0–0.1)
BASOPHILS NFR BLD: 0.3 % (ref 0–1)
BILIRUB UR QL: NEGATIVE
BUN SERPL-MCNC: 12 MG/DL (ref 6–20)
BUN/CREAT SERPL: 14 (ref 12–20)
CALCIUM SERPL-MCNC: 9.4 MG/DL (ref 8.5–10.1)
CHLORIDE SERPL-SCNC: 110 MMOL/L (ref 97–108)
CO2 SERPL-SCNC: 27 MMOL/L (ref 21–32)
COLOR UR: ABNORMAL
CREAT SERPL-MCNC: 0.86 MG/DL (ref 0.55–1.02)
DIFFERENTIAL METHOD BLD: ABNORMAL
EOSINOPHIL # BLD: 0.57 K/UL (ref 0–0.4)
EOSINOPHIL NFR BLD: 5.6 % (ref 0–7)
EPITH CASTS URNS QL MICRO: ABNORMAL /LPF
ERYTHROCYTE [DISTWIDTH] IN BLOOD BY AUTOMATED COUNT: 14.5 % (ref 11.5–14.5)
EST. AVERAGE GLUCOSE BLD GHB EST-MCNC: 134 MG/DL
GLUCOSE BLD STRIP.AUTO-MCNC: 121 MG/DL (ref 65–117)
GLUCOSE BLD STRIP.AUTO-MCNC: 128 MG/DL (ref 65–117)
GLUCOSE BLD STRIP.AUTO-MCNC: 136 MG/DL (ref 65–117)
GLUCOSE BLD STRIP.AUTO-MCNC: 177 MG/DL (ref 65–117)
GLUCOSE SERPL-MCNC: 95 MG/DL (ref 65–100)
GLUCOSE UR STRIP.AUTO-MCNC: NEGATIVE MG/DL
HBA1C MFR BLD: 6.3 % (ref 4–5.6)
HCT VFR BLD AUTO: 27.9 % (ref 35–47)
HGB BLD-MCNC: 8.7 G/DL (ref 11.5–16)
HGB UR QL STRIP: NEGATIVE
HYALINE CASTS URNS QL MICRO: ABNORMAL /LPF (ref 0–5)
IMM GRANULOCYTES # BLD AUTO: 0.05 K/UL (ref 0–0.04)
IMM GRANULOCYTES NFR BLD AUTO: 0.5 % (ref 0–0.5)
KETONES UR QL STRIP.AUTO: ABNORMAL MG/DL
LEUKOCYTE ESTERASE UR QL STRIP.AUTO: ABNORMAL
LYMPHOCYTES # BLD: 1.88 K/UL (ref 0.8–3.5)
LYMPHOCYTES NFR BLD: 18.4 % (ref 12–49)
MCH RBC QN AUTO: 27.8 PG (ref 26–34)
MCHC RBC AUTO-ENTMCNC: 31.2 G/DL (ref 30–36.5)
MCV RBC AUTO: 89.1 FL (ref 80–99)
MONOCYTES # BLD: 0.75 K/UL (ref 0–1)
MONOCYTES NFR BLD: 7.4 % (ref 5–13)
NEUTS SEG # BLD: 6.92 K/UL (ref 1.8–8)
NEUTS SEG NFR BLD: 67.8 % (ref 32–75)
NITRITE UR QL STRIP.AUTO: NEGATIVE
NRBC # BLD: 0 K/UL (ref 0–0.01)
NRBC BLD-RTO: 0 PER 100 WBC
PH UR STRIP: 5.5 (ref 5–8)
PLATELET # BLD AUTO: 330 K/UL (ref 150–400)
PMV BLD AUTO: 10.1 FL (ref 8.9–12.9)
POTASSIUM SERPL-SCNC: 3.9 MMOL/L (ref 3.5–5.1)
PROT UR STRIP-MCNC: NEGATIVE MG/DL
RBC # BLD AUTO: 3.13 M/UL (ref 3.8–5.2)
RBC #/AREA URNS HPF: ABNORMAL /HPF (ref 0–5)
SERVICE CMNT-IMP: ABNORMAL
SODIUM SERPL-SCNC: 142 MMOL/L (ref 136–145)
SP GR UR REFRACTOMETRY: 1.02 (ref 1–1.03)
TROPONIN I SERPL HS-MCNC: 8 NG/L (ref 0–51)
TROPONIN I SERPL HS-MCNC: 9 NG/L (ref 0–51)
UROBILINOGEN UR QL STRIP.AUTO: 1 EU/DL (ref 0.2–1)
WBC # BLD AUTO: 10.2 K/UL (ref 3.6–11)
WBC URNS QL MICRO: ABNORMAL /HPF (ref 0–4)
YEAST BUDDING URNS QL: PRESENT
YEAST URNS QL MICRO: PRESENT

## 2025-04-05 PROCEDURE — 6370000000 HC RX 637 (ALT 250 FOR IP)

## 2025-04-05 PROCEDURE — 81001 URINALYSIS AUTO W/SCOPE: CPT

## 2025-04-05 PROCEDURE — 80048 BASIC METABOLIC PNL TOTAL CA: CPT

## 2025-04-05 PROCEDURE — 6360000002 HC RX W HCPCS

## 2025-04-05 PROCEDURE — 82962 GLUCOSE BLOOD TEST: CPT

## 2025-04-05 PROCEDURE — 97530 THERAPEUTIC ACTIVITIES: CPT

## 2025-04-05 PROCEDURE — 2060000000 HC ICU INTERMEDIATE R&B

## 2025-04-05 PROCEDURE — 99222 1ST HOSP IP/OBS MODERATE 55: CPT | Performed by: FAMILY MEDICINE

## 2025-04-05 PROCEDURE — 83036 HEMOGLOBIN GLYCOSYLATED A1C: CPT

## 2025-04-05 PROCEDURE — 94761 N-INVAS EAR/PLS OXIMETRY MLT: CPT

## 2025-04-05 PROCEDURE — 97162 PT EVAL MOD COMPLEX 30 MIN: CPT

## 2025-04-05 PROCEDURE — 97116 GAIT TRAINING THERAPY: CPT

## 2025-04-05 PROCEDURE — 94640 AIRWAY INHALATION TREATMENT: CPT

## 2025-04-05 PROCEDURE — 2500000003 HC RX 250 WO HCPCS

## 2025-04-05 PROCEDURE — 85025 COMPLETE CBC W/AUTO DIFF WBC: CPT

## 2025-04-05 RX ORDER — SENNA AND DOCUSATE SODIUM 50; 8.6 MG/1; MG/1
2 TABLET, FILM COATED ORAL DAILY
Status: DISCONTINUED | OUTPATIENT
Start: 2025-04-05 | End: 2025-04-06 | Stop reason: HOSPADM

## 2025-04-05 RX ORDER — POLYETHYLENE GLYCOL 3350 17 G/17G
17 POWDER, FOR SOLUTION ORAL 2 TIMES DAILY
Status: DISCONTINUED | OUTPATIENT
Start: 2025-04-05 | End: 2025-04-06 | Stop reason: HOSPADM

## 2025-04-05 RX ORDER — SENNA AND DOCUSATE SODIUM 50; 8.6 MG/1; MG/1
2 TABLET, FILM COATED ORAL DAILY PRN
Status: DISCONTINUED | OUTPATIENT
Start: 2025-04-05 | End: 2025-04-05

## 2025-04-05 RX ORDER — MONTELUKAST SODIUM 10 MG/1
10 TABLET ORAL DAILY
Status: DISCONTINUED | OUTPATIENT
Start: 2025-04-05 | End: 2025-04-06 | Stop reason: HOSPADM

## 2025-04-05 RX ORDER — FLUTICASONE PROPIONATE 50 MCG
2 SPRAY, SUSPENSION (ML) NASAL DAILY PRN
Status: DISCONTINUED | OUTPATIENT
Start: 2025-04-05 | End: 2025-04-06 | Stop reason: HOSPADM

## 2025-04-05 RX ORDER — OXYCODONE HYDROCHLORIDE 5 MG/1
5 TABLET ORAL EVERY 6 HOURS PRN
Refills: 0 | Status: DISCONTINUED | OUTPATIENT
Start: 2025-04-05 | End: 2025-04-06 | Stop reason: HOSPADM

## 2025-04-05 RX ORDER — CETIRIZINE HYDROCHLORIDE 10 MG/1
10 TABLET ORAL DAILY
Status: DISCONTINUED | OUTPATIENT
Start: 2025-04-05 | End: 2025-04-06 | Stop reason: HOSPADM

## 2025-04-05 RX ADMIN — Medication 3 MG: at 21:40

## 2025-04-05 RX ADMIN — SODIUM CHLORIDE, PRESERVATIVE FREE 10 ML: 5 INJECTION INTRAVENOUS at 08:50

## 2025-04-05 RX ADMIN — ASPIRIN 81 MG: 81 TABLET, CHEWABLE ORAL at 08:47

## 2025-04-05 RX ADMIN — LATANOPROST 1 DROP: 50 SOLUTION OPHTHALMIC at 22:39

## 2025-04-05 RX ADMIN — CETIRIZINE HYDROCHLORIDE 10 MG: 10 TABLET, FILM COATED ORAL at 15:06

## 2025-04-05 RX ADMIN — GUAIFENESIN, DEXTROMETHORPHAN HBR 1 TABLET: 600; 30 TABLET ORAL at 17:16

## 2025-04-05 RX ADMIN — OXYCODONE HYDROCHLORIDE 5 MG: 5 TABLET ORAL at 22:38

## 2025-04-05 RX ADMIN — POLYETHYLENE GLYCOL 3350 17 G: 17 POWDER, FOR SOLUTION ORAL at 08:48

## 2025-04-05 RX ADMIN — LOSARTAN POTASSIUM 25 MG: 25 TABLET, FILM COATED ORAL at 08:57

## 2025-04-05 RX ADMIN — Medication 1000 UNITS: at 08:46

## 2025-04-05 RX ADMIN — ATORVASTATIN CALCIUM 40 MG: 20 TABLET, FILM COATED ORAL at 21:39

## 2025-04-05 RX ADMIN — MONTELUKAST 10 MG: 10 TABLET, FILM COATED ORAL at 15:06

## 2025-04-05 RX ADMIN — SODIUM CHLORIDE, PRESERVATIVE FREE 10 ML: 5 INJECTION INTRAVENOUS at 21:41

## 2025-04-05 RX ADMIN — OXYCODONE 5 MG: 5 TABLET ORAL at 12:43

## 2025-04-05 RX ADMIN — ARFORMOTEROL TARTRATE: 15 SOLUTION RESPIRATORY (INHALATION) at 18:31

## 2025-04-05 RX ADMIN — ARFORMOTEROL TARTRATE: 15 SOLUTION RESPIRATORY (INHALATION) at 07:49

## 2025-04-05 RX ADMIN — SENNOSIDES, DOCUSATE SODIUM 2 TABLET: 50; 8.6 TABLET, FILM COATED ORAL at 15:06

## 2025-04-05 RX ADMIN — PREGABALIN 75 MG: 25 CAPSULE ORAL at 21:39

## 2025-04-05 RX ADMIN — OXYCODONE HYDROCHLORIDE AND ACETAMINOPHEN 250 MG: 500 TABLET ORAL at 08:47

## 2025-04-05 RX ADMIN — POLYETHYLENE GLYCOL 3350 17 G: 17 POWDER, FOR SOLUTION ORAL at 21:40

## 2025-04-05 RX ADMIN — ENOXAPARIN SODIUM 40 MG: 100 INJECTION SUBCUTANEOUS at 08:48

## 2025-04-05 RX ADMIN — ACETAMINOPHEN 650 MG: 325 TABLET ORAL at 21:40

## 2025-04-05 RX ADMIN — TROSPIUM CHLORIDE 20 MG: 20 TABLET, FILM COATED ORAL at 08:46

## 2025-04-05 RX ADMIN — CYANOCOBALAMIN TAB 500 MCG 1000 MCG: 500 TAB at 08:55

## 2025-04-05 RX ADMIN — ACETAMINOPHEN 650 MG: 325 TABLET ORAL at 12:42

## 2025-04-05 RX ADMIN — HYDROXYZINE HYDROCHLORIDE 25 MG: 25 TABLET, FILM COATED ORAL at 21:40

## 2025-04-05 ASSESSMENT — PAIN SCALES - GENERAL
PAINLEVEL_OUTOF10: 0
PAINLEVEL_OUTOF10: 0
PAINLEVEL_OUTOF10: 7
PAINLEVEL_OUTOF10: 7
PAINLEVEL_OUTOF10: 0

## 2025-04-05 ASSESSMENT — PAIN DESCRIPTION - ORIENTATION: ORIENTATION: RIGHT

## 2025-04-05 ASSESSMENT — PAIN DESCRIPTION - LOCATION: LOCATION: ARM;SHOULDER

## 2025-04-05 NOTE — DISCHARGE SUMMARY
[address] [place] [id number]   Office [phone]  Fax [phone]       Discharge / Transfer / Off-Service Note     Name: Laura Lu MRN: [id number]  Sex: Female   YOB: 1940  Age: 85 y.o.  PCP: Alessia Rangel MD     Date of admission: 4/4/2025  Date of discharge/transfer: 4/6/2025    Attending physician at admission: Dr. Ellison    Attending physician at discharge/transfer: Dr. Ellison    Resident physician at discharge/transfer: Lyndon Lamb MD     Consultants during hospitalization  IP CONSULT TO CASE MANAGEMENT  IP CONSULT TO CASE MANAGEMENT  IP CONSULT TO CASE MANAGEMENT     Admission diagnoses   Shortness of breath [R06.02]  Dyspnea on exertion [R06.09]  Elevated troponin [R79.89]    Recommended follow-up after discharge  PCP: Alessia Rangel MD    Things to follow up on with PCP:  Shortness of breath    History of Present Illness  Per admitting provider,  Dr. Woodward    \"Laura Lu is a 85 y.o. female with known history of sinus bradycardia, hx of chronic dyspnea/fatigue, HTN, T2DM, CKD II, HLD, R rotator cuff tear s/p repair 3/10/2025 who presents to the ER complaining of dyspnea.       Reports chronic dyspnea on exertion and fatigue with subjective gradual worsening after right rotator cuff tear surgery on March 10.  Endorses \"feeling winded\" even on walking but denied any chest pain.  Reports that today, did not even start physical therapy due to dyspnea on exertion.  Unable to lay flat at night however patient confirms that she does not prefer laying down flat and is not short of breath if she does so.  Reports that she takes Lasix as needed when she have bilateral leg swelling but she has not taken it for a few weeks now.  Patient denies any fever, nausea, vomiting, abdominal pain, dysuria.\"    HOSPITAL COURSE    Dyspnea on exertion  Acute on chronic, with reported recent gradual worsening noticed after right shoulder surgery last March 10.

## 2025-04-05 NOTE — PLAN OF CARE
discussed with: registered nurse and physician (provider states need for PT eval ahead of planned discharge today).    Patient Education  Education Given To: Patient  Education Provided: Role of Therapy;Plan of Care;Precautions;Transfer Training;Mobility Training;Equipment;Fall Prevention Strategies  Education Method: Demonstration;Verbal  Barriers to Learning: None  Education Outcome: Verbalized understanding    Thank you for this referral.  Maria E Hayes, PT  Minutes: 41      Physical Therapy Evaluation Charge Determination   History Examination Presentation Decision-Making   HIGH Complexity :3+ comorbidities / personal factors will impact the outcome/ POC  HIGH Complexity : 4+ Standardized tests and measures addressing body structure, function, activity limitation and / or participation in recreation  LOW Complexity : Stable, uncomplicated  AM-PAC  LOW    Based on the above components, the patient evaluation is determined to be of the following complexity level: Low

## 2025-04-05 NOTE — DISCHARGE INSTRUCTIONS
HOME DISCHARGE INSTRUCTIONS    Laura Lu / [id number] : 1940    Admission date: 2025 Discharge date: 2025 9:50 AM     Please bring this form with you to show your care provider at your follow-up appointment.    Primary care provider:  Alessia Rangel      Discharging provider:  Lyndon Lamb MD  - Family Medicine Resident  Dr. Ellison  - Family Medicine Attending      You have been admitted to the hospital with the following diagnoses:    ACUTE DIAGNOSES:  Shortness of breath [R06.02]  Dyspnea on exertion [R06.09]  Elevated troponin [R79.89]    Brief hospital course: You were admitted for shortness of breath. Your chest x ray showed that your left lung is not fully inflated, likely related to your recent surgery. You did not have any signs of COPD or heart failure that could be causing your shortness of breath. You improved with breathing treatments and PT recommended home health, and a 4 point cane. You can be discharged, follow up with your PCP. Continue to use the incentive spirometer to help inflate your lungs.    . . . . . . . . . . . . . . . . . . . . . . . . . . . . . . . . . . . . . . . . . . . . . . . . . . . . . . . . . . . . . . . . . . . . . .      MEDICATION CHANGES  - Symbicort 2 puffs twice daily. Take this inhaler every day even if you do not have symptoms.  - Albuterol 2 puffs every 4 hours as needed for wheezing, shortness of breath    -HOLD Flexeril, Tramadol, Zanaflex until you follow up with your PCP    Continue your other medications as prescribed.  . . . . . . . . . . . . . . . . . . . . . . . . . . . . . . . . . . . . . . . . . . . . . . . . . . . . . . . . . . . . . . . . . . . . . .     FOLLOW-UP CARE RECOMMENDATIONS:    Appointments  Future Appointments   Date Time Provider Department Center   2025  2:20 PM Martina Guido, APRN - NP [facility]       No follow-up provider specified.      Follow-up with your PCP regarding:  -Shortness of breath

## 2025-04-06 ENCOUNTER — HOSPITAL ENCOUNTER (EMERGENCY)
Facility: HOSPITAL | Age: 85
Discharge: HOME | End: 2025-04-06
Attending: EMERGENCY MEDICINE
Payer: MEDICARE

## 2025-04-06 ENCOUNTER — APPOINTMENT (OUTPATIENT)
Facility: HOSPITAL | Age: 85
End: 2025-04-06
Payer: MEDICARE

## 2025-04-06 VITALS
OXYGEN SATURATION: 94 % | WEIGHT: 187.6 LBS | RESPIRATION RATE: 14 BRPM | DIASTOLIC BLOOD PRESSURE: 65 MMHG | SYSTOLIC BLOOD PRESSURE: 109 MMHG | TEMPERATURE: 98.4 F | BODY MASS INDEX: 33.24 KG/M2 | HEART RATE: 81 BPM | HEIGHT: 63 IN

## 2025-04-06 VITALS
TEMPERATURE: 98 F | SYSTOLIC BLOOD PRESSURE: 135 MMHG | HEART RATE: 86 BPM | HEIGHT: 63 IN | BODY MASS INDEX: 33.13 KG/M2 | DIASTOLIC BLOOD PRESSURE: 64 MMHG | OXYGEN SATURATION: 97 % | WEIGHT: 187 LBS | RESPIRATION RATE: 14 BRPM

## 2025-04-06 DIAGNOSIS — R06.00 DYSPNEA, UNSPECIFIED TYPE: Primary | ICD-10-CM

## 2025-04-06 PROBLEM — J45.909 REACTIVE AIRWAY DISEASE: Status: ACTIVE | Noted: 2025-04-06

## 2025-04-06 PROBLEM — R06.02 SHORTNESS OF BREATH: Status: ACTIVE | Noted: 2025-04-04

## 2025-04-06 PROBLEM — J06.9 UPPER RESPIRATORY TRACT INFECTION: Status: ACTIVE | Noted: 2025-04-06

## 2025-04-06 LAB
ALBUMIN SERPL-MCNC: 3 G/DL (ref 3.5–5)
ALBUMIN/GLOB SERPL: 0.7 (ref 1.1–2.2)
ALP SERPL-CCNC: 120 U/L (ref 45–117)
ALT SERPL-CCNC: 11 U/L (ref 12–78)
ANION GAP SERPL CALC-SCNC: 6 MMOL/L (ref 2–12)
ANION GAP SERPL CALC-SCNC: 7 MMOL/L (ref 2–12)
AST SERPL-CCNC: 11 U/L (ref 15–37)
BASOPHILS # BLD: 0.03 K/UL (ref 0–0.1)
BASOPHILS # BLD: 0.04 K/UL (ref 0–0.1)
BASOPHILS NFR BLD: 0.3 % (ref 0–1)
BASOPHILS NFR BLD: 0.4 % (ref 0–1)
BILIRUB SERPL-MCNC: 0.3 MG/DL (ref 0.2–1)
BUN SERPL-MCNC: 7 MG/DL (ref 6–20)
BUN SERPL-MCNC: 8 MG/DL (ref 6–20)
BUN/CREAT SERPL: 9 (ref 12–20)
BUN/CREAT SERPL: 9 (ref 12–20)
CALCIUM SERPL-MCNC: 8.8 MG/DL (ref 8.5–10.1)
CALCIUM SERPL-MCNC: 9.5 MG/DL (ref 8.5–10.1)
CHLORIDE SERPL-SCNC: 106 MMOL/L (ref 97–108)
CHLORIDE SERPL-SCNC: 108 MMOL/L (ref 97–108)
CO2 SERPL-SCNC: 26 MMOL/L (ref 21–32)
CO2 SERPL-SCNC: 29 MMOL/L (ref 21–32)
CREAT SERPL-MCNC: 0.75 MG/DL (ref 0.55–1.02)
CREAT SERPL-MCNC: 0.94 MG/DL (ref 0.55–1.02)
DIFFERENTIAL METHOD BLD: ABNORMAL
DIFFERENTIAL METHOD BLD: ABNORMAL
EOSINOPHIL # BLD: 0.66 K/UL (ref 0–0.4)
EOSINOPHIL # BLD: 0.73 K/UL (ref 0–0.4)
EOSINOPHIL NFR BLD: 6.3 % (ref 0–7)
EOSINOPHIL NFR BLD: 6.6 % (ref 0–7)
ERYTHROCYTE [DISTWIDTH] IN BLOOD BY AUTOMATED COUNT: 14.4 % (ref 11.5–14.5)
ERYTHROCYTE [DISTWIDTH] IN BLOOD BY AUTOMATED COUNT: 14.5 % (ref 11.5–14.5)
GLOBULIN SER CALC-MCNC: 4.6 G/DL (ref 2–4)
GLUCOSE BLD STRIP.AUTO-MCNC: 111 MG/DL (ref 65–117)
GLUCOSE BLD STRIP.AUTO-MCNC: 155 MG/DL (ref 65–117)
GLUCOSE SERPL-MCNC: 109 MG/DL (ref 65–100)
GLUCOSE SERPL-MCNC: 113 MG/DL (ref 65–100)
HCT VFR BLD AUTO: 28.2 % (ref 35–47)
HCT VFR BLD AUTO: 31 % (ref 35–47)
HGB BLD-MCNC: 8.7 G/DL (ref 11.5–16)
HGB BLD-MCNC: 9.9 G/DL (ref 11.5–16)
IMM GRANULOCYTES # BLD AUTO: 0.05 K/UL (ref 0–0.04)
IMM GRANULOCYTES # BLD AUTO: 0.11 K/UL (ref 0–0.04)
IMM GRANULOCYTES NFR BLD AUTO: 0.5 % (ref 0–0.5)
IMM GRANULOCYTES NFR BLD AUTO: 1 % (ref 0–0.5)
LYMPHOCYTES # BLD: 1.95 K/UL (ref 0.8–3.5)
LYMPHOCYTES # BLD: 2.67 K/UL (ref 0.8–3.5)
LYMPHOCYTES NFR BLD: 17.7 % (ref 12–49)
LYMPHOCYTES NFR BLD: 25.3 % (ref 12–49)
MCH RBC QN AUTO: 28 PG (ref 26–34)
MCH RBC QN AUTO: 28.2 PG (ref 26–34)
MCHC RBC AUTO-ENTMCNC: 30.9 G/DL (ref 30–36.5)
MCHC RBC AUTO-ENTMCNC: 31.9 G/DL (ref 30–36.5)
MCV RBC AUTO: 88.3 FL (ref 80–99)
MCV RBC AUTO: 90.7 FL (ref 80–99)
MONOCYTES # BLD: 0.64 K/UL (ref 0–1)
MONOCYTES # BLD: 0.64 K/UL (ref 0–1)
MONOCYTES NFR BLD: 5.8 % (ref 5–13)
MONOCYTES NFR BLD: 6.1 % (ref 5–13)
NEUTS SEG # BLD: 6.43 K/UL (ref 1.8–8)
NEUTS SEG # BLD: 7.6 K/UL (ref 1.8–8)
NEUTS SEG NFR BLD: 60.9 % (ref 32–75)
NEUTS SEG NFR BLD: 69.1 % (ref 32–75)
NRBC # BLD: 0 K/UL (ref 0–0.01)
NRBC # BLD: 0 K/UL (ref 0–0.01)
NRBC BLD-RTO: 0 PER 100 WBC
NRBC BLD-RTO: 0 PER 100 WBC
PLATELET # BLD AUTO: 319 K/UL (ref 150–400)
PLATELET # BLD AUTO: 342 K/UL (ref 150–400)
PMV BLD AUTO: 9.6 FL (ref 8.9–12.9)
PMV BLD AUTO: 9.8 FL (ref 8.9–12.9)
POTASSIUM SERPL-SCNC: 3.4 MMOL/L (ref 3.5–5.1)
POTASSIUM SERPL-SCNC: 3.9 MMOL/L (ref 3.5–5.1)
PROT SERPL-MCNC: 7.6 G/DL (ref 6.4–8.2)
RBC # BLD AUTO: 3.11 M/UL (ref 3.8–5.2)
RBC # BLD AUTO: 3.51 M/UL (ref 3.8–5.2)
SERVICE CMNT-IMP: ABNORMAL
SERVICE CMNT-IMP: NORMAL
SODIUM SERPL-SCNC: 141 MMOL/L (ref 136–145)
SODIUM SERPL-SCNC: 141 MMOL/L (ref 136–145)
TROPONIN I SERPL HS-MCNC: 4 NG/L (ref 0–51)
WBC # BLD AUTO: 10.6 K/UL (ref 3.6–11)
WBC # BLD AUTO: 11 K/UL (ref 3.6–11)

## 2025-04-06 PROCEDURE — 94640 AIRWAY INHALATION TREATMENT: CPT

## 2025-04-06 PROCEDURE — 6370000000 HC RX 637 (ALT 250 FOR IP): Performed by: EMERGENCY MEDICINE

## 2025-04-06 PROCEDURE — 84484 ASSAY OF TROPONIN QUANT: CPT

## 2025-04-06 PROCEDURE — 85025 COMPLETE CBC W/AUTO DIFF WBC: CPT

## 2025-04-06 PROCEDURE — 82962 GLUCOSE BLOOD TEST: CPT

## 2025-04-06 PROCEDURE — 99285 EMERGENCY DEPT VISIT HI MDM: CPT

## 2025-04-06 PROCEDURE — 94761 N-INVAS EAR/PLS OXIMETRY MLT: CPT

## 2025-04-06 PROCEDURE — 99238 HOSP IP/OBS DSCHRG MGMT 30/<: CPT | Performed by: FAMILY MEDICINE

## 2025-04-06 PROCEDURE — 97165 OT EVAL LOW COMPLEX 30 MIN: CPT

## 2025-04-06 PROCEDURE — 97535 SELF CARE MNGMENT TRAINING: CPT

## 2025-04-06 PROCEDURE — 6370000000 HC RX 637 (ALT 250 FOR IP)

## 2025-04-06 PROCEDURE — 71045 X-RAY EXAM CHEST 1 VIEW: CPT

## 2025-04-06 PROCEDURE — 2500000003 HC RX 250 WO HCPCS

## 2025-04-06 PROCEDURE — 80053 COMPREHEN METABOLIC PANEL: CPT

## 2025-04-06 PROCEDURE — 6360000002 HC RX W HCPCS

## 2025-04-06 PROCEDURE — 93005 ELECTROCARDIOGRAM TRACING: CPT | Performed by: EMERGENCY MEDICINE

## 2025-04-06 PROCEDURE — 36415 COLL VENOUS BLD VENIPUNCTURE: CPT

## 2025-04-06 RX ORDER — BUDESONIDE AND FORMOTEROL FUMARATE DIHYDRATE 160; 4.5 UG/1; UG/1
2 AEROSOL RESPIRATORY (INHALATION) 2 TIMES DAILY
Qty: 10.2 G | Refills: 0 | Status: CANCELLED | OUTPATIENT
Start: 2025-04-06

## 2025-04-06 RX ORDER — BUDESONIDE AND FORMOTEROL FUMARATE DIHYDRATE 160; 4.5 UG/1; UG/1
2 AEROSOL RESPIRATORY (INHALATION) 2 TIMES DAILY
Qty: 10.2 G | Refills: 3 | Status: SHIPPED | OUTPATIENT
Start: 2025-04-06

## 2025-04-06 RX ORDER — IPRATROPIUM BROMIDE AND ALBUTEROL SULFATE 2.5; .5 MG/3ML; MG/3ML
1 SOLUTION RESPIRATORY (INHALATION)
Status: COMPLETED | OUTPATIENT
Start: 2025-04-06 | End: 2025-04-06

## 2025-04-06 RX ORDER — FLUCONAZOLE 100 MG/1
150 TABLET ORAL ONCE
Status: COMPLETED | OUTPATIENT
Start: 2025-04-06 | End: 2025-04-06

## 2025-04-06 RX ORDER — CEPHALEXIN 500 MG/1
500 CAPSULE ORAL 3 TIMES DAILY
Qty: 21 CAPSULE | Refills: 0 | Status: SHIPPED | OUTPATIENT
Start: 2025-04-06 | End: 2025-04-13

## 2025-04-06 RX ORDER — IPRATROPIUM BROMIDE AND ALBUTEROL SULFATE 2.5; .5 MG/3ML; MG/3ML
1 SOLUTION RESPIRATORY (INHALATION) EVERY 4 HOURS
Qty: 360 ML | Refills: 0 | Status: SHIPPED | OUTPATIENT
Start: 2025-04-06

## 2025-04-06 RX ORDER — OXYCODONE HYDROCHLORIDE 5 MG/1
5 TABLET ORAL EVERY 6 HOURS PRN
Qty: 2 TABLET | Refills: 0 | Status: CANCELLED | OUTPATIENT
Start: 2025-04-06 | End: 2025-04-08

## 2025-04-06 RX ORDER — FLUCONAZOLE 150 MG/1
150 TABLET ORAL ONCE
Qty: 1 TABLET | Refills: 0 | Status: SHIPPED | OUTPATIENT
Start: 2025-04-06 | End: 2025-04-06

## 2025-04-06 RX ADMIN — Medication 1000 UNITS: at 08:56

## 2025-04-06 RX ADMIN — OXYCODONE HYDROCHLORIDE AND ACETAMINOPHEN 250 MG: 500 TABLET ORAL at 08:56

## 2025-04-06 RX ADMIN — ASPIRIN 81 MG: 81 TABLET, CHEWABLE ORAL at 08:56

## 2025-04-06 RX ADMIN — FLUCONAZOLE 150 MG: 100 TABLET ORAL at 08:56

## 2025-04-06 RX ADMIN — TROSPIUM CHLORIDE 20 MG: 20 TABLET, FILM COATED ORAL at 08:56

## 2025-04-06 RX ADMIN — MONTELUKAST 10 MG: 10 TABLET, FILM COATED ORAL at 08:56

## 2025-04-06 RX ADMIN — IPRATROPIUM BROMIDE AND ALBUTEROL SULFATE 1 DOSE: .5; 3 SOLUTION RESPIRATORY (INHALATION) at 17:42

## 2025-04-06 RX ADMIN — CYANOCOBALAMIN TAB 500 MCG 1000 MCG: 500 TAB at 08:56

## 2025-04-06 RX ADMIN — ARFORMOTEROL TARTRATE: 15 SOLUTION RESPIRATORY (INHALATION) at 07:51

## 2025-04-06 RX ADMIN — SENNOSIDES, DOCUSATE SODIUM 2 TABLET: 50; 8.6 TABLET, FILM COATED ORAL at 08:56

## 2025-04-06 RX ADMIN — ENOXAPARIN SODIUM 40 MG: 100 INJECTION SUBCUTANEOUS at 08:59

## 2025-04-06 RX ADMIN — CETIRIZINE HYDROCHLORIDE 10 MG: 10 TABLET, FILM COATED ORAL at 08:56

## 2025-04-06 RX ADMIN — OXYCODONE HYDROCHLORIDE 5 MG: 5 TABLET ORAL at 09:14

## 2025-04-06 RX ADMIN — LOSARTAN POTASSIUM 25 MG: 25 TABLET, FILM COATED ORAL at 09:06

## 2025-04-06 RX ADMIN — POLYETHYLENE GLYCOL 3350 17 G: 17 POWDER, FOR SOLUTION ORAL at 08:59

## 2025-04-06 RX ADMIN — ACETAMINOPHEN 650 MG: 325 TABLET ORAL at 12:04

## 2025-04-06 RX ADMIN — SODIUM CHLORIDE, PRESERVATIVE FREE 10 ML: 5 INJECTION INTRAVENOUS at 09:43

## 2025-04-06 ASSESSMENT — PAIN DESCRIPTION - DESCRIPTORS
DESCRIPTORS: ACHING

## 2025-04-06 ASSESSMENT — PAIN DESCRIPTION - LOCATION
LOCATION: ARM;SHOULDER
LOCATION: SHOULDER
LOCATION: SHOULDER

## 2025-04-06 ASSESSMENT — LIFESTYLE VARIABLES
HOW OFTEN DO YOU HAVE A DRINK CONTAINING ALCOHOL: PATIENT DECLINED
HOW MANY STANDARD DRINKS CONTAINING ALCOHOL DO YOU HAVE ON A TYPICAL DAY: PATIENT DECLINED

## 2025-04-06 ASSESSMENT — PAIN DESCRIPTION - PAIN TYPE: TYPE: ACUTE PAIN;SURGICAL PAIN

## 2025-04-06 ASSESSMENT — PAIN DESCRIPTION - ORIENTATION
ORIENTATION: RIGHT
ORIENTATION: RIGHT

## 2025-04-06 ASSESSMENT — PAIN SCALES - GENERAL
PAINLEVEL_OUTOF10: 5
PAINLEVEL_OUTOF10: 9
PAINLEVEL_OUTOF10: 7

## 2025-04-06 NOTE — CARE COORDINATION
@1149 Case management follow up  Met with family/patient at bedside, discussed HH referral remains pending.  Will follow up either by EOD today or Monday once agency has reviewed.    Family practice made aware.  Nurse inquired on SNF placement, confirmed with FP discharge plan is home with HH.  Quad cane discussed with possible bill.  Family would like to use patients Medicare OTC benefit to purchase cane once home.   Family practice called at bedside as family had questions.   No further questions noted at end of discussion. JW    @ 104 Case management follow up  Met with patient at bedside, discussed HH referral/services.  Patient is in agreement for referral to [facility].    Referral placed via [facility].     Quad cane referral- sent via [facility] to [employer].    Call made to [employer] @ [phone], spoke to Stephen regarding referral/review to be able to provide from consignment as dc orders are in.  Markell states referral was received and may be a bit before review.  Explained dc orders are in and patient is waiting.  LORENZA

## 2025-04-06 NOTE — ED TRIAGE NOTES
Pt arrives via EMS for dizziness and SOB.   Pt recently has rotator cuff surgery on the RIGHT arm 3/10.    Pt states this happens every time she has surgery

## 2025-04-06 NOTE — PROGRESS NOTES
[facility]    Office [phone], Fax [phone]     Senior Resident Admission Note     CC:   Chief Complaint   Patient presents with    Shortness of Breath       HPI:  Laura Lu is a 85 y.o. female who presents to the ER with the above complaint.     Chart reviewed. Patient seen, examined, and discussed with the Dr. Woodward (PGY-1). See H&P for more details.    A/P: Admit to tele. Code status: Full.    DOWNS: AoC. Worse over last several weeks. No CP or worsening edema of legs. Patient bradycardic in perioperative peroid per chart reviewing march 2025. Metoprlol was stopped at the time 2/2 HR in 40s. HR 80s in cardiology office on 3/14/25. Cath in 4/14/23 with no PCI. Echo 11/4/24: EF 60-65%. Abonrmal diastolic function. Mild MR. Mild TR. CXR with atelectasis. EKG NSR LAD and NS changes. Suspect polypharmacy and age contributing to presentation.    - Echo ordered   - Recommend DCing Flexeril, Zanaflex, Pregabalin, Tramdol (takes PRN).   - Trend troponin         I agree with remaining assessment and plan as documented in Full H&P as mentioned above.     Pt discussed with Dr. Faby Kwon MD  Family Medicine Resident   
0700: Bedside and Verbal shift change report given to Mireya RN and Gina RN (oncoming nurse) by Maria E RN (offgoing nurse). Report included the following information Nurse Handoff Report.     1900: Bedside and Verbal shift change report given to Leda URIBE (oncoming nurse) by Mireya URIBE and Gina RN (offgoing nurse). Report included the following information Nurse Handoff Report.     
Pharmacy Dosing Services    Pharmacist Renal Dosing      Previous Regimen Trospium 20 mg bid   Serum Creatinine Lab Results   Component Value Date/Time    CREATININE 0.97 04/04/2025 02:42 PM      Creatinine Clearance Estimated Creatinine Clearance: 44 mL/min (A) (based on SCr of 0.97 mg/dL (H)).   BUN Lab Results   Component Value Date/Time    BUN 13 04/04/2025 02:42 PM       Dose administration notes:   85 yof.         Plan: change to daily dose.    
Physical Therapy Note:    PT evaluation deferred. Pt was receiving treatment from another service and was not available to participate at time of attempt. Will follow.    Radha Hayes, PT, DPT, ESTUARDO    
250 mg Oral Daily    atorvastatin (LIPITOR) tablet 40 mg  40 mg Oral Nightly    arformoterol 15 mcg-budesonide 0.5 mg neb solution   Nebulization BID RT    vitamin B-12 (CYANOCOBALAMIN) tablet 1,000 mcg  1,000 mcg Oral Daily    hydrOXYzine HCl (ATARAX) tablet 25 mg  25 mg Oral Nightly    latanoprost (XALATAN) 0.005 % ophthalmic solution 1 drop  1 drop Both Eyes Nightly    losartan (COZAAR) tablet 25 mg  25 mg Oral Daily    trospium (SANCTURA) tablet 20 mg  20 mg Oral Daily    oxyCODONE (ROXICODONE) immediate release tablet 5 mg  5 mg Oral Q6H PRN    Vitamin D (CHOLECALCIFEROL) tablet 1,000 Units  1,000 Units Oral Daily    glucose chewable tablet 16 g  4 tablet Oral PRN    dextrose bolus 10% 125 mL  125 mL IntraVENous PRN    Or    dextrose bolus 10% 250 mL  250 mL IntraVENous PRN    glucagon injection 1 mg  1 mg SubCUTAneous PRN    dextrose 10 % infusion   IntraVENous Continuous PRN    insulin lispro (HUMALOG,ADMELOG) injection vial 0-4 Units  0-4 Units SubCUTAneous 4x Daily AC & HS    polyethylene glycol (GLYCOLAX) packet 17 g  17 g Oral Daily     Allergies  Allergies   Allergen Reactions    Sulfa Antibiotics Hives and Itching    Adhesive Tape Other (See Comments)     CARDIAC PATCHES REDNESS     CBC:  Recent Labs     04/04/25  1442 04/05/25  0131   WBC 10.3 10.2   HGB 9.9* 8.7*    330     Metabolic Panel:  Recent Labs     04/04/25  1442 04/05/25  0131    142   K 4.4 3.9    110*   CO2 28 27   BUN 13 12   ALT 10  --      Results       ** No results found for the last 336 hours. **            Imaging/procedures:   No new.         Assessment and Plan     Laura Lu is a 85 y.o. female with a PMHx of sinus bradycardia, hx of chronic dyspnea/fatigue, HTN, T2DM, CKD II, HLD, R rotator cuff tear s/p repair 3/10/2025 who is admitted for dyspnea on exertion.     Dyspnea on exertion  Acute on chronic, with reported recent gradual worsening noticed after right shoulder surgery last March 10.  Partially 
Saud Rivas Sandra D. Passek, Stephen Ye, Al Duncan, -PAC “6-Clicks” Functional Assessment Scores Predict Acute Care Hospital Discharge Destination, Physical Therapy, Volume 94, Issue 9, 1 September 2014, Pages 0283-8441, [email or web address]://[email or web address]    Pain Rating:  Tolerable in R shoulder      Pain Intervention(s):   Nurse aware    Activity Tolerance:   Good    After treatment:   Patient left in no apparent distress sitting up in chair, Call bell within reach, and Bed/ chair alarm activated    COMMUNICATION/EDUCATION:   The patient's plan of care was discussed with: registered nurse    Patient Education  Education Given To: Patient  Education Provided: Role of Therapy;Transfer Training;Plan of Care;Fall Prevention Strategies  Education Method: Verbal  Barriers to Learning: None  Education Outcome: Verbalized understanding;Demonstrated understanding    Thank you for this referral.  Rupinder Kilgore OT  Minutes: 22    Occupational Therapy Evaluation Charge Determination   History Examination Decision-Making   LOW Complexity : Brief history review  LOW Complexity: 1-3 Performance deficits relating to physical, cognitive, or psychosocial skills that result in activity limitations and/or participation restrictions LOW Complexity: No comorbidities that affect functional and  no verbal  or physical assist needed to complete eval tasks   Based on the above components, the patient evaluation is determined to be of the following complexity level: Low

## 2025-04-06 NOTE — ED PROVIDER NOTES
solution for the nebulizer machine to use as needed for dyspneic events.  Advise slow deep breaths at home when she starts to feel anxious about her breathing. [BN]      ED Course User Index  [BN] Luis Angel Anderson MD         CONSULTS:  None    PROCEDURES:     Procedures    (Please note that portions of this note were completed with a voice recognition program.  Efforts were made to edit the dictations but occasionally words are mis-transcribed.)    Luis Angel Anderson MD (electronically signed)  Emergency Attending Physician              Luis Angel Anderson MD  04/06/25 1958

## 2025-04-07 LAB
EKG ATRIAL RATE: 94 BPM
EKG DIAGNOSIS: NORMAL
EKG P AXIS: 43 DEGREES
EKG P-R INTERVAL: 180 MS
EKG Q-T INTERVAL: 342 MS
EKG QRS DURATION: 78 MS
EKG QTC CALCULATION (BAZETT): 427 MS
EKG R AXIS: -50 DEGREES
EKG T AXIS: 31 DEGREES
EKG VENTRICULAR RATE: 94 BPM

## 2025-04-07 PROCEDURE — 93010 ELECTROCARDIOGRAM REPORT: CPT | Performed by: SPECIALIST

## 2025-04-07 NOTE — CARE COORDINATION
4/7/2025 9:25 AM [facility] is unable to accept pt. CM sent additional home health referrals. [facility] has accepted pt and will go out to see pt at home within 24-48 hours.  CM called and updated pt's daughter, Fela at [phone].  JAVIER BullW

## 2025-04-08 ENCOUNTER — TELEPHONE (OUTPATIENT)
Age: 85
End: 2025-04-08

## 2025-04-10 ENCOUNTER — TELEPHONE (OUTPATIENT)
Age: 85
End: 2025-04-10

## 2025-04-10 NOTE — TELEPHONE ENCOUNTER
Pt update:  Has a new stage 2 pressure ulcer in her left gluteal cleft. Wanting to do calcium alginate 2 times a week.

## 2025-04-17 ENCOUNTER — LAB (OUTPATIENT)
Age: 85
End: 2025-04-17
Payer: MEDICARE

## 2025-04-17 ENCOUNTER — OFFICE VISIT (OUTPATIENT)
Age: 85
End: 2025-04-17
Payer: MEDICARE

## 2025-04-17 VITALS
OXYGEN SATURATION: 95 % | HEIGHT: 63 IN | WEIGHT: 175.4 LBS | SYSTOLIC BLOOD PRESSURE: 136 MMHG | RESPIRATION RATE: 16 BRPM | DIASTOLIC BLOOD PRESSURE: 82 MMHG | HEART RATE: 64 BPM | BODY MASS INDEX: 31.08 KG/M2 | TEMPERATURE: 98.3 F

## 2025-04-17 DIAGNOSIS — R00.1 SYMPTOMATIC BRADYCARDIA: ICD-10-CM

## 2025-04-17 DIAGNOSIS — Z13.0 SCREENING FOR DEFICIENCY ANEMIA: ICD-10-CM

## 2025-04-17 DIAGNOSIS — Z79.899 POLYPHARMACY: ICD-10-CM

## 2025-04-17 DIAGNOSIS — Z09 HOSPITAL DISCHARGE FOLLOW-UP: ICD-10-CM

## 2025-04-17 DIAGNOSIS — I27.20 PULMONARY HYPERTENSION, UNSPECIFIED (HCC): ICD-10-CM

## 2025-04-17 DIAGNOSIS — R06.02 SHORTNESS OF BREATH: ICD-10-CM

## 2025-04-17 DIAGNOSIS — R00.1 SYMPTOMATIC BRADYCARDIA: Primary | ICD-10-CM

## 2025-04-17 PROCEDURE — 1111F DSCHRG MED/CURRENT MED MERGE: CPT | Performed by: FAMILY MEDICINE

## 2025-04-17 PROCEDURE — 3079F DIAST BP 80-89 MM HG: CPT | Performed by: FAMILY MEDICINE

## 2025-04-17 PROCEDURE — 99214 OFFICE O/P EST MOD 30 MIN: CPT | Performed by: FAMILY MEDICINE

## 2025-04-17 PROCEDURE — 3075F SYST BP GE 130 - 139MM HG: CPT | Performed by: FAMILY MEDICINE

## 2025-04-17 PROCEDURE — 1123F ACP DISCUSS/DSCN MKR DOCD: CPT | Performed by: FAMILY MEDICINE

## 2025-04-17 RX ORDER — PANTOPRAZOLE SODIUM 40 MG/1
1 TABLET, DELAYED RELEASE ORAL DAILY
COMMUNITY

## 2025-04-17 RX ORDER — FLUCONAZOLE 150 MG/1
TABLET ORAL
COMMUNITY
Start: 2025-04-07

## 2025-04-17 NOTE — PROGRESS NOTES
Identified pt with two pt identifiers(name and )    Chief Complaint   Patient presents with    Follow-Up from Hospital     Patient was in the hospital  for dizziness and and SOB and is feeling better but is still having some sob         Health Maintenance Due   Topic    DTaP/Tdap/Td vaccine (1 - Tdap)    Shingles vaccine (1 of 2)    Respiratory Syncytial Virus (RSV) Pregnant or age 60 yrs+ (1 - 1-dose 75+ series)    COVID-19 Vaccine ( season)    Annual Wellness Visit (Medicare Advantage)        Wt Readings from Last 3 Encounters:   25 84.8 kg (187 lb)   25 85.1 kg (187 lb 9.6 oz)   25 86.6 kg (191 lb)     Temp Readings from Last 3 Encounters:   25 98 °F (36.7 °C) (Oral)   25 98.4 °F (36.9 °C) (Oral)   25 98.1 °F (36.7 °C) (Oral)     BP Readings from Last 3 Encounters:   25 135/64   25 109/65   25 126/72     Pulse Readings from Last 3 Encounters:   25 86   25 81   25 94           Depression Screening:  :         2025     9:21 AM 2024    10:37 AM 2024     2:28 PM 3/21/2024    10:40 AM 2023     9:51 AM 10/5/2023     2:09 PM 2023    10:00 AM   PHQ-9 Questionaire   Little interest or pleasure in doing things 0 0 0 0 0 0 0   Feeling down, depressed, or hopeless 0 0 0 0 0 0 0   Trouble falling or staying asleep, or sleeping too much  0 0 0  0    Feeling tired or having little energy  0 0 3  0    Poor appetite or overeating  0 0 3  0    Feeling bad about yourself - or that you are a failure or have let yourself or your family down  0 0 0  0    Trouble concentrating on things, such as reading the newspaper or watching television  0 0 3  0    Moving or speaking so slowly that other people could have noticed. Or the opposite - being so fidgety or restless that you have been moving around a lot more than usual  0 0 0  0    Thoughts that you would be better off dead, or of hurting yourself in some way  0 0 0  0

## 2025-04-18 ENCOUNTER — RESULTS FOLLOW-UP (OUTPATIENT)
Age: 85
End: 2025-04-18

## 2025-04-18 LAB
BASOPHILS # BLD AUTO: 0 X10E3/UL (ref 0–0.2)
BASOPHILS NFR BLD AUTO: 1 %
EOSINOPHIL # BLD AUTO: 0.2 X10E3/UL (ref 0–0.4)
EOSINOPHIL NFR BLD AUTO: 3 %
ERYTHROCYTE [DISTWIDTH] IN BLOOD BY AUTOMATED COUNT: 13.7 % (ref 11.7–15.4)
FERRITIN SERPL-MCNC: 342 NG/ML (ref 15–150)
HCT VFR BLD AUTO: 29.1 % (ref 34–46.6)
HGB BLD-MCNC: 9.3 G/DL (ref 11.1–15.9)
IMM GRANULOCYTES # BLD AUTO: 0 X10E3/UL (ref 0–0.1)
IMM GRANULOCYTES NFR BLD AUTO: 1 %
IRON SATN MFR SERPL: 13 % (ref 15–55)
IRON SERPL-MCNC: 36 UG/DL (ref 27–139)
LYMPHOCYTES # BLD AUTO: 1.8 X10E3/UL (ref 0.7–3.1)
LYMPHOCYTES NFR BLD AUTO: 23 %
MCH RBC QN AUTO: 28.3 PG (ref 26.6–33)
MCHC RBC AUTO-ENTMCNC: 32 G/DL (ref 31.5–35.7)
MCV RBC AUTO: 88 FL (ref 79–97)
MONOCYTES # BLD AUTO: 0.5 X10E3/UL (ref 0.1–0.9)
MONOCYTES NFR BLD AUTO: 6 %
NEUTROPHILS # BLD AUTO: 5.4 X10E3/UL (ref 1.4–7)
NEUTROPHILS NFR BLD AUTO: 66 %
PLATELET # BLD AUTO: 261 X10E3/UL (ref 150–450)
RBC # BLD AUTO: 3.29 X10E6/UL (ref 3.77–5.28)
TIBC SERPL-MCNC: 279 UG/DL (ref 250–450)
UIBC SERPL-MCNC: 243 UG/DL (ref 118–369)
WBC # BLD AUTO: 7.9 X10E3/UL (ref 3.4–10.8)

## 2025-04-18 ASSESSMENT — ENCOUNTER SYMPTOMS
NAUSEA: 0
BLOOD IN STOOL: 0
SINUS PRESSURE: 0
VOMITING: 0
CONSTIPATION: 0
SHORTNESS OF BREATH: 0
ABDOMINAL DISTENTION: 0
WHEEZING: 0
BACK PAIN: 0
DIARRHEA: 0
COUGH: 0
SORE THROAT: 0
SINUS PAIN: 0
ANAL BLEEDING: 0
ABDOMINAL PAIN: 0
RHINORRHEA: 0
CHEST TIGHTNESS: 0

## 2025-04-18 NOTE — PROGRESS NOTES
Laura Lu (:  1940) is a 85 y.o. female, Established patient, here for evaluation of the following chief complaint(s):  Follow-Up from Hospital (Patient was in the hospital  for dizziness and and SOB and is feeling better but is still having some sob )         Assessment & Plan  1. Dizziness.  - Dizziness may be attributed to a combination of bradycardia, polypharmacy, and anemia.  - Heart rate is on the lower side, and she experiences dizziness and near syncope during activities like walking.  - The use of Flexeril, tramadol, and Lyrica could be contributing to her symptoms.  - Blood work will be conducted today to assess her current hemoglobin levels and determine if further intervention is necessary.    2. Anemia.  - Anemia is likely contributing to her dizziness and elevated troponin levels, indicating increased cardiac stress.  - Hemoglobin levels dropped from 11.1 in 2025 to 9.6 in 2025, which is still within an acceptable range but indicates a significant decrease.  - Blood work will be repeated today to monitor her hemoglobin levels and decide if additional treatment is needed.  - She has been feeling extremely cold, which may be related to her anemia.    3. Yeast urinary tract infection.  - Frequently experiences yeast urinary tract infections.  - No specific treatment plan was discussed during this visit.  - Blood sugar levels are reported to be normal at home.    4. Open wound on buttocks.  - Has an open wound on her buttocks that requires daily saline application for the next 2 to 3 weeks to promote healing.  - Advised to apply normal saline daily to aid in the healing process.  - Wound care has been ongoing, and she has been using medicated paste to cover the wound.    5. Dry skin dermatitis.  - Persistent dry spot on her back, likely due to contact dermatitis from pressure and lack of lotion application.  - Advised to avoid scratching the area and to apply lotion regularly

## 2025-04-18 NOTE — RESULT ENCOUNTER NOTE
Good morning Ms. Lu,     We received the results of your blood work and it is significant for anemia, that has worsened since your hospital visit. Please monitor for dark colored stools, or blood in stool, along with bleeding from any other site, if you develop any of these symptoms, please follow up with your primary care provider asap.     Sincerely,     Dr. Alonso

## 2025-04-25 ENCOUNTER — TELEPHONE (OUTPATIENT)
Age: 85
End: 2025-04-25

## 2025-04-25 NOTE — TELEPHONE ENCOUNTER
Returned call.  Recommended patient reach out to pcp to guide treatment plan for hand tremors.  Understanding expressed.

## 2025-04-25 NOTE — TELEPHONE ENCOUNTER
Patient's daughter Fela is calling because her mother is back having tremors in her hands again.Patient's daughter would like to know what can be done about this.    [phone] Fela (daughter)

## 2025-04-25 NOTE — TELEPHONE ENCOUNTER
Pt's daughter called in looking for advice. She states that the shaking in Pt's hands has come back(had calmed down a bit). Pt was tested for Parkinsons but that was negative. She is wondering if she could contact her heart doctor or if she should make an appt with Dr. Alonso?    Please advise

## 2025-04-28 NOTE — RESULT ENCOUNTER NOTE
Good morning Ms. Lu,     I reviewed your blood work and it is stable for your iron deficiency anemia. If you experience any bleeding or any other symptoms please let us know. For now, please schedule a follow up with your PCP and we will repeat these levels in about 1 month.     Sincerely,     Dr. Alonso

## 2025-05-06 ENCOUNTER — TELEPHONE (OUTPATIENT)
Age: 85
End: 2025-05-06

## 2025-05-06 NOTE — TELEPHONE ENCOUNTER
Patient's daughter Fela is calling because her mother heart rate has been fluctuating up and down.    Patient heart rate reached a 100-195.Patient daughter put her back on the Metoprolol for the last 4 days.    Fela said she has been given her half of the pill in the morning and at night.Patient's daughter would like to know if it is anything else that the doctor may want her to do.    [phone] Josefina (daughter)

## 2025-05-07 ENCOUNTER — TELEPHONE (OUTPATIENT)
Age: 85
End: 2025-05-07

## 2025-05-07 NOTE — TELEPHONE ENCOUNTER
Spoke with patient's daughter.  Stated metoprolol was stopped post surgery d/t low heart rates.  Daughter added it back d/t elevated heart rates while out of town at a  up to 195 bpm.  Scheduled for office evaluation and EKG.

## 2025-05-09 ENCOUNTER — TELEPHONE (OUTPATIENT)
Age: 85
End: 2025-05-09

## 2025-05-09 ENCOUNTER — OFFICE VISIT (OUTPATIENT)
Age: 85
End: 2025-05-09
Payer: MEDICARE

## 2025-05-09 ENCOUNTER — ANCILLARY PROCEDURE (OUTPATIENT)
Age: 85
End: 2025-05-09
Payer: MEDICARE

## 2025-05-09 VITALS
BODY MASS INDEX: 30.65 KG/M2 | WEIGHT: 173 LBS | OXYGEN SATURATION: 98 % | HEIGHT: 63 IN | SYSTOLIC BLOOD PRESSURE: 116 MMHG | RESPIRATION RATE: 17 BRPM | DIASTOLIC BLOOD PRESSURE: 60 MMHG | HEART RATE: 55 BPM

## 2025-05-09 DIAGNOSIS — R06.00 DYSPNEA, UNSPECIFIED TYPE: ICD-10-CM

## 2025-05-09 DIAGNOSIS — R00.2 PALPITATION: ICD-10-CM

## 2025-05-09 DIAGNOSIS — I10 ESSENTIAL (PRIMARY) HYPERTENSION: ICD-10-CM

## 2025-05-09 DIAGNOSIS — G47.33 OSA ON CPAP: ICD-10-CM

## 2025-05-09 DIAGNOSIS — R00.0 HEART RATE FAST: Primary | ICD-10-CM

## 2025-05-09 PROCEDURE — 1123F ACP DISCUSS/DSCN MKR DOCD: CPT | Performed by: INTERNAL MEDICINE

## 2025-05-09 PROCEDURE — 3078F DIAST BP <80 MM HG: CPT | Performed by: INTERNAL MEDICINE

## 2025-05-09 PROCEDURE — 99214 OFFICE O/P EST MOD 30 MIN: CPT | Performed by: INTERNAL MEDICINE

## 2025-05-09 PROCEDURE — 93225 XTRNL ECG REC<48 HRS REC: CPT | Performed by: INTERNAL MEDICINE

## 2025-05-09 PROCEDURE — 1159F MED LIST DOCD IN RCRD: CPT | Performed by: INTERNAL MEDICINE

## 2025-05-09 PROCEDURE — 1160F RVW MEDS BY RX/DR IN RCRD: CPT | Performed by: INTERNAL MEDICINE

## 2025-05-09 PROCEDURE — 1126F AMNT PAIN NOTED NONE PRSNT: CPT | Performed by: INTERNAL MEDICINE

## 2025-05-09 PROCEDURE — 3074F SYST BP LT 130 MM HG: CPT | Performed by: INTERNAL MEDICINE

## 2025-05-09 NOTE — PROGRESS NOTES
had concerns including Hyperlipidemia and Hypertension.    Vitals:    05/09/25 0952   BP: 116/60   BP Site: Left Upper Arm   Patient Position: Sitting   Pulse: 55   Resp: 17   SpO2: 98%   Weight: 78.5 kg (173 lb)   Height: 1.6 m (5' 3\")        Chest pain No    Refills No        1. Have you been to the ER, urgent care clinic since your last visit? No       Hospitalized since your last visit? No       Where?        Date?

## 2025-05-09 NOTE — TELEPHONE ENCOUNTER
Pt's daughter is requesting a referral to a Pulmonologist. They had a visit with Pt's cardiologist today and were directed to ask PCP. Pt is getting out of breath walking short distances.

## 2025-05-09 NOTE — PROGRESS NOTES
Valentino Avila MD      [address],[facility],[place] [address]      Office [phone]         Laura Lu is a 83 y.o.  female is here for f/u visit .      Primary care physician:   Alessia Rangel MD         Chief complaint:     As in EMR           Dear Dr Rangel,      I had the pleasure of seeing Ms. Lu in the office today.         Assessment:    Chronic dyspnea/fatigue-admitted to [facility]/ - 4/4/25 for dyspnea.  BNP-92.  Attributed to polypharmacy  HTN   HLD   DM -   Former smoker.  History of exposure to secondhand smoking   JOSS  Sinus bradycardia-during shoulder surgery.  Was on metoprolol at that time.  Metoprolol was discontinued.  Since heart rate trended up metoprolol 12.5 mg twice daily was restarted.  S/p recent Shoulder surgery       Plan:      Recently admitted to [facility] (4/4/2025) for dyspnea-was attributed to polypharmacy.  BNP was 92 (4/4/2025)    Continues to have intermittent dyspnea.  States that her heart rate trends up when she has these episodes.  She feels like her breathing progressively gets worse and she feels almost passing out but then gets better spontaneously.  No dizziness/syncope.  Patient's daughter states that her heart rate can be as high as 180s when she has these episodes.  Has worn monitors previously.  Wore Holter monitor 11/2023-no significant arrhythmias.    Will check 48-hour Holter monitor.    Lipid Panel 12/5/24 , , HDL 55, LDL 77      Aggressive cardiovascular risk factor modification.    Has follow-up appointment with ANP later this month.  Advised to keep appointment.     Patient understands the plan. All questions were answered to the patient's satisfaction.      Medication Side Effects and Warnings were discussed with patient: yes   Patient Labs were reviewed and or requested:  yes   Patient Past Records were reviewed and or requested: yes      I appreciate the opportunity to be involved in care. See

## 2025-05-17 ENCOUNTER — CLINICAL DOCUMENTATION (OUTPATIENT)
Age: 85
End: 2025-05-17

## 2025-05-17 LAB — ECHO BSA: 1.87 M2

## 2025-05-17 PROCEDURE — 93227 XTRNL ECG REC<48 HR R&I: CPT | Performed by: INTERNAL MEDICINE

## 2025-05-21 ENCOUNTER — OFFICE VISIT (OUTPATIENT)
Age: 85
End: 2025-05-21
Payer: MEDICARE

## 2025-05-21 VITALS
HEIGHT: 63 IN | SYSTOLIC BLOOD PRESSURE: 134 MMHG | HEART RATE: 94 BPM | WEIGHT: 169.8 LBS | OXYGEN SATURATION: 96 % | RESPIRATION RATE: 16 BRPM | BODY MASS INDEX: 30.09 KG/M2 | TEMPERATURE: 98 F | DIASTOLIC BLOOD PRESSURE: 82 MMHG

## 2025-05-21 DIAGNOSIS — E61.1 IRON DEFICIENCY: ICD-10-CM

## 2025-05-21 DIAGNOSIS — R06.02 SOB (SHORTNESS OF BREATH): ICD-10-CM

## 2025-05-21 DIAGNOSIS — R53.81 MALAISE AND FATIGUE: ICD-10-CM

## 2025-05-21 DIAGNOSIS — R73.09 ELEVATED GLUCOSE: ICD-10-CM

## 2025-05-21 DIAGNOSIS — Z00.00 MEDICARE ANNUAL WELLNESS VISIT, SUBSEQUENT: Primary | ICD-10-CM

## 2025-05-21 DIAGNOSIS — E55.9 VITAMIN D DEFICIENCY: ICD-10-CM

## 2025-05-21 DIAGNOSIS — R53.83 MALAISE AND FATIGUE: ICD-10-CM

## 2025-05-21 DIAGNOSIS — R06.09 DOE (DYSPNEA ON EXERTION): ICD-10-CM

## 2025-05-21 PROCEDURE — 99213 OFFICE O/P EST LOW 20 MIN: CPT | Performed by: INTERNAL MEDICINE

## 2025-05-21 SDOH — HEALTH STABILITY: PHYSICAL HEALTH: ON AVERAGE, HOW MANY DAYS PER WEEK DO YOU ENGAGE IN MODERATE TO STRENUOUS EXERCISE (LIKE A BRISK WALK)?: 3 DAYS

## 2025-05-21 SDOH — HEALTH STABILITY: PHYSICAL HEALTH: ON AVERAGE, HOW MANY MINUTES DO YOU ENGAGE IN EXERCISE AT THIS LEVEL?: 30 MIN

## 2025-05-21 ASSESSMENT — PATIENT HEALTH QUESTIONNAIRE - PHQ9
SUM OF ALL RESPONSES TO PHQ QUESTIONS 1-9: 1
1. LITTLE INTEREST OR PLEASURE IN DOING THINGS: SEVERAL DAYS
2. FEELING DOWN, DEPRESSED OR HOPELESS: NOT AT ALL
SUM OF ALL RESPONSES TO PHQ QUESTIONS 1-9: 6
SUM OF ALL RESPONSES TO PHQ QUESTIONS 1-9: 1
1. LITTLE INTEREST OR PLEASURE IN DOING THINGS: SEVERAL DAYS
2. FEELING DOWN, DEPRESSED OR HOPELESS: NOT AT ALL
4. FEELING TIRED OR HAVING LITTLE ENERGY: SEVERAL DAYS
3. TROUBLE FALLING OR STAYING ASLEEP: NOT AT ALL
10. IF YOU CHECKED OFF ANY PROBLEMS, HOW DIFFICULT HAVE THESE PROBLEMS MADE IT FOR YOU TO DO YOUR WORK, TAKE CARE OF THINGS AT HOME, OR GET ALONG WITH OTHER PEOPLE: SOMEWHAT DIFFICULT
9. THOUGHTS THAT YOU WOULD BE BETTER OFF DEAD, OR OF HURTING YOURSELF: NOT AT ALL
SUM OF ALL RESPONSES TO PHQ QUESTIONS 1-9: 6
SUM OF ALL RESPONSES TO PHQ QUESTIONS 1-9: 6
SUM OF ALL RESPONSES TO PHQ QUESTIONS 1-9: 1
6. FEELING BAD ABOUT YOURSELF - OR THAT YOU ARE A FAILURE OR HAVE LET YOURSELF OR YOUR FAMILY DOWN: NOT AT ALL
7. TROUBLE CONCENTRATING ON THINGS, SUCH AS READING THE NEWSPAPER OR WATCHING TELEVISION: SEVERAL DAYS
5. POOR APPETITE OR OVEREATING: NEARLY EVERY DAY
6. FEELING BAD ABOUT YOURSELF - OR THAT YOU ARE A FAILURE OR HAVE LET YOURSELF OR YOUR FAMILY DOWN: NOT AT ALL
2. FEELING DOWN, DEPRESSED OR HOPELESS: NOT AT ALL
10. IF YOU CHECKED OFF ANY PROBLEMS, HOW DIFFICULT HAVE THESE PROBLEMS MADE IT FOR YOU TO DO YOUR WORK, TAKE CARE OF THINGS AT HOME, OR GET ALONG WITH OTHER PEOPLE: SOMEWHAT DIFFICULT
9. THOUGHTS THAT YOU WOULD BE BETTER OFF DEAD, OR OF HURTING YOURSELF: NOT AT ALL
5. POOR APPETITE OR OVEREATING: NEARLY EVERY DAY
4. FEELING TIRED OR HAVING LITTLE ENERGY: SEVERAL DAYS
3. TROUBLE FALLING OR STAYING ASLEEP: NOT AT ALL
7. TROUBLE CONCENTRATING ON THINGS, SUCH AS READING THE NEWSPAPER OR WATCHING TELEVISION: SEVERAL DAYS
SUM OF ALL RESPONSES TO PHQ QUESTIONS 1-9: 6
8. MOVING OR SPEAKING SO SLOWLY THAT OTHER PEOPLE COULD HAVE NOTICED. OR THE OPPOSITE - BEING SO FIDGETY OR RESTLESS THAT YOU HAVE BEEN MOVING AROUND A LOT MORE THAN USUAL: NOT AT ALL
SUM OF ALL RESPONSES TO PHQ QUESTIONS 1-9: 1
1. LITTLE INTEREST OR PLEASURE IN DOING THINGS: SEVERAL DAYS
8. MOVING OR SPEAKING SO SLOWLY THAT OTHER PEOPLE COULD HAVE NOTICED. OR THE OPPOSITE, BEING SO FIGETY OR RESTLESS THAT YOU HAVE BEEN MOVING AROUND A LOT MORE THAN USUAL: NOT AT ALL
SUM OF ALL RESPONSES TO PHQ QUESTIONS 1-9: 6

## 2025-05-21 ASSESSMENT — LIFESTYLE VARIABLES
HOW OFTEN DO YOU HAVE A DRINK CONTAINING ALCOHOL: 2
HOW MANY STANDARD DRINKS CONTAINING ALCOHOL DO YOU HAVE ON A TYPICAL DAY: 1 OR 2
HOW OFTEN DO YOU HAVE SIX OR MORE DRINKS ON ONE OCCASION: 1
HOW MANY STANDARD DRINKS CONTAINING ALCOHOL DO YOU HAVE ON A TYPICAL DAY: 1
HOW OFTEN DO YOU HAVE A DRINK CONTAINING ALCOHOL: MONTHLY OR LESS

## 2025-05-21 NOTE — PATIENT INSTRUCTIONS
amount directed each day. Make sure you take aspirin and not another kind of pain reliever, such as acetaminophen (Tylenol).   When should you call for help?   Call 911 if you have symptoms of a heart attack. These may include:    Chest pain or pressure, or a strange feeling in the chest.     Sweating.     Shortness of breath.     Pain, pressure, or a strange feeling in the back, neck, jaw, or upper belly or in one or both shoulders or arms.     Lightheadedness or sudden weakness.     A fast or irregular heartbeat.   After you call 911, the  may tell you to chew 1 adult-strength or 2 to 4 low-dose aspirin. Wait for an ambulance. Do not try to drive yourself.  Watch closely for changes in your health, and be sure to contact your doctor if you have any problems.  Where can you learn more?  Go to https://www.[employer].net/patientEd and enter F075 to learn more about \"A Healthy Heart: Care Instructions.\"  Current as of: July 31, 2024  Content Version: 14.4  © 9756-5336 [employer].   Care instructions adapted under license by [employer]. If you have questions about a medical condition or this instruction, always ask your healthcare professional. [employer], disclaims any warranty or liability for your use of this information.    Personalized Preventive Plan for Laura Lu - 5/21/2025  Medicare offers a range of preventive health benefits. Some of the tests and screenings are paid in full while other may be subject to a deductible, co-insurance, and/or copay.  Some of these benefits include a comprehensive review of your medical history including lifestyle, illnesses that may run in your family, and various assessments and screenings as appropriate.  After reviewing your medical record and screening and assessments performed today your provider may have ordered immunizations, labs, imaging, and/or referrals for you.  A list of these orders (if applicable) as well as your

## 2025-05-21 NOTE — PROGRESS NOTES
Identified pt with two pt identifiers(name and )    Chief Complaint   Patient presents with    Medicare AWV    Follow-up     Pt states she is having SOB        Health Maintenance Due   Topic    DTaP/Tdap/Td vaccine (1 - Tdap)    Shingles vaccine (1 of 2)    Respiratory Syncytial Virus (RSV) Pregnant or age 60 yrs+ (1 - 1-dose 75+ series)    COVID-19 Vaccine ( season)       Wt Readings from Last 3 Encounters:   25 77 kg (169 lb 12.8 oz)   25 78.5 kg (173 lb)   25 79.6 kg (175 lb 6.4 oz)     Temp Readings from Last 3 Encounters:   25 98 °F (36.7 °C) (Temporal)   25 98.3 °F (36.8 °C) (Temporal)   25 98 °F (36.7 °C) (Oral)     BP Readings from Last 3 Encounters:   25 134/82   25 116/60   25 136/82     Pulse Readings from Last 3 Encounters:   25 94   25 55   25 64           Depression Screening:  :         2025    10:26 AM 2025    10:25 AM 2025     9:21 AM 2024    10:37 AM 2024     2:28 PM 3/21/2024    10:40 AM 2023     9:51 AM   PHQ-9 Questionaire   Little interest or pleasure in doing things 1 1 0 0 0 0 0   Feeling down, depressed, or hopeless 0 0 0 0 0 0 0   Trouble falling or staying asleep, or sleeping too much 0   0 0 0    Feeling tired or having little energy 1   0 0 3    Poor appetite or overeating 3   0 0 3    Feeling bad about yourself - or that you are a failure or have let yourself or your family down 0   0 0 0    Trouble concentrating on things, such as reading the newspaper or watching television 1   0 0 3    Moving or speaking so slowly that other people could have noticed. Or the opposite - being so fidgety or restless that you have been moving around a lot more than usual 0   0 0 0    Thoughts that you would be better off dead, or of hurting yourself in some way 0   0 0 0    PHQ-9 Total Score 6  1  0 0 0 9 0   If you checked off any problems, how difficult have these problems made it for
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
## 2025-05-23 ENCOUNTER — TELEPHONE (OUTPATIENT)
Age: 85
End: 2025-05-23

## 2025-05-23 NOTE — TELEPHONE ENCOUNTER
Patient's daughter Fela is calling because her mother heart rate went up 185-210 on Wednesday at 2 am for about 2 hours per the patient.Please advise.    [phone] Fela (daughter)

## 2025-05-23 NOTE — TELEPHONE ENCOUNTER
R/t call to pt's daughter, (HIPAA approved) confirmed ID x2.    180 went as high as 210, lasted for 2 hours.  She waited it out, took metoprolol 1/2 pill, brought it down.  Hasn't had it again.    Saw PCP wed and informed them that the monitor results were back.  Confirmed taking Toprol XL 25 mg 1/2 tab daily, takes 2nd half prn.  Lightheaded/dizziness when pulse is going high.  BP in normal range at office visits recently.      Per clinical documentation 5/17/25,  \"Monitor 5/7/25  PSVT - fastest - 182 bpm  Min HR 38 bpm  On low dose BB  Has appt to see ANP 5/28/25\"    Asked her to keep an eye on pulse, stay hydrated, avoid caffeine.  If has syncopal episode, or if pulse sustained >120 + for greater than 1 hour, will head to ER.  Has pulsox to keep an eye on it.    Will await further instruction at confirmed NOV.

## 2025-05-26 ENCOUNTER — HOSPITAL ENCOUNTER (EMERGENCY)
Facility: HOSPITAL | Age: 85
Discharge: HOME | End: 2025-05-26
Attending: STUDENT IN AN ORGANIZED HEALTH CARE EDUCATION/TRAINING PROGRAM
Payer: MEDICARE

## 2025-05-26 ENCOUNTER — APPOINTMENT (OUTPATIENT)
Facility: HOSPITAL | Age: 85
End: 2025-05-26
Payer: MEDICARE

## 2025-05-26 VITALS
SYSTOLIC BLOOD PRESSURE: 136 MMHG | HEIGHT: 63 IN | BODY MASS INDEX: 29.23 KG/M2 | HEART RATE: 75 BPM | TEMPERATURE: 98.3 F | WEIGHT: 165 LBS | DIASTOLIC BLOOD PRESSURE: 61 MMHG | OXYGEN SATURATION: 96 % | RESPIRATION RATE: 17 BRPM

## 2025-05-26 DIAGNOSIS — E11.8 TYPE 2 DIABETES MELLITUS WITH COMPLICATION, WITHOUT LONG-TERM CURRENT USE OF INSULIN (HCC): ICD-10-CM

## 2025-05-26 DIAGNOSIS — I27.20 PULMONARY HYPERTENSION, UNSPECIFIED (HCC): ICD-10-CM

## 2025-05-26 DIAGNOSIS — E66.01 MORBID (SEVERE) OBESITY DUE TO EXCESS CALORIES (HCC): ICD-10-CM

## 2025-05-26 DIAGNOSIS — I10 ESSENTIAL (PRIMARY) HYPERTENSION: ICD-10-CM

## 2025-05-26 DIAGNOSIS — R00.2 PALPITATIONS: Primary | ICD-10-CM

## 2025-05-26 DIAGNOSIS — G47.33 OSA ON CPAP: ICD-10-CM

## 2025-05-26 DIAGNOSIS — I47.10 SVT (SUPRAVENTRICULAR TACHYCARDIA): ICD-10-CM

## 2025-05-26 LAB
ALBUMIN SERPL-MCNC: 3.6 G/DL (ref 3.5–5)
ALBUMIN/GLOB SERPL: 0.8 (ref 1.1–2.2)
ALP SERPL-CCNC: 106 U/L (ref 45–117)
ALT SERPL-CCNC: 18 U/L (ref 12–78)
ANION GAP SERPL CALC-SCNC: 4 MMOL/L (ref 2–12)
AST SERPL-CCNC: 24 U/L (ref 15–37)
BASOPHILS # BLD: 0.03 K/UL (ref 0–0.1)
BASOPHILS NFR BLD: 0.5 % (ref 0–1)
BILIRUB SERPL-MCNC: 0.4 MG/DL (ref 0.2–1)
BUN SERPL-MCNC: 11 MG/DL (ref 6–20)
BUN/CREAT SERPL: 9 (ref 12–20)
CALCIUM SERPL-MCNC: 9.9 MG/DL (ref 8.5–10.1)
CHLORIDE SERPL-SCNC: 109 MMOL/L (ref 97–108)
CO2 SERPL-SCNC: 25 MMOL/L (ref 21–32)
CREAT SERPL-MCNC: 1.16 MG/DL (ref 0.55–1.02)
D DIMER PPP FEU-MCNC: 1.16 MG/L FEU (ref 0–0.65)
DIFFERENTIAL METHOD BLD: ABNORMAL
EOSINOPHIL # BLD: 0.05 K/UL (ref 0–0.4)
EOSINOPHIL NFR BLD: 0.9 % (ref 0–7)
ERYTHROCYTE [DISTWIDTH] IN BLOOD BY AUTOMATED COUNT: 14.7 % (ref 11.5–14.5)
GLOBULIN SER CALC-MCNC: 4.4 G/DL (ref 2–4)
GLUCOSE SERPL-MCNC: 105 MG/DL (ref 65–100)
HCT VFR BLD AUTO: 33.4 % (ref 35–47)
HGB BLD-MCNC: 10.5 G/DL (ref 11.5–16)
IMM GRANULOCYTES # BLD AUTO: 0.03 K/UL (ref 0–0.04)
IMM GRANULOCYTES NFR BLD AUTO: 0.5 % (ref 0–0.5)
LYMPHOCYTES # BLD: 1.33 K/UL (ref 0.8–3.5)
LYMPHOCYTES NFR BLD: 23.2 % (ref 12–49)
MCH RBC QN AUTO: 28.2 PG (ref 26–34)
MCHC RBC AUTO-ENTMCNC: 31.4 G/DL (ref 30–36.5)
MCV RBC AUTO: 89.8 FL (ref 80–99)
MONOCYTES # BLD: 0.41 K/UL (ref 0–1)
MONOCYTES NFR BLD: 7.2 % (ref 5–13)
NEUTS SEG # BLD: 3.88 K/UL (ref 1.8–8)
NEUTS SEG NFR BLD: 67.7 % (ref 32–75)
NRBC # BLD: 0 K/UL (ref 0–0.01)
NRBC BLD-RTO: 0 PER 100 WBC
NT PRO BNP: 67 PG/ML
PLATELET # BLD AUTO: 287 K/UL (ref 150–400)
PMV BLD AUTO: 10.3 FL (ref 8.9–12.9)
POTASSIUM SERPL-SCNC: 4.2 MMOL/L (ref 3.5–5.1)
PROT SERPL-MCNC: 8 G/DL (ref 6.4–8.2)
RBC # BLD AUTO: 3.72 M/UL (ref 3.8–5.2)
SODIUM SERPL-SCNC: 138 MMOL/L (ref 136–145)
TROPONIN I SERPL HS-MCNC: 8 NG/L (ref 0–51)
WBC # BLD AUTO: 5.7 K/UL (ref 3.6–11)

## 2025-05-26 PROCEDURE — 84484 ASSAY OF TROPONIN QUANT: CPT

## 2025-05-26 PROCEDURE — 83880 ASSAY OF NATRIURETIC PEPTIDE: CPT

## 2025-05-26 PROCEDURE — 71045 X-RAY EXAM CHEST 1 VIEW: CPT

## 2025-05-26 PROCEDURE — 71275 CT ANGIOGRAPHY CHEST: CPT

## 2025-05-26 PROCEDURE — 85025 COMPLETE CBC W/AUTO DIFF WBC: CPT

## 2025-05-26 PROCEDURE — 6360000004 HC RX CONTRAST MEDICATION: Performed by: STUDENT IN AN ORGANIZED HEALTH CARE EDUCATION/TRAINING PROGRAM

## 2025-05-26 PROCEDURE — 93005 ELECTROCARDIOGRAM TRACING: CPT | Performed by: STUDENT IN AN ORGANIZED HEALTH CARE EDUCATION/TRAINING PROGRAM

## 2025-05-26 PROCEDURE — 99285 EMERGENCY DEPT VISIT HI MDM: CPT

## 2025-05-26 PROCEDURE — 80053 COMPREHEN METABOLIC PANEL: CPT

## 2025-05-26 PROCEDURE — 36415 COLL VENOUS BLD VENIPUNCTURE: CPT

## 2025-05-26 PROCEDURE — 85379 FIBRIN DEGRADATION QUANT: CPT

## 2025-05-26 RX ORDER — METOPROLOL TARTRATE 25 MG/1
25 TABLET, FILM COATED ORAL 2 TIMES DAILY
Qty: 90 TABLET | Refills: 0 | Status: SHIPPED | OUTPATIENT
Start: 2025-05-26 | End: 2025-05-26

## 2025-05-26 RX ORDER — IOPAMIDOL 755 MG/ML
100 INJECTION, SOLUTION INTRAVASCULAR
Status: COMPLETED | OUTPATIENT
Start: 2025-05-26 | End: 2025-05-26

## 2025-05-26 RX ORDER — METOPROLOL TARTRATE 25 MG/1
12.5 TABLET, FILM COATED ORAL 2 TIMES DAILY
Qty: 90 TABLET | Refills: 0 | Status: ON HOLD | OUTPATIENT
Start: 2025-05-26 | End: 2025-05-30 | Stop reason: HOSPADM

## 2025-05-26 RX ADMIN — IOPAMIDOL 100 ML: 755 INJECTION, SOLUTION INTRAVENOUS at 21:07

## 2025-05-26 ASSESSMENT — PAIN DESCRIPTION - LOCATION: LOCATION: ARM

## 2025-05-26 ASSESSMENT — PAIN DESCRIPTION - ORIENTATION: ORIENTATION: RIGHT

## 2025-05-26 ASSESSMENT — PAIN SCALES - GENERAL: PAINLEVEL_OUTOF10: 7

## 2025-05-26 ASSESSMENT — PAIN DESCRIPTION - DESCRIPTORS: DESCRIPTORS: ACHING

## 2025-05-26 NOTE — ED PROVIDER NOTES
Efforts were made to edit the dictations but occasionally words are mis-transcribed.)    uSsan Miles MD (electronically signed)  Emergency Attending Physician               Susan Miles MD  05/27/25 [id number]

## 2025-05-26 NOTE — ED TRIAGE NOTES
Patient arrives to ER via wheelchair with c/c of SOB that worsen in the past week.   Patient denies chest pain, n/v/d at this time.   PMH  Patient reports following up with cardiology

## 2025-05-27 ENCOUNTER — OFFICE VISIT (OUTPATIENT)
Age: 85
End: 2025-05-27
Payer: MEDICARE

## 2025-05-27 ENCOUNTER — HOSPITAL ENCOUNTER (INPATIENT)
Facility: HOSPITAL | Age: 85
LOS: 4 days | Discharge: HOME | DRG: 242 | End: 2025-05-31
Attending: EMERGENCY MEDICINE | Admitting: HOSPITALIST
Payer: MEDICARE

## 2025-05-27 ENCOUNTER — APPOINTMENT (OUTPATIENT)
Facility: HOSPITAL | Age: 85
DRG: 242 | End: 2025-05-27
Payer: MEDICARE

## 2025-05-27 ENCOUNTER — TELEPHONE (OUTPATIENT)
Age: 85
End: 2025-05-27

## 2025-05-27 VITALS
DIASTOLIC BLOOD PRESSURE: 80 MMHG | OXYGEN SATURATION: 97 % | HEART RATE: 52 BPM | WEIGHT: 163 LBS | BODY MASS INDEX: 28.88 KG/M2 | HEIGHT: 63 IN | SYSTOLIC BLOOD PRESSURE: 120 MMHG

## 2025-05-27 DIAGNOSIS — R00.1 SYMPTOMATIC BRADYCARDIA: Primary | ICD-10-CM

## 2025-05-27 DIAGNOSIS — M19.011 OSTEOARTHRITIS OF RIGHT SHOULDER, UNSPECIFIED OSTEOARTHRITIS TYPE: ICD-10-CM

## 2025-05-27 DIAGNOSIS — R00.1 BRADYCARDIA: ICD-10-CM

## 2025-05-27 DIAGNOSIS — R00.2 PALPITATIONS: ICD-10-CM

## 2025-05-27 DIAGNOSIS — R01.1 HEART MURMUR: ICD-10-CM

## 2025-05-27 DIAGNOSIS — M19.011 LOCALIZED OSTEOARTHROSIS OF RIGHT SHOULDER REGION: ICD-10-CM

## 2025-05-27 LAB
ALBUMIN SERPL-MCNC: 3.6 G/DL (ref 3.5–5)
ALBUMIN/GLOB SERPL: 0.7 (ref 1.1–2.2)
ALP SERPL-CCNC: 104 U/L (ref 45–117)
ALT SERPL-CCNC: 18 U/L (ref 12–78)
ANION GAP SERPL CALC-SCNC: 5 MMOL/L (ref 2–12)
AST SERPL-CCNC: 13 U/L (ref 15–37)
BASOPHILS # BLD: 0.04 K/UL (ref 0–0.1)
BASOPHILS NFR BLD: 0.7 % (ref 0–1)
BILIRUB SERPL-MCNC: 0.3 MG/DL (ref 0.2–1)
BUN SERPL-MCNC: 10 MG/DL (ref 6–20)
BUN/CREAT SERPL: 10 (ref 12–20)
CALCIUM SERPL-MCNC: 10.8 MG/DL (ref 8.5–10.1)
CHLORIDE SERPL-SCNC: 106 MMOL/L (ref 97–108)
CO2 SERPL-SCNC: 28 MMOL/L (ref 21–32)
COMMENT:: NORMAL
CREAT SERPL-MCNC: 1.03 MG/DL (ref 0.55–1.02)
DIFFERENTIAL METHOD BLD: ABNORMAL
EKG ATRIAL RATE: 55 BPM
EKG ATRIAL RATE: 84 BPM
EKG DIAGNOSIS: NORMAL
EKG DIAGNOSIS: NORMAL
EKG P AXIS: 49 DEGREES
EKG P AXIS: 52 DEGREES
EKG P-R INTERVAL: 154 MS
EKG P-R INTERVAL: 166 MS
EKG Q-T INTERVAL: 354 MS
EKG Q-T INTERVAL: 408 MS
EKG QRS DURATION: 68 MS
EKG QRS DURATION: 76 MS
EKG QTC CALCULATION (BAZETT): 390 MS
EKG QTC CALCULATION (BAZETT): 418 MS
EKG R AXIS: -33 DEGREES
EKG R AXIS: -34 DEGREES
EKG T AXIS: 26 DEGREES
EKG T AXIS: 29 DEGREES
EKG VENTRICULAR RATE: 55 BPM
EKG VENTRICULAR RATE: 84 BPM
EOSINOPHIL # BLD: 0.11 K/UL (ref 0–0.4)
EOSINOPHIL NFR BLD: 2 % (ref 0–7)
ERYTHROCYTE [DISTWIDTH] IN BLOOD BY AUTOMATED COUNT: 14.6 % (ref 11.5–14.5)
GLOBULIN SER CALC-MCNC: 4.9 G/DL (ref 2–4)
GLUCOSE BLD STRIP.AUTO-MCNC: 113 MG/DL (ref 65–117)
GLUCOSE BLD STRIP.AUTO-MCNC: 86 MG/DL (ref 65–117)
GLUCOSE SERPL-MCNC: 101 MG/DL (ref 65–100)
HCT VFR BLD AUTO: 33.7 % (ref 35–47)
HGB BLD-MCNC: 10.4 G/DL (ref 11.5–16)
IMM GRANULOCYTES # BLD AUTO: 0.02 K/UL (ref 0–0.04)
IMM GRANULOCYTES NFR BLD AUTO: 0.4 % (ref 0–0.5)
LYMPHOCYTES # BLD: 1.54 K/UL (ref 0.8–3.5)
LYMPHOCYTES NFR BLD: 28.1 % (ref 12–49)
MAGNESIUM SERPL-MCNC: 2.2 MG/DL (ref 1.6–2.4)
MCH RBC QN AUTO: 28 PG (ref 26–34)
MCHC RBC AUTO-ENTMCNC: 30.9 G/DL (ref 30–36.5)
MCV RBC AUTO: 90.8 FL (ref 80–99)
MONOCYTES # BLD: 0.41 K/UL (ref 0–1)
MONOCYTES NFR BLD: 7.5 % (ref 5–13)
NEUTS SEG # BLD: 3.36 K/UL (ref 1.8–8)
NEUTS SEG NFR BLD: 61.3 % (ref 32–75)
NRBC # BLD: 0 K/UL (ref 0–0.01)
NRBC BLD-RTO: 0 PER 100 WBC
PLATELET # BLD AUTO: 215 K/UL (ref 150–400)
PMV BLD AUTO: 11.9 FL (ref 8.9–12.9)
POTASSIUM SERPL-SCNC: 4.5 MMOL/L (ref 3.5–5.1)
PROT SERPL-MCNC: 8.5 G/DL (ref 6.4–8.2)
RBC # BLD AUTO: 3.71 M/UL (ref 3.8–5.2)
SERVICE CMNT-IMP: NORMAL
SERVICE CMNT-IMP: NORMAL
SODIUM SERPL-SCNC: 139 MMOL/L (ref 136–145)
SPECIMEN HOLD: NORMAL
T4 FREE SERPL-MCNC: 1.1 NG/DL (ref 0.8–1.5)
TROPONIN I SERPL HS-MCNC: 9 NG/L (ref 0–51)
TSH SERPL DL<=0.05 MIU/L-ACNC: 1.02 UIU/ML (ref 0.36–3.74)
TSH SERPL DL<=0.05 MIU/L-ACNC: 1.03 UIU/ML (ref 0.36–3.74)
WBC # BLD AUTO: 5.5 K/UL (ref 3.6–11)

## 2025-05-27 PROCEDURE — 84439 ASSAY OF FREE THYROXINE: CPT

## 2025-05-27 PROCEDURE — 1123F ACP DISCUSS/DSCN MKR DOCD: CPT | Performed by: NURSE PRACTITIONER

## 2025-05-27 PROCEDURE — 99214 OFFICE O/P EST MOD 30 MIN: CPT | Performed by: NURSE PRACTITIONER

## 2025-05-27 PROCEDURE — 85025 COMPLETE CBC W/AUTO DIFF WBC: CPT

## 2025-05-27 PROCEDURE — 6360000002 HC RX W HCPCS: Performed by: HOSPITALIST

## 2025-05-27 PROCEDURE — 2060000000 HC ICU INTERMEDIATE R&B

## 2025-05-27 PROCEDURE — 3074F SYST BP LT 130 MM HG: CPT | Performed by: NURSE PRACTITIONER

## 2025-05-27 PROCEDURE — 93005 ELECTROCARDIOGRAM TRACING: CPT | Performed by: EMERGENCY MEDICINE

## 2025-05-27 PROCEDURE — 80053 COMPREHEN METABOLIC PANEL: CPT

## 2025-05-27 PROCEDURE — 93010 ELECTROCARDIOGRAM REPORT: CPT | Performed by: INTERNAL MEDICINE

## 2025-05-27 PROCEDURE — 99285 EMERGENCY DEPT VISIT HI MDM: CPT

## 2025-05-27 PROCEDURE — 82962 GLUCOSE BLOOD TEST: CPT

## 2025-05-27 PROCEDURE — 83735 ASSAY OF MAGNESIUM: CPT

## 2025-05-27 PROCEDURE — 1160F RVW MEDS BY RX/DR IN RCRD: CPT | Performed by: NURSE PRACTITIONER

## 2025-05-27 PROCEDURE — 3079F DIAST BP 80-89 MM HG: CPT | Performed by: NURSE PRACTITIONER

## 2025-05-27 PROCEDURE — 1126F AMNT PAIN NOTED NONE PRSNT: CPT | Performed by: NURSE PRACTITIONER

## 2025-05-27 PROCEDURE — 1159F MED LIST DOCD IN RCRD: CPT | Performed by: NURSE PRACTITIONER

## 2025-05-27 PROCEDURE — 6370000000 HC RX 637 (ALT 250 FOR IP): Performed by: HOSPITALIST

## 2025-05-27 PROCEDURE — 71046 X-RAY EXAM CHEST 2 VIEWS: CPT

## 2025-05-27 PROCEDURE — 84484 ASSAY OF TROPONIN QUANT: CPT

## 2025-05-27 PROCEDURE — 84443 ASSAY THYROID STIM HORMONE: CPT

## 2025-05-27 RX ORDER — ONDANSETRON 2 MG/ML
4 INJECTION INTRAMUSCULAR; INTRAVENOUS EVERY 6 HOURS PRN
Status: DISCONTINUED | OUTPATIENT
Start: 2025-05-27 | End: 2025-05-31 | Stop reason: HOSPADM

## 2025-05-27 RX ORDER — MULTIVITAMIN WITH IRON
1000 TABLET ORAL DAILY
Status: DISCONTINUED | OUTPATIENT
Start: 2025-05-27 | End: 2025-05-31 | Stop reason: HOSPADM

## 2025-05-27 RX ORDER — SODIUM CHLORIDE 9 MG/ML
INJECTION, SOLUTION INTRAVENOUS PRN
Status: DISCONTINUED | OUTPATIENT
Start: 2025-05-27 | End: 2025-05-31 | Stop reason: HOSPADM

## 2025-05-27 RX ORDER — MAGNESIUM SULFATE IN WATER 40 MG/ML
2000 INJECTION, SOLUTION INTRAVENOUS PRN
Status: DISCONTINUED | OUTPATIENT
Start: 2025-05-27 | End: 2025-05-31 | Stop reason: HOSPADM

## 2025-05-27 RX ORDER — PANTOPRAZOLE SODIUM 40 MG/1
40 TABLET, DELAYED RELEASE ORAL
Status: DISCONTINUED | OUTPATIENT
Start: 2025-05-28 | End: 2025-05-31 | Stop reason: HOSPADM

## 2025-05-27 RX ORDER — LIDOCAINE 4 G/G
1 PATCH TOPICAL DAILY
Status: DISCONTINUED | OUTPATIENT
Start: 2025-05-27 | End: 2025-05-31 | Stop reason: HOSPADM

## 2025-05-27 RX ORDER — MONTELUKAST SODIUM 10 MG/1
10 TABLET ORAL
Status: DISCONTINUED | OUTPATIENT
Start: 2025-05-27 | End: 2025-05-31 | Stop reason: HOSPADM

## 2025-05-27 RX ORDER — SODIUM CHLORIDE 0.9 % (FLUSH) 0.9 %
5-40 SYRINGE (ML) INJECTION PRN
Status: DISCONTINUED | OUTPATIENT
Start: 2025-05-27 | End: 2025-05-31 | Stop reason: HOSPADM

## 2025-05-27 RX ORDER — POTASSIUM CHLORIDE 750 MG/1
40 TABLET, EXTENDED RELEASE ORAL PRN
Status: DISCONTINUED | OUTPATIENT
Start: 2025-05-27 | End: 2025-05-31 | Stop reason: HOSPADM

## 2025-05-27 RX ORDER — ONDANSETRON 4 MG/1
4 TABLET, ORALLY DISINTEGRATING ORAL EVERY 8 HOURS PRN
Status: DISCONTINUED | OUTPATIENT
Start: 2025-05-27 | End: 2025-05-31 | Stop reason: HOSPADM

## 2025-05-27 RX ORDER — ACETAMINOPHEN 325 MG/1
650 TABLET ORAL EVERY 6 HOURS PRN
Status: DISCONTINUED | OUTPATIENT
Start: 2025-05-27 | End: 2025-05-31 | Stop reason: HOSPADM

## 2025-05-27 RX ORDER — SENNA AND DOCUSATE SODIUM 50; 8.6 MG/1; MG/1
1 TABLET, FILM COATED ORAL 2 TIMES DAILY
Status: DISCONTINUED | OUTPATIENT
Start: 2025-05-27 | End: 2025-05-31 | Stop reason: HOSPADM

## 2025-05-27 RX ORDER — SODIUM CHLORIDE 0.9 % (FLUSH) 0.9 %
5-40 SYRINGE (ML) INJECTION EVERY 12 HOURS SCHEDULED
Status: DISCONTINUED | OUTPATIENT
Start: 2025-05-27 | End: 2025-05-31 | Stop reason: HOSPADM

## 2025-05-27 RX ORDER — POLYETHYLENE GLYCOL 3350 17 G/17G
17 POWDER, FOR SOLUTION ORAL DAILY PRN
Status: DISCONTINUED | OUTPATIENT
Start: 2025-05-27 | End: 2025-05-31 | Stop reason: HOSPADM

## 2025-05-27 RX ORDER — LOSARTAN POTASSIUM 25 MG/1
25 TABLET ORAL DAILY
Status: DISCONTINUED | OUTPATIENT
Start: 2025-05-27 | End: 2025-05-31 | Stop reason: HOSPADM

## 2025-05-27 RX ORDER — ASPIRIN 81 MG/1
81 TABLET, CHEWABLE ORAL DAILY
Status: DISCONTINUED | OUTPATIENT
Start: 2025-05-27 | End: 2025-05-31 | Stop reason: HOSPADM

## 2025-05-27 RX ORDER — POTASSIUM CHLORIDE 7.45 MG/ML
10 INJECTION INTRAVENOUS PRN
Status: DISCONTINUED | OUTPATIENT
Start: 2025-05-27 | End: 2025-05-31 | Stop reason: HOSPADM

## 2025-05-27 RX ORDER — CETIRIZINE HYDROCHLORIDE 10 MG/1
10 TABLET ORAL DAILY
Status: DISCONTINUED | OUTPATIENT
Start: 2025-05-27 | End: 2025-05-31 | Stop reason: HOSPADM

## 2025-05-27 RX ORDER — TRAMADOL HYDROCHLORIDE 50 MG/1
50 TABLET ORAL EVERY 6 HOURS PRN
Status: DISCONTINUED | OUTPATIENT
Start: 2025-05-27 | End: 2025-05-31 | Stop reason: HOSPADM

## 2025-05-27 RX ORDER — PANTOPRAZOLE SODIUM 40 MG/1
40 TABLET, DELAYED RELEASE ORAL
Qty: 90 TABLET | Refills: 1 | Status: SHIPPED | OUTPATIENT
Start: 2025-05-27

## 2025-05-27 RX ORDER — ATORVASTATIN CALCIUM 40 MG/1
40 TABLET, FILM COATED ORAL NIGHTLY
Status: DISCONTINUED | OUTPATIENT
Start: 2025-05-27 | End: 2025-05-31 | Stop reason: HOSPADM

## 2025-05-27 RX ORDER — INSULIN LISPRO 100 [IU]/ML
0-8 INJECTION, SOLUTION INTRAVENOUS; SUBCUTANEOUS
Status: DISCONTINUED | OUTPATIENT
Start: 2025-05-27 | End: 2025-05-31 | Stop reason: HOSPADM

## 2025-05-27 RX ORDER — ENOXAPARIN SODIUM 100 MG/ML
40 INJECTION SUBCUTANEOUS DAILY
Status: DISCONTINUED | OUTPATIENT
Start: 2025-05-27 | End: 2025-05-31 | Stop reason: HOSPADM

## 2025-05-27 RX ORDER — IPRATROPIUM BROMIDE AND ALBUTEROL SULFATE 2.5; .5 MG/3ML; MG/3ML
1 SOLUTION RESPIRATORY (INHALATION) EVERY 4 HOURS PRN
Status: DISCONTINUED | OUTPATIENT
Start: 2025-05-27 | End: 2025-05-31 | Stop reason: HOSPADM

## 2025-05-27 RX ORDER — VITAMIN B COMPLEX
1000 TABLET ORAL DAILY
Status: DISCONTINUED | OUTPATIENT
Start: 2025-05-27 | End: 2025-05-31 | Stop reason: HOSPADM

## 2025-05-27 RX ORDER — LATANOPROST 50 UG/ML
1 SOLUTION/ DROPS OPHTHALMIC NIGHTLY
Status: DISCONTINUED | OUTPATIENT
Start: 2025-05-27 | End: 2025-05-31 | Stop reason: HOSPADM

## 2025-05-27 RX ORDER — TROSPIUM CHLORIDE 20 MG/1
20 TABLET, FILM COATED ORAL
Status: DISCONTINUED | OUTPATIENT
Start: 2025-05-27 | End: 2025-05-31 | Stop reason: HOSPADM

## 2025-05-27 RX ORDER — FLUTICASONE PROPIONATE 50 MCG
2 SPRAY, SUSPENSION (ML) NASAL DAILY PRN
Status: DISCONTINUED | OUTPATIENT
Start: 2025-05-27 | End: 2025-05-31 | Stop reason: HOSPADM

## 2025-05-27 RX ORDER — ACETAMINOPHEN 650 MG/1
650 SUPPOSITORY RECTAL EVERY 6 HOURS PRN
Status: DISCONTINUED | OUTPATIENT
Start: 2025-05-27 | End: 2025-05-31 | Stop reason: HOSPADM

## 2025-05-27 RX ADMIN — Medication 1000 UNITS: at 17:24

## 2025-05-27 RX ADMIN — LOSARTAN POTASSIUM 25 MG: 25 TABLET, FILM COATED ORAL at 17:24

## 2025-05-27 RX ADMIN — CYANOCOBALAMIN TAB 500 MCG 1000 MCG: 500 TAB at 17:26

## 2025-05-27 RX ADMIN — FLUTICASONE PROPIONATE 2 SPRAY: 50 SPRAY, METERED NASAL at 20:08

## 2025-05-27 RX ADMIN — ASPIRIN 81 MG: 81 TABLET, CHEWABLE ORAL at 17:25

## 2025-05-27 RX ADMIN — TROSPIUM CHLORIDE 20 MG: 20 TABLET, FILM COATED ORAL at 17:26

## 2025-05-27 RX ADMIN — CETIRIZINE HYDROCHLORIDE 10 MG: 10 TABLET, FILM COATED ORAL at 17:24

## 2025-05-27 RX ADMIN — TRAMADOL HYDROCHLORIDE 50 MG: 50 TABLET, COATED ORAL at 17:24

## 2025-05-27 RX ADMIN — ENOXAPARIN SODIUM 40 MG: 100 INJECTION SUBCUTANEOUS at 17:27

## 2025-05-27 ASSESSMENT — PAIN SCALES - GENERAL
PAINLEVEL_OUTOF10: 10
PAINLEVEL_OUTOF10: 9
PAINLEVEL_OUTOF10: 9

## 2025-05-27 ASSESSMENT — PATIENT HEALTH QUESTIONNAIRE - PHQ9
SUM OF ALL RESPONSES TO PHQ QUESTIONS 1-9: 0
2. FEELING DOWN, DEPRESSED OR HOPELESS: NOT AT ALL
SUM OF ALL RESPONSES TO PHQ QUESTIONS 1-9: 0
SUM OF ALL RESPONSES TO PHQ QUESTIONS 1-9: 0
1. LITTLE INTEREST OR PLEASURE IN DOING THINGS: NOT AT ALL
SUM OF ALL RESPONSES TO PHQ QUESTIONS 1-9: 0

## 2025-05-27 ASSESSMENT — PAIN DESCRIPTION - ORIENTATION
ORIENTATION: LEFT;RIGHT
ORIENTATION: LEFT
ORIENTATION: RIGHT;LEFT

## 2025-05-27 ASSESSMENT — ENCOUNTER SYMPTOMS: SHORTNESS OF BREATH: 1

## 2025-05-27 ASSESSMENT — PAIN DESCRIPTION - LOCATION
LOCATION: SHOULDER

## 2025-05-27 ASSESSMENT — PAIN DESCRIPTION - PAIN TYPE: TYPE: CHRONIC PAIN

## 2025-05-27 NOTE — TELEPHONE ENCOUNTER
PTS daughter called to inform us that PT went to [facility] last night, and was admitted and transferred to [facility] with a spiking HR

## 2025-05-27 NOTE — ED TRIAGE NOTES
Patient arrives to the ED by EMS for complaints of intermittent palpitations on and off for years. Patient reports they began more frequently while she was sitting down last week. Patient was seen at [facility] last night and was discharged. Patient was seen at her Cardiologist's appointment today and was sent to the ED.

## 2025-05-27 NOTE — H&P
History and Physical    Date of Service:  5/27/2025  Primary Care Provider: Alessia Rangel MD  Source of information: The patient, her daughter at the bedside and Chart review    Chief Complaint: Palpitation and bradycardia since yesterday.       History of Presenting Illness:   Laura Lu is a 85 y.o. female with PMH significant for tachycardia, HTN, T2DM on diet control, CKD stage II, anemia of chronic disease, right hip replacement 2 years ago, hearing impairment, bilateral knee replacement, s/p right rotator cuff tear on 3/10/2025, two sacral wound, and urinary incontinent who is transferred from cardiologist office for symptomatic bradycardia.  She stated that she had tachycardia 2 years ago after she had right hip replacement, improved after she was started on metoprolol.  In March of this year after she had right rotator cuff's tear repair she had episode of bradycardia, heart rate of 42 and she was taken off metoprolol.  Since then she had on and off palpitation, dizziness and nausea.  She stated that her heart rate up and down between 48 and 200 bpm.  She had multiple workup for these and she was placed back on metoprolol 12.5 mg daily.  She kept having palpitation and bradycardia, she went to [facility] ER last night. On monitor her heart rate was stable and she was discharged with advised to resume her metoprolol.  But she didn't take it because her heart rate kept dropping to 38. She went to cardiology office for a follow-up and referred to [facility] ER for further evaluation and management.  No left-sided chest pain, fever, cough, nausea, diaphoresis, abdominal pain or urinary complaints. No history of CAD or CHF.     The patient denies any headache, blurry vision, sore throat, trouble swallowing, trouble with speech, sick contacts with COVID-19 diagnosed patients.    Vital sings on arrival to ER; temperature 99, respirate 18, pulse 46, /69   saturation of oxygen  TOTAL KNEE ARTHROPLASTY Left 2014    UMBILICAL HERNIA REPAIR      UROLOGICAL SURGERY      BOTOX FOR FREQUENT URINATION     Prior to Admission medications    Medication Sig Start Date End Date Taking? Authorizing Provider   pantoprazole (PROTONIX) 40 MG tablet Take 1 tablet by mouth every morning (before breakfast) 5/27/25   Deonna Barrett ACNP   metoprolol tartrate (LOPRESSOR) 25 MG tablet Take 0.5 tablets by mouth 2 times daily 5/26/25   Susan Miles MD   budesonide-formoterol (SYMBICORT) 160-4.5 MCG/ACT AERO Inhale 2 puffs into the lungs 2 times daily . 4/6/25   Sasha Gracia, DO   albuterol sulfate (PROAIR RESPICLICK) 108 (90 Base) MCG/ACT aerosol powder inhalation Inhale 2 puffs into the lungs every 4 hours as needed for Wheezing or Shortness of Breath . 4/6/25   Sasha Gracia, DO   ipratropium 0.5 mg-albuterol 2.5 mg (DUONEB) 0.5-2.5 (3) MG/3ML SOLN nebulizer solution Inhale 3 mLs into the lungs every 4 hours 4/6/25   Luis Angel Anderson MD   sennosides-docusate sodium (SENOKOT-S) 8.6-50 MG tablet Take 1 tablet by mouth in the morning and at bedtime 3/11/25   Drea Tavarez, CAMMY - NP   tiZANidine (ZANAFLEX) 2 MG tablet TAKE 1 TABLET BY MOUTH NIGHTLY AS NEEDED FOR MUSCLE SPASM 1/23/25   Alessia Rangel MD   montelukast (SINGULAIR) 10 MG tablet Take 1 tablet by mouth daily 1/23/25   Alessia Rangel MD   mirabegron (MYRBETRIQ) 50 MG TB24 Take 50 mg by mouth daily 1/23/25   Alessia Rangel MD   nystatin 225651 UNIT/GM powder APPLY 1 APPLICATION OF POWDER TOPICALLY TO AFFECTED AREA 4 TIMES DAILY UNTIL RASH RESOLVES 1/23/25   Alessia Rangel MD   ketoconazole (NIZORAL) 2 % cream Apply topically 2 x daily as needed. 1/23/25   Alessia Rangel MD   atorvastatin (LIPITOR) 40 MG tablet Take 1 tablet by mouth nightly 1/22/25   Valentino Avila MD   losartan (COZAAR) 25 MG tablet Take 1 tablet by mouth daily 1/22/25   Valentino Avila MD   acetaminophen (TYLENOL) 500 MG tablet Take 2 tablets

## 2025-05-27 NOTE — PATIENT INSTRUCTIONS
Labs today downstairs.      Echocardiogram.     Heart monitor to be mailed to you.     Stress test.     EP referral.

## 2025-05-27 NOTE — ED PROVIDER NOTES
[facility] EMERGENCY DEPARTMENT  EMERGENCY DEPARTMENT ENCOUNTER      Pt Name: Laura Lu  MRN: [id number]  Birthdate 1940  Date of evaluation: 5/27/2025  Provider: Frederick Phillips MD    CHIEF COMPLAINT       Chief Complaint   Patient presents with    Palpitations         HISTORY OF PRESENT ILLNESS   (Location/Symptom, Timing/Onset, Context/Setting, Quality, Duration, Modifying Factors, Severity)  Note limiting factors.   Ms. Lu is an 84yo female who presents to the ER with complaints of bradycardia.  She reports she has episodes sometimes where she feels her heartbeat is off.  They can be as frequently as a few times a week.  They seem to be more regular here recently.  She was seen in the emergency room yesterday.  She was discharged to follow-up with cardiology today.  While she was in cardiologist office, she had another episode.  She began to feel lightheaded and dizzy.  She felt she might pass out.  She felt some tightness in her throat.  She feels short of breath.  The cardiologist checked her heart rate and it was in the 40s at that time.  They sent her to the emergency room and told her that she needed to be admitted to the hospital.  She said that she feels back to her baseline at this time.            Review of External Medical Records:     Nursing Notes were reviewed.    REVIEW OF SYSTEMS    (2-9 systems for level 4, 10 or more for level 5)     Review of Systems   Respiratory:  Positive for shortness of breath.    Neurological:  Positive for dizziness and light-headedness.       Except as noted above the remainder of the review of systems was reviewed and negative.       PAST MEDICAL HISTORY     Past Medical History:   Diagnosis Date    Adverse reaction to anesthetic agent     PT HAD TACHYCARDIA POST HIP REPLACEMENT, ADMITTED TO CARDIAC UNIT    Arrhythmia     Bradycardia.    Arthritis     Burning with urination     Incontinence.    Depression     GERD (gastroesophageal reflux disease)

## 2025-05-27 NOTE — PROGRESS NOTES
Patient: Laura Lu  : 1940    Primary Cardiologist:Dr. LEIGH ANN Avila  EP Cardiologist:NONE   PCP: Alessia Rangel MD    Today's Date: 2025      ASSESSMENT AND PLAN:     Assessment and Plan:  Assessment & Plan  Palpitations  Symptomatic bradycardia  History of bradycardia following shoulder surgery for which metoprolol was discontinued, then subsequent tachycardia with low dosing resumed.  ?history of SSS - episodes of tachy/merry have been increasing, resulting in frequent ER evaluation (4 trips in ~ 6 weeks)   48 hr Holter placed 25 showed :   *The predominant rhythm was sinus.  *The Maximum Heart Rate recorded was 182 bpm,  09:57:47, the Minimum Heart Rate recorded  was 38 bpm,  02:08:39, and the Average Heart Rate was 57 bpm.  *There were 1,467 VE beats with a burden of <1 %.  *There were 1,262 SVE beats with a burden of <1 %. There were 15 occurrences of Supraventricular  Tachycardia with the Fastest episode 182 bpm,  09:57:46, and the Longest episode 2m 40.0s,   09:57:46.  *Other rhythms in this study include: Ventricular Run, Idioventricular Rhythm.  *There were 3 Patient Triggers.  Was seen in ER last night (25) for palpitations/bradycardia and pre-syncope - advised to increase metoprolol to 12.5 mg BID but did not do so due to concerns about bradycardia at home (HR in 40s)   Seen today for follow up and sent to ER for evaluation due to symptomatic bradycardia during office visit today, recommend admission for comprehensive inpatient evaluation.      Heart murmur  Last echo 114 showed LVEF 60-65%, mild MR, mild TR.    Repeat due to increased frequency of presyncopal events, III/VI systolic murmur.      Follow up with Dr. LEIGH ANN Avila upon hospital f/u.       HISTORY OF PRESENT ILLNESS:     History of Present Illness:  Laura Lu is a 85 y.o. female with PMH of HTN, HLD, GERD, bradycardia, tachycardia and dyspnea who presents today for

## 2025-05-27 NOTE — ACP (ADVANCE CARE PLANNING)
Advance Care Planning     Advance Care Planning (ACP) Physician/NP/PA Conversation    Date of Conversation: 5/27/2025  Conducted with: Patient with Decision Making Capacity  Other persons present:  Daughter, Fela Loya    Healthcare Decision Maker:       Primary Decision Maker: ShaliniFela - Child - [phone]    Secondary Decision Maker: Ana Loya - Grandchild - [phone]    Hospitalization:  \"If your health worsens and it becomes clear that your chance of recovery is unlikely, what would be your preference regarding hospitalization?\"  The patient would prefer hospitalization.    Ventilation:  \"If you were unable to breath on your own and your chance of recovery was unlikely, what would be your preference about the use of a ventilator (breathing machine) if it was available to you?\"  The patient would desire the use of a ventilator.    Resuscitation:  \"In the event your heart stopped as a result of an underlying serious health condition, would you want attempts made to restart your heart, or would you prefer a natural death?\"  Yes, attempt to resuscitate.    treatment goals, benefit/burden of treatment options, artificial nutrition, ventilation preferences, hospitalization preferences, and resuscitation preferences    Conversation Outcomes / Follow-Up Plan:  ACP complete - no further action today  Reviewed DNR/DNI and patient elects Full Code (Attempt Resuscitation)    Length of Voluntary ACP Conversation in minutes: Face to Face time: 16 minutes    Mariela Gates MD  5/27/2025

## 2025-05-27 NOTE — CONSULTS
Dr Miles called and asked about holter result   It was written in note and result also in EPIC under cardiology tab  She has 1000 PAC and PVC (< 1%)  SVT he said 180 bpm  She was started on low dose lopressor 12.5 mg bid   BP is ok in ER so she can increase to 25 mg BID for her palpitation   She wants to be admitted for more monitoring but that is not medically indicated  Chest CT no PE  Appt as below  Future Appointments   Date Time Provider Department Center   6/4/2025  1:40 PM Deonna Barrett ACNP [facility] AMB   6/12/2025  3:00 PM Alessia Rangel MD [facility] ECC DEP   9/22/2025 11:00 AM Alessia Rangel MD [facility] ECC DEP       She has PAC on her ECG in ER

## 2025-05-27 NOTE — DISCHARGE INSTRUCTIONS
Thank you for allowing us to provide you with medical care today.  We realize that you have many choices for your emergency care needs.  We thank you for choosing [facility].  Please choose us in the future for any continued health care needs.      The exam and treatment you received in the Emergency Department were for an emergent problem and are not intended as complete care. It is important that you follow up with a doctor, nurse practitioner, or physician assistant for ongoing care. If your symptoms worsen or you do not improve as expected and you are unable to reach your usual health care provider, you should return to the Emergency Department. We are available 24 hours a day.      Please make an appointment with your healthcare provider(s) for follow up of your Emergency Department visit.  Take this sheet with you when you go to your follow-up visit.

## 2025-05-28 PROBLEM — E43 SEVERE PROTEIN-ENERGY MALNUTRITION: Status: ACTIVE | Noted: 2025-05-28

## 2025-05-28 LAB
ALBUMIN SERPL-MCNC: 3.1 G/DL (ref 3.5–5)
ALBUMIN/GLOB SERPL: 0.7 (ref 1.1–2.2)
ALP SERPL-CCNC: 99 U/L (ref 45–117)
ALT SERPL-CCNC: 14 U/L (ref 12–78)
ANION GAP SERPL CALC-SCNC: 3 MMOL/L (ref 2–12)
AST SERPL-CCNC: 19 U/L (ref 15–37)
BILIRUB SERPL-MCNC: 0.4 MG/DL (ref 0.2–1)
BUN SERPL-MCNC: 9 MG/DL (ref 6–20)
BUN/CREAT SERPL: 11 (ref 12–20)
CALCIUM SERPL-MCNC: 9.8 MG/DL (ref 8.5–10.1)
CHLORIDE SERPL-SCNC: 107 MMOL/L (ref 97–108)
CO2 SERPL-SCNC: 28 MMOL/L (ref 21–32)
CREAT SERPL-MCNC: 0.83 MG/DL (ref 0.55–1.02)
EKG ATRIAL RATE: 46 BPM
EKG DIAGNOSIS: NORMAL
EKG P AXIS: 51 DEGREES
EKG P-R INTERVAL: 182 MS
EKG Q-T INTERVAL: 440 MS
EKG QRS DURATION: 76 MS
EKG QTC CALCULATION (BAZETT): 385 MS
EKG R AXIS: -45 DEGREES
EKG T AXIS: 42 DEGREES
EKG VENTRICULAR RATE: 46 BPM
ERYTHROCYTE [DISTWIDTH] IN BLOOD BY AUTOMATED COUNT: 14.6 % (ref 11.5–14.5)
GLOBULIN SER CALC-MCNC: 4.2 G/DL (ref 2–4)
GLUCOSE BLD STRIP.AUTO-MCNC: 113 MG/DL (ref 65–117)
GLUCOSE BLD STRIP.AUTO-MCNC: 114 MG/DL (ref 65–117)
GLUCOSE BLD STRIP.AUTO-MCNC: 152 MG/DL (ref 65–117)
GLUCOSE BLD STRIP.AUTO-MCNC: 89 MG/DL (ref 65–117)
GLUCOSE SERPL-MCNC: 93 MG/DL (ref 65–100)
HCT VFR BLD AUTO: 32 % (ref 35–47)
HGB BLD-MCNC: 9.8 G/DL (ref 11.5–16)
MCH RBC QN AUTO: 27.9 PG (ref 26–34)
MCHC RBC AUTO-ENTMCNC: 30.6 G/DL (ref 30–36.5)
MCV RBC AUTO: 91.2 FL (ref 80–99)
NRBC # BLD: 0 K/UL (ref 0–0.01)
NRBC BLD-RTO: 0 PER 100 WBC
PLATELET # BLD AUTO: 248 K/UL (ref 150–400)
PMV BLD AUTO: 10.3 FL (ref 8.9–12.9)
POTASSIUM SERPL-SCNC: 3.6 MMOL/L (ref 3.5–5.1)
PROT SERPL-MCNC: 7.3 G/DL (ref 6.4–8.2)
RBC # BLD AUTO: 3.51 M/UL (ref 3.8–5.2)
SERVICE CMNT-IMP: ABNORMAL
SERVICE CMNT-IMP: NORMAL
SODIUM SERPL-SCNC: 138 MMOL/L (ref 136–145)
WBC # BLD AUTO: 5.3 K/UL (ref 3.6–11)

## 2025-05-28 PROCEDURE — 93010 ELECTROCARDIOGRAM REPORT: CPT | Performed by: SPECIALIST

## 2025-05-28 PROCEDURE — 2500000003 HC RX 250 WO HCPCS: Performed by: HOSPITALIST

## 2025-05-28 PROCEDURE — 80053 COMPREHEN METABOLIC PANEL: CPT

## 2025-05-28 PROCEDURE — 6370000000 HC RX 637 (ALT 250 FOR IP): Performed by: HOSPITALIST

## 2025-05-28 PROCEDURE — 2060000000 HC ICU INTERMEDIATE R&B

## 2025-05-28 PROCEDURE — 99223 1ST HOSP IP/OBS HIGH 75: CPT | Performed by: INTERNAL MEDICINE

## 2025-05-28 PROCEDURE — 82962 GLUCOSE BLOOD TEST: CPT

## 2025-05-28 PROCEDURE — 85027 COMPLETE CBC AUTOMATED: CPT

## 2025-05-28 PROCEDURE — 6360000002 HC RX W HCPCS: Performed by: HOSPITALIST

## 2025-05-28 PROCEDURE — 6370000000 HC RX 637 (ALT 250 FOR IP): Performed by: FAMILY MEDICINE

## 2025-05-28 RX ORDER — DIPHENHYDRAMINE HCL 25 MG
25 CAPSULE ORAL EVERY 6 HOURS PRN
Status: DISCONTINUED | OUTPATIENT
Start: 2025-05-28 | End: 2025-05-31 | Stop reason: HOSPADM

## 2025-05-28 RX ADMIN — SENNOSIDES AND DOCUSATE SODIUM 1 TABLET: 50; 8.6 TABLET ORAL at 00:06

## 2025-05-28 RX ADMIN — Medication 1000 UNITS: at 09:13

## 2025-05-28 RX ADMIN — SODIUM CHLORIDE, PRESERVATIVE FREE 10 ML: 5 INJECTION INTRAVENOUS at 00:09

## 2025-05-28 RX ADMIN — ATORVASTATIN CALCIUM 40 MG: 40 TABLET, FILM COATED ORAL at 20:42

## 2025-05-28 RX ADMIN — DIPHENHYDRAMINE HYDROCHLORIDE 25 MG: 25 CAPSULE ORAL at 17:18

## 2025-05-28 RX ADMIN — LATANOPROST 1 DROP: 50 SOLUTION OPHTHALMIC at 01:00

## 2025-05-28 RX ADMIN — TRAMADOL HYDROCHLORIDE 50 MG: 50 TABLET, COATED ORAL at 14:14

## 2025-05-28 RX ADMIN — MONTELUKAST 10 MG: 10 TABLET, FILM COATED ORAL at 20:42

## 2025-05-28 RX ADMIN — ENOXAPARIN SODIUM 40 MG: 100 INJECTION SUBCUTANEOUS at 09:14

## 2025-05-28 RX ADMIN — SODIUM CHLORIDE, PRESERVATIVE FREE 10 ML: 5 INJECTION INTRAVENOUS at 10:17

## 2025-05-28 RX ADMIN — CYANOCOBALAMIN TAB 500 MCG 1000 MCG: 500 TAB at 09:13

## 2025-05-28 RX ADMIN — ASPIRIN 81 MG: 81 TABLET, CHEWABLE ORAL at 09:13

## 2025-05-28 RX ADMIN — TROSPIUM CHLORIDE 20 MG: 20 TABLET, FILM COATED ORAL at 06:19

## 2025-05-28 RX ADMIN — MONTELUKAST 10 MG: 10 TABLET, FILM COATED ORAL at 00:08

## 2025-05-28 RX ADMIN — PANTOPRAZOLE SODIUM 40 MG: 40 TABLET, DELAYED RELEASE ORAL at 06:19

## 2025-05-28 RX ADMIN — SENNOSIDES AND DOCUSATE SODIUM 1 TABLET: 50; 8.6 TABLET ORAL at 09:13

## 2025-05-28 RX ADMIN — SENNOSIDES AND DOCUSATE SODIUM 1 TABLET: 50; 8.6 TABLET ORAL at 20:42

## 2025-05-28 RX ADMIN — TRAMADOL HYDROCHLORIDE 50 MG: 50 TABLET, COATED ORAL at 20:40

## 2025-05-28 RX ADMIN — TROSPIUM CHLORIDE 20 MG: 20 TABLET, FILM COATED ORAL at 17:18

## 2025-05-28 RX ADMIN — LATANOPROST 1 DROP: 50 SOLUTION OPHTHALMIC at 20:49

## 2025-05-28 RX ADMIN — ACETAMINOPHEN 650 MG: 325 TABLET ORAL at 03:34

## 2025-05-28 RX ADMIN — ATORVASTATIN CALCIUM 40 MG: 40 TABLET, FILM COATED ORAL at 00:08

## 2025-05-28 RX ADMIN — LOSARTAN POTASSIUM 25 MG: 25 TABLET, FILM COATED ORAL at 09:13

## 2025-05-28 RX ADMIN — CETIRIZINE HYDROCHLORIDE 10 MG: 10 TABLET, FILM COATED ORAL at 09:13

## 2025-05-28 RX ADMIN — TRAMADOL HYDROCHLORIDE 50 MG: 50 TABLET, COATED ORAL at 00:08

## 2025-05-28 ASSESSMENT — PAIN DESCRIPTION - ORIENTATION
ORIENTATION: LEFT

## 2025-05-28 ASSESSMENT — PAIN SCALES - GENERAL
PAINLEVEL_OUTOF10: 4
PAINLEVEL_OUTOF10: 10
PAINLEVEL_OUTOF10: 9
PAINLEVEL_OUTOF10: 8
PAINLEVEL_OUTOF10: 0
PAINLEVEL_OUTOF10: 3
PAINLEVEL_OUTOF10: 2
PAINLEVEL_OUTOF10: 8

## 2025-05-28 ASSESSMENT — PAIN DESCRIPTION - DESCRIPTORS
DESCRIPTORS: ACHING
DESCRIPTORS: ACHING
DESCRIPTORS: SHOOTING

## 2025-05-28 ASSESSMENT — PAIN SCALES - WONG BAKER
WONGBAKER_NUMERICALRESPONSE: HURTS A LITTLE BIT

## 2025-05-28 ASSESSMENT — PAIN DESCRIPTION - LOCATION
LOCATION: SHOULDER

## 2025-05-28 ASSESSMENT — PAIN - FUNCTIONAL ASSESSMENT: PAIN_FUNCTIONAL_ASSESSMENT: ACTIVITIES ARE NOT PREVENTED

## 2025-05-28 NOTE — PROGRESS NOTES
4 Eyes Skin Assessment     NAME:  Laura Lu  YOB: 1940  MEDICAL RECORD NUMBER:  [id number]    The patient is being assessed for  Admission    I agree that at least one RN has performed a thorough Head to Toe Skin Assessment on the patient. ALL assessment sites listed below have been assessed.      Areas assessed by both nurses:    Head, Face, Ears, Shoulders, Back, Chest, Arms, Elbows, Hands, Sacrum. Buttock, Coccyx, Ischium, Legs. Feet and Heels, and Under Medical Devices         Does the Patient have a Wound? Yes wound(s) were present on assessment. LDA wound assessment was Initiated and completed by RN       Clint Prevention initiated by RN: Yes  Wound Care Orders initiated by RN: Yes    Pressure Injury (Stage 3,4, Unstageable, DTI, NWPT, and Complex wounds) if present, place Wound referral order by RN under : Yes    New Ostomies, if present place, Ostomy referral order under : No     Nurse 1 eSignature: Electronically signed by Glory Collazo RN on 5/28/25 at 3:28 AM EDT    **SHARE this note so that the co-signing nurse can place an eSignature**    Nurse 2 eSignature: Electronically signed by Omer Gallegos RN on 5/28/25 at 7:18 AM EDT

## 2025-05-28 NOTE — CONSULTS
[facility] CARDIOLOGY                     Cardiac EP Hospital Care Note     [x]Initial Encounter     [ ]Follow-up     Patient Name: Laura Lu - :1940 - MRN:[id number]   Primary Cardiologist: Valentino Avila MD   Consulting Cardiologist: Srini Mckeon MD     Reason for encounter: symptomatic sinus bradycardia     HPI:       Laura Lu is an 85 y.o. female with PMH significant for hypertension, hyperlipidemia, GERD, and tachy-merry. She presented to the ED on 2025 from the cardiology office after becoming weak and presyncopal while checking out. She had gone to [facility] ED on 2025 for palpitations. She was then seen in the office by NP on 2025 where she reported symptoms of fatigue and dizziness. HR during the visit was 52, when she was checking out she became weak and presyncopal and had to sit down, HR was found to be in the 40's. EMS was called and she was transported to [facility] ED for further evaluation. EP consulted    Laura Lu is nervous to get a pacemaker.     She was to see me in September    She wanted to have me come by when her daughter arrived today   I did come by in the afternoon  She was feeling fine lying down  Sinus rate was normal then  Her daughter and she are pleasant   They felt that \"her body is not good enough to do any procedure today)    2 nights ago she was in ER and I was on call  Her ER attending Dr Miles asked me if I knew the result of her holter  I pulled up records and pointed out that he had PAC PVC and atrial tachycardia 182 bpm for about 2.5 min  Her average sinus rate was close to 60 bpm  She had sinus bradycardia when she was sleeping  Therefore I recommended her increase metoprolol from 12.5 mg bid to 25 mg bid  She was bothered by fast rate and palpitations to come to ER then  She took one dose 25 mg lopressor and was in cardiology clinic with NP when she felt diaphoretic, dizzy and sinus bradycardia so admitted to [facility]     Assessment and  and Itching   · Adhesive Tape Other (See Comments)   CARDIAC PATCHES REDNESS          OBJECTIVE:   Wt Readings from Last 3 Encounters:   05/28/25 77.7 kg (171 lb 3.2 oz)   05/27/25 73.9 kg (163 lb)   05/26/25 74.8 kg (165 lb)      Physical Exam:      Vitals:    05/28/25 1221 05/28/25 1400 05/28/25 1632 05/28/25 1800   BP:   120/68    Pulse:  78 90 68   Resp:   16    Temp:   98.4 °F (36.9 °C)    TempSrc:   Oral    SpO2:   97%    Weight:       Height: 1.6 m (5' 2.99\")         Telemetry: normal sinus rhythm     Gen: Well-developed, well-nourished, in no acute distress   Neck: Supple, No JVD, No Carotid Bruit   Resp: No accessory muscle use, Clear breath sounds, No rales or rhonchi   Card: Regular Rate,Rhythm, No murmurs, rubs or gallop. No thrills.   Abd:   Soft, non-tender, mild obesity  MSK: No cyanosis   Skin: No rashes    Neuro: Moving all four extremities, follows commands appropriately   Psych: Good insight, oriented to person, place, alert, Nml Affect   LE: No edema     Data Review:     Radiology:   XR Results (most recent):   Xray Result (most recent):   XR CHEST (2 VW) 05/27/2025     Narrative   EXAM: XR CHEST (2 VW)     INDICATION:  palpitations     COMPARISON: 5/26/2025     TECHNIQUE: PA and lateral chest views     FINDINGS: The cardiac size is mildly enlarged and there is tortuosity of the   descending aorta.. The pulmonary vasculature is within normal limits.   A right total shoulder replacement is present.   The lungs and pleural spaces are hyperinflated but clear. The visualized bones   and upper abdomen are age-appropriate.     Impression   Lung hyperinflation. No infiltrate.       Electronically signed by Danica Higgins       CT Result (most recent):   CTA CHEST W WO CONTRAST 05/26/2025     Narrative   EXAM: CTA CHEST W WO CONTRAST     INDICATION: Pulmonary embolism     COMPARISON: April 4, 2025     TECHNIQUE: Helical thin section chest CT following intravenous administration of   nonionic contrast 100 mL  IntraVENous, PRN, Mariela Gates MD   ·  magnesium sulfate 2000 mg in 50 mL IVPB premix, 2,000 mg, IntraVENous, PRN, Mariela Gates MD   ·  enoxaparin (LOVENOX) injection 40 mg, 40 mg, SubCUTAneous, Daily, Mariela Gates MD, 40 mg at 05/28/25 0914   ·  ondansetron (ZOFRAN-ODT) disintegrating tablet 4 mg, 4 mg, Oral, Q8H PRN **OR** ondansetron (ZOFRAN) injection 4 mg, 4 mg, IntraVENous, Q6H PRN, Mariela Gates MD   ·  polyethylene glycol (GLYCOLAX) packet 17 g, 17 g, Oral, Daily PRN, Mariela Gates MD   ·  acetaminophen (TYLENOL) tablet 650 mg, 650 mg, Oral, Q6H PRN, 650 mg at 05/28/25 0334 **OR** acetaminophen (TYLENOL) suppository 650 mg, 650 mg, Rectal, Q6H PRN, Mariela Gates MD   ·  insulin lispro (HUMALOG,ADMELOG) injection vial 0-8 Units, 0-8 Units, SubCUTAneous, 4x Daily AC & HS, Mariela Gates MD   ·  lidocaine 4 % external patch 1 patch, 1 patch, TransDERmal, Daily, Mariela Gates MD, 1 patch at 05/27/25 1733   ·  traMADol (ULTRAM) tablet 50 mg, 50 mg, Oral, Q6H PRN, Mariela Gates MD, 50 mg at 05/28/25 0008   ·  lidocaine 4 % external patch 1 patch, 1 patch, TransDERmal, Daily, Mariela Gates MD, 1 patch at 05/27/25 4812     Srini Mckeon MD     [facility] Cardiology   Call center: P) [phone]  (F) [phone]       CC:Alessia Rangel MD

## 2025-05-28 NOTE — PROGRESS NOTES
Spiritual Health History and Assessment/Progress Note  [facility]    Spiritual/Emotional Needs,  ,  ,      Name: Laura Lu MRN: [id number]    Age: 85 y.o.     Sex: female   Language: English   Adventist: Voodoo   Bradycardia     Date: 5/28/2025            Total Time Calculated: 15 min              Spiritual Assessment began in [facility] MED SURG        Referral/Consult From: Nurse   Encounter Overview/Reason: Spiritual/Emotional Needs  Service Provided For: Patient    Shannen, Belief, Meaning:   Patient is connected with a shannen tradition or spiritual practice and has beliefs or practices that help with coping during difficult times  Family/Friends No family/friends present      Importance and Influence:  Patient has spiritual/personal beliefs that influence decisions regarding their health  Family/Friends     Community:  Patient feels well-supported. Support system includes: Children  Family/Friends     Assessment and Plan of Care:   Responded to spiritual care consult. Prayed for pt's health requests. Pt was appreciative of the visit. Hopeful for discharge and recovery.    Patient Interventions include: Other: Prayer  Family/Friends Interventions include:     Patient Plan of Care: No spiritual needs identified for follow-up and Spiritual Care available upon further referral  Family/Friends Plan of Care:     Electronically signed by SUZANNE Turner on 5/28/2025 at 7:47 AM  For additional spiritual care, please contact the  on-call at ([phone]).    Meri Le MDiv, MS, BCC, CGP  Staff   Spiritual Health Services

## 2025-05-28 NOTE — PROGRESS NOTES
Hospitalist Progress Note  Margo Delaney MD  Answering service: [phone] OR [phone] from in house phone        Date of Service:  2025  NAME:  Laura Lu  :  1940  MRN:  [id number]      Admission Summary:   Laura Lu is a 85 y.o. female with PMH significant for tachycardia, HTN, T2DM on diet control, CKD stage II, anemia of chronic disease, right hip replacement 2 years ago, hearing impairment, bilateral knee replacement, s/p right rotator cuff tear on 3/10/2025, two sacral wound, and urinary incontinent who is transferred from cardiologist office for symptomatic bradycardia.  She stated that she had tachycardia 2 years ago after she had right hip replacement, improved after she was started on metoprolol.  In March of this year after she had right rotator cuff's tear repair she had episode of bradycardia, heart rate of 42 and she was taken off metoprolol.  Since then she had on and off palpitation, dizziness and nausea.  She stated that her heart rate up and down between 48 and 200 bpm.  She had multiple workup for these and she was placed back on metoprolol 12.5 mg daily.  She kept having palpitation and bradycardia, she went to [facility] ER last night. On monitor her heart rate was stable and she was discharged with advised to resume her metoprolol.  But she didn't take it because her heart rate kept dropping to 38. She went to cardiology office for a follow-up and referred to [facility] ER for further evaluation and management.  No left-sided chest pain, fever, cough, nausea, diaphoresis, abdominal pain or urinary complaints. No history of CAD or CHF.      The patient denies any headache, blurry vision, sore throat, trouble swallowing, trouble with speech, sick contacts with COVID-19 diagnosed patients.     Vital sings on arrival to ER; temperature 99, respirate 18, pulse 46, BP  assistive devices (rolling walker/cane).   Ambulatory status/function: Ambulates with assistance:  Cane and Walker   EARLY MOBILITY ASSESSMENT: Recommend routine ambulation while hospitalized with the assistance of nursing staff and Recommend an assessment from physical therapy and/or occupational therapy  ANTICIPATED DISCHARGE: Greater than 24- 48 hours.  ANTICIPATED DISPOSITION: Home with Home Healthcare  EMERGENCY CONTACT/SURROGATE DECISION MAKER: daughterShalini Rebekah MPOA         Active Hospital Problems    Diagnosis Date Noted    Bradycardia [R00.1] 05/27/2025         Review of Systems:   Pertinent items are noted in HPI.         Vital Signs:    Last 24hrs VS reviewed since prior progress note. Most recent are:  Vitals:    05/28/25 0600   BP:    Pulse: 74   Resp:    Temp:    SpO2:          Intake/Output Summary (Last 24 hours) at 5/28/2025 0830  Last data filed at 5/28/2025 0625  Gross per 24 hour   Intake 220 ml   Output 350 ml   Net -130 ml        Physical Examination:     I had a face to face encounter with this patient and independently examined them on 5/28/2025 as outlined below:          General : alert x 3, awake, no acute distress,   HEENT: PEERL, EOMI, moist mucus membrane, TM clear  Neck: supple, no JVD, no meningeal signs  Chest: Clear to auscultation bilaterally   CVS: S1 S2, reg   Abd: soft/ non tender, non distended, BS physiological,   Ext: no clubbing, no cyanosis, no edema, brisk 2+ DP pulses  Neuro/Psych: anxious mood and affect, CN 2-12 grossly intact, sensory grossly within normal limit, Strength 5/5 in all extremities  Skin: warm            Data Review:    Review and/or order of clinical lab test  Review and/or order of tests in the radiology section of CPT    I have independently reviewed and interpreted patient's lab and all other diagnostic data    Notes reviewed from all clinical/nonclinical/nursing services involved in patient's clinical care. Care coordination

## 2025-05-28 NOTE — WOUND CARE
Wound Care Note:     New consult placed by physician request for coccyx    Chart shows:  Admitted for bradycardia  Past Medical History:   Diagnosis Date    Adverse reaction to anesthetic agent     PT HAD TACHYCARDIA POST HIP REPLACEMENT, ADMITTED TO CARDIAC UNIT    Arrhythmia     Bradycardia.    Arthritis     Burning with urination     Incontinence.    Depression     GERD (gastroesophageal reflux disease)     Glaucoma     Hearing loss     High cholesterol     Hypertension     Hypertensive emergency 01/14/2023    Ill-defined condition     EDEMA, LOWER LIMS, URINARY INCONTINENCE    Neuropathy     Obesity, Class II, BMI 35-39.9     Osteoarthritis     Sleep apnea     NO CPAP    Type 2 diabetes mellitus without complication (HCC)     Urinary incontinence      WBC = 5.3 on 5/28/25  Clint = 19    Assessment:   Patient is A&O x 4, communicative, incontinent with no assistance needed in repositioning.    Bed: [facility]  Patient wearing briefs for incontinence and has a Pure Wick in place.    Diet: Adult diet regular; 4 carb choices  Patient reports no pain.      Bilateral heels, buttocks, and sacral skin intact and without erythema.    1. POA gluteal cleft with fissure measuring 2 cm x 0.3 cm x 0.1 cm, wound bed is red, wound edges are open/macerated, janelle-wound with hyperpigmentation, no  drainage.  Zinc oxide applied.      Spoke with Dr. Delaney, wound care orders obtained.    Patient repositioned supine.  Heels offloaded on pillows.     Recommendations:    Gluteal cleft- Every 12 hours and as needed apply zinc oxide.      Skin Care & Pressure Prevention:  Minimize layers of linen/pads under patient to optimize support surface.    Turn/reposition approximately every 2 hours and offload heels.  Manage incontinence / promote continence   Nourishing Skin Cream to dry skin, minimize use of briefs when able    Discussed above plan with patient & JOJO James    Transition

## 2025-05-28 NOTE — PLAN OF CARE
Problem: Chronic Conditions and Co-morbidities  Goal: Patient's chronic conditions and co-morbidity symptoms are monitored and maintained or improved  Outcome: Progressing     Problem: Discharge Planning  Goal: Discharge to home or other facility with appropriate resources  Outcome: Progressing     Problem: Pain  Goal: Verbalizes/displays adequate comfort level or baseline comfort level  Outcome: Progressing     Problem: Safety - Adult  Goal: Free from fall injury  5/28/2025 1511 by Erika Landry, RN  Outcome: Progressing  5/28/2025 0325 by Glory Collazo, RN  Outcome: Progressing     Problem: ABCDS Injury Assessment  Goal: Absence of physical injury  Outcome: Progressing     Problem: Nutrition Deficit:  Goal: Optimize nutritional status  Outcome: Progressing

## 2025-05-28 NOTE — CARE COORDINATION
Care Management Initial Assessment       RUR: 17%  Readmission? No  1st IM letter given? Yes - 5/27/25  1st  letter given: No    Patient admitted for palpitations  Residence: 2 level home with entrance on 1st floor, bedrooms on 1st floor, ramped entrance  ADL: independent, does not drive  DME: stefan  PCP: Dr. Alessia Rangel   Verified Insurance: [facility] Medicare  Emergency Contacts: Fela Shalini [phone]  Previous rehab/services: [facility]    Transportation: family         05/28/25 1148   Service Assessment   Patient Orientation Alert and Oriented   Cognition Alert   History Provided By Patient   Primary Caregiver Self   Support Systems Family Members   Patient's Healthcare Decision Maker is: Named in Scanned ACP Document   PCP Verified by CM Yes  (Alessia Rangel)   Last Visit to PCP Within last 3 months   Prior Functional Level Independent in ADLs/IADLs   Current Functional Level Independent in ADLs/IADLs   Can patient return to prior living arrangement Yes   Ability to make needs known: Good   Family able to assist with home care needs: Yes   Would you like for me to discuss the discharge plan with any other family members/significant others, and if so, who? Yes  (Fela Loya)   Financial Resources Medicare   Social/Functional History   Lives With Alone   Type of Home House   Home Layout Two level   Home Access Ramped entrance   Bathroom Toilet Handicap height   Bathroom Equipment Grab bars in shower   Home Equipment Cane   Receives Help From Family   Prior Level of Assist for ADLs Independent   Prior Level of Assist for Homemaking Independent   Ambulation Assistance Independent   Prior Level of Assist for Transfers Independent   Active  No   Discharge Planning   Living Arrangements Alone     Michelle KRISHNAN, RN, BS  Case Management [facility]

## 2025-05-28 NOTE — ED NOTES
ED TO INPATIENT SBAR HANDOFF    Patient Name: Laura Lu   :  1940  85 y.o.   MRN:  [id number]  ED Room #:  ER04/04     Situation  Code Status: Full Code   Allergies: Sulfa antibiotics and Adhesive tape  Weight: No data found.    Arrived from: home    Chief Complaint:   Chief Complaint   Patient presents with    Palpitations       Hospital Problem/Diagnosis:  Principal Problem:    Bradycardia  Resolved Problems:    * No resolved hospital problems. *      Mobility: no current mobility problem   ED Fall Risk: Presents to emergency department  because of falls (Syncope, seizure, or loss of consciousness): No, Age > 70: Yes, Altered Mental Status, Intoxication with alcohol or substance confusion (Disorientation, impaired judgment, poor safety awaremess, or inability to follow instructions): No, Impaired Mobility: Ambulates or transfers with assistive devices or assistance; Unable to ambulate or transer.: Yes, Nursing Judgement: Yes   Fell in ED or prior to admission: no   Restraints: no     Sitter: no   Family/Caregiver Present: yes    Neet to know social/safety information:     Background  History:   Past Medical History:   Diagnosis Date    Adverse reaction to anesthetic agent     PT HAD TACHYCARDIA POST HIP REPLACEMENT, ADMITTED TO CARDIAC UNIT    Arrhythmia     Bradycardia.    Arthritis     Burning with urination     Incontinence.    Depression     GERD (gastroesophageal reflux disease)     Glaucoma     Hearing loss     High cholesterol     Hypertension     Hypertensive emergency 2023    Ill-defined condition     EDEMA, LOWER LIMS, URINARY INCONTINENCE    Neuropathy     Obesity, Class II, BMI 35-39.9     Osteoarthritis     Sleep apnea     NO CPAP    Type 2 diabetes mellitus without complication (HCC)     Urinary incontinence        Assessment    Abnormal Assessment Findings:   Imaging:   XR CHEST (2 VW)   Final Result      Lung hyperinflation. No infiltrate.         Electronically signed by Danica

## 2025-05-29 LAB
ALBUMIN SERPL-MCNC: 3.2 G/DL (ref 3.5–5)
ALBUMIN/GLOB SERPL: 0.9 (ref 1.1–2.2)
ALP SERPL-CCNC: 95 U/L (ref 45–117)
ALT SERPL-CCNC: 14 U/L (ref 12–78)
ANION GAP SERPL CALC-SCNC: 5 MMOL/L (ref 2–12)
AST SERPL-CCNC: 14 U/L (ref 15–37)
BILIRUB SERPL-MCNC: 0.4 MG/DL (ref 0.2–1)
BUN SERPL-MCNC: 8 MG/DL (ref 6–20)
BUN/CREAT SERPL: 9 (ref 12–20)
CALCIUM SERPL-MCNC: 10 MG/DL (ref 8.5–10.1)
CHLORIDE SERPL-SCNC: 105 MMOL/L (ref 97–108)
CO2 SERPL-SCNC: 28 MMOL/L (ref 21–32)
CREAT SERPL-MCNC: 0.85 MG/DL (ref 0.55–1.02)
ERYTHROCYTE [DISTWIDTH] IN BLOOD BY AUTOMATED COUNT: 14.6 % (ref 11.5–14.5)
GLOBULIN SER CALC-MCNC: 3.5 G/DL (ref 2–4)
GLUCOSE BLD STRIP.AUTO-MCNC: 102 MG/DL (ref 65–117)
GLUCOSE BLD STRIP.AUTO-MCNC: 116 MG/DL (ref 65–117)
GLUCOSE BLD STRIP.AUTO-MCNC: 119 MG/DL (ref 65–117)
GLUCOSE BLD STRIP.AUTO-MCNC: 94 MG/DL (ref 65–117)
GLUCOSE SERPL-MCNC: 101 MG/DL (ref 65–100)
HCT VFR BLD AUTO: 31.6 % (ref 35–47)
HGB BLD-MCNC: 10.1 G/DL (ref 11.5–16)
MCH RBC QN AUTO: 28.2 PG (ref 26–34)
MCHC RBC AUTO-ENTMCNC: 32 G/DL (ref 30–36.5)
MCV RBC AUTO: 88.3 FL (ref 80–99)
NRBC # BLD: 0 K/UL (ref 0–0.01)
NRBC BLD-RTO: 0 PER 100 WBC
PLATELET # BLD AUTO: 259 K/UL (ref 150–400)
PMV BLD AUTO: 10.4 FL (ref 8.9–12.9)
POTASSIUM SERPL-SCNC: 4.1 MMOL/L (ref 3.5–5.1)
PROT SERPL-MCNC: 6.7 G/DL (ref 6.4–8.2)
RBC # BLD AUTO: 3.58 M/UL (ref 3.8–5.2)
SERVICE CMNT-IMP: ABNORMAL
SERVICE CMNT-IMP: NORMAL
SODIUM SERPL-SCNC: 138 MMOL/L (ref 136–145)
WBC # BLD AUTO: 5.5 K/UL (ref 3.6–11)

## 2025-05-29 PROCEDURE — 6370000000 HC RX 637 (ALT 250 FOR IP): Performed by: FAMILY MEDICINE

## 2025-05-29 PROCEDURE — 99233 SBSQ HOSP IP/OBS HIGH 50: CPT | Performed by: INTERNAL MEDICINE

## 2025-05-29 PROCEDURE — 82962 GLUCOSE BLOOD TEST: CPT

## 2025-05-29 PROCEDURE — 85027 COMPLETE CBC AUTOMATED: CPT

## 2025-05-29 PROCEDURE — 80053 COMPREHEN METABOLIC PANEL: CPT

## 2025-05-29 PROCEDURE — 2060000000 HC ICU INTERMEDIATE R&B

## 2025-05-29 PROCEDURE — 6360000002 HC RX W HCPCS: Performed by: HOSPITALIST

## 2025-05-29 PROCEDURE — 6370000000 HC RX 637 (ALT 250 FOR IP): Performed by: HOSPITALIST

## 2025-05-29 RX ADMIN — LATANOPROST 1 DROP: 50 SOLUTION OPHTHALMIC at 22:26

## 2025-05-29 RX ADMIN — ACETAMINOPHEN 650 MG: 325 TABLET ORAL at 18:12

## 2025-05-29 RX ADMIN — LOSARTAN POTASSIUM 25 MG: 25 TABLET, FILM COATED ORAL at 08:47

## 2025-05-29 RX ADMIN — PANTOPRAZOLE SODIUM 40 MG: 40 TABLET, DELAYED RELEASE ORAL at 07:25

## 2025-05-29 RX ADMIN — TRAMADOL HYDROCHLORIDE 50 MG: 50 TABLET, COATED ORAL at 22:30

## 2025-05-29 RX ADMIN — DIPHENHYDRAMINE HYDROCHLORIDE 25 MG: 25 CAPSULE ORAL at 04:25

## 2025-05-29 RX ADMIN — DIPHENHYDRAMINE HYDROCHLORIDE 25 MG: 25 CAPSULE ORAL at 12:51

## 2025-05-29 RX ADMIN — TRAMADOL HYDROCHLORIDE 50 MG: 50 TABLET, COATED ORAL at 04:24

## 2025-05-29 RX ADMIN — MONTELUKAST 10 MG: 10 TABLET, FILM COATED ORAL at 22:24

## 2025-05-29 RX ADMIN — ASPIRIN 81 MG: 81 TABLET, CHEWABLE ORAL at 08:47

## 2025-05-29 RX ADMIN — DIPHENHYDRAMINE HYDROCHLORIDE 25 MG: 25 CAPSULE ORAL at 22:31

## 2025-05-29 RX ADMIN — TROSPIUM CHLORIDE 20 MG: 20 TABLET, FILM COATED ORAL at 17:32

## 2025-05-29 RX ADMIN — TRAMADOL HYDROCHLORIDE 50 MG: 50 TABLET, COATED ORAL at 12:51

## 2025-05-29 RX ADMIN — TROSPIUM CHLORIDE 20 MG: 20 TABLET, FILM COATED ORAL at 07:25

## 2025-05-29 RX ADMIN — SENNOSIDES AND DOCUSATE SODIUM 1 TABLET: 50; 8.6 TABLET ORAL at 22:24

## 2025-05-29 RX ADMIN — ATORVASTATIN CALCIUM 40 MG: 40 TABLET, FILM COATED ORAL at 22:24

## 2025-05-29 RX ADMIN — CYANOCOBALAMIN TAB 500 MCG 1000 MCG: 500 TAB at 08:47

## 2025-05-29 RX ADMIN — CETIRIZINE HYDROCHLORIDE 10 MG: 10 TABLET, FILM COATED ORAL at 08:47

## 2025-05-29 RX ADMIN — Medication 1000 UNITS: at 08:47

## 2025-05-29 RX ADMIN — ENOXAPARIN SODIUM 40 MG: 100 INJECTION SUBCUTANEOUS at 08:48

## 2025-05-29 RX ADMIN — SENNOSIDES AND DOCUSATE SODIUM 1 TABLET: 50; 8.6 TABLET ORAL at 08:47

## 2025-05-29 ASSESSMENT — PAIN SCALES - GENERAL
PAINLEVEL_OUTOF10: 0
PAINLEVEL_OUTOF10: 10
PAINLEVEL_OUTOF10: 10
PAINLEVEL_OUTOF10: 3
PAINLEVEL_OUTOF10: 10
PAINLEVEL_OUTOF10: 10
PAINLEVEL_OUTOF10: 3

## 2025-05-29 ASSESSMENT — PAIN SCALES - WONG BAKER
WONGBAKER_NUMERICALRESPONSE: HURTS A LITTLE BIT
WONGBAKER_NUMERICALRESPONSE: NO HURT
WONGBAKER_NUMERICALRESPONSE: HURTS A LITTLE BIT

## 2025-05-29 ASSESSMENT — PAIN DESCRIPTION - LOCATION
LOCATION: SHOULDER

## 2025-05-29 ASSESSMENT — PAIN DESCRIPTION - DESCRIPTORS
DESCRIPTORS: STABBING
DESCRIPTORS: ACHING
DESCRIPTORS: ACHING;BURNING

## 2025-05-29 ASSESSMENT — PAIN DESCRIPTION - ORIENTATION
ORIENTATION: RIGHT;LEFT
ORIENTATION: RIGHT;LEFT

## 2025-05-29 NOTE — PROGRESS NOTES
Hospitalist Progress Note  Merle Jaimes MD  Answering service: [phone] OR [phone] from in house phone        Date of Service:  2025  NAME:  Laura Lu  :  1940  MRN:  [id number]      Admission Summary:   Laura Lu is a 85 y.o. female with PMH significant for tachycardia, HTN, T2DM on diet control, CKD stage II, anemia of chronic disease, right hip replacement 2 years ago, hearing impairment, bilateral knee replacement, s/p right rotator cuff tear on 3/10/2025, two sacral wound, and urinary incontinent who is transferred from cardiologist office for symptomatic bradycardia.  She stated that she had tachycardia 2 years ago after she had right hip replacement, improved after she was started on metoprolol.  In March of this year after she had right rotator cuff's tear repair she had episode of bradycardia, heart rate of 42 and she was taken off metoprolol.  Since then she had on and off palpitation, dizziness and nausea.  She stated that her heart rate up and down between 48 and 200 bpm.  She had multiple workup for these and she was placed back on metoprolol 12.5 mg daily.  She kept having palpitation and bradycardia, she went to [facility] ER last night. On monitor her heart rate was stable and she was discharged with advised to resume her metoprolol.  But she didn't take it because her heart rate kept dropping to 38. She went to cardiology office for a follow-up and referred to [facility] ER for further evaluation and management.  No left-sided chest pain, fever, cough, nausea, diaphoresis, abdominal pain or urinary complaints. No history of CAD or CHF.      The patient denies any headache, blurry vision, sore throat, trouble swallowing, trouble with speech, sick contacts with COVID-19 diagnosed patients.     Vital sings on arrival to ER; temperature 99, respirate 18, pulse 46, BP  nebulizer solution 1 Dose  1 Dose Inhalation Q4H PRN    sodium chloride flush 0.9 % injection 5-40 mL  5-40 mL IntraVENous 2 times per day    sodium chloride flush 0.9 % injection 5-40 mL  5-40 mL IntraVENous PRN    0.9 % sodium chloride infusion   IntraVENous PRN    potassium chloride (KLOR-CON) extended release tablet 40 mEq  40 mEq Oral PRN    Or    potassium bicarb-citric acid (EFFER-K) effervescent tablet 40 mEq  40 mEq Oral PRN    Or    potassium chloride 10 mEq/100 mL IVPB (Peripheral Line)  10 mEq IntraVENous PRN    magnesium sulfate 2000 mg in 50 mL IVPB premix  2,000 mg IntraVENous PRN    [Held by provider] enoxaparin (LOVENOX) injection 40 mg  40 mg SubCUTAneous Daily    ondansetron (ZOFRAN-ODT) disintegrating tablet 4 mg  4 mg Oral Q8H PRN    Or    ondansetron (ZOFRAN) injection 4 mg  4 mg IntraVENous Q6H PRN    polyethylene glycol (GLYCOLAX) packet 17 g  17 g Oral Daily PRN    acetaminophen (TYLENOL) tablet 650 mg  650 mg Oral Q6H PRN    Or    acetaminophen (TYLENOL) suppository 650 mg  650 mg Rectal Q6H PRN    insulin lispro (HUMALOG,ADMELOG) injection vial 0-8 Units  0-8 Units SubCUTAneous 4x Daily AC & HS    lidocaine 4 % external patch 1 patch  1 patch TransDERmal Daily    traMADol (ULTRAM) tablet 50 mg  50 mg Oral Q6H PRN    lidocaine 4 % external patch 1 patch  1 patch TransDERmal Daily     ______________________________________________________________________  EXPECTED LENGTH OF STAY: 3  ACTUAL LENGTH OF STAY:          2                 Merle Jaimes MD

## 2025-05-29 NOTE — PLAN OF CARE
Problem: Chronic Conditions and Co-morbidities  Goal: Patient's chronic conditions and co-morbidity symptoms are monitored and maintained or improved  5/28/2025 1511 by Erika Landry RN  Outcome: Progressing     Problem: Discharge Planning  Goal: Discharge to home or other facility with appropriate resources  5/28/2025 1511 by Erika Landry RN  Outcome: Progressing     Problem: Pain  Goal: Verbalizes/displays adequate comfort level or baseline comfort level  5/28/2025 1511 by Erika Landry RN  Outcome: Progressing     Problem: Safety - Adult  Goal: Free from fall injury  5/28/2025 1511 by Erika Landry RN  Outcome: Progressing     Problem: ABCDS Injury Assessment  Goal: Absence of physical injury  5/28/2025 1511 by Erika Landry RN  Outcome: Progressing     Problem: Nutrition Deficit:  Goal: Optimize nutritional status  5/28/2025 1511 by Erika Landry RN  Outcome: Progressing

## 2025-05-30 ENCOUNTER — APPOINTMENT (OUTPATIENT)
Facility: HOSPITAL | Age: 85
DRG: 242 | End: 2025-05-30
Payer: MEDICARE

## 2025-05-30 DIAGNOSIS — R06.02 SHORTNESS OF BREATH: Primary | ICD-10-CM

## 2025-05-30 LAB
ALBUMIN SERPL-MCNC: 3.2 G/DL (ref 3.5–5)
ALBUMIN/GLOB SERPL: 0.8 (ref 1.1–2.2)
ALP SERPL-CCNC: 101 U/L (ref 45–117)
ALT SERPL-CCNC: 17 U/L (ref 12–78)
ANION GAP SERPL CALC-SCNC: 6 MMOL/L (ref 2–12)
AST SERPL-CCNC: 20 U/L (ref 15–37)
BILIRUB SERPL-MCNC: 0.4 MG/DL (ref 0.2–1)
BUN SERPL-MCNC: 6 MG/DL (ref 6–20)
BUN/CREAT SERPL: 8 (ref 12–20)
CALCIUM SERPL-MCNC: 9.9 MG/DL (ref 8.5–10.1)
CHLORIDE SERPL-SCNC: 108 MMOL/L (ref 97–108)
CO2 SERPL-SCNC: 25 MMOL/L (ref 21–32)
CREAT SERPL-MCNC: 0.75 MG/DL (ref 0.55–1.02)
ECHO BSA: 1.84 M2
ECHO BSA: 1.84 M2
ERYTHROCYTE [DISTWIDTH] IN BLOOD BY AUTOMATED COUNT: 14.3 % (ref 11.5–14.5)
GLOBULIN SER CALC-MCNC: 4 G/DL (ref 2–4)
GLUCOSE BLD STRIP.AUTO-MCNC: 142 MG/DL (ref 65–117)
GLUCOSE BLD STRIP.AUTO-MCNC: 151 MG/DL (ref 65–117)
GLUCOSE BLD STRIP.AUTO-MCNC: 88 MG/DL (ref 65–117)
GLUCOSE BLD STRIP.AUTO-MCNC: 99 MG/DL (ref 65–117)
GLUCOSE SERPL-MCNC: 99 MG/DL (ref 65–100)
HCT VFR BLD AUTO: 33.5 % (ref 35–47)
HGB BLD-MCNC: 10.7 G/DL (ref 11.5–16)
MCH RBC QN AUTO: 27.8 PG (ref 26–34)
MCHC RBC AUTO-ENTMCNC: 31.9 G/DL (ref 30–36.5)
MCV RBC AUTO: 87 FL (ref 80–99)
NRBC # BLD: 0 K/UL (ref 0–0.01)
NRBC BLD-RTO: 0 PER 100 WBC
PLATELET # BLD AUTO: 273 K/UL (ref 150–400)
PMV BLD AUTO: 10.2 FL (ref 8.9–12.9)
POTASSIUM SERPL-SCNC: 3.6 MMOL/L (ref 3.5–5.1)
PROT SERPL-MCNC: 7.2 G/DL (ref 6.4–8.2)
RBC # BLD AUTO: 3.85 M/UL (ref 3.8–5.2)
SERVICE CMNT-IMP: ABNORMAL
SERVICE CMNT-IMP: ABNORMAL
SERVICE CMNT-IMP: NORMAL
SERVICE CMNT-IMP: NORMAL
SODIUM SERPL-SCNC: 139 MMOL/L (ref 136–145)
WBC # BLD AUTO: 5.7 K/UL (ref 3.6–11)

## 2025-05-30 PROCEDURE — C1894 INTRO/SHEATH, NON-LASER: HCPCS | Performed by: HOSPITALIST

## 2025-05-30 PROCEDURE — 36415 COLL VENOUS BLD VENIPUNCTURE: CPT

## 2025-05-30 PROCEDURE — 0JH606Z INSERTION OF PACEMAKER, DUAL CHAMBER INTO CHEST SUBCUTANEOUS TISSUE AND FASCIA, OPEN APPROACH: ICD-10-PCS | Performed by: HOSPITALIST

## 2025-05-30 PROCEDURE — 02HK3JZ INSERTION OF PACEMAKER LEAD INTO RIGHT VENTRICLE, PERCUTANEOUS APPROACH: ICD-10-PCS | Performed by: HOSPITALIST

## 2025-05-30 PROCEDURE — 6370000000 HC RX 637 (ALT 250 FOR IP): Performed by: HOSPITALIST

## 2025-05-30 PROCEDURE — 76937 US GUIDE VASCULAR ACCESS: CPT | Performed by: HOSPITALIST

## 2025-05-30 PROCEDURE — 2500000003 HC RX 250 WO HCPCS: Performed by: HOSPITALIST

## 2025-05-30 PROCEDURE — 33208 INSRT HEART PM ATRIAL & VENT: CPT | Performed by: HOSPITALIST

## 2025-05-30 PROCEDURE — 85027 COMPLETE CBC AUTOMATED: CPT

## 2025-05-30 PROCEDURE — 99153 MOD SED SAME PHYS/QHP EA: CPT | Performed by: HOSPITALIST

## 2025-05-30 PROCEDURE — C1785 PMKR, DUAL, RATE-RESP: HCPCS | Performed by: HOSPITALIST

## 2025-05-30 PROCEDURE — 2720000010 HC SURG SUPPLY STERILE: Performed by: HOSPITALIST

## 2025-05-30 PROCEDURE — 2580000003 HC RX 258: Performed by: HOSPITALIST

## 2025-05-30 PROCEDURE — 80053 COMPREHEN METABOLIC PANEL: CPT

## 2025-05-30 PROCEDURE — C1892 INTRO/SHEATH,FIXED,PEEL-AWAY: HCPCS | Performed by: HOSPITALIST

## 2025-05-30 PROCEDURE — 71045 X-RAY EXAM CHEST 1 VIEW: CPT

## 2025-05-30 PROCEDURE — 6370000000 HC RX 637 (ALT 250 FOR IP): Performed by: FAMILY MEDICINE

## 2025-05-30 PROCEDURE — 2060000000 HC ICU INTERMEDIATE R&B

## 2025-05-30 PROCEDURE — 82962 GLUCOSE BLOOD TEST: CPT

## 2025-05-30 PROCEDURE — C1769 GUIDE WIRE: HCPCS | Performed by: HOSPITALIST

## 2025-05-30 PROCEDURE — 6360000002 HC RX W HCPCS: Performed by: HOSPITALIST

## 2025-05-30 PROCEDURE — 2709999900 HC NON-CHARGEABLE SUPPLY: Performed by: HOSPITALIST

## 2025-05-30 PROCEDURE — C1898 LEAD, PMKR, OTHER THAN TRANS: HCPCS | Performed by: HOSPITALIST

## 2025-05-30 PROCEDURE — 2500000003 HC RX 250 WO HCPCS

## 2025-05-30 PROCEDURE — 6370000000 HC RX 637 (ALT 250 FOR IP)

## 2025-05-30 PROCEDURE — 99152 MOD SED SAME PHYS/QHP 5/>YRS: CPT | Performed by: HOSPITALIST

## 2025-05-30 PROCEDURE — 02H63JZ INSERTION OF PACEMAKER LEAD INTO RIGHT ATRIUM, PERCUTANEOUS APPROACH: ICD-10-PCS | Performed by: HOSPITALIST

## 2025-05-30 DEVICE — IPG W1DR01 AZURE XT DR MRI USA
Type: IMPLANTABLE DEVICE | Status: FUNCTIONAL
Brand: AZURE™ XT DR MRI SURESCAN™

## 2025-05-30 DEVICE — LEAD 5076-52 MRI US RCMCRD
Type: IMPLANTABLE DEVICE | Status: FUNCTIONAL
Brand: CAPSUREFIX NOVUS MRI™ SURESCAN®

## 2025-05-30 DEVICE — LEAD 3830 US MKT/ 69CM MRI LBBAP
Type: IMPLANTABLE DEVICE | Status: FUNCTIONAL
Brand: SELECTSECURE™ MRI SURESCAN™

## 2025-05-30 RX ORDER — SODIUM CHLORIDE 9 MG/ML
INJECTION, SOLUTION INTRAVENOUS CONTINUOUS PRN
Status: DISCONTINUED | OUTPATIENT
Start: 2025-05-30 | End: 2025-05-30 | Stop reason: HOSPADM

## 2025-05-30 RX ORDER — MIDAZOLAM HYDROCHLORIDE 1 MG/ML
INJECTION, SOLUTION INTRAMUSCULAR; INTRAVENOUS PRN
Status: DISCONTINUED | OUTPATIENT
Start: 2025-05-30 | End: 2025-05-30 | Stop reason: HOSPADM

## 2025-05-30 RX ORDER — CEFAZOLIN SODIUM 1 G/3ML
INJECTION, POWDER, FOR SOLUTION INTRAMUSCULAR; INTRAVENOUS PRN
Status: DISCONTINUED | OUTPATIENT
Start: 2025-05-30 | End: 2025-05-30 | Stop reason: HOSPADM

## 2025-05-30 RX ORDER — FENTANYL CITRATE 50 UG/ML
INJECTION, SOLUTION INTRAMUSCULAR; INTRAVENOUS PRN
Status: DISCONTINUED | OUTPATIENT
Start: 2025-05-30 | End: 2025-05-30 | Stop reason: HOSPADM

## 2025-05-30 RX ORDER — METOPROLOL SUCCINATE 50 MG/1
50 TABLET, EXTENDED RELEASE ORAL DAILY
Qty: 30 TABLET | Refills: 3 | Status: SHIPPED | OUTPATIENT
Start: 2025-05-30

## 2025-05-30 RX ORDER — METOPROLOL SUCCINATE 50 MG/1
50 TABLET, EXTENDED RELEASE ORAL DAILY
Status: DISCONTINUED | OUTPATIENT
Start: 2025-05-30 | End: 2025-05-31 | Stop reason: HOSPADM

## 2025-05-30 RX ORDER — METOPROLOL TARTRATE 1 MG/ML
5 INJECTION, SOLUTION INTRAVENOUS EVERY 6 HOURS SCHEDULED
Status: DISCONTINUED | OUTPATIENT
Start: 2025-05-30 | End: 2025-05-31 | Stop reason: HOSPADM

## 2025-05-30 RX ADMIN — TRAMADOL HYDROCHLORIDE 50 MG: 50 TABLET, COATED ORAL at 13:53

## 2025-05-30 RX ADMIN — ASPIRIN 81 MG: 81 TABLET, CHEWABLE ORAL at 13:50

## 2025-05-30 RX ADMIN — ATORVASTATIN CALCIUM 40 MG: 40 TABLET, FILM COATED ORAL at 20:39

## 2025-05-30 RX ADMIN — SENNOSIDES AND DOCUSATE SODIUM 1 TABLET: 50; 8.6 TABLET ORAL at 13:50

## 2025-05-30 RX ADMIN — MONTELUKAST 10 MG: 10 TABLET, FILM COATED ORAL at 20:39

## 2025-05-30 RX ADMIN — SENNOSIDES AND DOCUSATE SODIUM 1 TABLET: 50; 8.6 TABLET ORAL at 20:39

## 2025-05-30 RX ADMIN — METOPROLOL SUCCINATE 50 MG: 50 TABLET, EXTENDED RELEASE ORAL at 13:50

## 2025-05-30 RX ADMIN — LOSARTAN POTASSIUM 25 MG: 25 TABLET, FILM COATED ORAL at 13:50

## 2025-05-30 RX ADMIN — TROSPIUM CHLORIDE 20 MG: 20 TABLET, FILM COATED ORAL at 17:38

## 2025-05-30 RX ADMIN — TROSPIUM CHLORIDE 20 MG: 20 TABLET, FILM COATED ORAL at 06:21

## 2025-05-30 RX ADMIN — Medication 1000 UNITS: at 13:50

## 2025-05-30 RX ADMIN — CYANOCOBALAMIN TAB 500 MCG 1000 MCG: 500 TAB at 13:50

## 2025-05-30 RX ADMIN — METOPROLOL TARTRATE 5 MG: 5 INJECTION INTRAVENOUS at 14:14

## 2025-05-30 RX ADMIN — PANTOPRAZOLE SODIUM 40 MG: 40 TABLET, DELAYED RELEASE ORAL at 06:21

## 2025-05-30 RX ADMIN — LATANOPROST 1 DROP: 50 SOLUTION OPHTHALMIC at 20:40

## 2025-05-30 RX ADMIN — SODIUM CHLORIDE, PRESERVATIVE FREE 10 ML: 5 INJECTION INTRAVENOUS at 20:41

## 2025-05-30 RX ADMIN — DIPHENHYDRAMINE HYDROCHLORIDE 25 MG: 25 CAPSULE ORAL at 14:45

## 2025-05-30 RX ADMIN — CETIRIZINE HYDROCHLORIDE 10 MG: 10 TABLET, FILM COATED ORAL at 13:50

## 2025-05-30 ASSESSMENT — PAIN SCALES - GENERAL: PAINLEVEL_OUTOF10: 6

## 2025-05-30 NOTE — PROGRESS NOTES
# Sick sinus syndrome/symptomatic sinus bradycardia  # Tachycardia-bradycardia syndrome  # Paroxysmal atrial tachycardia    Patient seen and examined today.  Procedure discussed with patient.    -- Plan for dual PPM today.    I discussed with the patient the risks, benefits and alternatives for Pacemaker implantation. I reported a risk of major complications at 2% and minor complications at 4%. The details of the procedure and risks associated with undergoing the procedure were discussed in detail including but not limited to, death, myocardial ischemia, stroke, cardiac perforation, potential need for temporary pacemaker implantation, lung damage requiring chest tube (pneumothorax), blood clot, blood collection requiring blood transfusion or repeat surgery (hematoma), infection, vascular injury  or lead dislodgment risk.  Shared decision making was performed. All questions were answered to her satisfaction and she expressed verbal understanding of the procedure, the benefits, and risks. Laura Lu wishes to proceed with the procedure.

## 2025-05-30 NOTE — PROCEDURES
PROCEDURE NOTE  Date: 5/30/2025   Name: Laura Lu  YOB: 1940    Procedures      Successful Dual PPM implanted    See full procedure report for details

## 2025-05-30 NOTE — PROGRESS NOTES
1215 TRANSFER - IN Cath Lab RR REPORT:    Verbal report received from Yelena URIBE on Laura Lu  being received from the EP Lab for routine progression of care. Report consisted of patient’s Situation, Background, Assessment and Recommendations(SBAR). Procedural summary and MAR reviewed with receiving nurse. Opportunity for questions and clarification was provided. Assessment completed upon patient’s arrival to Cardiac Cath Lab RECOVERY AREA and care assumed.       Cardiac Cath Lab Recovery Arrival Note:    Laura Lu arrived to [facility] recovery area.  Patient procedure= Dual Chamber PPI. Patient on cardiac monitor, non-invasive blood pressure, SPO2 monitor. On RA.  IV  of NS on pump at 50 ml/hr. Patient is A&Ox 3. Patient reports No CP SOB.    PROCEDURE SITE CHECK:    Procedure site: No bleeding and no hematoma, no pain/discomfort reported at procedure site.     No change in patient status. Continue to monitor patient and status.       1229- Pt noted to have -170 nonsustained. BP stable. Pt denies CP SOB. Dr Mccloud notified and MD states pt has known atrial tachycardia. Pt ok to transfer to inpt bed and metoprolol will be admin. CXR completed.     1250- Attempted to call report to [facility] Martina, nurse to call back.     1312-TRANSFER - OUT REPORT:    Verbal report given to Martina on Laura Lu  being transferred to [facility] for routine progression of patient care       Report consisted of patient's Situation, Background, Assessment and   Recommendations(SBAR).     Information from the following report(s) Nurse Handoff Report, Intake/Output, Cardiac Rhythm SR/ A paced, Neuro Assessment, and Event Log was reviewed with the receiving nurse.           Lines:   Peripheral IV 05/30/25 Right;Anterior Forearm (Active)   Site Assessment Clean, dry & intact 05/30/25 0855   Line Status Capped;Flushed;Normal saline locked 05/30/25 0855   Line Care Cap changed;Connections checked and tightened;Chlorhexidine

## 2025-05-30 NOTE — PROGRESS NOTES
TRANSFER - IN Cath Lab RR REPORT:    Verbal report received from JOJO Herrera on Laura Lu  being received from  for ordered procedure      Report consisted of patient's Situation, Background, Assessment and   Recommendations(SBAR).     Information from the following report(s) Nurse Handoff Report, MAR, Recent Results, and Cardiac Rhythm sinus bradycardia  was reviewed with the receiving nurse.    Opportunity for questions and clarification was provided.

## 2025-05-30 NOTE — DISCHARGE INSTRUCTIONS
Pacemaker  Discharge Instructions    Please make sure you have received your Temporary Pacemaker identification card with your discharge instructions      MEDICATIONS        Take only the medications prescribed to you at discharge.      ACTIVITY        Return to your normal activity, except as noted below.    Do not lift anything heavier than 10 pounds for 4 weeks with the affected arm.  This is how long it takes the muscles to heal, and the leads inside your heart to stabilize their position.  Do not reach above your head with the affected arm for 4 weeks, doing so increases the risk of lead dislodgement.    It is, however, important to move the affected arm to prevent shoulder stiffness and locking.  Avoid tight clothes or unnecessary pressure over your incision (such as bra straps or seat belts).  If it is tender or sensitive to clothing, cover the incision with a soft dressing or pad.  Avoid driving for at least 72 hours.   You may resume all other activities after 4 weeks (including exercise, sex, etc.)      SHOWERING        Leave the bandage over your incision until your clinic follow up in 10-14 days after the Pacemaker implant.  You bandage will be removed in clinic during that appointment.     It is important to keep the bandaged area clean and dry.  You may shower around the site until the bandage is removed in clinic. Thereafter, you may shower after the bandage is removed, washing it gently with soap and water. Do not apply any lotions, powders, or perfumes to the incision line.    Avoid submerging your incision in water (tub baths, hot tubs, or swimming) for four weeks.     Underneath the dressing.    If you have white steri-strips over your incision (underneath the gauze dressing), they will curl up at the end and fall off, usually within 10 days.  Do not pull them off.  - OR -   You may have a different type of closure for the incision including a dermabond adhesive which will slowly peel and come off  nausea, vomiting, diarrhea, change in mentation, falling, weakness, bleeding. Severe pain or pain not relieved by medications.  Or, any other signs or symptoms that you may have questions about.      DISPOSITION:   x Home With:   OT  PT  HH  RN       SNF/Inpatient Rehab/LTAC    Independent/assisted living    Hospice    Other:     CDMP Checked:   Yes x     PROBLEM LIST Updated:  Yes x       Signed:   Merle Jaimes MD  5/30/2025  1:07 PM

## 2025-05-30 NOTE — CARE COORDINATION
Transition of Care Plan:    RUR: 17%  Prior Level of Functioning: independent   Disposition: Home with HH  CHANDAN: today   Follow up appointments: MD mcneil   DME needed: N  Transportation at discharge: family   IM/IMM Medicare/ letter given: 5/28   Caregiver Contact: Fela / daughter / [phone]  Discharge Caregiver contacted prior to discharge? Y  Care Conference needed? N  Barriers to discharge: N      Pt is open with [facility] for PT/OT/SN. [facility]OA[facility] orders sent via [facility].    AVS updated.    CM will provide support as needed.    Crystal Rogers RN BSN CM    [employer] [employer]

## 2025-05-30 NOTE — PROGRESS NOTES
Cardiac Cath Lab Procedure Area Arrival Note:    Laura Lu arrived to Cardiac Cath Lab, Procedure Area. Patient identifiers verified with NAME and DATE OF BIRTH. Procedure verified with patient. Consent forms verified. Allergies verified. Patient informed of procedure and plan of care. Questions answered with review. Patient voiced understanding of procedure and plan of care.    Patient on cardiac monitor, non-invasive blood pressure, SPO2 monitor. On RA.  Placed on O2 @ 2 lpm via NC for procedure.  IV of NS on pump at 50 ml/hr. Patient status doing well without problems. Patient is A&Ox 4. Patient reports bilateral shoulder pain.     Patient medicated during procedure with orders obtained and verified by Dr. Mccloud.    Refer to patients Cardiac Cath Lab PROCEDURE REPORT for vital signs, assessment, status, and response during procedure, printed at end of case. Printed report on chart or scanned into chart.

## 2025-05-30 NOTE — PROGRESS NOTES
EP/Cath LAB to Recovery Room Report    Procedure: Dual chamber PPI    MD: MD Ame    Verbal Report given to Recovery Nurse on patient being transferred to Recovery Room for routine post-op. Patient stable upon transfer to .  Pt had IV conscious sedation with procedural RN. Vitals, mental status, MAR, procedural summary discussed with recovery RN.     Conscious sedation medication given by Procedural RN:  Versed 6 mg  Fentanyl 75 mcg      Procedural Site:    Incision site made to left chest.       Pacemaker Mode set to AAIR-DDDR .

## 2025-05-30 NOTE — PROGRESS NOTES
Hospitalist Progress Note  Merle Jaimes MD  Answering service: [phone] OR [phone] from in house phone        Date of Service:  2025  NAME:  Laura Lu  :  1940  MRN:  [id number]      Admission Summary:   Larua Lu is a 85 y.o. female with PMH significant for tachycardia, HTN, T2DM on diet control, CKD stage II, anemia of chronic disease, right hip replacement 2 years ago, hearing impairment, bilateral knee replacement, s/p right rotator cuff tear on 3/10/2025, two sacral wound, and urinary incontinent who is transferred from cardiologist office for symptomatic bradycardia.  She stated that she had tachycardia 2 years ago after she had right hip replacement, improved after she was started on metoprolol.  In March of this year after she had right rotator cuff's tear repair she had episode of bradycardia, heart rate of 42 and she was taken off metoprolol.  Since then she had on and off palpitation, dizziness and nausea.  She stated that her heart rate up and down between 48 and 200 bpm.  She had multiple workup for these and she was placed back on metoprolol 12.5 mg daily.  She kept having palpitation and bradycardia, she went to [facility] ER last night. On monitor her heart rate was stable and she was discharged with advised to resume her metoprolol.  But she didn't take it because her heart rate kept dropping to 38. She went to cardiology office for a follow-up and referred to [facility] ER for further evaluation and management.  No left-sided chest pain, fever, cough, nausea, diaphoresis, abdominal pain or urinary complaints. No history of CAD or CHF.      The patient denies any headache, blurry vision, sore throat, trouble swallowing, trouble with speech, sick contacts with COVID-19 diagnosed patients.     Vital sings on arrival to ER; temperature 99, respirate 18, pulse 46, BP

## 2025-05-31 VITALS
HEART RATE: 62 BPM | RESPIRATION RATE: 18 BRPM | OXYGEN SATURATION: 98 % | BODY MASS INDEX: 29.13 KG/M2 | SYSTOLIC BLOOD PRESSURE: 118 MMHG | TEMPERATURE: 99.3 F | WEIGHT: 164.4 LBS | HEIGHT: 63 IN | DIASTOLIC BLOOD PRESSURE: 67 MMHG

## 2025-05-31 DIAGNOSIS — Z98.890 HISTORY OF BLADDER SUSPENSION PROCEDURE: ICD-10-CM

## 2025-05-31 DIAGNOSIS — R30.0 DYSURIA: ICD-10-CM

## 2025-05-31 DIAGNOSIS — Z87.448 HISTORY OF BLADDER SUSPENSION PROCEDURE: ICD-10-CM

## 2025-05-31 PROBLEM — R00.1 BRADYCARDIA: Status: RESOLVED | Noted: 2025-05-27 | Resolved: 2025-05-31

## 2025-05-31 LAB
ALBUMIN SERPL-MCNC: 3.1 G/DL (ref 3.5–5)
ALBUMIN/GLOB SERPL: 0.8 (ref 1.1–2.2)
ALP SERPL-CCNC: 89 U/L (ref 45–117)
ALT SERPL-CCNC: 14 U/L (ref 12–78)
ANION GAP SERPL CALC-SCNC: 5 MMOL/L (ref 2–12)
AST SERPL-CCNC: 17 U/L (ref 15–37)
BILIRUB SERPL-MCNC: 0.4 MG/DL (ref 0.2–1)
BUN SERPL-MCNC: 6 MG/DL (ref 6–20)
BUN/CREAT SERPL: 7 (ref 12–20)
CALCIUM SERPL-MCNC: 9.5 MG/DL (ref 8.5–10.1)
CHLORIDE SERPL-SCNC: 106 MMOL/L (ref 97–108)
CO2 SERPL-SCNC: 27 MMOL/L (ref 21–32)
CREAT SERPL-MCNC: 0.81 MG/DL (ref 0.55–1.02)
ERYTHROCYTE [DISTWIDTH] IN BLOOD BY AUTOMATED COUNT: 14.1 % (ref 11.5–14.5)
GLOBULIN SER CALC-MCNC: 4.1 G/DL (ref 2–4)
GLUCOSE BLD STRIP.AUTO-MCNC: 130 MG/DL (ref 65–117)
GLUCOSE BLD STRIP.AUTO-MCNC: 147 MG/DL (ref 65–117)
GLUCOSE SERPL-MCNC: 107 MG/DL (ref 65–100)
HCT VFR BLD AUTO: 37.3 % (ref 35–47)
HGB BLD-MCNC: 11.3 G/DL (ref 11.5–16)
MCH RBC QN AUTO: 27.6 PG (ref 26–34)
MCHC RBC AUTO-ENTMCNC: 30.3 G/DL (ref 30–36.5)
MCV RBC AUTO: 91.2 FL (ref 80–99)
NRBC # BLD: 0 K/UL (ref 0–0.01)
NRBC BLD-RTO: 0 PER 100 WBC
PLATELET # BLD AUTO: 244 K/UL (ref 150–400)
PMV BLD AUTO: 10.7 FL (ref 8.9–12.9)
POTASSIUM SERPL-SCNC: 3.4 MMOL/L (ref 3.5–5.1)
PROT SERPL-MCNC: 7.2 G/DL (ref 6.4–8.2)
RBC # BLD AUTO: 4.09 M/UL (ref 3.8–5.2)
SERVICE CMNT-IMP: ABNORMAL
SERVICE CMNT-IMP: ABNORMAL
SODIUM SERPL-SCNC: 138 MMOL/L (ref 136–145)
WBC # BLD AUTO: 7.8 K/UL (ref 3.6–11)

## 2025-05-31 PROCEDURE — 6370000000 HC RX 637 (ALT 250 FOR IP): Performed by: HOSPITALIST

## 2025-05-31 PROCEDURE — 82962 GLUCOSE BLOOD TEST: CPT

## 2025-05-31 PROCEDURE — 80053 COMPREHEN METABOLIC PANEL: CPT

## 2025-05-31 PROCEDURE — 85027 COMPLETE CBC AUTOMATED: CPT

## 2025-05-31 PROCEDURE — 6370000000 HC RX 637 (ALT 250 FOR IP): Performed by: FAMILY MEDICINE

## 2025-05-31 PROCEDURE — 2500000003 HC RX 250 WO HCPCS: Performed by: HOSPITALIST

## 2025-05-31 PROCEDURE — 99233 SBSQ HOSP IP/OBS HIGH 50: CPT | Performed by: HOSPITALIST

## 2025-05-31 PROCEDURE — 6370000000 HC RX 637 (ALT 250 FOR IP)

## 2025-05-31 RX ORDER — TRAMADOL HYDROCHLORIDE 25 MG/1
25 TABLET, COATED ORAL 2 TIMES DAILY PRN
Qty: 6 TABLET | Refills: 0 | Status: SHIPPED | OUTPATIENT
Start: 2025-05-31

## 2025-05-31 RX ADMIN — TROSPIUM CHLORIDE 20 MG: 20 TABLET, FILM COATED ORAL at 05:46

## 2025-05-31 RX ADMIN — METOPROLOL SUCCINATE 50 MG: 50 TABLET, EXTENDED RELEASE ORAL at 08:38

## 2025-05-31 RX ADMIN — SODIUM CHLORIDE, PRESERVATIVE FREE 10 ML: 5 INJECTION INTRAVENOUS at 08:40

## 2025-05-31 RX ADMIN — Medication 1000 UNITS: at 08:38

## 2025-05-31 RX ADMIN — SENNOSIDES AND DOCUSATE SODIUM 1 TABLET: 50; 8.6 TABLET ORAL at 08:38

## 2025-05-31 RX ADMIN — CYANOCOBALAMIN TAB 500 MCG 1000 MCG: 500 TAB at 08:38

## 2025-05-31 RX ADMIN — ASPIRIN 81 MG: 81 TABLET, CHEWABLE ORAL at 08:38

## 2025-05-31 RX ADMIN — DIPHENHYDRAMINE HYDROCHLORIDE 25 MG: 25 CAPSULE ORAL at 05:47

## 2025-05-31 RX ADMIN — CETIRIZINE HYDROCHLORIDE 10 MG: 10 TABLET, FILM COATED ORAL at 08:38

## 2025-05-31 RX ADMIN — PANTOPRAZOLE SODIUM 40 MG: 40 TABLET, DELAYED RELEASE ORAL at 05:46

## 2025-05-31 RX ADMIN — LOSARTAN POTASSIUM 25 MG: 25 TABLET, FILM COATED ORAL at 08:38

## 2025-05-31 RX ADMIN — TRAMADOL HYDROCHLORIDE 50 MG: 50 TABLET, COATED ORAL at 05:46

## 2025-05-31 ASSESSMENT — PAIN DESCRIPTION - LOCATION: LOCATION: CHEST

## 2025-05-31 ASSESSMENT — PAIN DESCRIPTION - ORIENTATION: ORIENTATION: LEFT

## 2025-05-31 ASSESSMENT — PAIN SCALES - GENERAL: PAINLEVEL_OUTOF10: 9

## 2025-05-31 NOTE — PROGRESS NOTES
[facility] CARDIOLOGY  Cardiac Electrophysiology Note    Followup Note    Patient Name: Laura Lu - :1940 - MRN:[id number]  Primary Cardiologist: Valentino Avila MD  Consulting Cardiologist: Ramo Mccloud MD       Reason for encounter:   Tachycardia-bradycardia syndrome    HPI:  Laura Lu is a 85 y.o. female with PMH significant for hypertension, hyperlipidemia, GERD, and tachy-merry. She presented to the ED on 2025 from the cardiology office after becoming weak and presyncopal while checking out. She had gone to [facility] ED on 2025 for palpitations. She was then seen in the office by NP on 2025 where she reported symptoms of fatigue and dizziness. HR during the visit was 52, when she was checking out she became weak and presyncopal and had to sit down, HR was found to be in the 40's. EMS was called and she was transported to [facility] ED for further evaluation. EP consulted for possible PPM implant.       SUBJECTIVE:  No acute issues overnight.     Assessment and Plan     Principal Problem (Resolved):    Bradycardia  Active Problems:    Severe protein-energy malnutrition      Current active problems:   Symptomatic bradycardia/tachybrady syndrome  Paroxysmal atrial tachycardia  Hypertension  Diabetes    Assessment and Plan:   Patient presented to the ER after becoming weak and presyncope.  Reports fatigue and dizziness.  Heart rate during the visit was 52 bpm.  When she was transferred to the ER her heart rate was noted to be in the 40s.  Also has a prior history of paroxysmal atrial tachycardia.  Due to evidence of symptomatic bradycardia and tachycardia with need for medical management, she ultimately will benefit from pacemaker implantation      S/p dual-chamber PPM on .    Device interrogation performed today was personally reviewed by me in detail.  Normal lead functioning and stable lead impedances, capture

## 2025-05-31 NOTE — DISCHARGE SUMMARY
Discharge Summary       PATIENT ID: Laura Lu  MRN: [id number]   YOB: 1940    DATE OF ADMISSION: 5/27/2025  2:17 PM    DATE OF DISCHARGE: 5/31/25   PRIMARY CARE PROVIDER: Alessia Rangel MD     ATTENDING PHYSICIAN: Randy Rivera MD  DISCHARGING PROVIDER: Randy Rivera MD    To contact this individual call [phone] and ask the  to page.  If unavailable ask to be transferred the Adult Hospitalist Department.    CONSULTATIONS: IP CONSULT TO CARDIOLOGY  IP CONSULT TO SPIRITUAL SERVICES  IP CONSULT TO DIETITIAN  IP CONSULT TO CASE MANAGEMENT    PROCEDURES/SURGERIES: Procedure(s):  Insert PPM dual  Ultrasound guided vascular access    ADMITTING DIAGNOSES & HOSPITAL COURSE:   Laura Lu is a 85 y.o. female with PMH significant for tachycardia, HTN, T2DM on diet control, CKD stage II, anemia of chronic disease, right hip replacement 2 years ago, hearing impairment, bilateral knee replacement, s/p right rotator cuff tear on 3/10/2025, two sacral wound, and urinary incontinent who is transferred from cardiologist office for symptomatic bradycardia.  She stated that she had tachycardia 2 years ago after she had right hip replacement, improved after she was started on metoprolol.  In March of this year after she had right rotator cuff's tear repair she had episode of bradycardia, heart rate of 42 and she was taken off metoprolol.  Since then she had on and off palpitation, dizziness and nausea.  She stated that her heart rate up and down between 48 and 200 bpm.  She had multiple workup for these and she was placed back on metoprolol 12.5 mg daily.  She kept having palpitation and bradycardia, she went to [facility] ER last night. On monitor her heart rate was stable and she was discharged with advised to resume her metoprolol.  But she didn't take it because her heart rate kept dropping to 38. She went to cardiology office for a follow-up and  Toprol 50 mg Qday     HTN  -Home meds      T2DM  - A1c 6.3 on 4/5/2025  -Monitor fingerstick glucose on sliding scale per hypoglycemic protocol  -Home meds      Anemia of chronic disease  -Hgb stable      CKD stage II  -Monitor creatinine  -Avoid nephrotoxin     Hx urinary incontinence  -Home meds      Sacral wounds   -continue local wound care     Shoulder pain, Hx right rotator cuff tear repaired  -add lidocaine patch, prn tylenol and ultram  -PT/OT but avoid use of left arm due to pacemaker placement     Prutitus  - Unknown etiology but chronic  - Benadryl PRN     Obesity  - BMI 28.87 kg/m²  - Benefit with lifestyle modification, AHA type of diet, exercise and weight loss program       PENDING TEST RESULTS:   At the time of discharge the following test results are still pending: none    FOLLOW UP APPOINTMENTS:    @[facility]@     ADDITIONAL CARE RECOMMENDATIONS: none    DIET: diabetic diet    ACTIVITY: activity as tolerated    WOUND CARE: none    EQUIPMENT needed: none      DISCHARGE MEDICATIONS:     Medication List        START taking these medications      metoprolol succinate 50 MG extended release tablet  Commonly known as: TOPROL XL  Take 1 tablet by mouth daily     traMADol HCl 25 MG Tabs  Take 25 mg by mouth 2 times daily as needed (pain) Max Daily Amount: 50 mg            CHANGE how you take these medications      tiZANidine 2 MG tablet  Commonly known as: ZANAFLEX  TAKE 1 TABLET BY MOUTH NIGHTLY AS NEEDED FOR MUSCLE SPASM  What changed: additional instructions            CONTINUE taking these medications      Accu-Chek Sophy Soln  Use with glucose meter     Accu-Chek Softclix Lancets Misc  Use to check blood sugar once daily     acetaminophen 500 MG tablet  Commonly known as: TYLENOL  Take 2 tablets by mouth 3 times daily     albuterol sulfate 108 (90 Base) MCG/ACT aerosol powder inhalation  Commonly known as: PROAIR RESPICLICK  Inhale 2 puffs into the lungs every 4 hours as needed for Wheezing or  [phone] - F [phone]  [address] [address]      Phone: [phone]   metoprolol succinate 50 MG extended release tablet  traMADol HCl 25 MG Tabs           NOTIFY YOUR PHYSICIAN FOR ANY OF THE FOLLOWING:   Fever over 101 degrees for 24 hours.   Chest pain, shortness of breath, fever, chills, nausea, vomiting, diarrhea, change in mentation, falling, weakness, bleeding. Severe pain or pain not relieved by medications.  Or, any other signs or symptoms that you may have questions about.    DISPOSITION:   X Home With:   OT  PT X HH  RN       Long term SNF/Inpatient Rehab    Independent/assisted living    Hospice    Other:       PATIENT CONDITION AT DISCHARGE:     Functional status    Poor    X Deconditioned     Independent      Cognition    X Lucid     Forgetful     Dementia      Catheters/lines (plus indication)    Ricks     PICC     PEG    X None      Code status    X Full code     DNR      PHYSICAL EXAMINATION AT DISCHARGE:    General : alert x 3, awake, no acute distress,   HEENT: PEERL, EOMI, moist mucus membrane  Neck: supple, no JVD, no meningeal signs  Chest: Clear to auscultation bilaterally. Bandage over left chest where pacemaker inserted  CVS: S1 S2 heard, Capillary refill less than 2 seconds  Abd: soft/ Non tender, non distended, BS physiological,   Ext: no clubbing, no cyanosis, no edema, brisk 2+ DP pulses  Neuro/Psych: pleasant mood and affect, CN 2-12 grossly intact, sensory grossly within normal limit  Skin: warm     CHRONIC MEDICAL DIAGNOSES:      Greater than 31 minutes were spent with the patient on counseling and coordination of care    Signed:   Randy Rivera MD  5/31/2025  12:34 PM

## 2025-06-02 ENCOUNTER — TELEPHONE (OUTPATIENT)
Age: 85
End: 2025-06-02

## 2025-06-02 DIAGNOSIS — I10 ESSENTIAL (PRIMARY) HYPERTENSION: Primary | ICD-10-CM

## 2025-06-02 RX ORDER — MIRABEGRON 50 MG/1
50 TABLET, FILM COATED, EXTENDED RELEASE ORAL DAILY
Qty: 90 TABLET | Refills: 0 | Status: SHIPPED | OUTPATIENT
Start: 2025-06-02

## 2025-06-02 RX ORDER — MONTELUKAST SODIUM 10 MG/1
10 TABLET ORAL DAILY
Qty: 90 TABLET | Refills: 0 | Status: SHIPPED | OUTPATIENT
Start: 2025-06-02

## 2025-06-02 NOTE — TELEPHONE ENCOUNTER
Verified patient with two identifiers. This RN spoke to pts daughter regarding pt blood pressure being 80s/60s yesterday with HR consistently in 60s-70s  - paramedics came today and pressure went up to 129/86. Pt daughter states she has been extremely dizzy, sob, and having cold sweats. This RN informed daughter that she will speak with NP for further advice

## 2025-06-02 NOTE — TELEPHONE ENCOUNTER
Patient's daughter is calling because he mother  Heart rate kept dropping low over the weekend.Patient had some SOB and Dizziness.    Patient's BP were 87/666 90/60.Patient's daughter said she did not give her mother her blood pressure medication due to her numbers dropping low.Please give a call back.      [phone] Fela (daughter)

## 2025-06-02 NOTE — TELEPHONE ENCOUNTER
Verified patient with two identifiers. This RN spoke with pts daughter regarding Graciela NP recommendations of ensuring patient s eating and drinking enough water throughout the day. Asked what pts BG was and daughter states it has been \"up and down\" but needs to check it again and will reach out when she does. Also instructed pt to send remote transmission and will reach out via [employer] with results.     Informed pt daughter to take her mom to ER if symptoms increase and if BP drops 90/60s again for emergent treatment.     Pt daughter verbalized understanding and was instructed to call back with any questions, concerns, or changes in patients status.

## 2025-06-02 NOTE — TELEPHONE ENCOUNTER
Patient daughter called, said scheduling was  asking for clarification of what type of stress test her mother is to have for her sob and low blood pressure.  Said this is needed asap; please call her when you know. Thanks    Fela # [phone]

## 2025-06-02 NOTE — PROGRESS NOTES
Remote Patient Monitoring Treatment Plan    Received request from ACM/Serena Boogie RN   to order remote patient monitoring for in home monitoring of HTN; Condition managed by LALA Shields CNP (BS Cardiology [facility]).  and order completed.     Patient will be monitoring blood pressure   HR (hx of bradycardia) .      Patient will engage in Remote Patient Monitoring each day to develop the skills necessary for self management.       RPM Care Team Responsibilities:   Alerts will be reviewed daily and addressed within 2-4 hours during operational hours (Monday -Friday 9 am-4 pm)  Alert response and intervention documented in patient medical record  Alert response escalated to PCP per protocol and documented in patient medical record  Patient monitored over approximately  days  Discharge from program based on self-management readiness    See care coordination encounters for additional details.

## 2025-06-03 ENCOUNTER — CARE COORDINATION (OUTPATIENT)
Facility: CLINIC | Age: 85
End: 2025-06-03

## 2025-06-03 NOTE — CARE COORDINATION
Kit Order   Remote Patient Kit Ordering Note      Date/Time:  6/3/2025 11:46 AM      CCSS placed phone call to patient/family today to notify of RPM kit order; patient/family was available; discussed the following topics below and all questions answered.    [x] [facility]  [x] Patient will receive package over the next 1-3 business days. Someone 21 years or older must be present to sign for UPS delivery.  [x] HRS will contact patient within 24 hours, an HRS  will call the patient directly: If the patient does not answer, HRS will follow up with the clinical team notifying them about the unsuccessful attempt to contact the patient. HRS will make three call attempts to the patient.Provide patient with [facility] Virtual install number is: [phone].  [x] The RPM Nurse will contact patient once equipment is active to welcome them to the program.                                                         [x] Hours of RPM monitoring - Monday-Friday [id number]; encourage patient to get vitals entered by Noon each day to have the alert addressed same day.  [x][facility] flyer to patient.                      ACM made aware the RPM kit has been ordered.

## 2025-06-03 NOTE — TELEPHONE ENCOUNTER
Verified patient with two identifiers. This RN spoke to pt daughter regarding pts nuclear stress test - got pt scheduled for 6/5 at 800am at [facility]. Instructions will be sent via [facility]    Patient is a 60y old  Male who presents with a chief complaint of "I have a growth on my pancreas" (15 Nov 2017 14:39)                                                               INTERVAL HPI/OVERNIGHT EVENTS:    REVIEW OF SYSTEMS:     CONSTITUTIONAL: No weakness, fevers or chills  RESPIRATORY: No cough, wheezing,  No shortness of breath  CARDIOVASCULAR: No chest pain or palpitations  GASTROINTESTINAL:  abdominal pain reasonablly controlled   . No nausea, vomiting, had small bm this am   GENITOURINARY: No dysuria, frequency   NEUROLOGICAL: No numbness or weakness                                                                                                                                                                                                                                                                                   Medications:  MEDICATIONS  (STANDING):  dextrose 5% + sodium chloride 0.3%. 1000 milliLiter(s) (75 mL/Hr) IV Continuous <Continuous>  dextrose 50% Injectable 12.5 Gram(s) IV Push once  dextrose 50% Injectable 25 Gram(s) IV Push once  dextrose 50% Injectable 25 Gram(s) IV Push once  enoxaparin Injectable 40 milliGRAM(s) SubCutaneous daily  HYDROmorphone PCA (1 mG/mL) 30 milliLiter(s) PCA Continuous PCA Continuous  insulin lispro (HumaLOG) corrective regimen sliding scale   SubCutaneous every 6 hours  metoprolol    tartrate Injectable 5 milliGRAM(s) IV Push every 6 hours  oxyCODONE    Solution 10 milliGRAM(s) Oral <User Schedule>  pantoprazole  Injectable 40 milliGRAM(s) IV Push daily    MEDICATIONS  (PRN):  butorphanol Injectable 0.25 milliGRAM(s) IV Push every 6 hours PRN Pruritus  dextrose Gel 1 Dose(s) Oral once PRN Blood Glucose LESS THAN 70 milliGRAM(s)/deciliter  glucagon  Injectable 1 milliGRAM(s) IntraMuscular once PRN Glucose LESS THAN 70 milligrams/deciliter  HYDROmorphone PCA (1 mG/mL) Rescue Clinician Bolus 0.5 milliGRAM(s) IV Push every 15 minutes PRN for Pain Scale GREATER THAN 6  naloxone Injectable 0.1 milliGRAM(s) IV Push every 3 minutes PRN For ANY of the following changes in patient status:  A. RR LESS THAN 10 breaths per minute, B. Oxygen saturation LESS THAN 90%, C. Sedation score of 6  ondansetron Injectable 4 milliGRAM(s) IV Push every 6 hours PRN Nausea  ondansetron Injectable 4 milliGRAM(s) IV Push once PRN Nausea and/or Vomiting  simethicone 80 milliGRAM(s) Chew daily PRN Gas       Allergies    No Known Allergies    Intolerances      Vital Signs Last 24 Hrs  T(C): 37.1 (04 Dec 2017 16:10), Max: 37.6 (04 Dec 2017 00:00)  T(F): 98.8 (04 Dec 2017 16:10), Max: 99.6 (04 Dec 2017 00:00)  HR: 101 (04 Dec 2017 16:10) (92 - 112)  BP: 128/73 (04 Dec 2017 16:10) (109/70 - 128/85)  BP(mean): --  RR: 18 (04 Dec 2017 16:10) (17 - 18)  SpO2: 96% (04 Dec 2017 16:10) (93% - 97%)  CAPILLARY BLOOD GLUCOSE  168 (04 Dec 2017 05:00)  138 (04 Dec 2017 00:00)      POCT Blood Glucose.: 165 mg/dL (04 Dec 2017 19:00)  POCT Blood Glucose.: 151 mg/dL (04 Dec 2017 12:01)  POCT Blood Glucose.: 134 mg/dL (03 Dec 2017 21:57)      12-03 @ 07:01  -  12-04 @ 07:00  --------------------------------------------------------  IN: 2450 mL / OUT: 2653 mL / NET: -203 mL    12-04 @ 07:01  -  12-04 @ 20:04  --------------------------------------------------------  IN: 1590 mL / OUT: 915 mL / NET: 675 mL      Physical Exam:   General: NAD   HEENT:  Nonicteric, PERRLA  CV:  RRR, S1S2   Lungs:  CTA B/L  Abdomen:  Soft, tender over incision site   Extremities:  no edema   Skin:  Warm and dry, no rashes  Neuro:  AAOx3, non-focal, grossly intact                                                                                                                                                                                                                                                                                                LABS:                               12.2   7.38  )-----------( 256      ( 04 Dec 2017 06:04 )             38.2                      12-04    137  |  100  |  9   ----------------------------<  184<H>  3.6   |  25  |  0.79    Ca    8.5      04 Dec 2017 06:04  Phos  2.7     12-04  Mg     1.9     12-04    TPro  6.1  /  Alb  2.8<L>  /  TBili  1.2  /  DBili  0.5<H>  /  AST  17  /  ALT  21  /  AlkPhos  106  12-04

## 2025-06-05 ENCOUNTER — ANCILLARY PROCEDURE (OUTPATIENT)
Age: 85
End: 2025-06-05
Payer: MEDICARE

## 2025-06-05 VITALS
SYSTOLIC BLOOD PRESSURE: 126 MMHG | BODY MASS INDEX: 29.06 KG/M2 | HEIGHT: 63 IN | DIASTOLIC BLOOD PRESSURE: 78 MMHG | HEART RATE: 67 BPM | WEIGHT: 164 LBS

## 2025-06-05 DIAGNOSIS — R06.02 SHORTNESS OF BREATH: ICD-10-CM

## 2025-06-05 PROCEDURE — 93017 CV STRESS TEST TRACING ONLY: CPT | Performed by: INTERNAL MEDICINE

## 2025-06-05 PROCEDURE — A9500 TC99M SESTAMIBI: HCPCS | Performed by: INTERNAL MEDICINE

## 2025-06-05 RX ORDER — TETRAKIS(2-METHOXYISOBUTYLISOCYANIDE)COPPER(I) TETRAFLUOROBORATE 1 MG/ML
8.3 INJECTION, POWDER, LYOPHILIZED, FOR SOLUTION INTRAVENOUS
Status: COMPLETED | OUTPATIENT
Start: 2025-06-05 | End: 2025-06-05

## 2025-06-05 RX ORDER — AMINOPHYLLINE 25 MG/ML
100 INJECTION, SOLUTION INTRAVENOUS
Status: COMPLETED | OUTPATIENT
Start: 2025-06-05 | End: 2025-06-05

## 2025-06-05 RX ORDER — TETRAKIS(2-METHOXYISOBUTYLISOCYANIDE)COPPER(I) TETRAFLUOROBORATE 1 MG/ML
24.8 INJECTION, POWDER, LYOPHILIZED, FOR SOLUTION INTRAVENOUS
Status: COMPLETED | OUTPATIENT
Start: 2025-06-05 | End: 2025-06-05

## 2025-06-05 RX ORDER — REGADENOSON 0.08 MG/ML
0.4 INJECTION, SOLUTION INTRAVENOUS
Status: COMPLETED | OUTPATIENT
Start: 2025-06-05 | End: 2025-06-05

## 2025-06-05 RX ADMIN — TECHNETIUM TC-99M SESTAMIBI 24.8 MILLICURIE: 1 INJECTION INTRAVENOUS at 10:00

## 2025-06-05 RX ADMIN — TECHNETIUM TC-99M SESTAMIBI 8.3 MILLICURIE: 1 INJECTION INTRAVENOUS at 08:30

## 2025-06-05 RX ADMIN — REGADENOSON 0.4 MG: 0.08 INJECTION, SOLUTION INTRAVENOUS at 09:59

## 2025-06-05 RX ADMIN — AMINOPHYLLINE 100 MG: 25 INJECTION, SOLUTION INTRAVENOUS at 10:01

## 2025-06-05 NOTE — PROGRESS NOTES
Physician Progress Note      PATIENT:               IRENE DUNCAN  CSN #:                  [id number]  :                       1940  ADMIT DATE:       2025 2:17 PM  DISCH DATE:        2025 1:27 PM  RESPONDING  PROVIDER #:        Randy Rivera MD          QUERY TEXT:    Please clarify the patient?s nutritional status:    The clinical indicators include:  \"Malnutrition Status:  Severe malnutrition (25 1218)  Findings of the 6 clinical characteristics of malnutrition:  Energy Intake:  75% or less of estimated energy requirements for 7 or more   days  Weight Loss:  Mild weight loss (6.5% x 3 mo ; 13% x 6 mo)  Body Fat Loss:  Moderate body fat loss Orbital, Triceps  Muscle Mass Loss:  Moderate muscle mass loss Temples (temporalis), Clavicles   (pectoralis & deltoids)  Fluid Accumulation:  No fluid accumulation   Strength:  Not Performed  Current BMI (kg/m2): 30.4 \" Dietitian Consults by Kelsea Son RD at   2025    \"Obesity- BMI 28.87 kg/m?- Benefit with lifestyle modification, AHA type of   diet, exercise and weight loss program\" H&P by Mariela Gates MD at   2025      Magic Cup, Continue Regular diet, removed CHO restrictions to promote PO   intake    Thank you,  REINALDO Townsend CDS  Options provided:  -- Protein calorie malnutrition severe  -- Other - I will add my own diagnosis  -- Disagree - Not applicable / Not valid  -- Disagree - Clinically unable to determine / Unknown  -- Refer to Clinical Documentation Reviewer    PROVIDER RESPONSE TEXT:    This patient has severe protein calorie malnutrition.    Query created by: Baylee Borjas on 2025 9:12 AM      Electronically signed by:  Randy Rivera MD 2025 6:41 AM

## 2025-06-06 LAB
ECHO BSA: 1.82 M2
NUC STRESS EJECTION FRACTION: 76 %
STRESS BASELINE DIAS BP: 78 MMHG
STRESS BASELINE HR: 61 BPM
STRESS BASELINE SYS BP: 126 MMHG
STRESS ESTIMATED WORKLOAD: 0 METS
STRESS EXERCISE DUR MIN: 0 MIN
STRESS EXERCISE DUR SEC: 0 SEC
STRESS O2 SAT PEAK: 99 %
STRESS O2 SAT REST: 99 %
STRESS PEAK DIAS BP: 78 MMHG
STRESS PEAK SYS BP: 126 MMHG
STRESS PERCENT HR ACHIEVED: 61 %
STRESS POST PEAK HR: 82 BPM
STRESS RATE PRESSURE PRODUCT: NORMAL BPM*MMHG
STRESS TARGET HR: 135 BPM
TID: 1.01

## 2025-06-10 ENCOUNTER — OFFICE VISIT (OUTPATIENT)
Age: 85
End: 2025-06-10
Payer: MEDICARE

## 2025-06-10 ENCOUNTER — APPOINTMENT (OUTPATIENT)
Facility: HOSPITAL | Age: 85
DRG: 641 | End: 2025-06-10
Payer: MEDICARE

## 2025-06-10 ENCOUNTER — CARE COORDINATION (OUTPATIENT)
Dept: CASE MANAGEMENT | Age: 85
End: 2025-06-10

## 2025-06-10 ENCOUNTER — PATIENT MESSAGE (OUTPATIENT)
Age: 85
End: 2025-06-10

## 2025-06-10 ENCOUNTER — HOSPITAL ENCOUNTER (INPATIENT)
Facility: HOSPITAL | Age: 85
LOS: 1 days | Discharge: HOME HEALTH | DRG: 641 | End: 2025-06-11
Attending: EMERGENCY MEDICINE | Admitting: FAMILY MEDICINE
Payer: MEDICARE

## 2025-06-10 VITALS
HEART RATE: 64 BPM | SYSTOLIC BLOOD PRESSURE: 128 MMHG | DIASTOLIC BLOOD PRESSURE: 70 MMHG | OXYGEN SATURATION: 99 % | TEMPERATURE: 97.4 F | HEIGHT: 63 IN | BODY MASS INDEX: 28.35 KG/M2 | WEIGHT: 160 LBS | RESPIRATION RATE: 16 BRPM

## 2025-06-10 DIAGNOSIS — Z78.9 ACUTE MEDICAL ILLNESS: Primary | ICD-10-CM

## 2025-06-10 DIAGNOSIS — R68.83 CHILLS: ICD-10-CM

## 2025-06-10 DIAGNOSIS — R06.02 SOB (SHORTNESS OF BREATH): ICD-10-CM

## 2025-06-10 DIAGNOSIS — Z95.0 CARDIAC PACEMAKER IN SITU: Primary | ICD-10-CM

## 2025-06-10 DIAGNOSIS — R53.1 WEAKNESS: ICD-10-CM

## 2025-06-10 DIAGNOSIS — Z09 HOSPITAL DISCHARGE FOLLOW-UP: ICD-10-CM

## 2025-06-10 DIAGNOSIS — R06.09 DOE (DYSPNEA ON EXERTION): ICD-10-CM

## 2025-06-10 DIAGNOSIS — Z95.0 S/P PLACEMENT OF CARDIAC PACEMAKER: ICD-10-CM

## 2025-06-10 DIAGNOSIS — R06.02 SHORTNESS OF BREATH: Primary | ICD-10-CM

## 2025-06-10 LAB
ANION GAP SERPL CALC-SCNC: 12 MMOL/L (ref 2–12)
APPEARANCE UR: CLEAR
BACTERIA URNS QL MICRO: NEGATIVE /HPF
BASOPHILS # BLD: 0.02 K/UL (ref 0–0.1)
BASOPHILS NFR BLD: 0.2 % (ref 0–1)
BILIRUB UR QL: NEGATIVE
BUN SERPL-MCNC: 22 MG/DL (ref 6–20)
BUN/CREAT SERPL: 18 (ref 12–20)
CALCIUM SERPL-MCNC: 10.4 MG/DL (ref 8.5–10.1)
CHLORIDE SERPL-SCNC: 104 MMOL/L (ref 97–108)
CO2 SERPL-SCNC: 22 MMOL/L (ref 21–32)
COLOR UR: NORMAL
COMMENT:: NORMAL
CREAT SERPL-MCNC: 1.19 MG/DL (ref 0.55–1.02)
DIFFERENTIAL METHOD BLD: ABNORMAL
EOSINOPHIL # BLD: 0.05 K/UL (ref 0–0.4)
EOSINOPHIL NFR BLD: 0.5 % (ref 0–7)
EPITH CASTS URNS QL MICRO: NORMAL /LPF
ERYTHROCYTE [DISTWIDTH] IN BLOOD BY AUTOMATED COUNT: 14.5 % (ref 11.5–14.5)
GLUCOSE BLD STRIP.AUTO-MCNC: 115 MG/DL (ref 65–117)
GLUCOSE SERPL-MCNC: 106 MG/DL (ref 65–100)
GLUCOSE UR STRIP.AUTO-MCNC: NEGATIVE MG/DL
GLUCOSE, POC: 149 MG/DL
HCT VFR BLD AUTO: 37.1 % (ref 35–47)
HGB BLD-MCNC: 12 G/DL (ref 11.5–16)
HGB UR QL STRIP: NEGATIVE
HYALINE CASTS URNS QL MICRO: NORMAL /LPF (ref 0–2)
IMM GRANULOCYTES # BLD AUTO: 0.11 K/UL (ref 0–0.04)
IMM GRANULOCYTES NFR BLD AUTO: 1.1 % (ref 0–0.5)
KETONES UR QL STRIP.AUTO: NEGATIVE MG/DL
LACTATE SERPL-SCNC: 1.6 MMOL/L (ref 0.4–2)
LACTATE SERPL-SCNC: 2.7 MMOL/L (ref 0.4–2)
LEUKOCYTE ESTERASE UR QL STRIP.AUTO: NEGATIVE
LYMPHOCYTES # BLD: 2.16 K/UL (ref 0.8–3.5)
LYMPHOCYTES NFR BLD: 21.6 % (ref 12–49)
MAGNESIUM SERPL-MCNC: 2.4 MG/DL (ref 1.6–2.4)
MCH RBC QN AUTO: 28.2 PG (ref 26–34)
MCHC RBC AUTO-ENTMCNC: 32.3 G/DL (ref 30–36.5)
MCV RBC AUTO: 87.1 FL (ref 80–99)
MONOCYTES # BLD: 0.92 K/UL (ref 0–1)
MONOCYTES NFR BLD: 9.2 % (ref 5–13)
NEUTS SEG # BLD: 6.72 K/UL (ref 1.8–8)
NEUTS SEG NFR BLD: 67.4 % (ref 32–75)
NITRITE UR QL STRIP.AUTO: NEGATIVE
NRBC # BLD: 0 K/UL (ref 0–0.01)
NRBC BLD-RTO: 0 PER 100 WBC
NT PRO BNP: 283 PG/ML
PH UR STRIP: 7.5 (ref 5–8)
PLATELET # BLD AUTO: 392 K/UL (ref 150–400)
PMV BLD AUTO: 9.7 FL (ref 8.9–12.9)
POTASSIUM SERPL-SCNC: 4 MMOL/L (ref 3.5–5.1)
PROCALCITONIN SERPL-MCNC: <0.05 NG/ML
PROT UR STRIP-MCNC: NEGATIVE MG/DL
RBC # BLD AUTO: 4.26 M/UL (ref 3.8–5.2)
RBC #/AREA URNS HPF: NORMAL /HPF (ref 0–5)
SERVICE CMNT-IMP: NORMAL
SODIUM SERPL-SCNC: 138 MMOL/L (ref 136–145)
SP GR UR REFRACTOMETRY: 1.02 (ref 1–1.03)
SPECIMEN HOLD: NORMAL
TROPONIN I SERPL HS-MCNC: 9 NG/L (ref 0–51)
UROBILINOGEN UR QL STRIP.AUTO: 0.2 EU/DL (ref 0.2–1)
WBC # BLD AUTO: 10 K/UL (ref 3.6–11)
WBC URNS QL MICRO: NORMAL /HPF (ref 0–4)

## 2025-06-10 PROCEDURE — 3078F DIAST BP <80 MM HG: CPT | Performed by: INTERNAL MEDICINE

## 2025-06-10 PROCEDURE — 1111F DSCHRG MED/CURRENT MED MERGE: CPT | Performed by: INTERNAL MEDICINE

## 2025-06-10 PROCEDURE — 87040 BLOOD CULTURE FOR BACTERIA: CPT

## 2025-06-10 PROCEDURE — 82962 GLUCOSE BLOOD TEST: CPT | Performed by: INTERNAL MEDICINE

## 2025-06-10 PROCEDURE — 99214 OFFICE O/P EST MOD 30 MIN: CPT | Performed by: INTERNAL MEDICINE

## 2025-06-10 PROCEDURE — 2500000003 HC RX 250 WO HCPCS

## 2025-06-10 PROCEDURE — 1159F MED LIST DOCD IN RCRD: CPT | Performed by: INTERNAL MEDICINE

## 2025-06-10 PROCEDURE — 99291 CRITICAL CARE FIRST HOUR: CPT

## 2025-06-10 PROCEDURE — 99223 1ST HOSP IP/OBS HIGH 75: CPT

## 2025-06-10 PROCEDURE — 83605 ASSAY OF LACTIC ACID: CPT

## 2025-06-10 PROCEDURE — 36415 COLL VENOUS BLD VENIPUNCTURE: CPT

## 2025-06-10 PROCEDURE — 99285 EMERGENCY DEPT VISIT HI MDM: CPT

## 2025-06-10 PROCEDURE — 2060000000 HC ICU INTERMEDIATE R&B

## 2025-06-10 PROCEDURE — 3074F SYST BP LT 130 MM HG: CPT | Performed by: INTERNAL MEDICINE

## 2025-06-10 PROCEDURE — 82962 GLUCOSE BLOOD TEST: CPT

## 2025-06-10 PROCEDURE — 6360000002 HC RX W HCPCS

## 2025-06-10 PROCEDURE — 1123F ACP DISCUSS/DSCN MKR DOCD: CPT | Performed by: INTERNAL MEDICINE

## 2025-06-10 PROCEDURE — 85025 COMPLETE CBC W/AUTO DIFF WBC: CPT

## 2025-06-10 PROCEDURE — 6360000004 HC RX CONTRAST MEDICATION: Performed by: EMERGENCY MEDICINE

## 2025-06-10 PROCEDURE — 6370000000 HC RX 637 (ALT 250 FOR IP)

## 2025-06-10 PROCEDURE — 84484 ASSAY OF TROPONIN QUANT: CPT

## 2025-06-10 PROCEDURE — 83735 ASSAY OF MAGNESIUM: CPT

## 2025-06-10 PROCEDURE — 87086 URINE CULTURE/COLONY COUNT: CPT

## 2025-06-10 PROCEDURE — 81001 URINALYSIS AUTO W/SCOPE: CPT

## 2025-06-10 PROCEDURE — 93005 ELECTROCARDIOGRAM TRACING: CPT | Performed by: EMERGENCY MEDICINE

## 2025-06-10 PROCEDURE — 2580000003 HC RX 258: Performed by: EMERGENCY MEDICINE

## 2025-06-10 PROCEDURE — 83880 ASSAY OF NATRIURETIC PEPTIDE: CPT

## 2025-06-10 PROCEDURE — 84145 PROCALCITONIN (PCT): CPT

## 2025-06-10 PROCEDURE — 80048 BASIC METABOLIC PNL TOTAL CA: CPT

## 2025-06-10 PROCEDURE — 6360000002 HC RX W HCPCS: Performed by: EMERGENCY MEDICINE

## 2025-06-10 PROCEDURE — 99223 1ST HOSP IP/OBS HIGH 75: CPT | Performed by: HOSPITALIST

## 2025-06-10 PROCEDURE — PBSHW AMB POC GLUCOSE BLOOD, BY GLUCOSE MONITORING DEVICE: Performed by: INTERNAL MEDICINE

## 2025-06-10 PROCEDURE — 94640 AIRWAY INHALATION TREATMENT: CPT

## 2025-06-10 PROCEDURE — 71275 CT ANGIOGRAPHY CHEST: CPT

## 2025-06-10 RX ORDER — SODIUM CHLORIDE 9 MG/ML
INJECTION, SOLUTION INTRAVENOUS PRN
Status: DISCONTINUED | OUTPATIENT
Start: 2025-06-10 | End: 2025-06-11 | Stop reason: HOSPADM

## 2025-06-10 RX ORDER — ONDANSETRON 2 MG/ML
4 INJECTION INTRAMUSCULAR; INTRAVENOUS EVERY 6 HOURS PRN
Status: DISCONTINUED | OUTPATIENT
Start: 2025-06-10 | End: 2025-06-11 | Stop reason: HOSPADM

## 2025-06-10 RX ORDER — POLYETHYLENE GLYCOL 3350 17 G/17G
17 POWDER, FOR SOLUTION ORAL DAILY PRN
Status: DISCONTINUED | OUTPATIENT
Start: 2025-06-10 | End: 2025-06-11 | Stop reason: HOSPADM

## 2025-06-10 RX ORDER — TROSPIUM CHLORIDE 20 MG/1
20 TABLET, FILM COATED ORAL NIGHTLY
Status: DISCONTINUED | OUTPATIENT
Start: 2025-06-10 | End: 2025-06-11 | Stop reason: HOSPADM

## 2025-06-10 RX ORDER — MICONAZOLE NITRATE 2 G/100G
CREAM TOPICAL 2 TIMES DAILY
Status: DISCONTINUED | OUTPATIENT
Start: 2025-06-10 | End: 2025-06-11 | Stop reason: HOSPADM

## 2025-06-10 RX ORDER — IOPAMIDOL 755 MG/ML
100 INJECTION, SOLUTION INTRAVASCULAR
Status: COMPLETED | OUTPATIENT
Start: 2025-06-10 | End: 2025-06-10

## 2025-06-10 RX ORDER — TIZANIDINE 2 MG/1
2 TABLET ORAL NIGHTLY
Status: DISCONTINUED | OUTPATIENT
Start: 2025-06-10 | End: 2025-06-11 | Stop reason: HOSPADM

## 2025-06-10 RX ORDER — CETIRIZINE HYDROCHLORIDE 10 MG/1
10 TABLET ORAL DAILY
Status: DISCONTINUED | OUTPATIENT
Start: 2025-06-11 | End: 2025-06-11 | Stop reason: HOSPADM

## 2025-06-10 RX ORDER — DEXTROSE MONOHYDRATE 100 MG/ML
INJECTION, SOLUTION INTRAVENOUS CONTINUOUS PRN
Status: DISCONTINUED | OUTPATIENT
Start: 2025-06-10 | End: 2025-06-11 | Stop reason: HOSPADM

## 2025-06-10 RX ORDER — DIPHENHYDRAMINE HCL 25 MG
25 CAPSULE ORAL EVERY 6 HOURS PRN
Status: DISCONTINUED | OUTPATIENT
Start: 2025-06-10 | End: 2025-06-11 | Stop reason: HOSPADM

## 2025-06-10 RX ORDER — ENOXAPARIN SODIUM 100 MG/ML
40 INJECTION SUBCUTANEOUS DAILY
Status: DISCONTINUED | OUTPATIENT
Start: 2025-06-10 | End: 2025-06-11 | Stop reason: HOSPADM

## 2025-06-10 RX ORDER — LOSARTAN POTASSIUM 25 MG/1
25 TABLET ORAL DAILY
Status: DISCONTINUED | OUTPATIENT
Start: 2025-06-10 | End: 2025-06-11 | Stop reason: HOSPADM

## 2025-06-10 RX ORDER — SODIUM CHLORIDE 0.9 % (FLUSH) 0.9 %
5-40 SYRINGE (ML) INJECTION EVERY 12 HOURS SCHEDULED
Status: DISCONTINUED | OUTPATIENT
Start: 2025-06-10 | End: 2025-06-11 | Stop reason: HOSPADM

## 2025-06-10 RX ORDER — LATANOPROST 50 UG/ML
1 SOLUTION/ DROPS OPHTHALMIC NIGHTLY
Status: DISCONTINUED | OUTPATIENT
Start: 2025-06-10 | End: 2025-06-11 | Stop reason: HOSPADM

## 2025-06-10 RX ORDER — ACETAMINOPHEN 325 MG/1
650 TABLET ORAL EVERY 6 HOURS PRN
Status: DISCONTINUED | OUTPATIENT
Start: 2025-06-10 | End: 2025-06-11 | Stop reason: HOSPADM

## 2025-06-10 RX ORDER — SODIUM CHLORIDE 0.9 % (FLUSH) 0.9 %
5-40 SYRINGE (ML) INJECTION PRN
Status: DISCONTINUED | OUTPATIENT
Start: 2025-06-10 | End: 2025-06-11 | Stop reason: HOSPADM

## 2025-06-10 RX ORDER — PANTOPRAZOLE SODIUM 40 MG/1
40 TABLET, DELAYED RELEASE ORAL
Status: DISCONTINUED | OUTPATIENT
Start: 2025-06-11 | End: 2025-06-11 | Stop reason: HOSPADM

## 2025-06-10 RX ORDER — INSULIN LISPRO 100 [IU]/ML
0-4 INJECTION, SOLUTION INTRAVENOUS; SUBCUTANEOUS
Status: DISCONTINUED | OUTPATIENT
Start: 2025-06-10 | End: 2025-06-11 | Stop reason: HOSPADM

## 2025-06-10 RX ORDER — ACETAMINOPHEN 650 MG/1
650 SUPPOSITORY RECTAL EVERY 6 HOURS PRN
Status: DISCONTINUED | OUTPATIENT
Start: 2025-06-10 | End: 2025-06-11 | Stop reason: HOSPADM

## 2025-06-10 RX ORDER — MONTELUKAST SODIUM 10 MG/1
10 TABLET ORAL NIGHTLY
Status: DISCONTINUED | OUTPATIENT
Start: 2025-06-10 | End: 2025-06-11 | Stop reason: HOSPADM

## 2025-06-10 RX ORDER — ASPIRIN 81 MG/1
81 TABLET, CHEWABLE ORAL DAILY
Status: DISCONTINUED | OUTPATIENT
Start: 2025-06-11 | End: 2025-06-11 | Stop reason: HOSPADM

## 2025-06-10 RX ORDER — METOPROLOL SUCCINATE 50 MG/1
50 TABLET, EXTENDED RELEASE ORAL DAILY
Status: DISCONTINUED | OUTPATIENT
Start: 2025-06-11 | End: 2025-06-11 | Stop reason: HOSPADM

## 2025-06-10 RX ORDER — ATORVASTATIN CALCIUM 20 MG/1
40 TABLET, FILM COATED ORAL NIGHTLY
Status: DISCONTINUED | OUTPATIENT
Start: 2025-06-10 | End: 2025-06-11 | Stop reason: HOSPADM

## 2025-06-10 RX ORDER — ONDANSETRON 4 MG/1
4 TABLET, ORALLY DISINTEGRATING ORAL EVERY 8 HOURS PRN
Status: DISCONTINUED | OUTPATIENT
Start: 2025-06-10 | End: 2025-06-11 | Stop reason: HOSPADM

## 2025-06-10 RX ORDER — IPRATROPIUM BROMIDE AND ALBUTEROL SULFATE 2.5; .5 MG/3ML; MG/3ML
1 SOLUTION RESPIRATORY (INHALATION) EVERY 4 HOURS PRN
Status: DISCONTINUED | OUTPATIENT
Start: 2025-06-10 | End: 2025-06-11 | Stop reason: HOSPADM

## 2025-06-10 RX ORDER — 0.9 % SODIUM CHLORIDE 0.9 %
500 INTRAVENOUS SOLUTION INTRAVENOUS ONCE
Status: COMPLETED | OUTPATIENT
Start: 2025-06-10 | End: 2025-06-10

## 2025-06-10 RX ADMIN — IOPAMIDOL 40 ML: 755 INJECTION, SOLUTION INTRAVENOUS at 15:13

## 2025-06-10 RX ADMIN — TIZANIDINE 2 MG: 2 TABLET ORAL at 20:55

## 2025-06-10 RX ADMIN — ATORVASTATIN CALCIUM 40 MG: 20 TABLET, FILM COATED ORAL at 20:55

## 2025-06-10 RX ADMIN — TROSPIUM CHLORIDE 20 MG: 20 TABLET, FILM COATED ORAL at 20:56

## 2025-06-10 RX ADMIN — ARFORMOTEROL TARTRATE: 15 SOLUTION RESPIRATORY (INHALATION) at 19:28

## 2025-06-10 RX ADMIN — AMIODARONE HYDROCHLORIDE 1 MG/MIN: 50 INJECTION, SOLUTION INTRAVENOUS at 20:52

## 2025-06-10 RX ADMIN — SODIUM CHLORIDE, PRESERVATIVE FREE 10 ML: 5 INJECTION INTRAVENOUS at 20:55

## 2025-06-10 RX ADMIN — LATANOPROST 1 DROP: 50 SOLUTION OPHTHALMIC at 22:21

## 2025-06-10 RX ADMIN — ENOXAPARIN SODIUM 40 MG: 100 INJECTION SUBCUTANEOUS at 19:10

## 2025-06-10 RX ADMIN — AMIODARONE HYDROCHLORIDE 150 MG: 50 INJECTION, SOLUTION INTRAVENOUS at 19:12

## 2025-06-10 RX ADMIN — MICONAZOLE NITRATE: 20 CREAM TOPICAL at 22:21

## 2025-06-10 RX ADMIN — DIPHENHYDRAMINE HYDROCHLORIDE 25 MG: 25 CAPSULE ORAL at 22:42

## 2025-06-10 RX ADMIN — MONTELUKAST 10 MG: 10 TABLET, FILM COATED ORAL at 20:55

## 2025-06-10 RX ADMIN — SODIUM CHLORIDE 500 ML: 0.9 INJECTION, SOLUTION INTRAVENOUS at 16:25

## 2025-06-10 ASSESSMENT — PATIENT HEALTH QUESTIONNAIRE - PHQ9
2. FEELING DOWN, DEPRESSED OR HOPELESS: NOT AT ALL
4. FEELING TIRED OR HAVING LITTLE ENERGY: NOT AT ALL
3. TROUBLE FALLING OR STAYING ASLEEP: NOT AT ALL
1. LITTLE INTEREST OR PLEASURE IN DOING THINGS: NOT AT ALL
9. THOUGHTS THAT YOU WOULD BE BETTER OFF DEAD, OR OF HURTING YOURSELF: NOT AT ALL
7. TROUBLE CONCENTRATING ON THINGS, SUCH AS READING THE NEWSPAPER OR WATCHING TELEVISION: NOT AT ALL
5. POOR APPETITE OR OVEREATING: NOT AT ALL
SUM OF ALL RESPONSES TO PHQ QUESTIONS 1-9: 0
SUM OF ALL RESPONSES TO PHQ QUESTIONS 1-9: 0
6. FEELING BAD ABOUT YOURSELF - OR THAT YOU ARE A FAILURE OR HAVE LET YOURSELF OR YOUR FAMILY DOWN: NOT AT ALL
10. IF YOU CHECKED OFF ANY PROBLEMS, HOW DIFFICULT HAVE THESE PROBLEMS MADE IT FOR YOU TO DO YOUR WORK, TAKE CARE OF THINGS AT HOME, OR GET ALONG WITH OTHER PEOPLE: NOT DIFFICULT AT ALL
8. MOVING OR SPEAKING SO SLOWLY THAT OTHER PEOPLE COULD HAVE NOTICED. OR THE OPPOSITE, BEING SO FIGETY OR RESTLESS THAT YOU HAVE BEEN MOVING AROUND A LOT MORE THAN USUAL: NOT AT ALL
SUM OF ALL RESPONSES TO PHQ QUESTIONS 1-9: 0
SUM OF ALL RESPONSES TO PHQ QUESTIONS 1-9: 0

## 2025-06-10 ASSESSMENT — ENCOUNTER SYMPTOMS
SHORTNESS OF BREATH: 1
BLOOD IN STOOL: 0
DIARRHEA: 0
NAUSEA: 0
ABDOMINAL DISTENTION: 0
ABDOMINAL PAIN: 0
ANAL BLEEDING: 0
VOMITING: 0
COUGH: 0

## 2025-06-10 ASSESSMENT — PAIN - FUNCTIONAL ASSESSMENT: PAIN_FUNCTIONAL_ASSESSMENT: NONE - DENIES PAIN

## 2025-06-10 NOTE — ED TRIAGE NOTES
Pt arrives to ED via EMS from PCP office c/o generalized weakness, chills, and fatigue since placement of pacemaker @ [facility] on 5/30. Pt was at PCP office for surgical f/u. Blood sugar 130. Pt also had R sided rotator cuff sx and has been advised not to use either arm to lift things but has not been following those instructions

## 2025-06-10 NOTE — PROGRESS NOTES
Identified pt with two pt identifiers(name and )    Chief Complaint   Patient presents with    Follow-Up from Hospital     -  [facility]  Bradycardia    Weight Loss     Pt has been losing weight very fast recently, over the past few months        Health Maintenance Due   Topic    DTaP/Tdap/Td vaccine (1 - Tdap)    Shingles vaccine (1 of 2)    Respiratory Syncytial Virus (RSV) Pregnant or age 60 yrs+ (1 - 1-dose 75+ series)    COVID-19 Vaccine ( season)       Wt Readings from Last 3 Encounters:   06/10/25 72.6 kg (160 lb)   25 74.4 kg (164 lb)   25 74.6 kg (164 lb 6.4 oz)     Temp Readings from Last 3 Encounters:   06/10/25 97.4 °F (36.3 °C) (Temporal)   25 99.3 °F (37.4 °C) (Oral)   25 98.3 °F (36.8 °C) (Oral)     BP Readings from Last 3 Encounters:   06/10/25 128/70   06/10/25 120/73   25 126/78     Pulse Readings from Last 3 Encounters:   06/10/25 64   06/10/25 62   25 67           Depression Screening:  :         6/10/2025     1:10 PM 2025    11:33 AM 2025    10:26 AM 2025    10:25 AM 2025     9:21 AM 2024    10:37 AM 2024     2:28 PM   PHQ-9 Questionaire   Little interest or pleasure in doing things 0 0 1 1 0 0 0   Feeling down, depressed, or hopeless 0 0 0 0 0 0 0   Trouble falling or staying asleep, or sleeping too much 0  0   0 0   Feeling tired or having little energy 0  1   0 0   Poor appetite or overeating 0  3   0 0   Feeling bad about yourself - or that you are a failure or have let yourself or your family down 0  0   0 0   Trouble concentrating on things, such as reading the newspaper or watching television 0  1   0 0   Moving or speaking so slowly that other people could have noticed. Or the opposite - being so fidgety or restless that you have been moving around a lot more than usual 0  0   0 0   Thoughts that you would be better off dead, or of hurting yourself in some way 0  0   0 0   PHQ-9 Total Score 0 0 6  1  0 
respiratory distress  Cardiovascular: normal rate, normal S1 and S2, no gallops, intact distal pulses, and no carotid bruits  Extremities: no cyanosis, clubbing of fingernails  Musculoskeletal: The patient is weak. She is moaning and anxious. Feels profoundly weak.      Alessia Rangel MD  [facility]  6/10/25

## 2025-06-10 NOTE — ED PROVIDER NOTES
Final    Monocytes % 06/10/2025 9.2  5.0 - 13.0 % Final    Eosinophils % 06/10/2025 0.5  0.0 - 7.0 % Final    Basophils % 06/10/2025 0.2  0.0 - 1.0 % Final    Immature Granulocytes % 06/10/2025 1.1 (H)  0.0 - 0.5 % Final    Neutrophils Absolute 06/10/2025 6.72  1.80 - 8.00 K/UL Final    Lymphocytes Absolute 06/10/2025 2.16  0.80 - 3.50 K/UL Final    Monocytes Absolute 06/10/2025 0.92  0.00 - 1.00 K/UL Final    Eosinophils Absolute 06/10/2025 0.05  0.00 - 0.40 K/UL Final    Basophils Absolute 06/10/2025 0.02  0.00 - 0.10 K/UL Final    Immature Granulocytes Absolute 06/10/2025 0.11 (H)  0.00 - 0.04 K/UL Final    Differential Type 06/10/2025 AUTOMATED    Final    Sodium 06/10/2025 138  136 - 145 mmol/L Final    Potassium 06/10/2025 4.0  3.5 - 5.1 mmol/L Final    Chloride 06/10/2025 104  97 - 108 mmol/L Final    CO2 06/10/2025 22  21 - 32 mmol/L Final    Anion Gap 06/10/2025 12  2 - 12 mmol/L Final    PLEASE NOTE NEW REFERENCE RANGE    Glucose 06/10/2025 106 (H)  65 - 100 mg/dL Final    BUN 06/10/2025 22 (H)  6 - 20 MG/DL Final    Creatinine 06/10/2025 1.19 (H)  0.55 - 1.02 MG/DL Final    BUN/Creatinine Ratio 06/10/2025 18  12 - 20   Final    Est, Glom Filt Rate 06/10/2025 45 (L)  >60 ml/min/1.73m2 Final    Comment:    Pediatric calculator link: https://www.kidney.org/professionals/kdoqi/gfr_calculatorped     These results are not intended for use in patients <18 years of age.     eGFR results are calculated without a race factor using  the 2021 CKD-EPI equation. Careful clinical correlation is recommended, particularly when comparing to results calculated using previous equations.  The CKD-EPI equation is less accurate in patients with extremes of muscle mass, extra-renal metabolism of creatinine, excessive creatine ingestion, or following therapy that affects renal tubular secretion.      Calcium 06/10/2025 10.4 (H)  8.5 - 10.1 MG/DL Final    Magnesium 06/10/2025 2.4  1.6 - 2.4 mg/dL Final    Ventricular Rate

## 2025-06-10 NOTE — CARE COORDINATION
Remote Patient Monitoring Note      Date/Time:  6/10/2025 10:51 AM  Patient Current Location: Home: [address] [place] [id number]  LPN contacted family by telephone regarding red alert received for blood pressure reading (126/117). Verified patients name and  as identifiers.  Background: enrolled in RPM FOR HTN  Clinical Interventions: Reviewed and followed up on alerts and treatments-NOTED recheck of BP metrics. New reading 135/81. Metrics green alert WNL    Plan/Follow Up: Will continue to review, monitor and address alerts with follow up based on severity of symptoms and risk factors.        Remote Patient Monitoring Welcome Note  Date/Time:  6/10/2025 10:47 AM  Patient Current Location: Home: [address] [place] [id number]  Verified patients name and  as identifiers.       Completed and confirmed the following:    [x] Patient received all RPM equipment (tablet, scale, blood pressure device and cuff, and pulse oximeter)  Cuff Size: large (13.8\"-19.68\")    Weight Scale:  regular (<330lbs)                    [x] Instructed patient keep box for use when returning equipment                                                          [x] Reviewed Patient Welcome Letter with patient    [x]  Reviewed Consent Form  Copy of consent form in chart.                 [x] Reviewed expectations for patient and care team  Monitoring hours M-F 9-4pm  It is important to take your vitals every day, even on the weekends,to keep your care team aware of how you are doing every day of the week.  Completing monitoring by 12pm on  so that alerts can be responded to in the same day  Patient weighs self at same time every day (or after urinating and waking up)  Take blood pressure 1-2 hrs after medications   RPM team may have different phone area code (including [place], [place], [place] or [place])                              [x] Instructed patient to keep scale on flat surface

## 2025-06-10 NOTE — ED NOTES
TRANSFER - OUT REPORT:    Verbal report given to JOJO Hancock on Laura Lu  being transferred to [facility] for routine progression of patient care       Report consisted of patient's Situation, Background, Assessment and   Recommendations(SBAR).     Information from the following report(s) Nurse Handoff Report, ED SBAR, MAR, and Recent Results was reviewed with the receiving nurse.    [place] Fall Assessment:    Presents to emergency department  because of falls (Syncope, seizure, or loss of consciousness): No  Age > 70: Yes  Altered Mental Status, Intoxication with alcohol or substance confusion (Disorientation, impaired judgment, poor safety awaremess, or inability to follow instructions): No  Impaired Mobility: Ambulates or transfers with assistive devices or assistance; Unable to ambulate or transer.: Yes  Nursing Judgement: Yes          Lines:   Peripheral IV 06/10/25 Right Antecubital (Active)       Peripheral IV 06/10/25 Left;Anterior Forearm (Active)   Site Assessment Clean, dry & intact 06/10/25 1635   Line Status Blood return noted 06/10/25 1635   Line Care Cap changed 06/10/25 1635   Phlebitis Assessment No symptoms 06/10/25 1635   Infiltration Assessment 0 06/10/25 1635   Alcohol Cap Used Yes 06/10/25 1635   Dressing Status New dressing applied 06/10/25 1635        Opportunity for questions and clarification was provided.      Patient transported with:  Tech

## 2025-06-10 NOTE — H&P
prophylaxis - Fall precautions ordered   Disposition - Admit to Telemetry. Plan to d/c to Home.   Code Status - FULL, discussed with patient / caregivers.  Next of Kin Name and Contact :   Fela Loya (Child)  [phone]       Patient Laura Lu will be discussed Dr. Ramon Razo.     5:52 PM, 06/10/25  David Dawson DO  [facility] Medicine Resident       For Billing    Chief Complaint   Patient presents with    Fatigue

## 2025-06-10 NOTE — CARE COORDINATION
Remote Patient Monitoring Note      Date/Time:  6/10/2025 9:33 AM  Patient Current Location: Home: [address] [place] [id number]  LPN contacted family by telephone regarding red alert received for blood pressure reading (126/117). Verified patients name and  as identifiers.  Background: enrolled in RPM for HTN  Clinical Interventions: Reviewed and followed up on alerts and treatments-Spoke to dtr Fela regarding RPM red alert for high blood pressure. Fela states she is on her way to pt's home at this time and will recheck BP. Pt has an appt today with her PCP at 1pm.    Plan/Follow Up: Will continue to review, monitor and address alerts with follow up based on severity of symptoms and risk factors.

## 2025-06-11 ENCOUNTER — TELEPHONE (OUTPATIENT)
Age: 85
End: 2025-06-11

## 2025-06-11 ENCOUNTER — CARE COORDINATION (OUTPATIENT)
Dept: CASE MANAGEMENT | Age: 85
End: 2025-06-11

## 2025-06-11 VITALS
WEIGHT: 160 LBS | TEMPERATURE: 99 F | BODY MASS INDEX: 28.35 KG/M2 | HEIGHT: 63 IN | SYSTOLIC BLOOD PRESSURE: 124 MMHG | RESPIRATION RATE: 16 BRPM | DIASTOLIC BLOOD PRESSURE: 63 MMHG | HEART RATE: 63 BPM | OXYGEN SATURATION: 98 %

## 2025-06-11 PROBLEM — E86.0 DEHYDRATION: Status: ACTIVE | Noted: 2025-06-11

## 2025-06-11 PROBLEM — I49.5 TACHY-BRADY SYNDROME (HCC): Status: ACTIVE | Noted: 2025-06-11

## 2025-06-11 PROBLEM — R06.82 TACHYPNEA: Status: ACTIVE | Noted: 2025-06-11

## 2025-06-11 PROBLEM — E87.20 LACTIC ACIDOSIS: Status: ACTIVE | Noted: 2025-06-11

## 2025-06-11 PROBLEM — I47.19 ATRIAL TACHYCARDIA: Status: ACTIVE | Noted: 2025-06-11

## 2025-06-11 LAB
ANION GAP SERPL CALC-SCNC: 7 MMOL/L (ref 2–12)
BASOPHILS # BLD: 0.02 K/UL (ref 0–0.1)
BASOPHILS NFR BLD: 0.2 % (ref 0–1)
BUN SERPL-MCNC: 16 MG/DL (ref 6–20)
BUN/CREAT SERPL: 18 (ref 12–20)
CALCIUM SERPL-MCNC: 9.4 MG/DL (ref 8.5–10.1)
CHLORIDE SERPL-SCNC: 108 MMOL/L (ref 97–108)
CO2 SERPL-SCNC: 25 MMOL/L (ref 21–32)
CREAT SERPL-MCNC: 0.87 MG/DL (ref 0.55–1.02)
DIFFERENTIAL METHOD BLD: ABNORMAL
EKG ATRIAL RATE: 63 BPM
EKG DIAGNOSIS: NORMAL
EKG P AXIS: 5 DEGREES
EKG P-R INTERVAL: 198 MS
EKG Q-T INTERVAL: 388 MS
EKG QRS DURATION: 72 MS
EKG QTC CALCULATION (BAZETT): 397 MS
EKG R AXIS: -40 DEGREES
EKG T AXIS: 28 DEGREES
EKG VENTRICULAR RATE: 63 BPM
EOSINOPHIL # BLD: 0.11 K/UL (ref 0–0.4)
EOSINOPHIL NFR BLD: 1.3 % (ref 0–7)
ERYTHROCYTE [DISTWIDTH] IN BLOOD BY AUTOMATED COUNT: 14.6 % (ref 11.5–14.5)
GLUCOSE BLD STRIP.AUTO-MCNC: 111 MG/DL (ref 65–117)
GLUCOSE BLD STRIP.AUTO-MCNC: 141 MG/DL (ref 65–117)
GLUCOSE SERPL-MCNC: 115 MG/DL (ref 65–100)
HCT VFR BLD AUTO: 30.6 % (ref 35–47)
HGB BLD-MCNC: 9.8 G/DL (ref 11.5–16)
IMM GRANULOCYTES # BLD AUTO: 0.06 K/UL (ref 0–0.04)
IMM GRANULOCYTES NFR BLD AUTO: 0.7 % (ref 0–0.5)
LYMPHOCYTES # BLD: 1.9 K/UL (ref 0.8–3.5)
LYMPHOCYTES NFR BLD: 22 % (ref 12–49)
MCH RBC QN AUTO: 28 PG (ref 26–34)
MCHC RBC AUTO-ENTMCNC: 32 G/DL (ref 30–36.5)
MCV RBC AUTO: 87.4 FL (ref 80–99)
MONOCYTES # BLD: 0.84 K/UL (ref 0–1)
MONOCYTES NFR BLD: 9.7 % (ref 5–13)
NEUTS SEG # BLD: 5.7 K/UL (ref 1.8–8)
NEUTS SEG NFR BLD: 66.1 % (ref 32–75)
NRBC # BLD: 0 K/UL (ref 0–0.01)
NRBC BLD-RTO: 0 PER 100 WBC
PLATELET # BLD AUTO: 316 K/UL (ref 150–400)
PMV BLD AUTO: 9.6 FL (ref 8.9–12.9)
POTASSIUM SERPL-SCNC: 4.1 MMOL/L (ref 3.5–5.1)
RBC # BLD AUTO: 3.5 M/UL (ref 3.8–5.2)
SERVICE CMNT-IMP: ABNORMAL
SERVICE CMNT-IMP: NORMAL
SODIUM SERPL-SCNC: 140 MMOL/L (ref 136–145)
WBC # BLD AUTO: 8.6 K/UL (ref 3.6–11)

## 2025-06-11 PROCEDURE — 93010 ELECTROCARDIOGRAM REPORT: CPT | Performed by: SPECIALIST

## 2025-06-11 PROCEDURE — 6370000000 HC RX 637 (ALT 250 FOR IP)

## 2025-06-11 PROCEDURE — 94761 N-INVAS EAR/PLS OXIMETRY MLT: CPT

## 2025-06-11 PROCEDURE — 85025 COMPLETE CBC W/AUTO DIFF WBC: CPT

## 2025-06-11 PROCEDURE — 6360000002 HC RX W HCPCS

## 2025-06-11 PROCEDURE — 82962 GLUCOSE BLOOD TEST: CPT

## 2025-06-11 PROCEDURE — 99291 CRITICAL CARE FIRST HOUR: CPT

## 2025-06-11 PROCEDURE — 6370000000 HC RX 637 (ALT 250 FOR IP): Performed by: HOSPITALIST

## 2025-06-11 PROCEDURE — 2500000003 HC RX 250 WO HCPCS

## 2025-06-11 PROCEDURE — 80048 BASIC METABOLIC PNL TOTAL CA: CPT

## 2025-06-11 PROCEDURE — 94640 AIRWAY INHALATION TREATMENT: CPT

## 2025-06-11 RX ORDER — AMIODARONE HYDROCHLORIDE 400 MG/1
400 TABLET ORAL 2 TIMES DAILY
Qty: 27 TABLET | Refills: 0 | Status: SHIPPED | OUTPATIENT
Start: 2025-06-11 | End: 2025-06-25

## 2025-06-11 RX ORDER — AMIODARONE HYDROCHLORIDE 200 MG/1
400 TABLET ORAL 2 TIMES DAILY
Status: DISCONTINUED | OUTPATIENT
Start: 2025-06-11 | End: 2025-06-11 | Stop reason: HOSPADM

## 2025-06-11 RX ORDER — AMIODARONE HYDROCHLORIDE 200 MG/1
200 TABLET ORAL DAILY
Qty: 30 TABLET | Refills: 0 | Status: SHIPPED | OUTPATIENT
Start: 2025-06-25

## 2025-06-11 RX ORDER — ACYCLOVIR 200 MG/1
400 CAPSULE ORAL 3 TIMES DAILY
Status: DISCONTINUED | OUTPATIENT
Start: 2025-06-11 | End: 2025-06-11 | Stop reason: HOSPADM

## 2025-06-11 RX ORDER — ACYCLOVIR 200 MG/1
400 CAPSULE ORAL 3 TIMES DAILY
Qty: 40 CAPSULE | Refills: 0 | Status: SHIPPED | OUTPATIENT
Start: 2025-06-11 | End: 2025-06-18

## 2025-06-11 RX ADMIN — PANTOPRAZOLE SODIUM 40 MG: 40 TABLET, DELAYED RELEASE ORAL at 06:02

## 2025-06-11 RX ADMIN — AMIODARONE HYDROCHLORIDE 400 MG: 200 TABLET ORAL at 08:12

## 2025-06-11 RX ADMIN — ACYCLOVIR 400 MG: 200 CAPSULE ORAL at 09:01

## 2025-06-11 RX ADMIN — CETIRIZINE HYDROCHLORIDE 10 MG: 10 TABLET, FILM COATED ORAL at 08:11

## 2025-06-11 RX ADMIN — ENOXAPARIN SODIUM 40 MG: 100 INJECTION SUBCUTANEOUS at 08:11

## 2025-06-11 RX ADMIN — ASPIRIN 81 MG: 81 TABLET, CHEWABLE ORAL at 08:12

## 2025-06-11 RX ADMIN — ACYCLOVIR 400 MG: 200 CAPSULE ORAL at 15:10

## 2025-06-11 RX ADMIN — ACETAMINOPHEN 650 MG: 325 TABLET ORAL at 09:04

## 2025-06-11 RX ADMIN — METOPROLOL SUCCINATE 50 MG: 50 TABLET, FILM COATED, EXTENDED RELEASE ORAL at 09:01

## 2025-06-11 RX ADMIN — MICONAZOLE NITRATE: 20 CREAM TOPICAL at 08:10

## 2025-06-11 RX ADMIN — ARFORMOTEROL TARTRATE: 15 SOLUTION RESPIRATORY (INHALATION) at 07:15

## 2025-06-11 RX ADMIN — SODIUM CHLORIDE, PRESERVATIVE FREE 10 ML: 5 INJECTION INTRAVENOUS at 08:12

## 2025-06-11 ASSESSMENT — PAIN SCALES - WONG BAKER: WONGBAKER_NUMERICALRESPONSE: NO HURT

## 2025-06-11 NOTE — PROGRESS NOTES
[address] [place] [address]   Office [phone]  Fax [phone]          Subjective / Objective     Subjective  Overnight Events: None    Patient reports feeling much better this morning - is less winded/fatigued, feels like she has more energy.    Respiratory:   RA  Vitals:    06/11/25 0718   BP: 115/62   Pulse: 61   Resp: 14   Temp: 98.4 °F (36.9 °C)   SpO2: 100%       Physical Examination:   General appearance - alert, well appearing, and in no distress  Chest - clear to auscultation, no wheezes, rales or rhonchi, symmetric air entry  Heart - normal rate, regular rhythm, normal S1, S2, no murmurs, rubs, clicks or gallops,   Abdomen - soft, nontender, nondistended, no masses or organomegaly  Neurological - alert, oriented, normal speech, no focal findings  Skin - warm, dry. No notable rashes  Extremities - peripheral pulses normal, no pedal edema, no clubbing or cyanosis    I/O:  06/10 0701 - 06/11 0700  In: 240 [P.O.:240]  Out: -     Inpatient Medications  Current Facility-Administered Medications   Medication Dose Route Frequency    amiodarone (CORDARONE) tablet 400 mg  400 mg Oral BID    sodium chloride flush 0.9 % injection 5-40 mL  5-40 mL IntraVENous 2 times per day    sodium chloride flush 0.9 % injection 5-40 mL  5-40 mL IntraVENous PRN    0.9 % sodium chloride infusion   IntraVENous PRN    enoxaparin (LOVENOX) injection 40 mg  40 mg SubCUTAneous Daily    ondansetron (ZOFRAN-ODT) disintegrating tablet 4 mg  4 mg Oral Q8H PRN    Or    ondansetron (ZOFRAN) injection 4 mg  4 mg IntraVENous Q6H PRN    polyethylene glycol (GLYCOLAX) packet 17 g  17 g Oral Daily PRN    acetaminophen (TYLENOL) tablet 650 mg  650 mg Oral Q6H PRN    Or    acetaminophen (TYLENOL) suppository 650 mg  650 mg Rectal Q6H PRN    aspirin chewable tablet 81 mg  81 mg Oral Daily    atorvastatin (LIPITOR) tablet 40 mg  40 mg Oral Nightly    arformoterol 15 mcg-budesonide 0.25 mg neb solution   Nebulization BID RT    
Brief EP note.    Full note follow.    LH, chills over past week, bp low    Unclear etiology  WBC normal   --blood cultures pending    --suspect frequent A tach episodes contributing  --start amiodarone drip  --further recs to follow    
Device interrogation reviewed with Dr. Mccloud.   There were multiple short episodes of atrial tachycardia.   Will continue to monitor and discuss OAC outpatient. She is currently anemic and at ahigh bleeding risk.   At follow up will also consider turning on reactive ATP at 3 month visit.       Future Appointments   Date Time Provider Department Center   6/16/2025 10:40 AM PACEMAKER3, [facility] ADORNO BS AMB   6/20/2025 11:40 AM Graciela Fowler APRN - [facility] AMB   9/22/2025 11:00 AM Alessia Rangel MD [facility]   9/22/2025  2:00 PM PACEMAKER3, [facility]ERR[facility]A AMB   9/22/2025  2:20 PM Ramo Mccloud MD [facility] BS AMB       
with heels hanging off end of pillow at all times while in bed.  Sacral Preventive dressing: change as needed ~every 4 days. Discontinue if incontinence is frequently soiling dressing.     Z-guard cream to buttocks and sacrum daily and as needed with incontinence care.  Low Air Loss mattress: Use only flat sheet and one incontinence pad.     Discussed with Joe URIBE.    Transition of Care: Wound care to sign off at this time. Please re-consult as needed.    JAVIER PachecoN, RN  [phone]  Available via [email or web address]

## 2025-06-11 NOTE — CARE COORDINATION
medications?: No  In our efforts to provide the best possible care to you and others like you, can you think of anything that we could have done to help you after you left the hospital the first time, so that you might not have needed to return so soon?: Other (Comment) (daughter stated \"she was sent home too soon last time\")      Madisyn Yu  Case Management Department  For questions or concerns, please [employer]erve

## 2025-06-11 NOTE — CARE COORDINATION
RPM Note    Laura Lu has been admitted on 6/10/25 at [facility] due to shortness of breath . Patient is active in remote patient monitoring and has been paused at this time. LPN has contacted the care manager to advise.   Will continue to monitor and follow up as needed.   Message from dtstephanie Chahal on 6/10/25 after hours  Returned call this morning.   Fela reports pt  sent to [facility] and admitted from her PCP's office yesterday

## 2025-06-11 NOTE — CONSULTS
lymphadenopathy.  VICKY: No mass or lymphadenopathy.  THORACIC AORTA: No aneurysm. There are vascular calcifications  HEART: Normal in size.  ESOPHAGUS: No wall thickening or dilatation.  TRACHEA/BRONCHI: Patent.  PLEURA: No effusion or pneumothorax.  LUNGS: Left basilar atelectasis  UPPER ABDOMEN: Partially imaged. No acute pathology.  BONES: No aggressive bone lesion or fracture.    Impression  1. No acute PE    2. No fluid surrounding the left chest pacer      Electronically signed by Gera Barnes    MRI Result (most recent):  MRI SHOULDER RIGHT WO CONTRAST     Narrative  [facility] - MRI SHOULDER RIGHT WO CONTRAST  Patient: IRENE DUNCAN  : 1940  Date of Service: 2025 10:22:08 AM  Reason For Exam: Primary osteoarthritis, right shoulder  Ordering Provider: RODNEY Cottrell Physician: DIANA HERNANDEZ  Signing Date: 2025 1:55:59 PM    EXAM: MRI SHOULDER RIGHT WO CONTRAST    INDICATION: Pain.    COMPARISON: Radiographs 2023    TECHNIQUE: Axial proton density fat-saturated; oblique coronal T1, T2  fat-saturated, and proton density fat-saturated; and oblique sagittal T2  fat-saturated MRI of the right shoulder . Images are degraded by motion  artifacts.    CONTRAST: None.    FINDINGS: A.C. joint: Mild osteoarthrosis. Anterior acromion process type: 2,  with mild lateral downsloping and subacromial spurring.    Bone marrow: Within normal limits. No acute fracture, dislocation, or marrow  replacing process.    Joint fluid: Moderate effusions of glenohumeral joint and subacromial subdeltoid  bursa    Rotator cuff tendons: Complete tear of supraspinatus tendon with tendon  retraction along the bursal side to the superior level of the humeral head and  tendon retraction along the articular side to the superomedial humeral head.  Full-thickness tearing extends into the anterior infraspinatus tendon with  similar tendon retraction. The humeral head is superiorly subluxed.    Biceps tendon:

## 2025-06-11 NOTE — DISCHARGE INSTRUCTIONS
HOME DISCHARGE INSTRUCTIONS    Laura Lu / [id number] : 1940    Admission date: 6/10/2025 Discharge date: 2025     Please bring this form with you to show your care provider at your follow-up appointment.    Primary care provider:  Alessia Rangel    Discharging provider:  Dorothy Allen MD  - Family Medicine Resident  Dr. Ramon Razo. Attending, Family Medicine     You have been admitted to the hospital with the following diagnoses:    ACUTE DIAGNOSES:  Shortness of breath [R06.02]    . . . . . . . . . . . . . . . . . . . . . . . . . . . . . . . . . . . . . . . . . . . . . . . . . . . . . . . . . . . . . . . . . . . . . . . .   Attending physician at discharge/transfer:     FOLLOW-UP CARE RECOMMENDATIONS:  You are well enough to be discharged from the hospital. You were in the hospital for atrial tachycardia and an elevated lactic acid in your blood which resolved with IV fluids. You were seen by our cardiology team and treated with IV amiodarone which we were able to switch to oral amiodarone. You will need to follow up with your cardiologist and PCP for further management.     MEDICATION CHANGES   - START  400 mg amiodarone twice daily for 2 weeks (until 25) Then, take 200 mg a day for 3 months  Acyclovir 400 mg three times daily to complete 7 days     - CONTINUE   Metoprolol 50 mg daily    - PAUSE   Losartan, until instructed by your PCP    Appointments  Alessia Rangel MD  [address]  [facility] [address] [phone]    Follow up  for hospital follow-up    Ramo Mccloud MD  [address] [facility]  [address]  [facility] [id number] [phone]    Follow up  for hospital follow-up     Future Appointments   Date Time Provider Department Center   2025 10:40 AM PALMIRA MARSHALL BS AMB   2025 11:40 AM Graciela Fowler, APRN - NP [facility] BS AMB   2025 11:00 AM Alessia Rangel MD [facility]   2025  2:00 PM PALMIRA MARSHALL

## 2025-06-11 NOTE — TELEPHONE ENCOUNTER
Vicki with [facility] called in making sure Dr. Rangel will follow and sign orders on patient - needs verbal confirmation.     Vicki CB: [phone]

## 2025-06-12 ENCOUNTER — RESULTS FOLLOW-UP (OUTPATIENT)
Age: 85
End: 2025-06-12

## 2025-06-12 LAB
BACTERIA SPEC CULT: NORMAL
CC UR VC: NORMAL
SERVICE CMNT-IMP: NORMAL

## 2025-06-13 ENCOUNTER — CARE COORDINATION (OUTPATIENT)
Dept: CARE COORDINATION | Age: 85
End: 2025-06-13

## 2025-06-13 NOTE — CARE COORDINATION
Remote Patient Monitoring Note      Date/Time:  6/13/2025 9:19 AM    Message sent to patient via Patient Connect Portal for Remote Patient Monitoring RPM Nurse message:     Hello,     Please see an important message from your RPM Team:    Please resume entering your vitals today.    Please do not respond to this message as the messages are not monitored by the remote patient monitoring team. Please log on to your tablet and enter your vitals as you are able. The goal is to have these entered in each day prior to noon.     Ashley López LPN  [facility]/ CTN/ Remote Patient monitoring  [phone]

## 2025-06-14 NOTE — DISCHARGE SUMMARY
[address] [place] [address]   Office [phone]  Fax [phone]       Discharge / Transfer / Off-Service Note     Name: Laura Lu MRN: [id number]  Sex: Female   YOB: 1940  Age: 85 y.o.  PCP: Alessia Rangel MD     Date of admission: 6/10/2025  Date of discharge/transfer: 6/11/2025    Attending physician at admission: Dr. Ramon Razo.  Attending physician at discharge/transfer: Dr. Ramon Razo.  Resident physician at discharge/transfer: David Dawson DO     Consultants during hospitalization  IP CONSULT TO CARDIOLOGY  IP CONSULT HOME HEALTH     Admission diagnoses   Shortness of breath [R06.02]    Recommended follow-up after discharge    1. PCP-Alessia Rangel MD  2. Cardiology / EP     To follow up on with PCP:   - Hospital follow up, completion of acyclovir course    To follow up on with cardiology/EP:   - Atrial tachycardia   ------------------------------------------------------------------------------------------------------------------    History of Present Illness    As per admitting provider, Dr. Dawson:     \"Laura Lu is a 85 y.o. 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 She is accompanied by her daughter today.      Patient reports since discharge from hospital she has had worsening shortness of breath, chills, fatigue, and tremors. No fevers at home. She states she is always cold at baseline but in the last week has been freezing cold despite blankets and a heater in her room. Per her daughter she has been having episodic hypotension as well with lowest BP recorded at home 87/66, normally her blood pressure is reported in 120's/70's. She was instructed to stop her losartan. She

## 2025-06-15 LAB
BACTERIA SPEC CULT: NORMAL
BACTERIA SPEC CULT: NORMAL
SERVICE CMNT-IMP: NORMAL
SERVICE CMNT-IMP: NORMAL

## 2025-06-16 ASSESSMENT — PATIENT HEALTH QUESTIONNAIRE - PHQ9
1. LITTLE INTEREST OR PLEASURE IN DOING THINGS: NOT AT ALL
8. MOVING OR SPEAKING SO SLOWLY THAT OTHER PEOPLE COULD HAVE NOTICED. OR THE OPPOSITE, BEING SO FIGETY OR RESTLESS THAT YOU HAVE BEEN MOVING AROUND A LOT MORE THAN USUAL: NOT AT ALL
2. FEELING DOWN, DEPRESSED OR HOPELESS: NOT AT ALL
9. THOUGHTS THAT YOU WOULD BE BETTER OFF DEAD, OR OF HURTING YOURSELF: NOT AT ALL
SUM OF ALL RESPONSES TO PHQ QUESTIONS 1-9: 2
6. FEELING BAD ABOUT YOURSELF - OR THAT YOU ARE A FAILURE OR HAVE LET YOURSELF OR YOUR FAMILY DOWN: NOT AT ALL
7. TROUBLE CONCENTRATING ON THINGS, SUCH AS READING THE NEWSPAPER OR WATCHING TELEVISION: NOT AT ALL
6. FEELING BAD ABOUT YOURSELF - OR THAT YOU ARE A FAILURE OR HAVE LET YOURSELF OR YOUR FAMILY DOWN: NOT AT ALL
5. POOR APPETITE OR OVEREATING: SEVERAL DAYS
3. TROUBLE FALLING OR STAYING ASLEEP: NOT AT ALL
SUM OF ALL RESPONSES TO PHQ QUESTIONS 1-9: 2
5. POOR APPETITE OR OVEREATING: SEVERAL DAYS
10. IF YOU CHECKED OFF ANY PROBLEMS, HOW DIFFICULT HAVE THESE PROBLEMS MADE IT FOR YOU TO DO YOUR WORK, TAKE CARE OF THINGS AT HOME, OR GET ALONG WITH OTHER PEOPLE: SOMEWHAT DIFFICULT
7. TROUBLE CONCENTRATING ON THINGS, SUCH AS READING THE NEWSPAPER OR WATCHING TELEVISION: NOT AT ALL
10. IF YOU CHECKED OFF ANY PROBLEMS, HOW DIFFICULT HAVE THESE PROBLEMS MADE IT FOR YOU TO DO YOUR WORK, TAKE CARE OF THINGS AT HOME, OR GET ALONG WITH OTHER PEOPLE: SOMEWHAT DIFFICULT
SUM OF ALL RESPONSES TO PHQ QUESTIONS 1-9: 2
1. LITTLE INTEREST OR PLEASURE IN DOING THINGS: NOT AT ALL
2. FEELING DOWN, DEPRESSED OR HOPELESS: NOT AT ALL
4. FEELING TIRED OR HAVING LITTLE ENERGY: SEVERAL DAYS
SUM OF ALL RESPONSES TO PHQ QUESTIONS 1-9: 2
4. FEELING TIRED OR HAVING LITTLE ENERGY: SEVERAL DAYS
3. TROUBLE FALLING OR STAYING ASLEEP: NOT AT ALL
9. THOUGHTS THAT YOU WOULD BE BETTER OFF DEAD, OR OF HURTING YOURSELF: NOT AT ALL
SUM OF ALL RESPONSES TO PHQ QUESTIONS 1-9: 2
8. MOVING OR SPEAKING SO SLOWLY THAT OTHER PEOPLE COULD HAVE NOTICED. OR THE OPPOSITE - BEING SO FIDGETY OR RESTLESS THAT YOU HAVE BEEN MOVING AROUND A LOT MORE THAN USUAL: NOT AT ALL

## 2025-06-16 NOTE — PROGRESS NOTES
[facility]  Cardiac Electrophysiology Clinic Care Note                  []Initial visit     [x]Established visit     Patient Name: Laura Lu - :1940 - MRN:[id number]  Primary Cardiologist: Valentino Avila MD  Electrophysiologist: Ramo Mccloud MD     Reason for visit: PAT & dual chamber pacemaker    HPI:  Ms. Lu is a 85 y.o. female who presents for follow up of PAT & Medtronic left bundle dual chamber pacemaker (DOI 2025).    Since implant, she's had frequent episodes of 1:1 PAT noted via pacemaker.  She had unclear compliance with Toprol XL, was started on amiodarone 400 mg po bid with intention to decrease to 200 mg po daily on 2025.    She reports improvement in energy and activity tolerance since pacemaker implant.    ECG today shows AP-VS 64 bpm.    Nuclear stress test in 2025 showed predominantly fixed, mild defect in basal to mid inferior & inferolateral segments; overall low risk for cardiac event, but unable to rule out small degree of myocardial ischemia.    Echo in 2024 showed LVEF 60-65% with abnormal diastolic function, mild MAC with mild MR, & mild TR.    BP controlled.    Tolerating ASA 81 mg po daily without difficulty.      Previous:  ER visit in 2025 for fatigue, lightheadedness, chills, low normal BP, & mild lactic acidosis.  No apparent bacteremia.    1:1 PAT noted via pacer checks, cycle length around 370 ms/162 bpm, duration 10-34 seconds.    Medtronic left bundle dual chamber pacemaker (DOI 2025) implanted for TBS.       Assessment and Plan       ICD-10-CM    1. PAT (paroxysmal atrial tachycardia)  I47.19 EKG 12 Lead      2. Pacemaker  Z95.0 EKG 12 Lead      3. Tachy-merry syndrome (HCC)  I49.5 EKG 12 Lead      4. Primary hypertension  I10 EKG 12 Lead          PAT:  -Echo in 2024 showed LVEF 60-65% with abnormal diastolic function, mild MAC with mild MR, & mild TR.  -1:1 PAT noted via pacer checks, cycle

## 2025-06-17 ENCOUNTER — OFFICE VISIT (OUTPATIENT)
Age: 85
End: 2025-06-17
Payer: MEDICARE

## 2025-06-17 ENCOUNTER — LAB (OUTPATIENT)
Age: 85
End: 2025-06-17
Payer: MEDICARE

## 2025-06-17 VITALS
TEMPERATURE: 97.7 F | WEIGHT: 160.4 LBS | BODY MASS INDEX: 28.42 KG/M2 | SYSTOLIC BLOOD PRESSURE: 142 MMHG | OXYGEN SATURATION: 97 % | HEART RATE: 64 BPM | HEIGHT: 63 IN | RESPIRATION RATE: 16 BRPM | DIASTOLIC BLOOD PRESSURE: 76 MMHG

## 2025-06-17 DIAGNOSIS — E55.9 VITAMIN D DEFICIENCY: ICD-10-CM

## 2025-06-17 DIAGNOSIS — R06.09 DOE (DYSPNEA ON EXERTION): ICD-10-CM

## 2025-06-17 DIAGNOSIS — R53.83 MALAISE AND FATIGUE: ICD-10-CM

## 2025-06-17 DIAGNOSIS — R53.81 MALAISE AND FATIGUE: ICD-10-CM

## 2025-06-17 DIAGNOSIS — R06.02 SOB (SHORTNESS OF BREATH): ICD-10-CM

## 2025-06-17 DIAGNOSIS — E61.1 IRON DEFICIENCY: ICD-10-CM

## 2025-06-17 DIAGNOSIS — R63.4 WEIGHT LOSS: ICD-10-CM

## 2025-06-17 DIAGNOSIS — K21.9 GASTROESOPHAGEAL REFLUX DISEASE, UNSPECIFIED WHETHER ESOPHAGITIS PRESENT: ICD-10-CM

## 2025-06-17 DIAGNOSIS — D64.9 ANEMIA, UNSPECIFIED TYPE: ICD-10-CM

## 2025-06-17 DIAGNOSIS — B00.89 HERPES SIMPLEX VIRUS (HSV) INFECTION OF BUTTOCK: Primary | ICD-10-CM

## 2025-06-17 DIAGNOSIS — R73.09 ELEVATED GLUCOSE: ICD-10-CM

## 2025-06-17 DIAGNOSIS — Z09 HOSPITAL DISCHARGE FOLLOW-UP: ICD-10-CM

## 2025-06-17 PROCEDURE — 1111F DSCHRG MED/CURRENT MED MERGE: CPT | Performed by: INTERNAL MEDICINE

## 2025-06-17 PROCEDURE — 99214 OFFICE O/P EST MOD 30 MIN: CPT | Performed by: INTERNAL MEDICINE

## 2025-06-17 PROCEDURE — 1160F RVW MEDS BY RX/DR IN RCRD: CPT | Performed by: INTERNAL MEDICINE

## 2025-06-17 PROCEDURE — 3078F DIAST BP <80 MM HG: CPT | Performed by: INTERNAL MEDICINE

## 2025-06-17 PROCEDURE — 3077F SYST BP >= 140 MM HG: CPT | Performed by: INTERNAL MEDICINE

## 2025-06-17 PROCEDURE — 1123F ACP DISCUSS/DSCN MKR DOCD: CPT | Performed by: INTERNAL MEDICINE

## 2025-06-17 PROCEDURE — 1159F MED LIST DOCD IN RCRD: CPT | Performed by: INTERNAL MEDICINE

## 2025-06-17 RX ORDER — PANTOPRAZOLE SODIUM 40 MG/1
40 TABLET, DELAYED RELEASE ORAL
COMMUNITY
Start: 2025-06-17

## 2025-06-17 NOTE — PROGRESS NOTES
Identified pt with two pt identifiers(name and )    Chief Complaint   Patient presents with    Follow-Up from Hospital     Shortness of breath  6/10-  [facility]        Health Maintenance Due   Topic    DTaP/Tdap/Td vaccine (1 - Tdap)    Shingles vaccine (1 of 2)    Respiratory Syncytial Virus (RSV) Pregnant or age 60 yrs+ (1 - 1-dose 75+ series)    COVID-19 Vaccine ( season)       Wt Readings from Last 3 Encounters:   25 72.8 kg (160 lb 6.4 oz)   06/10/25 72.6 kg (160 lb)   06/10/25 72.6 kg (160 lb)     Temp Readings from Last 3 Encounters:   25 97.7 °F (36.5 °C) (Temporal)   25 99 °F (37.2 °C) (Oral)   06/10/25 97.4 °F (36.3 °C) (Temporal)     BP Readings from Last 3 Encounters:   25 (!) 144/76   25 124/63   06/10/25 128/70     Pulse Readings from Last 3 Encounters:   25 64   25 63   06/10/25 64           Depression Screening:  :         2025     8:29 AM 6/10/2025     1:10 PM 2025    11:33 AM 2025    10:26 AM 2025    10:25 AM 2025     9:21 AM 2024    10:37 AM   PHQ-9 Questionaire   Little interest or pleasure in doing things 0 0 0 1 1 0 0   Feeling down, depressed, or hopeless 0 0 0 0 0 0 0   Trouble falling or staying asleep, or sleeping too much 0 0  0   0   Feeling tired or having little energy 1 0  1   0   Poor appetite or overeating 1 0  3   0   Feeling bad about yourself - or that you are a failure or have let yourself or your family down 0 0  0   0   Trouble concentrating on things, such as reading the newspaper or watching television 0 0  1   0   Moving or speaking so slowly that other people could have noticed. Or the opposite - being so fidgety or restless that you have been moving around a lot more than usual 0 0  0   0   Thoughts that you would be better off dead, or of hurting yourself in some way 0 0  0   0   PHQ-9 Total Score 2  0 0 6  1  0 0   If you checked off any problems, how difficult have these problems made 
inhalation  Commonly known as: PROAIR RESPICLICK  Inhale 2 puffs into the lungs every 4 hours as needed for Wheezing or Shortness of Breath .     * amiodarone 400 MG tablet  Commonly known as: PACERONE  Take 1 tablet by mouth 2 times daily for 27 doses     * amiodarone 200 MG tablet  Commonly known as: CORDARONE  Take 1 tablet by mouth daily  Start taking on: June 25, 2025     aspirin 81 MG chewable tablet     atorvastatin 40 MG tablet  Commonly known as: LIPITOR  Take 1 tablet by mouth nightly     budesonide-formoterol 160-4.5 MCG/ACT Aero  Commonly known as: Symbicort  Inhale 2 puffs into the lungs 2 times daily .     cetirizine 10 MG tablet  Commonly known as: ZYRTEC     cyanocobalamin 1000 MCG tablet     fluticasone 50 MCG/ACT nasal spray  Commonly known as: FLONASE     ipratropium 0.5 mg-albuterol 2.5 mg 0.5-2.5 (3) MG/3ML Soln nebulizer solution  Commonly known as: DUONEB  Inhale 3 mLs into the lungs every 4 hours     ketoconazole 2 % cream  Commonly known as: NIZORAL  Apply topically 2 x daily as needed.     latanoprost 0.005 % ophthalmic solution  Commonly known as: XALATAN  Place 1 drop into both eyes nightly     metoprolol succinate 50 MG extended release tablet  Commonly known as: TOPROL XL  Take 1 tablet by mouth daily     montelukast 10 MG tablet  Commonly known as: SINGULAIR  Take 1 tablet by mouth once daily     Myrbetriq 50 MG Tb24  Generic drug: mirabegron  Take 1 tablet by mouth once daily     nystatin 334014 UNIT/GM powder  Commonly known as: nystatin  APPLY 1 APPLICATION OF POWDER TOPICALLY TO AFFECTED AREA 4 TIMES DAILY UNTIL RASH RESOLVES     ondansetron 4 MG disintegrating tablet  Commonly known as: ZOFRAN-ODT  Take 1 tablet by mouth 3 times daily as needed for Nausea or Vomiting     ONE A DAY ENERGY PO     pantoprazole 40 MG tablet  Commonly known as: PROTONIX  Take 1 tablet by mouth every morning (before breakfast)     sennosides-docusate sodium 8.6-50 MG tablet  Commonly known as:

## 2025-06-18 ENCOUNTER — TELEPHONE (OUTPATIENT)
Dept: PHARMACY | Facility: CLINIC | Age: 85
End: 2025-06-18

## 2025-06-18 LAB
25(OH)D3+25(OH)D2 SERPL-MCNC: 26.4 NG/ML (ref 30–100)
ALBUMIN SERPL-MCNC: 4.3 G/DL (ref 3.7–4.7)
ALP SERPL-CCNC: 97 IU/L (ref 44–121)
ALT SERPL-CCNC: 9 IU/L (ref 0–32)
AST SERPL-CCNC: 15 IU/L (ref 0–40)
BASOPHILS # BLD AUTO: 0 X10E3/UL (ref 0–0.2)
BASOPHILS NFR BLD AUTO: 1 %
BILIRUB SERPL-MCNC: 0.2 MG/DL (ref 0–1.2)
BUN SERPL-MCNC: 12 MG/DL (ref 8–27)
BUN/CREAT SERPL: 12 (ref 12–28)
CALCIUM SERPL-MCNC: 10.1 MG/DL (ref 8.7–10.3)
CHLORIDE SERPL-SCNC: 106 MMOL/L (ref 96–106)
CO2 SERPL-SCNC: 20 MMOL/L (ref 20–29)
CREAT SERPL-MCNC: 1 MG/DL (ref 0.57–1)
EGFRCR SERPLBLD CKD-EPI 2021: 55 ML/MIN/1.73
EOSINOPHIL # BLD AUTO: 0.1 X10E3/UL (ref 0–0.4)
EOSINOPHIL NFR BLD AUTO: 1 %
ERYTHROCYTE [DISTWIDTH] IN BLOOD BY AUTOMATED COUNT: 14.4 % (ref 11.7–15.4)
FERRITIN SERPL-MCNC: 337 NG/ML (ref 15–150)
GLOBULIN SER CALC-MCNC: 2.8 G/DL (ref 1.5–4.5)
GLUCOSE SERPL-MCNC: 111 MG/DL (ref 70–99)
HBA1C MFR BLD: 6.2 % (ref 4.8–5.6)
HCT VFR BLD AUTO: 31.6 % (ref 34–46.6)
HGB BLD-MCNC: 9.9 G/DL (ref 11.1–15.9)
IMM GRANULOCYTES # BLD AUTO: 0 X10E3/UL (ref 0–0.1)
IMM GRANULOCYTES NFR BLD AUTO: 0 %
IRON SATN MFR SERPL: 16 % (ref 15–55)
IRON SERPL-MCNC: 50 UG/DL (ref 27–139)
LYMPHOCYTES # BLD AUTO: 1.5 X10E3/UL (ref 0.7–3.1)
LYMPHOCYTES NFR BLD AUTO: 20 %
MCH RBC QN AUTO: 28 PG (ref 26.6–33)
MCHC RBC AUTO-ENTMCNC: 31.3 G/DL (ref 31.5–35.7)
MCV RBC AUTO: 90 FL (ref 79–97)
MONOCYTES # BLD AUTO: 0.5 X10E3/UL (ref 0.1–0.9)
MONOCYTES NFR BLD AUTO: 7 %
NEUTROPHILS # BLD AUTO: 5.2 X10E3/UL (ref 1.4–7)
NEUTROPHILS NFR BLD AUTO: 70 %
PLATELET # BLD AUTO: 284 X10E3/UL (ref 150–450)
POTASSIUM SERPL-SCNC: 5 MMOL/L (ref 3.5–5.2)
PROT SERPL-MCNC: 7.1 G/DL (ref 6–8.5)
RBC # BLD AUTO: 3.53 X10E6/UL (ref 3.77–5.28)
REPORT: NORMAL
SODIUM SERPL-SCNC: 143 MMOL/L (ref 134–144)
T4 FREE SERPL-MCNC: 1.32 NG/DL (ref 0.82–1.77)
TIBC SERPL-MCNC: 304 UG/DL (ref 250–450)
TSH SERPL DL<=0.005 MIU/L-ACNC: 2.07 UIU/ML (ref 0.45–4.5)
UIBC SERPL-MCNC: 254 UG/DL (ref 118–369)
WBC # BLD AUTO: 7.4 X10E3/UL (ref 3.4–10.8)

## 2025-06-18 NOTE — TELEPHONE ENCOUNTER
Called [facility] to place refill auth on ATORVASTATIN TAB 40MG     Ramya Gudino CPhT.   Population Health Clinical   [facility] Clinical Pharm[facility]  Toll free: [phone] Option 1     For Pharmacy Admin Tracking Only    Program: [employer]  CPA in place:  No  Recommendation Provided To: Provider: 1 via Note to Provider, Patient/Caregiver: 1 via Telephone, and Pharmacy: 1  Intervention Detail: Adherence Monitorin and New Rx: 1, reason: Improve Adherence  Intervention Accepted By: Provider: 1, Patient/Caregiver: 1, and Pharmacy: 1  Gap Closed?: Yes   Time Spent (min): 15

## 2025-06-18 NOTE — TELEPHONE ENCOUNTER
[facility]: ADHERENCE REVIEW  Identified care gap per [place]: fills at [facility]: ACE/ARB and Statin adherence      ASSESSMENT    ACE/ARB ADHERENCE    Insurance Records claims through 6/10/25 (Prior Year PDC = not reported; YTD PDC = FIRST FILL; Potential Fail Date: 25):   LOSARTAN POTASSIUM 25 MG last filled on 25 for 90 day supply. Next refill due: 25    Prescribed si tablet/capsule daily  RX# [id number]    Per [facility] Pharmacy:  not contacted    Per 25Home losartan 25 mg qd - held by PCP due to concern for hypotension     BP Readings from Last 3 Encounters:   25 (!) 142/76   25 124/63   06/10/25 128/70     Estimated Creatinine Clearance: 45 mL/min (based on SCr of 0.87 mg/dL).  Lab Results   Component Value Date    CREATININE 0.87 2025     Lab Results   Component Value Date    K 4.1 2025     STATIN ADHERENCE    Insurance Records claims through 6/10/25 (Prior Year PDC = not reported; YTD PDC = FIRST FILL; Potential Fail Date: 25):   ATORVASTATIN TAB 40MG last filled on 25 for 90 day supply. Next refill due: 25    Prescribed si tablet/capsule daily    RX# [id number]    Per [facility] Pharmacy:  not contacted    Lab Results   Component Value Date    CHOL 154 2024    TRIG 110 2024    HDL 55 2024     Lab Results   Component Value Date    LDL 77 2024      ALT   Date Value Ref Range Status   2025 14 12 - 78 U/L Final     AST   Date Value Ref Range Status   2025 17 15 - 37 U/L Final     The ASCVD Risk score (Nora DK, et al., 2019) failed to calculate for the following reasons:    The 2019 ASCVD risk score is only valid for ages 40 to 79     LIS    The following are interventions that have been identified:   Patient OVERDUE refilling atorvastatin and active on home medication list.     Attempting to reach patient to review.  Left message asking for return call. MyChart message sent to patient.      Last

## 2025-06-18 NOTE — TELEPHONE ENCOUNTER
Patient's daughter called back regarding adherence.     Daughter stated patient is still taking atorvastatin 40mg as prescribed, one tablet daily. Stated she takes this consistently without issue but believes patient is almost out of this medication. Advised her that prescription may not have any refills remaining and asked if ok for pharmacist to reach out to provider who prescribed last time to send in new prescription. Patient's daughter agreed to this. Confirmed still using [facility] pharmacy. Will route back to CSS who originally made contact to review and then route to their pharmacist to reach out to provider for this.       [facility] Prak[facility]  Clinical Pharmacy   Phone: [phone], Option #3

## 2025-06-19 ENCOUNTER — CARE COORDINATION (OUTPATIENT)
Dept: CASE MANAGEMENT | Age: 85
End: 2025-06-19

## 2025-06-19 NOTE — CARE COORDINATION
Remote Patient Monitoring Note      Date/Time:  2025 8:39 AM  Patient Current Location: Home: [address] [place] [address]  LPN  was contacted family by telephone regarding blood pressure readings Verified patients name and  as identifiers.  Background: enrolled in [facility] for HTN  Clinical Interventions: Reviewed and followed up on alerts and treatments-call from triston Chahal . Fela requests all recent BP metrics be sent to pt's PCP    Plan/Follow Up: Will continue to review, monitor and address alerts with follow up based on severity of symptoms and risk factors.

## 2025-06-20 ENCOUNTER — PROCEDURE VISIT (OUTPATIENT)
Age: 85
End: 2025-06-20
Payer: MEDICARE

## 2025-06-20 ENCOUNTER — RESULTS FOLLOW-UP (OUTPATIENT)
Age: 85
End: 2025-06-20

## 2025-06-20 ENCOUNTER — OFFICE VISIT (OUTPATIENT)
Age: 85
End: 2025-06-20
Payer: MEDICARE

## 2025-06-20 VITALS
RESPIRATION RATE: 16 BRPM | OXYGEN SATURATION: 99 % | DIASTOLIC BLOOD PRESSURE: 60 MMHG | HEART RATE: 64 BPM | HEIGHT: 63 IN | WEIGHT: 163 LBS | BODY MASS INDEX: 28.88 KG/M2 | SYSTOLIC BLOOD PRESSURE: 100 MMHG

## 2025-06-20 VITALS — HEART RATE: 84 BPM | DIASTOLIC BLOOD PRESSURE: 74 MMHG | SYSTOLIC BLOOD PRESSURE: 116 MMHG

## 2025-06-20 DIAGNOSIS — Z95.0 PACEMAKER: ICD-10-CM

## 2025-06-20 DIAGNOSIS — I49.5 TACHY-BRADY SYNDROME (HCC): ICD-10-CM

## 2025-06-20 DIAGNOSIS — Z95.0 CARDIAC PACEMAKER IN SITU: Primary | ICD-10-CM

## 2025-06-20 DIAGNOSIS — I47.19 PAT (PAROXYSMAL ATRIAL TACHYCARDIA): Primary | ICD-10-CM

## 2025-06-20 DIAGNOSIS — R00.2 PALPITATIONS: ICD-10-CM

## 2025-06-20 DIAGNOSIS — R06.02 SHORTNESS OF BREATH: ICD-10-CM

## 2025-06-20 DIAGNOSIS — Z79.899 ON AMIODARONE THERAPY: ICD-10-CM

## 2025-06-20 DIAGNOSIS — I10 PRIMARY HYPERTENSION: ICD-10-CM

## 2025-06-20 PROCEDURE — 99214 OFFICE O/P EST MOD 30 MIN: CPT | Performed by: NURSE PRACTITIONER

## 2025-06-20 PROCEDURE — 3074F SYST BP LT 130 MM HG: CPT | Performed by: NURSE PRACTITIONER

## 2025-06-20 PROCEDURE — 93280 PM DEVICE PROGR EVAL DUAL: CPT | Performed by: HOSPITALIST

## 2025-06-20 PROCEDURE — 93010 ELECTROCARDIOGRAM REPORT: CPT | Performed by: NURSE PRACTITIONER

## 2025-06-20 PROCEDURE — 3078F DIAST BP <80 MM HG: CPT | Performed by: NURSE PRACTITIONER

## 2025-06-20 PROCEDURE — 1123F ACP DISCUSS/DSCN MKR DOCD: CPT | Performed by: NURSE PRACTITIONER

## 2025-06-20 PROCEDURE — 1126F AMNT PAIN NOTED NONE PRSNT: CPT | Performed by: NURSE PRACTITIONER

## 2025-06-20 PROCEDURE — 93005 ELECTROCARDIOGRAM TRACING: CPT | Performed by: NURSE PRACTITIONER

## 2025-06-20 RX ORDER — AMIODARONE HYDROCHLORIDE 200 MG/1
200 TABLET ORAL DAILY
Qty: 30 TABLET | Refills: 3 | Status: SHIPPED | OUTPATIENT
Start: 2025-06-25

## 2025-06-20 ASSESSMENT — PATIENT HEALTH QUESTIONNAIRE - PHQ9
SUM OF ALL RESPONSES TO PHQ QUESTIONS 1-9: 0
2. FEELING DOWN, DEPRESSED OR HOPELESS: NOT AT ALL
1. LITTLE INTEREST OR PLEASURE IN DOING THINGS: NOT AT ALL

## 2025-06-20 NOTE — TELEPHONE ENCOUNTER
----- Message from Dr. Alessia Rangel MD sent at 6/20/2025  7:59 AM EDT -----  Please let patient know that her labs are stable.   She is still anemic. So, should continue iron supplement.   Her ferritin is high and this could be due to an acute phase reaction (inflammation).   We will continue to monitor. May need to see hematologist if not already.   Recommend follow up with me in 6-weeks.   Thanks!

## 2025-06-20 NOTE — PROGRESS NOTES
1. Have you been to the ER, urgent care clinic since your last visit?  Hospitalized since your last visit?No    2. Have you seen or consulted any other health care providers outside of the [facility] since your last visit?  Include any pap smears or colon screening. No

## 2025-06-26 DIAGNOSIS — D64.9 LOW HEMOGLOBIN: ICD-10-CM

## 2025-06-26 DIAGNOSIS — R79.89 ELEVATED FERRITIN: ICD-10-CM

## 2025-06-26 DIAGNOSIS — R79.89 ABNORMAL CBC: Primary | ICD-10-CM

## 2025-07-10 RX ORDER — HYDROXYZINE HYDROCHLORIDE 25 MG/1
25 TABLET, FILM COATED ORAL NIGHTLY
Qty: 30 TABLET | Refills: 0 | Status: SHIPPED | OUTPATIENT
Start: 2025-07-10

## 2025-07-11 PROBLEM — E86.0 DEHYDRATION: Status: RESOLVED | Noted: 2025-06-11 | Resolved: 2025-07-11

## 2025-07-28 ENCOUNTER — PATIENT MESSAGE (OUTPATIENT)
Age: 85
End: 2025-07-28

## 2025-07-28 ENCOUNTER — TELEPHONE (OUTPATIENT)
Age: 85
End: 2025-07-28

## 2025-07-28 ENCOUNTER — OFFICE VISIT (OUTPATIENT)
Age: 85
End: 2025-07-28
Payer: MEDICARE

## 2025-07-28 VITALS
RESPIRATION RATE: 16 BRPM | WEIGHT: 157.2 LBS | OXYGEN SATURATION: 94 % | HEIGHT: 63 IN | SYSTOLIC BLOOD PRESSURE: 123 MMHG | HEART RATE: 82 BPM | DIASTOLIC BLOOD PRESSURE: 76 MMHG | TEMPERATURE: 98.2 F | BODY MASS INDEX: 27.85 KG/M2

## 2025-07-28 DIAGNOSIS — D50.0 BLOOD LOSS ANEMIA: Primary | ICD-10-CM

## 2025-07-28 PROCEDURE — 1159F MED LIST DOCD IN RCRD: CPT | Performed by: SURGERY

## 2025-07-28 PROCEDURE — 1126F AMNT PAIN NOTED NONE PRSNT: CPT | Performed by: SURGERY

## 2025-07-28 PROCEDURE — 1123F ACP DISCUSS/DSCN MKR DOCD: CPT | Performed by: SURGERY

## 2025-07-28 PROCEDURE — 3074F SYST BP LT 130 MM HG: CPT | Performed by: SURGERY

## 2025-07-28 PROCEDURE — 3078F DIAST BP <80 MM HG: CPT | Performed by: SURGERY

## 2025-07-28 PROCEDURE — 99204 OFFICE O/P NEW MOD 45 MIN: CPT | Performed by: SURGERY

## 2025-07-28 RX ORDER — POLYETHYLENE GLYCOL-3350 AND ELECTROLYTES 236; 6.74; 5.86; 2.97; 22.74 G/274.31G; G/274.31G; G/274.31G; G/274.31G; G/274.31G
4 POWDER, FOR SOLUTION ORAL ONCE
Qty: 4000 ML | Refills: 0 | Status: SHIPPED | OUTPATIENT
Start: 2025-07-28 | End: 2025-07-28

## 2025-07-28 RX ORDER — SODIUM CHLORIDE 0.9 % (FLUSH) 0.9 %
5-40 SYRINGE (ML) INJECTION EVERY 12 HOURS SCHEDULED
Status: CANCELLED | OUTPATIENT
Start: 2025-07-28

## 2025-07-28 RX ORDER — SODIUM CHLORIDE 9 MG/ML
INJECTION, SOLUTION INTRAVENOUS CONTINUOUS
Status: CANCELLED | OUTPATIENT
Start: 2025-07-28

## 2025-07-28 RX ORDER — SODIUM CHLORIDE 9 MG/ML
INJECTION, SOLUTION INTRAVENOUS PRN
Status: CANCELLED | OUTPATIENT
Start: 2025-07-28

## 2025-07-28 RX ORDER — SODIUM CHLORIDE 0.9 % (FLUSH) 0.9 %
5-40 SYRINGE (ML) INJECTION PRN
Status: CANCELLED | OUTPATIENT
Start: 2025-07-28

## 2025-07-28 ASSESSMENT — ENCOUNTER SYMPTOMS
VOMITING: 0
ABDOMINAL DISTENTION: 0
CONSTIPATION: 0
ABDOMINAL PAIN: 0
DIARRHEA: 0
BLOOD IN STOOL: 0
NAUSEA: 0
SHORTNESS OF BREATH: 0

## 2025-07-28 NOTE — PROGRESS NOTES
Identified patient with two patient identifiers (name and ). Reviewed chart in preparation for visit and have obtained necessary documentation.    Laura Lu is a 85 y.o. female  Chief Complaint   Patient presents with    New Patient     anemia     /76   Pulse 82   Temp 98.2 °F (36.8 °C) (Oral)   Resp 16   Ht 1.6 m (5' 3\")   Wt 71.3 kg (157 lb 3.2 oz)   SpO2 94%   BMI 27.85 kg/m²     1. Have you been to the ER, urgent care clinic since your last visit?  Hospitalized since your last visit?no    2. Have you seen or consulted any other health care providers outside of the [facility] since your last visit?  Include any pap smears or colon screening. no  
1  Past Medical History:   Diagnosis Date    Adverse reaction to anesthetic agent     PT HAD TACHYCARDIA POST HIP REPLACEMENT, ADMITTED TO CARDIAC UNIT    Arrhythmia     Bradycardia.    Arthritis     Burning with urination     Incontinence.    Congenital heart disease     Depression     GERD (gastroesophageal reflux disease)     Glaucoma     Hearing loss     High cholesterol     Hypertension     Hypertensive emergency 01/14/2023    Ill-defined condition     EDEMA, LOWER LIMS, URINARY INCONTINENCE    Neuropathy     Obesity, Class II, BMI 35-39.9     Osteoarthritis     Sleep apnea     NO CPAP    Type 2 diabetes mellitus without complication (HCC)     Urinary incontinence      Past Surgical History:   Procedure Laterality Date    BREAST BIOPSY Left 1970s    Excision of benign breast cysts.    CARDIAC CATHETERIZATION  01/06/2017    CATARACT REMOVAL Bilateral     COLONOSCOPY      2008    DRAINAGE OF DEEP RECTAL ABSCESS      EP DEVICE PROCEDURE N/A 5/30/2025    Insert PPM dual performed by Ramo Mccloud MD at [facility] CARDIAC CATH LAB    EYE SURGERY      GYN      Laparoscopic resection of ?ovarian cysts.    HERNIA REPAIR  11/01/2021    Robotic-assisted laparoscopic repair of incarcerated supraumbilical and umbilical hernias with mesh.    HYSTERECTOMY (CERVIX STATUS UNKNOWN)      INVASIVE VASCULAR N/A 5/30/2025    Ultrasound guided vascular access performed by Ramo Mccloud MD at [facility] CARDIAC CATH LAB    JOINT REPLACEMENT      Both knees    SHOULDER SURGERY Right 3/10/2025    RIGHT REVERSE TOTAL SHOULDER ARTHROPLASTY  WITH BICEPS TENODESIS (GENERAL WITH INTERSCALENE BLOCK WITH EXPAREL) performed by John Frias MD at [facility] MAIN OR    TOTAL HIP ARTHROPLASTY Right 10/30/2023    RIGHT TOTAL HIP  ARTHROPLASTY, POSTERIOR APPROACH performed by John Frias MD at [facility] MAIN OR    TOTAL KNEE ARTHROPLASTY Right     TOTAL KNEE ARTHROPLASTY Left 2014    UMBILICAL HERNIA REPAIR      UROLOGICAL SURGERY      BOTOX FOR FREQUENT

## 2025-07-28 NOTE — ASSESSMENT & PLAN NOTE
Upper and lower endoscopy  Needs diagnostic colonoscopy.   I discussed the intended procedure as well as alternative treatments including doing nothing.  I discussed the risks of the procedure including but not limited to bleeding, perforation, aspiration, and damage to surrounding structures. I answered all questions related to the procedure.  Understanding was verbalized and we will proceed as planned.

## 2025-07-28 NOTE — TELEPHONE ENCOUNTER
Contacted patients daughter to schedule procedure with Dr. Durán. Patient requested ASAP. Offered 7/29 and patient and daughter accepted. Fela(child) stated that they were given information regarding days before the procedure what to do and not to do but were unsure of what to do since it was tomorrow. Notified her I will have Dr. Durán's nurse call to go over things with her. Patient and daughter thanked me for the call.   
your regular medications as directed by your doctor.  · Do not drive or operate machinery for 24 hours.  · If you experience sever pain or cramping in your belly, unusual shoulder pain, temperature higher than 100.5 F, a hard belly, trouble passing gas or having bowel movements, fever, extreme dizziness, frequent or very bloody bowel movements, please call [phone].    Note: Anticipate passing more gas than is routine, mild belly cramping, nausea, or mild sleepiness.  If you need to change or cancel your procedure appointment or have questions, please contact the office at [phone] or via [employer].    Thank you for choosing [facility] for your health care needs.

## 2025-07-29 ENCOUNTER — ANESTHESIA EVENT (OUTPATIENT)
Facility: HOSPITAL | Age: 85
End: 2025-07-29
Payer: MEDICARE

## 2025-07-29 ENCOUNTER — HOSPITAL ENCOUNTER (OUTPATIENT)
Facility: HOSPITAL | Age: 85
Setting detail: OUTPATIENT SURGERY
Discharge: HOME | End: 2025-07-29
Attending: SURGERY | Admitting: SURGERY
Payer: MEDICARE

## 2025-07-29 ENCOUNTER — ANESTHESIA (OUTPATIENT)
Facility: HOSPITAL | Age: 85
End: 2025-07-29
Payer: MEDICARE

## 2025-07-29 VITALS
TEMPERATURE: 97.8 F | RESPIRATION RATE: 16 BRPM | HEART RATE: 70 BPM | OXYGEN SATURATION: 95 % | BODY MASS INDEX: 27.85 KG/M2 | WEIGHT: 157.19 LBS | DIASTOLIC BLOOD PRESSURE: 77 MMHG | HEIGHT: 63 IN | SYSTOLIC BLOOD PRESSURE: 138 MMHG

## 2025-07-29 PROCEDURE — 7100000001 HC PACU RECOVERY - ADDTL 15 MIN: Performed by: SURGERY

## 2025-07-29 PROCEDURE — 2709999900 HC NON-CHARGEABLE SUPPLY: Performed by: SURGERY

## 2025-07-29 PROCEDURE — 45385 COLONOSCOPY W/LESION REMOVAL: CPT | Performed by: SURGERY

## 2025-07-29 PROCEDURE — 3600000012 HC SURGERY LEVEL 2 ADDTL 15MIN: Performed by: SURGERY

## 2025-07-29 PROCEDURE — 43239 EGD BIOPSY SINGLE/MULTIPLE: CPT | Performed by: SURGERY

## 2025-07-29 PROCEDURE — 88305 TISSUE EXAM BY PATHOLOGIST: CPT

## 2025-07-29 PROCEDURE — 3700000000 HC ANESTHESIA ATTENDED CARE: Performed by: SURGERY

## 2025-07-29 PROCEDURE — 6360000002 HC RX W HCPCS: Performed by: STUDENT IN AN ORGANIZED HEALTH CARE EDUCATION/TRAINING PROGRAM

## 2025-07-29 PROCEDURE — 3700000001 HC ADD 15 MINUTES (ANESTHESIA): Performed by: SURGERY

## 2025-07-29 PROCEDURE — 2580000003 HC RX 258: Performed by: STUDENT IN AN ORGANIZED HEALTH CARE EDUCATION/TRAINING PROGRAM

## 2025-07-29 PROCEDURE — 7100000000 HC PACU RECOVERY - FIRST 15 MIN: Performed by: SURGERY

## 2025-07-29 PROCEDURE — 45380 COLONOSCOPY AND BIOPSY: CPT | Performed by: SURGERY

## 2025-07-29 PROCEDURE — 3600000002 HC SURGERY LEVEL 2 BASE: Performed by: SURGERY

## 2025-07-29 RX ORDER — PROPOFOL 10 MG/ML
INJECTION, EMULSION INTRAVENOUS
Status: DISCONTINUED | OUTPATIENT
Start: 2025-07-29 | End: 2025-07-29 | Stop reason: SDUPTHER

## 2025-07-29 RX ORDER — LIDOCAINE HYDROCHLORIDE 20 MG/ML
INJECTION, SOLUTION EPIDURAL; INFILTRATION; INTRACAUDAL; PERINEURAL
Status: DISCONTINUED | OUTPATIENT
Start: 2025-07-29 | End: 2025-07-29 | Stop reason: SDUPTHER

## 2025-07-29 RX ORDER — SODIUM CHLORIDE, SODIUM LACTATE, POTASSIUM CHLORIDE, CALCIUM CHLORIDE 600; 310; 30; 20 MG/100ML; MG/100ML; MG/100ML; MG/100ML
INJECTION, SOLUTION INTRAVENOUS
Status: DISCONTINUED | OUTPATIENT
Start: 2025-07-29 | End: 2025-07-29 | Stop reason: SDUPTHER

## 2025-07-29 RX ADMIN — SODIUM CHLORIDE, POTASSIUM CHLORIDE, SODIUM LACTATE AND CALCIUM CHLORIDE: 600; 310; 30; 20 INJECTION, SOLUTION INTRAVENOUS at 13:11

## 2025-07-29 RX ADMIN — LIDOCAINE HYDROCHLORIDE 40 MG: 20 INJECTION, SOLUTION EPIDURAL; INFILTRATION; INTRACAUDAL; PERINEURAL at 13:14

## 2025-07-29 RX ADMIN — PROPOFOL 75 MCG/KG/MIN: 10 INJECTION, EMULSION INTRAVENOUS at 13:15

## 2025-07-29 RX ADMIN — PROPOFOL 80 MG: 10 INJECTION, EMULSION INTRAVENOUS at 13:14

## 2025-07-29 ASSESSMENT — PAIN - FUNCTIONAL ASSESSMENT
PAIN_FUNCTIONAL_ASSESSMENT: NONE - DENIES PAIN
PAIN_FUNCTIONAL_ASSESSMENT: 0-10
PAIN_FUNCTIONAL_ASSESSMENT: NONE - DENIES PAIN

## 2025-07-29 ASSESSMENT — ENCOUNTER SYMPTOMS: SHORTNESS OF BREATH: 1

## 2025-07-29 NOTE — ANESTHESIA PRE PROCEDURE
Department of Anesthesiology  Preprocedure Note       Name:  Laura Lu   Age:  85 y.o.  :  1940                                          MRN:  [id number]         Date:  2025      Surgeon: Surgeon(s):  Gabe Durán Jr., MD    Procedure: Procedure(s):  ESOPHAGOGASTRODUODENOSCOPY AND COLONOSCOPY  .    Medications prior to admission:   Prior to Admission medications    Medication Sig Start Date End Date Taking? Authorizing Provider   amiodarone (CORDARONE) 200 MG tablet Take 1 tablet by mouth daily 25  Yes Graciela Fowler, APRMISTY - NP   traMADol HCl 25 MG TABS Take 25 mg by mouth 2 times daily as needed (pain) Max Daily Amount: 50 mg 25  Yes Randy Bhardwaj MD   metoprolol succinate (TOPROL XL) 50 MG extended release tablet Take 1 tablet by mouth daily 25  Yes Merle Jaimes MD   losartan (COZAAR) 25 MG tablet Take 1 tablet by mouth daily 25  Yes Valentino Avila MD   acetaminophen (TYLENOL) 500 MG tablet Take 2 tablets by mouth 3 times daily 24  Yes Radha Alonso MD   aspirin 81 MG chewable tablet Take 1 tablet by mouth daily   Yes Renata Rocha MD   hydrOXYzine HCl (ATARAX) 25 MG tablet TAKE 1 TABLET BY MOUTH AT BEDTIME 7/10/25   Alessia Rangel MD   Multiple Vitamins-Minerals (ONE A DAY ENERGY PO) Take by mouth    ProviderRenata MD   pantoprazole (PROTONIX) 40 MG tablet Take 1 tablet by mouth every morning (before breakfast) 25   Alessia Rangel MD   amiodarone (PACERONE) 400 MG tablet Take 1 tablet by mouth 2 times daily for 27 doses 25  David Dawson DO   montelukast (SINGULAIR) 10 MG tablet Take 1 tablet by mouth once daily 25   Alessia Rangel MD   MYRBETRIQ 50 MG TB24 Take 1 tablet by mouth once daily 25   Alessia Rangel MD   budesonide-formoterol (SYMBICORT) 160-4.5 MCG/ACT AERO Inhale 2 puffs into the lungs 2 times daily . 25   Samia, Sasha ESCOBAR DO   albuterol sulfate (PROAIR

## 2025-07-29 NOTE — H&P
URINATION     Social History     Tobacco Use    Smoking status: Former     Current packs/day: 0.00     Types: Cigarettes     Quit date: 1966     Years since quittin.0    Smokeless tobacco: Never   Vaping Use    Vaping status: Never Used   Substance Use Topics    Alcohol use: Yes     Alcohol/week: 1.0 standard drink of alcohol     Types: 1 Glasses of wine per week     Comment: 1 DRINK EVERY COUPLE OF WEEKS    Drug use: No     Comment: CBD Gummies     Family History   Problem Relation Age of Onset    Heart Disease Mother     Arthritis Mother     No Known Problems Father     Cancer Sister         BREAST    Alcohol Abuse Sister     Heart Disease Sister     Breast Cancer Sister         80's    Heart Disease Sister     No Known Problems Brother     No Known Problems Brother     No Known Problems Brother     Alcohol Abuse Brother     Cancer Brother         PANCREAS    Other Daughter         DVT    Anesth Problems Neg Hx         Review of Systems   Constitutional:  Negative for appetite change, chills and fever.   Respiratory:  Negative for shortness of breath.    Cardiovascular:  Negative for chest pain and palpitations.   Gastrointestinal:  Negative for abdominal distention, abdominal pain, blood in stool, constipation, diarrhea, nausea and vomiting.   Hematological:  Does not bruise/bleed easily.   Psychiatric/Behavioral:  Negative for suicidal ideas.          Objective    Vitals:    25 0911   BP: 123/76   Pulse: 82   Resp: 16   Temp: 98.2 °F (36.8 °C)   SpO2: 94%      Physical Exam  Vitals and nursing note reviewed.   Constitutional:       Appearance: Normal appearance.   HENT:      Head: Normocephalic and atraumatic.   Cardiovascular:      Rate and Rhythm: Normal rate.   Pulmonary:      Effort: Pulmonary effort is normal. No respiratory distress.   Abdominal:      Palpations: Abdomen is soft.   Skin:     General: Skin is warm and dry.   Neurological:      General: No focal deficit present.      Mental

## 2025-07-29 NOTE — ANESTHESIA POSTPROCEDURE EVALUATION
Post-Anesthesia Evaluation and Assessment    Patient: Laura Lu MRN: [id number]  SSN: [id number]    YOB: 1940  Age: 85 y.o.  Sex: female      I have evaluated the patient and they are stable and ready for discharge from the PACU.     Cardiovascular Function/Vital Signs  Visit Vitals  /77   Pulse 70   Temp 97.8 °F (36.6 °C) (Temporal)   Resp 16   Ht 1.6 m (5' 2.99\")   Wt 71.3 kg (157 lb 3 oz)   SpO2 95%   BMI 27.85 kg/m²       Patient is status post Monitor Anesthesia Care anesthesia for Procedure(s):  ESOPHAGOGASTRODUODENOSCOPY AND COLONOSCOPY  ..    Nausea/Vomiting: None    Postoperative hydration reviewed and adequate.    Pain:  Managed    Neurological Status:   At baseline    Mental Status, Level of Consciousness: Alert and  oriented to person, place, and time    Pulmonary Status:   Adequate oxygenation and airway patent    Complications related to anesthesia: None    Post-anesthesia assessment completed. No concerns    Signed By: Joaquin Ac MD     July 29, 2025

## 2025-07-29 NOTE — DISCHARGE INSTRUCTIONS
Laura Lu  [id number]  1940    EGD and COLONOSCOPY DISCHARGE INSTRUCTIONS    DISCOMFORT:  Redness at IV site- apply warm compress to area; if redness or soreness persist- contact your physician  Sore throat- throat lozenges or warm salt water gargle  There may be a slight amount of blood passed from the rectum  Gaseous discomfort- walking, belching will help relieve any discomfort    DIET:   Regular diet, however, remember your colon is empty and a heavy meal will produce gas.  Avoid these foods:  vegetables, fried / greasy foods, carbonated drinks for today  You may not drink alcoholic beverages for at least 12 hours       ACTIVITY:  You may not operate a vehicle for 12 hours  You may not engage in an occupation involving machinery or appliances for rest of today  You may then resume your normal daily activities it is recommended that you spend the remainder of the day resting -  avoid any strenuous activity.  Avoid making any critical decisions for at least 24 hour    CALL M.D.  ANY SIGN OF:   Increasing pain, nausea, vomiting  Abdominal distension (swelling)  New increased bleeding (oral or rectal)  Fever (chills)  Pain in chest area  Bloody discharge from nose or mouth  Shortness of breath     Follow-up Instructions:   Call Dr. Durán for any questions or problems.   Telephone # [phone]  Biopsy results will be available in  5 to 7 days      Impression: upper endoscopy with mild inflammation of the duodenum, colon with 1 polyp removed (benign in appearance) and sings of chronic ischemia of the sigmoid, biopsy taken          Colon Polyps: Care Instructions  Your Care Instructions     Colon polyps are growths in the colon or the rectum. The cause of most colon polyps is not known, and most people who get them do not have any problems. But a certain kind can turn into cancer. For this reason, regular testing for colon polyps is important for people as they get older. It is also important for

## 2025-07-29 NOTE — OP NOTE
Colonoscopy and EGD Procedure Note      Indications:    Anemia     :  LETTY LALA JR, MD    Staff: Circulator: Elin Holland RN  Scrub Person First: Jenny Mathur  Circulator Assist: Pablo Ahn RN     Implants: none    Referring Provider: Alessia Rangel MD    Sedation: MAC    Procedure Details:  After informed consent was obtained with all risks and benefits of procedure explained and preoperative exam completed, the patient was taken to the endoscopy suite and placed in the left lateral decubitus position.  Upon sequential sedation as per above, a digital rectal exam was performed  And was normal.  The Olympus videocolonoscope  was inserted in the rectum and carefully advanced to the terminal ileum.  The quality of preparation was excellent.  The colonoscope was slowly withdrawn with careful evaluation between folds. Retroflexion in the rectum was performed and was normal..     Colon Findings:   Rectum: Grade 2 internal hemorrhoid(s);  Sigmoid: 1  Pedunculated polyp(s), the largest 7 mm in size;  change in vascularity decreased diffuse;  Descending Colon: normal  Transverse Colon: normal  Ascending Colon: normal  Cecum: normal  Terminal Ileum: normal      Following sequential administration of sedation as per above, the [id number] gastroscope was inserted into the mouth and advanced under direct vision to esophagus.  A careful inspection was made as the gastroscope was withdrawn, including a retroflexed view of the proximal stomach; findings and interventions are described below.      EGD Findings:  Esophagus:normal  Stomach:normal   Duodenum/jejunum: mild duodenitis was located  proximal bulb    Complications:   None; patient tolerated the procedure well.           Impression:    See Postoperative diagnosis above    Recommendations:  -Continue acid suppression.  -Await pathology.   Resume normal medication(s).     Interventions:  biopsy of stomach antrum  biopsy of colon

## 2025-07-31 ENCOUNTER — RESULTS FOLLOW-UP (OUTPATIENT)
Age: 85
End: 2025-07-31

## 2025-07-31 NOTE — TELEPHONE ENCOUNTER
PHI verified & two patient identifiers used.    Called pt spoke to daughter Fela Loya. Relayed message from Dr. Durán.  Ms. Hugo expressed understanding.

## 2025-07-31 NOTE — TELEPHONE ENCOUNTER
----- Message from Dr. Gabe Durán MD sent at 7/30/2025  2:50 PM EDT -----  Let them know polpy was bening, rest of biopsies look ok follow up as shceduled  ----- Message -----  From: [facility] Incoming Lab For Copath Pdfs  Sent: 7/30/2025  12:11 PM EDT  To: Gabe Durán Jr., MD

## 2025-08-07 DIAGNOSIS — G47.33 OSA ON CPAP: ICD-10-CM

## 2025-08-07 DIAGNOSIS — I10 ESSENTIAL (PRIMARY) HYPERTENSION: ICD-10-CM

## 2025-08-07 DIAGNOSIS — I47.10 SVT (SUPRAVENTRICULAR TACHYCARDIA): ICD-10-CM

## 2025-08-07 DIAGNOSIS — I27.20 PULMONARY HYPERTENSION, UNSPECIFIED (HCC): ICD-10-CM

## 2025-08-07 DIAGNOSIS — E66.01 MORBID (SEVERE) OBESITY DUE TO EXCESS CALORIES (HCC): ICD-10-CM

## 2025-08-07 DIAGNOSIS — E11.8 TYPE 2 DIABETES MELLITUS WITH COMPLICATION, WITHOUT LONG-TERM CURRENT USE OF INSULIN (HCC): ICD-10-CM

## 2025-08-08 RX ORDER — ATORVASTATIN CALCIUM 40 MG/1
40 TABLET, FILM COATED ORAL NIGHTLY
Qty: 90 TABLET | Refills: 3 | Status: SHIPPED | OUTPATIENT
Start: 2025-08-08

## 2025-08-12 ENCOUNTER — CARE COORDINATION (OUTPATIENT)
Facility: CLINIC | Age: 85
End: 2025-08-12

## 2025-08-12 DIAGNOSIS — I10 ESSENTIAL (PRIMARY) HYPERTENSION: Primary | ICD-10-CM

## 2025-08-18 RX ORDER — HYDROXYZINE HYDROCHLORIDE 25 MG/1
25 TABLET, FILM COATED ORAL NIGHTLY
Qty: 30 TABLET | Refills: 0 | Status: SHIPPED | OUTPATIENT
Start: 2025-08-18

## 2025-08-19 ENCOUNTER — LAB (OUTPATIENT)
Age: 85
End: 2025-08-19
Payer: MEDICARE

## 2025-08-19 ENCOUNTER — OFFICE VISIT (OUTPATIENT)
Age: 85
End: 2025-08-19
Payer: MEDICARE

## 2025-08-19 VITALS
RESPIRATION RATE: 16 BRPM | HEIGHT: 63 IN | SYSTOLIC BLOOD PRESSURE: 118 MMHG | OXYGEN SATURATION: 98 % | TEMPERATURE: 98.2 F | BODY MASS INDEX: 28.53 KG/M2 | DIASTOLIC BLOOD PRESSURE: 60 MMHG | WEIGHT: 161 LBS | HEART RATE: 65 BPM

## 2025-08-19 DIAGNOSIS — Z77.120 MOLD EXPOSURE: ICD-10-CM

## 2025-08-19 DIAGNOSIS — Z95.0 S/P PLACEMENT OF CARDIAC PACEMAKER: ICD-10-CM

## 2025-08-19 DIAGNOSIS — J06.9 UPPER RESPIRATORY TRACT INFECTION, UNSPECIFIED TYPE: ICD-10-CM

## 2025-08-19 DIAGNOSIS — J06.9 UPPER RESPIRATORY TRACT INFECTION, UNSPECIFIED TYPE: Primary | ICD-10-CM

## 2025-08-19 DIAGNOSIS — Z79.899 POLYPHARMACY: ICD-10-CM

## 2025-08-19 DIAGNOSIS — I27.20 PULMONARY HYPERTENSION, UNSPECIFIED (HCC): ICD-10-CM

## 2025-08-19 PROCEDURE — G2211 COMPLEX E/M VISIT ADD ON: HCPCS | Performed by: FAMILY MEDICINE

## 2025-08-19 PROCEDURE — 3074F SYST BP LT 130 MM HG: CPT | Performed by: FAMILY MEDICINE

## 2025-08-19 PROCEDURE — 99214 OFFICE O/P EST MOD 30 MIN: CPT | Performed by: FAMILY MEDICINE

## 2025-08-19 PROCEDURE — 3078F DIAST BP <80 MM HG: CPT | Performed by: FAMILY MEDICINE

## 2025-08-19 PROCEDURE — 1123F ACP DISCUSS/DSCN MKR DOCD: CPT | Performed by: FAMILY MEDICINE

## 2025-08-19 PROCEDURE — 1159F MED LIST DOCD IN RCRD: CPT | Performed by: FAMILY MEDICINE

## 2025-08-19 RX ORDER — FLUTICASONE PROPIONATE 50 MCG
1 SPRAY, SUSPENSION (ML) NASAL DAILY
Qty: 32 G | Refills: 1 | Status: SHIPPED | OUTPATIENT
Start: 2025-08-19

## 2025-08-19 ASSESSMENT — PATIENT HEALTH QUESTIONNAIRE - PHQ9
2. FEELING DOWN, DEPRESSED OR HOPELESS: NOT AT ALL
SUM OF ALL RESPONSES TO PHQ QUESTIONS 1-9: 0
1. LITTLE INTEREST OR PLEASURE IN DOING THINGS: NOT AT ALL
SUM OF ALL RESPONSES TO PHQ QUESTIONS 1-9: 0

## 2025-08-20 LAB
25(OH)D3+25(OH)D2 SERPL-MCNC: 34.8 NG/ML (ref 30–100)
ALBUMIN SERPL-MCNC: 4.2 G/DL (ref 3.7–4.7)
ALP SERPL-CCNC: 83 IU/L (ref 44–121)
ALT SERPL-CCNC: 11 IU/L (ref 0–32)
AST SERPL-CCNC: 20 IU/L (ref 0–40)
BASOPHILS # BLD AUTO: 0 X10E3/UL (ref 0–0.2)
BASOPHILS NFR BLD AUTO: 1 %
BILIRUB SERPL-MCNC: 0.5 MG/DL (ref 0–1.2)
BUN SERPL-MCNC: 9 MG/DL (ref 8–27)
BUN/CREAT SERPL: 9 (ref 12–28)
CALCIUM SERPL-MCNC: 9.7 MG/DL (ref 8.7–10.3)
CHLORIDE SERPL-SCNC: 103 MMOL/L (ref 96–106)
CO2 SERPL-SCNC: 23 MMOL/L (ref 20–29)
CREAT SERPL-MCNC: 0.97 MG/DL (ref 0.57–1)
EGFRCR SERPLBLD CKD-EPI 2021: 57 ML/MIN/1.73
EOSINOPHIL # BLD AUTO: 0.1 X10E3/UL (ref 0–0.4)
EOSINOPHIL NFR BLD AUTO: 1 %
ERYTHROCYTE [DISTWIDTH] IN BLOOD BY AUTOMATED COUNT: 15.5 % (ref 11.7–15.4)
EST. AVERAGE GLUCOSE BLD GHB EST-MCNC: 126 MG/DL
FERRITIN SERPL-MCNC: 223 NG/ML (ref 15–150)
GLOBULIN SER CALC-MCNC: 2.4 G/DL (ref 1.5–4.5)
GLUCOSE SERPL-MCNC: 89 MG/DL (ref 70–99)
HBA1C MFR BLD: 6 % (ref 4.8–5.6)
HCT VFR BLD AUTO: 34.5 % (ref 34–46.6)
HGB BLD-MCNC: 10.7 G/DL (ref 11.1–15.9)
IMM GRANULOCYTES # BLD AUTO: 0 X10E3/UL (ref 0–0.1)
IMM GRANULOCYTES NFR BLD AUTO: 0 %
IRON SATN MFR SERPL: 22 % (ref 15–55)
IRON SERPL-MCNC: 64 UG/DL (ref 27–139)
LYMPHOCYTES # BLD AUTO: 1.5 X10E3/UL (ref 0.7–3.1)
LYMPHOCYTES NFR BLD AUTO: 23 %
MCH RBC QN AUTO: 27.6 PG (ref 26.6–33)
MCHC RBC AUTO-ENTMCNC: 31 G/DL (ref 31.5–35.7)
MCV RBC AUTO: 89 FL (ref 79–97)
MONOCYTES # BLD AUTO: 0.5 X10E3/UL (ref 0.1–0.9)
MONOCYTES NFR BLD AUTO: 7 %
NEUTROPHILS # BLD AUTO: 4.3 X10E3/UL (ref 1.4–7)
NEUTROPHILS NFR BLD AUTO: 68 %
PLATELET # BLD AUTO: 273 X10E3/UL (ref 150–450)
POTASSIUM SERPL-SCNC: 4.4 MMOL/L (ref 3.5–5.2)
PROT SERPL-MCNC: 6.6 G/DL (ref 6–8.5)
RBC # BLD AUTO: 3.88 X10E6/UL (ref 3.77–5.28)
REPORT: NORMAL
SODIUM SERPL-SCNC: 142 MMOL/L (ref 134–144)
T4 FREE SERPL-MCNC: 1.25 NG/DL (ref 0.82–1.77)
TIBC SERPL-MCNC: 294 UG/DL (ref 250–450)
TSH SERPL DL<=0.005 MIU/L-ACNC: 5.41 UIU/ML (ref 0.45–4.5)
UIBC SERPL-MCNC: 230 UG/DL (ref 118–369)
WBC # BLD AUTO: 6.3 X10E3/UL (ref 3.4–10.8)

## 2025-09-03 ENCOUNTER — OFFICE VISIT (OUTPATIENT)
Age: 85
End: 2025-09-03
Payer: MEDICARE

## 2025-09-03 ENCOUNTER — APPOINTMENT (OUTPATIENT)
Dept: VASCULAR SURGERY | Facility: HOSPITAL | Age: 85
End: 2025-09-03
Attending: EMERGENCY MEDICINE
Payer: MEDICARE

## 2025-09-03 ENCOUNTER — TELEPHONE (OUTPATIENT)
Age: 85
End: 2025-09-03

## 2025-09-03 ENCOUNTER — HOSPITAL ENCOUNTER (EMERGENCY)
Facility: HOSPITAL | Age: 85
Discharge: HOME | End: 2025-09-03
Attending: EMERGENCY MEDICINE
Payer: MEDICARE

## 2025-09-03 ENCOUNTER — APPOINTMENT (OUTPATIENT)
Facility: HOSPITAL | Age: 85
End: 2025-09-03
Payer: MEDICARE

## 2025-09-03 VITALS
HEIGHT: 63 IN | BODY MASS INDEX: 28.01 KG/M2 | TEMPERATURE: 98.8 F | SYSTOLIC BLOOD PRESSURE: 142 MMHG | OXYGEN SATURATION: 100 % | WEIGHT: 158.07 LBS | DIASTOLIC BLOOD PRESSURE: 73 MMHG | HEART RATE: 61 BPM | RESPIRATION RATE: 18 BRPM

## 2025-09-03 VITALS
HEART RATE: 70 BPM | RESPIRATION RATE: 16 BRPM | BODY MASS INDEX: 28.56 KG/M2 | DIASTOLIC BLOOD PRESSURE: 75 MMHG | OXYGEN SATURATION: 98 % | WEIGHT: 161.2 LBS | SYSTOLIC BLOOD PRESSURE: 130 MMHG | HEIGHT: 63 IN

## 2025-09-03 DIAGNOSIS — M79.89 SWELLING OF BOTH LOWER EXTREMITIES: ICD-10-CM

## 2025-09-03 DIAGNOSIS — R63.4 UNINTENTIONAL WEIGHT LOSS: ICD-10-CM

## 2025-09-03 DIAGNOSIS — D64.9 NORMOCYTIC ANEMIA: Primary | ICD-10-CM

## 2025-09-03 DIAGNOSIS — M79.89 LEG SWELLING: Primary | ICD-10-CM

## 2025-09-03 DIAGNOSIS — R30.0 DYSURIA: ICD-10-CM

## 2025-09-03 DIAGNOSIS — L03.119 CELLULITIS OF LOWER EXTREMITY, UNSPECIFIED LATERALITY: ICD-10-CM

## 2025-09-03 DIAGNOSIS — N39.0 URINARY TRACT INFECTION WITHOUT HEMATURIA, SITE UNSPECIFIED: ICD-10-CM

## 2025-09-03 LAB
ALBUMIN SERPL-MCNC: 3.5 G/DL (ref 3.5–5.2)
ALBUMIN/GLOB SERPL: 0.9 (ref 1.1–2.2)
ALP SERPL-CCNC: 86 U/L (ref 35–104)
ALT SERPL-CCNC: 9 U/L (ref 10–35)
ANION GAP SERPL CALC-SCNC: 13 MMOL/L (ref 2–14)
APPEARANCE UR: CLEAR
AST SERPL-CCNC: 22 U/L (ref 10–35)
BACTERIA URNS QL MICRO: ABNORMAL /HPF
BASOPHILS # BLD: 0.03 K/UL (ref 0–0.1)
BASOPHILS NFR BLD: 0.4 % (ref 0–1)
BILIRUB SERPL-MCNC: 0.4 MG/DL (ref 0–1.2)
BILIRUB UR QL: NEGATIVE
BUN SERPL-MCNC: 5 MG/DL (ref 8–23)
BUN/CREAT SERPL: 6 (ref 12–20)
CALCIUM SERPL-MCNC: 9.7 MG/DL (ref 8.8–10.2)
CHLORIDE SERPL-SCNC: 105 MMOL/L (ref 98–107)
CO2 SERPL-SCNC: 25 MMOL/L (ref 20–29)
COLOR UR: ABNORMAL
CREAT SERPL-MCNC: 0.89 MG/DL (ref 0.6–1)
DIFFERENTIAL METHOD BLD: ABNORMAL
EOSINOPHIL # BLD: 0.06 K/UL (ref 0–0.4)
EOSINOPHIL NFR BLD: 0.9 % (ref 0–7)
EPITH CASTS URNS QL MICRO: ABNORMAL /LPF
ERYTHROCYTE [DISTWIDTH] IN BLOOD BY AUTOMATED COUNT: 15.9 % (ref 11.5–14.5)
GLOBULIN SER CALC-MCNC: 3.7 G/DL (ref 2–4)
GLUCOSE SERPL-MCNC: 96 MG/DL (ref 65–100)
GLUCOSE UR STRIP.AUTO-MCNC: NEGATIVE MG/DL
HCT VFR BLD AUTO: 33.4 % (ref 35–47)
HGB BLD-MCNC: 10.5 G/DL (ref 11.5–16)
HGB UR QL STRIP: NEGATIVE
IMM GRANULOCYTES # BLD AUTO: 0.04 K/UL (ref 0–0.04)
IMM GRANULOCYTES NFR BLD AUTO: 0.6 % (ref 0–0.5)
KETONES UR QL STRIP.AUTO: NEGATIVE MG/DL
LEUKOCYTE ESTERASE UR QL STRIP.AUTO: ABNORMAL
LIPASE SERPL-CCNC: 22 U/L (ref 13–60)
LYMPHOCYTES # BLD: 1.46 K/UL (ref 0.8–3.5)
LYMPHOCYTES NFR BLD: 21 % (ref 12–49)
MAGNESIUM SERPL-MCNC: 1.8 MG/DL (ref 1.6–2.4)
MCH RBC QN AUTO: 27.5 PG (ref 26–34)
MCHC RBC AUTO-ENTMCNC: 31.4 G/DL (ref 30–36.5)
MCV RBC AUTO: 87.4 FL (ref 80–99)
MONOCYTES # BLD: 0.45 K/UL (ref 0–1)
MONOCYTES NFR BLD: 6.5 % (ref 5–13)
NEUTS SEG # BLD: 4.92 K/UL (ref 1.8–8)
NEUTS SEG NFR BLD: 70.6 % (ref 32–75)
NITRITE UR QL STRIP.AUTO: NEGATIVE
NRBC # BLD: 0 K/UL (ref 0–0.01)
NRBC BLD-RTO: 0 PER 100 WBC
NT PRO BNP: 117 PG/ML (ref 0–450)
PH UR STRIP: 8 (ref 5–8)
PLATELET # BLD AUTO: 257 K/UL (ref 150–400)
PMV BLD AUTO: 9.9 FL (ref 8.9–12.9)
POTASSIUM SERPL-SCNC: 3.8 MMOL/L (ref 3.5–5.1)
PROT SERPL-MCNC: 7.3 G/DL (ref 6.4–8.3)
PROT UR STRIP-MCNC: NEGATIVE MG/DL
RBC # BLD AUTO: 3.82 M/UL (ref 3.8–5.2)
RBC #/AREA URNS HPF: ABNORMAL /HPF (ref 0–5)
SODIUM SERPL-SCNC: 142 MMOL/L (ref 136–145)
SP GR UR REFRACTOMETRY: 1.01 (ref 1–1.03)
SPECIMEN HOLD: NORMAL
TROPONIN T SERPL HS-MCNC: 11.9 NG/L (ref 0–14)
UROBILINOGEN UR QL STRIP.AUTO: 1 EU/DL (ref 0.2–1)
WBC # BLD AUTO: 7 K/UL (ref 3.6–11)
WBC URNS QL MICRO: ABNORMAL /HPF (ref 0–4)

## 2025-09-03 PROCEDURE — 83690 ASSAY OF LIPASE: CPT

## 2025-09-03 PROCEDURE — 85025 COMPLETE CBC W/AUTO DIFF WBC: CPT

## 2025-09-03 PROCEDURE — 71045 X-RAY EXAM CHEST 1 VIEW: CPT

## 2025-09-03 PROCEDURE — 36415 COLL VENOUS BLD VENIPUNCTURE: CPT

## 2025-09-03 PROCEDURE — 84484 ASSAY OF TROPONIN QUANT: CPT

## 2025-09-03 PROCEDURE — 99204 OFFICE O/P NEW MOD 45 MIN: CPT | Performed by: NURSE PRACTITIONER

## 2025-09-03 PROCEDURE — 83735 ASSAY OF MAGNESIUM: CPT

## 2025-09-03 PROCEDURE — 93970 EXTREMITY STUDY: CPT

## 2025-09-03 PROCEDURE — 80053 COMPREHEN METABOLIC PANEL: CPT

## 2025-09-03 PROCEDURE — 81001 URINALYSIS AUTO W/SCOPE: CPT

## 2025-09-03 PROCEDURE — 83880 ASSAY OF NATRIURETIC PEPTIDE: CPT

## 2025-09-03 PROCEDURE — 6370000000 HC RX 637 (ALT 250 FOR IP): Performed by: EMERGENCY MEDICINE

## 2025-09-03 RX ORDER — CEPHALEXIN 500 MG/1
500 CAPSULE ORAL 3 TIMES DAILY
Qty: 21 CAPSULE | Refills: 0 | Status: SHIPPED | OUTPATIENT
Start: 2025-09-03 | End: 2025-09-10

## 2025-09-03 RX ORDER — IBUPROFEN 600 MG/1
600 TABLET, FILM COATED ORAL
Status: COMPLETED | OUTPATIENT
Start: 2025-09-03 | End: 2025-09-03

## 2025-09-03 RX ORDER — IBUPROFEN 600 MG/1
600 TABLET, FILM COATED ORAL 4 TIMES DAILY PRN
Qty: 28 TABLET | Refills: 0 | Status: SHIPPED | OUTPATIENT
Start: 2025-09-03 | End: 2025-09-10

## 2025-09-03 RX ORDER — KETOROLAC TROMETHAMINE 15 MG/ML
15 INJECTION, SOLUTION INTRAMUSCULAR; INTRAVENOUS
Status: DISCONTINUED | OUTPATIENT
Start: 2025-09-03 | End: 2025-09-03

## 2025-09-03 RX ADMIN — IBUPROFEN 600 MG: 600 TABLET, FILM COATED ORAL at 15:42

## 2025-09-03 RX ADMIN — CEPHALEXIN 250 MG: 250 CAPSULE ORAL at 15:42

## 2025-09-03 ASSESSMENT — PAIN DESCRIPTION - DESCRIPTORS: DESCRIPTORS: PATIENT UNABLE TO DESCRIBE

## 2025-09-03 ASSESSMENT — PAIN DESCRIPTION - ORIENTATION: ORIENTATION: RIGHT;LEFT

## 2025-09-03 ASSESSMENT — PAIN SCALES - GENERAL: PAINLEVEL_OUTOF10: 10

## 2025-09-03 ASSESSMENT — PAIN DESCRIPTION - LOCATION: LOCATION: LEG

## 2025-09-04 LAB — ECHO BSA: 1.79 M2

## (undated) DEVICE — STERILE POLYISOPRENE POWDER-FREE SURGICAL GLOVES: Brand: PROTEXIS

## (undated) DEVICE — SYRINGE 20ML LL S/C 50

## (undated) DEVICE — SOLUTION IRRIG 1000ML STRL H2O USP PLAS POUR BTL

## (undated) DEVICE — DRAPE,EXTREMITY,89X128,STERILE: Brand: MEDLINE

## (undated) DEVICE — SOLUTION SURG PREP 26 CC PURPREP

## (undated) DEVICE — STRAP,POSITIONING,KNEE/BODY,FOAM,4X60": Brand: MEDLINE

## (undated) DEVICE — ELECTRODE,RADIOTRANSLUCENT,FOAM,5PK: Brand: MEDLINE

## (undated) DEVICE — SYR 20ML LL STRL LF --

## (undated) DEVICE — GLOVE SURG SZ 65 L12IN FNGR THK94MIL STD WHT LTX FREE

## (undated) DEVICE — CONTAINER,SPECIMEN,3OZ,OR STRL: Brand: MEDLINE

## (undated) DEVICE — INFECTION CONTROL KIT SYS

## (undated) DEVICE — TR BAND RADIAL ARTERY COMPRESSION DEVICE: Brand: TR BAND

## (undated) DEVICE — BANDAGE COMPR 9 FTX4 IN SMOOTH COMFORTABLE SYNTH ESMRK LF

## (undated) DEVICE — Device

## (undated) DEVICE — ELECTRODE PT RET AD L9FT HI MOIST COND ADH HYDRGEL CORDED

## (undated) DEVICE — BLADE SAW W083XL354IN THK0047IN CUT THK0047IN SAG FLR

## (undated) DEVICE — (D)PREP SKN CHLRAPRP APPL 26ML -- CONVERT TO ITEM 371833

## (undated) DEVICE — SUTURE ETHIBOND EXCEL SZ 5 L30IN NONABSORBABLE GRN L40MM V-37 MB66G

## (undated) DEVICE — HOOD, PEEL-AWAY: Brand: FLYTE

## (undated) DEVICE — DRAPE,U/ SHT,SPLIT,PLAS,STERIL: Brand: MEDLINE

## (undated) DEVICE — SUTURE ETHLN SZ 4-0 L18IN NONABSORBABLE BLK L19MM PS-2 3/8 1667H

## (undated) DEVICE — APPLICATOR MEDICATED 26 CC SOLUTION HI LT ORNG CHLORAPREP

## (undated) DEVICE — NDL SPNE LR LCK 18GX6IN PNK --

## (undated) DEVICE — HEWSON SUTURE RETRIEVER: Brand: HEWSON SUTURE RETRIEVER

## (undated) DEVICE — GUIDEPIN ORTH 2.5X220 MM SHLDR AEQUALIS PERFORM+

## (undated) DEVICE — STERILE POLYISOPRENE POWDER-FREE SURGICAL GLOVES WITH EMOLLIENT COATING: Brand: PROTEXIS

## (undated) DEVICE — TOTAL TRAY, 16FR 10ML SIL FOLEY, URN: Brand: MEDLINE

## (undated) DEVICE — SUTURE MCRYL SZ 4-0 L27IN ABSRB UD L19MM PS-2 1/2 CIR PRIM Y426H

## (undated) DEVICE — DECANTER BAG 9": Brand: MEDLINE INDUSTRIES, INC.

## (undated) DEVICE — SUTURE SZ 0 27IN 5/8 CIR UR-6  TAPER PT VIOLET ABSRB VICRYL J603H

## (undated) DEVICE — GUIDEPIN ORTH 3X75 MM SS SIMPLICITI

## (undated) DEVICE — GLOVE SURG SZ 8 L12IN FNGR THK79MIL GRN LTX FREE

## (undated) DEVICE — PAD BD MATTRESS 73X32 IN STD CONVOLUTED FOAM LTX FREE

## (undated) DEVICE — CLICKLINE SCISSORS INSERT: Brand: CLICKLINE

## (undated) DEVICE — SUTURE FIBERWIRE SZ 5 L38IN NONABSORBABLE BLU L48MM 1/2 AR7211

## (undated) DEVICE — APPLICATOR MEDICATED 10.5 CC SOLUTION CLR STRL CHLORAPREP

## (undated) DEVICE — PADPRO DEFIBRILLATION/PACING/CARDIOVERSION/MONITORING ELECTRODES, ADULT/CHILD GREATER THAN 10 KG RADIOTRANSPARENT ELECTRODE, PHYSIO-CONTROL QUIK-COMBO (M) 60" (152 CM): Brand: PADPRO

## (undated) DEVICE — YANKAUER,OPEN TIP,W/O VENT,STERILE: Brand: MEDLINE INDUSTRIES, INC.

## (undated) DEVICE — DRAPE,REIN 53X77,STERILE: Brand: MEDLINE

## (undated) DEVICE — ELECTRODE BLDE L4IN NONINSULATED EDGE

## (undated) DEVICE — SOLUTION IRRIG 1000ML H2O STRL BLT

## (undated) DEVICE — DRESSING HYDROCOLLOID BORDER 35X10 IN ALUM PRIMASEAL

## (undated) DEVICE — STRETCH BANDAGE ROLL: Brand: DERMACEA

## (undated) DEVICE — MASTISOL ADHESIVE LIQ 2/3ML

## (undated) DEVICE — SYR 10ML LUER LOK 1/5ML GRAD --

## (undated) DEVICE — GLOVE ORTHO 7   MSG9470

## (undated) DEVICE — NEEDLE HYPO 21GA L1.5IN INTRAMUSCULAR S STL LATCH BVL UP

## (undated) DEVICE — IMMOBILIZER SHLDR M UNIV 65X17IN BLK LIGHWEIGHT PCH SZ REUSE

## (undated) DEVICE — DRESSING HYDROFIBER AQUACEL AG ADVANTAGE 3.5X6 IN

## (undated) DEVICE — SCRUBIN SCRUB BRUSH DRY STER: Brand: MEDLINE INDUSTRIES, INC.

## (undated) DEVICE — LIQUIBAND RAPID ADHESIVE 36/CS 0.8ML: Brand: MEDLINE

## (undated) DEVICE — GLOVE SURG SZ 7 L12IN FNGR THK79MIL GRN LTX FREE

## (undated) DEVICE — GARMENT,MEDLINE,DVT,INT,CALF,MED, GEN2: Brand: MEDLINE

## (undated) DEVICE — SUTURE ETHBND EXCEL SZ 5 L30IN NONABSORBABLE GRN L40MM V-37 MB66G

## (undated) DEVICE — ZIMMER® STERILE DISPOSABLE TOURNIQUET CUFF WITH PROTECTIVE SLEEVE AND PLC, DUAL PORT, SINGLE BLADDER, 18 IN. (46 CM)

## (undated) DEVICE — ALCOHOL RUBBING ISO 16OZ 70%

## (undated) DEVICE — GAUZE SPONGES,12 PLY: Brand: CURITY

## (undated) DEVICE — SUTURE ETHIBOND EXCEL SZ 0 L30IN NONABSORBABLE GRN CT1 L36MM X424H

## (undated) DEVICE — CARTRIDGE BNE CEM MIX UNIV TWR VAC ROTOR BRK OFF NOZ W/O

## (undated) DEVICE — INTENDED FOR TISSUE SEPARATION, AND OTHER PROCEDURES THAT REQUIRE A SHARP SURGICAL BLADE TO PUNCTURE OR CUT.: Brand: BARD-PARKER ® CARBON RIB-BACK BLADES

## (undated) DEVICE — HEART CATH-SFMC: Brand: MEDLINE INDUSTRIES, INC.

## (undated) DEVICE — HYPODERMIC SAFETY NEEDLE: Brand: MAGELLAN

## (undated) DEVICE — SUTURE PDS II SZ 0 L27IN ABSRB VLT L36MM CT-1 1/2 CIR Z340H

## (undated) DEVICE — 3M™ STERI-DRAPE™ U-DRAPE 1015: Brand: STERI-DRAPE™

## (undated) DEVICE — GUIDEWIRE VASC L180CM DIA0.035IN 3MM PTFE J TIP EXCHG FIX

## (undated) DEVICE — HANDPIECE SET WITH BONE CLEANING TIP AND SUCTION TUBE: Brand: INTERPULSE

## (undated) DEVICE — SUTURE ETHIBOND EXCEL SZ 5 L30IN NONABSORBABLE GRN L48MM CCS MB47G

## (undated) DEVICE — SPONGE GZ W4XL4IN COT RADPQ HIGHLY ABSRB STERILE

## (undated) DEVICE — FORCEPS BX L240CM JAW DIA2.8MM L CAP W/ NDL MIC MESH TOOTH

## (undated) DEVICE — STRIP,CLOSURE,WOUND,MEDI-STRIP,1/2X4: Brand: MEDLINE

## (undated) DEVICE — SCRUB DRY SURG EZ SCRUB BRUSH PREOPERATIVE GRN

## (undated) DEVICE — SUTURE ETHIBOND EXCEL SZ 2 L30IN NONABSORBABLE GRN L40MM V-37 MX69G

## (undated) DEVICE — BLADE SAW W098XL354IN THK0047IN CUT THK0047IN SAG

## (undated) DEVICE — FORCEPS BX L240CM JAW DIA2.4MM ORNG L CAP W/ NDL DISP RAD

## (undated) DEVICE — TOWEL,OR,DSP,ST,BLUE,STD,4/PK,20PK/CS: Brand: MEDLINE

## (undated) DEVICE — 2108 SERIES SAGITTAL BLADE (18.6 X 0.8 X 73.8MM)

## (undated) DEVICE — TOTAL JOINT - SMH: Brand: MEDLINE INDUSTRIES, INC.

## (undated) DEVICE — ELECTRO LUBE IS A SINGLE PATIENT USE DEVICE THAT IS INTENDED TO BE USED ON ELECTROSURGICAL ELECTRODES TO REDUCE STICKING.: Brand: KEY SURGICAL ELECTRO LUBE

## (undated) DEVICE — INTRO SAFESHEATH 7FR 25CM W/SIDEPORT

## (undated) DEVICE — SYR LR LCK 1ML GRAD NSAF 30ML --

## (undated) DEVICE — PACK,BASIC,SIRUS,V: Brand: MEDLINE

## (undated) DEVICE — SUTURE VICRYL SZ 1 L36IN ABSRB UD L36MM CT-1 1/2 CIR J947H

## (undated) DEVICE — SPECIAL PROCEDURE DRAPE 32" X 34": Brand: SPECIAL PROCEDURE DRAPE

## (undated) DEVICE — RADIFOCUS GLIDEWIRE ADVANTAGE GUIDEWIRE: Brand: GLIDEWIRE ADVANTAGE

## (undated) DEVICE — BINDER ABD S/M 12X30-45IN --

## (undated) DEVICE — SOLUTION IRRIG 3000ML 0.9% SOD CHL FLX CONT 0797208] ICU MEDICAL INC]

## (undated) DEVICE — LAPAROSCOPIC TROCAR SLEEVE/SINGLE USE: Brand: KII® OPTICAL ACCESS SYSTEM

## (undated) DEVICE — 4-PORT MANIFOLD: Brand: NEPTUNE 2

## (undated) DEVICE — DRESSING FOAM 4X8IN DISP POSTOP MEPILEX BORD AG

## (undated) DEVICE — GLIDESHEATH SLENDER ACCESS KIT: Brand: GLIDESHEATH SLENDER

## (undated) DEVICE — SUTURE VCRL SZ 1 L36IN ABSRB UD L36MM CT-1 1/2 CIR J947H

## (undated) DEVICE — HI-TORQUE VERSACORE MODIFIED J GUIDE WIRE SYSTEM 260 CM: Brand: HI-TORQUE VERSACORE

## (undated) DEVICE — SUTURE VCRL SZ 2-0 L36IN ABSRB UD L40MM CT 1/2 CIR J957H

## (undated) DEVICE — BANDAGE COMPR M W6INXL10YD WHT BGE VELC E MTRX HK AND LOOP

## (undated) DEVICE — SUTURE STRATAFIX SYMMETRIC PDS + SZ 1 L18IN ABSRB VLT L48MM SXPP1A400

## (undated) DEVICE — PREP SKN CHLRAPRP APL 26ML STR --

## (undated) DEVICE — INTRODUCER SHTH L13CM OD7FR SH ORNG HUB SEAMLESS SAFSHTH

## (undated) DEVICE — SYRINGE MED 10ML LUERLOCK TIP W/O SFTY DISP

## (undated) DEVICE — SOLUTION WND IRRIGATION 450 ML 0.5 PVP-I 0.9 NACL

## (undated) DEVICE — PLASMABLADE X PS210-030S-LIGHT 3.0SL: Brand: PLASMABLADE™ X

## (undated) DEVICE — SUTURE VICRYL + SZ 2-0 L27IN ABSRB UD CP-1 1/2 CIR REV CUT VCP266H

## (undated) DEVICE — LIMB HOLDER, WRIST/ANKLE: Brand: DEROYAL

## (undated) DEVICE — ROCKER SWITCH PENCIL BLADE ELECTRODE, HOLSTER: Brand: EDGE

## (undated) DEVICE — LIGHT HANDLE: Brand: DEVON

## (undated) DEVICE — SUTURE STRATAFIX SPRL PDS + SZ 2-0 L9IN ABSRB VLT CT-1 SXPP1B456

## (undated) DEVICE — BLADELESS OBTURATOR: Brand: WECK VISTA

## (undated) DEVICE — SYRINGE IRRIG 60ML SFT PLIABLE BLB EZ TO GRP 1 HND USE W/

## (undated) DEVICE — DEVON™ KNEE AND BODY STRAP 60" X 3" (1.5 M X 7.6 CM): Brand: DEVON

## (undated) DEVICE — 3M™ IOBAN™ 2 ANTIMICROBIAL INCISE DRAPE 6650EZ: Brand: IOBAN™ 2

## (undated) DEVICE — ARM DRAPE

## (undated) DEVICE — INTRODUCER SURG KT 0.018 IN 4 FR SFT TIP REG NIT VSI

## (undated) DEVICE — TUBING IRRIG L10IN DISP PMP ENDOGATOR E

## (undated) DEVICE — COVER MPLR TIP CRV SCIS ACC DA VINCI

## (undated) DEVICE — NEEDLE HYPO 25GA L1.5IN BVL ORIENTED ECLIPSE

## (undated) DEVICE — SUPPORT WRST AD W3.5XL9IN DIA14.5IN ART SFT ADJ HK AND LOOP

## (undated) DEVICE — TOWEL SURG W17XL27IN STD BLU COT NONFENESTRATED PREWASHED

## (undated) DEVICE — OCCLUSIVE GAUZE STRIP,3% BISMUTH TRIBROMOPHENATE IN PETROLATUM BLEND: Brand: XEROFORM

## (undated) DEVICE — OBTURATOR: Brand: ENDOTRIG

## (undated) DEVICE — SPONGE GZ W4XL4IN COT RADPQ HIGHLY ABSRB

## (undated) DEVICE — CATHETER ART THERMODILUTION 6 FRX110 CM 4 LUMEN SWAN

## (undated) DEVICE — GLOVE ORTHO 7 1/2   MSG9475

## (undated) DEVICE — SEAL UNIV 5-8MM DISP BX/10 -- DA VINCI XI - SNGL USE

## (undated) DEVICE — COLON KIT WITH 1.1 OZ ORCA HYDRA SEAL 2 GOWN

## (undated) DEVICE — TIP SUCT CRV REG REDI

## (undated) DEVICE — REM POLYHESIVE ADULT PATIENT RETURN ELECTRODE: Brand: VALLEYLAB

## (undated) DEVICE — SOLUTION IV 1000ML 0.9% SOD CHL

## (undated) DEVICE — SOLUTION IRRIG 3000ML 0.9% SOD CHL USP UROMATIC PLAS CONT

## (undated) DEVICE — SURGICAL PROCEDURE PACK BASIN MAJ SET CUST NO CAUT

## (undated) DEVICE — SUTURE VCRL SZ 4-0 L27IN ABSRB UD L26MM SH 1/2 CIR J415H

## (undated) DEVICE — PADDING CST 6IN STERILE --

## (undated) DEVICE — PROVE COVER: Brand: UNBRANDED

## (undated) DEVICE — BIT DRL SCREW 3.2 MM PERIPH STRL

## (undated) DEVICE — MICROPUNCTURE INTRODUCER SET SILHOUETTE TRANSITIONLESS WITH STAINLESS STEEL WIRE GUIDE: Brand: MICROPUNCTURE

## (undated) DEVICE — SUTURE VICRYL ABSORBABLE BRAIDED 2-0 CT 36 IN DA UD  VCP957H

## (undated) DEVICE — SUTURE MONOCRYL STRATAFIX SPRL + SZ 4-0 L12IN ABSRB UD PS-2 SXMP1B117

## (undated) DEVICE — BIT DRL L5IN DIA2MM STD ST S STL TWST BUSA

## (undated) DEVICE — MARKER SURG SKIN UTIL BLK REG TIP NONSMEARING W/ 6IN RUL

## (undated) DEVICE — COVER,MAYO STAND,STERILE: Brand: MEDLINE

## (undated) DEVICE — SUTURE NONABSORBABLE MONOFILAMENT 4-0 PS-2 18 IN BLU PROLENE 8682H

## (undated) DEVICE — (D)STRIP SKN CLSR 0.5X4IN WHT --

## (undated) DEVICE — SUTURE FIBERWIRE SZ 2 W/ TAPERED NEEDLE BLUE L38IN NONABSORB BLU L26.5MM 1/2 CIRCLE AR7200

## (undated) DEVICE — CATHETER KIT JL 4 JL5 6 FRX100 CM 110 CM 145 PGTL DXTERITY

## (undated) DEVICE — ARGYLE FRAZIER SURGICAL SUCTION INSTRUMENT 10 FR/CH (3.3 MM): Brand: ARGYLE

## (undated) DEVICE — AIRSEAL BIFURCATED SMOKE EVAC FILTERED TUBE SET: Brand: AIRSEAL

## (undated) DEVICE — ROSEN CURVED WIRE GUIDE: Brand: ROSEN

## (undated) DEVICE — ROBOTIC GENERAL-SFMC: Brand: MEDLINE INDUSTRIES, INC.

## (undated) DEVICE — SUTURE ABSRB L30CM 2-0 VLT SPRL PDS + STRATAFIX SXPP1B410

## (undated) DEVICE — BLADE SAW OSC COARSE 25X9MM --

## (undated) DEVICE — T4 HOOD

## (undated) DEVICE — SUTURE VCRL SZ 2 L54IN ABSRB UD L65MM TP-1 1/2 CIR J880T

## (undated) DEVICE — BNDG ADH FABRIC 2X4IN ST LF --

## (undated) DEVICE — DERMACEA GAUZE FLUFF ROLL: Brand: DERMACEA